# Patient Record
Sex: FEMALE | Race: WHITE | NOT HISPANIC OR LATINO | Employment: OTHER | URBAN - METROPOLITAN AREA
[De-identification: names, ages, dates, MRNs, and addresses within clinical notes are randomized per-mention and may not be internally consistent; named-entity substitution may affect disease eponyms.]

---

## 2018-07-15 ENCOUNTER — HOSPITAL ENCOUNTER (EMERGENCY)
Facility: HOSPITAL | Age: 83
Discharge: HOME | End: 2018-07-16
Attending: EMERGENCY MEDICINE | Admitting: EMERGENCY MEDICINE

## 2018-07-15 ENCOUNTER — APPOINTMENT (OUTPATIENT)
Dept: CT IMAGING | Facility: HOSPITAL | Age: 83
End: 2018-07-15

## 2018-07-15 DIAGNOSIS — R91.8 MASS OF UPPER LOBE OF RIGHT LUNG: ICD-10-CM

## 2018-07-15 DIAGNOSIS — T07.XXXA MULTIPLE CONTUSIONS: ICD-10-CM

## 2018-07-15 DIAGNOSIS — K59.09 CHRONIC CONSTIPATION: ICD-10-CM

## 2018-07-15 DIAGNOSIS — R29.6 MULTIPLE FALLS: Primary | ICD-10-CM

## 2018-07-15 LAB
BILIRUB UR QL STRIP: NEGATIVE
CLARITY UR: CLEAR
COLOR UR: ABNORMAL
GLUCOSE UR STRIP-MCNC: NEGATIVE MG/DL
HGB UR QL STRIP.AUTO: NEGATIVE
KETONES UR QL STRIP: ABNORMAL
LEUKOCYTE ESTERASE UR QL STRIP.AUTO: ABNORMAL
NITRITE UR QL STRIP: NEGATIVE
PH UR STRIP.AUTO: <=5 [PH] (ref 5–8)
PROT UR QL STRIP: ABNORMAL
SP GR UR STRIP: 1.02 (ref 1–1.03)
UROBILINOGEN UR QL STRIP: ABNORMAL

## 2018-07-15 PROCEDURE — 72125 CT NECK SPINE W/O DYE: CPT

## 2018-07-15 PROCEDURE — 70486 CT MAXILLOFACIAL W/O DYE: CPT

## 2018-07-15 PROCEDURE — 96372 THER/PROPH/DIAG INJ SC/IM: CPT

## 2018-07-15 PROCEDURE — 74176 CT ABD & PELVIS W/O CONTRAST: CPT

## 2018-07-15 PROCEDURE — 71250 CT THORAX DX C-: CPT

## 2018-07-15 PROCEDURE — 70450 CT HEAD/BRAIN W/O DYE: CPT

## 2018-07-15 PROCEDURE — 81001 URINALYSIS AUTO W/SCOPE: CPT | Performed by: EMERGENCY MEDICINE

## 2018-07-15 PROCEDURE — 99284 EMERGENCY DEPT VISIT MOD MDM: CPT

## 2018-07-15 RX ORDER — CARBAMAZEPINE 300 MG/1
300 CAPSULE, EXTENDED RELEASE ORAL DAILY
COMMUNITY
End: 2018-07-20 | Stop reason: HOSPADM

## 2018-07-15 RX ORDER — LANOLIN ALCOHOL/MO/W.PET/CERES
400 CREAM (GRAM) TOPICAL 2 TIMES DAILY
COMMUNITY

## 2018-07-15 RX ORDER — ASPIRIN 81 MG/1
81 TABLET ORAL DAILY
COMMUNITY
End: 2021-12-06

## 2018-07-15 RX ORDER — MIRTAZAPINE 30 MG/1
15 TABLET, FILM COATED ORAL NIGHTLY
COMMUNITY
End: 2018-07-20 | Stop reason: HOSPADM

## 2018-07-15 RX ORDER — BUDESONIDE AND FORMOTEROL FUMARATE DIHYDRATE 160; 4.5 UG/1; UG/1
2 AEROSOL RESPIRATORY (INHALATION)
COMMUNITY
End: 2021-12-06

## 2018-07-15 RX ORDER — PANTOPRAZOLE SODIUM 20 MG/1
20 TABLET, DELAYED RELEASE ORAL DAILY
COMMUNITY
End: 2022-04-22

## 2018-07-15 RX ORDER — LOSARTAN POTASSIUM 50 MG/1
50 TABLET ORAL DAILY
COMMUNITY
End: 2018-07-20 | Stop reason: HOSPADM

## 2018-07-15 RX ORDER — ATENOLOL 50 MG/1
50 TABLET ORAL DAILY
COMMUNITY
End: 2018-07-20 | Stop reason: HOSPADM

## 2018-07-15 RX ORDER — SACCHAROMYCES BOULARDII 250 MG
250 CAPSULE ORAL DAILY
COMMUNITY
End: 2018-07-20 | Stop reason: HOSPADM

## 2018-07-15 RX ORDER — LEVOTHYROXINE SODIUM 0.03 MG/1
25 TABLET ORAL
COMMUNITY

## 2018-07-16 VITALS
HEIGHT: 67 IN | SYSTOLIC BLOOD PRESSURE: 127 MMHG | WEIGHT: 88 LBS | TEMPERATURE: 97.7 F | DIASTOLIC BLOOD PRESSURE: 77 MMHG | HEART RATE: 75 BPM | OXYGEN SATURATION: 97 % | BODY MASS INDEX: 13.81 KG/M2 | RESPIRATION RATE: 16 BRPM

## 2018-07-16 LAB
BACTERIA UR QL AUTO: ABNORMAL /HPF
COARSE GRAN CASTS URNS QL MICRO: ABNORMAL /LPF
HYALINE CASTS UR QL AUTO: ABNORMAL /LPF
RBC # UR: ABNORMAL /HPF
REF LAB TEST METHOD: ABNORMAL
SQUAMOUS #/AREA URNS HPF: ABNORMAL /HPF
WBC UR QL AUTO: ABNORMAL /HPF

## 2018-07-16 PROCEDURE — 96372 THER/PROPH/DIAG INJ SC/IM: CPT

## 2018-07-16 RX ADMIN — METHYLNALTREXONE BROMIDE 6 MG: 12 INJECTION, SOLUTION SUBCUTANEOUS at 01:34

## 2018-07-18 ENCOUNTER — APPOINTMENT (OUTPATIENT)
Dept: GENERAL RADIOLOGY | Facility: HOSPITAL | Age: 83
End: 2018-07-18

## 2018-07-18 ENCOUNTER — HOSPITAL ENCOUNTER (OUTPATIENT)
Facility: HOSPITAL | Age: 83
Setting detail: OBSERVATION
Discharge: NURSING, REHAB OR LONG-TERM CARE | End: 2018-07-20
Attending: EMERGENCY MEDICINE | Admitting: EMERGENCY MEDICINE

## 2018-07-18 ENCOUNTER — APPOINTMENT (OUTPATIENT)
Dept: CT IMAGING | Facility: HOSPITAL | Age: 83
End: 2018-07-18

## 2018-07-18 DIAGNOSIS — S72.114A CLOSED NONDISPLACED FRACTURE OF GREATER TROCHANTER OF RIGHT FEMUR, INITIAL ENCOUNTER (HCC): ICD-10-CM

## 2018-07-18 DIAGNOSIS — Z74.09 IMPAIRED MOBILITY AND ADLS: ICD-10-CM

## 2018-07-18 DIAGNOSIS — R78.89 ELEVATED CARBAMAZEPINE LEVEL: ICD-10-CM

## 2018-07-18 DIAGNOSIS — N39.0 RECURRENT URINARY TRACT INFECTION: ICD-10-CM

## 2018-07-18 DIAGNOSIS — R29.6 MULTIPLE FALLS: Primary | ICD-10-CM

## 2018-07-18 DIAGNOSIS — F03.C0 ADVANCED DEMENTIA (HCC): ICD-10-CM
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
## 2018-07-18 DIAGNOSIS — Z74.09 IMPAIRED FUNCTIONAL MOBILITY, BALANCE, GAIT, AND ENDURANCE: ICD-10-CM
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
## 2018-07-18 DIAGNOSIS — Z78.9 IMPAIRED MOBILITY AND ADLS: ICD-10-CM

## 2018-07-18 LAB
ALBUMIN SERPL-MCNC: 3.61 G/DL (ref 3.2–4.8)
ALBUMIN/GLOB SERPL: 1.5 G/DL (ref 1.5–2.5)
ALP SERPL-CCNC: 88 U/L (ref 25–100)
ALT SERPL W P-5'-P-CCNC: 15 U/L (ref 7–40)
ANION GAP SERPL CALCULATED.3IONS-SCNC: 7 MMOL/L (ref 3–11)
AST SERPL-CCNC: 26 U/L (ref 0–33)
BACTERIA UR QL AUTO: ABNORMAL /HPF
BASOPHILS # BLD AUTO: 0.03 10*3/MM3 (ref 0–0.2)
BASOPHILS NFR BLD AUTO: 0.2 % (ref 0–1)
BILIRUB SERPL-MCNC: 0.3 MG/DL (ref 0.3–1.2)
BILIRUB UR QL STRIP: NEGATIVE
BUN BLD-MCNC: 22 MG/DL (ref 9–23)
BUN/CREAT SERPL: 22.9 (ref 7–25)
CALCIUM SPEC-SCNC: 8.7 MG/DL (ref 8.7–10.4)
CARBAMAZEPINE SERPL-MCNC: 19.5 MCG/ML (ref 4–10)
CHLORIDE SERPL-SCNC: 106 MMOL/L (ref 99–109)
CLARITY UR: ABNORMAL
CO2 SERPL-SCNC: 27 MMOL/L (ref 20–31)
COLOR UR: YELLOW
CREAT BLD-MCNC: 0.96 MG/DL (ref 0.6–1.3)
DEPRECATED RDW RBC AUTO: 43.7 FL (ref 37–54)
EOSINOPHIL # BLD AUTO: 0.05 10*3/MM3 (ref 0–0.3)
EOSINOPHIL NFR BLD AUTO: 0.4 % (ref 0–3)
ERYTHROCYTE [DISTWIDTH] IN BLOOD BY AUTOMATED COUNT: 12.6 % (ref 11.3–14.5)
GFR SERPL CREATININE-BSD FRML MDRD: 56 ML/MIN/1.73
GLOBULIN UR ELPH-MCNC: 2.4 GM/DL
GLUCOSE BLD-MCNC: 104 MG/DL (ref 70–100)
GLUCOSE UR STRIP-MCNC: NEGATIVE MG/DL
HCT VFR BLD AUTO: 31.8 % (ref 34.5–44)
HGB BLD-MCNC: 10.3 G/DL (ref 11.5–15.5)
HGB UR QL STRIP.AUTO: NEGATIVE
HYALINE CASTS UR QL AUTO: ABNORMAL /LPF
IMM GRANULOCYTES # BLD: 0.04 10*3/MM3 (ref 0–0.03)
IMM GRANULOCYTES NFR BLD: 0.3 % (ref 0–0.6)
KETONES UR QL STRIP: NEGATIVE
LEUKOCYTE ESTERASE UR QL STRIP.AUTO: ABNORMAL
LYMPHOCYTES # BLD AUTO: 1.14 10*3/MM3 (ref 0.6–4.8)
LYMPHOCYTES NFR BLD AUTO: 8.7 % (ref 24–44)
MCH RBC QN AUTO: 30.7 PG (ref 27–31)
MCHC RBC AUTO-ENTMCNC: 32.4 G/DL (ref 32–36)
MCV RBC AUTO: 94.9 FL (ref 80–99)
MONOCYTES # BLD AUTO: 1.15 10*3/MM3 (ref 0–1)
MONOCYTES NFR BLD AUTO: 8.7 % (ref 0–12)
NEUTROPHILS # BLD AUTO: 10.8 10*3/MM3 (ref 1.5–8.3)
NEUTROPHILS NFR BLD AUTO: 82 % (ref 41–71)
NITRITE UR QL STRIP: POSITIVE
PH UR STRIP.AUTO: 7 [PH] (ref 5–8)
PLATELET # BLD AUTO: 227 10*3/MM3 (ref 150–450)
PMV BLD AUTO: 10.4 FL (ref 6–12)
POTASSIUM BLD-SCNC: 4 MMOL/L (ref 3.5–5.5)
PROT SERPL-MCNC: 6 G/DL (ref 5.7–8.2)
PROT UR QL STRIP: ABNORMAL
RBC # BLD AUTO: 3.35 10*6/MM3 (ref 3.89–5.14)
RBC # UR: ABNORMAL /HPF
REF LAB TEST METHOD: ABNORMAL
SODIUM BLD-SCNC: 140 MMOL/L (ref 132–146)
SP GR UR STRIP: 1.02 (ref 1–1.03)
SQUAMOUS #/AREA URNS HPF: ABNORMAL /HPF
UROBILINOGEN UR QL STRIP: ABNORMAL
WBC NRBC COR # BLD: 13.17 10*3/MM3 (ref 3.5–10.8)
WBC UR QL AUTO: ABNORMAL /HPF

## 2018-07-18 PROCEDURE — 96372 THER/PROPH/DIAG INJ SC/IM: CPT

## 2018-07-18 PROCEDURE — 25010000002 CEFTRIAXONE PER 250 MG: Performed by: EMERGENCY MEDICINE

## 2018-07-18 PROCEDURE — 87186 SC STD MICRODIL/AGAR DIL: CPT | Performed by: EMERGENCY MEDICINE

## 2018-07-18 PROCEDURE — P9612 CATHETERIZE FOR URINE SPEC: HCPCS

## 2018-07-18 PROCEDURE — 72125 CT NECK SPINE W/O DYE: CPT

## 2018-07-18 PROCEDURE — 70450 CT HEAD/BRAIN W/O DYE: CPT

## 2018-07-18 PROCEDURE — G0378 HOSPITAL OBSERVATION PER HR: HCPCS

## 2018-07-18 PROCEDURE — 99285 EMERGENCY DEPT VISIT HI MDM: CPT

## 2018-07-18 PROCEDURE — 87077 CULTURE AEROBIC IDENTIFY: CPT | Performed by: EMERGENCY MEDICINE

## 2018-07-18 PROCEDURE — 80053 COMPREHEN METABOLIC PANEL: CPT | Performed by: EMERGENCY MEDICINE

## 2018-07-18 PROCEDURE — 70486 CT MAXILLOFACIAL W/O DYE: CPT

## 2018-07-18 PROCEDURE — 87086 URINE CULTURE/COLONY COUNT: CPT | Performed by: EMERGENCY MEDICINE

## 2018-07-18 PROCEDURE — 96365 THER/PROPH/DIAG IV INF INIT: CPT

## 2018-07-18 PROCEDURE — 80156 ASSAY CARBAMAZEPINE TOTAL: CPT | Performed by: EMERGENCY MEDICINE

## 2018-07-18 PROCEDURE — 85025 COMPLETE CBC W/AUTO DIFF WBC: CPT | Performed by: EMERGENCY MEDICINE

## 2018-07-18 PROCEDURE — 99220 PR INITIAL OBSERVATION CARE/DAY 70 MINUTES: CPT | Performed by: HOSPITALIST

## 2018-07-18 PROCEDURE — 25010000002 HEPARIN (PORCINE) PER 1000 UNITS: Performed by: HOSPITALIST

## 2018-07-18 PROCEDURE — 73502 X-RAY EXAM HIP UNI 2-3 VIEWS: CPT

## 2018-07-18 PROCEDURE — 81001 URINALYSIS AUTO W/SCOPE: CPT | Performed by: EMERGENCY MEDICINE

## 2018-07-18 RX ORDER — ACETAMINOPHEN 325 MG/1
650 TABLET ORAL EVERY 4 HOURS PRN
Status: DISCONTINUED | OUTPATIENT
Start: 2018-07-18 | End: 2018-07-20 | Stop reason: HOSPADM

## 2018-07-18 RX ORDER — CEFTRIAXONE SODIUM 1 G/50ML
1 INJECTION, SOLUTION INTRAVENOUS ONCE
Status: COMPLETED | OUTPATIENT
Start: 2018-07-18 | End: 2018-07-18

## 2018-07-18 RX ORDER — ONDANSETRON 2 MG/ML
4 INJECTION INTRAMUSCULAR; INTRAVENOUS EVERY 6 HOURS PRN
Status: DISCONTINUED | OUTPATIENT
Start: 2018-07-18 | End: 2018-07-20 | Stop reason: HOSPADM

## 2018-07-18 RX ORDER — LOSARTAN POTASSIUM AND HYDROCHLOROTHIAZIDE 25; 100 MG/1; MG/1
0.5 TABLET ORAL DAILY
COMMUNITY
End: 2018-07-20 | Stop reason: HOSPADM

## 2018-07-18 RX ORDER — LOVASTATIN 40 MG/1
40 TABLET ORAL NIGHTLY
COMMUNITY
End: 2021-12-06

## 2018-07-18 RX ORDER — AMLODIPINE BESYLATE 10 MG/1
10 TABLET ORAL DAILY
COMMUNITY
End: 2021-12-06 | Stop reason: HOSPADM

## 2018-07-18 RX ORDER — HEPARIN SODIUM 5000 [USP'U]/ML
5000 INJECTION, SOLUTION INTRAVENOUS; SUBCUTANEOUS EVERY 12 HOURS SCHEDULED
Status: DISCONTINUED | OUTPATIENT
Start: 2018-07-18 | End: 2018-07-20 | Stop reason: HOSPADM

## 2018-07-18 RX ORDER — PANTOPRAZOLE SODIUM 40 MG/1
40 TABLET, DELAYED RELEASE ORAL
Status: DISCONTINUED | OUTPATIENT
Start: 2018-07-19 | End: 2018-07-20 | Stop reason: HOSPADM

## 2018-07-18 RX ORDER — ACETAMINOPHEN 500 MG
1000 TABLET ORAL ONCE
Status: COMPLETED | OUTPATIENT
Start: 2018-07-18 | End: 2018-07-18

## 2018-07-18 RX ORDER — SODIUM CHLORIDE 0.9 % (FLUSH) 0.9 %
10 SYRINGE (ML) INJECTION AS NEEDED
Status: DISCONTINUED | OUTPATIENT
Start: 2018-07-18 | End: 2018-07-20 | Stop reason: HOSPADM

## 2018-07-18 RX ORDER — ONDANSETRON 4 MG/1
4 TABLET, FILM COATED ORAL EVERY 6 HOURS PRN
Status: DISCONTINUED | OUTPATIENT
Start: 2018-07-18 | End: 2018-07-20 | Stop reason: HOSPADM

## 2018-07-18 RX ORDER — ONDANSETRON HYDROCHLORIDE 8 MG/1
TABLET, FILM COATED ORAL 3 TIMES DAILY
COMMUNITY
End: 2018-07-20 | Stop reason: HOSPADM

## 2018-07-18 RX ORDER — LEVOTHYROXINE SODIUM 0.03 MG/1
25 TABLET ORAL
Status: DISCONTINUED | OUTPATIENT
Start: 2018-07-19 | End: 2018-07-20 | Stop reason: HOSPADM

## 2018-07-18 RX ORDER — CEFTRIAXONE SODIUM 1 G/50ML
1 INJECTION, SOLUTION INTRAVENOUS EVERY 24 HOURS
Status: COMPLETED | OUTPATIENT
Start: 2018-07-19 | End: 2018-07-20

## 2018-07-18 RX ORDER — DRONABINOL 2.5 MG/1
2.5 CAPSULE ORAL
Status: ON HOLD | COMMUNITY
End: 2018-07-20

## 2018-07-18 RX ORDER — ATORVASTATIN CALCIUM 10 MG/1
10 TABLET, FILM COATED ORAL DAILY
Status: DISCONTINUED | OUTPATIENT
Start: 2018-07-18 | End: 2018-07-20 | Stop reason: HOSPADM

## 2018-07-18 RX ORDER — BUDESONIDE AND FORMOTEROL FUMARATE DIHYDRATE 160; 4.5 UG/1; UG/1
2 AEROSOL RESPIRATORY (INHALATION)
Status: DISCONTINUED | OUTPATIENT
Start: 2018-07-18 | End: 2018-07-20 | Stop reason: HOSPADM

## 2018-07-18 RX ORDER — SODIUM CHLORIDE 0.9 % (FLUSH) 0.9 %
1-10 SYRINGE (ML) INJECTION AS NEEDED
Status: DISCONTINUED | OUTPATIENT
Start: 2018-07-18 | End: 2018-07-20 | Stop reason: HOSPADM

## 2018-07-18 RX ORDER — DRONABINOL 2.5 MG/1
2.5 CAPSULE ORAL
Status: DISCONTINUED | OUTPATIENT
Start: 2018-07-18 | End: 2018-07-20 | Stop reason: HOSPADM

## 2018-07-18 RX ORDER — AMLODIPINE BESYLATE 10 MG/1
10 TABLET ORAL DAILY
Status: DISCONTINUED | OUTPATIENT
Start: 2018-07-18 | End: 2018-07-20 | Stop reason: HOSPADM

## 2018-07-18 RX ADMIN — CEFTRIAXONE SODIUM 1 G: 1 INJECTION, SOLUTION INTRAVENOUS at 09:00

## 2018-07-18 RX ADMIN — ATORVASTATIN CALCIUM 10 MG: 10 TABLET, FILM COATED ORAL at 21:27

## 2018-07-18 RX ADMIN — ACETAMINOPHEN 1000 MG: 500 TABLET, FILM COATED ORAL at 06:29

## 2018-07-18 RX ADMIN — AMLODIPINE BESYLATE 10 MG: 10 TABLET ORAL at 21:26

## 2018-07-18 RX ADMIN — HEPARIN SODIUM 5000 UNITS: 5000 INJECTION, SOLUTION INTRAVENOUS; SUBCUTANEOUS at 21:27

## 2018-07-18 RX ADMIN — ACETAMINOPHEN 650 MG: 325 TABLET, FILM COATED ORAL at 17:59

## 2018-07-18 NOTE — CONSULTS
No Known Provider  Consulting physician: Andrew    Chief Complaint   Patient presents with   • Fall         HPI: Patient is an 83-year-old female brought in hospital after suffering a fall.  The patient probably had multiple falls over the last couple of days.  She was seen in the ER about 2 days ago for a previous fall.  The patient lives in independent living facility, however she has had a recent stay in a rehabilitation facility after suffering a hip fracture.  The patient herself states that she does not remember to fall or what caused her to fall.      Past Medical History:   Diagnosis Date   • Ataxic gait    • Chronic kidney disease, stage 3    • COPD (chronic obstructive pulmonary disease) (CMS/[facility])    • Dementia    • Disease of thyroid gland    • Hypertension      Past Surgical History:   Procedure Laterality Date   • BRAIN TUMOR EXCISION      BENIGN   • FRACTURE SURGERY     • TRIGGER FINGER RELEASE       Current Code Status     Date Active Code Status Order ID Comments User Context       7/18/2018 12:02 PM No CPR [id number]  Jessie Morales MD ED       Questions for Current Code Status     Question Answer Comment    Code Status No CPR     Medical Interventions (Level of Support Prior to Arrest) Limited     Limited Support to NOT Include Dialysis      Intubation      Vasopressors     Comments Freddy Eric  Arnie Medical POA     Level Of Support Discussed With Health Care Surrogate         Current Facility-Administered Medications   Medication Dose Route Frequency Provider Last Rate Last Dose   • [START ON 7/19/2018] cefTRIAXone (ROCEPHIN) IVPB 1 g  1 g Intravenous Q24H Jessie Morales MD       • sodium chloride 0.9 % flush 10 mL  10 mL Intravenous PRN Carlos Espinal MD         Current Outpatient Prescriptions   Medication Sig Dispense Refill   • amLODIPine (NORVASC) 10 MG tablet Take 10 mg by mouth Daily.     • aspirin 81 MG EC tablet Take 81 mg by mouth Daily.     • atenolol (TENORMIN) 50 MG tablet Take  "50 mg by mouth Daily.     • budesonide-formoterol (SYMBICORT) 160-4.5 MCG/ACT inhaler Inhale 2 puffs 2 (Two) Times a Day.     • carBAMazepine (CARBATROL) 300 MG 12 hr capsule Take 300 mg by mouth Daily.     • dronabinol (MARINOL) 2.5 MG capsule Take 2.5 mg by mouth 2 (Two) Times a Day Before Meals.     • levothyroxine (SYNTHROID, LEVOTHROID) 25 MCG tablet Take 25 mcg by mouth Daily.     • losartan-hydrochlorothiazide (HYZAAR) 100-25 MG per tablet Take 0.5 tablets by mouth Daily.     • lovastatin (MEVACOR) 40 MG tablet Take 40 mg by mouth Every Night.     • mirtazapine (REMERON) 30 MG tablet Take 15 mg by mouth Every Night.     • ondansetron (ZOFRAN) 8 MG tablet Take  by mouth 3 (Three) Times a Day.     • pantoprazole (PROTONIX) 40 MG EC tablet Take 40 mg by mouth Daily.     • folic acid (FOLVITE) 400 MCG tablet Take 400 mcg by mouth 2 (Two) Times a Day.     • losartan (COZAAR) 50 MG tablet Take 50 mg by mouth Daily.     • saccharomyces boulardii (FLORASTOR) 250 MG capsule Take 250 mg by mouth Daily.          Insert peripheral IV **AND** sodium chloride  No Known Allergies  History reviewed. No pertinent family history.  Social History     Social History   • Marital status: Single     Spouse name: N/A   • Number of children: N/A   • Years of education: N/A     Occupational History   • Not on file.     Social History Main Topics   • Smoking status: Former Smoker   • Smokeless tobacco: Not on file   • Alcohol use No   • Drug use: No   • Sexual activity: Not on file     Other Topics Concern   • Not on file     Social History Narrative   • No narrative on file     Review of Systems - All others reviewed and found negative except mentioned in the history of present illness      /63   Pulse 66   Temp 97.6 °F (36.4 °C) (Oral)   Resp 18   Ht 160 cm (63\")   Wt 39.9 kg (88 lb)   SpO2 94%   BMI 15.59 kg/m²     Intake/Output Summary (Last 24 hours) at 07/18/18 1401  Last data filed at 07/18/18 1221   Gross per 24 " hour   Intake               50 ml   Output                0 ml   Net               50 ml     Physical Exam:      General Appearance:    Alert, cooperative, in no acute distress   Head:    Multiple areas of ecchymosis on her face and forehead   Eyes:            Lids and lashes normal, conjunctivae and sclerae normal, no   icterus, no pallor, corneas clear, PERRLA   Ears:    Ears appear intact with no abnormalities noted   Throat:   No oral lesions, no thrush, oral mucosa moist   Neck:   No adenopathy, supple, trachea midline, no thyromegaly, no     carotid bruit, no JVD   Back:     No kyphosis present, no scoliosis present, no skin lesions,       erythema or scars, no tenderness to percussion or                   palpation,   range of motion normal   Lungs:     Clear to auscultation,respirations regular, even and                   unlabored    Heart:    Regular rhythm and normal rate, normal S1 and S2, no            murmur, no gallop, no rub, no click   Breast Exam:    Deferred   Abdomen:     Normal bowel sounds, no masses, no organomegaly, soft        non-tender, non-distended, no guarding, no rebound                 tenderness   Genitalia:    Deferred   Extremities:   Moves all extremities well, no edema, no cyanosis, no              redness   Pulses:   Pulses palpable and equal bilaterally   Skin:   No bleeding, bruising or rash   Lymph nodes:   No palpable adenopathy   Neurologic:   Cranial nerves 2 - 12 grossly intact, sensation intact, DTR        present and equal bilaterally         Results from last 7 days  Lab Units 07/18/18  0655   WBC 10*3/mm3 13.17*   HEMOGLOBIN g/dL 10.3*   HEMATOCRIT % 31.8*   PLATELETS 10*3/mm3 227       Results from last 7 days  Lab Units 07/18/18  0655   SODIUM mmol/L 140   POTASSIUM mmol/L 4.0   CHLORIDE mmol/L 106   CO2 mmol/L 27.0   BUN mg/dL 22   CREATININE mg/dL 0.96   CALCIUM mg/dL 8.7   BILIRUBIN mg/dL 0.3   ALK PHOS U/L 88   ALT (SGPT) U/L 15   AST (SGOT) U/L 26   GLUCOSE  mg/dL 104*       Results from last 7 days  Lab Units 07/18/18  0655   SODIUM mmol/L 140   POTASSIUM mmol/L 4.0   CHLORIDE mmol/L 106   CO2 mmol/L 27.0   BUN mg/dL 22   CREATININE mg/dL 0.96   GLUCOSE mg/dL 104*   CALCIUM mg/dL 8.7     Imaging Results (last 72 hours)     Procedure Component Value Units Date/Time    XR Hip With or Without Pelvis 2 - 3 View Right [[id number]] Collected:  07/18/18 0858     Updated:  07/18/18 1124    Narrative:       EXAMINATION: XR HIP W OR WO PELVIS 2-3 VIEW RIGHT- 07/18/2018     INDICATION: multiple falls with right hip fracture     TECHNIQUE:  Frontal view pelvis and frontal and lateral views right hip     COMPARISONS:  CT abdomen/pelvis 07/15/2018     FINDINGS:  Visible in retrospect on the comparison CT of the  abdomen/pelvis and poorly visualized on the radiograph is a fracture  involving the right greater trochanter; it is unknown when the ORIF of  the right hip was performed so this may be acute or chronic. Sacral  arcuate, ilioischial and iliopectineal lines are preserved. Hip joint  spaces are preserved. Incidental lumbar spine degenerative change noted.       Impression:       Age-indeterminate right greater trochanter fracture. ORIF  presumed prior right femoral neck fracture.     D:  07/18/2018  E:  07/18/2018     This report was finalized on 7/18/2018 11:22 AM by Cisco Jimenez.       CT Head Without Contrast [[id number]] Collected:  07/18/18 0621     Updated:  07/18/18 0758    Narrative:       EXAM:    CT Head Without Intravenous Contrast    EXAM DATE/TIME:    7/18/2018 6:21 AM    CLINICAL HISTORY:    83 years old, female; Injury or trauma; Fall; Initial encounter; Blunt trauma   (contusions or hematomas); Consciousness not specified; Additional info:   Multiple falls with head and face trauma    TECHNIQUE:    Axial computed tomography images of the head/brain without intravenous   contrast.  All CT scans at this facility use at least one of these dose   optimization  techniques: automated exposure control; mA and/or kV adjustment   per patient size (includes targeted exams where dose is matched to clinical   indication); or iterative reconstruction.    COMPARISON:    CT HEAD WO CONTRAST 2018-07-15 22:49    FINDINGS:    Brain: Prior left frontal craniotomy with subjacent old tissue loss.   Ill-defined areas of low attenuation  are  noted in the supratentorial white   matter.  Age related microvascular/ischemic disease may be considered. Stable 9   mm right far anterior falcine calcification versus calcified old meningioma.    Ventricles: Atrophic changes.    Bones/joints:  Unremarkable.  No acute fracture.    Soft tissues:  Unremarkable.    Sinuses:  Unremarkable as visualized.  No acute sinusitis.    Mastoid air cells:  Unremarkable as visualized.  No mastoid effusion.      Impression:       Essentially stable appearance compared to 7/15/18, with findings above.  MRI may be useful in excluding occult acute infarction, if clinically warranted.    THIS DOCUMENT HAS BEEN ELECTRONICALLY SIGNED BY DARIAN TRACY MD    CT Facial Bones Without Contrast [[id number]] Collected:  07/18/18 0622     Updated:  07/18/18 0745    Narrative:       EXAM:    CT Maxillofacial Without Intravenous Contrast    EXAM DATE/TIME:    7/18/2018 6:22 AM    CLINICAL HISTORY:    83 years old, female; Injury or trauma; Fall; Initial encounter; Blunt trauma   (contusions or hematomas); Cheek bone and head/scalp and forehead and nose and   orbit/periorbital and maxilla and jaw; Loss of consciousness not known; Right;   Additional info: Multiple falls with head and face trauma    TECHNIQUE:    Axial computed tomography images of the face without intravenous contrast.    All CT scans at this facility use at least one of these dose optimization   techniques: automated exposure control; mA and/or kV adjustment per patient   size (includes targeted exams where dose is matched to clinical indication); or   iterative  reconstruction.    Coronal and sagittal reformatted images were created and reviewed.    COMPARISON:    CT FACIAL BONES WO CONTRAST 2018-07-15 22:49    FINDINGS:    Bones/joints: Nasal bridge fractures.    Soft tissues: Right facial swelling.    Orbits:  Unremarkable.    Sinuses:  Unremarkable.  No air-fluid levels.      Impression:       Nasal bridge fractures.  Right facial swelling.    THIS DOCUMENT HAS BEEN ELECTRONICALLY SIGNED BY DARIAN TRACY MD    CT Cervical Spine Without Contrast [[id number]] Collected:  07/18/18 0622     Updated:  07/18/18 0738    Narrative:       EXAM:    CT Cervical Spine Without Intravenous Contrast    EXAM DATE/TIME:    7/18/2018 6:22 AM    CLINICAL HISTORY:    83 years old, female; Injury or trauma; Fall; Initial encounter; Blunt   trauma; Injury date: Today; Additional info: Multiple falls with head and face   trauma    TECHNIQUE:    Axial computed tomography images of the cervical spine without intravenous   contrast.  All CT scans at this facility use at least one of these dose   optimization techniques: automated exposure control; mA and/or kV adjustment   per patient size (includes targeted exams where dose is matched to clinical   indication); or iterative reconstruction.    Coronal and sagittal reformatted images were created and reviewed.    COMPARISON:    CT CERVICAL SPINE WO CONTRAST 2018-07-15 22:49    FINDINGS:    Vertebrae:  Unremarkable.  No acute fracture.    Discs/spinal canal/neural foramina: Mild multilevel degenerative spondylosis   with C5-6 listhesis.    Soft tissues: Small left thyroid nodule.    Lung apices: Biapical emphysematous changes.      Impression:         No acute fracture or prevertebral swelling.  Mild multilevel degenerative spondylosis with C5-6 listhesis.  Small left thyroid nodule.  Biapical emphysematous changes.    THIS DOCUMENT HAS BEEN ELECTRONICALLY SIGNED BY DARIAN TRACY MD        Impression: Fall  Dementia  Nasal fracture  [facility]  Plan: I did  have a discussion with the patient and her POA, who was at bedside.  Code status is noted to be DNR which I agree with at this time.  POA is meeting with patients independent living facility for further discussion and may look at SNF vs long term placement.  We will discuss with him further tomorrow.        Oliver Guillermo,   07/18/18  2:01 PM

## 2018-07-18 NOTE — H&P
[facility]  HISTORY AND PHYSICAL    Patient Name: Mitzi Eric  : 1935  MRN: [id number]  Primary Care Physician: No Known Provider    Subjective   Subjective     Chief Complaint:  FU multiple falls    HPI:  Mitzi Eric is a 83 y.o. female with dementia, HTN, COPD presented after an unwitnessed fall. Most history obtained from nephew - medical POA. 3 falls in 3 days at [facility]. Pt unable to provide any extensive history regarding falls. Able to answer simple questions. Found to have UTI in ED.     Review of Systems   Gen- No fevers, chills  CV- No chest pain, palpitations  Resp- No cough, dyspnea  GI- No N/V/D, abd pain    Otherwise 10-system ROS reviewed and is negative except as mentioned in the HPI.    Personal History     Past Medical History:   Diagnosis Date   • Ataxic gait    • Chronic kidney disease, stage 3    • COPD (chronic obstructive pulmonary disease) (CMS/HCC)    • Dementia    • Disease of thyroid gland    • Hypertension        Past Surgical History:   Procedure Laterality Date   • BRAIN TUMOR EXCISION      BENIGN   • FRACTURE SURGERY     • TRIGGER FINGER RELEASE         Family History: family history is not on file.     Social History:  reports that she has quit smoking. She does not have any smokeless tobacco history on file. She reports that she does not drink alcohol or use drugs.    Medications:  Prescriptions Prior to Admission   Medication Sig Dispense Refill Last Dose   • amLODIPine (NORVASC) 10 MG tablet Take 10 mg by mouth Daily.      • aspirin 81 MG EC tablet Take 81 mg by mouth Daily.      • atenolol (TENORMIN) 50 MG tablet Take 50 mg by mouth Daily.      • budesonide-formoterol (SYMBICORT) 160-4.5 MCG/ACT inhaler Inhale 2 puffs 2 (Two) Times a Day.      • carBAMazepine (CARBATROL) 300 MG 12 hr capsule Take 300 mg by mouth Daily.      • dronabinol (MARINOL) 2.5 MG capsule Take 2.5 mg by mouth 2 (Two) Times a Day Before Meals.       • levothyroxine (SYNTHROID, LEVOTHROID) 25 MCG tablet Take 25 mcg by mouth Daily.      • losartan-hydrochlorothiazide (HYZAAR) 100-25 MG per tablet Take 0.5 tablets by mouth Daily.      • lovastatin (MEVACOR) 40 MG tablet Take 40 mg by mouth Every Night.      • mirtazapine (REMERON) 30 MG tablet Take 15 mg by mouth Every Night.      • ondansetron (ZOFRAN) 8 MG tablet Take  by mouth 3 (Three) Times a Day.      • pantoprazole (PROTONIX) 40 MG EC tablet Take 40 mg by mouth Daily.      • folic acid (FOLVITE) 400 MCG tablet Take 400 mcg by mouth 2 (Two) Times a Day.      • losartan (COZAAR) 50 MG tablet Take 50 mg by mouth Daily.      • saccharomyces boulardii (FLORASTOR) 250 MG capsule Take 250 mg by mouth Daily.          No Known Allergies    Objective   Objective     Vital Signs:   Temp:  [97.6 °F (36.4 °C)] 97.6 °F (36.4 °C)  Heart Rate:  [65-68] 66  Resp:  [16-18] 18  BP: ()/(50-75) 140/69        Physical Exam   Constitutional: No acute distress, awake, alert on RA  Eyes: PERRLA, sclerae anicteric, no conjunctival injection, R eye ecchymotic  HENT: NCAT, mucous membranes moist, bruises on upper lip, no bleeding in mouth  Neck: Supple, no thyromegaly, no lymphadenopathy, trachea midline  Respiratory: Clear to auscultation bilaterally, nonlabored respirations   Cardiovascular: RRR, no murmurs, rubs, or gallops, palpable pedal pulses bilaterally  Gastrointestinal: Positive bowel sounds, soft, Suprapubic tenderness, nondistended  Musculoskeletal: No bilateral ankle edema, no clubbing or cyanosis to extremities  Psychiatric: Appropriate affect, cooperative  Neurologic: Oriented x 3, strength symmetric in all extremities, Cranial Nerves grossly intact to confrontation, speech clear  Skin: No rashes    Results Reviewed:  I have personally reviewed current lab, radiology, and data and agree.      Results from last 7 days  Lab Units 07/18/18  0655   WBC 10*3/mm3 13.17*   HEMOGLOBIN g/dL 10.3*   HEMATOCRIT % 31.8*    PLATELETS 10*3/mm3 227       Results from last 7 days  Lab Units 07/18/18  0655   SODIUM mmol/L 140   POTASSIUM mmol/L 4.0   CHLORIDE mmol/L 106   CO2 mmol/L 27.0   BUN mg/dL 22   CREATININE mg/dL 0.96   GLUCOSE mg/dL 104*   CALCIUM mg/dL 8.7   ALT (SGPT) U/L 15   AST (SGOT) U/L 26     No results found for: BNP  No results found for: PHART  Imaging Results (last 24 hours)     Procedure Component Value Units Date/Time    XR Hip With or Without Pelvis 2 - 3 View Right [[id number]] Collected:  07/18/18 0858     Updated:  07/18/18 1124    Narrative:       EXAMINATION: XR HIP W OR WO PELVIS 2-3 VIEW RIGHT- 07/18/2018     INDICATION: multiple falls with right hip fracture     TECHNIQUE:  Frontal view pelvis and frontal and lateral views right hip     COMPARISONS:  CT abdomen/pelvis 07/15/2018     FINDINGS:  Visible in retrospect on the comparison CT of the  abdomen/pelvis and poorly visualized on the radiograph is a fracture  involving the right greater trochanter; it is unknown when the ORIF of  the right hip was performed so this may be acute or chronic. Sacral  arcuate, ilioischial and iliopectineal lines are preserved. Hip joint  spaces are preserved. Incidental lumbar spine degenerative change noted.       Impression:       Age-indeterminate right greater trochanter fracture. ORIF  presumed prior right femoral neck fracture.     D:  07/18/2018  E:  07/18/2018     This report was finalized on 7/18/2018 11:22 AM by Cisco Jimenez.       CT Head Without Contrast [[id number]] Collected:  07/18/18 0621     Updated:  07/18/18 0758    Narrative:       EXAM:    CT Head Without Intravenous Contrast    EXAM DATE/TIME:    7/18/2018 6:21 AM    CLINICAL HISTORY:    83 years old, female; Injury or trauma; Fall; Initial encounter; Blunt trauma   (contusions or hematomas); Consciousness not specified; Additional info:   Multiple falls with head and face trauma    TECHNIQUE:    Axial computed tomography images of the head/brain without  intravenous   contrast.  All CT scans at this facility use at least one of these dose   optimization techniques: automated exposure control; mA and/or kV adjustment   per patient size (includes targeted exams where dose is matched to clinical   indication); or iterative reconstruction.    COMPARISON:    CT HEAD WO CONTRAST 2018-07-15 22:49    FINDINGS:    Brain: Prior left frontal craniotomy with subjacent old tissue loss.   Ill-defined areas of low attenuation  are  noted in the supratentorial white   matter.  Age related microvascular/ischemic disease may be considered. Stable 9   mm right far anterior falcine calcification versus calcified old meningioma.    Ventricles: Atrophic changes.    Bones/joints:  Unremarkable.  No acute fracture.    Soft tissues:  Unremarkable.    Sinuses:  Unremarkable as visualized.  No acute sinusitis.    Mastoid air cells:  Unremarkable as visualized.  No mastoid effusion.      Impression:       Essentially stable appearance compared to 7/15/18, with findings above.  MRI may be useful in excluding occult acute infarction, if clinically warranted.    THIS DOCUMENT HAS BEEN ELECTRONICALLY SIGNED BY DARIAN TRACY MD    CT Facial Bones Without Contrast [[id number]] Collected:  07/18/18 0622     Updated:  07/18/18 0745    Narrative:       EXAM:    CT Maxillofacial Without Intravenous Contrast    EXAM DATE/TIME:    7/18/2018 6:22 AM    CLINICAL HISTORY:    83 years old, female; Injury or trauma; Fall; Initial encounter; Blunt trauma   (contusions or hematomas); Cheek bone and head/scalp and forehead and nose and   orbit/periorbital and maxilla and jaw; Loss of consciousness not known; Right;   Additional info: Multiple falls with head and face trauma    TECHNIQUE:    Axial computed tomography images of the face without intravenous contrast.    All CT scans at this facility use at least one of these dose optimization   techniques: automated exposure control; mA and/or kV adjustment per  patient   size (includes targeted exams where dose is matched to clinical indication); or   iterative reconstruction.    Coronal and sagittal reformatted images were created and reviewed.    COMPARISON:    CT FACIAL BONES WO CONTRAST 2018-07-15 22:49    FINDINGS:    Bones/joints: Nasal bridge fractures.    Soft tissues: Right facial swelling.    Orbits:  Unremarkable.    Sinuses:  Unremarkable.  No air-fluid levels.      Impression:       Nasal bridge fractures.  Right facial swelling.    THIS DOCUMENT HAS BEEN ELECTRONICALLY SIGNED BY DARIAN TRACY MD    CT Cervical Spine Without Contrast [[id number]] Collected:  07/18/18 0622     Updated:  07/18/18 0738    Narrative:       EXAM:    CT Cervical Spine Without Intravenous Contrast    EXAM DATE/TIME:    7/18/2018 6:22 AM    CLINICAL HISTORY:    83 years old, female; Injury or trauma; Fall; Initial encounter; Blunt   trauma; Injury date: Today; Additional info: Multiple falls with head and face   trauma    TECHNIQUE:    Axial computed tomography images of the cervical spine without intravenous   contrast.  All CT scans at this facility use at least one of these dose   optimization techniques: automated exposure control; mA and/or kV adjustment   per patient size (includes targeted exams where dose is matched to clinical   indication); or iterative reconstruction.    Coronal and sagittal reformatted images were created and reviewed.    COMPARISON:    CT CERVICAL SPINE WO CONTRAST 2018-07-15 22:49    FINDINGS:    Vertebrae:  Unremarkable.  No acute fracture.    Discs/spinal canal/neural foramina: Mild multilevel degenerative spondylosis   with C5-6 listhesis.    Soft tissues: Small left thyroid nodule.    Lung apices: Biapical emphysematous changes.      Impression:         No acute fracture or prevertebral swelling.  Mild multilevel degenerative spondylosis with C5-6 listhesis.  Small left thyroid nodule.  Biapical emphysematous changes.    THIS DOCUMENT HAS BEEN  ELECTRONICALLY SIGNED BY DARIAN TRACY MD             Assessment/Plan   Assessment / Plan     Hospital Problem List     Fall          Assessment & Plan:    - Multiple falls: Telemetry for possible. Obtain orthostatic VS. Possibly mechanical. CM to look into a better-suited place to discharge pt to- perhaps with closer monitoring. Will consult Palliative given her multiple falls and plans for future hospitalizations going forward  - UTI: Ceftriaxone. Has suprapubic tenderness. UCx pending  - R greater trochanter fx: PT/OT. Non-surgical  - HTN: Amlodipine  - Hypothyr: Levothyr    DVT prophylaxis: [facility]    CODE STATUS:    Code Status and Medical Interventions:   Ordered at: 07/18/18 1202     Limited Support to NOT Include:    Dialysis    Intubation    Vasopressors     Level Of Support Discussed With:    Health Care Surrogate     Code Status:    No CPR     Medical Interventions (Level of Support Prior to Arrest):    Limited     Comments:    Freddy Dutton Nephgurpreet Medical POA       Admission Status:  I believe this patient meets OBSERVATION status, however if further evaluation or treatment plans warrant, status may change.  Based upon current information, I predict patient's care encounter to be less than or equal to 2 midnights.    Jessie Morales MD   07/18/18   6:04 PM

## 2018-07-18 NOTE — PROGRESS NOTES
Continued Stay Note  [facility]     Patient Name: Mitzi Eric  MRN: [id number]  Today's Date: 7/18/2018    Admit Date: 7/18/2018          Discharge Plan     Row Name 07/18/18 1718       Plan    Plan Comments CM received call from Tosha at [facility] and she has met with pt's nephew Freddy and they have suggested to him that pt goes to skilled rehab again and then they will reassess her at rehab prior to return to [facility] to see if she still qualifies for their level of care. Tosha reports they do not have a higher level of care available for the pt at this time. CM will need to follow up with pt and nephew tomorrow to develope discharge plan. CM will conitnue to follow.     Row Name 07/18/18 6857       Plan    Plan TBD    Patient/Family in Agreement with Plan yes    Plan Comments CM spoke with pt and nepwhew/ POBONY Freddy at bedside. Pt has been a resident at [facility], assisted living. Pt has has multiple falls in the past month including 3 in the past 3 days. Pt's nephew Freddy is concerned of pt safety residing in [facility] and feels she needs higher level of care. CM has attempted to contact Tosha at [facility]  twice today and left 2 messages reporting she would return calls to CM regarding pt. CM trying to contact [facility] to see if pt can be moved to a higher level of care at their facility or if pt and family will need to seek placement elsewhere. CM discussed with pt's nephew at bedside and he arranged an appointment with them for 4pm to discuss plans.  JACKIE did provide Freddy with [facility] facility list and we will follow up with pt and family to assist with discharge plans and needs.               Discharge Codes    No documentation.           Neelam Duong

## 2018-07-18 NOTE — PROGRESS NOTES
Discharge Planning Assessment  [facility]     Patient Name: Mitzi Eric  MRN: [id number]  Today's Date: 7/18/2018    Admit Date: 7/18/2018          Discharge Needs Assessment     Row Name 07/18/18 1523       Living Environment    Lives With facility resident    Name(s) of Who Lives With Patient Pt is a resident at [facility], assisted living    Current Living Arrangements home/apartment/condo;independent/assisted living facility    Primary Care Provided by other (see comments)   staff    Provides Primary Care For no one, unable/limited ability to care for self    Family Caregiver if Needed other (see comments)    Family Caregiver Names Freddy, nephew    Quality of Family Relationships helpful;involved;supportive    Able to Return to Prior Arrangements other (see comments)   TBD       Resource/Environmental Concerns    Resource/Environmental Concerns none       Transition Planning    Patient/Family Anticipates Transition to long term care facility    Patient/Family Anticipated Services at Transition skilled nursing   pt is going to need higher level of care than assisted living    Transportation Anticipated family or friend will provide       Discharge Needs Assessment    Readmission Within the Last 30 Days previous discharge plan unsuccessful   previously admitted to OSH for hip fx    Concerns to be Addressed discharge planning    Equipment Currently Used at Home wheelchair    Anticipated Changes Related to Illness none    Equipment Needed After Discharge none    Outpatient/Agency/Support Group Needs skilled nursing facility    Discharge Facility/Level of Care Needs nursing facility, skilled    Offered/Gave Vendor List yes            Discharge Plan     Row Name 07/18/18 1532       Plan    Plan TBD    Patient/Family in Agreement with Plan yes    Plan Comments CM spoke with pt and pratik/ CADE Hayes at bedside. Pt has been a resident at [facility], assisted [facility]. Pt has has multiple falls in the past month  including 3 in the past 3 days. Pt's nephew Freddy is concerned [facility] and feels she needs higher level of care. CM has attempted to contact Tosha at [facility]  twice today and left 2 messages reporting she would return calls to  regarding pt. CM trying to contact [facility] to see if pt can be moved to a higher level of care at their facility or if pt and family will need to seek placement elsewhere. CM discussed with pt's nephew at bedside and he arranged an appointment with them for 4pm to discuss plans.  CM did provide Freddy with [facility] facility list and we will follow up with pt and family to assist with discharge plans and needs.         Destination     No service coordination in this encounter.      Durable Medical Equipment     No service coordination in this encounter.      Dialysis/Infusion     No service coordination in this encounter.      Home Medical Care     No service coordination in this encounter.      Social Care     No service coordination in this encounter.                Demographic Summary     Row Name 07/18/18 1512       General Information    Required Notices Provided Observation Status Notice    Referral Source admission list    General Information Comments PCP- MD2U       Contact Information    Permission Granted to Share Info With     Contact Information Comments Freddy (nephew, POA) [phone]            Functional Status     Row Name 07/18/18 1519       Functional Status    Usual Activity Tolerance poor    Current Activity Tolerance poor    Functional Status Comments pt has been wheelchair bound since hip fx approx 1 month ago       Functional Status, IADL    Medications completely dependent    Meal Preparation completely dependent    Housekeeping completely dependent    Laundry completely dependent    Shopping completely dependent       Employment/    Employment/ Comments pt's nephew reports pt has [employer]  Medicare, denies concerns or disruption in coverage. Denies issues with medications            Psychosocial    No documentation.           Abuse/Neglect    No documentation.           Legal    No documentation.           Substance Abuse    No documentation.           Patient Forms    No documentation.         Neelam Duong

## 2018-07-18 NOTE — ED PROVIDER NOTES
Subjective   The patient presents to emergency department after a fall this morning.  The patient has multiple recent visits to this hospital others with falls and this is the 2nd time I have seen the patient this week related to a fall at  facility.  The patient recently had a surgery secondary to hip fracture associated with fall and was in a rehabilitation facility and now in a nursing home.  The patient is currently in a nursing home but did set up more like a assisted living facility per the nephew that is at the bedside.  The patient was getting out of bed at some point during the night and falling area she has multiple areas of ecchymoses, contusion, abrasion, and skin tears of various stages from multiple recent falls area unknown loss of consciousness.  No blood thinner.  Patient is complaining of right hip pain which is where she had surgery before as well as some neck pain.  Patient also complains of pain of the mouth and face.        History provided by:  Nursing home, EMS personnel, medical records and relative  History limited by:  Dementia      Review of Systems   Unable to perform ROS: Dementia   Respiratory: Negative.    Cardiovascular: Negative.    Gastrointestinal: Negative.    Musculoskeletal: Positive for neck pain. Negative for back pain.   Hematological: Bruises/bleeds easily.       Past Medical History:   Diagnosis Date   • Ataxic gait    • Chronic kidney disease, stage 3    • COPD (chronic obstructive pulmonary disease) (CMS/[facility])    • Dementia    • Disease of thyroid gland    • Hypertension        No Known Allergies    Past Surgical History:   Procedure Laterality Date   • BRAIN TUMOR EXCISION      BENIGN   • FRACTURE SURGERY     • TRIGGER FINGER RELEASE         History reviewed. No pertinent family history.    Social History     Social History   • Marital status: Single     Social History Main Topics   • Smoking status: Former Smoker   • Alcohol use No   • Drug use: No     Other Topics  Concern   • Not on file           Objective   Physical Exam   Constitutional: She appears well-developed and well-nourished.   Elderly chronically ill-appearing female.   HENT:   Head: Normocephalic. Head is with abrasion and with contusion. Head is without Green's sign.       Mouth/Throat: Oropharynx is clear and moist.       Multiple areas of contusion, ecchymoses, abrasion, superficial laceration throughout the face.  No significant injuries noted above.  These demonstrate various stages of evolution with acute and subacute injuries.   Eyes: Pupils are equal, round, and reactive to light. EOM are normal.   Neck:   Mild diffuse tenderness patient but no focal point tenderness or step-off.   Cardiovascular: Normal rate, regular rhythm, normal heart sounds and intact distal pulses.    Pulmonary/Chest: Effort normal and breath sounds normal. No respiratory distress.   Abdominal: Soft. There is no tenderness. There is no guarding.   Musculoskeletal:   Mild diffuse tenderness of the cervical spine.  No point tenderness of the thoracic spine.  Mild tenderness of the T12/L-1 region.  No step-off.  Mild tenderness of the right hip.   Neurological: She is alert.   Baseline mental status and is able to answer questions appropriately.   Skin: Capillary refill takes less than 2 seconds. Abrasion, bruising and laceration noted.        Multiple areas of injury at various stages.  Abrasions and contusions of the elbows and knees.  Facial contusions and ecchymoses as noted above.  Ecchymoses and mild swelling of the right shoulder.   Psychiatric: She has a normal mood and affect. Her behavior is normal.   Nursing note and vitals reviewed.      Procedures           ED Course  ED Course as of Jul 18 0926 Wed Jul 18, 2018   0726 Leuk Esterase, UA: (!) Small (1+) [RS]   0726 Nitrite, UA: (!) Positive [RS]   0726 WBC, UA: (!) 6-12 [RS]   0726 Bacteria, UA: (!) 4+ [RS]   0754 Soft tissue swelling in nasal bone fracture otherwise no  emergent findings on imaging of the head face or neck. CT Facial Bones Without Contrast [RS]   0819 Carbamazepine Level: (!!) 19.5 [RS]   0842 Patient has multiple abnormalities today including a recurrent urinary tract infection, supratherapeutic Carbatrol level, and possibly acute versus subacute greater trochanter fracture on the right hip.  With all of these considerations and her living situation, we will admit her for further evaluation and disposition planning.  Case discussed with Dr. Morales who agrees with plan to admit.  [RS]      ED Course User Index  [RS] Carlos Espinal MD                  MDM  Number of Diagnoses or Management Options  Advanced dementia:   Closed nondisplaced fracture of greater trochanter of right femur, initial encounter (CMS/[facility]):   Elevated carbamazepine level:   Fall at nursing home, initial encounter:   Multiple falls:   Recurrent urinary tract infection:   Diagnosis management comments: Recent Results (from the past 24 hour(s))  -Urinalysis With Microscopic If Indicated (No Culture) - Urine, Catheter  Collection Time: 07/18/18  6:40 AM       Result                      Value             Ref Range           Color, UA                   Yellow            Yellow, Straw       Appearance, UA              Cloudy (A)        Clear               pH, UA                      7.0               5.0 - 8.0           Specific Whiteside, UA        1.025             1.001 - 1.030       Glucose, UA                 Negative          Negative            Ketones, UA                 Negative          Negative            Bilirubin, UA               Negative          Negative            Blood, UA                   Negative          Negative            Protein, UA                                   Negative        30 mg/dL (1+) (A)       Leuk Esterase, UA                             Negative        Small (1+) (A)       Nitrite, UA                 Positive (A)      Negative            Urobilinogen, UA             1.0 E.U./dL       0.2 - 1.0 E.*  -Urinalysis, Microscopic Only - Urine, Clean Catch  Collection Time: 07/18/18  6:40 AM       Result                      Value             Ref Range           RBC, UA                     0-2               None Seen, 0*       WBC, UA                     6-12 (A)          None Seen, 0*       Bacteria, UA                4+ (A)            None Seen, T*       Squamous Epithelial Ce*     3-6 (A)           None Seen, 0*       Hyaline Casts, UA           0-6               0 - 6 /LPF          Methodology                                                   Manual Light Microscopy  -Comprehensive Metabolic Panel  Collection Time: 07/18/18  6:55 AM       Result                      Value             Ref Range           Glucose                     104 (H)           70 - 100 mg/*       BUN                         22                9 - 23 mg/dL        Creatinine                  0.96              0.60 - 1.30 *       Sodium                      140               132 - 146 mm*       Potassium                   4.0               3.5 - 5.5 mm*       Chloride                    106               99 - 109 mmo*       CO2                         27.0              20.0 - 31.0 *       Calcium                     8.7               8.7 - 10.4 m*       Total Protein               6.0               5.7 - 8.2 g/*       Albumin                     3.61              3.20 - 4.80 *       ALT (SGPT)                  15                7 - 40 U/L          AST (SGOT)                  26                0 - 33 U/L          Alkaline Phosphatase        88                25 - 100 U/L        Total Bilirubin             0.3               0.3 - 1.2 mg*       eGFR Non  Amer       56 (L)            >60 mL/min/1*       Globulin                    2.4               gm/dL               A/G Ratio                   1.5               1.5 - 2.5 g/*       BUN/Creatinine Ratio        22.9              7.0 - 25.0          Anion  Gap                   7.0               3.0 - 11.0 m*  -Carbamazepine Level, Total  Collection Time: 07/18/18  6:55 AM       Result                      Value             Ref Range           Carbamazepine Level         19.5 (C)          4.0 - 10.0 m*  -CBC Auto Differential  Collection Time: 07/18/18  6:55 AM       Result                      Value             Ref Range           WBC                         13.17 (H)         3.50 - 10.80*       RBC                         3.35 (L)          3.89 - 5.14 *       Hemoglobin                  10.3 (L)          11.5 - 15.5 *       Hematocrit                  31.8 (L)          34.5 - 44.0 %       MCV                         94.9              80.0 - 99.0 *       MCH                         30.7              27.0 - 31.0 *       MCHC                        32.4              32.0 - 36.0 *       RDW                         12.6              11.3 - 14.5 %       RDW-SD                      43.7              37.0 - 54.0 *       MPV                         10.4              6.0 - 12.0 fL       Platelets                   227               150 - 450 10*       Neutrophil %                82.0 (H)          41.0 - 71.0 %       Lymphocyte %                8.7 (L)           24.0 - 44.0 %       Monocyte %                  8.7               0.0 - 12.0 %        Eosinophil %                0.4               0.0 - 3.0 %         Basophil %                  0.2               0.0 - 1.0 %         Immature Grans %            0.3               0.0 - 0.6 %         Neutrophils, Absolute       10.80 (H)         1.50 - 8.30 *       Lymphocytes, Absolute       1.14              0.60 - 4.80 *       Monocytes, Absolute         1.15 (H)          0.00 - 1.00 *       Eosinophils, Absolute       0.05              0.00 - 0.30 *       Basophils, Absolute         0.03              0.00 - 0.20 *       Immature Grans, Absolu*     0.04 (H)          0.00 - 0.03 *  Note: In addition to lab results from this visit, the  "labs listed above may include labs taken at another facility or during a different encounter within the last 24 hours. Please correlate lab times with ED admission and discharge times for further clarification of the services performed during this visit.    XR Hip With or Without Pelvis 2 - 3 View Right   Preliminary Result    Age-indeterminate right greater trochanter fracture. ORIF    presumed prior right femoral neck fracture.         D:  07/18/2018    E:  07/18/2018               CT Head Without Contrast   Final Result    Essentially stable appearance compared to 7/15/18, with findings above.    MRI may be useful in excluding occult acute infarction, if clinically warranted.        THIS DOCUMENT HAS BEEN ELECTRONICALLY SIGNED BY DARIAN TRACY MD     CT Facial Bones Without Contrast   Final Result    Nasal bridge fractures.    Right facial swelling.        THIS DOCUMENT HAS BEEN ELECTRONICALLY SIGNED BY DARIAN TRACY MD     CT Cervical Spine Without Contrast   Final Result      No acute fracture or prevertebral swelling.    Mild multilevel degenerative spondylosis with C5-6 listhesis.    Small left thyroid nodule.    Biapical emphysematous changes.        THIS DOCUMENT HAS BEEN ELECTRONICALLY SIGNED BY DARIAN TRACY MD     --------------------------------------               07/18/18 07/18/18                  0546          0830     --------------------------------------   BP:          130/75        123/63     BP Location:    Right arm                 Patient Position:     Sitting                  Pulse:         68            65       Resp:          16            18       Temp:   97.6 °F (36.4 °C)             TempSrc:      Oral                    SpO2:          98%          97%       Weight:  39.9 kg (88 lb)              Height:   160 cm (63\")               --------------------------------------  Medications  sodium chloride 0.9 % flush 10 mL (not " administered)  cefTRIAXone (ROCEPHIN) IVPB 1 g (1 g Intravenous New Bag 7/18/18 0900)  acetaminophen (TYLENOL) tablet 1,000 mg (1,000 mg Oral Given 7/18/18 0629)  ECG/EMG Results (last 24 hours)     ** No results found for the last 24 hours. **           Amount and/or Complexity of Data Reviewed  Clinical lab tests: reviewed  Tests in the radiology section of CPT®: reviewed  Decide to obtain previous medical records or to obtain history from someone other than the patient: yes  Obtain history from someone other than the patient: yes  Review and summarize past medical records: yes  Discuss the patient with other providers: yes  Independent visualization of images, tracings, or specimens: yes          Final diagnoses:   Multiple falls   Fall at nursing home, initial encounter   Recurrent urinary tract infection   Elevated carbamazepine level   Closed nondisplaced fracture of greater trochanter of right femur, initial encounter (CMS/[facility])   Advanced dementia            Carlos Espinal MD  07/18/18 0997       Carlos Espinal MD  07/18/18 8942

## 2018-07-19 LAB
ALBUMIN SERPL-MCNC: 3.25 G/DL (ref 3.2–4.8)
ANION GAP SERPL CALCULATED.3IONS-SCNC: 7 MMOL/L (ref 3–11)
BUN BLD-MCNC: 16 MG/DL (ref 9–23)
BUN/CREAT SERPL: 19.8 (ref 7–25)
CALCIUM SPEC-SCNC: 8.3 MG/DL (ref 8.7–10.4)
CHLORIDE SERPL-SCNC: 106 MMOL/L (ref 99–109)
CO2 SERPL-SCNC: 27 MMOL/L (ref 20–31)
CREAT BLD-MCNC: 0.81 MG/DL (ref 0.6–1.3)
DEPRECATED RDW RBC AUTO: 44.9 FL (ref 37–54)
ERYTHROCYTE [DISTWIDTH] IN BLOOD BY AUTOMATED COUNT: 12.8 % (ref 11.3–14.5)
GFR SERPL CREATININE-BSD FRML MDRD: 68 ML/MIN/1.73
GLUCOSE BLD-MCNC: 80 MG/DL (ref 70–100)
HCT VFR BLD AUTO: 31.5 % (ref 34.5–44)
HGB BLD-MCNC: 10.2 G/DL (ref 11.5–15.5)
MCH RBC QN AUTO: 30.9 PG (ref 27–31)
MCHC RBC AUTO-ENTMCNC: 32.4 G/DL (ref 32–36)
MCV RBC AUTO: 95.5 FL (ref 80–99)
PHOSPHATE SERPL-MCNC: 3.6 MG/DL (ref 2.4–5.1)
PLATELET # BLD AUTO: 220 10*3/MM3 (ref 150–450)
PMV BLD AUTO: 10.7 FL (ref 6–12)
POTASSIUM BLD-SCNC: 3.8 MMOL/L (ref 3.5–5.5)
RBC # BLD AUTO: 3.3 10*6/MM3 (ref 3.89–5.14)
SODIUM BLD-SCNC: 140 MMOL/L (ref 132–146)
WBC NRBC COR # BLD: 7.31 10*3/MM3 (ref 3.5–10.8)

## 2018-07-19 PROCEDURE — 25010000002 HEPARIN (PORCINE) PER 1000 UNITS: Performed by: HOSPITALIST

## 2018-07-19 PROCEDURE — 80069 RENAL FUNCTION PANEL: CPT | Performed by: HOSPITALIST

## 2018-07-19 PROCEDURE — 94640 AIRWAY INHALATION TREATMENT: CPT

## 2018-07-19 PROCEDURE — G0378 HOSPITAL OBSERVATION PER HR: HCPCS

## 2018-07-19 PROCEDURE — G8979 MOBILITY GOAL STATUS: HCPCS

## 2018-07-19 PROCEDURE — 94760 N-INVAS EAR/PLS OXIMETRY 1: CPT

## 2018-07-19 PROCEDURE — 96372 THER/PROPH/DIAG INJ SC/IM: CPT

## 2018-07-19 PROCEDURE — 63710000001 DRONABINOL PER 2.5 MG: Performed by: HOSPITALIST

## 2018-07-19 PROCEDURE — 99225 PR SBSQ OBSERVATION CARE/DAY 25 MINUTES: CPT | Performed by: NURSE PRACTITIONER

## 2018-07-19 PROCEDURE — 97162 PT EVAL MOD COMPLEX 30 MIN: CPT

## 2018-07-19 PROCEDURE — G8978 MOBILITY CURRENT STATUS: HCPCS

## 2018-07-19 PROCEDURE — 96366 THER/PROPH/DIAG IV INF ADDON: CPT

## 2018-07-19 PROCEDURE — 94799 UNLISTED PULMONARY SVC/PX: CPT

## 2018-07-19 PROCEDURE — 25010000002 CEFTRIAXONE PER 250 MG: Performed by: HOSPITALIST

## 2018-07-19 PROCEDURE — 85027 COMPLETE CBC AUTOMATED: CPT | Performed by: HOSPITALIST

## 2018-07-19 RX ADMIN — DRONABINOL 2.5 MG: 2.5 CAPSULE ORAL at 10:35

## 2018-07-19 RX ADMIN — CEFTRIAXONE SODIUM 1 G: 1 INJECTION, SOLUTION INTRAVENOUS at 09:36

## 2018-07-19 RX ADMIN — AMLODIPINE BESYLATE 10 MG: 10 TABLET ORAL at 09:36

## 2018-07-19 RX ADMIN — DRONABINOL 2.5 MG: 2.5 CAPSULE ORAL at 17:30

## 2018-07-19 RX ADMIN — BUDESONIDE AND FORMOTEROL FUMARATE DIHYDRATE 2 PUFF: 160; 4.5 AEROSOL RESPIRATORY (INHALATION) at 20:14

## 2018-07-19 RX ADMIN — ACETAMINOPHEN 650 MG: 325 TABLET, FILM COATED ORAL at 10:35

## 2018-07-19 RX ADMIN — PANTOPRAZOLE SODIUM 40 MG: 40 TABLET, DELAYED RELEASE ORAL at 09:37

## 2018-07-19 RX ADMIN — HEPARIN SODIUM 5000 UNITS: 5000 INJECTION, SOLUTION INTRAVENOUS; SUBCUTANEOUS at 09:37

## 2018-07-19 RX ADMIN — ATORVASTATIN CALCIUM 10 MG: 10 TABLET, FILM COATED ORAL at 09:37

## 2018-07-19 RX ADMIN — HEPARIN SODIUM 5000 UNITS: 5000 INJECTION, SOLUTION INTRAVENOUS; SUBCUTANEOUS at 19:53

## 2018-07-19 RX ADMIN — BUDESONIDE AND FORMOTEROL FUMARATE DIHYDRATE 2 PUFF: 160; 4.5 AEROSOL RESPIRATORY (INHALATION) at 08:19

## 2018-07-19 RX ADMIN — LEVOTHYROXINE SODIUM 25 MCG: 25 TABLET ORAL at 09:37

## 2018-07-19 NOTE — PLAN OF CARE
Problem: Fall Risk (Adult)  Goal: Identify Related Risk Factors and Signs and Symptoms  Outcome: Ongoing (interventions implemented as appropriate)  Very high fall risk, all fall preventions implemented.   Goal: Absence of Fall  Outcome: Ongoing (interventions implemented as appropriate)      Problem: Skin Injury Risk (Adult)  Goal: Identify Related Risk Factors and Signs and Symptoms  Outcome: Ongoing (interventions implemented as appropriate)    Goal: Skin Health and Integrity  Outcome: Ongoing (interventions implemented as appropriate)

## 2018-07-19 NOTE — PROGRESS NOTES
Discharge Planning Assessment  [facility]     Patient Name: Mitzi Eric  MRN: [id number]  Today's Date: 7/19/2018    Admit Date: 7/18/2018          Discharge Needs Assessment    No documentation.             Discharge Plan     Row Name 07/19/18 1057       Plan    Plan skilled nursing facility    Plan Comments I spoke with Lloyd, Mrs. Eric's nephew and POA at the bedside. He would ultimately like for Mrs. Eric to return to her apartment as [facility], however for now he is requesting to send her to a skilled nursing facility for rehab. He is requesting that referrals are made to [facility], [facility], and [facility]. I have made these referrrals. This will require insurance approval.     Mrs. Eric was recently at [facility] for a short term rehab stay where she returned to her assited living apartment. She is rehospitalized after sustaining a fall at home. I provided Mr. Eric with a list of local memory care units if needed in the future.    Final Discharge Disposition Code 03 - skilled nursing facility (SNF)        Destination     Service Request Status Selected Specialties Address Phone Number Fax Number    [facility] Pending - No Request Sent N/A [address] [facility] [id number] [phone]    [facility] Pending - No Request Sent N/A [address] [facility] [address] [phone]    [facility] P[facility] - No Request Sent N/A [address] [place] [id number] [phone]      Durable Medical Equipment     No service coordination in this encounter.      Dialysis/Infusion     No service coordination in this encounter.      Home Medical Care     No service coordination in this encounter.      Social Care     No service coordination in this encounter.        Expected Discharge Date and Time     Expected Discharge Date Expected Discharge Time    Jul 21, 2018               Demographic  Summary    No documentation.           Functional Status    No documentation.           Psychosocial    No documentation.           Abuse/Neglect    No documentation.           Legal    No documentation.           Substance Abuse    No documentation.           Patient Forms    No documentation.         Shayne Walker

## 2018-07-19 NOTE — PROGRESS NOTES
"Adult Nutrition  Assessment/PES    Patient Name:  Mitzi Eric  YOB: 1935  MRN: [id number]  Admit Date:  7/18/2018    Assessment Date:  7/19/2018    Comments:            Reason for Assessment     Row Name 07/19/18 1237          Reason for Assessment    Reason For Assessment identified at risk by screening criteria     Diagnosis --   S/P FALL, RECENT HIP FX (NO SURGERY). PMH: CKD 3, COPD/ FORMER SMOKER, DEMENTIA, HYPOTHYROIDISM, HTN, ATAXIC GAIT. PSH: BENIGN BRAIN TUMOR EXCISION, FX SURG     Identified At Risk by Screening Criteria MST SCORE 2+             Nutrition/Diet History     Row Name 07/19/18 1238          Nutrition/Diet History    Typical Food/Fluid Intake PT WITH DIFFICULTY FINDING WORDS: HOWEVER, CONVEYED NOT SURE IF SHE'S LOST WEIGHT RECENTLY. SHE REPORTS HAS A DECREASED APPETITE BUT DOESN'T KNOW HOW LONG THAT HAS BEEN GOING ON. SHE REPORTS, HOWEVER, SHE'S NEVER BEEN OVERWEIGHT. SHE REPORTS HAS DIFFICULTY CHEWING DUE TO HITTING HER TEETH WHEN SHE FELL. SHE DENIES FOOD DISLIKES & REPORTS WILL DRINK BOOST PLUS IN CHOCOLATE FLAVOR.              Anthropometrics     Row Name 07/19/18 1242 07/19/18 0500       Anthropometrics    Height 160 cm (63\")  --    Weight  -- 39.2 kg (86 lb 6.4 oz)       Admit Weight    Admit Weight Method measured   BEDSCALE  --    Admit Weight 39.8 kg (87 lb 11.2 oz)  --       Ideal Body Weight (IBW)    Ideal Body Weight (IBW) (kg) 52.72  --                                --            Labs/Tests/Procedures/Meds     Row Name 07/19/18 1243          Labs/Procedures/Meds    Lab Results Reviewed reviewed        Medications    Pertinent Medications Reviewed reviewed     Pertinent Medications Comments MARINOL             Physical Findings     Row Name 07/19/18 1243          Physical Findings    Overall Physical Appearance other (see comments)   SEVERE MUSCLE WASTING & SUBCUTANEOUS FAT LOSS.     Gastrointestinal other (see comments)   WDL PER NURSING DOCUMENTATION     Skin " "other (see comments)   LACERATION & MULTIPLE ABRASIONS                   Nutrition Prescription Ordered     Row Name 07/19/18 1244          Nutrition Prescription PO    Current PO Diet Soft Texture     Texture Whole foods     Common Modifiers Cardiac             Evaluation of Received Nutrient/Fluid Intake     Row Name 07/19/18 1245 07/19/18 1242       Calculation Measurements    Height  -- 160 cm (63\")       PO Evaluation    Number of Days PO Intake Evaluated Insufficient Data   NO MEALS RECORDED YET  --                        Malnutrition Severity Assessment     Row Name 07/19/18 1245          Malnutrition Severity Assessment    Malnutrition Type Chronic Illness Malnutrition        Physical Signs of Malnutrition (Chronic)    Muscle Wasting Severe     Fat Loss Severe        Criteria Met (Must meet criteria for severity in at least 2 of these categories: M Wasting, Fat Loss, Fluid, Secondary Signs, Wt. Status, Intake)    Patient meets criteria for  Severe malnutrition         Problem/Interventions:        Problem 1     Row Name 07/19/18 1245          Nutrition Diagnoses Problem 1    Problem 1 Malnutrition   SEVERE, MOST LIKELY CHRONIC     Etiology (related to) Other (comment)   CLINICAL CONDITION     Signs/Symptoms (evidenced by) Other (comment)   SEVERE MUSCLE WASTING & SUBCUTANEOUS FAT LOSS                     Intervention Goal     Row Name 07/19/18 1246          Intervention Goal    General Nutrition support treatment     PO Establish PO             Nutrition Intervention     Row Name 07/19/18 1246          Nutrition Intervention    RD/Tech Action Interview for preference;Encourage intake;Recommend/ordered;Follow Tx progress;Care plan reviewd     Recommended/Ordered Supplement             Nutrition Prescription     Row Name 07/19/18 1247          Nutrition Prescription PO    PO Prescription Begin/change supplement     Supplement Boost Plus     Supplement Frequency 3 times a day     New PO Prescription Ordered? " Yes             Education/Evaluation     Row Name 07/19/18 1247          Monitor/Evaluation    Monitor Per protocol;I&O;PO intake;Supplement intake;Pertinent labs;Weight;Skin status;Symptoms         Electronically signed by:  Berenice Stewart RD  07/19/18 12:47 PM

## 2018-07-19 NOTE — PLAN OF CARE
Problem: Patient Care Overview  Goal: Plan of Care Review  Outcome: Ongoing (interventions implemented as appropriate)   07/19/18 7191   Coping/Psychosocial   Plan of Care Reviewed With patient   OTHER   Outcome Summary Pt doing well today with minimal c/o pain, tylenol helps, up with 1 to chair, needs rehab, [place] when ready.    Plan of Care Review   Progress improving       Problem: Fall Risk (Adult)  Goal: Absence of Fall  Outcome: Ongoing (interventions implemented as appropriate)      Problem: Skin Injury Risk (Adult)  Goal: Identify Related Risk Factors and Signs and Symptoms  Outcome: Outcome(s) achieved Date Met: 07/19/18    Goal: Skin Health and Integrity  Outcome: Ongoing (interventions implemented as appropriate)

## 2018-07-19 NOTE — THERAPY EVALUATION
Acute Care - Physical Therapy Initial Evaluation  [facility]     Patient Name: Mitzi Eric  : 1935  MRN: [id number]  Today's Date: 2018      Date of Referral to PT: 18  Referring Physician: MD Andrew      Admit Date: 2018    Visit Dx:     ICD-10-CM ICD-9-CM   1. Multiple falls R29.6 V15.88   2. Fall at nursing home, initial encounter W19.XXXA E888.9    Y92.129    3. Recurrent urinary tract infection N39.0 599.0   4. Elevated carbamazepine level R78.89 790.99   5. Closed nondisplaced fracture of greater trochanter of right femur, initial encounter (CMS/[facility]) S72.114A 820.20   6. Advanced dementia F03.90 294.20   7. Impaired functional mobility, balance, gait, and endurance Z74.09 V49.89     Patient Active Problem List   Diagnosis   • Fall     Past Medical History:   Diagnosis Date   • Ataxic gait    • Chronic kidney disease, stage 3    • COPD (chronic obstructive pulmonary disease) (CMS/[facility])    • Dementia    • Disease of thyroid gland    • Hypertension      Past Surgical History:   Procedure Laterality Date   • BRAIN TUMOR EXCISION      BENIGN   • FRACTURE SURGERY     • TRIGGER FINGER RELEASE          PT ASSESSMENT (last 12 hours)      Physical Therapy Evaluation     Row Name 18 0900          PT Evaluation Time/Intention    Subjective Information complains of;pain   c/o R hip pain w/movement  -     Document Type evaluation  -     Row Name 18 0900          General Information    Patient Profile Reviewed? yes  -LS     Referring Physician MD Andrew  -LS     Prior Level of Function independent:;transfer;bed mobility   nephew said walking w/HHPT & rollator; transferring on own  -     Equipment Currently Used at Home rollator;wheelchair   has been self-propelling w/c w/feet per nephew  -LS     Pertinent History of Current Functional Problem Pt w/3 falls in 3 days. Found to have UTI. Had ORIF one month ago per nephew (presumed prior femoral neck fx) now w/closed, nondisplaced R  greater trochanter fx (nonsurgical; WBAT). CT also showed nasal bridge fxs  -LS     Existing Precautions/Restrictions fall  -LS     Risks Reviewed patient and family:;LOB;increased discomfort   pt and nephew  -LS     Benefits Reviewed patient and family:;improve function  -LS     Barriers to Rehab cognitive status  -LS     Row Name 07/19/18 0900          Relationship/Environment    Primary Source of Support/Comfort extended family   nephew  -LS     Lives With facility resident   assisted living [facility];they do not assist w/mobility  -LS     Row Name 07/19/18 0900          Cognitive Assessment/Intervention- PT/OT    Affect/Mental Status (Cognitive) confused  -LS     Orientation Status (Cognition) disoriented to;place;situation;time   oriented to person  -LS     Follows Commands (Cognition) follows one step commands;over 90% accuracy  -     Personal Safety Interventions fall prevention program maintained;gait belt;nonskid shoes/slippers when out of bed;supervised activity  -     Row Name 07/19/18 0900          Safety Issues, Functional Mobility    Impairments Affecting Function (Mobility) cognition  -     Row Name 07/19/18 0900          Bed Mobility Assessment/Treatment    Supine-Sit [place] (Bed Mobility) supervision;minimum assist (75% patient effort)  -     Assistive Device (Bed Mobility) head of bed elevated  -     Row Name 07/19/18 0900          Transfer Assessment/Treatment    Sit-Stand [place] (Transfers) verbal cues;minimum assist (75% patient effort)   vcs and tcs for safe hand placement  -     Stand-Sit [place] (Transfers) contact guard  -     Row Name 07/19/18 0900          Sit-Stand Transfer    Assistive Device (Sit-Stand Transfers) walker, front-wheeled  -     Row Name 07/19/18 0900          Stand-Sit Transfer    Assistive Device (Stand-Sit Transfers) walker, front-wheeled  -LS     Row Name 07/19/18 0900          Gait/Stairs Assessment/Training    [place] Level  (Gait) minimum assist (75% patient effort)   PT tech also guarding for safety w/amb  -LS     Assistive Device (Gait) walker, front-wheeled  -LS     Distance in Feet (Gait) 50  -LS     Pattern (Gait) step-to  -LS     Deviations/Abnormal Patterns (Gait) bilateral deviations;base of support, narrow;mik decreased;gait speed decreased;stride length decreased  -LS     Bilateral Gait Deviations forward flexed posture  -LS     Row Name 07/19/18 0900          General ROM    GENERAL ROM COMMENTS AROM WFL X 4 extremities  -LS     Row Name 07/19/18 0900          General Assessment (Manual Muscle Testing)    Comment, General Manual Muscle Testing (MMT) Assessment B sh flexors, elbow flexors & extensors 4/5. R hip flexors 3, L hip flexors 4. B knee extensors 4. B ankle dorsiflexors 3+/5  -LS     Row Name 07/19/18 0900          Pain Scale: Numbers Pre/Post-Treatment    Pain Location - Side Right  -LS     Pain Location hip  -LS     Pain Intervention(s) Repositioned  -LS     Row Name 07/19/18 0900          Pain Scale: FACES Pre/Post-Treatment    Pain: FACES Scale, Pretreatment 0-->no hurt  -LS     Pain: FACES Scale, Post-Treatment 4-->hurts little more  -LS     Pre/Post Treatment Pain Comment c/o R hip pain during and after amb.  -LS     Row Name             Wound 07/18/18 2000 Other (See comments) anterior nose laceration    Wound - Properties Group Date first assessed: 07/18/18  -TJ Time first assessed: 2000  -TJ Present On Admission : yes  -TJ Side: Other (See comments)  -TJ Orientation: anterior  -TJ Location: nose  -TJ Type: laceration  -TJ    Row Name             Wound 07/18/18 2000 Left eyebrow abrasion    Wound - Properties Group Date first assessed: 07/18/18  -TJ Time first assessed: 2000  -TJ Present On Admission : yes  -TJ Side: Left  -TJ Location: eyebrow  -TJ Type: abrasion  -TJ    Row Name             Wound 07/18/18 2000 Right eyebrow abrasion;laceration    Wound - Properties Group Date first assessed: 07/18/18   -TJ Time first assessed: 2000  -TJ Side: Right  -TJ Location: eyebrow  -TJ Type: abrasion;laceration  -TJ    Row Name             Wound 07/18/18 2000 Right elbow abrasion    Wound - Properties Group Date first assessed: 07/18/18  -TJ Time first assessed: 2000  -TJ Side: Right  -TJ Location: elbow  -TJ Type: abrasion  -TJ    Row Name 07/19/18 0900          Physical Therapy Clinical Impression    Date of Referral to PT 07/18/18  -LS     PT Diagnosis (PT Clinical Impression) Impaired functional mobility  -LS     Patient/Family Goals Statement (PT Clinical Impression) Nephew said the ultimate goal is for pt to get back to [facility]. Pt did not state a goal.  -LS     Criteria for Skilled Interventions Met (PT Clinical Impression) yes;treatment indicated  -LS     Rehab Potential (PT Clinical Summary) good, to achieve stated therapy goals  -LS     Care Plan Review (PT) evaluation/treatment results reviewed;care plan/treatment goals reviewed;patient/other agree to care plan  -LS     Care Plan Review, Other Participant (PT Clinical Impression) family   nephew  -LS     Row Name 07/19/18 0900          Bed Mobility Goal 1 (PT)    Activity/Assistive Device (Bed Mobility Goal 1, PT) sit to supine/supine to sit  -LS     [place] Level/Cues Needed (Bed Mobility Goal 1, PT) independent  -LS     Time Frame (Bed Mobility Goal 1, PT) 10 days  -LS     Progress/Outcomes (Bed Mobility Goal 1, PT) goal ongoing  -LS     Row Name 07/19/18 0900          Transfer Goal 1 (PT)    Activity/Assistive Device (Transfer Goal 1, PT) sit-to-stand/stand-to-sit;walker, rolling  -LS     [place] Level/Cues Needed (Transfer Goal 1, PT) standby assist  -LS     Time Frame (Transfer Goal 1, PT) 10 days  -LS     Progress/Outcome (Transfer Goal 1, PT) goal ongoing  -LS     Row Name 07/19/18 0900          Gait Training Goal 1 (PT)    Activity/Assistive Device (Gait Training Goal 1, PT) gait (walking locomotion);assistive device use  -LS      [place] Level (Gait Training Goal 1, PT) standby assist  -LS     Distance (Gait Goal 1, PT) 150  -LS     Time Frame (Gait Training Goal 1, PT) 10 days  -LS     Progress/Outcome (Gait Training Goal 1, PT) goal ongoing  -LS     Row Name 07/19/18 0900          Positioning and Restraints    Pre-Treatment Position in bed  -LS     Post Treatment Position chair  -LS     In Chair notified nsg;sitting;call light within reach;encouraged to call for assist;exit alarm on;with family/caregiver;on mechanical lift sling;legs elevated;heels elevated  -LS     Row Name 07/19/18 0900          Living Environment    Home Accessibility wheelchair accessible  -       User Key  (r) = Recorded By, (t) = Taken By, (c) = Cosigned By    Initials Name Provider Type    LS Ute Rasheed, PT Physical Therapist    RUSSELL Hanks RN Registered Nurse          Physical Therapy Education     Title: PT OT SLP Therapies (Done)     Topic: Physical Therapy (Done)     Point: Mobility training (Done)    Learning Progress Summary     Learner Status Readiness Method Response Comment Documented by    Patient Done Acceptance E TANIA,NR   07/19/18 1004                      User Key     Initials Effective Dates Name Provider Type Discipline     06/19/15 -  Ute Rasheed, PT Physical Therapist PT                PT Recommendation and Plan  Anticipated Discharge Disposition (PT): skilled nursing facility  Planned Therapy Interventions (PT Eval): balance training, bed mobility training, gait training, home exercise program, neuromuscular re-education, patient/family education, strengthening, transfer training  Therapy Frequency (PT Clinical Impression): daily  Outcome Summary/Treatment Plan (PT)  Anticipated Discharge Disposition (PT): skilled nursing facility  Plan of Care Reviewed With: patient, other (see comments) (nephew)  Outcome Summary: Pt presents with deficits in strength and functional mobilty. She is also confused which is a  safety concern (nephew said more confused than baseline). Pt would benefit from skilled PT services to improve funtional mobility and maximize independence level.          Outcome Measures     Row Name 07/19/18 0900             How much help from another person do you currently need...    Turning from your back to your side while in flat bed without using bedrails? 4  -LS      Moving from lying on back to sitting on the side of a flat bed without bedrails? 3  -LS      Moving to and from a bed to a chair (including a wheelchair)? 3  -LS      Standing up from a chair using your arms (e.g., wheelchair, bedside chair)? 3  -LS      Climbing 3-5 steps with a railing? 2  -LS      To walk in hospital room? 3  -LS      AM-PAC 6 Clicks Score 18  -LS         Functional Assessment    Outcome Measure Options AM-PAC 6 Clicks Basic Mobility (PT)  -        User Key  (r) = Recorded By, (t) = Taken By, (c) = Cosigned By    Initials Name Provider Type    ANDREA Rasheed PT Physical Therapist           Time Calculation:         PT Charges     Row Name 07/19/18 1024             Time Calculation    Start Time 0900  -LS      PT Received On 07/19/18  -      PT Goal Re-Cert Due Date 07/29/18  -        User Key  (r) = Recorded By, (t) = Taken By, (c) = Cosigned By    Initials Name Provider Type    ANDREA Rasheed PT Physical Therapist        Therapy Suggested Charges     Code   Minutes Charges    None           Therapy Charges for Today     Code Description Service Date Service Provider Modifiers Qty    01915516142 HC PT EVAL MOD COMPLEXITY 4 7/19/2018 Ute Rasheed PT GP 1    85039314186 HC PT THER SUPP EA 15 MIN 7/19/2018 Ute Rasheed PT GP 1    48912775564 HC PT MOBILITY CURRENT 7/19/2018 Ute Rasheed, PT GP, CK 1    96586400491 HC PT MOBILITY PROJECTED 7/19/2018 Ute Rasheed, PT GP, CK 1          PT G-Codes  PT Professional Judgement Used?: Yes  Outcome Measure Options: AM-PAC 6 Clicks  Basic Mobility (PT)  Score: 18  Functional Limitation: Mobility: Walking and moving around  Mobility: Walking and Moving Around Current Status (): At least 40 percent but less than 60 percent impaired, limited or restricted  Mobility: Walking and Moving Around Goal Status (): At least 40 percent but less than 60 percent impaired, limited or restricted      Ute Rasheed, PT  7/19/2018

## 2018-07-19 NOTE — PLAN OF CARE
Problem: Patient Care Overview  Goal: Plan of Care Review  Outcome: Ongoing (interventions implemented as appropriate)   07/19/18 1023   Coping/Psychosocial   Plan of Care Reviewed With patient;other (see comments)  (nephew)   OTHER   Outcome Summary Pt presents with deficits in strength and functional mobilty. She is also confused which is a safety concern (nephew said more confused than baseline). Pt would benefit from skilled PT services to improve funtional mobility and maximize independence level.

## 2018-07-19 NOTE — PLAN OF CARE
Problem: Patient Care Overview  Goal: Plan of Care Review  Outcome: Ongoing (interventions implemented as appropriate)   07/19/18 1030   Coping/Psychosocial   Plan of Care Reviewed With family   OTHER   Outcome Summary plans for skilled rehab in hopes of getting back to [facility] assisted living. tylenol prn for pain. mouth is very tender and sore and right hip also causing pain. worse with standing.some nausea and anxiety as well. palliative can follow at snf but will sign off for now. pt has CADE Eric on file and present for conversation. no cpr/intubation.    Plan of Care Review   Progress no change

## 2018-07-19 NOTE — PROGRESS NOTES
[facility] Medicine Services  PROGRESS NOTE    Patient Name: Mitzi Eric  : 1935  MRN: [id number]    Date of Admission: 2018  Length of Stay: 0  Primary Care Physician: No Known Provider    Subjective   Subjective     CC:  FU multiple falls    HPI:  Resting in bed without any new complaints or issues.  States she still having pain in right hip and face sore.  Difficulty eating because teeth are sore from fall.  Denies nausea or vomiting.      Review of Systems  Gen- No fevers, chills. + face pain   CV- No chest pain, palpitations  Resp- No cough, dyspnea  GI- No N/V/D, abd pain    Otherwise ROS is negative except as mentioned in the HPI.    Objective   Objective     Vital Signs:   Temp:  [97.5 °F (36.4 °C)-98.1 °F (36.7 °C)] 98.1 °F (36.7 °C)  Heart Rate:  [59-90] 89  Resp:  [16-18] 17  BP: (104-142)/(61-93) 116/75        Physical Exam:  Constitutional: No acute distress, awake, alert, frail elderly lady  Eyes: PERRLA, sclerae anicteric, no conjunctival injection, R eye and face ecchymotic  HENT: NCAT, mucous membranes moist, bruises on upper lip, no bleeding in mouth  Neck: Supple, no thyromegaly, no lymphadenopathy, trachea midline  Respiratory: Clear to auscultation bilaterally, nonlabored respirations on RA  Cardiovascular: RRR, no murmurs, rubs, or gallops, palpable pedal pulses bilaterally  Gastrointestinal: Positive bowel sounds, soft, Suprapubic tenderness, nondistended  Musculoskeletal: No bilateral ankle edema, no clubbing or cyanosis to extremities  Psychiatric: Appropriate affect, cooperative  Neurologic: Oriented x 3, strength symmetric in all extremities, Cranial Nerves grossly intact to confrontation, speech clear  Skin: No rashes    Results Reviewed:  I have personally reviewed current lab, radiology, and data and agree.      Results from last 7 days  Lab Units 18  0333 18  0655   WBC 10*3/mm3 7.31 13.17*   HEMOGLOBIN g/dL 10.2* 10.3*    HEMATOCRIT % 31.5* 31.8*   PLATELETS 10*3/mm3 220 227       Results from last 7 days  Lab Units 07/19/18  0333 07/18/18  0655   SODIUM mmol/L 140 140   POTASSIUM mmol/L 3.8 4.0   CHLORIDE mmol/L 106 106   CO2 mmol/L 27.0 27.0   BUN mg/dL 16 22   CREATININE mg/dL 0.81 0.96   GLUCOSE mg/dL 80 104*   CALCIUM mg/dL 8.3* 8.7   ALT (SGPT) U/L  --  15   AST (SGOT) U/L  --  26     Estimated Creatinine Clearance: 33.1 mL/min (by C-G formula based on SCr of 0.81 mg/dL).  No results found for: BNP    Microbiology Results Abnormal     Procedure Component Value - Date/Time    Urine Culture - Urine, Urine, Clean Catch [[id number]]  (Abnormal) Collected:  07/18/18 0640    Lab Status:  Preliminary result Specimen:  Urine from Urine, Catheter Updated:  07/19/18 0929     Urine Culture >100,000 CFU/mL Gram Negative Bacilli (A)          Imaging Results (last 24 hours)     ** No results found for the last 24 hours. **             I have reviewed the medications.    Assessment/Plan   Assessment / Plan     Hospital Problem List     Fall        Brief Hospital Course to date:  Mitzi Eric is a 83 y.o. female with dementia, HTN, COPD presented after an unwitnessed fall.  Has had 3 falls in 3 days at [facility] assisted living.  Found to have UTI in ED.        Assessment & Plan:    Multiple falls   --palliative consulted  --CM consulted to help with DC planning    UTI  --Continue Rocephin  --culture pending    R greater trochanter fx  --Non-surgical  --PT/OT consulted    HTN  --Amlodipine    Hypothyroidism  --continue levothyroxine    DVT Prophylaxis:  heparin    CODE STATUS:   Code Status and Medical Interventions:   Ordered at: 07/18/18 1202     Limited Support to NOT Include:    Dialysis    Intubation    Vasopressors     Level Of Support Discussed With:    Health Care Surrogate     Code Status:    No CPR     Medical Interventions (Level of Support Prior to Arrest):    Limited     Comments:    Freddy Eric - [facility] CADE        Disposition: I expect the patient to be discharged to rehab when arrangements are made.        Electronically signed by RAFAEL Austin, 07/19/18, 2:12 PM.

## 2018-07-19 NOTE — PROGRESS NOTES
Malnutrition Severity Assessment    Patient Name:  Mitzi Eric  YOB: 1935  MRN: [id number]  Admit Date:  7/18/2018    Patient meets criteria for : Severe malnutrition    Comments:  MD, PT MEETS CRITERIA FOR SEVERE MALNUTRITION. PLEASE ATTEST & INCLUDE IN DX AS APPROPRIATE.     Malnutrition Type: Chronic Illness Malnutrition     Malnutrition Type (last 8 hours)      Malnutrition Severity Assessment     Row Name 07/19/18 1245       Malnutrition Severity Assessment    Malnutrition Type Chronic Illness Malnutrition    Row Name 07/19/18 1245       Physical Signs of Malnutrition (Chronic)    Muscle Wasting Severe    Fat Loss Severe    Row Name 07/19/18 1245       Criteria Met (Must meet criteria for severity in at least 2 of these categories: M Wasting, Fat Loss, Fluid, Secondary Signs, Wt. Status, Intake)    Patient meets criteria for  Severe malnutrition          Electronically signed by:  Berenice Stewart RD  07/19/18 12:48 PM

## 2018-07-19 NOTE — PLAN OF CARE
Problem: Patient Care Overview  Goal: Plan of Care Review  Outcome: Ongoing (interventions implemented as appropriate)  Orthostatic blood pressure done, Bp much lower when standing. Plan of care to be reviewed with poa,

## 2018-07-19 NOTE — PROGRESS NOTES
"Palliative Care Progress Note    Date of Admission: 7/18/2018    Subjective:  Patient states that she is having some slight pain in her right hip area, but otherwise has no complaints at this point time.  Current Code Status     Date Active Code Status Order ID Comments User Context       7/18/2018 12:02 PM No CPR [id number]  Jessie Morales MD ED       Questions for Current Code Status     Question Answer Comment    Code Status No CPR     Medical Interventions (Level of Support Prior to Arrest) Limited     Limited Support to NOT Include Dialysis      Intubation      Vasopressors     Comments [facility] POA     Level Of Support Discussed With Health Care Surrogate         No current facility-administered medications on file prior to encounter.      Current Outpatient Prescriptions on File Prior to Encounter   Medication Sig Dispense Refill   • aspirin 81 MG EC tablet Take 81 mg by mouth Daily.     • atenolol (TENORMIN) 50 MG tablet Take 50 mg by mouth Daily.     • budesonide-formoterol (SYMBICORT) 160-4.5 MCG/ACT inhaler Inhale 2 puffs 2 (Two) Times a Day.     • carBAMazepine (CARBATROL) 300 MG 12 hr capsule Take 300 mg by mouth Daily.     • levothyroxine (SYNTHROID, LEVOTHROID) 25 MCG tablet Take 25 mcg by mouth Daily.     • mirtazapine (REMERON) 30 MG tablet Take 15 mg by mouth Every Night.     • pantoprazole (PROTONIX) 40 MG EC tablet Take 40 mg by mouth Daily.     • folic acid (FOLVITE) 400 MCG tablet Take 400 mcg by mouth 2 (Two) Times a Day.     • losartan (COZAAR) 50 MG tablet Take 50 mg by mouth Daily.     • saccharomyces boulardii (FLORASTOR) 250 MG capsule Take 250 mg by mouth Daily.          •  acetaminophen  •  ondansetron **OR** ondansetron  •  sodium chloride  •  Insert peripheral IV **AND** sodium chloride    Objective: /75   Pulse 89   Temp 98.1 °F (36.7 °C) (Oral)   Resp 17   Ht 160 cm (63\")   Wt 39.2 kg (86 lb 6.4 oz)   SpO2 95%   BMI 15.31 kg/m²      Intake/Output " Summary (Last 24 hours) at 07/19/18 1249  Last data filed at 07/19/18 0936   Gross per 24 hour   Intake              170 ml   Output              550 ml   Net             -380 ml     Physical Exam:      General Appearance:    Alert, cooperative, in no acute distress   Head:    Multiple ecchymosis on face   Eyes:            Lids and lashes normal, conjunctivae and sclerae normal, no   icterus, no pallor, corneas clear, PERRLA   Ears:    Ears appear intact with no abnormalities noted   Throat:   No oral lesions, no thrush, oral mucosa moist   Neck:   No adenopathy, supple, trachea midline, no thyromegaly, no     carotid bruit, no JVD   Back:     No kyphosis present, no scoliosis present, no skin lesions,       erythema or scars, no tenderness to percussion or                   palpation,   range of motion normal   Lungs:     Clear to auscultation,respirations regular, even and                   unlabored    Heart:    Regular rhythm and normal rate, normal S1 and S2, no            murmur, no gallop, no rub, no click   Breast Exam:    Deferred   Abdomen:     Normal bowel sounds, no masses, no organomegaly, soft        non-tender, non-distended, no guarding, no rebound                 tenderness   Genitalia:    Deferred   Extremities:   Moves all extremities well, no edema, no cyanosis, no              redness   Pulses:   Pulses palpable and equal bilaterally   Skin:   No bleeding, bruising or rash   Lymph nodes:   No palpable adenopathy   Neurologic:   Cranial nerves 2 - 12 grossly intact, sensation intact, DTR        present and equal bilaterally       Results from last 7 days  Lab Units 07/19/18  0333   WBC 10*3/mm3 7.31   HEMOGLOBIN g/dL 10.2*   HEMATOCRIT % 31.5*   PLATELETS 10*3/mm3 220       Results from last 7 days  Lab Units 07/19/18  0333 07/18/18  0655   SODIUM mmol/L 140 140   POTASSIUM mmol/L 3.8 4.0   CHLORIDE mmol/L 106 106   CO2 mmol/L 27.0 27.0   BUN mg/dL 16 22   CREATININE mg/dL 0.81 0.96   CALCIUM  mg/dL 8.3* 8.7   BILIRUBIN mg/dL  --  0.3   ALK PHOS U/L  --  88   ALT (SGPT) U/L  --  15   AST (SGOT) U/L  --  26   GLUCOSE mg/dL 80 104*       Impression: Fall  Dementia  M/s pain- right hip  Nasal fracture  GOC  Plan: Overall the pain seems to be controlled and goals of care are set.  Palliative medicine will sign off at this point time.        Oliver Guillermo DO  07/19/18  12:49 PM

## 2018-07-19 NOTE — PROGRESS NOTES
Continued Stay Note  [facility]     Patient Name: Mitzi Eric  MRN: [id number]  Today's Date: 7/19/2018    Admit Date: 7/18/2018          Discharge Plan     Row Name 07/19/18 1556       Plan    Plan the [facility]     Patient/Family in Agreement with Plan yes    Plan Comments Per Cassie, they can offer Mrs. Eric a skilled bed. I notified Mrs. Eric's nephew Lloyd, and this is his first choice. Per Cassie, they are going to initiate insurance precert today.    Final Discharge Disposition Code 03 - skilled nursing facility (SNF)              Discharge Codes    No documentation.       Expected Discharge Date and Time     Expected Discharge Date Expected Discharge Time    Jul 21, 2018             Shayne Walker

## 2018-07-20 VITALS
OXYGEN SATURATION: 94 % | SYSTOLIC BLOOD PRESSURE: 103 MMHG | WEIGHT: 86.4 LBS | HEART RATE: 69 BPM | RESPIRATION RATE: 16 BRPM | DIASTOLIC BLOOD PRESSURE: 80 MMHG | TEMPERATURE: 97.5 F | BODY MASS INDEX: 15.31 KG/M2 | HEIGHT: 63 IN

## 2018-07-20 LAB — BACTERIA SPEC AEROBE CULT: ABNORMAL

## 2018-07-20 PROCEDURE — 25010000002 HEPARIN (PORCINE) PER 1000 UNITS: Performed by: HOSPITALIST

## 2018-07-20 PROCEDURE — 96372 THER/PROPH/DIAG INJ SC/IM: CPT

## 2018-07-20 PROCEDURE — G8988 SELF CARE GOAL STATUS: HCPCS

## 2018-07-20 PROCEDURE — 96366 THER/PROPH/DIAG IV INF ADDON: CPT

## 2018-07-20 PROCEDURE — 97166 OT EVAL MOD COMPLEX 45 MIN: CPT

## 2018-07-20 PROCEDURE — G0378 HOSPITAL OBSERVATION PER HR: HCPCS

## 2018-07-20 PROCEDURE — 25010000002 CEFTRIAXONE PER 250 MG: Performed by: HOSPITALIST

## 2018-07-20 PROCEDURE — 94640 AIRWAY INHALATION TREATMENT: CPT

## 2018-07-20 PROCEDURE — G8987 SELF CARE CURRENT STATUS: HCPCS

## 2018-07-20 PROCEDURE — 63710000001 DRONABINOL PER 2.5 MG: Performed by: HOSPITALIST

## 2018-07-20 PROCEDURE — 99217 PR OBSERVATION CARE DISCHARGE MANAGEMENT: CPT | Performed by: NURSE PRACTITIONER

## 2018-07-20 RX ORDER — DOCUSATE SODIUM 100 MG/1
100 CAPSULE, LIQUID FILLED ORAL 2 TIMES DAILY
Status: DISCONTINUED | OUTPATIENT
Start: 2018-07-20 | End: 2018-07-20 | Stop reason: HOSPADM

## 2018-07-20 RX ORDER — DRONABINOL 2.5 MG/1
2.5 CAPSULE ORAL
Qty: 6 CAPSULE | Refills: 0 | Status: SHIPPED | OUTPATIENT
Start: 2018-07-20 | End: 2021-12-06 | Stop reason: HOSPADM

## 2018-07-20 RX ORDER — CEFDINIR 300 MG/1
300 CAPSULE ORAL 2 TIMES DAILY
Qty: 10 CAPSULE | Refills: 0
Start: 2018-07-20 | End: 2018-07-25

## 2018-07-20 RX ORDER — PSEUDOEPHEDRINE HCL 30 MG
100 TABLET ORAL 2 TIMES DAILY
Start: 2018-07-20

## 2018-07-20 RX ORDER — ACETAMINOPHEN 325 MG/1
650 TABLET ORAL EVERY 4 HOURS PRN
Qty: 1 BOTTLE | Refills: 0
Start: 2018-07-20 | End: 2021-12-06

## 2018-07-20 RX ADMIN — HEPARIN SODIUM 5000 UNITS: 5000 INJECTION, SOLUTION INTRAVENOUS; SUBCUTANEOUS at 08:31

## 2018-07-20 RX ADMIN — LEVOTHYROXINE SODIUM 25 MCG: 25 TABLET ORAL at 05:17

## 2018-07-20 RX ADMIN — PANTOPRAZOLE SODIUM 40 MG: 40 TABLET, DELAYED RELEASE ORAL at 05:17

## 2018-07-20 RX ADMIN — BUDESONIDE AND FORMOTEROL FUMARATE DIHYDRATE 2 PUFF: 160; 4.5 AEROSOL RESPIRATORY (INHALATION) at 10:28

## 2018-07-20 RX ADMIN — ATORVASTATIN CALCIUM 10 MG: 10 TABLET, FILM COATED ORAL at 08:31

## 2018-07-20 RX ADMIN — DRONABINOL 2.5 MG: 2.5 CAPSULE ORAL at 08:31

## 2018-07-20 RX ADMIN — CEFTRIAXONE SODIUM 1 G: 1 INJECTION, SOLUTION INTRAVENOUS at 08:31

## 2018-07-20 RX ADMIN — AMLODIPINE BESYLATE 10 MG: 10 TABLET ORAL at 08:31

## 2018-07-20 RX ADMIN — ACETAMINOPHEN 650 MG: 325 TABLET, FILM COATED ORAL at 08:31

## 2018-07-20 NOTE — DISCHARGE SUMMARY
[facility] Medicine Services  DISCHARGE SUMMARY    Patient Name: Mitzi Eric  : 1935  MRN: [id number]    Date of Admission: 2018  Date of Discharge:  2018  Primary Care Physician: No Known Provider    Consults     Date and Time Order Name Status Description    2018 1221 Inpatient Palliative Care MD Consult Completed     2018 1204 Inpatient Palliative Care MD Consult Completed         Hospital Course     Presenting Problem:   Fall [W19.XXXA]    Active Hospital Problems    Diagnosis Date Noted   • Fall [W19.XXXA] 2018      Resolved Hospital Problems    Diagnosis Date Noted Date Resolved   No resolved problems to display.          Hospital Course:  Mitzi Eric is a 83 y.o. female dementia patient who has been a long-term resident of [facility] who presented to [facility] emergency department after an unwitnessed fall.  The patient's nephew and medical power of  presented with her and assist with most of her medical history.  The patient has had 3 previous falls at [facility].  The first fall resulted in an ER visit and subsequent discharge.  The second fall was treated at the facility and her third fall resulted in this hospitalization.  The patient was found to have a urinary tract infection in the emergency department and was initiated on IV Rocephin.  Per the patient's nephew, 4 weeks ago she sustained a right hip fracture from a fall at the facility and hadn't a right hip fixation at [facility].  Imaging in the emergency department on this admission revealed a small nonoperative fracture of that right greater trochanter.  The patient's pain has been tolerable.  She has tolerated IV Rocephin.  Her urine culture grew out pansensitive Escherichia coli.  She will be transitioned to oral cefdinir for a total of 7 days of treatment.  The patient has been accepted to a skilled nursing facility for inpatient rehabilitation.  I had  a long conversation with the patient and her nephew about the plan of care.  He is hopeful that she will regain some of her mobility that she had previously had prior to her hip fracture but is realistic that she may have a new normal mobility.  The patient is now medically stable and will be transferred to the [facility] today by family for further rehabilitation by private vehicle.  Patient should follow-up with his primary care provider within the next couple of weeks.  If any hip pain returns or worsens, the patient is encouraged to follow-up with her primary surgeon at [place].      Day of Discharge     HPI:   Resting comfortably in chair.  Nephew at bedside.  He feels like she is doing better.    Review of Systems  Difficult to obtain due to baseline altered mental status    Otherwise ROS is negative except as mentioned in the HPI.    Vital Signs:   Temp:  [97.3 °F (36.3 °C)-98.1 °F (36.7 °C)] 97.5 °F (36.4 °C)  Heart Rate:  [] 69  Resp:  [16-20] 16  BP: (103-121)/(80-89) 103/80     Physical Exam:  Constitutional: No acute distress, awake, alert, resting in chair with nephew at bedside  HENT: NCAT, mucous membranes moist, multiple areas of ecchymosis on her face  Respiratory: Clear to auscultation bilaterally, respiratory effort normal on room air   Cardiovascular: RRR, no murmurs, rubs, or gallops, palpable pedal pulses bilaterally  Gastrointestinal: Positive bowel sounds, soft, nontender, nondistended  Musculoskeletal: No bilateral ankle edema, thin extremities with muscle wasting noted  Psychiatric: Appropriate affect, cooperative  Neurologic: Oriented x 3, strength symmetric in all extremities, Cranial Nerves grossly intact to confrontation, speech clear  Skin: No rashes, loose fragile skin        Pertinent  and/or Most Recent Results       Results from last 7 days  Lab Units 07/19/18  0333 07/18/18  0655   WBC 10*3/mm3 7.31 13.17*   HEMOGLOBIN g/dL 10.2* 10.3*   HEMATOCRIT % 31.5* 31.8*    PLATELETS 10*3/mm3 220 227   SODIUM mmol/L 140 140   POTASSIUM mmol/L 3.8 4.0   CHLORIDE mmol/L 106 106   CO2 mmol/L 27.0 27.0   BUN mg/dL 16 22   CREATININE mg/dL 0.81 0.96   GLUCOSE mg/dL 80 104*   CALCIUM mg/dL 8.3* 8.7       Results from last 7 days  Lab Units 07/18/18  0655   BILIRUBIN mg/dL 0.3   ALK PHOS U/L 88   ALT (SGPT) U/L 15   AST (SGOT) U/L 26           Invalid input(s): TG, LDLCALC, LDLREALC      Brief Urine Lab Results  (Last result in the past 365 days)      Color   Clarity   Blood   Leuk Est   Nitrite   Protein   CREAT   Urine HCG        07/18/18 0640 Yellow Cloudy(A) Negative Small (1+)(A) Positive(A) 30 mg/dL (1+)(A)               Microbiology Results Abnormal     Procedure Component Value - Date/Time    Urine Culture - Urine, Urine, Clean Catch [[id number]]  (Abnormal)  (Susceptibility) Collected:  07/18/18 0640    Lab Status:  Final result Specimen:  Urine from Urine, Catheter Updated:  07/20/18 1212     Urine Culture >100,000 CFU/mL Escherichia coli (A)    Susceptibility      Escherichia coli     JOAN     Ampicillin <=8 ug/ml Susceptible     Ampicillin + Sulbactam <=8/4 ug/ml Susceptible     Aztreonam <=8 ug/ml Susceptible     Cefepime <=8 ug/ml Susceptible     Cefotaxime <=2 ug/ml Susceptible     Ceftriaxone <=8 ug/ml Susceptible     Cefuroxime sodium <=4 ug/ml Susceptible     Cephalothin <=8 ug/ml Susceptible     Ertapenem <=1 ug/ml Susceptible     Gentamicin <=4 ug/ml Susceptible     Levofloxacin <=2 ug/ml Susceptible     Meropenem <=1 ug/ml Susceptible     Nitrofurantoin <=32 ug/ml Susceptible     Piperacillin + Tazobactam <=16 ug/ml Susceptible     Tetracycline <=4 ug/ml Susceptible     Tobramycin <=4 ug/ml Susceptible     Trimethoprim + Sulfamethoxazole <=2/38 ug/ml Susceptible                          Imaging Results (all)     Procedure Component Value Units Date/Time    XR Hip With or Without Pelvis 2 - 3 View Right [[id number]] Collected:  07/18/18 0858     Updated:  07/18/18 1124     Narrative:       EXAMINATION: XR HIP W OR WO PELVIS 2-3 VIEW RIGHT- 07/18/2018     INDICATION: multiple falls with right hip fracture     TECHNIQUE:  Frontal view pelvis and frontal and lateral views right hip     COMPARISONS:  CT abdomen/pelvis 07/15/2018     FINDINGS:  Visible in retrospect on the comparison CT of the  abdomen/pelvis and poorly visualized on the radiograph is a fracture  involving the right greater trochanter; it is unknown when the ORIF of  the right hip was performed so this may be acute or chronic. Sacral  arcuate, ilioischial and iliopectineal lines are preserved. Hip joint  spaces are preserved. Incidental lumbar spine degenerative change noted.       Impression:       Age-indeterminate right greater trochanter fracture. ORIF  presumed prior right femoral neck fracture.     D:  07/18/2018  E:  07/18/2018     This report was finalized on 7/18/2018 11:22 AM by Cisco Jimenez.       CT Head Without Contrast [[id number]] Collected:  07/18/18 0621     Updated:  07/18/18 0758    Narrative:       EXAM:    CT Head Without Intravenous Contrast    EXAM DATE/TIME:    7/18/2018 6:21 AM    CLINICAL HISTORY:    83 years old, female; Injury or trauma; Fall; Initial encounter; Blunt trauma   (contusions or hematomas); Consciousness not specified; Additional info:   Multiple falls with head and face trauma    TECHNIQUE:    Axial computed tomography images of the head/brain without intravenous   contrast.  All CT scans at this facility use at least one of these dose   optimization techniques: automated exposure control; mA and/or kV adjustment   per patient size (includes targeted exams where dose is matched to clinical   indication); or iterative reconstruction.    COMPARISON:    CT HEAD WO CONTRAST 2018-07-15 22:49    FINDINGS:    Brain: Prior left frontal craniotomy with subjacent old tissue loss.   Ill-defined areas of low attenuation  are  noted in the supratentorial white   matter.  Age related  microvascular/ischemic disease may be considered. Stable 9   mm right far anterior falcine calcification versus calcified old meningioma.    Ventricles: Atrophic changes.    Bones/joints:  Unremarkable.  No acute fracture.    Soft tissues:  Unremarkable.    Sinuses:  Unremarkable as visualized.  No acute sinusitis.    Mastoid air cells:  Unremarkable as visualized.  No mastoid effusion.      Impression:       Essentially stable appearance compared to 7/15/18, with findings above.  MRI may be useful in excluding occult acute infarction, if clinically warranted.    THIS DOCUMENT HAS BEEN ELECTRONICALLY SIGNED BY DARIAN TRCAY MD    CT Facial Bones Without Contrast [[id number]] Collected:  07/18/18 0622     Updated:  07/18/18 0745    Narrative:       EXAM:    CT Maxillofacial Without Intravenous Contrast    EXAM DATE/TIME:    7/18/2018 6:22 AM    CLINICAL HISTORY:    83 years old, female; Injury or trauma; Fall; Initial encounter; Blunt trauma   (contusions or hematomas); Cheek bone and head/scalp and forehead and nose and   orbit/periorbital and maxilla and jaw; Loss of consciousness not known; Right;   Additional info: Multiple falls with head and face trauma    TECHNIQUE:    Axial computed tomography images of the face without intravenous contrast.    All CT scans at this facility use at least one of these dose optimization   techniques: automated exposure control; mA and/or kV adjustment per patient   size (includes targeted exams where dose is matched to clinical indication); or   iterative reconstruction.    Coronal and sagittal reformatted images were created and reviewed.    COMPARISON:    CT FACIAL BONES WO CONTRAST 2018-07-15 22:49    FINDINGS:    Bones/joints: Nasal bridge fractures.    Soft tissues: Right facial swelling.    Orbits:  Unremarkable.    Sinuses:  Unremarkable.  No air-fluid levels.      Impression:       Nasal bridge fractures.  Right facial swelling.    THIS DOCUMENT HAS BEEN ELECTRONICALLY  SIGNED BY DARIAN TRACY MD    CT Cervical Spine Without Contrast [[id number]] Collected:  07/18/18 0622     Updated:  07/18/18 0738    Narrative:       EXAM:    CT Cervical Spine Without Intravenous Contrast    EXAM DATE/TIME:    7/18/2018 6:22 AM    CLINICAL HISTORY:    83 years old, female; Injury or trauma; Fall; Initial encounter; Blunt   trauma; Injury date: Today; Additional info: Multiple falls with head and face   trauma    TECHNIQUE:    Axial computed tomography images of the cervical spine without intravenous   contrast.  All CT scans at this facility use at least one of these dose   optimization techniques: automated exposure control; mA and/or kV adjustment   per patient size (includes targeted exams where dose is matched to clinical   indication); or iterative reconstruction.    Coronal and sagittal reformatted images were created and reviewed.    COMPARISON:    CT CERVICAL SPINE WO CONTRAST 2018-07-15 22:49    FINDINGS:    Vertebrae:  Unremarkable.  No acute fracture.    Discs/spinal canal/neural foramina: Mild multilevel degenerative spondylosis   with C5-6 listhesis.    Soft tissues: Small left thyroid nodule.    Lung apices: Biapical emphysematous changes.      Impression:         No acute fracture or prevertebral swelling.  Mild multilevel degenerative spondylosis with C5-6 listhesis.  Small left thyroid nodule.  Biapical emphysematous changes.    THIS DOCUMENT HAS BEEN ELECTRONICALLY SIGNED BY DARIAN TRACY MD                           Discharge Details        Discharge Medications      New Medications      Instructions Start Date   acetaminophen 325 MG tablet  Commonly known as:  TYLENOL   650 mg, Oral, Every 4 Hours PRN      cefdinir 300 MG capsule  Commonly known as:  OMNICEF   300 mg, Oral, 2 Times Daily      docusate sodium 100 MG capsule   100 mg, Oral, 2 Times Daily         Continue These Medications      Instructions Start Date   amLODIPine 10 MG tablet  Commonly known as:  NORVASC   10 mg,  Oral, Daily      aspirin 81 MG EC tablet   81 mg, Oral, Daily      budesonide-formoterol 160-4.5 MCG/ACT inhaler  Commonly known as:  SYMBICORT   2 puffs, Inhalation, 2 Times Daily - RT      dronabinol 2.5 MG capsule  Commonly known as:  MARINOL   2.5 mg, Oral, 2 Times Daily Before Meals      folic acid 400 MCG tablet  Commonly known as:  FOLVITE   400 mcg, Oral, 2 Times Daily      levothyroxine 25 MCG tablet  Commonly known as:  SYNTHROID, LEVOTHROID   25 mcg, Oral, Daily      lovastatin 40 MG tablet  Commonly known as:  MEVACOR   40 mg, Oral, Nightly      pantoprazole 40 MG EC tablet  Commonly known as:  PROTONIX   40 mg, Oral, Daily         Stop These Medications    atenolol 50 MG tablet  Commonly known as:  TENORMIN     carBAMazepine 300 MG 12 hr capsule  Commonly known as:  CARBATROL     losartan 50 MG tablet  Commonly known as:  COZAAR     losartan-hydrochlorothiazide 100-25 MG per tablet  Commonly known as:  HYZAAR     mirtazapine 30 MG tablet  Commonly known as:  REMERON     ondansetron 8 MG tablet  Commonly known as:  ZOFRAN     saccharomyces boulardii 250 MG capsule  Commonly known as:  FLORASTOR              Discharge Disposition:  Skilled Nursing Facility (DC - External)    Discharge Diet:  Diet Instructions     Diet: Soft Texture; Thin Liquids, No Restrictions; Whole       Discharge Diet:  Soft Texture    Fluid Consistency:  Thin Liquids, No Restrictions    Soft Options:  Whole          Discharge Activity:   Activity Instructions     Activity as Tolerated             Code Status/Level of Support:  Code Status and Medical Interventions:   Ordered at: 07/18/18 1202     Limited Support to NOT Include:    Dialysis    Intubation    Vasopressors     Level Of Support Discussed With:    Health Care Surrogate     Code Status:    No CPR     Medical Interventions (Level of Support Prior to Arrest):    Limited     Comments:    [facility] POA       No future appointments.    Additional Instructions  for the Follow-ups that You Need to Schedule     Discharge Follow-up with PCP    As directed      Currently Documented PCP:  No Known Provider  PCP Phone Number:  None    Follow Up Details:  once discharged from rehab               Time Spent on Discharge:  45 minutes    Electronically signed by RAFAEL Navas, 07/20/18, 2:30 PM.

## 2018-07-20 NOTE — PLAN OF CARE
Problem: Patient Care Overview  Goal: Plan of Care Review  Outcome: Outcome(s) achieved Date Met: 07/20/18 07/20/18 6515   Coping/Psychosocial   Plan of Care Reviewed With patient   OTHER   Outcome Summary Pt is limited by fear and anxiety. Pt requires maxA for toileting and Katerina for transfers. LBD deferred this date due to pts decreased balance and frequent falls. Recommend discharge to SNF when medically ready.    Plan of Care Review   Progress improving

## 2018-07-20 NOTE — PLAN OF CARE
Problem: Patient Care Overview  Goal: Plan of Care Review  Outcome: Ongoing (interventions implemented as appropriate)   07/20/18 8383   Coping/Psychosocial   Plan of Care Reviewed With patient   OTHER   Outcome Summary pt did not rest well, awake throughout night, vss, no c/o pain, no c/o n/v/d, encourage PO fluids, turn q2, mepilex and zguard to coccyx, will continue to monitor.   Plan of Care Review   Progress improving     Goal: Individualization and Mutuality  Outcome: Ongoing (interventions implemented as appropriate)    Goal: Discharge Needs Assessment  Outcome: Ongoing (interventions implemented as appropriate)      Problem: Fall Risk (Adult)  Goal: Identify Related Risk Factors and Signs and Symptoms  Outcome: Ongoing (interventions implemented as appropriate)    Goal: Absence of Fall  Outcome: Ongoing (interventions implemented as appropriate)      Problem: Skin Injury Risk (Adult)  Goal: Skin Health and Integrity  Outcome: Ongoing (interventions implemented as appropriate)

## 2018-07-20 NOTE — PROGRESS NOTES
Case Management Discharge Note    Final Note: Aayush Gustafson at the [facility] has approved Mrs. Eric's transfer to a skilled bed. I have notified her nephew Lane. He will transport her there by car. Nurse to call report to [phone].    Destination - Selection Complete     Service Request Status Selected Specialties Address Phone Number Fax Number    [facility] Selected Skilled Nursing Facility [address] [place] [address] [phone]      Durable Medical Equipment     No service has been selected for the patient.      Dialysis/Infusion     No service has been selected for the patient.      Home Medical Care     No service has been selected for the patient.      Social Care     No service has been selected for the patient.             Final Discharge Disposition Code: 03 - skilled nursing facility (SNF)

## 2018-07-20 NOTE — THERAPY DISCHARGE NOTE
Acute Care - Physical Therapy Discharge Summary  [facility]       Patient Name: Mitzi Eric  : 1935  MRN: [id number]    Today's Date: 2018  Onset of Illness/Injury or Date of Surgery: 18    Date of Referral to PT: 18  Referring Physician: MD Andrew      Admit Date: 2018      PT Recommendation and Plan    Visit Dx:    ICD-10-CM ICD-9-CM   1. Multiple falls R29.6 V15.88   2. Fall at nursing home, initial encounter W19.XXXA E888.9    Y92.129    3. Recurrent urinary tract infection N39.0 599.0   4. Elevated carbamazepine level R78.89 790.99   5. Closed nondisplaced fracture of greater trochanter of right femur, initial encounter (CMS/[facility]) S72.114A 820.20   6. Advanced dementia F03.90 294.20   7. Impaired functional mobility, balance, gait, and endurance Z74.09 V49.89   8. Impaired mobility and ADLs Z74.09 799.89             Outcome Measures     Row Name 18 0925 18 0900          How much help from another person do you currently need...    Turning from your back to your side while in flat bed without using bedrails?  -- 4  -LS     Moving from lying on back to sitting on the side of a flat bed without bedrails?  -- 3  -LS     Moving to and from a bed to a chair (including a wheelchair)?  -- 3  -LS     Standing up from a chair using your arms (e.g., wheelchair, bedside chair)?  -- 3  -LS     Climbing 3-5 steps with a railing?  -- 2  -LS     To walk in hospital room?  -- 3  -LS     AM-PAC 6 Clicks Score  -- 18  -LS        How much help from another is currently needed...    Putting on and taking off regular lower body clothing? 2  -HK  --     Bathing (including washing, rinsing, and drying) 2  -HK  --     Toileting (which includes using toilet bed pan or urinal) 1  -HK  --     Putting on and taking off regular upper body clothing 3  -HK  --     Taking care of personal grooming (such as brushing teeth) 2  -HK  --     Eating meals 3  -HK  --     Score 13  -HK  --         Functional Assessment    Outcome Measure Options AM-PAC 6 Clicks Daily Activity (OT)  -HK AM-PAC 6 Clicks Basic Mobility (PT)  -LS       User Key  (r) = Recorded By, (t) = Taken By, (c) = Cosigned By    Initials Name Provider Type    LS Ute Rasheed, PT Physical Therapist    HK Dionra Gonzalez, OT Occupational Therapist            Therapy Suggested Charges     Code   Minutes Charges    None                   PT Rehab Goals     Row Name 07/20/18 8684             Bed Mobility Goal 1 (PT)    Activity/Assistive Device (Bed Mobility Goal 1, PT) sit to supine/supine to sit  -MC      [place] Level/Cues Needed (Bed Mobility Goal 1, PT) independent  -MC      Time Frame (Bed Mobility Goal 1, PT) 10 days  -MC      Progress/Outcomes (Bed Mobility Goal 1, PT) goal ongoing  -MC         Transfer Goal 1 (PT)    Activity/Assistive Device (Transfer Goal 1, PT) sit-to-stand/stand-to-sit;walker, rolling  -MC      [place] Level/Cues Needed (Transfer Goal 1, PT) standby assist  -MC      Time Frame (Transfer Goal 1, PT) 10 days  -MC      Progress/Outcome (Transfer Goal 1, PT) goal ongoing  -MC         Gait Training Goal 1 (PT)    Activity/Assistive Device (Gait Training Goal 1, PT) gait (walking locomotion);assistive device use  -MC      [place] Level (Gait Training Goal 1, PT) standby assist  -MC      Distance (Gait Goal 1, PT) 150  -MC      Time Frame (Gait Training Goal 1, PT) 10 days  -MC      Progress/Outcome (Gait Training Goal 1, PT) goal ongoing  -MC        User Key  (r) = Recorded By, (t) = Taken By, (c) = Cosigned By    Initials Name Provider Type [facility] Kami Aguilar, PT Physical Therapist PT              PT Discharge Summary  Anticipated Discharge Disposition (PT): skilled nursing facility  Reason for Discharge: Discharge from facility  Outcomes Achieved: Refer to plan of care for updates on goals achieved      Kami Aguilar, PT   7/20/2018

## 2018-07-20 NOTE — THERAPY EVALUATION
Acute Care - Occupational Therapy Initial Evaluation  [facility]     Patient Name: Mitzi Eric  : 1935  MRN: [id number]  Today's Date: 2018  Onset of Illness/Injury or Date of Surgery: 18  Date of Referral to OT: 18  Referring Physician: MD Andrew    Admit Date: 2018       ICD-10-CM ICD-9-CM   1. Multiple falls R29.6 V15.88   2. Fall at nursing home, initial encounter W19.XXXA E888.9    Y92.129    3. Recurrent urinary tract infection N39.0 599.0   4. Elevated carbamazepine level R78.89 790.99   5. Closed nondisplaced fracture of greater trochanter of right femur, initial encounter (CMS/[facility]) S72.114A 820.20   6. Advanced dementia F03.90 294.20   7. Impaired functional mobility, balance, gait, and endurance Z74.09 V49.89   8. Impaired mobility and ADLs Z74.09 799.89     Patient Active Problem List   Diagnosis   • Fall     Past Medical History:   Diagnosis Date   • Ataxic gait    • Chronic kidney disease, stage 3    • COPD (chronic obstructive pulmonary disease) (CMS/[facility])    • Dementia    • Disease of thyroid gland    • Hypertension      Past Surgical History:   Procedure Laterality Date   • BRAIN TUMOR EXCISION      BENIGN   • FRACTURE SURGERY     • TRIGGER FINGER RELEASE            OT ASSESSMENT FLOWSHEET (last 72 hours)      Occupational Therapy Evaluation     Row Name 18 0925                   OT Evaluation Time/Intention    Subjective Information complains of   anixety  -HK        Document Type evaluation  -HK        Mode of Treatment individual therapy;occupational therapy  -HK        Patient Effort good  -HK        Symptoms Noted During/After Treatment fatigue  -HK        Comment Pt reports she is used to being alone. Pt is very easily frightened by sudden movement and talking.   -HK           General Information    Patient Profile Reviewed? yes  -HK        Onset of Illness/Injury or Date of Surgery 18  -HK        Referring Physician MD Andrew  -HK        Patient  Observations alert;cooperative;agree to therapy  -HK        Patient/Family Observations No family at bedside.   -HK        General Observations of Patient Pt received sitting EOB with nursing tech.   -HK        Prior Level of Function independent:;transfer;bed mobility   pt is poor historian; per chart required assist   -HK        Equipment Currently Used at Home rollator;wheelchair   per chart; pt said no AE  -HK        Pertinent History of Current Functional Problem Pt is a 83 YOF who presents to [facility] with complaints of 3 falls in 3 days. Found to have UTI. Had ORIF one month ago per nephew. Pt now has nondisplaced R greater trochanter fx (nonsurgical; WBAT).   -HK        Existing Precautions/Restrictions fall  -HK        Risks Reviewed patient:;nausea/vomiting;LOB;dizziness;increased discomfort;change in vital signs;increased drainage;lines disloged  -HK        Benefits Reviewed patient:;improve function;increase independence;increase balance;decrease pain;improve skin integrity;increase knowledge;decrease risk of DVT;increase strength  -HK        Barriers to Rehab cognitive status  -HK           Relationship/Environment    Primary Source of Support/Comfort extended family   nephew  -HK        Lives With facility resident  -HK           Resource/Environmental Concerns    Current Living Arrangements independent/assisted living facility  -HK           Cognitive Assessment/Interventions    Additional Documentation Cognitive Assessment/Intervention (Group)  -HK           Cognitive Assessment/Intervention- PT/OT    Affect/Mental Status (Cognitive) emotionally labile  -HK        Behavioral Issues (Cognitive) difficulty managing stress  -HK        Orientation Status (Cognition) oriented to;person  -HK        Follows Commands (Cognition) follows one step commands;over 90% accuracy  -HK        Cognitive Function (Cognitive) safety deficit  -HK        Safety Deficit (Cognitive) safety precautions  follow-through/compliance;safety precautions awareness;problem solving;insight into deficits/self awareness;awareness of need for assistance;mild deficit  -HK        Personal Safety Interventions fall prevention program maintained;gait belt;nonskid shoes/slippers when out of bed  -HK           Bed Mobility Assessment/Treatment    Comment (Bed Mobility) pt received at EOB and left UIC  -HK           Functional Mobility    Functional Mobility- Ind. Level minimum assist (75% patient effort);verbal cues required  -HK        Functional Mobility- Device rolling walker  -HK        Functional Mobility-Distance (Feet) 15  -HK        Functional Mobility- Safety Issues step length decreased;weight-shifting ability decreased  -HK        Functional Mobility- Comment pt took steps from bed to BSC and over to chair.   -HK           Transfer Assessment/Treatment    Transfer Assessment/Treatment sit-stand transfer;stand-sit transfer;toilet transfer  -HK        Comment (Transfers) verbal cues for safe hand placement and appropriate body mechanics.   -HK           Sit-Stand Transfer    Sit-Stand [place] (Transfers) minimum assist (75% patient effort);verbal cues  -HK        Assistive Device (Sit-Stand Transfers) walker, front-wheeled  -HK           Stand-Sit Transfer    Stand-Sit [place] (Transfers) minimum assist (75% patient effort);verbal cues  -HK        Assistive Device (Stand-Sit Transfers) walker, front-wheeled  -HK           Toilet Transfer    Type (Toilet Transfer) sit-stand;stand-sit  -HK        [place] Level (Toilet Transfer) minimum assist (75% patient effort);verbal cues  -HK           ADL Assessment/Intervention    BADL Assessment/Intervention toileting;lower body dressing  -HK           Lower Body Dressing Assessment/Training    Comment (Lower Body Dressing) deferred due to decreased balance and frequent falls.   -HK           Toileting Assessment/Training    [place] Level (Toileting) toileting  skills;maximum assist (25% patient effort);verbal cues  -HK        Assistive Devices (Toileting) commode, bedside without drop arms  -HK        Toileting Position unsupported sitting  -HK        Comment (Toileting) Pt requires maxA for lokesh care and clothing management.   -HK           BADL Safety/Performance    Impairments, BADL Safety/Performance balance;strength  -HK           General ROM    RT Upper Ext Rt Shoulder Flexion  -HK        LT Upper Ext Lt Shoulder Flexion  -HK           Right Upper Ext    Rt Shoulder Flexion AROM WFL for age  -HK           Left Upper Ext    Lt Shoulder Flexion AROM WFL for age  -HK           MMT (Manual Muscle Testing)    Additional Documentation General Assessment (Manual Muscle Testing) (Group)  -HK           General Assessment (Manual Muscle Testing)    General Manual Muscle Testing (MMT) Assessment upper extremity strength deficits identified  -HK           Upper Extremity (Manual Muscle Testing)    Upper Extremity: Manual Muscle Testing (MMT) left shoulder strength deficit;right shoulder strength deficit  -HK           Left Shoulder (Manual Muscle Testing)    Left Shoulder Manual Muscle Testing (MMT) flexion  -HK        MMT: Flexion, Left Shoulder flexion  -HK        MMT, Gross Movement: Left Shoulder Flexion (4-/5) good minus  -HK           Right Shoulder (Manual Muscle Testing)    Right Shoulder Manual Muscle Testing (MMT) flexion  -HK        MMT: Flexion, Right Shoulder flexion  -HK        MMT, Gross Movement: Right Shoulder Flexion (4-/5) good minus  -HK           Motor Assessment/Interventions    Additional Documentation Balance (Group)  -HK           Balance    Balance static sitting balance;static standing balance  -HK           Static Sitting Balance    Level of [place] (Unsupported Sitting, Static Balance) supervision  -HK        Sitting Position (Unsupported Sitting, Static Balance) other (see comments)   sitting on BSC   -HK        Time Able to Maintain Position  (Unsupported Sitting, Static Balance) more than 5 minutes  -HK           Static Standing Balance    Level of [place] (Supported Standing, Static Balance) contact guard assist  -HK        Time Able to Maintain Position (Supported Standing, Static Balance) 1 to 2 minutes  -HK        Assistive Device Utilized (Supported Standing, Static Balance) rolling walker  -HK           Sensory Assessment/Intervention    Sensory General Assessment no sensation deficits identified  -HK           Positioning and Restraints    Pre-Treatment Position in bed  -HK        Post Treatment Position chair  -HK        In Chair notified nsg;reclined;call light within reach;exit alarm on;encouraged to call for assist  -HK           Pain Assessment    Additional Documentation Pain Scale: Numbers Pre/Post-Treatment (Group)  -HK           Pain Scale: Numbers Pre/Post-Treatment    Pain Scale: Numbers, Pretreatment 0/10 - no pain  -HK        Pain Scale: Numbers, Post-Treatment 0/10 - no pain  -HK           Wound 07/18/18 2000 Other (See comments) anterior nose laceration    Wound - Properties Group Date first assessed: 07/18/18  -TJ Time first assessed: 2000  -TJ Present On Admission : yes  -TJ Side: Other (See comments)  -TJ Orientation: anterior  -TJ Location: nose  -TJ Type: laceration  -TJ       Wound 07/18/18 2000 Left eyebrow abrasion    Wound - Properties Group Date first assessed: 07/18/18  -TJ Time first assessed: 2000  -TJ Present On Admission : yes  -TJ Side: Left  -TJ Location: eyebrow  -TJ Type: abrasion  -TJ       Wound 07/18/18 2000 Right eyebrow abrasion;laceration    Wound - Properties Group Date first assessed: 07/18/18  -TJ Time first assessed: 2000  -TJ Side: Right  -TJ Location: eyebrow  -TJ Type: abrasion;laceration  -TJ       Wound 07/18/18 2000 Right elbow abrasion    Wound - Properties Group Date first assessed: 07/18/18  -TJ Time first assessed: 2000  -TJ Side: Right  -TJ Location: elbow  -TJ Type: abrasion  -TJ        Wound 07/19/18 2000 midline coccyx puncture;abrasion;unspecified    Wound - Properties Group Date first assessed: 07/19/18  -MW Time first assessed: 2000  -MW Present On Admission : other (see comments)  -MW, unsure  Orientation: midline  -MW Location: coccyx  -MW Type: puncture;abrasion;unspecified  -MW       Plan of Care Review    Plan of Care Reviewed With patient  -HK           Clinical Impression (OT)    Date of Referral to OT 07/19/18  -HK        OT Diagnosis Decreased independence with ADLS and Mobility   -HK        Patient/Family Goals Statement (OT Eval) Pt would like to improve and return home  -HK        Rehab Potential (OT Eval) good, to achieve stated therapy goals  -HK        Therapy Frequency (OT Eval) daily  -HK        Care Plan Review (OT) evaluation/treatment results reviewed;care plan/treatment goals reviewed;patient/other agree to care plan  -HK        Anticipated Discharge Disposition (OT) skilled nursing facility  -HK           Vital Signs    Pre Systolic BP Rehab 103  -HK        Pre Treatment Diastolic BP 80  -HK        Intra Systolic BP Rehab 87  -HK        Intra Treatment Diastolic BP 59  -HK        Post Systolic BP Rehab 87  -HK        Post Treatment Diastolic BP 59  -HK        Pretreatment Heart Rate (beats/min) 78  -HK        Intratreatment Heart Rate (beats/min) 97  -HK        Posttreatment Heart Rate (beats/min) 86  -HK        Pre Patient Position Sitting  -HK        Intra Patient Position Standing  -HK        Post Patient Position Sitting  -HK           OT Goals    Bed Mobility Goal Selection (OT) bed mobility, OT goal 1  -HK        Transfer Goal Selection (OT) transfer, OT goal 1  -HK        Dressing Goal Selection (OT) dressing, OT goal 1  -HK        Toileting Goal Selection (OT) toileting, OT goal 1  -HK           Bed Mobility Goal 1 (OT)    Activity/Assistive Device (Bed Mobility Goal 1, OT) sit to supine/supine to sit;scooting  -HK        [place] Level/Cues Needed (Bed  Mobility Goal 1, OT) minimum assist (75% or more patient effort);verbal cues required  -HK        Time Frame (Bed Mobility Goal 1, OT) 5 days  -HK        Progress/Outcomes (Bed Mobility Goal 1, OT) goal ongoing  -HK           Transfer Goal 1 (OT)    Activity/Assistive Device (Transfer Goal 1, OT) sit-to-stand/stand-to-sit;toilet  -HK        [place] Level/Cues Needed (Transfer Goal 1, OT) contact guard assist  -HK        Time Frame (Transfer Goal 1, OT) 5 days  -HK        Progress/Outcome (Transfer Goal 1, OT) goal ongoing  -HK           Dressing Goal 1 (OT)    Activity/Assistive Device (Dressing Goal 1, OT) lower body dressing  -HK        [place]/Cues Needed (Dressing Goal 1, OT) minimum assist (75% or more patient effort);verbal cues required  -HK        Time Frame (Dressing Goal 1, OT) 5 days  -HK        Progress/Outcome (Dressing Goal 1, OT) goal ongoing  -HK           Toileting Goal 1 (OT)    Activity/Device (Toileting Goal 1, OT) toileting skills, all  -HK        [place] Level/Cues Needed (Toileting Goal 1, OT) moderate assist (50-74% patient effort);verbal cues required  -HK        Time Frame (Toileting Goal 1, OT) 5 days  -HK        Progress/Outcome (Toileting Goal 1, OT) goal ongoing  -HK           Living Environment    Home Accessibility wheelchair accessible  -HK          User Key  (r) = Recorded By, (t) = Taken By, (c) = Cosigned By    Initials Name Effective Dates    MW Barb Dinh RN 06/16/16 -     TJ Savannah Hanks RN 06/16/16 -     HK Dinora Gonzalez OT 03/07/18 -            Occupational Therapy Education     Title: PT OT SLP Therapies (Active)     Topic: Occupational Therapy (Active)     Point: ADL training (Done)     Description: Instruct learner(s) on proper safety adaptation and remediation techniques during self care or transfers.   Instruct in proper use of assistive devices.   Learning Progress Summary     Learner Status Readiness Method Response Comment Documented by     Patient Done Acceptance E VU Pt educated on ADL retraining with toileting, safety precautions, and appropriate body mechanics. HK 07/20/18 1337          Point: Precautions (Done)     Description: Instruct learner(s) on prescribed precautions during self-care and functional transfers.   Learning Progress Summary     Learner Status Readiness Method Response Comment Documented by    Patient Done Acceptance E VU Pt educated on ADL retraining with toileting, safety precautions, and appropriate body mechanics. HK 07/20/18 1337          Point: Body mechanics (Done)     Description: Instruct learner(s) on proper positioning and spine alignment during self-care, functional mobility activities and/or exercises.   Learning Progress Summary     Learner Status Readiness Method Response Comment Documented by    Patient Done Acceptance E VU Pt educated on ADL retraining with toileting, safety precautions, and appropriate body mechanics.  07/20/18 1337                      User Key     Initials Effective Dates Name Provider Type Discipline     03/07/18 -  Dinora Gonzalez, OT Occupational Therapist OT                  OT Recommendation and Plan  Outcome Summary/Treatment Plan (OT)  Anticipated Discharge Disposition (OT): skilled nursing facility  Therapy Frequency (OT Eval): daily  Plan of Care Review  Plan of Care Reviewed With: patient  Plan of Care Reviewed With: patient  Outcome Summary: Pt is limited by fear and anxiety. Pt requires maxA for toileting and Katerina for transfers. LBD deferred this date due to pts decreased balance and frequent falls. Recommend discharge to SNF when medically ready.           Outcome Measures     Row Name 07/20/18 0925 07/19/18 0900          How much help from another person do you currently need...    Turning from your back to your side while in flat bed without using bedrails?  -- 4  -LS     Moving from lying on back to sitting on the side of a flat bed without bedrails?  -- 3  -LS     Moving to  and from a bed to a chair (including a wheelchair)?  -- 3  -LS     Standing up from a chair using your arms (e.g., wheelchair, bedside chair)?  -- 3  -LS     Climbing 3-5 steps with a railing?  -- 2  -LS     To walk in hospital room?  -- 3  -LS     AM-PAC 6 Clicks Score  -- 18  -LS        How much help from another is currently needed...    Putting on and taking off regular lower body clothing? 2  -HK  --     Bathing (including washing, rinsing, and drying) 2  -HK  --     Toileting (which includes using toilet bed pan or urinal) 1  -HK  --     Putting on and taking off regular upper body clothing 3  -HK  --     Taking care of personal grooming (such as brushing teeth) 2  -HK  --     Eating meals 3  -HK  --     Score 13  -HK  --        Functional Assessment    Outcome Measure Options AM-PAC 6 Clicks Daily Activity (OT)  -HK AM-PAC 6 Clicks Basic Mobility (PT)  -LS       User Key  (r) = Recorded By, (t) = Taken By, (c) = Cosigned By    Initials Name Provider Type     Ute Rasheed, PT Physical Therapist     Dinora Gonzalez OT Occupational Therapist          Time Calculation:   OT Start Time: 0925  Therapy Suggested Charges     Code   Minutes Charges    None           Therapy Charges for Today     Code Description Service Date Service Provider Modifiers Qty    [id number]  OT EVAL MOD COMPLEXITY 4 7/20/2018 Dinora Gonzalez OT GO 1               Dinora Gonzalez OT  7/20/2018

## 2018-07-23 NOTE — THERAPY DISCHARGE NOTE
Acute Care - Occupational Therapy Discharge Summary  [facility]     Patient Name: Mitzi Eirc  : 1935  MRN: [id number]    Today's Date: 2018  Onset of Illness/Injury or Date of Surgery: 18    Date of Referral to OT: 18  Referring Physician: MD Andrew      Admit Date: 2018        OT Recommendation and Plan    Visit Dx:    ICD-10-CM ICD-9-CM   1. Multiple falls R29.6 V15.88   2. Fall at nursing home, initial encounter W19.XXXA E888.9    Y92.129    3. Recurrent urinary tract infection N39.0 599.0   4. Elevated carbamazepine level R78.89 790.99   5. Closed nondisplaced fracture of greater trochanter of right femur, initial encounter (CMS/[facility]) S72.114A 820.20   6. Advanced dementia F03.90 294.20   7. Impaired functional mobility, balance, gait, and endurance Z74.09 V49.89   8. Impaired mobility and ADLs Z74.09 799.89                     OT Rehab Goals     Row Name 18 1255             Bed Mobility Goal 1 (OT)    Progress/Outcomes (Bed Mobility Goal 1, OT) goal not met;discharged from facility  -JESSICA         Transfer Goal 1 (OT)    Progress/Outcome (Transfer Goal 1, OT) goal not met;discharged from facility  -JESSICA         Dressing Goal 1 (OT)    Progress/Outcome (Dressing Goal 1, OT) goal not met;discharged from facility  -JESSICA         Toileting Goal 1 (OT)    Progress/Outcome (Toileting Goal 1, OT) goal not met;discharged from facility  -JESSICA        User Key  (r) = Recorded By, (t) = Taken By, (c) = Cosigned By    Initials Name Provider Type Discipline    JESSICA Kamara OT Occupational Therapist OT              Therapy Suggested Charges     Code   Minutes Charges    None                 OT Discharge Summary  Reason for Discharge: Discharge from facility  Outcomes Achieved: Discharge from facility occurred on same date as evluation  Discharge Destination: SNF      Rochelle Kamara OT  2018

## 2021-10-21 ENCOUNTER — HOSPITAL ENCOUNTER (EMERGENCY)
Facility: HOSPITAL | Age: 86
Discharge: HOME | End: 2021-10-21
Attending: EMERGENCY MEDICINE | Admitting: EMERGENCY MEDICINE

## 2021-10-21 ENCOUNTER — APPOINTMENT (OUTPATIENT)
Dept: GENERAL RADIOLOGY | Facility: HOSPITAL | Age: 86
End: 2021-10-21

## 2021-10-21 ENCOUNTER — APPOINTMENT (OUTPATIENT)
Dept: CT IMAGING | Facility: HOSPITAL | Age: 86
End: 2021-10-21

## 2021-10-21 VITALS
BODY MASS INDEX: 15.95 KG/M2 | WEIGHT: 90 LBS | HEART RATE: 90 BPM | DIASTOLIC BLOOD PRESSURE: 95 MMHG | HEIGHT: 63 IN | OXYGEN SATURATION: 94 % | TEMPERATURE: 97.6 F | SYSTOLIC BLOOD PRESSURE: 158 MMHG | RESPIRATION RATE: 18 BRPM

## 2021-10-21 DIAGNOSIS — S70.01XA CONTUSION OF RIGHT HIP, INITIAL ENCOUNTER: Primary | ICD-10-CM

## 2021-10-21 DIAGNOSIS — S40.012A CONTUSION OF LEFT SHOULDER, INITIAL ENCOUNTER: ICD-10-CM

## 2021-10-21 DIAGNOSIS — S00.03XA CONTUSION OF SCALP, INITIAL ENCOUNTER: ICD-10-CM

## 2021-10-21 LAB
ALBUMIN SERPL-MCNC: 4.9 G/DL (ref 3.5–5.2)
ALBUMIN/GLOB SERPL: 2 G/DL
ALP SERPL-CCNC: 113 U/L (ref 39–117)
ALT SERPL W P-5'-P-CCNC: 7 U/L (ref 1–33)
ANION GAP SERPL CALCULATED.3IONS-SCNC: 10 MMOL/L (ref 5–15)
AST SERPL-CCNC: 23 U/L (ref 1–32)
BASOPHILS # BLD AUTO: 0.05 10*3/MM3 (ref 0–0.2)
BASOPHILS NFR BLD AUTO: 0.8 % (ref 0–1.5)
BILIRUB SERPL-MCNC: 0.4 MG/DL (ref 0–1.2)
BUN SERPL-MCNC: 23 MG/DL (ref 8–23)
BUN/CREAT SERPL: 26.1 (ref 7–25)
CALCIUM SPEC-SCNC: 9.3 MG/DL (ref 8.6–10.5)
CHLORIDE SERPL-SCNC: 107 MMOL/L (ref 98–107)
CO2 SERPL-SCNC: 27 MMOL/L (ref 22–29)
CREAT SERPL-MCNC: 0.88 MG/DL (ref 0.57–1)
DEPRECATED RDW RBC AUTO: 42.2 FL (ref 37–54)
EOSINOPHIL # BLD AUTO: 0.1 10*3/MM3 (ref 0–0.4)
EOSINOPHIL NFR BLD AUTO: 1.7 % (ref 0.3–6.2)
ERYTHROCYTE [DISTWIDTH] IN BLOOD BY AUTOMATED COUNT: 11.9 % (ref 12.3–15.4)
GFR SERPL CREATININE-BSD FRML MDRD: 61 ML/MIN/1.73
GLOBULIN UR ELPH-MCNC: 2.5 GM/DL
GLUCOSE SERPL-MCNC: 101 MG/DL (ref 65–99)
HCT VFR BLD AUTO: 42.1 % (ref 34–46.6)
HGB BLD-MCNC: 13.9 G/DL (ref 12–15.9)
IMM GRANULOCYTES # BLD AUTO: 0.03 10*3/MM3 (ref 0–0.05)
IMM GRANULOCYTES NFR BLD AUTO: 0.5 % (ref 0–0.5)
LYMPHOCYTES # BLD AUTO: 0.94 10*3/MM3 (ref 0.7–3.1)
LYMPHOCYTES NFR BLD AUTO: 15.7 % (ref 19.6–45.3)
MCH RBC QN AUTO: 31.8 PG (ref 26.6–33)
MCHC RBC AUTO-ENTMCNC: 33 G/DL (ref 31.5–35.7)
MCV RBC AUTO: 96.3 FL (ref 79–97)
MONOCYTES # BLD AUTO: 0.74 10*3/MM3 (ref 0.1–0.9)
MONOCYTES NFR BLD AUTO: 12.4 % (ref 5–12)
NEUTROPHILS NFR BLD AUTO: 4.11 10*3/MM3 (ref 1.7–7)
NEUTROPHILS NFR BLD AUTO: 68.9 % (ref 42.7–76)
NRBC BLD AUTO-RTO: 0 /100 WBC (ref 0–0.2)
PLATELET # BLD AUTO: 114 10*3/MM3 (ref 140–450)
PMV BLD AUTO: 11.8 FL (ref 6–12)
POTASSIUM SERPL-SCNC: 4.5 MMOL/L (ref 3.5–5.2)
PROT SERPL-MCNC: 7.4 G/DL (ref 6–8.5)
RBC # BLD AUTO: 4.37 10*6/MM3 (ref 3.77–5.28)
SODIUM SERPL-SCNC: 144 MMOL/L (ref 136–145)
WBC # BLD AUTO: 5.97 10*3/MM3 (ref 3.4–10.8)

## 2021-10-21 PROCEDURE — 70450 CT HEAD/BRAIN W/O DYE: CPT

## 2021-10-21 PROCEDURE — 73030 X-RAY EXAM OF SHOULDER: CPT

## 2021-10-21 PROCEDURE — 73502 X-RAY EXAM HIP UNI 2-3 VIEWS: CPT

## 2021-10-21 PROCEDURE — 36415 COLL VENOUS BLD VENIPUNCTURE: CPT

## 2021-10-21 PROCEDURE — 85025 COMPLETE CBC W/AUTO DIFF WBC: CPT | Performed by: EMERGENCY MEDICINE

## 2021-10-21 PROCEDURE — 99283 EMERGENCY DEPT VISIT LOW MDM: CPT

## 2021-10-21 PROCEDURE — 80053 COMPREHEN METABOLIC PANEL: CPT | Performed by: EMERGENCY MEDICINE

## 2021-10-21 RX ORDER — MIRTAZAPINE 15 MG/1
15 TABLET, FILM COATED ORAL NIGHTLY
COMMUNITY
End: 2021-12-06 | Stop reason: HOSPADM

## 2021-10-21 RX ORDER — SODIUM CHLORIDE 0.9 % (FLUSH) 0.9 %
10 SYRINGE (ML) INJECTION AS NEEDED
Status: DISCONTINUED | OUTPATIENT
Start: 2021-10-21 | End: 2021-10-21 | Stop reason: HOSPADM

## 2021-10-21 RX ORDER — CARBAMAZEPINE 400 MG/1
400 TABLET, EXTENDED RELEASE ORAL 2 TIMES DAILY
COMMUNITY
End: 2021-12-06 | Stop reason: HOSPADM

## 2021-10-21 RX ORDER — PREGABALIN 50 MG/1
50 CAPSULE ORAL 3 TIMES DAILY
COMMUNITY
End: 2021-12-06 | Stop reason: HOSPADM

## 2021-10-21 RX ORDER — TRAZODONE HYDROCHLORIDE 50 MG/1
50 TABLET ORAL NIGHTLY
COMMUNITY
End: 2021-12-06 | Stop reason: HOSPADM

## 2021-10-21 NOTE — ED PROVIDER NOTES
Subjective   86-year-old female who presents for evaluation of right hip pain after fall.  The patient has a known history of dementia and [place] [facility]  She typically uses a walker to help aid in.  She states that she was attempting to get off of the toilet when she tripped due to her wheelchair and landed on her right side.  She reports striking her head, he did her right hip, and also complains mildly of left shoulder pain.  She states she has not been able to get up on her own despite what the triage note states.  She does not take any anticoagulation.  Denies significant headache.  No reported chest or abdominal pain.  No recent fever, bodies, or chills.  No other acute complaints.  Per family of her who is in the room with her she is currently at her baseline.          Review of Systems   Constitutional: Negative for chills, fatigue and fever.   HENT: Negative for congestion, ear pain, postnasal drip, sinus pressure and sore throat.    Eyes: Negative for pain, redness and visual disturbance.   Respiratory: Negative for cough, chest tightness and shortness of breath.    Cardiovascular: Negative for chest pain, palpitations and leg swelling.   Gastrointestinal: Negative for abdominal pain, anal bleeding, blood in stool, diarrhea, nausea and vomiting.   Endocrine: Negative for polydipsia and polyuria.   Genitourinary: Negative for difficulty urinating, dysuria, frequency and urgency.   Musculoskeletal: Positive for arthralgias. Negative for back pain and neck pain.   Skin: Negative for pallor and rash.   Allergic/Immunologic: Negative for environmental allergies and immunocompromised state.   Neurological: Positive for headaches. Negative for dizziness and weakness.   Hematological: Negative for adenopathy.   Psychiatric/Behavioral: Negative for confusion, self-injury and suicidal ideas. The patient is not nervous/anxious.    All other systems reviewed and are negative.      Past Medical  History:   Diagnosis Date   • A-fib (HCC)    • Ataxic gait    • Chronic kidney disease, stage 3 (HCC)    • COPD (chronic obstructive pulmonary disease) (HCC)    • Dementia (HCC)    • Disease of thyroid gland    • Hyperlipidemia    • Hypertension        No Known Allergies    Past Surgical History:   Procedure Laterality Date   • BRAIN TUMOR EXCISION      BENIGN   • FRACTURE SURGERY     • TRIGGER FINGER RELEASE         History reviewed. No pertinent family history.    Social History     Socioeconomic History   • Marital status: Single   Tobacco Use   • Smoking status: Former Smoker   Substance and Sexual Activity   • Alcohol use: No   • Drug use: No           Objective   Physical Exam  Vitals and nursing note reviewed.   Constitutional:       General: She is not in acute distress.     Appearance: Normal appearance. She is well-developed. She is not toxic-appearing or diaphoretic.   HENT:      Head: Normocephalic and atraumatic.        Right Ear: External ear normal.      Left Ear: External ear normal.      Nose: Nose normal.   Eyes:      General: Lids are normal.      Pupils: Pupils are equal, round, and reactive to light.   Neck:      Trachea: No tracheal deviation.   Cardiovascular:      Rate and Rhythm: Normal rate and regular rhythm.      Pulses: No decreased pulses.      Heart sounds: Normal heart sounds. No murmur heard.  No friction rub. No gallop.    Pulmonary:      Effort: Pulmonary effort is normal. No respiratory distress.      Breath sounds: Normal breath sounds. No decreased breath sounds, wheezing, rhonchi or rales.   Abdominal:      General: Bowel sounds are normal.      Palpations: Abdomen is soft.      Tenderness: There is no abdominal tenderness. There is no guarding or rebound.   Musculoskeletal:         General: No deformity. Normal range of motion.        Arms:       Cervical back: Normal range of motion and neck supple.        Legs:    Lymphadenopathy:      Cervical: No cervical adenopathy.    Skin:     General: Skin is warm and dry.      Findings: No rash.   Neurological:      Mental Status: She is alert and oriented to person, place, and time.      Cranial Nerves: No cranial nerve deficit.      Sensory: No sensory deficit.   Psychiatric:         Speech: Speech normal.         Behavior: Behavior normal.         Thought Content: Thought content normal.         Judgment: Judgment normal.         Procedures           ED Course                                           MDM  Number of Diagnoses or Management Options  Contusion of left shoulder, initial encounter: new and requires workup  Contusion of right hip, initial encounter: new and requires workup  Contusion of scalp, initial encounter: new and requires workup  Diagnosis management comments: X-ray of the left shoulder, right hip, and CT scan head showed no acute valleys.    Patient is felt to have contusion of the aforementioned areas, but overall appears well, expresses desire to go home, and is appropriate for discharge.    Patient already has tramadol available to her at home to help with pain that is not controlled by Tylenol or ibuprofen.    Discharged home appearing well.       Amount and/or Complexity of Data Reviewed  Tests in the radiology section of CPT®: ordered and reviewed  Obtain history from someone other than the patient: yes  Review and summarize past medical records: yes  Independent visualization of images, tracings, or specimens: yes        Final diagnoses:   Contusion of right hip, initial encounter   Contusion of scalp, initial encounter   Contusion of left shoulder, initial encounter       ED Disposition  ED Disposition     ED Disposition Condition Comment    Discharge Stable           No follow-up provider specified.       Medication List      No changes were made to your prescriptions during this visit.          April Diaz MD  10/21/21 6722

## 2021-10-21 NOTE — DISCHARGE INSTRUCTIONS
Apply ice to areas of pain.    Take Tylenol or ibuprofen as needed for pain.    Take already prescribed tramadol as needed to help with pain.    Follow-up with primary care physician for recheck in 1 week and return to the ER as needed.

## 2021-11-05 ENCOUNTER — APPOINTMENT (OUTPATIENT)
Dept: GENERAL RADIOLOGY | Facility: HOSPITAL | Age: 86
End: 2021-11-05

## 2021-11-05 ENCOUNTER — APPOINTMENT (OUTPATIENT)
Dept: CT IMAGING | Facility: HOSPITAL | Age: 86
End: 2021-11-05

## 2021-11-05 ENCOUNTER — HOSPITAL ENCOUNTER (EMERGENCY)
Facility: HOSPITAL | Age: 86
Discharge: NURSING, REHAB OR LONG-TERM CARE | End: 2021-11-05
Attending: EMERGENCY MEDICINE | Admitting: EMERGENCY MEDICINE

## 2021-11-05 VITALS
BODY MASS INDEX: 20.61 KG/M2 | HEART RATE: 80 BPM | TEMPERATURE: 97.6 F | WEIGHT: 112 LBS | DIASTOLIC BLOOD PRESSURE: 85 MMHG | RESPIRATION RATE: 16 BRPM | SYSTOLIC BLOOD PRESSURE: 121 MMHG | OXYGEN SATURATION: 94 % | HEIGHT: 62 IN

## 2021-11-05 DIAGNOSIS — R25.1 TREMOR: ICD-10-CM

## 2021-11-05 DIAGNOSIS — R41.0 EPISODE OF CONFUSION: ICD-10-CM

## 2021-11-05 DIAGNOSIS — R41.82 ALTERED MENTAL STATUS, UNSPECIFIED ALTERED MENTAL STATUS TYPE: Primary | ICD-10-CM

## 2021-11-05 LAB
ALBUMIN SERPL-MCNC: 4.7 G/DL (ref 3.5–5.2)
ALBUMIN/GLOB SERPL: 2.1 G/DL
ALP SERPL-CCNC: 107 U/L (ref 39–117)
ALT SERPL W P-5'-P-CCNC: 12 U/L (ref 1–33)
ANION GAP SERPL CALCULATED.3IONS-SCNC: 12 MMOL/L (ref 5–15)
AST SERPL-CCNC: 21 U/L (ref 1–32)
BASOPHILS # BLD AUTO: 0.04 10*3/MM3 (ref 0–0.2)
BASOPHILS NFR BLD AUTO: 0.9 % (ref 0–1.5)
BILIRUB SERPL-MCNC: 0.4 MG/DL (ref 0–1.2)
BILIRUB UR QL STRIP: NEGATIVE
BUN SERPL-MCNC: 17 MG/DL (ref 8–23)
BUN/CREAT SERPL: 20.2 (ref 7–25)
CALCIUM SPEC-SCNC: 8.9 MG/DL (ref 8.6–10.5)
CHLORIDE SERPL-SCNC: 105 MMOL/L (ref 98–107)
CLARITY UR: CLEAR
CO2 SERPL-SCNC: 25 MMOL/L (ref 22–29)
COLOR UR: YELLOW
CREAT SERPL-MCNC: 0.84 MG/DL (ref 0.57–1)
DEPRECATED RDW RBC AUTO: 40.6 FL (ref 37–54)
EOSINOPHIL # BLD AUTO: 0.18 10*3/MM3 (ref 0–0.4)
EOSINOPHIL NFR BLD AUTO: 4.1 % (ref 0.3–6.2)
ERYTHROCYTE [DISTWIDTH] IN BLOOD BY AUTOMATED COUNT: 11.9 % (ref 12.3–15.4)
GFR SERPL CREATININE-BSD FRML MDRD: 64 ML/MIN/1.73
GLOBULIN UR ELPH-MCNC: 2.2 GM/DL
GLUCOSE SERPL-MCNC: 86 MG/DL (ref 65–99)
GLUCOSE UR STRIP-MCNC: NEGATIVE MG/DL
HCT VFR BLD AUTO: 38.7 % (ref 34–46.6)
HGB BLD-MCNC: 13 G/DL (ref 12–15.9)
HGB UR QL STRIP.AUTO: NEGATIVE
HOLD SPECIMEN: NORMAL
IMM GRANULOCYTES # BLD AUTO: 0.01 10*3/MM3 (ref 0–0.05)
IMM GRANULOCYTES NFR BLD AUTO: 0.2 % (ref 0–0.5)
KETONES UR QL STRIP: NEGATIVE
LEUKOCYTE ESTERASE UR QL STRIP.AUTO: NEGATIVE
LYMPHOCYTES # BLD AUTO: 1.31 10*3/MM3 (ref 0.7–3.1)
LYMPHOCYTES NFR BLD AUTO: 29.5 % (ref 19.6–45.3)
MAGNESIUM SERPL-MCNC: 2 MG/DL (ref 1.6–2.4)
MCH RBC QN AUTO: 31.4 PG (ref 26.6–33)
MCHC RBC AUTO-ENTMCNC: 33.6 G/DL (ref 31.5–35.7)
MCV RBC AUTO: 93.5 FL (ref 79–97)
MONOCYTES # BLD AUTO: 0.56 10*3/MM3 (ref 0.1–0.9)
MONOCYTES NFR BLD AUTO: 12.6 % (ref 5–12)
NEUTROPHILS NFR BLD AUTO: 2.34 10*3/MM3 (ref 1.7–7)
NEUTROPHILS NFR BLD AUTO: 52.7 % (ref 42.7–76)
NITRITE UR QL STRIP: NEGATIVE
NRBC BLD AUTO-RTO: 0 /100 WBC (ref 0–0.2)
PH UR STRIP.AUTO: 6.5 [PH] (ref 5–8)
PLATELET # BLD AUTO: 142 10*3/MM3 (ref 140–450)
PMV BLD AUTO: 12 FL (ref 6–12)
POTASSIUM SERPL-SCNC: 4.1 MMOL/L (ref 3.5–5.2)
PROT SERPL-MCNC: 6.9 G/DL (ref 6–8.5)
PROT UR QL STRIP: NEGATIVE
RBC # BLD AUTO: 4.14 10*6/MM3 (ref 3.77–5.28)
SODIUM SERPL-SCNC: 142 MMOL/L (ref 136–145)
SP GR UR STRIP: 1.01 (ref 1–1.03)
TROPONIN T SERPL-MCNC: <0.01 NG/ML (ref 0–0.03)
UROBILINOGEN UR QL STRIP: NORMAL
WBC # BLD AUTO: 4.44 10*3/MM3 (ref 3.4–10.8)
WHOLE BLOOD HOLD SPECIMEN: NORMAL
WHOLE BLOOD HOLD SPECIMEN: NORMAL

## 2021-11-05 PROCEDURE — 80053 COMPREHEN METABOLIC PANEL: CPT | Performed by: EMERGENCY MEDICINE

## 2021-11-05 PROCEDURE — 99284 EMERGENCY DEPT VISIT MOD MDM: CPT

## 2021-11-05 PROCEDURE — 83735 ASSAY OF MAGNESIUM: CPT | Performed by: EMERGENCY MEDICINE

## 2021-11-05 PROCEDURE — 84484 ASSAY OF TROPONIN QUANT: CPT | Performed by: EMERGENCY MEDICINE

## 2021-11-05 PROCEDURE — 85025 COMPLETE CBC W/AUTO DIFF WBC: CPT | Performed by: EMERGENCY MEDICINE

## 2021-11-05 PROCEDURE — 93005 ELECTROCARDIOGRAM TRACING: CPT | Performed by: EMERGENCY MEDICINE

## 2021-11-05 PROCEDURE — 71045 X-RAY EXAM CHEST 1 VIEW: CPT

## 2021-11-05 PROCEDURE — 70450 CT HEAD/BRAIN W/O DYE: CPT

## 2021-11-05 PROCEDURE — 81003 URINALYSIS AUTO W/O SCOPE: CPT | Performed by: EMERGENCY MEDICINE

## 2021-11-05 RX ORDER — SODIUM CHLORIDE 0.9 % (FLUSH) 0.9 %
10 SYRINGE (ML) INJECTION AS NEEDED
Status: DISCONTINUED | OUTPATIENT
Start: 2021-11-05 | End: 2021-11-05 | Stop reason: HOSPADM

## 2021-11-05 RX ORDER — CETIRIZINE HYDROCHLORIDE 10 MG/1
10 TABLET ORAL DAILY
COMMUNITY
End: 2022-12-13

## 2021-11-05 NOTE — ED PROVIDER NOTES
"Subjective   Patient is an 86-year-old female presenting to the emergency department from [facility] for concerns of altered mental status with tremors.  Nursing staff reported that at approximately noon today she was found to be in an altered mental state with violent tremors in her arms with additional involuntary flexion in both legs. Nursing staff had additional reports that patient was combative during transit. Patient appears to be resting comfortably still in her bed in no distress at this time with subtle observable resting tremor.  On attempt to converse with the patient, she appeared to be alert, but neither aware of why she was here nor oriented to place, day, or year.  She was oriented to her own name, but not her age.  She is unaware of this event happening.  Her nephew whom she recognized and who has power of  arrived to the emergency department and provided the remaining patient history.  Endorses that she has a history of frequent UTIs, does not hydrate well, and expresses high suspicion that she has one today.  He endorses that her earlier described symptoms were abnormal for her baseline.  He reports she has been showing signs of mental decline over the past 3 months, but denies any prior diagnoses of dementia or or Alzheimer's.  He endorses that she is still able to ambulate with assistance and can clean herself and toilet independently at this time.  He reports she has a history of 2 falls; the most recent fall was 2 weeks ago.  He endorses that she has been predominantly wheelchair-bound since her last fall.  The patient denies any symptoms of dysuria, shortness of breath, myopathies, fever, pain, or distress at this time.  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      History provided by:  Patient and relative  History limited by:  Mental status change   used: No    Altered " Mental Status  Presenting symptoms: behavior changes and disorientation    Severity:  Unable to specify  Most recent episode:  Today  Episode history:  Single  Timing:  Unable to specify  Progression:  Resolved  Chronicity:  New  Context: nursing home resident    Context: not homeless    Associated symptoms: weakness        Review of Systems   Neurological: Positive for weakness.       Past Medical History:   Diagnosis Date   • A-fib (HCC)    • Ataxic gait    • Chronic kidney disease, stage 3 (HCC)    • COPD (chronic obstructive pulmonary disease) (HCC)    • Dementia (HCC)    • Disease of thyroid gland    • Hyperlipidemia    • Hypertension        Allergies   Allergen Reactions   • Bee Pollen Unknown - Low Severity       Past Surgical History:   Procedure Laterality Date   • BRAIN TUMOR EXCISION      BENIGN   • FRACTURE SURGERY     • TRIGGER FINGER RELEASE         History reviewed. No pertinent family history.    Social History     Socioeconomic History   • Marital status: Single   Tobacco Use   • Smoking status: Former Smoker   Substance and Sexual Activity   • Alcohol use: No   • Drug use: No           Objective   Physical Exam  HENT:      Head: Normocephalic.   Cardiovascular:      Rate and Rhythm: Normal rate and regular rhythm.      Heart sounds: Normal heart sounds.   Pulmonary:      Effort: Pulmonary effort is normal.   Abdominal:      Palpations: Abdomen is soft.   Neurological:      Mental Status: She is alert. She is disoriented.      GCS: GCS eye subscore is 4. GCS verbal subscore is 5. GCS motor subscore is 6.      Motor: Weakness present.      Comments: Patient is oriented to self only.    Psychiatric:         Mood and Affect: Mood is not anxious or depressed.         Behavior: Behavior is not agitated.         Cognition and Memory: Cognition is impaired. Memory is impaired.         Procedures           ED Course  ED Course as of 11/06/21 0852   Fri Nov 05, 2021   1453 XR Chest 1 View [CP]      ED Course  User Index  [CP] Singh Pascual DO      Recent Results (from the past 24 hour(s))   Comprehensive Metabolic Panel    Collection Time: 11/05/21 12:46 PM    Specimen: Blood   Result Value Ref Range    Glucose 86 65 - 99 mg/dL    BUN 17 8 - 23 mg/dL    Creatinine 0.84 0.57 - 1.00 mg/dL    Sodium 142 136 - 145 mmol/L    Potassium 4.1 3.5 - 5.2 mmol/L    Chloride 105 98 - 107 mmol/L    CO2 25.0 22.0 - 29.0 mmol/L    Calcium 8.9 8.6 - 10.5 mg/dL    Total Protein 6.9 6.0 - 8.5 g/dL    Albumin 4.70 3.50 - 5.20 g/dL    ALT (SGPT) 12 1 - 33 U/L    AST (SGOT) 21 1 - 32 U/L    Alkaline Phosphatase 107 39 - 117 U/L    Total Bilirubin 0.4 0.0 - 1.2 mg/dL    eGFR Non African Amer 64 >60 mL/min/1.73    Globulin 2.2 gm/dL    A/G Ratio 2.1 g/dL    BUN/Creatinine Ratio 20.2 7.0 - 25.0    Anion Gap 12.0 5.0 - 15.0 mmol/L   Troponin    Collection Time: 11/05/21 12:46 PM    Specimen: Blood   Result Value Ref Range    Troponin T <0.010 0.000 - 0.030 ng/mL   Magnesium    Collection Time: 11/05/21 12:46 PM    Specimen: Blood   Result Value Ref Range    Magnesium 2.0 1.6 - 2.4 mg/dL   Green Top (Gel)    Collection Time: 11/05/21 12:46 PM   Result Value Ref Range    Extra Tube Hold for add-ons.    Lavender Top    Collection Time: 11/05/21 12:46 PM   Result Value Ref Range    Extra Tube hold for add-on    Gray Top    Collection Time: 11/05/21 12:46 PM   Result Value Ref Range    Extra Tube Hold for add-ons.    CBC Auto Differential    Collection Time: 11/05/21 12:46 PM    Specimen: Blood   Result Value Ref Range    WBC 4.44 3.40 - 10.80 10*3/mm3    RBC 4.14 3.77 - 5.28 10*6/mm3    Hemoglobin 13.0 12.0 - 15.9 g/dL    Hematocrit 38.7 34.0 - 46.6 %    MCV 93.5 79.0 - 97.0 fL    MCH 31.4 26.6 - 33.0 pg    MCHC 33.6 31.5 - 35.7 g/dL    RDW 11.9 (L) 12.3 - 15.4 %    RDW-SD 40.6 37.0 - 54.0 fl    MPV 12.0 6.0 - 12.0 fL    Platelets 142 140 - 450 10*3/mm3    Neutrophil % 52.7 42.7 - 76.0 %    Lymphocyte % 29.5 19.6 - 45.3 %    Monocyte % 12.6 (H) 5.0 -  12.0 %    Eosinophil % 4.1 0.3 - 6.2 %    Basophil % 0.9 0.0 - 1.5 %    Immature Grans % 0.2 0.0 - 0.5 %    Neutrophils, Absolute 2.34 1.70 - 7.00 10*3/mm3    Lymphocytes, Absolute 1.31 0.70 - 3.10 10*3/mm3    Monocytes, Absolute 0.56 0.10 - 0.90 10*3/mm3    Eosinophils, Absolute 0.18 0.00 - 0.40 10*3/mm3    Basophils, Absolute 0.04 0.00 - 0.20 10*3/mm3    Immature Grans, Absolute 0.01 0.00 - 0.05 10*3/mm3    nRBC 0.0 0.0 - 0.2 /100 WBC   Gold Top - SST    Collection Time: 11/05/21 12:47 PM   Result Value Ref Range    Extra Tube Hold for add-ons.    Light Blue Top    Collection Time: 11/05/21 12:47 PM   Result Value Ref Range    Extra Tube hold for add-on    Urinalysis With Microscopic If Indicated (No Culture) - Urine, Clean Catch    Collection Time: 11/05/21  1:01 PM    Specimen: Urine, Clean Catch   Result Value Ref Range    Color, UA Yellow Yellow, Straw    Appearance, UA Clear Clear    pH, UA 6.5 5.0 - 8.0    Specific Gravity, UA 1.012 1.001 - 1.030    Glucose, UA Negative Negative    Ketones, UA Negative Negative    Bilirubin, UA Negative Negative    Blood, UA Negative Negative    Protein, UA Negative Negative    Leuk Esterase, UA Negative Negative    Nitrite, UA Negative Negative    Urobilinogen, UA 0.2 E.U./dL 0.2 - 1.0 E.U./dL     Note: In addition to lab results from this visit, the labs listed above may include labs taken at another facility or during a different encounter within the last 24 hours. Please correlate lab times with ED admission and discharge times for further clarification of the services performed during this visit.    CT Head Without Contrast   Final Result   Stable age-related and postoperative changes of the brain,   without evidence of acute intracranial abnormality.           This report was finalized on 11/5/2021 3:14 PM by Javier Jean-Baptiste.          XR Chest 1 View   Final Result   A masslike right upper lobe opacity is noted, grossly   similar in appearance to 2018 comparison CT.  Correlate with any evidence   of further workup at that time. This finding was suspicious for   malignancy in 2018, however its apparent stability, in the absence of   treatment since that time, would suggest benign nature.       No acute cardiopulmonary abnormality otherwise.       This report was finalized on 11/5/2021 1:49 PM by Javier Jean-Baptiste.            Vitals:    11/05/21 1600 11/05/21 1615 11/05/21 1630 11/05/21 1645   BP: 138/79 145/69 113/76 121/85   BP Location:       Patient Position:       Pulse: 76 76 77 80   Resp:       Temp:       TempSrc:       SpO2: 92% 94% 94% 94%   Weight:       Height:         Medications - No data to display  ECG/EMG Results (last 24 hours)     ** No results found for the last 24 hours. **        ECG 12 Lead    (Results Pending)                                            MDM    Final diagnoses:   Altered mental status, unspecified altered mental status type   Episode of confusion   Tremor       ED Disposition  ED Disposition     ED Disposition Condition Comment    Discharge Stable           Your Primary Care Provider    Schedule an appointment as soon as possible for a visit       [facility] Emergency Department  [address] [address] [phone]  Schedule an appointment as soon as possible for a visit       Robin Guo MD  [address]  [facility] [address] [phone]    Schedule an appointment as soon as possible for a visit            Medication List      No changes were made to your prescriptions during this visit.          Singh Pascual DO  11/06/21 0853

## 2021-11-08 ENCOUNTER — TELEPHONE (OUTPATIENT)
Dept: NEUROLOGY | Facility: CLINIC | Age: 86
End: 2021-11-08

## 2021-11-08 NOTE — TELEPHONE ENCOUNTER
Caller: CHELE    Relationship to patient: NEPHSAYDA     Best call back number: [phone]    New or established patient?  [x] New  [] Established    Date of discharge: 11.05.21    Facility discharged from: [facility]     Diagnosis/Symptoms: ALTERED MENTAL /TREMOR     Length of stay (If applicable): 5 HOURS     Specialty Only: Did you see a [facility] provider?    [] Yes  [x] No  If so, who?     NOTE IN DISCHARGE STATES TO SET APPT UP WITH DR DUMONT. NEURO FOR ALTERED MENTAL /TREMOR  [facility] [place] [phone]  / POA IS GOING TO GET PCP TO SEND REFERRAL TO US  PLEASE ADVISE TO GO AHEAD AND SCHED OR WAIT FOR REFERRAL .

## 2021-11-11 LAB
QT INTERVAL: 368 MS
QTC INTERVAL: 419 MS

## 2021-11-29 ENCOUNTER — APPOINTMENT (OUTPATIENT)
Dept: CT IMAGING | Facility: HOSPITAL | Age: 86
End: 2021-11-29

## 2021-11-29 ENCOUNTER — APPOINTMENT (OUTPATIENT)
Dept: GENERAL RADIOLOGY | Facility: HOSPITAL | Age: 86
End: 2021-11-29

## 2021-11-29 ENCOUNTER — APPOINTMENT (OUTPATIENT)
Dept: CARDIOLOGY | Facility: HOSPITAL | Age: 86
End: 2021-11-29

## 2021-11-29 ENCOUNTER — APPOINTMENT (OUTPATIENT)
Dept: MRI IMAGING | Facility: HOSPITAL | Age: 86
End: 2021-11-29

## 2021-11-29 ENCOUNTER — HOSPITAL ENCOUNTER (INPATIENT)
Facility: HOSPITAL | Age: 86
LOS: 7 days | Discharge: HOME HEALTH | End: 2021-12-06
Attending: EMERGENCY MEDICINE | Admitting: INTERNAL MEDICINE

## 2021-11-29 DIAGNOSIS — D72.829 LEUKOCYTOSIS, UNSPECIFIED TYPE: ICD-10-CM

## 2021-11-29 DIAGNOSIS — N28.9 RENAL INSUFFICIENCY: ICD-10-CM

## 2021-11-29 DIAGNOSIS — R41.0 CONFUSION: ICD-10-CM

## 2021-11-29 DIAGNOSIS — G50.0 TRIGEMINAL NEURALGIA: ICD-10-CM

## 2021-11-29 DIAGNOSIS — R47.1 DYSARTHRIA: ICD-10-CM

## 2021-11-29 DIAGNOSIS — R29.810 FACIAL DROOP: ICD-10-CM

## 2021-11-29 DIAGNOSIS — N39.0 ACUTE UTI: Primary | ICD-10-CM

## 2021-11-29 DIAGNOSIS — R47.01 APHASIA: ICD-10-CM

## 2021-11-29 DIAGNOSIS — R41.841 COGNITIVE COMMUNICATION DEFICIT: ICD-10-CM

## 2021-11-29 PROBLEM — N17.9 AKI (ACUTE KIDNEY INJURY) (HCC): Status: ACTIVE | Noted: 2021-11-29

## 2021-11-29 PROBLEM — I63.9 CVA (CEREBRAL VASCULAR ACCIDENT): Status: ACTIVE | Noted: 2021-11-29

## 2021-11-29 PROBLEM — I63.9 CVA (CEREBRAL VASCULAR ACCIDENT): Status: RESOLVED | Noted: 2021-11-29 | Resolved: 2021-11-29

## 2021-11-29 PROBLEM — I10 HTN (HYPERTENSION): Status: ACTIVE | Noted: 2021-11-29

## 2021-11-29 LAB
ALBUMIN SERPL-MCNC: 4 G/DL (ref 3.5–5.2)
ALBUMIN/GLOB SERPL: 1.7 G/DL
ALP SERPL-CCNC: 77 U/L (ref 39–117)
ALT SERPL W P-5'-P-CCNC: 10 U/L (ref 1–33)
ANION GAP SERPL CALCULATED.3IONS-SCNC: 10 MMOL/L (ref 5–15)
APTT PPP: 38.1 SECONDS (ref 22–39)
AST SERPL-CCNC: 20 U/L (ref 1–32)
BACTERIA UR QL AUTO: ABNORMAL /HPF
BASE EXCESS BLDA CALC-SCNC: 4 MMOL/L (ref -5–5)
BASOPHILS # BLD AUTO: 0.05 10*3/MM3 (ref 0–0.2)
BASOPHILS NFR BLD AUTO: 0.3 % (ref 0–1.5)
BH CV ECHO MEAS - AO MAX PG: 6 MMHG
BH CV ECHO MEAS - AO MEAN PG: 3.3 MMHG
BH CV ECHO MEAS - AO ROOT AREA (BSA CORRECTED): 2.1
BH CV ECHO MEAS - AO ROOT AREA: 7.6 CM^2
BH CV ECHO MEAS - AO ROOT DIAM: 3.1 CM
BH CV ECHO MEAS - AO V2 MAX: 118.4 CM/SEC
BH CV ECHO MEAS - AO V2 MEAN: 87.9 CM/SEC
BH CV ECHO MEAS - AO V2 VTI: 24.1 CM
BH CV ECHO MEAS - BSA(HAYCOCK): 1.5 M^2
BH CV ECHO MEAS - BSA: 1.5 M^2
BH CV ECHO MEAS - BZI_BMI: 20.5 KILOGRAMS/M^2
BH CV ECHO MEAS - BZI_METRIC_HEIGHT: 157.5 CM
BH CV ECHO MEAS - BZI_METRIC_WEIGHT: 50.8 KG
BH CV ECHO MEAS - EDV(CUBED): 32.6 ML
BH CV ECHO MEAS - EDV(MOD-SP2): 83 ML
BH CV ECHO MEAS - EDV(MOD-SP4): 79.3 ML
BH CV ECHO MEAS - EDV(TEICH): 40.7 ML
BH CV ECHO MEAS - EF(CUBED): 67 %
BH CV ECHO MEAS - EF(MOD-BP): 66 %
BH CV ECHO MEAS - EF(MOD-SP2): 63.6 %
BH CV ECHO MEAS - EF(MOD-SP4): 69.6 %
BH CV ECHO MEAS - EF(TEICH): 59.9 %
BH CV ECHO MEAS - ESV(CUBED): 10.8 ML
BH CV ECHO MEAS - ESV(MOD-SP2): 30.2 ML
BH CV ECHO MEAS - ESV(MOD-SP4): 24.1 ML
BH CV ECHO MEAS - ESV(TEICH): 16.3 ML
BH CV ECHO MEAS - FS: 30.9 %
BH CV ECHO MEAS - IVS/LVPW: 0.79
BH CV ECHO MEAS - IVSD: 1 CM
BH CV ECHO MEAS - LA DIMENSION: 3.1 CM
BH CV ECHO MEAS - LA/AO: 1
BH CV ECHO MEAS - LAT PEAK E' VEL: 11.6 CM/SEC
BH CV ECHO MEAS - LATERAL E/E' RATIO: 5
BH CV ECHO MEAS - LV DIASTOLIC VOL/BSA (35-75): 53.1 ML/M^2
BH CV ECHO MEAS - LV MASS(C)D: 115.1 GRAMS
BH CV ECHO MEAS - LV MASS(C)DI: 77 GRAMS/M^2
BH CV ECHO MEAS - LV SYSTOLIC VOL/BSA (12-30): 16.1 ML/M^2
BH CV ECHO MEAS - LVIDD: 3.2 CM
BH CV ECHO MEAS - LVIDS: 2.2 CM
BH CV ECHO MEAS - LVLD AP2: 6.8 CM
BH CV ECHO MEAS - LVLD AP4: 6.3 CM
BH CV ECHO MEAS - LVLS AP2: 5.8 CM
BH CV ECHO MEAS - LVLS AP4: 5.2 CM
BH CV ECHO MEAS - LVOT AREA (M): 3.1 CM^2
BH CV ECHO MEAS - LVOT AREA: 3.1 CM^2
BH CV ECHO MEAS - LVOT DIAM: 2 CM
BH CV ECHO MEAS - LVPWD: 1.3 CM
BH CV ECHO MEAS - MED PEAK E' VEL: 10.6 CM/SEC
BH CV ECHO MEAS - MEDIAL E/E' RATIO: 5.5
BH CV ECHO MEAS - MV A MAX VEL: 66.2 CM/SEC
BH CV ECHO MEAS - MV DEC TIME: 0.18 SEC
BH CV ECHO MEAS - MV E MAX VEL: 58.3 CM/SEC
BH CV ECHO MEAS - MV E/A: 0.88
BH CV ECHO MEAS - MV MAX PG: 3.7 MMHG
BH CV ECHO MEAS - MV MEAN PG: 2 MMHG
BH CV ECHO MEAS - MV V2 MAX: 96.8 CM/SEC
BH CV ECHO MEAS - MV V2 MEAN: 66.4 CM/SEC
BH CV ECHO MEAS - MV V2 VTI: 20.3 CM
BH CV ECHO MEAS - PA ACC TIME: 0.07 SEC
BH CV ECHO MEAS - PA PR(ACCEL): 48.9 MMHG
BH CV ECHO MEAS - RAP SYSTOLE: 3 MMHG
BH CV ECHO MEAS - RVSP: 59 MMHG
BH CV ECHO MEAS - SI(AO): 122.5 ML/M^2
BH CV ECHO MEAS - SI(CUBED): 14.6 ML/M^2
BH CV ECHO MEAS - SI(MOD-SP2): 35.3 ML/M^2
BH CV ECHO MEAS - SI(MOD-SP4): 36.9 ML/M^2
BH CV ECHO MEAS - SI(TEICH): 16.3 ML/M^2
BH CV ECHO MEAS - SV(AO): 183 ML
BH CV ECHO MEAS - SV(CUBED): 21.8 ML
BH CV ECHO MEAS - SV(MOD-SP2): 52.8 ML
BH CV ECHO MEAS - SV(MOD-SP4): 55.2 ML
BH CV ECHO MEAS - SV(TEICH): 24.4 ML
BH CV ECHO MEAS - TAPSE (>1.6): 1.9 CM
BH CV ECHO MEAS - TR MAX PG: 56 MMHG
BH CV ECHO MEAS - TR MAX VEL: 361.4 CM/SEC
BH CV ECHO MEASUREMENTS AVERAGE E/E' RATIO: 5.25
BH CV XLRA - RV BASE: 3.6 CM
BH CV XLRA - RV LENGTH: 5.8 CM
BH CV XLRA - RV MID: 2.7 CM
BH CV XLRA - TDI S': 13.4 CM/SEC
BILIRUB SERPL-MCNC: 0.4 MG/DL (ref 0–1.2)
BILIRUB UR QL STRIP: NEGATIVE
BUN SERPL-MCNC: 29 MG/DL (ref 8–23)
BUN/CREAT SERPL: 19.1 (ref 7–25)
CA-I BLDA-SCNC: 1.21 MMOL/L (ref 1.2–1.32)
CALCIUM SPEC-SCNC: 8.8 MG/DL (ref 8.6–10.5)
CARBAMAZEPINE SERPL-MCNC: 14.5 MCG/ML (ref 4–12)
CHLORIDE SERPL-SCNC: 103 MMOL/L (ref 98–107)
CLARITY UR: ABNORMAL
CO2 BLDA-SCNC: 31 MMOL/L (ref 24–29)
CO2 SERPL-SCNC: 28 MMOL/L (ref 22–29)
COLOR UR: YELLOW
CREAT BLDA-MCNC: 1.6 MG/DL (ref 0.6–1.3)
CREAT SERPL-MCNC: 1.52 MG/DL (ref 0.57–1)
D-LACTATE SERPL-SCNC: 1.4 MMOL/L (ref 0.5–2)
DEPRECATED RDW RBC AUTO: 43.4 FL (ref 37–54)
EOSINOPHIL # BLD AUTO: 0.08 10*3/MM3 (ref 0–0.4)
EOSINOPHIL NFR BLD AUTO: 0.5 % (ref 0.3–6.2)
ERYTHROCYTE [DISTWIDTH] IN BLOOD BY AUTOMATED COUNT: 12.4 % (ref 12.3–15.4)
FLUAV SUBTYP SPEC NAA+PROBE: NOT DETECTED
FLUBV RNA ISLT QL NAA+PROBE: NOT DETECTED
GFR SERPL CREATININE-BSD FRML MDRD: 33 ML/MIN/1.73
GLOBULIN UR ELPH-MCNC: 2.3 GM/DL
GLUCOSE BLDC GLUCOMTR-MCNC: 88 MG/DL (ref 70–130)
GLUCOSE SERPL-MCNC: 86 MG/DL (ref 65–99)
GLUCOSE UR STRIP-MCNC: NEGATIVE MG/DL
HCO3 BLDA-SCNC: 29.2 MMOL/L (ref 22–26)
HCT VFR BLD AUTO: 35.6 % (ref 34–46.6)
HCT VFR BLDA CALC: 33 % (ref 38–51)
HGB BLD-MCNC: 11.9 G/DL (ref 12–15.9)
HGB BLDA-MCNC: 11.2 G/DL (ref 12–17)
HGB UR QL STRIP.AUTO: ABNORMAL
HOLD SPECIMEN: NORMAL
HYALINE CASTS UR QL AUTO: ABNORMAL /LPF
IMM GRANULOCYTES # BLD AUTO: 0.06 10*3/MM3 (ref 0–0.05)
IMM GRANULOCYTES NFR BLD AUTO: 0.4 % (ref 0–0.5)
INR PPP: 1.3 (ref 0.8–1.2)
KETONES UR QL STRIP: NEGATIVE
LEFT ATRIUM VOLUME INDEX: 26 ML/M2
LEUKOCYTE ESTERASE UR QL STRIP.AUTO: ABNORMAL
LYMPHOCYTES # BLD AUTO: 1.74 10*3/MM3 (ref 0.7–3.1)
LYMPHOCYTES NFR BLD AUTO: 11.9 % (ref 19.6–45.3)
MAGNESIUM SERPL-MCNC: 2.3 MG/DL (ref 1.6–2.4)
MAXIMAL PREDICTED HEART RATE: 135 BPM
MCH RBC QN AUTO: 32 PG (ref 26.6–33)
MCHC RBC AUTO-ENTMCNC: 33.4 G/DL (ref 31.5–35.7)
MCV RBC AUTO: 95.7 FL (ref 79–97)
MONOCYTES # BLD AUTO: 1.07 10*3/MM3 (ref 0.1–0.9)
MONOCYTES NFR BLD AUTO: 7.3 % (ref 5–12)
NEUTROPHILS NFR BLD AUTO: 11.6 10*3/MM3 (ref 1.7–7)
NEUTROPHILS NFR BLD AUTO: 79.6 % (ref 42.7–76)
NITRITE UR QL STRIP: POSITIVE
NRBC BLD AUTO-RTO: 0 /100 WBC (ref 0–0.2)
PCO2 BLDA: 46.6 MM HG (ref 35–45)
PH BLDA: 7.4 PH UNITS (ref 7.35–7.6)
PH UR STRIP.AUTO: 5.5 [PH] (ref 5–8)
PLATELET # BLD AUTO: 136 10*3/MM3 (ref 140–450)
PMV BLD AUTO: 11.9 FL (ref 6–12)
PO2 BLDA: 13 MMHG (ref 80–105)
POTASSIUM BLDA-SCNC: 4.1 MMOL/L (ref 3.5–4.9)
POTASSIUM SERPL-SCNC: 4.1 MMOL/L (ref 3.5–5.2)
PROCALCITONIN SERPL-MCNC: 2.46 NG/ML (ref 0–0.25)
PROT SERPL-MCNC: 6.3 G/DL (ref 6–8.5)
PROT UR QL STRIP: ABNORMAL
PROTHROMBIN TIME: 15.2 SECONDS (ref 12.8–15.2)
QT INTERVAL: 344 MS
QTC INTERVAL: 411 MS
RBC # BLD AUTO: 3.72 10*6/MM3 (ref 3.77–5.28)
RBC # UR STRIP: ABNORMAL /HPF
REF LAB TEST METHOD: ABNORMAL
SAO2 % BLDA: 15 % (ref 95–98)
SARS-COV-2 RNA PNL SPEC NAA+PROBE: NOT DETECTED
SODIUM BLD-SCNC: 141 MMOL/L (ref 138–146)
SODIUM SERPL-SCNC: 141 MMOL/L (ref 136–145)
SP GR UR STRIP: 1.03 (ref 1–1.03)
SQUAMOUS #/AREA URNS HPF: ABNORMAL /HPF
STRESS TARGET HR: 115 BPM
T4 FREE SERPL-MCNC: 0.8 NG/DL (ref 0.93–1.7)
TROPONIN T SERPL-MCNC: <0.01 NG/ML (ref 0–0.03)
TSH SERPL DL<=0.05 MIU/L-ACNC: 1.24 UIU/ML (ref 0.27–4.2)
UROBILINOGEN UR QL STRIP: ABNORMAL
WBC # UR STRIP: ABNORMAL /HPF
WBC NRBC COR # BLD: 14.6 10*3/MM3 (ref 3.4–10.8)
WHOLE BLOOD HOLD SPECIMEN: NORMAL
WHOLE BLOOD HOLD SPECIMEN: NORMAL

## 2021-11-29 PROCEDURE — 70496 CT ANGIOGRAPHY HEAD: CPT

## 2021-11-29 PROCEDURE — 36415 COLL VENOUS BLD VENIPUNCTURE: CPT

## 2021-11-29 PROCEDURE — 0 IOPAMIDOL PER 1 ML: Performed by: EMERGENCY MEDICINE

## 2021-11-29 PROCEDURE — 25010000002 CEFTRIAXONE PER 250 MG: Performed by: EMERGENCY MEDICINE

## 2021-11-29 PROCEDURE — 84145 PROCALCITONIN (PCT): CPT | Performed by: EMERGENCY MEDICINE

## 2021-11-29 PROCEDURE — 70450 CT HEAD/BRAIN W/O DYE: CPT

## 2021-11-29 PROCEDURE — 84132 ASSAY OF SERUM POTASSIUM: CPT

## 2021-11-29 PROCEDURE — 85610 PROTHROMBIN TIME: CPT

## 2021-11-29 PROCEDURE — 71045 X-RAY EXAM CHEST 1 VIEW: CPT

## 2021-11-29 PROCEDURE — 85730 THROMBOPLASTIN TIME PARTIAL: CPT | Performed by: EMERGENCY MEDICINE

## 2021-11-29 PROCEDURE — 84484 ASSAY OF TROPONIN QUANT: CPT | Performed by: EMERGENCY MEDICINE

## 2021-11-29 PROCEDURE — P9612 CATHETERIZE FOR URINE SPEC: HCPCS

## 2021-11-29 PROCEDURE — 81001 URINALYSIS AUTO W/SCOPE: CPT | Performed by: EMERGENCY MEDICINE

## 2021-11-29 PROCEDURE — 83605 ASSAY OF LACTIC ACID: CPT | Performed by: EMERGENCY MEDICINE

## 2021-11-29 PROCEDURE — 70553 MRI BRAIN STEM W/O & W/DYE: CPT

## 2021-11-29 PROCEDURE — 85025 COMPLETE CBC W/AUTO DIFF WBC: CPT | Performed by: EMERGENCY MEDICINE

## 2021-11-29 PROCEDURE — A9577 INJ MULTIHANCE: HCPCS | Performed by: INTERNAL MEDICINE

## 2021-11-29 PROCEDURE — 87077 CULTURE AEROBIC IDENTIFY: CPT | Performed by: EMERGENCY MEDICINE

## 2021-11-29 PROCEDURE — 0 GADOBENATE DIMEGLUMINE 529 MG/ML SOLUTION: Performed by: INTERNAL MEDICINE

## 2021-11-29 PROCEDURE — 0042T HC CT CEREBRAL PERFUSION W/WO CONTRAST: CPT

## 2021-11-29 PROCEDURE — 84295 ASSAY OF SERUM SODIUM: CPT

## 2021-11-29 PROCEDURE — 82565 ASSAY OF CREATININE: CPT

## 2021-11-29 PROCEDURE — 87186 SC STD MICRODIL/AGAR DIL: CPT | Performed by: EMERGENCY MEDICINE

## 2021-11-29 PROCEDURE — 87636 SARSCOV2 & INF A&B AMP PRB: CPT | Performed by: EMERGENCY MEDICINE

## 2021-11-29 PROCEDURE — 85014 HEMATOCRIT: CPT

## 2021-11-29 PROCEDURE — 80156 ASSAY CARBAMAZEPINE TOTAL: CPT | Performed by: INTERNAL MEDICINE

## 2021-11-29 PROCEDURE — 87040 BLOOD CULTURE FOR BACTERIA: CPT | Performed by: EMERGENCY MEDICINE

## 2021-11-29 PROCEDURE — 99222 1ST HOSP IP/OBS MODERATE 55: CPT | Performed by: STUDENT IN AN ORGANIZED HEALTH CARE EDUCATION/TRAINING PROGRAM

## 2021-11-29 PROCEDURE — 25010000002 HEPARIN (PORCINE) PER 1000 UNITS: Performed by: INTERNAL MEDICINE

## 2021-11-29 PROCEDURE — 82947 ASSAY GLUCOSE BLOOD QUANT: CPT

## 2021-11-29 PROCEDURE — 99223 1ST HOSP IP/OBS HIGH 75: CPT | Performed by: INTERNAL MEDICINE

## 2021-11-29 PROCEDURE — 93306 TTE W/DOPPLER COMPLETE: CPT | Performed by: INTERNAL MEDICINE

## 2021-11-29 PROCEDURE — 93005 ELECTROCARDIOGRAM TRACING: CPT | Performed by: EMERGENCY MEDICINE

## 2021-11-29 PROCEDURE — 82803 BLOOD GASES ANY COMBINATION: CPT

## 2021-11-29 PROCEDURE — 73502 X-RAY EXAM HIP UNI 2-3 VIEWS: CPT

## 2021-11-29 PROCEDURE — 25010000002 LORAZEPAM PER 2 MG: Performed by: INTERNAL MEDICINE

## 2021-11-29 PROCEDURE — 84443 ASSAY THYROID STIM HORMONE: CPT | Performed by: EMERGENCY MEDICINE

## 2021-11-29 PROCEDURE — 4A03X5D MEASUREMENT OF ARTERIAL FLOW, INTRACRANIAL, EXTERNAL APPROACH: ICD-10-PCS | Performed by: RADIOLOGY

## 2021-11-29 PROCEDURE — 84439 ASSAY OF FREE THYROXINE: CPT | Performed by: EMERGENCY MEDICINE

## 2021-11-29 PROCEDURE — 93306 TTE W/DOPPLER COMPLETE: CPT

## 2021-11-29 PROCEDURE — 82330 ASSAY OF CALCIUM: CPT

## 2021-11-29 PROCEDURE — 25010000002 HALOPERIDOL LACTATE PER 5 MG: Performed by: INTERNAL MEDICINE

## 2021-11-29 PROCEDURE — 87086 URINE CULTURE/COLONY COUNT: CPT | Performed by: EMERGENCY MEDICINE

## 2021-11-29 PROCEDURE — 99285 EMERGENCY DEPT VISIT HI MDM: CPT

## 2021-11-29 PROCEDURE — 70498 CT ANGIOGRAPHY NECK: CPT

## 2021-11-29 PROCEDURE — 80053 COMPREHEN METABOLIC PANEL: CPT | Performed by: EMERGENCY MEDICINE

## 2021-11-29 PROCEDURE — 83735 ASSAY OF MAGNESIUM: CPT | Performed by: EMERGENCY MEDICINE

## 2021-11-29 RX ORDER — CETIRIZINE HYDROCHLORIDE 10 MG/1
5 TABLET ORAL DAILY
Status: DISCONTINUED | OUTPATIENT
Start: 2021-11-30 | End: 2021-12-06 | Stop reason: HOSPADM

## 2021-11-29 RX ORDER — PANTOPRAZOLE SODIUM 40 MG/1
40 TABLET, DELAYED RELEASE ORAL
Status: DISCONTINUED | OUTPATIENT
Start: 2021-11-30 | End: 2021-12-05

## 2021-11-29 RX ORDER — SODIUM CHLORIDE 0.9 % (FLUSH) 0.9 %
10 SYRINGE (ML) INJECTION EVERY 12 HOURS SCHEDULED
Status: DISCONTINUED | OUTPATIENT
Start: 2021-11-29 | End: 2021-12-06 | Stop reason: HOSPADM

## 2021-11-29 RX ORDER — SODIUM CHLORIDE 9 MG/ML
100 INJECTION, SOLUTION INTRAVENOUS CONTINUOUS
Status: DISCONTINUED | OUTPATIENT
Start: 2021-11-29 | End: 2021-12-02

## 2021-11-29 RX ORDER — SODIUM CHLORIDE 0.9 % (FLUSH) 0.9 %
10 SYRINGE (ML) INJECTION AS NEEDED
Status: DISCONTINUED | OUTPATIENT
Start: 2021-11-29 | End: 2021-12-06 | Stop reason: HOSPADM

## 2021-11-29 RX ORDER — PREGABALIN 25 MG/1
25 CAPSULE ORAL EVERY 12 HOURS SCHEDULED
Status: DISCONTINUED | OUTPATIENT
Start: 2021-11-29 | End: 2021-12-06 | Stop reason: HOSPADM

## 2021-11-29 RX ORDER — SODIUM CHLORIDE 9 MG/ML
50 INJECTION, SOLUTION INTRAVENOUS CONTINUOUS
Status: DISCONTINUED | OUTPATIENT
Start: 2021-11-29 | End: 2021-11-29

## 2021-11-29 RX ORDER — LORAZEPAM 2 MG/ML
0.25 INJECTION INTRAMUSCULAR EVERY 4 HOURS PRN
Status: DISCONTINUED | OUTPATIENT
Start: 2021-11-29 | End: 2021-12-01

## 2021-11-29 RX ORDER — ASPIRIN 81 MG/1
81 TABLET, CHEWABLE ORAL DAILY
Status: DISCONTINUED | OUTPATIENT
Start: 2021-11-29 | End: 2021-12-05

## 2021-11-29 RX ORDER — ONDANSETRON 2 MG/ML
4 INJECTION INTRAMUSCULAR; INTRAVENOUS EVERY 6 HOURS PRN
Status: DISCONTINUED | OUTPATIENT
Start: 2021-11-29 | End: 2021-12-06 | Stop reason: HOSPADM

## 2021-11-29 RX ORDER — ACETAMINOPHEN 650 MG/1
650 SUPPOSITORY RECTAL EVERY 4 HOURS PRN
Status: DISCONTINUED | OUTPATIENT
Start: 2021-11-29 | End: 2021-12-06 | Stop reason: HOSPADM

## 2021-11-29 RX ORDER — BUSPIRONE HYDROCHLORIDE 7.5 MG/1
7.5 TABLET ORAL 2 TIMES DAILY
COMMUNITY
End: 2021-12-06 | Stop reason: HOSPADM

## 2021-11-29 RX ORDER — ONDANSETRON 4 MG/1
4 TABLET, FILM COATED ORAL EVERY 6 HOURS PRN
Status: DISCONTINUED | OUTPATIENT
Start: 2021-11-29 | End: 2021-12-06 | Stop reason: HOSPADM

## 2021-11-29 RX ORDER — LEVOTHYROXINE SODIUM 0.03 MG/1
25 TABLET ORAL
Status: DISCONTINUED | OUTPATIENT
Start: 2021-11-30 | End: 2021-12-06 | Stop reason: HOSPADM

## 2021-11-29 RX ORDER — MIRTAZAPINE 15 MG/1
15 TABLET, FILM COATED ORAL NIGHTLY
Status: DISCONTINUED | OUTPATIENT
Start: 2021-11-29 | End: 2021-12-04

## 2021-11-29 RX ORDER — HALOPERIDOL 5 MG/ML
2 INJECTION INTRAMUSCULAR ONCE
Status: COMPLETED | OUTPATIENT
Start: 2021-11-29 | End: 2021-11-29

## 2021-11-29 RX ORDER — ATORVASTATIN CALCIUM 40 MG/1
80 TABLET, FILM COATED ORAL NIGHTLY
Status: DISCONTINUED | OUTPATIENT
Start: 2021-11-29 | End: 2021-12-05

## 2021-11-29 RX ORDER — HEPARIN SODIUM 5000 [USP'U]/ML
5000 INJECTION, SOLUTION INTRAVENOUS; SUBCUTANEOUS EVERY 8 HOURS SCHEDULED
Status: DISCONTINUED | OUTPATIENT
Start: 2021-11-29 | End: 2021-12-06 | Stop reason: HOSPADM

## 2021-11-29 RX ORDER — TRAZODONE HYDROCHLORIDE 50 MG/1
50 TABLET ORAL NIGHTLY
Status: DISCONTINUED | OUTPATIENT
Start: 2021-11-29 | End: 2021-12-04

## 2021-11-29 RX ORDER — DOCUSATE SODIUM 100 MG/1
100 CAPSULE, LIQUID FILLED ORAL 2 TIMES DAILY
Status: DISCONTINUED | OUTPATIENT
Start: 2021-11-29 | End: 2021-12-06 | Stop reason: HOSPADM

## 2021-11-29 RX ORDER — ACETAMINOPHEN 325 MG/1
650 TABLET ORAL 3 TIMES DAILY
COMMUNITY

## 2021-11-29 RX ORDER — BUDESONIDE AND FORMOTEROL FUMARATE DIHYDRATE 160; 4.5 UG/1; UG/1
2 AEROSOL RESPIRATORY (INHALATION)
Status: DISCONTINUED | OUTPATIENT
Start: 2021-11-29 | End: 2021-12-06 | Stop reason: HOSPADM

## 2021-11-29 RX ORDER — LANOLIN ALCOHOL/MO/W.PET/CERES
400 CREAM (GRAM) TOPICAL 2 TIMES DAILY
Status: DISCONTINUED | OUTPATIENT
Start: 2021-11-29 | End: 2021-12-05

## 2021-11-29 RX ORDER — ASPIRIN 300 MG/1
300 SUPPOSITORY RECTAL DAILY
Status: DISCONTINUED | OUTPATIENT
Start: 2021-11-29 | End: 2021-12-05

## 2021-11-29 RX ORDER — ACETAMINOPHEN 160 MG/5ML
650 SOLUTION ORAL EVERY 4 HOURS PRN
Status: DISCONTINUED | OUTPATIENT
Start: 2021-11-29 | End: 2021-12-06 | Stop reason: HOSPADM

## 2021-11-29 RX ORDER — ACETAMINOPHEN 325 MG/1
650 TABLET ORAL EVERY 4 HOURS PRN
Status: DISCONTINUED | OUTPATIENT
Start: 2021-11-29 | End: 2021-12-06 | Stop reason: HOSPADM

## 2021-11-29 RX ADMIN — SODIUM CHLORIDE 100 ML/HR: 9 INJECTION, SOLUTION INTRAVENOUS at 18:22

## 2021-11-29 RX ADMIN — HEPARIN SODIUM 5000 UNITS: 5000 INJECTION INTRAVENOUS; SUBCUTANEOUS at 20:24

## 2021-11-29 RX ADMIN — GADOBENATE DIMEGLUMINE 10 ML: 529 INJECTION, SOLUTION INTRAVENOUS at 14:42

## 2021-11-29 RX ADMIN — HALOPERIDOL LACTATE 2 MG: 5 INJECTION, SOLUTION INTRAMUSCULAR at 16:09

## 2021-11-29 RX ADMIN — SODIUM CHLORIDE 1 G: 900 INJECTION INTRAVENOUS at 13:43

## 2021-11-29 RX ADMIN — IOPAMIDOL 100 ML: 755 INJECTION, SOLUTION INTRAVENOUS at 11:30

## 2021-11-29 RX ADMIN — LORAZEPAM 0.25 MG: 2 INJECTION INTRAMUSCULAR; INTRAVENOUS at 20:24

## 2021-11-30 LAB
AMMONIA BLD-SCNC: 15 UMOL/L (ref 11–51)
ANION GAP SERPL CALCULATED.3IONS-SCNC: 12 MMOL/L (ref 5–15)
BASOPHILS # BLD AUTO: 0.03 10*3/MM3 (ref 0–0.2)
BASOPHILS NFR BLD AUTO: 0.4 % (ref 0–1.5)
BUN SERPL-MCNC: 17 MG/DL (ref 8–23)
BUN/CREAT SERPL: 18.3 (ref 7–25)
CALCIUM SPEC-SCNC: 8.3 MG/DL (ref 8.6–10.5)
CHLORIDE SERPL-SCNC: 107 MMOL/L (ref 98–107)
CHOLEST SERPL-MCNC: 158 MG/DL (ref 0–200)
CO2 SERPL-SCNC: 23 MMOL/L (ref 22–29)
CREAT SERPL-MCNC: 0.93 MG/DL (ref 0.57–1)
DEPRECATED RDW RBC AUTO: 42 FL (ref 37–54)
EOSINOPHIL # BLD AUTO: 0.15 10*3/MM3 (ref 0–0.4)
EOSINOPHIL NFR BLD AUTO: 1.9 % (ref 0.3–6.2)
ERYTHROCYTE [DISTWIDTH] IN BLOOD BY AUTOMATED COUNT: 12 % (ref 12.3–15.4)
GFR SERPL CREATININE-BSD FRML MDRD: 57 ML/MIN/1.73
GLUCOSE SERPL-MCNC: 82 MG/DL (ref 65–99)
HBA1C MFR BLD: 5.5 % (ref 4.8–5.6)
HCT VFR BLD AUTO: 33.6 % (ref 34–46.6)
HDLC SERPL-MCNC: 40 MG/DL (ref 40–60)
HGB BLD-MCNC: 11.2 G/DL (ref 12–15.9)
IMM GRANULOCYTES # BLD AUTO: 0.02 10*3/MM3 (ref 0–0.05)
IMM GRANULOCYTES NFR BLD AUTO: 0.3 % (ref 0–0.5)
LDLC SERPL CALC-MCNC: 97 MG/DL (ref 0–100)
LDLC/HDLC SERPL: 2.36 {RATIO}
LYMPHOCYTES # BLD AUTO: 1.3 10*3/MM3 (ref 0.7–3.1)
LYMPHOCYTES NFR BLD AUTO: 16.6 % (ref 19.6–45.3)
MCH RBC QN AUTO: 32.1 PG (ref 26.6–33)
MCHC RBC AUTO-ENTMCNC: 33.3 G/DL (ref 31.5–35.7)
MCV RBC AUTO: 96.3 FL (ref 79–97)
MONOCYTES # BLD AUTO: 0.74 10*3/MM3 (ref 0.1–0.9)
MONOCYTES NFR BLD AUTO: 9.5 % (ref 5–12)
NEUTROPHILS NFR BLD AUTO: 5.58 10*3/MM3 (ref 1.7–7)
NEUTROPHILS NFR BLD AUTO: 71.3 % (ref 42.7–76)
NRBC BLD AUTO-RTO: 0 /100 WBC (ref 0–0.2)
PLATELET # BLD AUTO: 112 10*3/MM3 (ref 140–450)
PMV BLD AUTO: 12.5 FL (ref 6–12)
POTASSIUM SERPL-SCNC: 3.7 MMOL/L (ref 3.5–5.2)
RBC # BLD AUTO: 3.49 10*6/MM3 (ref 3.77–5.28)
SODIUM SERPL-SCNC: 142 MMOL/L (ref 136–145)
TRIGL SERPL-MCNC: 118 MG/DL (ref 0–150)
VLDLC SERPL-MCNC: 21 MG/DL (ref 5–40)
WBC NRBC COR # BLD: 7.82 10*3/MM3 (ref 3.4–10.8)

## 2021-11-30 PROCEDURE — 25010000002 LORAZEPAM PER 2 MG: Performed by: INTERNAL MEDICINE

## 2021-11-30 PROCEDURE — 25010000002 CEFTRIAXONE PER 250 MG: Performed by: INTERNAL MEDICINE

## 2021-11-30 PROCEDURE — 97530 THERAPEUTIC ACTIVITIES: CPT

## 2021-11-30 PROCEDURE — 83036 HEMOGLOBIN GLYCOSYLATED A1C: CPT | Performed by: NURSE PRACTITIONER

## 2021-11-30 PROCEDURE — 80048 BASIC METABOLIC PNL TOTAL CA: CPT | Performed by: INTERNAL MEDICINE

## 2021-11-30 PROCEDURE — 94799 UNLISTED PULMONARY SVC/PX: CPT

## 2021-11-30 PROCEDURE — 97166 OT EVAL MOD COMPLEX 45 MIN: CPT

## 2021-11-30 PROCEDURE — 94640 AIRWAY INHALATION TREATMENT: CPT

## 2021-11-30 PROCEDURE — 97162 PT EVAL MOD COMPLEX 30 MIN: CPT

## 2021-11-30 PROCEDURE — 97116 GAIT TRAINING THERAPY: CPT

## 2021-11-30 PROCEDURE — 92610 EVALUATE SWALLOWING FUNCTION: CPT

## 2021-11-30 PROCEDURE — 99233 SBSQ HOSP IP/OBS HIGH 50: CPT | Performed by: INTERNAL MEDICINE

## 2021-11-30 PROCEDURE — 85025 COMPLETE CBC W/AUTO DIFF WBC: CPT | Performed by: INTERNAL MEDICINE

## 2021-11-30 PROCEDURE — 92523 SPEECH SOUND LANG COMPREHEN: CPT

## 2021-11-30 PROCEDURE — 25010000002 HEPARIN (PORCINE) PER 1000 UNITS: Performed by: INTERNAL MEDICINE

## 2021-11-30 PROCEDURE — 80061 LIPID PANEL: CPT | Performed by: NURSE PRACTITIONER

## 2021-11-30 PROCEDURE — 82140 ASSAY OF AMMONIA: CPT | Performed by: INTERNAL MEDICINE

## 2021-11-30 PROCEDURE — 99233 SBSQ HOSP IP/OBS HIGH 50: CPT | Performed by: STUDENT IN AN ORGANIZED HEALTH CARE EDUCATION/TRAINING PROGRAM

## 2021-11-30 RX ORDER — BUSPIRONE HYDROCHLORIDE 15 MG/1
7.5 TABLET ORAL EVERY 12 HOURS SCHEDULED
Status: DISCONTINUED | OUTPATIENT
Start: 2021-11-30 | End: 2021-12-04

## 2021-11-30 RX ORDER — LORAZEPAM 2 MG/ML
0.5 INJECTION INTRAMUSCULAR ONCE
Status: COMPLETED | OUTPATIENT
Start: 2021-11-30 | End: 2021-11-30

## 2021-11-30 RX ADMIN — PREGABALIN 25 MG: 25 CAPSULE ORAL at 21:33

## 2021-11-30 RX ADMIN — LORAZEPAM 0.25 MG: 2 INJECTION INTRAMUSCULAR; INTRAVENOUS at 15:45

## 2021-11-30 RX ADMIN — TRAZODONE HYDROCHLORIDE 50 MG: 50 TABLET ORAL at 21:33

## 2021-11-30 RX ADMIN — HEPARIN SODIUM 5000 UNITS: 5000 INJECTION INTRAVENOUS; SUBCUTANEOUS at 21:32

## 2021-11-30 RX ADMIN — ASPIRIN 81 MG CHEWABLE TABLET 81 MG: 81 TABLET CHEWABLE at 11:51

## 2021-11-30 RX ADMIN — SODIUM CHLORIDE, PRESERVATIVE FREE 10 ML: 5 INJECTION INTRAVENOUS at 11:51

## 2021-11-30 RX ADMIN — ATORVASTATIN CALCIUM 80 MG: 40 TABLET, FILM COATED ORAL at 21:32

## 2021-11-30 RX ADMIN — BUSPIRONE HYDROCHLORIDE 7.5 MG: 15 TABLET ORAL at 21:40

## 2021-11-30 RX ADMIN — MIRTAZAPINE 15 MG: 15 TABLET, FILM COATED ORAL at 21:33

## 2021-11-30 RX ADMIN — LORAZEPAM 0.25 MG: 2 INJECTION INTRAMUSCULAR; INTRAVENOUS at 00:14

## 2021-11-30 RX ADMIN — LORAZEPAM 0.25 MG: 2 INJECTION INTRAMUSCULAR; INTRAVENOUS at 21:32

## 2021-11-30 RX ADMIN — CETIRIZINE HYDROCHLORIDE 5 MG: 10 TABLET, FILM COATED ORAL at 11:50

## 2021-11-30 RX ADMIN — BUSPIRONE HYDROCHLORIDE 7.5 MG: 15 TABLET ORAL at 14:04

## 2021-11-30 RX ADMIN — LORAZEPAM 0.5 MG: 2 INJECTION INTRAMUSCULAR; INTRAVENOUS at 17:12

## 2021-11-30 RX ADMIN — HEPARIN SODIUM 5000 UNITS: 5000 INJECTION INTRAVENOUS; SUBCUTANEOUS at 05:42

## 2021-11-30 RX ADMIN — PREGABALIN 25 MG: 25 CAPSULE ORAL at 11:50

## 2021-11-30 RX ADMIN — BUDESONIDE AND FORMOTEROL FUMARATE DIHYDRATE 2 PUFF: 160; 4.5 AEROSOL RESPIRATORY (INHALATION) at 09:48

## 2021-11-30 RX ADMIN — DOCUSATE SODIUM 100 MG: 100 CAPSULE, LIQUID FILLED ORAL at 21:32

## 2021-11-30 RX ADMIN — SODIUM CHLORIDE, PRESERVATIVE FREE 10 ML: 5 INJECTION INTRAVENOUS at 21:33

## 2021-11-30 RX ADMIN — FOLIC ACID TAB 400 MCG 400 MCG: 400 TAB at 21:33

## 2021-11-30 RX ADMIN — HEPARIN SODIUM 5000 UNITS: 5000 INJECTION INTRAVENOUS; SUBCUTANEOUS at 14:04

## 2021-11-30 RX ADMIN — DOCUSATE SODIUM 100 MG: 100 CAPSULE, LIQUID FILLED ORAL at 11:51

## 2021-11-30 RX ADMIN — SODIUM CHLORIDE 1 G: 900 INJECTION INTRAVENOUS at 11:51

## 2021-12-01 LAB
ANION GAP SERPL CALCULATED.3IONS-SCNC: 15 MMOL/L (ref 5–15)
BACTERIA SPEC AEROBE CULT: ABNORMAL
BASOPHILS # BLD AUTO: 0.03 10*3/MM3 (ref 0–0.2)
BASOPHILS NFR BLD AUTO: 0.5 % (ref 0–1.5)
BUN SERPL-MCNC: 12 MG/DL (ref 8–23)
BUN/CREAT SERPL: 14.8 (ref 7–25)
CALCIUM SPEC-SCNC: 8.6 MG/DL (ref 8.6–10.5)
CHLORIDE SERPL-SCNC: 104 MMOL/L (ref 98–107)
CO2 SERPL-SCNC: 21 MMOL/L (ref 22–29)
CREAT SERPL-MCNC: 0.81 MG/DL (ref 0.57–1)
DEPRECATED RDW RBC AUTO: 41.8 FL (ref 37–54)
EOSINOPHIL # BLD AUTO: 0.17 10*3/MM3 (ref 0–0.4)
EOSINOPHIL NFR BLD AUTO: 2.7 % (ref 0.3–6.2)
ERYTHROCYTE [DISTWIDTH] IN BLOOD BY AUTOMATED COUNT: 11.9 % (ref 12.3–15.4)
GFR SERPL CREATININE-BSD FRML MDRD: 67 ML/MIN/1.73
GLUCOSE SERPL-MCNC: 82 MG/DL (ref 65–99)
HCT VFR BLD AUTO: 36.2 % (ref 34–46.6)
HGB BLD-MCNC: 11.9 G/DL (ref 12–15.9)
IMM GRANULOCYTES # BLD AUTO: 0.02 10*3/MM3 (ref 0–0.05)
IMM GRANULOCYTES NFR BLD AUTO: 0.3 % (ref 0–0.5)
LYMPHOCYTES # BLD AUTO: 1.46 10*3/MM3 (ref 0.7–3.1)
LYMPHOCYTES NFR BLD AUTO: 23.3 % (ref 19.6–45.3)
MCH RBC QN AUTO: 31.9 PG (ref 26.6–33)
MCHC RBC AUTO-ENTMCNC: 32.9 G/DL (ref 31.5–35.7)
MCV RBC AUTO: 97.1 FL (ref 79–97)
MONOCYTES # BLD AUTO: 0.83 10*3/MM3 (ref 0.1–0.9)
MONOCYTES NFR BLD AUTO: 13.3 % (ref 5–12)
NEUTROPHILS NFR BLD AUTO: 3.75 10*3/MM3 (ref 1.7–7)
NEUTROPHILS NFR BLD AUTO: 59.9 % (ref 42.7–76)
NRBC BLD AUTO-RTO: 0 /100 WBC (ref 0–0.2)
PLATELET # BLD AUTO: 130 10*3/MM3 (ref 140–450)
PMV BLD AUTO: 12.4 FL (ref 6–12)
POTASSIUM SERPL-SCNC: 3.2 MMOL/L (ref 3.5–5.2)
RBC # BLD AUTO: 3.73 10*6/MM3 (ref 3.77–5.28)
SODIUM SERPL-SCNC: 140 MMOL/L (ref 136–145)
WBC NRBC COR # BLD: 6.26 10*3/MM3 (ref 3.4–10.8)

## 2021-12-01 PROCEDURE — 97530 THERAPEUTIC ACTIVITIES: CPT

## 2021-12-01 PROCEDURE — 25010000002 LORAZEPAM PER 2 MG: Performed by: INTERNAL MEDICINE

## 2021-12-01 PROCEDURE — 97110 THERAPEUTIC EXERCISES: CPT

## 2021-12-01 PROCEDURE — 93005 ELECTROCARDIOGRAM TRACING: CPT | Performed by: NURSE PRACTITIONER

## 2021-12-01 PROCEDURE — 85025 COMPLETE CBC W/AUTO DIFF WBC: CPT | Performed by: INTERNAL MEDICINE

## 2021-12-01 PROCEDURE — 80048 BASIC METABOLIC PNL TOTAL CA: CPT | Performed by: INTERNAL MEDICINE

## 2021-12-01 PROCEDURE — 93010 ELECTROCARDIOGRAM REPORT: CPT | Performed by: INTERNAL MEDICINE

## 2021-12-01 PROCEDURE — 25010000002 HEPARIN (PORCINE) PER 1000 UNITS: Performed by: INTERNAL MEDICINE

## 2021-12-01 PROCEDURE — 94799 UNLISTED PULMONARY SVC/PX: CPT

## 2021-12-01 PROCEDURE — 99233 SBSQ HOSP IP/OBS HIGH 50: CPT | Performed by: INTERNAL MEDICINE

## 2021-12-01 PROCEDURE — 25010000002 CEFTRIAXONE PER 250 MG: Performed by: INTERNAL MEDICINE

## 2021-12-01 PROCEDURE — 97535 SELF CARE MNGMENT TRAINING: CPT

## 2021-12-01 RX ORDER — METOPROLOL TARTRATE 5 MG/5ML
2.5 INJECTION INTRAVENOUS ONCE
Status: COMPLETED | OUTPATIENT
Start: 2021-12-02 | End: 2021-12-01

## 2021-12-01 RX ORDER — POTASSIUM CHLORIDE 7.45 MG/ML
10 INJECTION INTRAVENOUS
Status: DISCONTINUED | OUTPATIENT
Start: 2021-12-01 | End: 2021-12-06 | Stop reason: HOSPADM

## 2021-12-01 RX ORDER — LORAZEPAM 2 MG/ML
0.5 INJECTION INTRAMUSCULAR EVERY 4 HOURS PRN
Status: DISCONTINUED | OUTPATIENT
Start: 2021-12-01 | End: 2021-12-02

## 2021-12-01 RX ORDER — POTASSIUM CHLORIDE 1.5 G/1.77G
40 POWDER, FOR SOLUTION ORAL AS NEEDED
Status: DISCONTINUED | OUTPATIENT
Start: 2021-12-01 | End: 2021-12-06 | Stop reason: HOSPADM

## 2021-12-01 RX ORDER — POTASSIUM CHLORIDE 750 MG/1
40 CAPSULE, EXTENDED RELEASE ORAL AS NEEDED
Status: DISCONTINUED | OUTPATIENT
Start: 2021-12-01 | End: 2021-12-06 | Stop reason: HOSPADM

## 2021-12-01 RX ADMIN — SODIUM CHLORIDE, PRESERVATIVE FREE 10 ML: 5 INJECTION INTRAVENOUS at 09:26

## 2021-12-01 RX ADMIN — CETIRIZINE HYDROCHLORIDE 5 MG: 10 TABLET, FILM COATED ORAL at 09:26

## 2021-12-01 RX ADMIN — SODIUM CHLORIDE 250 ML: 9 INJECTION, SOLUTION INTRAVENOUS at 23:32

## 2021-12-01 RX ADMIN — TRAZODONE HYDROCHLORIDE 50 MG: 50 TABLET ORAL at 20:17

## 2021-12-01 RX ADMIN — POTASSIUM CHLORIDE 40 MEQ: 750 CAPSULE, EXTENDED RELEASE ORAL at 09:26

## 2021-12-01 RX ADMIN — FOLIC ACID TAB 400 MCG 400 MCG: 400 TAB at 09:25

## 2021-12-01 RX ADMIN — LORAZEPAM 0.25 MG: 2 INJECTION INTRAMUSCULAR; INTRAVENOUS at 03:38

## 2021-12-01 RX ADMIN — HEPARIN SODIUM 5000 UNITS: 5000 INJECTION INTRAVENOUS; SUBCUTANEOUS at 05:15

## 2021-12-01 RX ADMIN — HEPARIN SODIUM 5000 UNITS: 5000 INJECTION INTRAVENOUS; SUBCUTANEOUS at 20:19

## 2021-12-01 RX ADMIN — BUSPIRONE HYDROCHLORIDE 7.5 MG: 15 TABLET ORAL at 20:18

## 2021-12-01 RX ADMIN — ACETAMINOPHEN 650 MG: 325 TABLET, FILM COATED ORAL at 09:25

## 2021-12-01 RX ADMIN — ACETAMINOPHEN 650 MG: 325 TABLET, FILM COATED ORAL at 15:00

## 2021-12-01 RX ADMIN — SODIUM CHLORIDE, PRESERVATIVE FREE 10 ML: 5 INJECTION INTRAVENOUS at 20:19

## 2021-12-01 RX ADMIN — DOCUSATE SODIUM 100 MG: 100 CAPSULE, LIQUID FILLED ORAL at 09:25

## 2021-12-01 RX ADMIN — PREGABALIN 25 MG: 25 CAPSULE ORAL at 09:26

## 2021-12-01 RX ADMIN — LEVOTHYROXINE SODIUM 25 MCG: 25 TABLET ORAL at 05:15

## 2021-12-01 RX ADMIN — METOPROLOL TARTRATE 2.5 MG: 5 INJECTION INTRAVENOUS at 23:31

## 2021-12-01 RX ADMIN — BUSPIRONE HYDROCHLORIDE 7.5 MG: 15 TABLET ORAL at 09:26

## 2021-12-01 RX ADMIN — POTASSIUM CHLORIDE 40 MEQ: 750 CAPSULE, EXTENDED RELEASE ORAL at 15:00

## 2021-12-01 RX ADMIN — SODIUM CHLORIDE 1 G: 900 INJECTION INTRAVENOUS at 12:59

## 2021-12-01 RX ADMIN — SODIUM CHLORIDE, PRESERVATIVE FREE 10 ML: 5 INJECTION INTRAVENOUS at 20:18

## 2021-12-01 RX ADMIN — PREGABALIN 25 MG: 25 CAPSULE ORAL at 20:17

## 2021-12-01 RX ADMIN — BUDESONIDE AND FORMOTEROL FUMARATE DIHYDRATE 2 PUFF: 160; 4.5 AEROSOL RESPIRATORY (INHALATION) at 22:24

## 2021-12-01 RX ADMIN — LORAZEPAM 0.5 MG: 2 INJECTION INTRAMUSCULAR; INTRAVENOUS at 20:18

## 2021-12-01 RX ADMIN — LORAZEPAM 0.5 MG: 2 INJECTION INTRAMUSCULAR; INTRAVENOUS at 09:26

## 2021-12-01 RX ADMIN — HEPARIN SODIUM 5000 UNITS: 5000 INJECTION INTRAVENOUS; SUBCUTANEOUS at 15:00

## 2021-12-01 RX ADMIN — ATORVASTATIN CALCIUM 80 MG: 40 TABLET, FILM COATED ORAL at 20:17

## 2021-12-01 RX ADMIN — ASPIRIN 81 MG CHEWABLE TABLET 81 MG: 81 TABLET CHEWABLE at 09:25

## 2021-12-01 RX ADMIN — FOLIC ACID TAB 400 MCG 400 MCG: 400 TAB at 20:18

## 2021-12-01 RX ADMIN — MIRTAZAPINE 15 MG: 15 TABLET, FILM COATED ORAL at 20:17

## 2021-12-01 RX ADMIN — PANTOPRAZOLE SODIUM 40 MG: 40 TABLET, DELAYED RELEASE ORAL at 09:25

## 2021-12-01 NOTE — PLAN OF CARE
Problem: Adult Inpatient Plan of Care  Goal: Plan of Care Review  Recent Flowsheet Documentation  Taken 12/1/2021 1504 by Jesse Carpenter, OT  Progress: improving  Plan of Care Reviewed With:   patient   family  Outcome Summary: Demonstrated improved fxl endurance and increased participation this date - progressed to [facility] for bed mobiltiy, [facility]/CGA for transfers and in-room distances w/ RW, [facility] for UB dressing. Pt able to walk distance to toilet w/ assist, SBA for lokesh-care. Tolerated BUE AROM HEP, pain in R shoulder reported. Will cont IPOT per POC as tolerated.

## 2021-12-01 NOTE — PLAN OF CARE
Goal Outcome Evaluation:  Plan of Care Reviewed With: patient, other (see comments) (NEPHEW)           Outcome Summary: PT NEEDS CONTINUAL CUES FOR SAFETY AWARENESS DUE TO IMPULSIVITY AND DIFFICULTY SEQUENCING. REQUIRED CGA FOR STS AND MIN ASSIST FOR GAIT X 32 FEET WITH R WALKER. DISTANCE LIMITED DUE TO FATIGUE AND ELEVATED HR. PT SOA WITH ACTIVITY BUT O2 SATS STABLE ON RA.

## 2021-12-01 NOTE — THERAPY TREATMENT NOTE
Patient Name: Mitzi Eric  : 1935    MRN: [id number]                              Today's Date: 2021       Admit Date: 2021    Visit Dx:     ICD-10-CM ICD-9-CM   1. Acute UTI  N39.0 599.0   2. Confusion  R41.0 298.9   3. Dysarthria  R47.1 784.51   4. Facial droop  R29.810 781.94   5. Renal insufficiency  N28.9 593.9   6. Leukocytosis, unspecified type  D72.829 288.60   7. Aphasia  R47.01 784.3     Patient Active Problem List   Diagnosis   • Fall   • Delirium, acute   • Acute UTI (urinary tract infection)   • HTN (hypertension)   • JUAN F (acute kidney injury) (HCC)     Past Medical History:   Diagnosis Date   • A-fib (HCC)    • Ataxic gait    • Chronic kidney disease, stage 3 (HCC)    • COPD (chronic obstructive pulmonary disease) (HCC)    • Dementia (HCC)    • Disease of thyroid gland    • Hyperlipidemia    • Hypertension      Past Surgical History:   Procedure Laterality Date   • BRAIN TUMOR EXCISION      BENIGN   • FRACTURE SURGERY     • TRIGGER FINGER RELEASE        General Information     Row Name 21 1456          OT Time and Intention    Document Type therapy note (daily note)  -CS     Mode of Treatment occupational therapy  -CS     Row Name 21 1456          General Information    Patient Profile Reviewed yes  -CS     Existing Precautions/Restrictions fall; other (see comments)  baseline dementia  -CS     Barriers to Rehab previous functional deficit; cognitive status  -CS     Row Name 21 1456          Cognition    Orientation Status (Cognition) oriented to; person; disoriented to; place; situation; time  -CS     Row Name 21 1456          Safety Issues, Functional Mobility    Safety Issues Affecting Function (Mobility) awareness of need for assistance; insight into deficits/self-awareness; problem-solving; safety precaution awareness; safety precautions follow-through/compliance; sequencing abilities  -CS     Impairments Affecting Function (Mobility) balance;  cognition; endurance/activity tolerance; strength; coordination  -     Cognitive Impairments, Mobility Safety/Performance awareness, need for assistance; insight into deficits/self-awareness; safety precaution awareness; safety precaution follow-through; sequencing abilities; problem-solving/reasoning  -           User Key  (r) = Recorded By, (t) = Taken By, (c) = Cosigned By    Initials Name Provider Type     Jesse Carpenter OT Occupational Therapist                 Mobility/ADL's     Row Name 12/01/21 1457          Bed Mobility    Bed Mobility supine-sit; scooting/bridging  -CS     Scooting/Bridging [place] (Bed Mobility) minimum assist (75% patient effort); verbal cues; nonverbal cues (demo/gesture)  -     Supine-Sit [place] (Bed Mobility) minimum assist (75% patient effort); verbal cues; nonverbal cues (demo/gesture)  -     Assistive Device (Bed Mobility) bed rails; head of bed elevated  -CS     Comment (Bed Mobility) improved trunk stability during supine-sit, improved sitting balance upon reaching EOB  -     Row Name 12/01/21 8809          Transfers    Transfers sit-stand transfer; bed-chair transfer; toilet transfer  -     Comment (Transfers) tactile/verbal cues for sequencing  safe hand placement w/ RW use, cues for grab bars, increased assist at toilet d/t lower surface  -     Bed-Chair [place] (Transfers) minimum assist (75% patient effort); verbal cues; nonverbal cues (demo/gesture)  -     Assistive Device (Bed-Chair Transfers) walker, front-wheeled  -     Sit-Stand [place] (Transfers) contact guard; verbal cues; nonverbal cues (demo/gesture)  -     [place] Level (Toilet Transfer) minimum assist (75% patient effort); verbal cues; nonverbal cues (demo/gesture)  -     Assistive Device (Toilet Transfer) walker, front-wheeled  -     Row Name 12/01/21 1457          Sit-Stand Transfer    Assistive Device (Sit-Stand Transfers) walker, front-wheeled  -      Row Name 12/01/21 1457          Toilet Transfer    Type (Toilet Transfer) stand-sit; sit-stand  -     Row Name 12/01/21 1457          Functional Mobility    Functional Mobility- Comment CGA for in-room distance to toilet and recliner, slow and effortful  -     Row Name 12/01/21 1457          Activities of Daily Living    BADL Assessment/Intervention lower body dressing; upper body dressing; toileting  -     Row Name 12/01/21 1457          Grooming Assessment/Training    [place] Level (Grooming) wash face, hands; set up; nonverbal cues (demo/gesture)  -     Position (Grooming) supported sitting  -CS     Row Name 12/01/21 1457          Upper Body Dressing Assessment/Training    [place] Level (Upper Body Dressing) doff; don; other (see comments); minimum assist (75% patient effort)  -CS     Comment (Upper Body Dressing) improved command following this date  -     Row Name 12/01/21 1457          Toileting Assessment/Training    [place] Level (Toileting) adjust/manage clothing; perform perineal hygiene; moderate assist (50% patient effort)  -CS     Position (Toileting) supported sitting; unsupported standing  -           User Key  (r) = Recorded By, (t) = Taken By, (c) = Cosigned By    Initials Name Provider Type    Jesse Silva OT Occupational Therapist               Obj/Interventions     Row Name 12/01/21 1503          Balance    Balance Assessment sitting static balance; sitting dynamic balance; standing static balance; standing dynamic balance  -     Static Sitting Balance WFL; unsupported; sitting, edge of bed  -     Dynamic Sitting Balance mild impairment; unsupported; sitting, edge of bed  -CS     Static Standing Balance mild impairment; supported; standing  -CS     Dynamic Standing Balance mild impairment; supported; standing  -CS     Balance Interventions sitting; sit to stand; standing; core stability exercise; occupation based/functional task; UE activity with balance  activity  -CS     Row Name 12/01/21 1502          Therapeutic Exercise    Therapeutic Exercise elbow/forearm; shoulder; other (see comments)  BUE AROM performed w/ Max cues from therapist, R shoulder pain reported - w/in pain free range  -CS           User Key  (r) = Recorded By, (t) = Taken By, (c) = Cosigned By    Initials Name Provider Type     Jesse Carpenter OT Occupational Therapist               Goals/Plan    No documentation.                Clinical Impression     Row Name 12/01/21 1507          Pain Assessment    Additional Documentation Pain Scale: FACES Pre/Post-Treatment (Group)  -CS     Row Name 12/01/21 1505          Pain Scale: FACES Pre/Post-Treatment    Pain: FACES Scale, Pretreatment 2-->hurts little bit  -CS     Posttreatment Pain Rating 4-->hurts little more  -CS     Pain Location - Side Right  -CS     Pain Location shoulder  -CS     Pre/Posttreatment Pain Comment tolerated, nursing aware and managing  -CS     Row Name 12/01/21 1501          Plan of Care Review    Plan of Care Reviewed With patient; family  -CS     Progress improving  -CS     Outcome Summary Demonstrated improved fxl endurance and increased participation this date - progressed to [place] for bed mobiltiy, Elías/CGA for transfers and in-room distances w/ RW, [place] for UB dressing. Pt able to walk distance to toilet w/ assist, SBA for lokesh-care. Tolerated BUE AROM HEP, pain in R shoulder reported. Will cont IPOT per POC as tolerated.  -CS     Row Name 12/01/21 1501          Vital Signs    Pre Systolic BP Rehab --  RN cleared for tx, VSS on RA  -CS     O2 Delivery Pre Treatment room air  -CS     O2 Delivery Intra Treatment room air  -CS     O2 Delivery Post Treatment room air  -CS     Pre Patient Position Supine  -CS     Intra Patient Position Standing  -CS     Post Patient Position Sitting  -CS     Row Name 12/01/21 150          Positioning and Restraints    Pre-Treatment Position in bed  -CS     Post Treatment Position chair  -CS      In Chair notified nsg; reclined; sitting; call light within reach; encouraged to call for assist; exit alarm on; RUE elevated; waffle cushion; legs elevated  -CS           User Key  (r) = Recorded By, (t) = Taken By, (c) = Cosigned By    Initials Name Provider Type    Jesse Silva OT Occupational Therapist               Outcome Measures     Row Name 12/01/21 1508          How much help from another is currently needed...    Putting on and taking off regular lower body clothing? 2  -CS     Bathing (including washing, rinsing, and drying) 2  -CS     Toileting (which includes using toilet bed pan or urinal) 3  -CS     Putting on and taking off regular upper body clothing 3  -CS     Taking care of personal grooming (such as brushing teeth) 3  -CS     Eating meals 3  -CS     AM-PAC 6 Clicks Score (OT) 16  -CS     Row Name 12/01/21 1508          Functional Assessment    Outcome Measure Options AM-PAC 6 Clicks Daily Activity (OT)  -CS           User Key  (r) = Recorded By, (t) = Taken By, (c) = Cosigned By    Initials Name Provider Type    Jesse Silva OT Occupational Therapist                Occupational Therapy Education                 Title: PT OT SLP Therapies (In Progress)     Topic: Occupational Therapy (In Progress)     Point: ADL training (In Progress)     Description:   Instruct learner(s) on proper safety adaptation and remediation techniques during self care or transfers.   Instruct in proper use of assistive devices.              Learning Progress Summary           Patient Acceptance, E,D, NR,NL by  at 11/30/2021 0942    Comment: OT role, safety awareness                   Point: Home exercise program (In Progress)     Description:   Instruct learner(s) on appropriate technique for monitoring, assisting and/or progressing therapeutic exercises/activities.              Learning Progress Summary           Patient Acceptance, E,D, NR,NL by  at 11/30/2021 0942    Comment: OT role, safety  awareness                   Point: Precautions (In Progress)     Description:   Instruct learner(s) on prescribed precautions during self-care and functional transfers.              Learning Progress Summary           Patient Acceptance, E,D, NR,NL by  at 11/30/2021 0942    Comment: OT role, safety awareness                   Point: Body mechanics (In Progress)     Description:   Instruct learner(s) on proper positioning and spine alignment during self-care, functional mobility activities and/or exercises.              Learning Progress Summary           Patient Acceptance, E,D, NR,NL by  at 11/30/2021 0942    Comment: OT role, safety awareness                               User Key     Initials Effective Dates Name Provider Type Discipline     06/16/21 -  Jesse Carpenter, OT Occupational Therapist OT              OT Recommendation and Plan  Planned Therapy Interventions (OT): activity tolerance training, functional balance retraining, occupation/activity based interventions, ROM/therapeutic exercise, strengthening exercise, transfer/mobility retraining, passive ROM/stretching, patient/caregiver education/training  Therapy Frequency (OT): daily  Plan of Care Review  Plan of Care Reviewed With: patient, family  Progress: improving  Outcome Summary: Demonstrated improved fxl endurance and increased participation this date - progressed to [facility] for bed mobiltiy, Elías/CGA for transfers and in-room distances w/ RW, [place] for UB dressing. Pt able to walk distance to toilet w/ assist, SBA for lokesh-care. Tolerated BUE AROM HEP, pain in R shoulder reported. Will cont IPOT per POC as tolerated.     Time Calculation:    Time Calculation- OT     Row Name 12/01/21 1509             Time Calculation- OT    OT Start Time 1400  -      OT Received On 12/01/21  -      OT Goal Re-Cert Due Date 12/10/21  -              Timed Charges    97195 - OT Therapeutic Exercise Minutes 4  -      77216 - OT Therapeutic Activity Minutes  12  -      18341 - OT Self Care/Mgmt Minutes 8  -CS              Total Minutes    Timed Charges Total Minutes 24  -CS       Total Minutes 24  -CS            User Key  (r) = Recorded By, (t) = Taken By, (c) = Cosigned By    Initials Name Provider Type    CS Jesse Carpenter, OT Occupational Therapist              Therapy Charges for Today     Code Description Service Date Service Provider Modifiers Qty    54416417171 HC OT THERAPEUTIC ACT EA 15 MIN 11/30/2021 Jesse Carpenter OT GO 1    02983510862 HC OT EVAL MOD COMPLEXITY 4 11/30/2021 Jesse Carpenter, OT GO 1    85935977917 HC OT SELF CARE/MGMT/TRAIN EA 15 MIN 12/1/2021 Jesse Carpenter OT GO 1    67408980237 HC OT THERAPEUTIC ACT EA 15 MIN 12/1/2021 Jesse Carpenter, OT GO 1               Jesse Carpenter OT  12/1/2021

## 2021-12-01 NOTE — PROGRESS NOTES
[facility] Medicine Services  PROGRESS NOTE    Patient Name: Mitzi Eric  : 1935  MRN: [id number]    Date of Admission: 2021  Primary Care Physician: Dm Cade APRN    Subjective   Subjective     CC:  AMS    HPI:  Pt reports that she's cold this am. Son states that he asked RN to give her ativan preemptively this am because yesterday was a rough day for her.       ROS:  Gen- No fevers, chills  CV- No chest pain, palpitations  Resp- No cough, dyspnea  GI- No N/V/D, abd pain        Objective   Objective     Vital Signs:   Temp:  [98.1 °F (36.7 °C)-98.7 °F (37.1 °C)] 98.1 °F (36.7 °C)  Heart Rate:  [] 97  Resp:  [18] 18  BP: (156-195)/() 156/97     Physical Exam:  Constitutional: No acute distress, awake, alert  HENT: NCAT, mucous membranes moist  Respiratory: Clear to auscultation bilaterally, respiratory effort normal   Cardiovascular: RRR, no murmurs, rubs, or gallops  Gastrointestinal: Positive bowel sounds, soft, nontender, nondistended  Musculoskeletal: No bilateral ankle edema  Psychiatric: Appropriate affect, cooperative  Neurologic: Oriented x 3, strength symmetric in all extremities, Cranial Nerves grossly intact to confrontation, speech clear  Skin: No rashes    Results Reviewed:  LAB RESULTS:      Lab 21  0657 21  0839 21  1123 21  1115 21  1111 21  1110   WBC 6.26 7.82  --  14.60*  --   --    HEMOGLOBIN 11.9* 11.2*  --  11.9*  --   --    HEMOGLOBIN, POC  --   --   --   --   --  11.2*   HEMATOCRIT 36.2 33.6*  --  35.6  --   --    HEMATOCRIT POC  --   --   --   --   --  33*   PLATELETS 130* 112*  --  136*  --   --    NEUTROS ABS 3.75 5.58  --  11.60*  --   --    IMMATURE GRANS (ABS) 0.02 0.02  --  0.06*  --   --    LYMPHS ABS 1.46 1.30  --  1.74  --   --    MONOS ABS 0.83 0.74  --  1.07*  --   --    EOS ABS 0.17 0.15  --  0.08  --   --    MCV 97.1* 96.3  --  95.7  --   --    PROCALCITONIN  --   --   --   2.46*  --   --    LACTATE  --   --   --  1.4  --   --    PROTIME  --   --   --   --  15.2  --    APTT  --   --  38.1  --   --   --          Lab 12/01/21  0657 11/30/21  0839 11/29/21  1115 11/29/21  1108   SODIUM 140 142 141  --    POTASSIUM 3.2* 3.7 4.1  --    CHLORIDE 104 107 103  --    CO2 21.0* 23.0 28.0  --    ANION GAP 15.0 12.0 10.0  --    BUN 12 17 29*  --    CREATININE 0.81 0.93 1.52* 1.60*   GLUCOSE 82 82 86  --    CALCIUM 8.6 8.3* 8.8  --    MAGNESIUM  --   --  2.3  --    HEMOGLOBIN A1C  --  5.50  --   --    TSH  --   --  1.240  --          Lab 11/29/21  1115   TOTAL PROTEIN 6.3   ALBUMIN 4.00   GLOBULIN 2.3   ALT (SGPT) 10   AST (SGOT) 20   BILIRUBIN 0.4   ALK PHOS 77         Lab 11/29/21  1115 11/29/21  1111   TROPONIN T <0.010  --    PROTIME  --  15.2   INR  --  1.3*         Lab 11/30/21  0839   CHOLESTEROL 158   LDL CHOL 97   HDL CHOL 40   TRIGLYCERIDES 118             Lab 11/29/21  1110   PH, ARTERIAL 7.40     Brief Urine Lab Results  (Last result in the past 365 days)      Color   Clarity   Blood   Leuk Est   Nitrite   Protein   CREAT   Urine HCG        11/29/21 1200 Yellow   Cloudy   Trace   Small (1+)   Positive   30 mg/dL (1+)                 Microbiology Results Abnormal     Procedure Component Value - Date/Time    Blood Culture - Blood, Arm, Right [[id number]]  (Normal) Collected: 11/29/21 1330    Lab Status: Preliminary result Specimen: Blood from Arm, Right Updated: 11/30/21 1530     Blood Culture No growth at 24 hours    Blood Culture - Blood, Arm, Right [[id number]]  (Normal) Collected: 11/29/21 1320    Lab Status: Preliminary result Specimen: Blood from Arm, Right Updated: 11/30/21 1530     Blood Culture No growth at 24 hours    COVID-19 and FLU A/B PCR - Swab, Nasopharynx [[id number]]  (Normal) Collected: 11/29/21 1131    Lab Status: Final result Specimen: Swab from Nasopharynx Updated: 11/29/21 1213     COVID19 Not Detected     Influenza A PCR Not Detected     Influenza B PCR Not Detected     Narrative:      Fact sheet for providers: https://www.fda.gov/media/009139/download    Fact sheet for patients: https://www.fda.gov/media/313842/download    Test performed by PCR.          Adult Transthoracic Echo Complete W/ Cont if Necessary Per Protocol (With Agitated Saline)    Result Date: 11/29/2021  · Left ventricular ejection fraction appears to be 61 - 65%. Left ventricular systolic function is normal. · Saline test results are negative. · Estimated right ventricular systolic pressure from tricuspid regurgitation is markedly elevated (>55 mmHg). · Moderate tricuspid valve regurgitation is present. · Left ventricular diastolic function was normal.      CT Angiogram Neck    Result Date: 11/29/2021  EXAMINATION: CT ANGIOGRAM NECK-, CT ANGIOGRAM HEAD W AI ANALYSIS OF LVO-11/29/2021:  INDICATION: Stroke, follow-up.  TECHNIQUE: CT angiogram head and neck with intravenous contrast administration. 2-D and 3-D reconstructions performed.  The radiation dose reduction device was turned on for each scan per the ALARA (As Low as Reasonably Achievable) protocol.  COMPARISON: Concurrent CT cerebral perfusion and CT head, noncontrast, stroke protocol head.  FINDINGS:  CTA NECK: Normal 3-vessel arch with patent great vessel origins. Proximal subclavian arteries are patent. The vertebral arteries demonstrate a mild left-sided dominance of caliber without focal severe stenosis, aneurysm or occlusion. The carotids demonstrate grossly normal course and branching pattern with atherosclerotic calcific disease and plaque formations of the ICA origins producing 20% right and 10% left luminal narrowing as measured by NASCET criteria with patency of the distal internal carotid arteries to the intracranial portions discussed further below. Cervical soft tissue is unremarkable. The thyroid has a low-density left thyroid nodules without calcifications measuring up to 1 cm. The lung apices demonstrate biapical pleural parenchymal  scarring with confluent opacification in the right upper lung partially imaged and may represent airspace disease although underlying mass or nodule would not be excluded.  CTA HEAD: Distal internal carotid arteries demonstrate mild-to-moderate atherosclerotic calcific disease of the distal internal carotid arteries, left greater than right, without focal severe stenosis, aneurysm or occlusion. Anterior cerebral arteries are patent without hemodynamically significant stenosis, aneurysm or occlusion. Middle cerebral arteries are patent without hemodynamically significant stenosis, aneurysm or occlusion. Vertebrobasilar system and posterior cerebral arteries are patent without hemodynamically significant stenosis, aneurysm or occlusion. Fetal origin variance of the right posterior cerebral artery. The venous structures are partially imaged and unremarkable including superior sagittal sinus widely patent.      Impression: 1. No hemodynamically significant stenosis, aneurysm or occlusion throughout the CTA head and neck with plaque formation and calcific disease in the ICA origins and distal internal carotid arteries, however, no large vessel occlusion with [place] widely patent.  2.  Lung apices demonstrate biapical pleural parenchymal scarring with confluent opacification in the right upper lung partially imaged and may represent airspace disease although underlying mass or nodule cannot be excluded. Further evaluation with CT chest recommended when clinically appropriate.  D:  11/29/2021 E:  11/29/2021  This report was finalized on 11/29/2021 4:50 PM by Dr. Mikie Alfaro.      MRI Brain With & Without Contrast    Result Date: 11/29/2021  EXAMINATION: MRI BRAIN W WO CONTRAST-11/29/2021:  INDICATION: Seizure, AMS.  TECHNIQUE: Multiplanar MRI of the brain with and without intravenous contrast administration.  COMPARISON: CT head and angiogram as well as perfusion 11/29/2021 earlier same day.  FINDINGS: No  restriction on diffusion-weighted sequences to suggest acute ischemia. Midline structures are asymmetric with encephalomalacia left frontal insult and prominence of the sulci towards the vertex with prior left craniotomy findings. No susceptibility artifact on susceptibility-weighted imaging. Postcontrast sequences without abnormal enhancement of the pulmonary parenchyma. Globes and orbits are unremarkable. The paranasal sinuses and mastoid air cells are grossly clear and well pneumatized with the exception of small right and trace left mastoid effusions. Pituitary and sella within normal limits. Cervicomedullary junction widely patent.      Impression: 1. Postsurgical changes and encephalomalacic involvement of the left frontal lobe. 2. No acute infarction. 3. No abnormal enhancement.  D:  11/29/2021 E:  11/29/2021         XR Chest 1 View    Result Date: 11/29/2021  EXAMINATION: XR CHEST 1 VW- 11/29/2021  INDICATION: Acute Stroke Protocol (onset < 12 hrs)  COMPARISON: Chest x-ray 11/05/2021  FINDINGS: Hyperinflated appearance bilateral with opacifications at the right lung apex similar to prior comparison 11/05/2021 likely atelectasis or scarring without pleural effusion.      Impression: Chronic changes including atelectasis or scarring somewhat confluent at the right lung apex similar to 11/05/2021 comparison without effusion.  D: 11/29/2021 E: 11/29/2021  This report was finalized on 11/29/2021 4:51 PM by Dr. Mikie Alfaro.      CT Head Without Contrast Stroke Protocol    Result Date: 11/29/2021  EXAMINATION: CT HEAD WO CONTRAST, STROKE PROTOCOL-11/29/2021:  INDICATION: Neuro deficit, acute, stroke suspected.  TECHNIQUE: CT head without intravenous contrast following stroke protocol.  The radiation dose reduction device was turned on for each scan per the ALARA (As Low as Reasonably Achievable) protocol.  COMPARISON: CT head dated 11/05/2021.  FINDINGS: Prior left frontal craniotomy and encephalomalacia in the  left frontal lobe without acute findings. No midline shift or hydrocephalus. No intra-axial hemorrhage or extra-axial fluid collection. Globes and orbits are normal.      Impression: No acute intracranial finding specifically, no acute intracranial hemorrhage.  Scan performed on 11/29/2021 at 1055 hours. Scan report given to ER physician and stroke team in person at scanner by Dr. Alfaro on 11/29/2021 at 1105 hours.  D:  11/29/2021 E:  11/29/2021  This report was finalized on 11/29/2021 4:50 PM by Dr. Mikie Alfaro.      CT Angiogram Head w AI Analysis of LVO    Result Date: 11/29/2021  EXAMINATION: CT ANGIOGRAM NECK-, CT ANGIOGRAM HEAD W AI ANALYSIS OF LVO-11/29/2021:  INDICATION: Stroke, follow-up.  TECHNIQUE: CT angiogram head and neck with intravenous contrast administration. 2-D and 3-D reconstructions performed.  The radiation dose reduction device was turned on for each scan per the ALARA (As Low as Reasonably Achievable) protocol.  COMPARISON: Concurrent CT cerebral perfusion and CT head, noncontrast, stroke protocol head.  FINDINGS:  CTA NECK: Normal 3-vessel arch with patent great vessel origins. Proximal subclavian arteries are patent. The vertebral arteries demonstrate a mild left-sided dominance of caliber without focal severe stenosis, aneurysm or occlusion. The carotids demonstrate grossly normal course and branching pattern with atherosclerotic calcific disease and plaque formations of the ICA origins producing 20% right and 10% left luminal narrowing as measured by NASCET criteria with patency of the distal internal carotid arteries to the intracranial portions discussed further below. Cervical soft tissue is unremarkable. The thyroid has a low-density left thyroid nodules without calcifications measuring up to 1 cm. The lung apices demonstrate biapical pleural parenchymal scarring with confluent opacification in the right upper lung partially imaged and may represent airspace disease although  underlying mass or nodule would not be excluded.  CTA HEAD: Distal internal carotid arteries demonstrate mild-to-moderate atherosclerotic calcific disease of the distal internal carotid arteries, left greater than right, without focal severe stenosis, aneurysm or occlusion. Anterior cerebral arteries are patent without hemodynamically significant stenosis, aneurysm or occlusion. Middle cerebral arteries are patent without hemodynamically significant stenosis, aneurysm or occlusion. Vertebrobasilar system and posterior cerebral arteries are patent without hemodynamically significant stenosis, aneurysm or occlusion. Fetal origin variance of the right posterior cerebral artery. The venous structures are partially imaged and unremarkable including superior sagittal sinus widely patent.      Impression: 1. No hemodynamically significant stenosis, aneurysm or occlusion throughout the CTA head and neck with plaque formation and calcific disease in the ICA origins and distal internal carotid arteries, however, no large vessel occlusion with [place] of [place] widely patent.  2.  Lung apices demonstrate biapical pleural parenchymal scarring with confluent opacification in the right upper lung partially imaged and may represent airspace disease although underlying mass or nodule cannot be excluded. Further evaluation with CT chest recommended when clinically appropriate.  D:  11/29/2021 E:  11/29/2021  This report was finalized on 11/29/2021 4:50 PM by Dr. Mikie Alfaro.      CT CEREBRAL PERFUSION WITH & WITHOUT CONTRAST    Result Date: 11/29/2021  EXAMINATION: CT CEREBRAL PERFUSION W WO CONTRAST- 11/29/2021  INDICATION: Neuro deficit, acute, stroke suspected  TECHNIQUE: CT cerebral perfusion with and without intravenous contrast administration. Multiple parametric maps including mean transit time, time to drain, cerebral blood flow and cerebral blood volume performed.  The radiation dose reduction device was turned on for each  scan per the ALARA (As Low as Reasonably Achievable) protocol.  COMPARISON: CT noncontrast stroke protocol and concurrent CT angiogram  FINDINGS: Area of artifact from prior insult left frontal region. No perfusion defect otherwise identified specifically no reversible ischemia within a specific vascular territory.      Impression: No reversible ischemia within a specific vascular territory.  D: 11/29/2021 E: 11/29/2021   This report was finalized on 11/29/2021 4:50 PM by Dr. Mikie Alfaro.      XR Hip With or Without Pelvis 2 - 3 View Left    Result Date: 11/29/2021  EXAMINATION: XR HIP W OR WO PELVIS 2-3 VIEW LEFT- 11/29/2021  INDICATION: fall  COMPARISON: Hip x-ray 10/21/2021  FINDINGS: SI joints symmetric and without widening. No displaced pelvic ring fracture. 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 Left hip without acute displaced fracture remaining well located at the left hip joint.      Impression: No displaced left rib fracture or displaced pelvic ring fracture with left hip remaining well located in the left acetabular cup.  D: 11/29/2021 E: 11/29/2021  This report was finalized on 11/29/2021 4:51 PM by Dr. Mikie Alfaro.        Results for orders placed during the hospital encounter of 11/29/21    Adult Transthoracic Echo Complete W/ Cont if Necessary Per Protocol (With Agitated Saline)    Interpretation Summary  · Left ventricular ejection fraction appears to be 61 - 65%. Left ventricular systolic function is normal.  · Saline test results are negative.  · Estimated right ventricular systolic pressure from tricuspid regurgitation is markedly elevated (>55 mmHg).  · Moderate tricuspid valve regurgitation is present.  · Left ventricular diastolic function was normal.      I have reviewed the medications:  Scheduled Meds:aspirin, 81 mg, Oral, Daily   Or  aspirin, 300 mg, Rectal, Daily  atorvastatin, 80 mg, Oral, Nightly  budesonide-formoterol, 2 puff, Inhalation, BID - RT  busPIRone, 7.5 mg, Oral,  Q12H  cefTRIAXone, 1 g, Intravenous, Q24H  cetirizine, 5 mg, Oral, Daily  docusate sodium, 100 mg, Oral, BID  folic acid, 400 mcg, Oral, BID  heparin (porcine), 5,000 Units, Subcutaneous, Q8H  levothyroxine, 25 mcg, Oral, Q AM  mirtazapine, 15 mg, Oral, Nightly  pantoprazole, 40 mg, Oral, QAM AC  pregabalin, 25 mg, Oral, Q12H  sodium chloride, 10 mL, Intravenous, Q12H  sodium chloride, 10 mL, Intravenous, Q12H  traZODone, 50 mg, Oral, Nightly      Continuous Infusions:sodium chloride, 100 mL/hr, Last Rate: 100 mL/hr (12/01/21 0338)      PRN Meds:.•  acetaminophen **OR** acetaminophen **OR** acetaminophen  •  LORazepam  •  ondansetron **OR** ondansetron  •  sodium chloride  •  sodium chloride  •  sodium chloride    Assessment/Plan   Assessment & Plan     Active Hospital Problems    Diagnosis  POA   • Delirium, acute [R41.0]  Yes   • Acute UTI (urinary tract infection) [N39.0]  Yes   • HTN (hypertension) [I10]  Yes   • JUAN F (acute kidney injury) (HCC) [N17.9]  Yes   • Fall [W19.XXXA]  Yes      Resolved Hospital Problems    Diagnosis Date Resolved POA   • CVA (cerebral vascular accident) (HCC) [I63.9] 11/29/2021 Yes        Brief Hospital Course to date:  Mitzi Eric is a 86 y.o. female with history of dementia, hypothyroidism, prior craniotomy secondary to possible brain mass, hypertension, atrial fibrillation not on anticoagulation, COPD, lung mass, and frequent UTIs who presented from [facility] with altered mental status, a fall and tremors found to have an UTI.    Plan:    UTI  --on admission, WBC count 14,Procal 2.46  -- UA with positive nitrates, small leukocyte esterase, 21-30 white blood cells and 4+ bacteria  -- continue Rocephin D3 of 7  -- UCx >100 Klebsiella resistant to Ampicillin, would continue IV Rocephin until discharge and then d/c on PO ABX      Delirium  -- likely secondary to UTI, MRI brain without acute changes  -- patient on multiple sedating meds, which may contribute to AMS in setting of  JUAN F  -- Tegretol: level was high on admission.  Will hold for now  -- Buspar- will resume  -- Lyrica: lowered dose and frequency  -- Remeron: continue HS dose  -- Trazodone: continue HS dose  -- Ativan seems to be helping with her agitation, increased dose to 0.5 Q 4 PRN to hopefully decrease the frequency needed (she had gotten four doses in last 48 hours of 0.25 mg in addition to one time dose of 0.5mg yesterday afternoon).   --Will obviously need a long term solution (other than ativan/benzos) if her delirium doesn't improve with treatment of UTI     Elevated Creatinine  Volume depletion  - IV fluids overnight on day of admission  -- Cr initially 1.60, baseline of  0.8  -- repeat Cr this am shows improvement     Dementia     Fall  - plain films without fracture  - PT/OT/fall precautions     Lung mass  - right apex. Noted on CT chest back in 2018 too. ED notes at the time mention prior biopsies x 2.  - Unable to discuss with family on admission, I discussed with son. They are aware of this and stated that, during prior workup, they had all decided on no further workup/treatment     HTN  --continue home meds     Hypothyroid  --TSH wnl, continue home dose Synthroid    H/o Afib  -- not on AC, likely due to fall risk  -- not on any rate controlling meds, but currently rate controlled    Hypokalemia  --replace per protocol    DVT prophylaxis:  Medical and mechanical DVT prophylaxis orders are present.       AM-PAC 6 Clicks Score (PT): 17 (11/30/21 1123)    Disposition: I expect the patient to be discharged back to NH in 1-2 days    CODE STATUS:   Code Status and Medical Interventions:   Ordered at: 11/29/21 1528     Level Of Support Discussed With:    Next of Kin (If No Surrogate)     Code Status (Patient has no pulse and is not breathing):    CPR (Attempt to Resuscitate)     Medical Interventions (Patient has pulse or is breathing):    Full Support       Yu Cesar MD  12/01/21

## 2021-12-01 NOTE — PLAN OF CARE
Alert.  Oriented to self.  Follows commands.  Anxious intermittent but cooperative.  Majority of speech illogical.  Respirations unlabored.  Monitor SR and ST with activity.  Potassium replaced.  PRN Tylenol twice and Ativan once.  Appetite poor.  Up to recliner and bathroom 1 assist/walker.  Nephew bedside.

## 2021-12-01 NOTE — THERAPY TREATMENT NOTE
Patient Name: Mitzi Eric  : 1935    MRN: [id number]                              Today's Date: 2021       Admit Date: 2021    Visit Dx:     ICD-10-CM ICD-9-CM   1. Acute UTI  N39.0 599.0   2. Confusion  R41.0 298.9   3. Dysarthria  R47.1 784.51   4. Facial droop  R29.810 781.94   5. Renal insufficiency  N28.9 593.9   6. Leukocytosis, unspecified type  D72.829 288.60   7. Aphasia  R47.01 784.3     Patient Active Problem List   Diagnosis   • Fall   • Delirium, acute   • Acute UTI (urinary tract infection)   • HTN (hypertension)   • JUAN F (acute kidney injury) (HCC)     Past Medical History:   Diagnosis Date   • A-fib (HCC)    • Ataxic gait    • Chronic kidney disease, stage 3 (HCC)    • COPD (chronic obstructive pulmonary disease) (HCC)    • Dementia (HCC)    • Disease of thyroid gland    • Hyperlipidemia    • Hypertension      Past Surgical History:   Procedure Laterality Date   • BRAIN TUMOR EXCISION      BENIGN   • FRACTURE SURGERY     • TRIGGER FINGER RELEASE        General Information     Row Name 21 1635          Physical Therapy Time and Intention    Document Type therapy note (daily note)  -CD     Mode of Treatment physical therapy  -CD     Row Name 21 1635          General Information    Patient Profile Reviewed yes  -CD     Existing Precautions/Restrictions fall  DEMENTIA, MONITOR HR.  -CD     Barriers to Rehab previous functional deficit; cognitive status  -CD     Row Name 21 1638          Cognition    Orientation Status (Cognition) oriented to; person; disoriented to; place; situation; time  SLOW TO RESPOND VERBALLY. DIFFICULTY WITH WORD FINDING.  -CD     Row Name 21 8437          Safety Issues, Functional Mobility    Safety Issues Affecting Function (Mobility) awareness of need for assistance; insight into deficits/self-awareness; positioning of assistive device; safety precautions follow-through/compliance; safety precaution awareness; sequencing abilities   -CD     Impairments Affecting Function (Mobility) balance; endurance/activity tolerance; strength  -CD     Cognitive Impairments, Mobility Safety/Performance insight into deficits/self-awareness; safety precaution awareness  -CD           User Key  (r) = Recorded By, (t) = Taken By, (c) = Cosigned By    Initials Name Provider Type    CD Michelle Cardenas PT Physical Therapist               Mobility     Row Name 12/01/21 1637          Bed Mobility    Comment (Bed Mobility) PT [facility] UPON ARRIVAL ON RA.  -CD     Row Name 12/01/21 1637          Transfers    Comment (Transfers) CUES FOR HAND PLACEMENT - REPEATED MULTIPLE TIME FOR SAFTY. STS FROM RECLINER AND COMMODE. CUES TO USE GRAB BAR IN BATHROOM.  -CD     Row Name 12/01/21 1637          Sit-Stand Transfer    Sit-Stand [place] (Transfers) contact guard; verbal cues  -CD     Assistive Device (Sit-Stand Transfers) walker, front-wheeled  -CD     Row Name 12/01/21 1637          Gait/Stairs (Locomotion)    [place] Level (Gait) minimum assist (75% patient effort); verbal cues  -CD     Assistive Device (Gait) walker, front-wheeled  -CD     Distance in Feet (Gait) 32 FEET.  -CD     Deviations/Abnormal Patterns (Gait) mik decreased; gait speed decreased; base of support, narrow; stride length decreased  -CD     Bilateral Gait Deviations forward flexed posture; heel strike decreased  -CD     Comment (Gait/Stairs) MANUAL ASSIST TO GUIDE R WALKER. CUES FOR UPRIGHT POSTURE AND TO LOOK AHEAD. LIMITED BY FATIGUE/ELEVATED HR.  -CD           User Key  (r) = Recorded By, (t) = Taken By, (c) = Cosigned By    Initials Name Provider Type    Michelle Venegas PT Physical Therapist               Obj/Interventions     Row Name 12/01/21 1639          Motor Skills    Motor Skills coordination; functional endurance  -CD     Coordination gross motor deficit; bilateral; lower extremity; minimal impairment  -CD     Functional Endurance GAIT LIMITED BY ELEVATED HR, FATIGUE.  -CD      Therapeutic Exercise --  SEATED B LE THER EX: LAQ, HIP FLEX, AP'S, SLR- 1 SET OF 10 REPS EACH WITH REST BREAKS.  -CD     Row Name 12/01/21 2471          Balance    Comment, Balance STOOD AT SINK APPROX 30 SECONDS TO WASH HANDS. CUES FOR UPRIGHT POSTURE AND WALKER PLACEMENT. GAIT IN ROOM.  -CD           User Key  (r) = Recorded By, (t) = Taken By, (c) = Cosigned By    Initials Name Provider Type    CD Michelle Cardenas, PT Physical Therapist               Goals/Plan    No documentation.                Clinical Impression     Row Name 12/01/21 1640          Pain Scale: Numbers Pre/Post-Treatment    Pretreatment Pain Rating 0/10 - no pain  -CD     Posttreatment Pain Rating 0/10 - no pain  -CD     Row Name 12/01/21 1640          Plan of Care Review    Plan of Care Reviewed With patient; other (see comments)  NEPHEW  -CD     Outcome Summary PT NEEDS CONTINUAL CUES FOR SAFETY AWARENESS DUE TO IMPULSIVITY AND DIFFICULTY SEQUENCING. REQUIRED CGA FOR STS AND MIN ASSIST FOR GAIT X 32 FEET WITH R WALKER. DISTANCE LIMITED DUE TO FATIGUE AND ELEVATED HR. PT SOA WITH ACTIVITY BUT O2 SATS STABLE ON RA.  -CD     Row Name 12/01/21 1640          Therapy Assessment/Plan (PT)    Patient/Family Therapy Goals Statement (PT) DID NOT STATE.  -CD     Rehab Potential (PT) good, to achieve stated therapy goals  -CD     Row Name 12/01/21 1640          Vital Signs    Post Systolic BP Rehab 174  -CD     Post Treatment Diastolic BP 98  -CD     Pretreatment Heart Rate (beats/min) 96  -CD     Intratreatment Heart Rate (beats/min) 125  -CD     Posttreatment Heart Rate (beats/min) 100  -CD     Pre SpO2 (%) 94  -CD     O2 Delivery Pre Treatment room air  -CD     O2 Delivery Intra Treatment room air  -CD     Post SpO2 (%) 92  -CD     O2 Delivery Post Treatment room air  -CD     Pre Patient Position Sitting  -CD     Intra Patient Position Standing  -CD     Post Patient Position Sitting  -CD     Row Name 12/01/21 1640          Positioning and Restraints     Pre-Treatment Position sitting in chair/recliner  -CD     Post Treatment Position chair  -CD     In Chair reclined; call light within reach; exit alarm on; with family/caregiver; legs elevated; notified nsg; RUE elevated; LUE elevated  -CD           User Key  (r) = Recorded By, (t) = Taken By, (c) = Cosigned By    Initials Name Provider Type    CD Michelle Cardenas, PT Physical Therapist               Outcome Measures     Row Name 12/01/21 1644          How much help from another person do you currently need...    Turning from your back to your side while in flat bed without using bedrails? 3  -CD     Moving from lying on back to sitting on the side of a flat bed without bedrails? 3  -CD     Moving to and from a bed to a chair (including a wheelchair)? 3  -CD     Standing up from a chair using your arms (e.g., wheelchair, bedside chair)? 3  -CD     Climbing 3-5 steps with a railing? 2  -CD     To walk in hospital room? 3  -CD     AM-PAC 6 Clicks Score (PT) 17  -CD     Row Name 12/01/21 1644          Modified [place]bbie Scale    Modified [place] Scale 4 - Moderately severe disability.  Unable to walk without assistance, and unable to attend to own bodily needs without assistance.  -CD     Row Name 12/01/21 1644 12/01/21 1508       Functional Assessment    Outcome Measure Options AM-PAC 6 Clicks Basic Mobility (PT)  -CD AM-PAC 6 Clicks Daily Activity (OT)  -          User Key  (r) = Recorded By, (t) = Taken By, (c) = Cosigned By    Initials Name Provider Type    CD Michelle Cardenas, PT Physical Therapist    CS Jesse Carpenter OT Occupational Therapist                             Physical Therapy Education                 Title: PT OT SLP Therapies (In Progress)     Topic: Physical Therapy (Done)     Point: Mobility training (Done)     Learning Progress Summary           Patient Acceptance, E, VU,NR by CD at 12/1/2021 1645    Comment: SEE FLOWSHEET.    Acceptance, E, VU,NR by CD at 11/30/2021 1124    Comment: BENEFITS OF OOB  ACTIVITY, SAFETY WITH MOBILITY, PROGRESSION OF POC, D/C PLANNING,   Family Acceptance, E, VU,NR by CD at 12/1/2021 1645    Comment: SEE FLOWSHEET.   Significant Other Acceptance, E, VU,NR by CD at 11/30/2021 1124    Comment: BENEFITS OF OOB ACTIVITY, SAFETY WITH MOBILITY, PROGRESSION OF POC, D/C PLANNING,                   Point: Home exercise program (Done)     Learning Progress Summary           Patient Acceptance, E, VU,NR by CD at 12/1/2021 1645    Comment: SEE FLOWSHEET.    Acceptance, E, VU,NR by CD at 11/30/2021 1124    Comment: BENEFITS OF OOB ACTIVITY, SAFETY WITH MOBILITY, PROGRESSION OF POC, D/C PLANNING,   Family Acceptance, E, VU,NR by CD at 12/1/2021 1645    Comment: SEE FLOWSHEET.   Significant Other Acceptance, E, VU,NR by CD at 11/30/2021 1124    Comment: BENEFITS OF OOB ACTIVITY, SAFETY WITH MOBILITY, PROGRESSION OF POC, D/C PLANNING,                   Point: Body mechanics (Done)     Learning Progress Summary           Patient Acceptance, E, VU,NR by CD at 12/1/2021 1645    Comment: SEE FLOWSHEET.    Acceptance, E, VU,NR by CD at 11/30/2021 1124    Comment: BENEFITS OF OOB ACTIVITY, SAFETY WITH MOBILITY, PROGRESSION OF POC, D/C PLANNING,   Family Acceptance, E, VU,NR by CD at 12/1/2021 1645    Comment: SEE FLOWSHEET.   Significant Other Acceptance, E, VU,NR by CD at 11/30/2021 1124    Comment: BENEFITS OF OOB ACTIVITY, SAFETY WITH MOBILITY, PROGRESSION OF POC, D/C PLANNING,                   Point: Precautions (Done)     Learning Progress Summary           Patient Acceptance, E, VU,NR by CD at 12/1/2021 1645    Comment: SEE FLOWSHEET.    Acceptance, E, VU,NR by CD at 11/30/2021 1124    Comment: BENEFITS OF OOB ACTIVITY, SAFETY WITH MOBILITY, PROGRESSION OF POC, D/C PLANNING,   Family Acceptance, E, VU,NR by CD at 12/1/2021 1645    Comment: SEE FLOWSHEET.   Significant Other Acceptance, E, VU,NR by CD at 11/30/2021 1124    Comment: BENEFITS OF OOB ACTIVITY, SAFETY WITH MOBILITY, PROGRESSION OF POC,  D/C PLANNING,                               User Key     Initials Effective Dates Name Provider Type Discipline    CD 06/16/21 -  Michelle Cardenas PT Physical Therapist PT              PT Recommendation and Plan  Planned Therapy Interventions (PT): balance training, bed mobility training, gait training, home exercise program, transfer training, strengthening, patient/family education  Plan of Care Reviewed With: patient, other (see comments) (NEPHEW)  Outcome Summary: PT NEEDS CONTINUAL CUES FOR SAFETY AWARENESS DUE TO IMPULSIVITY AND DIFFICULTY SEQUENCING. REQUIRED CGA FOR STS AND MIN ASSIST FOR GAIT X 32 FEET WITH R WALKER. DISTANCE LIMITED DUE TO FATIGUE AND ELEVATED HR. PT SOA WITH ACTIVITY BUT O2 SATS STABLE ON RA.     Time Calculation:    PT Charges     Row Name 12/01/21 1645             Time Calculation    Start Time 1608  -CD      PT Received On 12/01/21  -CD              Time Calculation- PT    Total Timed Code Minutes- PT 34 minute(s)  -CD              Timed Charges    24418 - PT Therapeutic Exercise Minutes 15  -CD      69570 - Gait Training Minutes  8  -CD      24930 - PT Therapeutic Activity Minutes 11  -CD              Total Minutes    Timed Charges Total Minutes 34  -CD       Total Minutes 34  -CD            User Key  (r) = Recorded By, (t) = Taken By, (c) = Cosigned By    Initials Name Provider Type    CD Michelle Cardenas PT Physical Therapist              Therapy Charges for Today     Code Description Service Date Service Provider Modifiers Qty    55693980857 HC GAIT TRAINING EA 15 MIN 11/30/2021 Michelle Cardenas, PT GP 1    07131633810 HC PT THERAPEUTIC ACT EA 15 MIN 11/30/2021 Michelle Cardenas, PT GP 1    12688050269 HC PT EVAL MOD COMPLEXITY 4 11/30/2021 Michelle Cardenas, PT GP 1    94901477168 HC PT THER PROC EA 15 MIN 12/1/2021 Michelle Cardenas, PT GP 1    56056460470 HC PT THERAPEUTIC ACT EA 15 MIN 12/1/2021 Michelle Cardenas, PT GP 1          PT G-Codes  Outcome Measure Options: AM-PAC 6 Clicks Basic  Mobility (PT)  AM-PAC 6 Clicks Score (PT): 17  AM-PAC 6 Clicks Score (OT): 16  Modified [place] Scale: 4 - Moderately severe disability.  Unable to walk without assistance, and unable to attend to own bodily needs without assistance.    Michelle Cardenas, PT  12/1/2021

## 2021-12-02 LAB
ANION GAP SERPL CALCULATED.3IONS-SCNC: 13 MMOL/L (ref 5–15)
BASOPHILS # BLD AUTO: 0.04 10*3/MM3 (ref 0–0.2)
BASOPHILS NFR BLD AUTO: 0.7 % (ref 0–1.5)
BUN SERPL-MCNC: 8 MG/DL (ref 8–23)
BUN/CREAT SERPL: 12.9 (ref 7–25)
CALCIUM SPEC-SCNC: 8.8 MG/DL (ref 8.6–10.5)
CHLORIDE SERPL-SCNC: 107 MMOL/L (ref 98–107)
CO2 SERPL-SCNC: 20 MMOL/L (ref 22–29)
CREAT SERPL-MCNC: 0.62 MG/DL (ref 0.57–1)
DEPRECATED RDW RBC AUTO: 41.1 FL (ref 37–54)
EOSINOPHIL # BLD AUTO: 0.01 10*3/MM3 (ref 0–0.4)
EOSINOPHIL NFR BLD AUTO: 0.2 % (ref 0.3–6.2)
ERYTHROCYTE [DISTWIDTH] IN BLOOD BY AUTOMATED COUNT: 11.9 % (ref 12.3–15.4)
GFR SERPL CREATININE-BSD FRML MDRD: 91 ML/MIN/1.73
GLUCOSE SERPL-MCNC: 97 MG/DL (ref 65–99)
HCT VFR BLD AUTO: 36.7 % (ref 34–46.6)
HGB BLD-MCNC: 12.3 G/DL (ref 12–15.9)
IMM GRANULOCYTES # BLD AUTO: 0.04 10*3/MM3 (ref 0–0.05)
IMM GRANULOCYTES NFR BLD AUTO: 0.7 % (ref 0–0.5)
LYMPHOCYTES # BLD AUTO: 0.84 10*3/MM3 (ref 0.7–3.1)
LYMPHOCYTES NFR BLD AUTO: 15.1 % (ref 19.6–45.3)
MCH RBC QN AUTO: 32.2 PG (ref 26.6–33)
MCHC RBC AUTO-ENTMCNC: 33.5 G/DL (ref 31.5–35.7)
MCV RBC AUTO: 96.1 FL (ref 79–97)
MONOCYTES # BLD AUTO: 1.09 10*3/MM3 (ref 0.1–0.9)
MONOCYTES NFR BLD AUTO: 19.5 % (ref 5–12)
NEUTROPHILS NFR BLD AUTO: 3.56 10*3/MM3 (ref 1.7–7)
NEUTROPHILS NFR BLD AUTO: 63.8 % (ref 42.7–76)
NRBC BLD AUTO-RTO: 0 /100 WBC (ref 0–0.2)
PLATELET # BLD AUTO: 112 10*3/MM3 (ref 140–450)
PMV BLD AUTO: 12.6 FL (ref 6–12)
POTASSIUM SERPL-SCNC: 3.6 MMOL/L (ref 3.5–5.2)
RBC # BLD AUTO: 3.82 10*6/MM3 (ref 3.77–5.28)
SODIUM SERPL-SCNC: 140 MMOL/L (ref 136–145)
WBC NRBC COR # BLD: 5.58 10*3/MM3 (ref 3.4–10.8)

## 2021-12-02 PROCEDURE — 25010000002 HEPARIN (PORCINE) PER 1000 UNITS: Performed by: INTERNAL MEDICINE

## 2021-12-02 PROCEDURE — 80048 BASIC METABOLIC PNL TOTAL CA: CPT | Performed by: INTERNAL MEDICINE

## 2021-12-02 PROCEDURE — 85025 COMPLETE CBC W/AUTO DIFF WBC: CPT | Performed by: INTERNAL MEDICINE

## 2021-12-02 PROCEDURE — 25010000002 CEFTRIAXONE PER 250 MG: Performed by: INTERNAL MEDICINE

## 2021-12-02 PROCEDURE — 94799 UNLISTED PULMONARY SVC/PX: CPT

## 2021-12-02 PROCEDURE — 92507 TX SP LANG VOICE COMM INDIV: CPT

## 2021-12-02 PROCEDURE — 99232 SBSQ HOSP IP/OBS MODERATE 35: CPT | Performed by: INTERNAL MEDICINE

## 2021-12-02 RX ORDER — LORAZEPAM 0.5 MG/1
0.5 TABLET ORAL EVERY 6 HOURS PRN
Status: DISCONTINUED | OUTPATIENT
Start: 2021-12-02 | End: 2021-12-05

## 2021-12-02 RX ORDER — QUETIAPINE FUMARATE 25 MG/1
12.5 TABLET, FILM COATED ORAL NIGHTLY PRN
Status: DISCONTINUED | OUTPATIENT
Start: 2021-12-02 | End: 2021-12-06 | Stop reason: HOSPADM

## 2021-12-02 RX ORDER — CARBAMAZEPINE 100 MG/1
200 TABLET, EXTENDED RELEASE ORAL EVERY 12 HOURS SCHEDULED
Status: DISCONTINUED | OUTPATIENT
Start: 2021-12-02 | End: 2021-12-04

## 2021-12-02 RX ADMIN — CARBAMAZEPINE 200 MG: 100 TABLET, EXTENDED RELEASE ORAL at 20:24

## 2021-12-02 RX ADMIN — FOLIC ACID TAB 400 MCG 400 MCG: 400 TAB at 08:57

## 2021-12-02 RX ADMIN — METOPROLOL TARTRATE 12.5 MG: 25 TABLET, FILM COATED ORAL at 09:02

## 2021-12-02 RX ADMIN — SODIUM CHLORIDE, PRESERVATIVE FREE 10 ML: 5 INJECTION INTRAVENOUS at 20:25

## 2021-12-02 RX ADMIN — TRAZODONE HYDROCHLORIDE 50 MG: 50 TABLET ORAL at 20:25

## 2021-12-02 RX ADMIN — BUSPIRONE HYDROCHLORIDE 7.5 MG: 15 TABLET ORAL at 20:24

## 2021-12-02 RX ADMIN — METOPROLOL TARTRATE 12.5 MG: 25 TABLET, FILM COATED ORAL at 20:24

## 2021-12-02 RX ADMIN — HEPARIN SODIUM 5000 UNITS: 5000 INJECTION INTRAVENOUS; SUBCUTANEOUS at 20:25

## 2021-12-02 RX ADMIN — QUETIAPINE FUMARATE 12.5 MG: 25 TABLET ORAL at 22:05

## 2021-12-02 RX ADMIN — SODIUM CHLORIDE 1 G: 900 INJECTION INTRAVENOUS at 14:31

## 2021-12-02 RX ADMIN — PANTOPRAZOLE SODIUM 40 MG: 40 TABLET, DELAYED RELEASE ORAL at 05:56

## 2021-12-02 RX ADMIN — PREGABALIN 25 MG: 25 CAPSULE ORAL at 20:25

## 2021-12-02 RX ADMIN — BUDESONIDE AND FORMOTEROL FUMARATE DIHYDRATE 2 PUFF: 160; 4.5 AEROSOL RESPIRATORY (INHALATION) at 20:01

## 2021-12-02 RX ADMIN — BUSPIRONE HYDROCHLORIDE 7.5 MG: 15 TABLET ORAL at 08:57

## 2021-12-02 RX ADMIN — MIRTAZAPINE 15 MG: 15 TABLET, FILM COATED ORAL at 20:24

## 2021-12-02 RX ADMIN — ASPIRIN 81 MG CHEWABLE TABLET 81 MG: 81 TABLET CHEWABLE at 08:56

## 2021-12-02 RX ADMIN — ATORVASTATIN CALCIUM 80 MG: 40 TABLET, FILM COATED ORAL at 20:24

## 2021-12-02 RX ADMIN — BUDESONIDE AND FORMOTEROL FUMARATE DIHYDRATE 2 PUFF: 160; 4.5 AEROSOL RESPIRATORY (INHALATION) at 08:19

## 2021-12-02 RX ADMIN — PREGABALIN 25 MG: 25 CAPSULE ORAL at 08:56

## 2021-12-02 RX ADMIN — SODIUM CHLORIDE, PRESERVATIVE FREE 10 ML: 5 INJECTION INTRAVENOUS at 09:04

## 2021-12-02 RX ADMIN — FOLIC ACID TAB 400 MCG 400 MCG: 400 TAB at 20:25

## 2021-12-02 RX ADMIN — CETIRIZINE HYDROCHLORIDE 5 MG: 10 TABLET, FILM COATED ORAL at 08:56

## 2021-12-02 RX ADMIN — DOCUSATE SODIUM 100 MG: 100 CAPSULE, LIQUID FILLED ORAL at 20:24

## 2021-12-02 RX ADMIN — DOCUSATE SODIUM 100 MG: 100 CAPSULE, LIQUID FILLED ORAL at 08:56

## 2021-12-02 RX ADMIN — LEVOTHYROXINE SODIUM 25 MCG: 25 TABLET ORAL at 05:56

## 2021-12-02 RX ADMIN — HEPARIN SODIUM 5000 UNITS: 5000 INJECTION INTRAVENOUS; SUBCUTANEOUS at 14:49

## 2021-12-02 RX ADMIN — HEPARIN SODIUM 5000 UNITS: 5000 INJECTION INTRAVENOUS; SUBCUTANEOUS at 05:56

## 2021-12-02 NOTE — THERAPY TREATMENT NOTE
Acute Care - Speech Language Pathology Treatment Note  [facility]     Patient Name: Mitzi Eric  : 1935  MRN: [id number]  Today's Date: 2021               Admit Date: 2021     Visit Dx:    ICD-10-CM ICD-9-CM   1. Acute UTI  N39.0 599.0   2. Confusion  R41.0 298.9   3. Dysarthria  R47.1 784.51   4. Facial droop  R29.810 781.94   5. Renal insufficiency  N28.9 593.9   6. Leukocytosis, unspecified type  D72.829 288.60   7. Aphasia  R47.01 784.3     Patient Active Problem List   Diagnosis   • Fall   • Delirium, acute   • Acute UTI (urinary tract infection)   • HTN (hypertension)   • JUAN F (acute kidney injury) (HCC)     Past Medical History:   Diagnosis Date   • A-fib (HCC)    • Ataxic gait    • Chronic kidney disease, stage 3 (HCC)    • COPD (chronic obstructive pulmonary disease) (HCC)    • Dementia (HCC)    • Disease of thyroid gland    • Hyperlipidemia    • Hypertension      Past Surgical History:   Procedure Laterality Date   • BRAIN TUMOR EXCISION      BENIGN   • FRACTURE SURGERY     • TRIGGER FINGER RELEASE         SLP Recommendation and Plan              Anticipated Discharge Disposition (SLP): unknown, anticipate therapy at next level of care (21 1505)        Predicted Duration Therapy Intervention (Days): until discharge (21 1505)  Daily Summary of Progress (SLP): progress toward functional goals as expected (21 1505)          Plan of Care Reviewed With: patient (21 1619)  Progress: improving (21 1619)      SLP EVALUATION (last 72 hours)     SLP SLC Evaluation     Row Name 21 1505                Communication Assessment/Intervention    Document Type therapy note (daily note)  -RD       Subjective Information no complaints  -RD       Patient Observations alert; cooperative; agree to therapy  -RD       Patient/Family/Caregiver Comments/Observations no family present  -RD       Patient Effort good  -RD               General Information    Prior Level of  Function-Communication --       Patient's Goals for Discharge --       Family Goals for Discharge --               Pain    Additional Documentation Pain Scale: Numbers Pre/Post-Treatment (Group)  -RD               Pain Scale: Numbers Pre/Post-Treatment    Pretreatment Pain Rating 0/10 - no pain  -RD       Posttreatment Pain Rating 0/10 - no pain  -RD               Comprehension Assessment/Intervention    Comprehension Assessment/Intervention --               SLP Treatment Clinical Impressions    Treatment Assessment (SLP) Communication treatment completed. Pt improving today & reported having a rough night/day. Participated well in therapy. Continue to address.  -RD       Daily Summary of Progress (SLP) progress toward functional goals as expected  -RD       Barriers to Overall Progress (SLP) Baseline deficits  -RD       Plan for Continued Treatment (SLP) continue treatment per plan of care  -RD       Care Plan Review evaluation/treatment results reviewed; care plan/treatment goals reviewed; risks/benefits reviewed; current/potential barriers reviewed; patient/other agree to care plan  -RD               Recommendations    Therapy Frequency (SLP SLC) 5 days per week  -RD       Predicted Duration Therapy Intervention (Days) until discharge  -RD       Anticipated Discharge Disposition (SLP) unknown; anticipate therapy at next level of care  -RD             User Key  (r) = Recorded By, (t) = Taken By, (c) = Cosigned By    Initials Name Effective Dates    RD Bela Huynh, MS CCC-SLP 06/16/21 -                    EDUCATION  The patient has been educated in the following areas:     Cognitive Impairment Communication Impairment.           SLP GOALS     Row Name 12/02/21 1505 11/30/21 0930          Comprehend Questions Goal 1 (SLP)    Improve Ability to Comprehend Questions Goal 1 (SLP) questions about personal information; complex yes/no questions; concrete general questions; 80%; with minimal cues (75-90%)  -RD  questions about personal information; complex yes/no questions; concrete general questions; 80%; with minimal cues (75-90%)  -RD     Time Frame (Comprehend Questions Goal 1, SLP) short term goal (STG)  -RD short term goal (STG)  -RD     Progress (Ability to Comprehend Questions Goal 1, SLP) 70%; with minimal cues (75-90%)  -RD --     Progress/Outcomes (Comprehend Questions Goal 1, SLP) continuing progress toward goal  -RD --            Follow Directions Goal 2 (SLP)    Improve Ability to Follow Directions Goal 1 (SLP) 3 step commands; 80%; with minimal cues (75-90%)  -RD 3 step commands; 80%; with minimal cues (75-90%)  -RD     Time Frame (Follow Directions Goal 1, SLP) short term goal (STG)  -RD short term goal (STG)  -RD     Progress (Ability to Follow Directions Goal 1, SLP) 70%; with minimal cues (75-90%)  -RD --     Progress/Outcomes (Follow Directions Goal 1, SLP) continuing progress toward goal  -RD --            Word Retrieval Skills Goal 1 (SLP)    Improve Word Retrieval Skills By Goal 1 (SLP) confrontational naming task; low frequency; completing open ended unstructured sentence; completing a divergent task; completing a convergent task; 80%; with minimal cues (75-90%)  -RD confrontational naming task; low frequency; completing open ended unstructured sentence; completing a divergent task; completing a convergent task; 80%; with minimal cues (75-90%)  -RD     Time Frame (Word Retrieval Goal 1, SLP) short term goal (STG)  -RD short term goal (STG)  -RD     Progress (Word Retrieval Skills Goal 1, SLP) 60%; with minimal cues (75-90%)  -RD --     Progress/Outcomes (Word Retrieval Goal 1, SLP) continuing progress toward goal  -RD --     Comment (Word Retrieval Goal 1, SLP) convergent  -RD --            Additional Goal 1 (SLP)    Additional Goal 1, SLP LTG: pt will improve communication skills to actively participate in care w/ 80% accuracy w/ min cues  -RD LTG: pt will improve communication skills to actively  participate in care w/ 80% accuracy w/ min cues  -RD     Time Frame (Additional Goal 1, SLP) by discharge  -RD by discharge  -RD     Progress/Outcomes (Additional Goal 1, SLP) continuing progress toward goal  -RD --           User Key  (r) = Recorded By, (t) = Taken By, (c) = Cosigned By    Initials Name Provider Type    Bela Bryant MS CCC-SLP Speech and Language Pathologist                        Time Calculation:      Time Calculation- SLP     Row Name 12/02/21 1624             Time Calculation- SLP    SLP Start Time 1505  -RD      SLP Received On 12/02/21  -RD              Untimed Charges    32216-WL Treatment/ST Modification Prosth Aug Alter  30  -RD              Total Minutes    Untimed Charges Total Minutes 30  -RD       Total Minutes 30  -RD            User Key  (r) = Recorded By, (t) = Taken By, (c) = Cosigned By    Initials Name Provider Type    Bela Bryant MS CCC-SLP Speech and Language Pathologist                Therapy Charges for Today     Code Description Service Date Service Provider Modifiers Qty    89090005531  ST TREATMENT SPEECH 2 12/2/2021 Bela Huynh MS CCC-OJNATHAN  1            Patient was not wearing a face mask and did not exhibit coughing during this therapy encounter.  Procedure performed was aerosolizing, involved close contact (within 6 feet for at least 15 minutes or longer), and did not involve contact with infectious secretions or specimens.  Therapist used appropriate personal protective equipment including gloves, standard procedure mask and eye protection.  Appropriate PPE was worn during the entire therapy session.  Hand hygiene was completed before and after therapy session.            MS PAULA Hebert  12/2/2021

## 2021-12-02 NOTE — PLAN OF CARE
Goal Outcome Evaluation:  Plan of Care Reviewed With: patient  Progress: improving   SLP treatment completed. Will continue to address communication deficit during treatment. Please see note for further details and recommendations.

## 2021-12-02 NOTE — PROGRESS NOTES
[facility]  PROGRESS NOTE    Patient Name: Mitzi Eric  : 1935  MRN: [id number]    Date of Admission: 2021  Primary Care Physician: Dm Cade APRN    Subjective   Subjective     CC:  AMS     HPI:  Difficulty with sleep and agitation last night. Nephew at bedside stated she had a terrible time and was unable to sleep. Reviewed medication changes and both are agreeable to trying.     ROS:  Gen- No fevers, chills  CV- No chest pain, palpitations  Resp- No cough, dyspnea  GI- No N/V/D, abd pain     Objective   Objective     Vital Signs:   Temp:  [97.3 °F (36.3 °C)-98.9 °F (37.2 °C)] 98.9 °F (37.2 °C)  Heart Rate:  [] 107  Resp:  [16-20] 18  BP: (123-189)/() 142/98  Flow (L/min):  [2] 2     Physical Exam:  Constitutional: No acute distress, awake, alert; frail  HENT: NCAT, mucous membranes moist  Respiratory: Clear to auscultation bilaterally, respiratory effort normal   Cardiovascular: RRR, no murmurs, rubs, or gallops  Gastrointestinal: Positive bowel sounds, soft, nontender, nondistended  Musculoskeletal: No bilateral ankle edema  Psychiatric: Appropriate affect, cooperative  Neurologic: Oriented x 2, strength symmetric in all extremities, Cranial Nerves grossly intact to confrontation, speech clear  Skin: No rashes    Results Reviewed:  LAB RESULTS:      Lab 21  0657 21  0839 21  1123 21  1115 21  1111 21  1110   WBC 6.26 7.82  --  14.60*  --   --    HEMOGLOBIN 11.9* 11.2*  --  11.9*  --   --    HEMOGLOBIN, POC  --   --   --   --   --  11.2*   HEMATOCRIT 36.2 33.6*  --  35.6  --   --    HEMATOCRIT POC  --   --   --   --   --  33*   PLATELETS 130* 112*  --  136*  --   --    NEUTROS ABS 3.75 5.58  --  11.60*  --   --    IMMATURE GRANS (ABS) 0.02 0.02  --  0.06*  --   --    LYMPHS ABS 1.46 1.30  --  1.74  --   --    MONOS ABS 0.83 0.74  --  1.07*  --   --    EOS ABS 0.17 0.15  --  0.08  --   --    MCV  97.1* 96.3  --  95.7  --   --    PROCALCITONIN  --   --   --  2.46*  --   --    LACTATE  --   --   --  1.4  --   --    PROTIME  --   --   --   --  15.2  --    APTT  --   --  38.1  --   --   --          Lab 12/01/21  0657 11/30/21  0839 11/29/21  1115 11/29/21  1108   SODIUM 140 142 141  --    POTASSIUM 3.2* 3.7 4.1  --    CHLORIDE 104 107 103  --    CO2 21.0* 23.0 28.0  --    ANION GAP 15.0 12.0 10.0  --    BUN 12 17 29*  --    CREATININE 0.81 0.93 1.52* 1.60*   GLUCOSE 82 82 86  --    CALCIUM 8.6 8.3* 8.8  --    MAGNESIUM  --   --  2.3  --    HEMOGLOBIN A1C  --  5.50  --   --    TSH  --   --  1.240  --          Lab 11/29/21  1115   TOTAL PROTEIN 6.3   ALBUMIN 4.00   GLOBULIN 2.3   ALT (SGPT) 10   AST (SGOT) 20   BILIRUBIN 0.4   ALK PHOS 77         Lab 11/29/21  1115 11/29/21  1111   TROPONIN T <0.010  --    PROTIME  --  15.2   INR  --  1.3*         Lab 11/30/21  0839   CHOLESTEROL 158   LDL CHOL 97   HDL CHOL 40   TRIGLYCERIDES 118             Lab 11/29/21  1110   PH, ARTERIAL 7.40     Brief Urine Lab Results  (Last result in the past 365 days)      Color   Clarity   Blood   Leuk Est   Nitrite   Protein   CREAT   Urine HCG        11/29/21 1200 Yellow   Cloudy   Trace   Small (1+)   Positive   30 mg/dL (1+)                 Microbiology Results Abnormal     Procedure Component Value - Date/Time    Blood Culture - Blood, Arm, Right [[id number]]  (Normal) Collected: 11/29/21 1330    Lab Status: Preliminary result Specimen: Blood from Arm, Right Updated: 12/01/21 1530     Blood Culture No growth at 2 days    Blood Culture - Blood, Arm, Right [[id number]]  (Normal) Collected: 11/29/21 1320    Lab Status: Preliminary result Specimen: Blood from Arm, Right Updated: 12/01/21 1530     Blood Culture No growth at 2 days    COVID-19 and FLU A/B PCR - Swab, Nasopharynx [[id number]]  (Normal) Collected: 11/29/21 1131    Lab Status: Final result Specimen: Swab from Nasopharynx Updated: 11/29/21 1213     COVID19 Not Detected      Influenza A PCR Not Detected     Influenza B PCR Not Detected    Narrative:      Fact sheet for providers: https://www.fda.gov/media/702539/download    Fact sheet for patients: https://www.fda.gov/media/177950/download    Test performed by PCR.          No radiology results from the last 24 hrs    Results for orders placed during the hospital encounter of 11/29/21    Adult Transthoracic Echo Complete W/ Cont if Necessary Per Protocol (With Agitated Saline)    Interpretation Summary  · Left ventricular ejection fraction appears to be 61 - 65%. Left ventricular systolic function is normal.  · Saline test results are negative.  · Estimated right ventricular systolic pressure from tricuspid regurgitation is markedly elevated (>55 mmHg).  · Moderate tricuspid valve regurgitation is present.  · Left ventricular diastolic function was normal.      I have reviewed the medications:  Scheduled Meds:aspirin, 81 mg, Oral, Daily   Or  aspirin, 300 mg, Rectal, Daily  atorvastatin, 80 mg, Oral, Nightly  budesonide-formoterol, 2 puff, Inhalation, BID - RT  busPIRone, 7.5 mg, Oral, Q12H  cefTRIAXone, 1 g, Intravenous, Q24H  cetirizine, 5 mg, Oral, Daily  docusate sodium, 100 mg, Oral, BID  folic acid, 400 mcg, Oral, BID  heparin (porcine), 5,000 Units, Subcutaneous, Q8H  levothyroxine, 25 mcg, Oral, Q AM  mirtazapine, 15 mg, Oral, Nightly  pantoprazole, 40 mg, Oral, QAM AC  pregabalin, 25 mg, Oral, Q12H  sodium chloride, 10 mL, Intravenous, Q12H  sodium chloride, 10 mL, Intravenous, Q12H  traZODone, 50 mg, Oral, Nightly      Continuous Infusions:sodium chloride, 100 mL/hr, Last Rate: 100 mL/hr (12/01/21 0338)      PRN Meds:.•  acetaminophen **OR** acetaminophen **OR** acetaminophen  •  LORazepam  •  ondansetron **OR** ondansetron  •  potassium chloride **OR** potassium chloride **OR** potassium chloride  •  sodium chloride  •  sodium chloride  •  sodium chloride    Assessment/Plan   Assessment & Plan     Active Hospital Problems     Diagnosis  POA   • Delirium, acute [R41.0]  Yes   • Acute UTI (urinary tract infection) [N39.0]  Yes   • HTN (hypertension) [I10]  Yes   • JUAN F (acute kidney injury) (HCC) [N17.9]  Yes   • Fall [W19.XXXA]  Yes      Resolved Hospital Problems    Diagnosis Date Resolved POA   • CVA (cerebral vascular accident) (HCC) [I63.9] 11/29/2021 Yes        Brief Hospital Course to date:  Mitzi Eric is a 86 y.o. female with history of dementia, hypothyroidism, prior craniotomy secondary to possible brain mass, hypertension, atrial fibrillation not on anticoagulation, COPD, lung mass, and frequent UTIs who [facility] with altered mental status, a fall and tremors found to have an UTI.     Plan:     UTI  --on admission, WBC count 14,Procal 2.46  -- UA with positive nitrates, small leukocyte esterase, 21-30 white blood cells and 4+ bacteria  -- continue Rocephin D4 of 7  -- UCx >100 Klebsiella resistant to Ampicillin, will transition to PO on discharge      Delirium  -- likely secondary to UTI, MRI brain without acute changes  -- patient on multiple sedating meds, which may contribute to AMS in setting of JUAN F  -- Tegretol: level was high on admission. Resume lower dose at 200 mg BID   -- Buspar- will resume  -- Lyrica: lowered dose and frequency  -- Remeron: continue HS dose  -- Trazodone: continue HS dose  -- Continue ativan PRN but change to PO  - Trial seroquel 12.5 mg qHS PRN   - Repeat EKG in AM      Elevated Creatinine  Volume depletion  -- Cr initially 1.60, baseline of  0.8  -- Cr improved; monitor off fluids      Dementia     Fall  - plain films without fracture  - PT/OT/fall precautions     Lung mass  - right apex. Noted on CT chest back in 2018 too. ED notes at the time mention prior biopsies x 2.  - my partner discussed with nephew, They are aware of this and stated that, during prior workup, they had all decided on no further workup/treatment     HTN  --monitor BP with starting metoprolol -- if  remains elevated, resume amlodipine      Hypothyroid  --TSH wnl, continue home dose Synthroid     H/o Afib  -- not on AC, likely due to fall risk  -- not on any rate controlling meds, and issue with SVT overnight 12/1  - Start metoprolol 12.5 mg BID      Hypokalemia  --replace per protocol    Trigeminal neuralgia   - Resume tegretol      DVT prophylaxis:  Medical and mechanical DVT prophylaxis orders are present.       AM-PAC 6 Clicks Score (PT): 17 (12/01/21 6144)    Disposition: I expect the patient to be discharged likely back to the nursing home in 1-3 days pending mental status     CODE STATUS:   Code Status and Medical Interventions:   Ordered at: 11/29/21 1290     Level Of Support Discussed With:    Next of Kin (If No Surrogate)     Code Status (Patient has no pulse and is not breathing):    CPR (Attempt to Resuscitate)     Medical Interventions (Patient has pulse or is breathing):    Full Support       Anjali Williamson DO  12/02/21

## 2021-12-03 ENCOUNTER — APPOINTMENT (OUTPATIENT)
Dept: GENERAL RADIOLOGY | Facility: HOSPITAL | Age: 86
End: 2021-12-03

## 2021-12-03 LAB
QT INTERVAL: 288 MS
QT INTERVAL: 338 MS
QTC INTERVAL: 425 MS
QTC INTERVAL: 446 MS

## 2021-12-03 PROCEDURE — 25010000002 HEPARIN (PORCINE) PER 1000 UNITS: Performed by: INTERNAL MEDICINE

## 2021-12-03 PROCEDURE — 94799 UNLISTED PULMONARY SVC/PX: CPT

## 2021-12-03 PROCEDURE — 71045 X-RAY EXAM CHEST 1 VIEW: CPT

## 2021-12-03 PROCEDURE — 94640 AIRWAY INHALATION TREATMENT: CPT

## 2021-12-03 PROCEDURE — 99232 SBSQ HOSP IP/OBS MODERATE 35: CPT | Performed by: INTERNAL MEDICINE

## 2021-12-03 PROCEDURE — 25010000002 CEFTRIAXONE PER 250 MG: Performed by: INTERNAL MEDICINE

## 2021-12-03 PROCEDURE — 93010 ELECTROCARDIOGRAM REPORT: CPT | Performed by: INTERNAL MEDICINE

## 2021-12-03 PROCEDURE — 93005 ELECTROCARDIOGRAM TRACING: CPT | Performed by: INTERNAL MEDICINE

## 2021-12-03 PROCEDURE — 97535 SELF CARE MNGMENT TRAINING: CPT

## 2021-12-03 RX ORDER — FLUTICASONE PROPIONATE 50 MCG
2 SPRAY, SUSPENSION (ML) NASAL DAILY
Status: DISCONTINUED | OUTPATIENT
Start: 2021-12-03 | End: 2021-12-06 | Stop reason: HOSPADM

## 2021-12-03 RX ORDER — BENZONATATE 100 MG/1
200 CAPSULE ORAL 3 TIMES DAILY PRN
Status: DISCONTINUED | OUTPATIENT
Start: 2021-12-03 | End: 2021-12-06 | Stop reason: HOSPADM

## 2021-12-03 RX ORDER — IPRATROPIUM BROMIDE AND ALBUTEROL SULFATE 2.5; .5 MG/3ML; MG/3ML
3 SOLUTION RESPIRATORY (INHALATION)
Status: DISCONTINUED | OUTPATIENT
Start: 2021-12-03 | End: 2021-12-06 | Stop reason: HOSPADM

## 2021-12-03 RX ORDER — CHOLECALCIFEROL (VITAMIN D3) 125 MCG
5 CAPSULE ORAL NIGHTLY
Status: DISCONTINUED | OUTPATIENT
Start: 2021-12-03 | End: 2021-12-06 | Stop reason: HOSPADM

## 2021-12-03 RX ADMIN — TRAZODONE HYDROCHLORIDE 50 MG: 50 TABLET ORAL at 20:45

## 2021-12-03 RX ADMIN — BUSPIRONE HYDROCHLORIDE 7.5 MG: 15 TABLET ORAL at 08:24

## 2021-12-03 RX ADMIN — ATORVASTATIN CALCIUM 80 MG: 40 TABLET, FILM COATED ORAL at 20:44

## 2021-12-03 RX ADMIN — IPRATROPIUM BROMIDE AND ALBUTEROL SULFATE 3 ML: 2.5; .5 SOLUTION RESPIRATORY (INHALATION) at 16:33

## 2021-12-03 RX ADMIN — PREGABALIN 25 MG: 25 CAPSULE ORAL at 08:25

## 2021-12-03 RX ADMIN — CETIRIZINE HYDROCHLORIDE 5 MG: 10 TABLET, FILM COATED ORAL at 08:25

## 2021-12-03 RX ADMIN — FOLIC ACID TAB 400 MCG 400 MCG: 400 TAB at 08:24

## 2021-12-03 RX ADMIN — FOLIC ACID TAB 400 MCG 400 MCG: 400 TAB at 20:43

## 2021-12-03 RX ADMIN — HEPARIN SODIUM 5000 UNITS: 5000 INJECTION INTRAVENOUS; SUBCUTANEOUS at 15:39

## 2021-12-03 RX ADMIN — SODIUM CHLORIDE, PRESERVATIVE FREE 10 ML: 5 INJECTION INTRAVENOUS at 20:48

## 2021-12-03 RX ADMIN — SODIUM CHLORIDE, PRESERVATIVE FREE 10 ML: 5 INJECTION INTRAVENOUS at 20:47

## 2021-12-03 RX ADMIN — ASPIRIN 81 MG CHEWABLE TABLET 81 MG: 81 TABLET CHEWABLE at 08:24

## 2021-12-03 RX ADMIN — IPRATROPIUM BROMIDE AND ALBUTEROL SULFATE 3 ML: 2.5; .5 SOLUTION RESPIRATORY (INHALATION) at 19:25

## 2021-12-03 RX ADMIN — LEVOTHYROXINE SODIUM 25 MCG: 25 TABLET ORAL at 08:27

## 2021-12-03 RX ADMIN — SODIUM CHLORIDE, PRESERVATIVE FREE 10 ML: 5 INJECTION INTRAVENOUS at 08:29

## 2021-12-03 RX ADMIN — Medication 5 MG: at 20:44

## 2021-12-03 RX ADMIN — PANTOPRAZOLE SODIUM 40 MG: 40 TABLET, DELAYED RELEASE ORAL at 08:25

## 2021-12-03 RX ADMIN — BUDESONIDE AND FORMOTEROL FUMARATE DIHYDRATE 2 PUFF: 160; 4.5 AEROSOL RESPIRATORY (INHALATION) at 19:26

## 2021-12-03 RX ADMIN — BUSPIRONE HYDROCHLORIDE 7.5 MG: 15 TABLET ORAL at 20:42

## 2021-12-03 RX ADMIN — FLUTICASONE PROPIONATE 2 SPRAY: 50 SPRAY, METERED NASAL at 15:39

## 2021-12-03 RX ADMIN — DOCUSATE SODIUM 100 MG: 100 CAPSULE, LIQUID FILLED ORAL at 20:48

## 2021-12-03 RX ADMIN — MIRTAZAPINE 15 MG: 15 TABLET, FILM COATED ORAL at 20:42

## 2021-12-03 RX ADMIN — CARBAMAZEPINE 200 MG: 100 TABLET, EXTENDED RELEASE ORAL at 08:25

## 2021-12-03 RX ADMIN — HEPARIN SODIUM 5000 UNITS: 5000 INJECTION INTRAVENOUS; SUBCUTANEOUS at 20:47

## 2021-12-03 RX ADMIN — BUDESONIDE AND FORMOTEROL FUMARATE DIHYDRATE 2 PUFF: 160; 4.5 AEROSOL RESPIRATORY (INHALATION) at 07:44

## 2021-12-03 RX ADMIN — CARBAMAZEPINE 200 MG: 100 TABLET, EXTENDED RELEASE ORAL at 20:43

## 2021-12-03 RX ADMIN — SODIUM CHLORIDE 1 G: 900 INJECTION INTRAVENOUS at 13:39

## 2021-12-03 RX ADMIN — METOPROLOL TARTRATE 12.5 MG: 25 TABLET, FILM COATED ORAL at 08:26

## 2021-12-03 RX ADMIN — PREGABALIN 25 MG: 25 CAPSULE ORAL at 20:46

## 2021-12-03 RX ADMIN — METOPROLOL TARTRATE 12.5 MG: 25 TABLET, FILM COATED ORAL at 20:46

## 2021-12-03 RX ADMIN — DOCUSATE SODIUM 100 MG: 100 CAPSULE, LIQUID FILLED ORAL at 08:25

## 2021-12-03 NOTE — CASE MANAGEMENT/SOCIAL WORK
Continued Stay Note   Flash     Patient Name: Mitzi Eric  MRN: [id number]  Today's Date: 12/3/2021    Admit Date: 11/29/2021          Janet Cooper RN

## 2021-12-03 NOTE — THERAPY TREATMENT NOTE
Patient Name: Mitzi Eric  : 1935    MRN: [id number]                              Today's Date: 12/3/2021       Admit Date: 2021    Visit Dx:     ICD-10-CM ICD-9-CM   1. Acute UTI  N39.0 599.0   2. Confusion  R41.0 298.9   3. Dysarthria  R47.1 784.51   4. Facial droop  R29.810 781.94   5. Renal insufficiency  N28.9 593.9   6. Leukocytosis, unspecified type  D72.829 288.60   7. Aphasia  R47.01 784.3     Patient Active Problem List   Diagnosis   • Fall   • Delirium, acute   • Acute UTI (urinary tract infection)   • HTN (hypertension)   • JUAN F (acute kidney injury) (HCC)     Past Medical History:   Diagnosis Date   • A-fib (HCC)    • Ataxic gait    • Chronic kidney disease, stage 3 (HCC)    • COPD (chronic obstructive pulmonary disease) (HCC)    • Dementia (HCC)    • Disease of thyroid gland    • Hyperlipidemia    • Hypertension      Past Surgical History:   Procedure Laterality Date   • BRAIN TUMOR EXCISION      BENIGN   • FRACTURE SURGERY     • TRIGGER FINGER RELEASE        General Information     Row Name 21 1627          OT Time and Intention    Document Type therapy note (daily note)  -     Mode of Treatment occupational therapy  -     Row Name 21 [facility]          General Information    Patient Profile Reviewed yes  -LC     Prior Level of Function --  See IE  -LC     Existing Precautions/Restrictions fall  -     Barriers to Rehab previous functional deficit; cognitive status  -     Row Name 21 1627          Cognition    Orientation Status (Cognition) oriented to; person; disoriented to; place; situation; time  -     Row Name 21 1627          Safety Issues, Functional Mobility    Safety Issues Affecting Function (Mobility) awareness of need for assistance; insight into deficits/self-awareness; safety precaution awareness; safety precautions follow-through/compliance; positioning of assistive device; sequencing abilities  -     Impairments Affecting Function  (Mobility) balance; endurance/activity tolerance; strength; coordination; motor planning  -     Cognitive Impairments, Mobility Safety/Performance awareness, need for assistance; insight into deficits/self-awareness; judgment; problem-solving/reasoning; safety precaution awareness; safety precaution follow-through; sequencing abilities  -           User Key  (r) = Recorded By, (t) = Taken By, (c) = Cosigned By    Initials Name Provider Type     Humaira Pritchett OT Occupational Therapist                 Mobility/ADL's     Row Name 12/03/21 1637          Bed Mobility    Bed Mobility supine-sit; scooting/bridging  -     Scooting/Bridging [place] (Bed Mobility) minimum assist (75% patient effort); verbal cues; nonverbal cues (demo/gesture)  -     Supine-Sit [place] (Bed Mobility) minimum assist (75% patient effort); verbal cues; nonverbal cues (demo/gesture)  -     Assistive Device (Bed Mobility) bed rails; head of bed elevated  -     Comment (Bed Mobility) Increased time and effort to transition from supine to sit EOB.  -     Row Name 12/03/21 1637          Transfers    Transfers sit-stand transfer; toilet transfer; bed-chair transfer  -     Bed-Chair [place] (Transfers) minimum assist (75% patient effort); 2 person assist; verbal cues  -     Assistive Device (Bed-Chair Transfers) other (see comments)  BUE support  -     Sit-Stand [place] (Transfers) minimum assist (75% patient effort); 2 person assist; verbal cues  -     [place] Level (Toilet Transfer) minimum assist (75% patient effort); 2 person assist; verbal cues  -     Assistive Device (Toilet Transfer) commode, bedside without drop arms  -     Row Name 12/03/21 1637          Sit-Stand Transfer    Assistive Device (Sit-Stand Transfers) other (see comments)  BUE support  -     Row Name 12/03/21 1637          Activities of Daily Living    BADL Assessment/Intervention lower body dressing; grooming; toileting  -      Row Name 12/03/21 1637          Lower Body Dressing Assessment/Training    [place] Level (Lower Body Dressing) don; socks; maximum assist (25% patient effort)  -     Position (Lower Body Dressing) edge of bed sitting  -     Row Name 12/03/21 1637          Grooming Assessment/Training    [place] Level (Grooming) wash face, hands; hair care, combing/brushing; minimum assist (75% patient effort)  -     Position (Grooming) supported sitting  -     Comment (Grooming) Assist to comb hair, vc's to initiate and sequence tasks needed.  -     Row Name 12/03/21 1637          Toileting Assessment/Training    [place] Level (Toileting) adjust/manage clothing; perform perineal hygiene; dependent (less than 25% patient effort)  -     Assistive Devices (Toileting) commode, bedside without drop arms  -     Position (Toileting) supported sitting; supported standing  -     Comment (Toileting) Increased time needed to complete task  -           User Key  (r) = Recorded By, (t) = Taken By, (c) = Cosigned By    Initials Name Provider Type    Humaira Osei OT Occupational Therapist               Obj/Interventions     Row Name 12/03/21 1640          Balance    Balance Assessment sitting static balance; sitting dynamic balance; standing static balance; standing dynamic balance  -     Static Sitting Balance WFL; unsupported; sitting, edge of bed  -     Dynamic Sitting Balance mild impairment; unsupported; sitting, edge of bed  -     Static Standing Balance mild impairment; supported; standing  -     Dynamic Standing Balance supported; standing; moderate impairment  -     Balance Interventions sitting; sit to stand; standing; occupation based/functional task; weight shifting activity  -     Comment, Balance Min Assist needed to steady. in standing.  -           User Key  (r) = Recorded By, (t) = Taken By, (c) = Cosigned By    Initials Name Provider Type    Humaira Osei, OT  Occupational Therapist               Goals/Plan    No documentation.                Clinical Impression     Row Name 12/03/21 1641          Pain Assessment    Additional Documentation Pain Scale: Numbers Pre/Post-Treatment (Group)  -     Row Name 12/03/21 1641          Pain Scale: Numbers Pre/Post-Treatment    Pretreatment Pain Rating 0/10 - no pain  -     Posttreatment Pain Rating 0/10 - no pain  -     Row Name 12/03/21 1641          Plan of Care Review    Plan of Care Reviewed With patient  -     Progress no change  -     Outcome Summary Pt. completes bed mobility with Min A and T/Fs with Min A of 2. Required increased time and effort to complete bed mobility. VC's needed to initiate and sequence tasks. Pt. required Max A to don socks, Min A for grooming, and DEP for toileting. Continue to recommend SNF at discharge.  -     Row Name 12/03/21 1641          Therapy Plan Review/Discharge Plan (OT)    Anticipated Discharge Disposition (OT) skilled nursing facility  -     Row Name 12/03/21 1641          Vital Signs    Pre Systolic BP Rehab --  VSS  -     Pre Patient Position Supine  -     Intra Patient Position Standing  -     Post Patient Position Sitting  -     Row Name 12/03/21 1641          Positioning and Restraints    Pre-Treatment Position in bed  -     Post Treatment Position chair  -     In Chair notified nsg; reclined; call light within reach; encouraged to call for assist; exit alarm on; waffle cushion; legs elevated  -           User Key  (r) = Recorded By, (t) = Taken By, (c) = Cosigned By    Initials Name Provider Type     Humaira Pritchett, REBECCA Occupational Therapist               Outcome Measures     Row Name 12/03/21 1643          How much help from another is currently needed...    Putting on and taking off regular lower body clothing? 2  -LC     Bathing (including washing, rinsing, and drying) 2  -LC     Toileting (which includes using toilet bed pan or urinal) 2  -LC      Putting on and taking off regular upper body clothing 3  -     Taking care of personal grooming (such as brushing teeth) 3  -     Eating meals 3  -     AM-PAC 6 Clicks Score (OT) 15  -     Row Name 12/03/21 1643          Functional Assessment    Outcome Measure Options AM-PAC 6 Clicks Daily Activity (OT)  -           User Key  (r) = Recorded By, (t) = Taken By, (c) = Cosigned By    Initials Name Provider Type     Humaira Pritchett OT Occupational Therapist                Occupational Therapy Education                 Title: PT OT SLP Therapies (In Progress)     Topic: Occupational Therapy (In Progress)     Point: ADL training (Done)     Description:   Instruct learner(s) on proper safety adaptation and remediation techniques during self care or transfers.   Instruct in proper use of assistive devices.              Learning Progress Summary           Patient Acceptance, E, VU,NR by  at 12/3/2021 1503    Acceptance, E,D, NR,NL by  at 11/30/2021 0942    Comment: OT role, safety awareness                   Point: Home exercise program (In Progress)     Description:   Instruct learner(s) on appropriate technique for monitoring, assisting and/or progressing therapeutic exercises/activities.              Learning Progress Summary           Patient Acceptance, E,D, NR,NL by  at 11/30/2021 0942    Comment: OT role, safety awareness                   Point: Precautions (Done)     Description:   Instruct learner(s) on prescribed precautions during self-care and functional transfers.              Learning Progress Summary           Patient Acceptance, E, VU,NR by  at 12/3/2021 1503    Acceptance, E,D, NR,NL by  at 11/30/2021 0942    Comment: OT role, safety awareness                   Point: Body mechanics (Done)     Description:   Instruct learner(s) on proper positioning and spine alignment during self-care, functional mobility activities and/or exercises.              Learning Progress Summary            Patient Acceptance, E, VU,NR by  at 12/3/2021 1503    Acceptance, E,D, NR,NL by  at 11/30/2021 0942    Comment: OT role, safety awareness                               User Key     Initials Effective Dates Name Provider Type Discipline     06/16/21 -  Humaira Pritchett OT Occupational Therapist OT    CS 06/16/21 -  Jesse Carpenter OT Occupational Therapist OT              OT Recommendation and Plan     Plan of Care Review  Plan of Care Reviewed With: patient  Progress: no change  Outcome Summary: Pt. completes bed mobility with Min A and T/Fs with Min A of 2. Required increased time and effort to complete bed mobility. VC's needed to initiate and sequence tasks. Pt. required Max A to don socks, Min A for grooming, and DEP for toileting. Continue to recommend SNF at discharge.     Time Calculation:    Time Calculation- OT     Row Name 12/03/21 1644             Time Calculation- OT    OT Start Time 1503  -      OT Received On 12/03/21  -      OT Goal Re-Cert Due Date 12/10/21  -              Timed Charges    20743 - OT Self Care/Mgmt Minutes 25  -              Total Minutes    Timed Charges Total Minutes 25  -       Total Minutes 25  -            User Key  (r) = Recorded By, (t) = Taken By, (c) = Cosigned By    Initials Name Provider Type     Humaira Pritchett OT Occupational Therapist              Therapy Charges for Today     Code Description Service Date Service Provider Modifiers Qty    77880388861 HC OT SELF CARE/MGMT/TRAIN EA 15 MIN 12/3/2021 Humaira Pritchett OT GO 2    [id number]  OT THER SUPP EA 15 MIN 12/3/2021 Humaira Pritchett OT GO 2               Humaira Pritchett OT  12/3/2021

## 2021-12-03 NOTE — NURSING NOTE
Woc follow up for miguel a scale < or = to 14    Current Miguel A Score: 14    Skin issues: No skin issues yes, RN states patient has not been mobile and probably benefit from specialty bed.    Specialty bed ordered: ELLEN bed ordered.    Pressure Injury Protocol (initiate for Miguel A Score of 18 or less):   *Maintain good skin care, keep dry, turn q 2 hr, keep heels elevated and offloaded with heel boots.    *Apply z-guard to sacrococcygeal area/ perineal area BID or daily and PRN per incontinent episodes.  *Follow C.A.R.E protocol if medical devices (Bipap, lewis, Ng tube, etc) are being used.     Please contact [facility] with any new questions or concerns.    Dragon disclaimer:  This note was entered via electronic voice recognition/ transcription prorgram. The electronic translation of spoken language may permit erroneous, or at times, nonsensical words or phrases to be inadvertently transcribed; Although I have reviewed the note for such errors, some may still exist.

## 2021-12-03 NOTE — PROGRESS NOTES
[facility] Medicine Services  PROGRESS NOTE    Patient Name: Mitzi Eric  : 1935  MRN: [id number]    Date of Admission: 2021  Primary Care Physician: Dm Cade APRN    Subjective   Subjective     CC:  Altered mental status, UTI    HPI:  Patient was less talkative today.  Her nephew is at bedside.  States that she had a more calm night but did not seem to sleep very well.  States that she is less talkative today.  She has had increasing cough specifically at night. Patient denies any concerns     ROS:  Gen- No fevers, chills  CV- No chest pain, palpitations  Resp- + cough, dyspnea  GI- No N/V/D, abd pain      Objective   Objective     Vital Signs:   Temp:  [97.8 °F (36.6 °C)-98.8 °F (37.1 °C)] 98.2 °F (36.8 °C)  Heart Rate:  [] 99  Resp:  [18] 18  BP: (141-168)/(92-97) 141/92     Physical Exam:  Constitutional: No acute distress, awake, alert; frail   HENT: NCAT, mucous membranes moist  Respiratory: no wheeze, crackles, rhonchi + cough   Cardiovascular: RRR, no murmurs, rubs, or gallops  Gastrointestinal: Positive bowel sounds, soft, nontender, nondistended  Musculoskeletal: No bilateral ankle edema  Psychiatric: Appropriate affect, cooperative  Neurologic: Oriented x 1, strength symmetric in all extremities, Cranial Nerves grossly intact to confrontation, speech clear  Skin: No rashes      Results Reviewed:  LAB RESULTS:      Lab 21  0953 21  0657 21  0839 21  1123 21  1115 21  1111 21  1110   WBC 5.58 6.26 7.82  --  14.60*  --   --    HEMOGLOBIN 12.3 11.9* 11.2*  --  11.9*  --   --    HEMOGLOBIN, POC  --   --   --   --   --   --  11.2*   HEMATOCRIT 36.7 36.2 33.6*  --  35.6  --   --    HEMATOCRIT POC  --   --   --   --   --   --  33*   PLATELETS 112* 130* 112*  --  136*  --   --    NEUTROS ABS 3.56 3.75 5.58  --  11.60*  --   --    IMMATURE GRANS (ABS) 0.04 0.02 0.02  --  0.06*  --   --    LYMPHS ABS 0.84 1.46 1.30   --  1.74  --   --    MONOS ABS 1.09* 0.83 0.74  --  1.07*  --   --    EOS ABS 0.01 0.17 0.15  --  0.08  --   --    MCV 96.1 97.1* 96.3  --  95.7  --   --    PROCALCITONIN  --   --   --   --  2.46*  --   --    LACTATE  --   --   --   --  1.4  --   --    PROTIME  --   --   --   --   --  15.2  --    APTT  --   --   --  38.1  --   --   --          Lab 12/02/21  0953 12/01/21  0657 11/30/21  0839 11/29/21  1115 11/29/21  1108   SODIUM 140 140 142 141  --    POTASSIUM 3.6 3.2* 3.7 4.1  --    CHLORIDE 107 104 107 103  --    CO2 20.0* 21.0* 23.0 28.0  --    ANION GAP 13.0 15.0 12.0 10.0  --    BUN 8 12 17 29*  --    CREATININE 0.62 0.81 0.93 1.52* 1.60*   GLUCOSE 97 82 82 86  --    CALCIUM 8.8 8.6 8.3* 8.8  --    MAGNESIUM  --   --   --  2.3  --    HEMOGLOBIN A1C  --   --  5.50  --   --    TSH  --   --   --  1.240  --          Lab 11/29/21  1115   TOTAL PROTEIN 6.3   ALBUMIN 4.00   GLOBULIN 2.3   ALT (SGPT) 10   AST (SGOT) 20   BILIRUBIN 0.4   ALK PHOS 77         Lab 11/29/21  1115 11/29/21  1111   TROPONIN T <0.010  --    PROTIME  --  15.2   INR  --  1.3*         Lab 11/30/21  0839   CHOLESTEROL 158   LDL CHOL 97   HDL CHOL 40   TRIGLYCERIDES 118             Lab 11/29/21  1110   PH, ARTERIAL 7.40     Brief Urine Lab Results  (Last result in the past 365 days)      Color   Clarity   Blood   Leuk Est   Nitrite   Protein   CREAT   Urine HCG        11/29/21 1200 Yellow   Cloudy   Trace   Small (1+)   Positive   30 mg/dL (1+)                 Microbiology Results Abnormal     Procedure Component Value - Date/Time    Blood Culture - Blood, Arm, Right [[id number]]  (Normal) Collected: 11/29/21 1330    Lab Status: Preliminary result Specimen: Blood from Arm, Right Updated: 12/02/21 1530     Blood Culture No growth at 3 days    Blood Culture - Blood, Arm, Right [[id number]]  (Normal) Collected: 11/29/21 1320    Lab Status: Preliminary result Specimen: Blood from Arm, Right Updated: 12/02/21 1530     Blood Culture No growth at 3 days     COVID-19 and FLU A/B PCR - Swab, Nasopharynx [[id number]]  (Normal) Collected: 11/29/21 1131    Lab Status: Final result Specimen: Swab from Nasopharynx Updated: 11/29/21 1213     COVID19 Not Detected     Influenza A PCR Not Detected     Influenza B PCR Not Detected    Narrative:      Fact sheet for providers: https://www.fda.gov/media/700945/download    Fact sheet for patients: https://www.fda.gov/media/425305/download    Test performed by PCR.          No radiology results from the last 24 hrs    Results for orders placed during the hospital encounter of 11/29/21    Adult Transthoracic Echo Complete W/ Cont if Necessary Per Protocol (With Agitated Saline)    Interpretation Summary  · Left ventricular ejection fraction appears to be 61 - 65%. Left ventricular systolic function is normal.  · Saline test results are negative.  · Estimated right ventricular systolic pressure from tricuspid regurgitation is markedly elevated (>55 mmHg).  · Moderate tricuspid valve regurgitation is present.  · Left ventricular diastolic function was normal.      I have reviewed the medications:  Scheduled Meds:aspirin, 81 mg, Oral, Daily   Or  aspirin, 300 mg, Rectal, Daily  atorvastatin, 80 mg, Oral, Nightly  budesonide-formoterol, 2 puff, Inhalation, BID - RT  busPIRone, 7.5 mg, Oral, Q12H  carBAMazepine XR, 200 mg, Oral, Q12H  cefTRIAXone, 1 g, Intravenous, Q24H  cetirizine, 5 mg, Oral, Daily  docusate sodium, 100 mg, Oral, BID  folic acid, 400 mcg, Oral, BID  heparin (porcine), 5,000 Units, Subcutaneous, Q8H  levothyroxine, 25 mcg, Oral, Q AM  metoprolol tartrate, 12.5 mg, Oral, Q12H  mirtazapine, 15 mg, Oral, Nightly  pantoprazole, 40 mg, Oral, QAM AC  pregabalin, 25 mg, Oral, Q12H  sodium chloride, 10 mL, Intravenous, Q12H  sodium chloride, 10 mL, Intravenous, Q12H  traZODone, 50 mg, Oral, Nightly      Continuous Infusions:   PRN Meds:.•  acetaminophen **OR** acetaminophen **OR** acetaminophen  •  LORazepam  •  ondansetron  **OR** ondansetron  •  potassium chloride **OR** potassium chloride **OR** potassium chloride  •  QUEtiapine  •  sodium chloride  •  sodium chloride  •  sodium chloride    Assessment/Plan   Assessment & Plan     Active Hospital Problems    Diagnosis  POA   • Delirium, acute [R41.0]  Yes   • Acute UTI (urinary tract infection) [N39.0]  Yes   • HTN (hypertension) [I10]  Yes   • JUAN F (acute kidney injury) (formerly [facility]) [N17.9]  Yes   • Fall [W19.XXXA]  Yes      Resolved Hospital Problems    Diagnosis Date Resolved POA   • CVA (cerebral vascular accident) (HCC) [I63.9] 11/29/2021 Yes        Brief Hospital Course to date:  Mitzi Eric is a 86 y.o. female with history of dementia, hypothyroidism, prior craniotomy secondary to possible brain mass, hypertension, atrial fibrillation not on anticoagulation, COPD, lung mass, and frequent UTIs who presented from [facility] with altered mental status, a fall and tremors found to have an UTI.     Plan:     UTI  --on admission, WBC count 14,Procal 2.46  -- UA with positive nitrates, small leukocyte esterase, 21-30 white blood cells and 4+ bacteria  -- continue Rocephin D5 of 7  -- UCx >100 Klebsiella resistant to Ampicillin, will transition to PO on discharge      Delirium  -- likely secondary to UTI, MRI brain without acute changes  -- patient on multiple sedating meds, which may contribute to AMS in setting of JUAN F  -- Tegretol: level was high on admission. Resume lower dose at 200 mg BID   -- Buspar: resumed   -- Lyrica: lowered dose and frequency  -- Remeron: continue HS dose  -- Trazodone: continue HS dose  -- Continue ativan PRN but change to PO  - Trial seroquel 12.5 mg qHS PRN; schedule melatonin   - Repeat EKG with acceptable QTc     Cough   - CXR with no acute findings  - Start duo-nebs; flonase; AM labs      Elevated Creatinine  Volume depletion  -- Cr initially 1.60, baseline of  0.8  -- Cr improved; monitor off fluids      Dementia     Fall  - plain films without  fracture  - PT/OT/fall precautions     Lung mass  - right apex. Noted on CT chest back in 2018 too. ED notes at the time mention prior biopsies x 2.  - my partner discussed with nephew, They are aware of this and stated that, during prior workup, they had all decided on no further workup/treatment     HTN  --monitor BP with starting metoprolol -- if remains elevated, resume amlodipine but currently acceptable BP      Hypothyroid  --TSH wnl, continue home dose Synthroid     H/o Afib  -- not on AC, likely due to fall risk  -- not on any rate controlling meds, and issue with SVT overnight 12/1 and metoprolol 12.5 mg BID started      Hypokalemia  --replace per protocol     Trigeminal neuralgia   - Resumed tegretol     DVT prophylaxis:  Medical and mechanical DVT prophylaxis orders are present.       AM-PAC 6 Clicks Score (PT): 17 (12/01/21 1644)    Disposition: I expect the patient to be discharged likely back to her assisted living vs SNF in 1-2 days    CODE STATUS:   Code Status and Medical Interventions:   Ordered at: 11/29/21 1528     Level Of Support Discussed With:    Next of Kin (If No Surrogate)     Code Status (Patient has no pulse and is not breathing):    CPR (Attempt to Resuscitate)     Medical Interventions (Patient has pulse or is breathing):    Full Support       Anjali Williamson DO  12/03/21

## 2021-12-03 NOTE — CASE MANAGEMENT/SOCIAL WORK
Case Management Discharge Note    Final Note:     I anticipate Ms. Eric to be medically ready for discharge over the weekend. I spoke to Marie with [facility] who advises Ms. Eric can return there at the time of discharge. I spoke to Ms. Eric's nephew/POBONY Sherman over the phone today, he is agreeable to this discharge plan.     Case management has spoken with [facility] and advised of Ms. Eric's potential discharge this weekend. The resume therapy order has been faxed. Lane will trransport Ms. Eric back to [facility] by car at the time of discharge.       Nursing to call report # [phone].     Case management will fax the discharge summary to # [phone].       Please call the weekend  at [phone] for any discharge needs.             Home Medical Care Coordination complete.    Service Provider Selected Services Address Phone Fax Patient Preferred    [facility] - [place]  Home [facility] Services [place] [address], [facility] [address] [phone] --                  Transportation Services  Private: Car    Final Discharge Disposition Code: 06 - home with home health care

## 2021-12-03 NOTE — PLAN OF CARE
Goal Outcome Evaluation:  Plan of Care Reviewed With: patient        Progress: no change  Outcome Summary: Pt. completes bed mobility with Min A and T/Fs with Min A of 2. Required increased time and effort to complete bed mobility. VC's needed to initiate and sequence tasks. Pt. required Max A to don socks, Min A for grooming, and DEP for toileting. Continue to recommend SNF at discharge.

## 2021-12-03 NOTE — DISCHARGE PLACEMENT REQUEST
"Janet ORELLANA, RN,   [phone]      Mitzi Zapata (86 y.o. Female)             Date of Birth Social Security Number Address Home Phone MRN    1935  [address]  [address]  [place] [address] [phone] [id number]    Zoroastrianism Marital Status             Caodaism Single       Admission Date Admission Type Admitting Provider Attending Provider Department, Room/Bed    21 Emergency Anjali Williamson DO Roesch, Lyndsey, DO [facility] 3F, S302/1    Discharge Date Discharge Disposition Discharge Destination                         Attending Provider: Anjali Williamson DO    Allergies: Bee Pollen    Isolation: None   Infection: None   Code Status: CPR   Advance Care Planning Activity    Ht: 157.5 cm (62\")   Wt: 50.8 kg (112 lb)    Admission Cmt: None   Principal Problem: None                Active Insurance as of 2021     Primary Coverage     Payor Plan Insurance Group Employer/Plan Group    HUMANA MEDICARE REPLACEMENT HUMANA MEDICARE REPLACEMENT [id number]     Payor Plan Address Payor Plan Phone Number Payor Plan Fax Number Effective Dates    [address] [address] [phone]  2018 - None Entered    [facility] [address]       Subscriber Name Subscriber Birth Date Member ID       MITZI ZAPATA 1935 [id number]                 Emergency Contacts      (Rel.) Home Phone Work Phone Mobile Phone    [employer] (Power of ) [phone] -- --          [address] [address]  Phone:  [phone]  Fax:  [phone]        Patient:     Mitzi Zapata MRN:  [id number]   [address]  [address]  [place] [address] :  1935  SSN:    Phone: [phone] Sex:  F      INSURANCE PAYOR PLAN GROUP # SUBSCRIBER ID   Primary:    HUMANA MEDICARE REPLACEMENT [id number]   Admitting Diagnosis: CVA (cerebral vascular accident) (HCC) [I63.9]  Order Date:  Dec 3, 2021       "     [facility]       (Order ID: [id number])     Diagnosis:         Priority:  Routine Expected Date:   Expiration Date:        Interval:  Until Discontinued Count:          Specimen Type:   Specimen Source:   Specimen Taken Date:   Specimen Taken Time:                     Authorizing Provider:Anjali Williamson DO  Authorizing Provider's NPI: [id number]  Order Entered By: Janet Cooper RN 12/3/2021  4:02 PM     Electronically signed by: Anjali Williamson DO 12/3/2021  4:02 PM

## 2021-12-04 LAB
ANION GAP SERPL CALCULATED.3IONS-SCNC: 14 MMOL/L (ref 5–15)
BACTERIA SPEC AEROBE CULT: NORMAL
BACTERIA SPEC AEROBE CULT: NORMAL
BUN SERPL-MCNC: 25 MG/DL (ref 8–23)
BUN/CREAT SERPL: 24.3 (ref 7–25)
CALCIUM SPEC-SCNC: 8.8 MG/DL (ref 8.6–10.5)
CHLORIDE SERPL-SCNC: 110 MMOL/L (ref 98–107)
CO2 SERPL-SCNC: 22 MMOL/L (ref 22–29)
CREAT SERPL-MCNC: 1.03 MG/DL (ref 0.57–1)
DEPRECATED RDW RBC AUTO: 43.5 FL (ref 37–54)
ERYTHROCYTE [DISTWIDTH] IN BLOOD BY AUTOMATED COUNT: 12.4 % (ref 12.3–15.4)
GFR SERPL CREATININE-BSD FRML MDRD: 51 ML/MIN/1.73
GLUCOSE SERPL-MCNC: 119 MG/DL (ref 65–99)
HCT VFR BLD AUTO: 38.1 % (ref 34–46.6)
HGB BLD-MCNC: 12.6 G/DL (ref 12–15.9)
MCH RBC QN AUTO: 31.7 PG (ref 26.6–33)
MCHC RBC AUTO-ENTMCNC: 33.1 G/DL (ref 31.5–35.7)
MCV RBC AUTO: 96 FL (ref 79–97)
PLATELET # BLD AUTO: 128 10*3/MM3 (ref 140–450)
PMV BLD AUTO: 12.9 FL (ref 6–12)
POTASSIUM SERPL-SCNC: 3.3 MMOL/L (ref 3.5–5.2)
RBC # BLD AUTO: 3.97 10*6/MM3 (ref 3.77–5.28)
SODIUM SERPL-SCNC: 146 MMOL/L (ref 136–145)
WBC NRBC COR # BLD: 7.72 10*3/MM3 (ref 3.4–10.8)

## 2021-12-04 PROCEDURE — 94799 UNLISTED PULMONARY SVC/PX: CPT

## 2021-12-04 PROCEDURE — 80048 BASIC METABOLIC PNL TOTAL CA: CPT | Performed by: INTERNAL MEDICINE

## 2021-12-04 PROCEDURE — 25010000002 CEFTRIAXONE PER 250 MG: Performed by: INTERNAL MEDICINE

## 2021-12-04 PROCEDURE — 25010000002 HEPARIN (PORCINE) PER 1000 UNITS: Performed by: INTERNAL MEDICINE

## 2021-12-04 PROCEDURE — 97530 THERAPEUTIC ACTIVITIES: CPT

## 2021-12-04 PROCEDURE — 85027 COMPLETE CBC AUTOMATED: CPT | Performed by: INTERNAL MEDICINE

## 2021-12-04 PROCEDURE — 99233 SBSQ HOSP IP/OBS HIGH 50: CPT | Performed by: INTERNAL MEDICINE

## 2021-12-04 PROCEDURE — 97116 GAIT TRAINING THERAPY: CPT

## 2021-12-04 RX ORDER — TRAZODONE HYDROCHLORIDE 50 MG/1
25 TABLET ORAL NIGHTLY
Status: DISCONTINUED | OUTPATIENT
Start: 2021-12-04 | End: 2021-12-06 | Stop reason: HOSPADM

## 2021-12-04 RX ORDER — SODIUM CHLORIDE 450 MG/100ML
50 INJECTION, SOLUTION INTRAVENOUS CONTINUOUS
Status: DISCONTINUED | OUTPATIENT
Start: 2021-12-04 | End: 2021-12-05

## 2021-12-04 RX ADMIN — PREGABALIN 25 MG: 25 CAPSULE ORAL at 12:34

## 2021-12-04 RX ADMIN — METOPROLOL TARTRATE 12.5 MG: 25 TABLET, FILM COATED ORAL at 21:16

## 2021-12-04 RX ADMIN — HEPARIN SODIUM 5000 UNITS: 5000 INJECTION INTRAVENOUS; SUBCUTANEOUS at 15:55

## 2021-12-04 RX ADMIN — IPRATROPIUM BROMIDE AND ALBUTEROL SULFATE 3 ML: 2.5; .5 SOLUTION RESPIRATORY (INHALATION) at 16:17

## 2021-12-04 RX ADMIN — SODIUM CHLORIDE 1 G: 900 INJECTION INTRAVENOUS at 12:26

## 2021-12-04 RX ADMIN — IPRATROPIUM BROMIDE AND ALBUTEROL SULFATE 3 ML: 2.5; .5 SOLUTION RESPIRATORY (INHALATION) at 19:17

## 2021-12-04 RX ADMIN — DOCUSATE SODIUM 100 MG: 100 CAPSULE, LIQUID FILLED ORAL at 21:16

## 2021-12-04 RX ADMIN — IPRATROPIUM BROMIDE AND ALBUTEROL SULFATE 3 ML: 2.5; .5 SOLUTION RESPIRATORY (INHALATION) at 13:06

## 2021-12-04 RX ADMIN — HEPARIN SODIUM 5000 UNITS: 5000 INJECTION INTRAVENOUS; SUBCUTANEOUS at 06:11

## 2021-12-04 RX ADMIN — ATORVASTATIN CALCIUM 80 MG: 40 TABLET, FILM COATED ORAL at 21:15

## 2021-12-04 RX ADMIN — BUDESONIDE AND FORMOTEROL FUMARATE DIHYDRATE 2 PUFF: 160; 4.5 AEROSOL RESPIRATORY (INHALATION) at 19:19

## 2021-12-04 RX ADMIN — PREGABALIN 25 MG: 25 CAPSULE ORAL at 21:15

## 2021-12-04 RX ADMIN — BUDESONIDE AND FORMOTEROL FUMARATE DIHYDRATE 2 PUFF: 160; 4.5 AEROSOL RESPIRATORY (INHALATION) at 07:50

## 2021-12-04 RX ADMIN — SODIUM CHLORIDE, PRESERVATIVE FREE 10 ML: 5 INJECTION INTRAVENOUS at 21:15

## 2021-12-04 RX ADMIN — LEVOTHYROXINE SODIUM 25 MCG: 25 TABLET ORAL at 06:12

## 2021-12-04 RX ADMIN — SODIUM CHLORIDE 50 ML/HR: 4.5 INJECTION, SOLUTION INTRAVENOUS at 09:54

## 2021-12-04 RX ADMIN — FOLIC ACID TAB 400 MCG 400 MCG: 400 TAB at 21:15

## 2021-12-04 RX ADMIN — HEPARIN SODIUM 5000 UNITS: 5000 INJECTION INTRAVENOUS; SUBCUTANEOUS at 21:19

## 2021-12-04 RX ADMIN — IPRATROPIUM BROMIDE AND ALBUTEROL SULFATE 3 ML: 2.5; .5 SOLUTION RESPIRATORY (INHALATION) at 07:50

## 2021-12-04 RX ADMIN — TRAZODONE HYDROCHLORIDE 25 MG: 50 TABLET ORAL at 21:15

## 2021-12-04 NOTE — PROGRESS NOTES
[facility] Medicine Services  PROGRESS NOTE    Patient Name: Mitzi Eric  : 1935  MRN: [id number]    Date of Admission: 2021  Primary Care Physician: Dm Cade APRN    Subjective   Subjective     CC:  AMS     HPI:  Nephew bedside.  States that patient slept okay last night but very lethargic today.  She will not wake up and interact with him.  Very poor p.o. intake.  Reviewed CODE STATUS with nephew who is her POA and agreeable to DNR/DNI.  Also reviewed medication changes and he was agreeable.  Agreeable to palliative care consult.  Patient will intermittently open her eyes but no verbal response.  She does follow commands.    ROS:  Unable to obtain due to mental status    Objective   Objective     Vital Signs:   Temp:  [98.3 °F (36.8 °C)-98.9 °F (37.2 °C)] 98.3 °F (36.8 °C)  Heart Rate:  [] 97  Resp:  [16-18] 18  BP: (104-135)/(59-94) 104/59  Flow (L/min):  [2] 2     Physical Exam:  Constitutional: lethargic, frail   HENT: NCAT, mucous membranes dry  Respiratory: Clear to auscultation bilaterally, respiratory effort normal   Cardiovascular: RRR, no murmurs, rubs, or gallops  Gastrointestinal: Positive bowel sounds, soft, nontender, nondistended  Musculoskeletal: No bilateral ankle edema  Psychiatric: Difficult to assess due to lethargy  Neurologic:strength symmetric in all extremities, Cranial Nerves grossly intact to confrontation, will not answer questions today  Skin: No rashes    Results Reviewed:  LAB RESULTS:      Lab 21  0457 21  0953 21  0657 21  0839 21  1123 21  1115 21  1111   WBC 7.72 5.58 6.26 7.82  --  14.60*  --    HEMOGLOBIN 12.6 12.3 11.9* 11.2*  --  11.9*  --    HEMATOCRIT 38.1 36.7 36.2 33.6*  --  35.6  --    PLATELETS 128* 112* 130* 112*  --  136*  --    NEUTROS ABS  --  3.56 3.75 5.58  --  11.60*  --    IMMATURE GRANS (ABS)  --  0.04 0.02 0.02  --  0.06*  --    LYMPHS ABS  --  0.84 1.46 1.30   --  1.74  --    MONOS ABS  --  1.09* 0.83 0.74  --  1.07*  --    EOS ABS  --  0.01 0.17 0.15  --  0.08  --    MCV 96.0 96.1 97.1* 96.3  --  95.7  --    PROCALCITONIN  --   --   --   --   --  2.46*  --    LACTATE  --   --   --   --   --  1.4  --    PROTIME  --   --   --   --   --   --  15.2   APTT  --   --   --   --  38.1  --   --          Lab 12/04/21  0457 12/02/21  0953 12/01/21  0657 11/30/21  0839 11/29/21  1115   SODIUM 146* 140 140 142 141   POTASSIUM 3.3* 3.6 3.2* 3.7 4.1   CHLORIDE 110* 107 104 107 103   CO2 22.0 20.0* 21.0* 23.0 28.0   ANION GAP 14.0 13.0 15.0 12.0 10.0   BUN 25* 8 12 17 29*   CREATININE 1.03* 0.62 0.81 0.93 1.52*   GLUCOSE 119* 97 82 82 86   CALCIUM 8.8 8.8 8.6 8.3* 8.8   MAGNESIUM  --   --   --   --  2.3   HEMOGLOBIN A1C  --   --   --  5.50  --    TSH  --   --   --   --  1.240         Lab 11/29/21  1115   TOTAL PROTEIN 6.3   ALBUMIN 4.00   GLOBULIN 2.3   ALT (SGPT) 10   AST (SGOT) 20   BILIRUBIN 0.4   ALK PHOS 77         Lab 11/29/21  1115 11/29/21  1111   TROPONIN T <0.010  --    PROTIME  --  15.2   INR  --  1.3*         Lab 11/30/21  0839   CHOLESTEROL 158   LDL CHOL 97   HDL CHOL 40   TRIGLYCERIDES 118             Lab 11/29/21  1110   PH, ARTERIAL 7.40     Brief Urine Lab Results  (Last result in the past 365 days)      Color   Clarity   Blood   Leuk Est   Nitrite   Protein   CREAT   Urine HCG        11/29/21 1200 Yellow   Cloudy   Trace   Small (1+)   Positive   30 mg/dL (1+)                 Microbiology Results Abnormal     Procedure Component Value - Date/Time    Blood Culture - Blood, Arm, Right [[id number]]  (Normal) Collected: 11/29/21 1330    Lab Status: Preliminary result Specimen: Blood from Arm, Right Updated: 12/03/21 1531     Blood Culture No growth at 4 days    Blood Culture - Blood, Arm, Right [[id number]]  (Normal) Collected: 11/29/21 1320    Lab Status: Preliminary result Specimen: Blood from Arm, Right Updated: 12/03/21 1531     Blood Culture No growth at 4 days     COVID-19 and FLU A/B PCR - Swab, Nasopharynx [[id number]]  (Normal) Collected: 11/29/21 1131    Lab Status: Final result Specimen: Swab from Nasopharynx Updated: 11/29/21 1213     COVID19 Not Detected     Influenza A PCR Not Detected     Influenza B PCR Not Detected    Narrative:      Fact sheet for providers: https://www.fda.gov/media/411851/download    Fact sheet for patients: https://www.fda.gov/media/811933/download    Test performed by PCR.          XR Chest 1 View    Result Date: 12/3/2021  EXAMINATION: XR CHEST 1 VW-  INDICATION: Cough, hypoxia; N39.0-Urinary tract infection, site not specified; R41.0-Disorientation, unspecified; R47.1-Dysarthria and anarthria; R29.810-Facial weakness; N28.9-Disorder of kidney and ureter, unspecified; D72.829-Elevated white blood cell count, unspecified; R47.01-Aphasia.  COMPARISON: 11/29/2021  FINDINGS: Heart and vasculature appear normal in size. There are chronic changes of centrilobular emphysema, with hyperlucent upper lungs and coarsening of the basilar interstitial markings which appears similar to the prior study. Right apical lung mass, known since at least 2018, appear stable. No new pulmonary parenchymal disease, evidence of edema, effusion or pneumothorax is seen.      Impression: Stable right apical lung mass and chronic appearing changes of centrilobular emphysema. No clearly new chest pathology is identified.   D:  12/03/2021 E:  12/03/2021  This report was finalized on 12/3/2021 10:01 PM by Dr. Jorge Foote MD.        Results for orders placed during the hospital encounter of 11/29/21    Adult Transthoracic Echo Complete W/ Cont if Necessary Per Protocol (With Agitated Saline)    Interpretation Summary  · Left ventricular ejection fraction appears to be 61 - 65%. Left ventricular systolic function is normal.  · Saline test results are negative.  · Estimated right ventricular systolic pressure from tricuspid regurgitation is markedly elevated (>55 mmHg).  ·  Moderate tricuspid valve regurgitation is present.  · Left ventricular diastolic function was normal.      I have reviewed the medications:  Scheduled Meds:aspirin, 81 mg, Oral, Daily   Or  aspirin, 300 mg, Rectal, Daily  atorvastatin, 80 mg, Oral, Nightly  budesonide-formoterol, 2 puff, Inhalation, BID - RT  busPIRone, 7.5 mg, Oral, Q12H  carBAMazepine XR, 200 mg, Oral, Q12H  cefTRIAXone, 1 g, Intravenous, Q24H  cetirizine, 5 mg, Oral, Daily  docusate sodium, 100 mg, Oral, BID  fluticasone, 2 spray, Each Nare, Daily  folic acid, 400 mcg, Oral, BID  heparin (porcine), 5,000 Units, Subcutaneous, Q8H  ipratropium-albuterol, 3 mL, Nebulization, 4x Daily - RT  levothyroxine, 25 mcg, Oral, Q AM  melatonin, 5 mg, Oral, Nightly  metoprolol tartrate, 12.5 mg, Oral, Q12H  mirtazapine, 15 mg, Oral, Nightly  pantoprazole, 40 mg, Oral, QAM AC  pregabalin, 25 mg, Oral, Q12H  sodium chloride, 10 mL, Intravenous, Q12H  sodium chloride, 10 mL, Intravenous, Q12H  traZODone, 50 mg, Oral, Nightly      Continuous Infusions:sodium chloride, 50 mL/hr      PRN Meds:.•  acetaminophen **OR** acetaminophen **OR** acetaminophen  •  benzonatate  •  LORazepam  •  ondansetron **OR** ondansetron  •  potassium chloride **OR** potassium chloride **OR** potassium chloride  •  QUEtiapine  •  sodium chloride  •  sodium chloride  •  sodium chloride    Assessment/Plan   Assessment & Plan     Active Hospital Problems    Diagnosis  POA   • Delirium, acute [R41.0]  Yes   • Acute UTI (urinary tract infection) [N39.0]  Yes   • HTN (hypertension) [I10]  Yes   • JUAN F (acute kidney injury) (HCC) [N17.9]  Yes   • Fall [W19.XXXA]  Yes      Resolved Hospital Problems    Diagnosis Date Resolved POA   • CVA (cerebral vascular accident) (HCC) [I63.9] 11/29/2021 Yes        Brief Hospital Course to date:  Mitzi Eric is a 86 y.o. female with history of dementia, hypothyroidism, prior craniotomy secondary to possible brain mass, hypertension, atrial fibrillation not  on anticoagulation, COPD, lung mass, and frequent UTIs who presented from [facility] with altered mental status, a fall and tremors found to have an UTI.     Plan:     UTI  --on admission, WBC count 14,Procal 2.46  -- UA with positive nitrates, small leukocyte esterase, 21-30 white blood cells and 4+ bacteria  -- continue Rocephin D6 of 7  -- UCx >100 Klebsiella resistant to Ampicillin, will transition to PO on discharge      Delirium  Dementia  -- likely secondary to UTI, MRI brain without acute changes  -- patient on multiple sedating meds, which may contribute to AMS in setting of JUAN F  -- Tegretol: level was high on admission.  Attempted to resume 200 mg twice daily after discussing with her POA however more lethargic today.  We will discontinue  -- Buspar: Discontinued  -- Lyrica: lowered dose and frequency  -- Remeron: Discontinued  -- Trazodone: Decreased to 25 mg nightly  -- Continue ativan PRN but change to PO  Continue scheduled melatonin  - Patient did not receive Seroquel last night still very lethargic today.  -Reviewed her overall goals of care general decline with her nephew who is her POA.  He is agreeable to palliative care consult.    Cough - improved  - CXR with no acute findings  - Start duo-nebs; flonase; normal WBC      Elevated Creatinine  Volume depletion  Hypernatremia   -- Cr initially 1.60, baseline of  0.8  -- Cr improved but now trending up with hypernatremia -- started 1/2 NS and monitor      Fall  - plain films without fracture  - PT/OT/fall precautions     Lung mass  - right apex. Noted on CT chest back in 2018 too. ED notes at the time mention prior biopsies x 2.  - my partner discussed with nephew, They are aware of this and stated that, during prior workup, they had all decided on no further workup/treatment     HTN  --monitor BP with starting metoprolol; currently controlled and will continue to home amlodipine      Hypothyroid  --TSH wnl, continue home dose Synthroid     H/o  Afib  -- not on AC, likely due to fall risk  -- not on any rate controlling meds, and issue with SVT overnight 12/1 and metoprolol 12.5 mg BID started      Hypokalemia  --replace per protocol     Trigeminal neuralgia   -Holding Tegretol    More than 50% of time spent counseling on current illness and plan of care. Case discussed with: Nursing, patient, POA  Total time of the encounter was 40 minutes.           DVT prophylaxis:  Medical and mechanical DVT prophylaxis orders are present.       AM-PAC 6 Clicks Score (PT): 17 (12/01/21 0364)    Disposition: I expect the patient to be discharged TBD; possible in 1-3 days pending labs and mental status. Plan back to [facility]     CODE STATUS:   Code Status and Medical Interventions:   Ordered at: 11/29/21 6201     Level Of Support Discussed With:    Next of Kin (If No Surrogate)     Code Status (Patient has no pulse and is not breathing):    CPR (Attempt to Resuscitate)     Medical Interventions (Patient has pulse or is breathing):    Full Support       Anjali Williamson DO  12/04/21

## 2021-12-04 NOTE — PLAN OF CARE
Goal Outcome Evaluation:  Plan of Care Reviewed With: patient, family        Progress: declining  Outcome Summary: Pt lethargic with low arousal and decreased participation requiring increased assist for bed mobility and transfers.  Pt required maxA to transfer from side lying to sitting EOB, CGA-maxAx1 to sit EOB, and mod-maxAx2 to step for EOB to chair.  Max cues for hand placement on armrest and to step and turn toward chair.  Pt lethargic with forward flexed posture and poor safety awareness throughout.  Family would like pt to return to assisted living facility at her [facility], but stated that she needs more assist and rehab before she can return.  Pt will need continued skilled inpatient PT with rehab at SNF upon D/C.

## 2021-12-04 NOTE — THERAPY TREATMENT NOTE
Patient Name: Mitzi Eric  : 1935    MRN: [id number]                              Today's Date: 2021       Admit Date: 2021    Visit Dx:     ICD-10-CM ICD-9-CM   1. Acute UTI  N39.0 599.0   2. Confusion  R41.0 298.9   3. Dysarthria  R47.1 784.51   4. Facial droop  R29.810 781.94   5. Renal insufficiency  N28.9 593.9   6. Leukocytosis, unspecified type  D72.829 288.60   7. Aphasia  R47.01 784.3     Patient Active Problem List   Diagnosis   • Fall   • Delirium, acute   • Acute UTI (urinary tract infection)   • HTN (hypertension)   • JUAN F (acute kidney injury) (HCC)     Past Medical History:   Diagnosis Date   • A-fib (HCC)    • Ataxic gait    • Chronic kidney disease, stage 3 (HCC)    • COPD (chronic obstructive pulmonary disease) (HCC)    • Dementia (HCC)    • Disease of thyroid gland    • Hyperlipidemia    • Hypertension      Past Surgical History:   Procedure Laterality Date   • BRAIN TUMOR EXCISION      BENIGN   • FRACTURE SURGERY     • TRIGGER FINGER RELEASE        General Information     Row Name 21          Physical Therapy Time and Intention    Document Type therapy note (daily note)  -     Mode of Treatment physical therapy  -     Row Name 21          General Information    Patient Profile Reviewed yes  -     Existing Precautions/Restrictions fall; oxygen therapy device and L/min; other (see comments)  h/o Dementia  -     Barriers to Rehab previous functional deficit; cognitive status  -     Row Name 21          Living Environment    Lives With facility resident  -     Row Name 21          Cognition    Orientation Status (Cognition) unable/difficult to assess; other (see comments)  Pt unable to answer orientation questions, but responded to her name and appears to be oriented to self.  -     Row Name 21          Safety Issues, Functional Mobility    Safety Issues Affecting Function (Mobility) at risk behavior observed;  ability to follow commands; awareness of need for assistance; insight into deficits/self-awareness; safety precaution awareness; judgment; problem-solving; safety precautions follow-through/compliance; positioning of assistive device; sequencing abilities; impulsivity; friction/shear risk  -     Impairments Affecting Function (Mobility) cognition; endurance/activity tolerance; strength; postural/trunk control; balance; coordination; motor control; motor planning; grasp  -     Cognitive Impairments, Mobility Safety/Performance attention; awareness, need for assistance; insight into deficits/self-awareness; judgment; problem-solving/reasoning; safety precaution awareness; safety precaution follow-through; sequencing abilities; impulsivity  -           User Key  (r) = Recorded By, (t) = Taken By, (c) = Cosigned By    Initials Name Provider Type     Akilah Brannon PT Physical Therapist               Mobility     Row Name 12/04/21 0942          Bed Mobility    Bed Mobility supine-sit  -     Scooting/Bridging [place] (Bed Mobility) maximum assist (25% patient effort); 1 person assist; verbal cues; nonverbal cues (demo/gesture)  -     Supine-Sit [place] (Bed Mobility) moderate assist (50% patient effort); 1 person assist; verbal cues; nonverbal cues (demo/gesture)  -     Assistive Device (Bed Mobility) bed rails; head of bed elevated; draw sheet  -     Comment (Bed Mobility) Pt lethargic with low arousal and decreased participation requiring increased assist for bed mobility and transfers.  -     Row Name 12/04/21 0942          Transfers    Comment (Transfers) Pt preformed sit<>stand multiple times, but unable to stand without forward flexed posture and legs supported by the chair/bed.  -     Row Name 12/04/21 0942          Bed-Chair Transfer    Bed-Chair [place] (Transfers) moderate assist (50% patient effort); maximum assist (25% patient effort); 2 person assist; verbal cues;  nonverbal cues (demo/gesture)  -LF     Assistive Device (Bed-Chair Transfers) other (see comments)  Rodrick UE support  -LF     Row Name 12/04/21 0942          Sit-Stand Transfer    Sit-Stand [place] (Transfers) moderate assist (50% patient effort); 1 person assist; 2 person assist; verbal cues; nonverbal cues (demo/gesture)  -LF     Assistive Device (Sit-Stand Transfers) walker, front-wheeled  -LF     Row Name 12/04/21 0942          Gait/Stairs (Locomotion)    [place] Level (Gait) moderate assist (50% patient effort); 2 person assist; verbal cues; nonverbal cues (demo/gesture)  -LF     Assistive Device (Gait) other (see comments)  rodrick UE support  -LF     Distance in Feet (Gait) 2ft bed to chair.  -LF     Deviations/Abnormal Patterns (Gait) base of support, narrow; mik decreased; stride length decreased  -LF     Bilateral Gait Deviations forward flexed posture; heel strike decreased; weight shift ability decreased  -LF     Comment (Gait/Stairs) Pt required mod-maxAx2 to step for EOB to chair.  Max cues for hand placement on armrest and to step and turn toward chair.  Pt lethargic with forward flexed posture and poor safety awareness.  -LF           User Key  (r) = Recorded By, (t) = Taken By, (c) = Cosigned By    Initials Name Provider Type    LF Akilah Brannon, PT Physical Therapist               Obj/Interventions     Row Name 12/04/21 0950          Balance    Balance Assessment sitting static balance; sitting dynamic balance; standing static balance; standing dynamic balance  -LF     Static Sitting Balance moderate impairment; supported; sitting, edge of bed; sitting in chair  -     Dynamic Sitting Balance severe impairment; unsupported; sitting, edge of bed  -     Static Standing Balance severe impairment; supported; standing  -LF     Dynamic Standing Balance severe impairment; supported; standing  -LF     Balance Interventions sitting; standing; sit to stand; supported; static; dynamic; minimal  challenge; dynamic reaching; weight shifting activity  -     Comment, Balance Pt with low arousal, lateral lean, and unable to sit EOB for more than a few seconds without assist.  Pt also unable to stand without support.  Dependent for hygiene in standing.  -     Row Name 12/04/21 0950          Sensory Assessment (Somatosensory)    Sensory Assessment (Somatosensory) unable/difficult to assess  -           User Key  (r) = Recorded By, (t) = Taken By, (c) = Cosigned By    Initials Name Provider Type     Akilah Brannon PT Physical Therapist               Goals/Plan    No documentation.                Clinical Impression     Row Name 12/04/21 0906          Pain    Additional Documentation Pain Scale: Numbers Pre/Post-Treatment (Group); Pain Scale: FACES Pre/Post-Treatment (Group)  -     Row Name 12/04/21 0958          Pain Scale: Numbers Pre/Post-Treatment    Pretreatment Pain Rating 0/10 - no pain  -     Posttreatment Pain Rating 0/10 - no pain  -     Row Name 12/04/21 0958          Pain Scale: FACES Pre/Post-Treatment    Pain: FACES Scale, Pretreatment 0-->no hurt  -     Posttreatment Pain Rating 0-->no hurt  -     Row Name 12/04/21 0986          Plan of Care Review    Plan of Care Reviewed With patient; family  -     Progress declining  -     Outcome Summary Pt lethargic with low arousal and decreased participation requiring increased assist for bed mobility and transfers.  Pt required maxA to transfer from side lying to sitting EOB, CGA-maxAx1 to sit EOB, and mod-maxAx2 to step for EOB to chair.  Max cues for hand placement on armrest and to step and turn toward chair.  Pt lethargic with forward flexed posture and poor safety awareness throughout.  Family would like pt to return to assisted living facility at her [facility], but stated that she needs more assist and rehab before she can return.  Pt will need continued skilled inpatient PT with rehab at SNF upon D/C.  -     Row Name 12/04/21  0958          Therapy Assessment/Plan (PT)    Patient/Family Therapy Goals Statement (PT) Unable to state.  Family would like pt to return to [facility].  -LF     Rehab Potential (PT) fair, will monitor progress closely  -LF     Criteria for Skilled Interventions Met (PT) yes; meets criteria; skilled treatment is necessary  -LF     Row Name 12/04/21 0958          Vital Signs    Pre Systolic BP Rehab 104  -LF     Pre Treatment Diastolic BP 59  -LF     Post Systolic BP Rehab 106  -LF     Post Treatment Diastolic BP 64  -LF     Pretreatment Heart Rate (beats/min) 103  -LF     Posttreatment Heart Rate (beats/min) 94  -LF     Pre SpO2 (%) 89  -LF     O2 Delivery Pre Treatment room air  -LF     O2 Delivery Intra Treatment supplemental O2  -LF     Post SpO2 (%) 91  -LF     O2 Delivery Post Treatment supplemental O2  -LF     Pre Patient Position Side Lying  -LF     Intra Patient Position Standing  -LF     Post Patient Position Sitting  -LF     Row Name 12/04/21 0958          Positioning and Restraints    Pre-Treatment Position in bed  -LF     Post Treatment Position chair  -LF     In Chair reclined; waffle cushion; legs elevated; heels elevated; call light within reach; encouraged to call for assist; exit alarm on; with family/caregiver; notified nsg  -LF           User Key  (r) = Recorded By, (t) = Taken By, (c) = Cosigned By    Initials Name Provider Type    Akilah Tovar, PT Physical Therapist               Outcome Measures     Row Name 12/04/21 1013          How much help from another person do you currently need...    Turning from your back to your side while in flat bed without using bedrails? 2  -LF     Moving from lying on back to sitting on the side of a flat bed without bedrails? 2  -LF     Moving to and from a bed to a chair (including a wheelchair)? 2  -LF     Standing up from a chair using your arms (e.g., wheelchair, bedside chair)? 2  -LF     Climbing 3-5 steps with a railing? 1  -LF     To walk in  hospital room? 2  -     AM-PAC 6 Clicks Score (PT) 11  -     Row Name 12/04/21 1013          Modified [place] Scale    Modified [place] Scale 4 - Moderately severe disability.  Unable to walk without assistance, and unable to attend to own bodily needs without assistance.  -     Row Name 12/04/21 1013          Functional Assessment    Outcome Measure Options AM-PAC 6 Clicks Basic Mobility (PT); Modified [place]  -LF           User Key  (r) = Recorded By, (t) = Taken By, (c) = Cosigned By    Initials Name Provider Type    Akilah Tovar, PT Physical Therapist                             Physical Therapy Education                 Title: PT OT SLP Therapies (In Progress)     Topic: Physical Therapy (In Progress)     Point: Mobility training (In Progress)     Learning Progress Summary           Patient Acceptance, E,D, NR,NL by  at 12/4/2021 0840    Acceptance, E, VU,NR by  at 12/1/2021 1645    Comment: SEE FLOWSHEET.    Acceptance, E, VU,NR by CD at 11/30/2021 1124    Comment: BENEFITS OF OOB ACTIVITY, SAFETY WITH MOBILITY, PROGRESSION OF POC, D/C PLANNING,   Family Acceptance, E,D, NR,NL by  at 12/4/2021 0840    Acceptance, E, VU,NR by CD at 12/1/2021 1645    Comment: SEE FLOWSHEET.   Significant Other Acceptance, E, VU,NR by CD at 11/30/2021 1124    Comment: BENEFITS OF OOB ACTIVITY, SAFETY WITH MOBILITY, PROGRESSION OF POC, D/C PLANNING,                   Point: Home exercise program (In Progress)     Learning Progress Summary           Patient Acceptance, E,D, NR,NL by  at 12/4/2021 0840    Acceptance, E, VU,NR by CD at 12/1/2021 1645    Comment: SEE FLOWSHEET.    Acceptance, E, VU,NR by CD at 11/30/2021 1124    Comment: BENEFITS OF OOB ACTIVITY, SAFETY WITH MOBILITY, PROGRESSION OF POC, D/C PLANNING,   Family Acceptance, E,D, NR,NL by  at 12/4/2021 0840    Acceptance, E, VU,NR by CD at 12/1/2021 1645    Comment: SEE FLOWSHEET.   Significant Other Acceptance, E, VU,NR by CD at 11/30/2021 1124     Comment: BENEFITS OF OOB ACTIVITY, SAFETY WITH MOBILITY, PROGRESSION OF POC, D/C PLANNING,                   Point: Body mechanics (In Progress)     Learning Progress Summary           Patient Acceptance, E,D, NR,NL by  at 12/4/2021 0840    Acceptance, E, VU,NR by  at 12/1/2021 1645    Comment: SEE FLOWSHEET.    Acceptance, E, VU,NR by CD at 11/30/2021 1124    Comment: BENEFITS OF OOB ACTIVITY, SAFETY WITH MOBILITY, PROGRESSION OF POC, D/C PLANNING,   Family Acceptance, E,D, NR,NL by  at 12/4/2021 0840    Acceptance, E, VU,NR by CD at 12/1/2021 1645    Comment: SEE FLOWSHEET.   Significant Other Acceptance, E, VU,NR by  at 11/30/2021 1124    Comment: BENEFITS OF OOB ACTIVITY, SAFETY WITH MOBILITY, PROGRESSION OF POC, D/C PLANNING,                   Point: Precautions (In Progress)     Learning Progress Summary           Patient Acceptance, E,D, NR,NL by  at 12/4/2021 0840    Acceptance, E, VU,NR by CD at 12/1/2021 1645    Comment: SEE FLOWSHEET.    Acceptance, E, VU,NR by  at 11/30/2021 1124    Comment: BENEFITS OF OOB ACTIVITY, SAFETY WITH MOBILITY, PROGRESSION OF POC, D/C PLANNING,   Family Acceptance, E,D, NR,NL by  at 12/4/2021 0840    Acceptance, E, VU,NR by CD at 12/1/2021 1645    Comment: SEE FLOWSHEET.   Significant Other Acceptance, E, VU,NR by  at 11/30/2021 1124    Comment: BENEFITS OF OOB ACTIVITY, SAFETY WITH MOBILITY, PROGRESSION OF POC, D/C PLANNING,                               User Key     Initials Effective Dates Name Provider Type Discipline     06/16/21 -  Michelle Cardenas, PT Physical Therapist PT     06/16/21 -  Akilah Brannon, PT Physical Therapist PT              PT Recommendation and Plan     Plan of Care Reviewed With: patient, family  Progress: declining  Outcome Summary: Pt lethargic with low arousal and decreased participation requiring increased assist for bed mobility and transfers.  Pt required maxA to transfer from side lying to sitting EOB, CGA-maxAx1 to sit  EOB, and mod-maxAx2 to step for EOB to chair.  Max cues for hand placement on armrest and to step and turn toward chair.  Pt lethargic with forward flexed posture and poor safety awareness throughout.  Family would like pt to return to assisted living facility at her [place], but stated that she needs more assist and rehab before she can return.  Pt will need continued skilled inpatient PT with rehab at SNF upon D/C.     Time Calculation:    PT Charges     Row Name 12/04/21 0840             Time Calculation    Start Time 0840  -LF      PT Received On 12/04/21  -LF      PT Goal Re-Cert Due Date 12/10/21  -LF              Timed Charges    35179 - Gait Training Minutes  8  -LF      65774 - PT Therapeutic Activity Minutes 42  -LF              Total Minutes    Timed Charges Total Minutes 50  -LF       Total Minutes 50  -LF            User Key  (r) = Recorded By, (t) = Taken By, (c) = Cosigned By    Initials Name Provider Type    LF Akilah Brannon, PT Physical Therapist              Therapy Charges for Today     Code Description Service Date Service Provider Modifiers Qty    53465800955 HC PT THERAPEUTIC ACT EA 15 MIN 12/4/2021 Akilah Brannon, PT GP 2    47990951259 HC GAIT TRAINING EA 15 MIN 12/4/2021 Akilah Brannon, PT GP 1          PT G-Codes  Outcome Measure Options: AM-PAC 6 Clicks Basic Mobility (PT), Modified [place]  AM-PAC 6 Clicks Score (PT): 11  AM-PAC 6 Clicks Score (OT): 15  Modified [place] Scale: 4 - Moderately severe disability.  Unable to walk without assistance, and unable to attend to own bodily needs without assistance.    Akilah Brannon PT  12/4/2021

## 2021-12-05 LAB
ANION GAP SERPL CALCULATED.3IONS-SCNC: 12 MMOL/L (ref 5–15)
BUN SERPL-MCNC: 23 MG/DL (ref 8–23)
BUN/CREAT SERPL: 29.1 (ref 7–25)
CALCIUM SPEC-SCNC: 8.4 MG/DL (ref 8.6–10.5)
CHLORIDE SERPL-SCNC: 109 MMOL/L (ref 98–107)
CO2 SERPL-SCNC: 22 MMOL/L (ref 22–29)
CREAT SERPL-MCNC: 0.79 MG/DL (ref 0.57–1)
DEPRECATED RDW RBC AUTO: 45 FL (ref 37–54)
ERYTHROCYTE [DISTWIDTH] IN BLOOD BY AUTOMATED COUNT: 12.7 % (ref 12.3–15.4)
GFR SERPL CREATININE-BSD FRML MDRD: 69 ML/MIN/1.73
GLUCOSE SERPL-MCNC: 102 MG/DL (ref 65–99)
HCT VFR BLD AUTO: 34.7 % (ref 34–46.6)
HGB BLD-MCNC: 11.6 G/DL (ref 12–15.9)
MCH RBC QN AUTO: 32 PG (ref 26.6–33)
MCHC RBC AUTO-ENTMCNC: 33.4 G/DL (ref 31.5–35.7)
MCV RBC AUTO: 95.6 FL (ref 79–97)
PLATELET # BLD AUTO: 108 10*3/MM3 (ref 140–450)
PMV BLD AUTO: 12.7 FL (ref 6–12)
POTASSIUM SERPL-SCNC: 3.4 MMOL/L (ref 3.5–5.2)
RBC # BLD AUTO: 3.63 10*6/MM3 (ref 3.77–5.28)
SODIUM SERPL-SCNC: 143 MMOL/L (ref 136–145)
WBC NRBC COR # BLD: 6.88 10*3/MM3 (ref 3.4–10.8)

## 2021-12-05 PROCEDURE — 25010000002 CEFTRIAXONE PER 250 MG: Performed by: INTERNAL MEDICINE

## 2021-12-05 PROCEDURE — 94799 UNLISTED PULMONARY SVC/PX: CPT

## 2021-12-05 PROCEDURE — 25010000002 HEPARIN (PORCINE) PER 1000 UNITS: Performed by: INTERNAL MEDICINE

## 2021-12-05 PROCEDURE — 80048 BASIC METABOLIC PNL TOTAL CA: CPT | Performed by: INTERNAL MEDICINE

## 2021-12-05 PROCEDURE — 99232 SBSQ HOSP IP/OBS MODERATE 35: CPT | Performed by: INTERNAL MEDICINE

## 2021-12-05 PROCEDURE — 85027 COMPLETE CBC AUTOMATED: CPT | Performed by: INTERNAL MEDICINE

## 2021-12-05 PROCEDURE — 94761 N-INVAS EAR/PLS OXIMETRY MLT: CPT

## 2021-12-05 RX ORDER — L.ACID,PARA/B.BIFIDUM/S.THERM 8B CELL
1 CAPSULE ORAL DAILY
Status: DISCONTINUED | OUTPATIENT
Start: 2021-12-05 | End: 2021-12-06 | Stop reason: HOSPADM

## 2021-12-05 RX ADMIN — HEPARIN SODIUM 5000 UNITS: 5000 INJECTION INTRAVENOUS; SUBCUTANEOUS at 08:57

## 2021-12-05 RX ADMIN — BUDESONIDE AND FORMOTEROL FUMARATE DIHYDRATE 2 PUFF: 160; 4.5 AEROSOL RESPIRATORY (INHALATION) at 08:05

## 2021-12-05 RX ADMIN — CETIRIZINE HYDROCHLORIDE 5 MG: 10 TABLET, FILM COATED ORAL at 08:56

## 2021-12-05 RX ADMIN — LEVOTHYROXINE SODIUM 25 MCG: 25 TABLET ORAL at 08:57

## 2021-12-05 RX ADMIN — SODIUM CHLORIDE, PRESERVATIVE FREE 10 ML: 5 INJECTION INTRAVENOUS at 21:38

## 2021-12-05 RX ADMIN — HEPARIN SODIUM 5000 UNITS: 5000 INJECTION INTRAVENOUS; SUBCUTANEOUS at 14:18

## 2021-12-05 RX ADMIN — FLUTICASONE PROPIONATE 2 SPRAY: 50 SPRAY, METERED NASAL at 08:57

## 2021-12-05 RX ADMIN — TRAZODONE HYDROCHLORIDE 25 MG: 50 TABLET ORAL at 21:38

## 2021-12-05 RX ADMIN — DOCUSATE SODIUM 100 MG: 100 CAPSULE, LIQUID FILLED ORAL at 21:37

## 2021-12-05 RX ADMIN — POTASSIUM CHLORIDE 40 MEQ: 750 CAPSULE, EXTENDED RELEASE ORAL at 19:13

## 2021-12-05 RX ADMIN — Medication 5 MG: at 21:37

## 2021-12-05 RX ADMIN — SODIUM CHLORIDE 1 G: 900 INJECTION INTRAVENOUS at 11:06

## 2021-12-05 RX ADMIN — Medication 1 CAPSULE: at 14:18

## 2021-12-05 RX ADMIN — METOPROLOL TARTRATE 12.5 MG: 25 TABLET, FILM COATED ORAL at 21:37

## 2021-12-05 RX ADMIN — POTASSIUM CHLORIDE 40 MEQ: 750 CAPSULE, EXTENDED RELEASE ORAL at 11:05

## 2021-12-05 RX ADMIN — ACETAMINOPHEN 650 MG: 325 TABLET, FILM COATED ORAL at 21:37

## 2021-12-05 RX ADMIN — BUDESONIDE AND FORMOTEROL FUMARATE DIHYDRATE 2 PUFF: 160; 4.5 AEROSOL RESPIRATORY (INHALATION) at 19:21

## 2021-12-05 RX ADMIN — PREGABALIN 25 MG: 25 CAPSULE ORAL at 21:37

## 2021-12-05 RX ADMIN — DOCUSATE SODIUM 100 MG: 100 CAPSULE, LIQUID FILLED ORAL at 08:56

## 2021-12-05 RX ADMIN — IPRATROPIUM BROMIDE AND ALBUTEROL SULFATE 3 ML: 2.5; .5 SOLUTION RESPIRATORY (INHALATION) at 19:21

## 2021-12-05 RX ADMIN — BENZONATATE 200 MG: 100 CAPSULE ORAL at 21:55

## 2021-12-05 RX ADMIN — HEPARIN SODIUM 5000 UNITS: 5000 INJECTION INTRAVENOUS; SUBCUTANEOUS at 21:38

## 2021-12-05 RX ADMIN — IPRATROPIUM BROMIDE AND ALBUTEROL SULFATE 3 ML: 2.5; .5 SOLUTION RESPIRATORY (INHALATION) at 08:04

## 2021-12-05 RX ADMIN — IPRATROPIUM BROMIDE AND ALBUTEROL SULFATE 3 ML: 2.5; .5 SOLUTION RESPIRATORY (INHALATION) at 12:36

## 2021-12-05 RX ADMIN — PREGABALIN 25 MG: 25 CAPSULE ORAL at 08:56

## 2021-12-05 RX ADMIN — METOPROLOL TARTRATE 12.5 MG: 25 TABLET, FILM COATED ORAL at 08:56

## 2021-12-05 RX ADMIN — IPRATROPIUM BROMIDE AND ALBUTEROL SULFATE 3 ML: 2.5; .5 SOLUTION RESPIRATORY (INHALATION) at 17:04

## 2021-12-05 NOTE — PLAN OF CARE
Goal Outcome Evaluation:  Plan of Care Reviewed With: patient        Progress: no change  Outcome Summary: Pt VSS, no complaints of pain. Pt sleeping throughout the night. family at bedside. will cont to monitor.

## 2021-12-05 NOTE — PROGRESS NOTES
[facility] Medicine Services  PROGRESS NOTE    Patient Name: Mitzi Eric  : 1935  MRN: [id number]    Date of Admission: 2021  Primary Care Physician: Dm Cade APRN    Subjective   Subjective     CC:  AMS, UTI     HPI:  Slept last night. More alert today. Nephew at bedside. Reviewed continuing with the same medication changes. Patient denies any concerns.     ROS:  Difficult to obtain due to mental status; denies pain.     Objective   Objective     Vital Signs:   Temp:  [98.2 °F (36.8 °C)-98.5 °F (36.9 °C)] 98.5 °F (36.9 °C)  Heart Rate:  [] 114  Resp:  [16-18] 18  BP: (118-142)/(57-79) 139/75  Flow (L/min):  [2-3] 3     Physical Exam:  Constitutional: No acute distress, awake, alert; frail   HENT: NCAT, mucous membranes moist  Respiratory: Clear to auscultation bilaterally, respiratory effort normal; cough with deep inspiration   Cardiovascular: RRR, no murmurs, rubs, or gallops  Gastrointestinal: Positive bowel sounds, soft, nontender, nondistended  Musculoskeletal: No bilateral ankle edema  Psychiatric: flat  Neurologic: Oriented x 1, strength symmetric in all extremities, Cranial Nerves grossly intact to confrontation, speech clear  Skin: No rashes    Results Reviewed:  LAB RESULTS:      Lab 21  0627 21  0457 21  0953 21  0657 21  0839 21  1123 21  1115 21  1115 21  1111   WBC 6.88 7.72 5.58 6.26 7.82  --    < > 14.60*  --    HEMOGLOBIN 11.6* 12.6 12.3 11.9* 11.2*  --    < > 11.9*  --    HEMATOCRIT 34.7 38.1 36.7 36.2 33.6*  --    < > 35.6  --    PLATELETS 108* 128* 112* 130* 112*  --    < > 136*  --    NEUTROS ABS  --   --  3.56 3.75 5.58  --   --  11.60*  --    IMMATURE GRANS (ABS)  --   --  0.04 0.02 0.02  --   --  0.06*  --    LYMPHS ABS  --   --  0.84 1.46 1.30  --   --  1.74  --    MONOS ABS  --   --  1.09* 0.83 0.74  --   --  1.07*  --    EOS ABS  --   --  0.01 0.17 0.15  --   --  0.08  --     MCV 95.6 96.0 96.1 97.1* 96.3  --    < > 95.7  --    PROCALCITONIN  --   --   --   --   --   --   --  2.46*  --    LACTATE  --   --   --   --   --   --   --  1.4  --    PROTIME  --   --   --   --   --   --   --   --  15.2   APTT  --   --   --   --   --  38.1  --   --   --     < > = values in this interval not displayed.         Lab 12/04/21  0457 12/02/21  0953 12/01/21  0657 11/30/21  0839 11/29/21  1115   SODIUM 146* 140 140 142 141   POTASSIUM 3.3* 3.6 3.2* 3.7 4.1   CHLORIDE 110* 107 104 107 103   CO2 22.0 20.0* 21.0* 23.0 28.0   ANION GAP 14.0 13.0 15.0 12.0 10.0   BUN 25* 8 12 17 29*   CREATININE 1.03* 0.62 0.81 0.93 1.52*   GLUCOSE 119* 97 82 82 86   CALCIUM 8.8 8.8 8.6 8.3* 8.8   MAGNESIUM  --   --   --   --  2.3   HEMOGLOBIN A1C  --   --   --  5.50  --    TSH  --   --   --   --  1.240         Lab 11/29/21  1115   TOTAL PROTEIN 6.3   ALBUMIN 4.00   GLOBULIN 2.3   ALT (SGPT) 10   AST (SGOT) 20   BILIRUBIN 0.4   ALK PHOS 77         Lab 11/29/21  1115 11/29/21  1111   TROPONIN T <0.010  --    PROTIME  --  15.2   INR  --  1.3*         Lab 11/30/21  0839   CHOLESTEROL 158   LDL CHOL 97   HDL CHOL 40   TRIGLYCERIDES 118             Lab 11/29/21  1110   PH, ARTERIAL 7.40     Brief Urine Lab Results  (Last result in the past 365 days)      Color   Clarity   Blood   Leuk Est   Nitrite   Protein   CREAT   Urine HCG        11/29/21 1200 Yellow   Cloudy   Trace   Small (1+)   Positive   30 mg/dL (1+)                 Microbiology Results Abnormal     Procedure Component Value - Date/Time    Blood Culture - Blood, Arm, Right [[id number]]  (Normal) Collected: 11/29/21 1320    Lab Status: Final result Specimen: Blood from Arm, Right Updated: 12/04/21 1516     Blood Culture No growth at 5 days    Blood Culture - Blood, Arm, Right [[id number]]  (Normal) Collected: 11/29/21 1330    Lab Status: Final result Specimen: Blood from Arm, Right Updated: 12/04/21 1516     Blood Culture No growth at 5 days    COVID-19 and FLU A/B PCR -  Swab, Nasopharynx [[id number]]  (Normal) Collected: 11/29/21 1131    Lab Status: Final result Specimen: Swab from Nasopharynx Updated: 11/29/21 1213     COVID19 Not Detected     Influenza A PCR Not Detected     Influenza B PCR Not Detected    Narrative:      Fact sheet for providers: https://www.fda.gov/media/647654/download    Fact sheet for patients: https://www.fda.gov/media/840117/download    Test performed by PCR.          XR Chest 1 View    Result Date: 12/3/2021  EXAMINATION: XR CHEST 1 VW-  INDICATION: Cough, hypoxia; N39.0-Urinary tract infection, site not specified; R41.0-Disorientation, unspecified; R47.1-Dysarthria and anarthria; R29.810-Facial weakness; N28.9-Disorder of kidney and ureter, unspecified; D72.829-Elevated white blood cell count, unspecified; R47.01-Aphasia.  COMPARISON: 11/29/2021  FINDINGS: Heart and vasculature appear normal in size. There are chronic changes of centrilobular emphysema, with hyperlucent upper lungs and coarsening of the basilar interstitial markings which appears similar to the prior study. Right apical lung mass, known since at least 2018, appear stable. No new pulmonary parenchymal disease, evidence of edema, effusion or pneumothorax is seen.      Impression: Stable right apical lung mass and chronic appearing changes of centrilobular emphysema. No clearly new chest pathology is identified.   D:  12/03/2021 E:  12/03/2021  This report was finalized on 12/3/2021 10:01 PM by Dr. Jorge Foote MD.        Results for orders placed during the hospital encounter of 11/29/21    Adult Transthoracic Echo Complete W/ Cont if Necessary Per Protocol (With Agitated Saline)    Interpretation Summary  · Left ventricular ejection fraction appears to be 61 - 65%. Left ventricular systolic function is normal.  · Saline test results are negative.  · Estimated right ventricular systolic pressure from tricuspid regurgitation is markedly elevated (>55 mmHg).  · Moderate tricuspid valve  regurgitation is present.  · Left ventricular diastolic function was normal.      I have reviewed the medications:  Scheduled Meds:budesonide-formoterol, 2 puff, Inhalation, BID - RT  cefTRIAXone, 1 g, Intravenous, Q24H  cetirizine, 5 mg, Oral, Daily  docusate sodium, 100 mg, Oral, BID  fluticasone, 2 spray, Each Nare, Daily  heparin (porcine), 5,000 Units, Subcutaneous, Q8H  ipratropium-albuterol, 3 mL, Nebulization, 4x Daily - RT  levothyroxine, 25 mcg, Oral, Q AM  melatonin, 5 mg, Oral, Nightly  metoprolol tartrate, 12.5 mg, Oral, Q12H  pregabalin, 25 mg, Oral, Q12H  sodium chloride, 10 mL, Intravenous, Q12H  sodium chloride, 10 mL, Intravenous, Q12H  traZODone, 25 mg, Oral, Nightly      Continuous Infusions:sodium chloride, 50 mL/hr, Last Rate: 50 mL/hr (12/04/21 0954)      PRN Meds:.•  acetaminophen **OR** acetaminophen **OR** acetaminophen  •  benzonatate  •  ondansetron **OR** ondansetron  •  potassium chloride **OR** potassium chloride **OR** potassium chloride  •  QUEtiapine  •  sodium chloride  •  sodium chloride  •  sodium chloride    Assessment/Plan   Assessment & Plan     Active Hospital Problems    Diagnosis  POA   • Delirium, acute [R41.0]  Yes   • Acute UTI (urinary tract infection) [N39.0]  Yes   • HTN (hypertension) [I10]  Yes   • JUAN F (acute kidney injury) (Formerly [facility]) [N17.9]  Yes   • Fall [W19.XXXA]  Yes      Resolved Hospital Problems    Diagnosis Date Resolved POA   • CVA (cerebral vascular accident) (Formerly [facility]) [I63.9] 11/29/2021 Yes        Brief Hospital Course to date:  Mitzi Eric is a 86 y.o. female with history of dementia, hypothyroidism, prior craniotomy secondary to possible brain mass, hypertension, atrial fibrillation not on anticoagulation, COPD, lung mass, and frequent UTIs who presented from [facility] with altered mental status, a fall and tremors found to have an UTI.     Plan:     UTI  --on admission, WBC count 14,Procal 2.46  -- UA with positive nitrates, small leukocyte esterase,  21-30 white blood cells and 4+ bacteria  -- UCx >100 Klebsiella resistant to Ampicillin  - Completed course of rocephin inpatient      Delirium  Dementia  -- likely secondary to UTI, MRI brain without acute changes  -- patient on multiple sedating meds, which may contribute to AMS in setting of JUAN F  -- Tegretol: level was high on admission.  Attempted to resume 200 mg twice daily after discussing with her POA however more lethargic and this was discontinued   -- Buspar: Discontinued  -- Lyrica: lowered dose and frequency  -- Remeron: Discontinued  -- Trazodone: Decreased to 25 mg nightly  -- Continue ativan PRN but change to PO  - Continue scheduled melatonin  - Palliative care consulted and appreciate recs. Monitor today with medication changes made yesterday. Improvement in mental status      Cough - improved  - CXR with no acute findings   - Start duo-nebs; flonase; normal WBC      Elevated Creatinine  Volume depletion  Hypernatremia   -- Cr initially 1.60, baseline of  0.8  -- Cr trended up due to poor PO intake. S/p IVF with improvement; monitor off fluids      Fall  - plain films without fracture  - PT/OT/fall precautions     Lung mass  - right apex. Noted on CT chest back in 2018 too. ED notes at the time mention prior biopsies x 2.  - my partner discussed with nephew, They are aware of this and stated that, during prior workup, they had all decided on no further workup/treatment     HTN  --monitor BP with starting metoprolol; remains controlled -- hold home amlodipine      Hypothyroid  --TSH wnl, continue home dose Synthroid     H/o Afib  -- not on AC, likely due to fall risk  -- not on any rate controlling meds, and issue with SVT overnight 12/1 and metoprolol 12.5 mg BID started      Hypokalemia  --replace per protocol     Trigeminal neuralgia   -Holding Tegretol    DVT prophylaxis:  Medical and mechanical DVT prophylaxis orders are present.       AM-PAC 6 Clicks Score (PT): 11 (12/04/21  1013)    Disposition: I expect the patient to be discharged TBD -  needs rehab     CODE STATUS:   Code Status and Medical Interventions:   Ordered at: 12/04/21 1115     Medical Intervention Limits:    NO intubation (DNI)     Level Of Support Discussed With:    Health Care Surrogate     Code Status (Patient has no pulse and is not breathing):    No CPR (Do Not Attempt to Resuscitate)     Medical Interventions (Patient has pulse or is breathing):    Limited Support       Anjali Williamson DO  12/05/21

## 2021-12-05 NOTE — CONSULTS
Dm Cade APRN  Consulting physician: Tierra Welsh MD    Chief Complaint   Patient presents with   • Altered Mental Status   Pt unable to report.  History per chart and nephew.     Reason for consult: symptom mgmt, disposition, [facility]    HPI:   85 yo F with advanced dementia (clinical diagnosis, type not diagnosed previously).  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  She presented to [facility] ED from [facility] due to multiple falls (most recent 11/29 0630), increased confusion, and twitching.      Nephew (POA, NOK)  Reports progressive decline over the last 4 years.  She had more rapid decline after COVID pandemic with subsequent isolation.  Prior to pandemic and then again recently he has been able to visit [facility] daily in order to assist with ambulation, social interaction, feeding.  He reports she typically sleeps from 6:00 p.m. at 10:00 a.m..  She typically does not eat breakfast or lunch but usually does eat evening meal.  She is ambulatory in the facility usually by propelling herself with her feet in a wheelchair or propelled by staff.  Since the pandemic she is no longer ambulatory with walker.  Nephew reports recurring urinary tract infections.  She had severe agitation that was worsened by doses of lorazepam this hospitalization.  He reports earlier hospital stay she was expressive for to himself and providers.  Today she is very sedate and will open her eyes briefly with stimulation answer yes/ no questions.  Minimal intake of only a few sips today.   She is not taking most of her medications orally.   MRI 11/2921 showed encephalomalacia due to prior left craniotomy but no other significant findings.   She is able to answer yes / no to some questions.  She denies pain, denies shortness of breath, denies nausea or vomiting.    She does take a few sips of water through straw and meds during visit with coughing or evidence of aspiration. Dr. Williamson decreased doses or discontinued sedating medications today.    She is currently on Rocephin for Klebsiella UTI.    Past Medical History:   Diagnosis Date   • A-fib (HCC)    • Ataxic gait    • Chronic kidney disease, stage 3 (HCC)    • COPD (chronic obstructive pulmonary disease) (HCC)    • Dementia (HCC)    • Disease of thyroid gland    • Hyperlipidemia    • Hypertension      Past Surgical History:   Procedure Laterality Date   • BRAIN TUMOR EXCISION      BENIGN   • FRACTURE SURGERY     • TRIGGER FINGER RELEASE       Current Facility-Administered Medications   Medication Dose Route Frequency Provider Last Rate Last Admin   • acetaminophen (TYLENOL) tablet 650 mg  650 mg Oral Q4H PRN Prem Whipple MD   650 mg at 12/01/21 1500    Or   • acetaminophen (TYLENOL) 160 MG/5ML solution 650 mg  650 mg Oral Q4H PRN Prem Whipple MD        Or   • acetaminophen (TYLENOL) suppository 650 mg  650 mg Rectal Q4H PRN Prem Whipple MD       • aspirin chewable tablet 81 mg  81 mg Oral Daily Jessica Hernandez APRN   81 mg at 12/03/21 0824    Or   • aspirin suppository 300 mg  300 mg Rectal Daily Jessica Hernandez APRN       • atorvastatin (LIPITOR) tablet 80 mg  80 mg Oral Nightly Jessica Hernandez APRN   80 mg at 12/04/21 2115   • benzonatate (TESSALON) capsule 200 mg  200 mg Oral TID PRN Anjali Williamson DO       • budesonide-formoterol (SYMBICORT) 160-4.5 MCG/ACT inhaler 2 puff  2 puff Inhalation BID - RT Prem Whipple MD   2 puff at 12/04/21 1919   • cefTRIAXone (ROCEPHIN) 1 g/100 mL 0.9% NS (MBP)  1 g Intravenous Q24H Anjali Williamson DO   1 g at 12/04/21 1226   • cetirizine (zyrTEC) tablet 5 mg  5 mg Oral Daily Prem Whipple MD   5 mg at 12/03/21 0825   • docusate sodium (COLACE) capsule 100 mg  100 mg Oral BID Prem Whipple MD   100 mg at 12/04/21 2116   • fluticasone (FLONASE) 50  MCG/ACT nasal spray 2 spray  2 spray Each Nare Daily Anjali Williamson DO   2 spray at 12/03/21 1539   • folic acid (FOLVITE) tablet 400 mcg  400 mcg Oral BID Prem Whipple MD   400 mcg at 12/04/21 2115   • heparin (porcine) 5000 UNIT/ML injection 5,000 Units  5,000 Units Subcutaneous Q8H Prem Whipple MD   5,000 Units at 12/04/21 2119   • ipratropium-albuterol (DUO-NEB) nebulizer solution 3 mL  3 mL Nebulization 4x Daily - RT Anjali Williamson DO   3 mL at 12/04/21 1917   • levothyroxine (SYNTHROID, LEVOTHROID) tablet 25 mcg  25 mcg Oral Q AM Prem Whipple MD   25 mcg at 12/04/21 0612   • LORazepam (ATIVAN) tablet 0.5 mg  0.5 mg Oral Q6H PRN Anjali Williamson DO       • melatonin tablet 5 mg  5 mg Oral Nightly Anjali Williamson DO   5 mg at 12/03/21 2044   • metoprolol tartrate (LOPRESSOR) half tablet 12.5 mg  12.5 mg Oral Q12H Anjali Williamson DO   12.5 mg at 12/04/21 2116   • ondansetron (ZOFRAN) tablet 4 mg  4 mg Oral Q6H PRN Prem Whipple MD        Or   • ondansetron (ZOFRAN) injection 4 mg  4 mg Intravenous Q6H PRN Prem Whipple MD       • pantoprazole (PROTONIX) EC tablet 40 mg  40 mg Oral QAM AC Prem Whipple MD   40 mg at 12/03/21 0825   • potassium chloride (MICRO-K) CR capsule 40 mEq  40 mEq Oral PRN Yu Cesar MD   40 mEq at 12/01/21 1500    Or   • potassium chloride (KLOR-CON) packet 40 mEq  40 mEq Oral PRN Yu Cesar MD        Or   • potassium chloride 10 mEq in 100 mL IVPB  10 mEq Intravenous Q1H PRN Yu Cesar MD       • pregabalin (LYRICA) capsule 25 mg  25 mg Oral Q12H Prem Whipple MD   25 mg at 12/04/21 2115   • QUEtiapine (SEROquel) tablet 12.5 mg  12.5 mg Oral Nightly PRN Anjali Williamson DO   12.5 mg at 12/02/21 2205   • sodium chloride 0.45 % infusion  50 mL/hr Intravenous Continuous Anjali Williamson DO 50 mL/hr at 12/04/21 0954 50 mL/hr at 12/04/21 0954   • sodium chloride 0.9 % flush 10 mL  10 mL Intravenous PRN Jonas Wayne MD        • sodium chloride 0.9 % flush 10 mL  10 mL Intravenous Q12H Jessica Hernandez APRN   10 mL at 21   • sodium chloride 0.9 % flush 10 mL  10 mL Intravenous PRN Jessica Hernandez APRN       • sodium chloride 0.9 % flush 10 mL  10 mL Intravenous Q12H Prem Whipple MD   10 mL at 21   • sodium chloride 0.9 % flush 10 mL  10 mL Intravenous PRN Prem Whipple MD       • traZODone (DESYREL) tablet 25 mg  25 mg Oral Nightly Anjali Williamson DO   25 mg at 21     sodium chloride, 50 mL/hr, Last Rate: 50 mL/hr (21)      Allergies   Allergen Reactions   • Bee Pollen Unknown - Low Severity     FHX:  Parents  before she was age 6.      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  After [employer], moved to [place] for > 20 years closer to her sister.  4 years ago returned to [place] (nephew is POA) and resides at [facility].    Never , no children.      Review of Systems -     See HPI.  She does answer a few yes / no questions but otherwise history her nephew and chart review.    General- for few she has maintained her weight, she typically does not eat breakfast or lunch, she does eat anything chocolate  HEENT- no dysphagia no reported oral issues  Lungs -  She denies shortness of breath, no cough  CV - telemetry afib  GI - no N/V, unknown usual bowel habits  MSK -  2 years ago she was ambulatory with walker, recently ambulatory with a wheelchair within  Facility, she has been able to transfer with minimal assistance  Psych -usually pleasant disposition, agitation is very rare for her and only present during hospitalization      /75 (BP Location: Left arm, Patient Position: Lying)   Pulse 114   Temp 98.4 °F (36.9 °C) (Oral)   Resp 18   Ht  "157.5 cm (62\")   Wt 50.8 kg (112 lb)   SpO2 93%   BMI 20.49 kg/m²     Intake/Output Summary (Last 24 hours) at 12/4/2021 2357  Last data filed at 12/4/2021 1226  Gross per 24 hour   Intake 1340 ml   Output 150 ml   Net 1190 ml     Physical Exam:      General Appearance:    Elderly female, sitting slouched in [place] chair, arouses and answers yes no questions with stimulation but otherwise does not engage during visit   Head:   Normocephalic,   Frontal temporal wasting, atraumatic   Eyes:           Lids and lashes normal, conjunctivae and sclerae normal   Ears:   Ears appear intact with no abnormalities noted, hearing grossly normal   Throat:  No oral lesions, no thrush, oral mucosa moist   Neck:  No adenopathy, supple, trachea midline   Back:    mild kyphosis   Lungs:    Clear to auscultation,respirations regular,  nonlabored respiratory effort, decreased breath sounds bilateral bases but no wheezes    Heart:   irreg irreg rhythm and normal rate   Breast Exam:   Deferred   Abdomen:    Normal bowel sounds, no masses, no organomegaly, soft        non-tender, non-distended, no guarding   Genitalia:   Deferred   Extremities:   No clubbing, cyanosis or edema   Pulses:  Pedal and radial pulses palpable and equal bilaterally   Skin:  No bleeding, bruising or rash   Lymph nodes:  No palpable adenopathy cervical and supraclavicular    Psych:             eyes closed most visit, does arouse with interaction or if her name is old, no agitation during visit, flat affect   Neurologic:   Face appears symmetric, moves BUE and bilateral feet, sleeping but arouseable during visit, no verbalization except yes/no           Results from last 7 days   Lab Units 12/04/21  0457 11/30/21  0839 11/29/21  1115   WBC 10*3/mm3 7.72   < > 14.60*   HEMOGLOBIN g/dL 12.6   < > 11.9*   HEMATOCRIT % 38.1   < > 35.6   PLATELETS 10*3/mm3 128*   < > 136*   SODIUM mmol/L 146*   < > 141   POTASSIUM mmol/L 3.3*   < > 4.1   CHLORIDE mmol/L 110*   < > 103 "   CO2 mmol/L 22.0   < > 28.0   BUN mg/dL 25*   < > 29*   CREATININE mg/dL 1.03*   < > 1.52*   CALCIUM mg/dL 8.8   < > 8.8   BILIRUBIN mg/dL  --   --  0.4   ALK PHOS U/L  --   --  77   ALT (SGPT) U/L  --   --  10   AST (SGOT) U/L  --   --  20   GLUCOSE mg/dL 119*   < > 86    < > = values in this interval not displayed.     Imaging Results (Last 72 Hours)     Procedure Component Value Units Date/Time    XR Chest 1 View [[id number]] Collected: 12/03/21 1035     Updated: 12/03/21 2205    Narrative:      EXAMINATION: XR CHEST 1 VW-     INDICATION: Cough, hypoxia; N39.0-Urinary tract infection, site not  specified; R41.0-Disorientation, unspecified; R47.1-Dysarthria and  anarthria; R29.810-Facial weakness; N28.9-Disorder of kidney and ureter,  unspecified; D72.829-Elevated white blood cell count, unspecified;  R47.01-Aphasia.     COMPARISON: 11/29/2021     FINDINGS: Heart and vasculature appear normal in size. There are chronic  changes of centrilobular emphysema, with hyperlucent upper lungs and  coarsening of the basilar interstitial markings which appears similar to  the prior study. Right apical lung mass, known since at least 2018,  appear stable. No new pulmonary parenchymal disease, evidence of edema,  effusion or pneumothorax is seen.       Impression:      Stable right apical lung mass and chronic appearing changes  of centrilobular emphysema. No clearly new chest pathology is  identified.      D:  12/03/2021  E:  12/03/2021     This report was finalized on 12/3/2021 10:01 PM by Dr. Jorge Foote MD.             ASSESSMENT/PLAN:  1.   Agitation -new behaviors according to her nephew.  May be related to adverse reaction from lorazepam versus acute infection.  She currently has Seroquel 12.5 mg each evening as needed.  Agree with decreasing sedating medications and monitoring response    2. Delirium - initially hyperactive delirium, now possible hypoactive delirium.  Monitor with medication changes as above.    3.  Dementia -she has not have formal diagnosis of dementia of based on her decline it sounds like a typical dementia course.  Discussed with her nephew regarding difference of dementia (disease progression over months to years) versus delirium (decline over days with fluctuating mental status).  Discussed advanced/end-stage dementia including decreased oral intake, increased risk for dehydration,  Recurring infections, increased sleeping, weight loss, cognitive and functional decline.    4. Klebsiella UTI - Rocephin D # 5 but she has not returned to prior baseline.      5. Pill burden -   She has been unable to take medications most of the day today.  Discontinue medications that are likely not beneficial with advanced disease including aspirin, statin, supplements.      6.  Disposition -reviewed possible hospice referral if she does not improve previous baseline.   Reviewed hospice benefit in home (moving to nephew's home for EOL care is NOT an option), AFL (may require hiring additional personal CG), LTCF, inpt hospice (currently not inpt appropriate since she is not requiring medications for symptom mgmt).      7.  Code status/GOC -  Nephew who desires no CPR intubation of or life-prolonging measures.  He does  Request treatment for reversible causes.      Thank you for allowing us see her in consultation.  Palliative will continue to follow along for symptom management, disposition, patient and family support.    Time: 60 min spent in care of this patient which includes examination of pt, discussion with pt and nephew (Freddy), chart review, , documentation.  40 min at bedside.     Debra Welsh MD  12/04/21

## 2021-12-06 ENCOUNTER — HOSPITAL ENCOUNTER (OUTPATIENT)
Facility: HOSPITAL | Age: 86
Setting detail: OBSERVATION
Discharge: HOME | End: 2021-12-08
Attending: STUDENT IN AN ORGANIZED HEALTH CARE EDUCATION/TRAINING PROGRAM | Admitting: HOSPITALIST

## 2021-12-06 VITALS
HEART RATE: 84 BPM | WEIGHT: 112 LBS | TEMPERATURE: 98.6 F | SYSTOLIC BLOOD PRESSURE: 137 MMHG | RESPIRATION RATE: 16 BRPM | BODY MASS INDEX: 20.61 KG/M2 | OXYGEN SATURATION: 93 % | DIASTOLIC BLOOD PRESSURE: 84 MMHG | HEIGHT: 62 IN

## 2021-12-06 DIAGNOSIS — Z74.09 IMPAIRED FUNCTIONAL MOBILITY, BALANCE, GAIT, AND ENDURANCE: ICD-10-CM

## 2021-12-06 DIAGNOSIS — R41.841 COGNITIVE COMMUNICATION DEFICIT: ICD-10-CM

## 2021-12-06 DIAGNOSIS — J10.1 INFLUENZA A VIRUS PRESENT: ICD-10-CM

## 2021-12-06 DIAGNOSIS — J96.01 ACUTE RESPIRATORY FAILURE WITH HYPOXIA (HCC): Primary | ICD-10-CM
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
## 2021-12-06 DIAGNOSIS — G50.0 TRIGEMINAL NEURALGIA: ICD-10-CM

## 2021-12-06 PROBLEM — N39.0 ACUTE UTI (URINARY TRACT INFECTION): Status: RESOLVED | Noted: 2021-11-29 | Resolved: 2021-12-06

## 2021-12-06 PROBLEM — N17.9 AKI (ACUTE KIDNEY INJURY): Status: RESOLVED | Noted: 2021-11-29 | Resolved: 2021-12-06

## 2021-12-06 PROBLEM — R91.8 LUNG MASS: Status: ACTIVE | Noted: 2021-12-06

## 2021-12-06 PROBLEM — E03.9 HYPOTHYROIDISM (ACQUIRED): Status: ACTIVE | Noted: 2021-12-06

## 2021-12-06 PROBLEM — R41.89 COGNITIVE DECLINE: Status: ACTIVE | Noted: 2021-12-06

## 2021-12-06 LAB
ANION GAP SERPL CALCULATED.3IONS-SCNC: 10 MMOL/L (ref 5–15)
BUN SERPL-MCNC: 17 MG/DL (ref 8–23)
BUN/CREAT SERPL: 22.7 (ref 7–25)
CALCIUM SPEC-SCNC: 8.4 MG/DL (ref 8.6–10.5)
CHLORIDE SERPL-SCNC: 111 MMOL/L (ref 98–107)
CO2 SERPL-SCNC: 21 MMOL/L (ref 22–29)
CREAT SERPL-MCNC: 0.75 MG/DL (ref 0.57–1)
GFR SERPL CREATININE-BSD FRML MDRD: 73 ML/MIN/1.73
GLUCOSE SERPL-MCNC: 97 MG/DL (ref 65–99)
POTASSIUM SERPL-SCNC: 5.2 MMOL/L (ref 3.5–5.2)
SODIUM SERPL-SCNC: 142 MMOL/L (ref 136–145)

## 2021-12-06 PROCEDURE — 94799 UNLISTED PULMONARY SVC/PX: CPT

## 2021-12-06 PROCEDURE — 80048 BASIC METABOLIC PNL TOTAL CA: CPT | Performed by: INTERNAL MEDICINE

## 2021-12-06 PROCEDURE — 99285 EMERGENCY DEPT VISIT HI MDM: CPT

## 2021-12-06 PROCEDURE — 94761 N-INVAS EAR/PLS OXIMETRY MLT: CPT

## 2021-12-06 PROCEDURE — 25010000002 HEPARIN (PORCINE) PER 1000 UNITS: Performed by: INTERNAL MEDICINE

## 2021-12-06 PROCEDURE — 99239 HOSP IP/OBS DSCHRG MGMT >30: CPT | Performed by: INTERNAL MEDICINE

## 2021-12-06 PROCEDURE — 92507 TX SP LANG VOICE COMM INDIV: CPT

## 2021-12-06 RX ORDER — IPRATROPIUM BROMIDE AND ALBUTEROL SULFATE 2.5; .5 MG/3ML; MG/3ML
3 SOLUTION RESPIRATORY (INHALATION)
Qty: 360 ML
Start: 2021-12-06 | End: 2021-12-06

## 2021-12-06 RX ORDER — MENTHOL 40 MG/ML
1 GEL TOPICAL
COMMUNITY

## 2021-12-06 RX ORDER — IPRATROPIUM BROMIDE 21 UG/1
2 SPRAY, METERED NASAL DAILY
COMMUNITY
End: 2022-03-09

## 2021-12-06 RX ORDER — CARBAMAZEPINE 400 MG/1
400 TABLET, EXTENDED RELEASE ORAL 2 TIMES DAILY
COMMUNITY
End: 2021-12-08 | Stop reason: HOSPADM

## 2021-12-06 RX ORDER — QUETIAPINE FUMARATE 25 MG/1
12.5 TABLET, FILM COATED ORAL NIGHTLY PRN
Start: 2021-12-06 | End: 2021-12-06

## 2021-12-06 RX ORDER — MIRTAZAPINE 15 MG/1
15 TABLET, FILM COATED ORAL NIGHTLY
COMMUNITY
End: 2021-12-08 | Stop reason: HOSPADM

## 2021-12-06 RX ORDER — BENZONATATE 200 MG/1
200 CAPSULE ORAL 3 TIMES DAILY PRN
Start: 2021-12-06 | End: 2021-12-06

## 2021-12-06 RX ORDER — FLUTICASONE PROPIONATE 50 MCG
2 SPRAY, SUSPENSION (ML) NASAL DAILY
Start: 2021-12-07 | End: 2021-12-06

## 2021-12-06 RX ORDER — MECLIZINE HCL 25MG 25 MG/1
25 TABLET, CHEWABLE ORAL 3 TIMES DAILY PRN
COMMUNITY
End: 2022-03-09

## 2021-12-06 RX ORDER — CHOLECALCIFEROL (VITAMIN D3) 125 MCG
5 CAPSULE ORAL NIGHTLY
Start: 2021-12-06 | End: 2021-12-06

## 2021-12-06 RX ORDER — AMLODIPINE BESYLATE 10 MG/1
10 TABLET ORAL DAILY
COMMUNITY
End: 2022-03-09

## 2021-12-06 RX ORDER — TRAMADOL HYDROCHLORIDE 50 MG/1
50 TABLET ORAL 2 TIMES DAILY PRN
Status: ON HOLD | COMMUNITY
End: 2021-12-08 | Stop reason: SDUPTHER

## 2021-12-06 RX ORDER — BUSPIRONE HYDROCHLORIDE 7.5 MG/1
7.5 TABLET ORAL 2 TIMES DAILY
COMMUNITY
End: 2021-12-08 | Stop reason: HOSPADM

## 2021-12-06 RX ORDER — PREGABALIN 25 MG/1
25 CAPSULE ORAL EVERY 12 HOURS SCHEDULED
Qty: 6 CAPSULE | Refills: 0 | Status: ON HOLD | OUTPATIENT
Start: 2021-12-06 | End: 2021-12-08 | Stop reason: SDUPTHER

## 2021-12-06 RX ORDER — L.ACID,PARA/B.BIFIDUM/S.THERM 8B CELL
1 CAPSULE ORAL DAILY
Start: 2021-12-07 | End: 2021-12-06

## 2021-12-06 RX ORDER — TRAZODONE HYDROCHLORIDE 50 MG/1
25 TABLET ORAL NIGHTLY
Start: 2021-12-06 | End: 2022-03-09

## 2021-12-06 RX ADMIN — IPRATROPIUM BROMIDE AND ALBUTEROL SULFATE 3 ML: 2.5; .5 SOLUTION RESPIRATORY (INHALATION) at 10:24

## 2021-12-06 RX ADMIN — FLUTICASONE PROPIONATE 2 SPRAY: 50 SPRAY, METERED NASAL at 08:18

## 2021-12-06 RX ADMIN — PREGABALIN 25 MG: 25 CAPSULE ORAL at 08:19

## 2021-12-06 RX ADMIN — LEVOTHYROXINE SODIUM 25 MCG: 25 TABLET ORAL at 07:02

## 2021-12-06 RX ADMIN — Medication 1 CAPSULE: at 08:19

## 2021-12-06 RX ADMIN — SODIUM CHLORIDE, PRESERVATIVE FREE 10 ML: 5 INJECTION INTRAVENOUS at 08:20

## 2021-12-06 RX ADMIN — SODIUM CHLORIDE, PRESERVATIVE FREE 10 ML: 5 INJECTION INTRAVENOUS at 08:19

## 2021-12-06 RX ADMIN — BUDESONIDE AND FORMOTEROL FUMARATE DIHYDRATE 2 PUFF: 160; 4.5 AEROSOL RESPIRATORY (INHALATION) at 10:34

## 2021-12-06 RX ADMIN — IPRATROPIUM BROMIDE AND ALBUTEROL SULFATE 3 ML: 2.5; .5 SOLUTION RESPIRATORY (INHALATION) at 13:36

## 2021-12-06 RX ADMIN — CETIRIZINE HYDROCHLORIDE 5 MG: 10 TABLET, FILM COATED ORAL at 08:19

## 2021-12-06 RX ADMIN — DOCUSATE SODIUM 100 MG: 100 CAPSULE, LIQUID FILLED ORAL at 08:19

## 2021-12-06 RX ADMIN — HEPARIN SODIUM 5000 UNITS: 5000 INJECTION INTRAVENOUS; SUBCUTANEOUS at 07:01

## 2021-12-06 RX ADMIN — METOPROLOL TARTRATE 12.5 MG: 25 TABLET, FILM COATED ORAL at 08:19

## 2021-12-06 NOTE — PLAN OF CARE
Goal Outcome Evaluation:     Palliative consult for GOC/ACP and support to pt and family. Pt seen in consult by ANN Welsh MD over the weekend, by Palliative RN today. Pt to discharge today back to [facility]; per discussion with nephew/POA, referral faxed to [facility] for follow up at facility.      1300, Palliative team meeting: CARIDAD Parra RN, CHPN; CYNDI Alaniz RN, CHPN; ANTONIA Garza, RN, CHPN; SANDY Grijalva, [facility], [facility]; ALEN Mancini, APRN; GIACOMO Suazo MD    Problem: Palliative Care  Goal: Enhanced Quality of Life  Outcome: Adequate for Care Transition  Intervention: Maximize Comfort  Flowsheets (Taken 12/6/2021 1606)  Pain Management Interventions: (denied pain when asked) other (see comments)  Intervention: Optimize Function  Flowsheets (Taken 12/6/2021 1606)  Fatigue Management:  • frequent rest breaks encouraged  • paced activity encouraged  Sleep/Rest Enhancement:  • consistent schedule promoted  • family presence promoted  Intervention: Promote Advance Care Planning  Flowsheets (Taken 12/6/2021 1606)  Life Transition/Adjustment: (referral faxed to [facility] for Palliative to follow in facility)  • palliative care discussed  • palliative care initiated  • other (see comments)  Intervention: Optimize Psychosocial Wellbeing  Flowsheets (Taken 12/6/2021 1606)  Supportive Measures:  • decision-making supported  • goal setting facilitated  • positive reinforcement provided  Spiritual Activities Assistance: (Spiritual Care consult) other (see comments)  Family/Support System Care:  • caregiver stress acknowledged  • involvement promoted  • presence promoted  • self-care encouraged  • support provided

## 2021-12-06 NOTE — PLAN OF CARE
Goal Outcome Evaluation:  Plan of Care Reviewed With: patient        Progress: no change  Outcome Summary: Pt VSS, more alert this shift when awake, seems to have rested well this shift. will cont to monitor.

## 2021-12-06 NOTE — PROGRESS NOTES
Continued Stay Note  [facility]     Patient Name: Mitzi Eric  MRN: [id number]  Today's Date: 12/6/2021    Admit Date: 11/29/2021     Discharge Plan     Row Name 12/06/21 1200       Plan    Plan Ongoing    Plan Comments Hospice referral received, chart reviewed. Visit made to pt, pt sitting up in bed, nephew/POA Freddy present. Informed Freddy and pt of hospice referral. Teaching done on hospice services, Freddy stated pt is not ready for hospice at this time. Provided Freddy with Hospice brochure and phone number if needed in the future. Freddy's goal is for pt to beable to return to [facility] if able. A representative from [facility] made a visit last week, at that time Freddy was told pt would be able to return to [facility]. Spoke with Dr. Williamson and  CHARLOTTE Cooper RN, informed Freddy has declined hospice and wants pt to return to [facility] if able. Kenneth stated will follow up with [facility]. Will continue to follow on the periphery. Please call [phone] if can be of further assistance.               Discharge Codes    No documentation.               Expected Discharge Date and Time     Expected Discharge Date Expected Discharge Time    Dec 4, 2021             Luz Marina Alaniz, RN

## 2021-12-06 NOTE — PROGRESS NOTES
"Palliative Care Daily Progress Note     CVA (cerebral vascular accident) ([facility]) [I63.9]    CC:  Decreased intake, more alert    S: Medical record reviewed, followed up with nephew at bedside regarding patient's condition. Events noted.    85 yo F with advanced dementia (clinical diagnosis, type not diagnosed previously).  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  She presented to [facility] ED from [facility] due to multiple falls (most recent 11/29 0630), increased confusion, and twitching.        Dr. Williamson decreased doses or discontinued sedating medications yesterday.  She is currently on Rocephin for Klebsiella UTI.  She is more alert today and answers questions and able to follow directions.  She denies pain, no N/V, denies SOA today.  Decreased appetite with only bites of intake.  She has not been OOB today.  PT/OT have not come for sessions today.       O:   Palliative Performance Scale Score:   30%  /63 (BP Location: Left arm, Patient Position: Lying)   Pulse 100   Temp 98.4 °F (36.9 °C) (Oral)   Resp 17   Ht 157.5 cm (62\")   Wt 50.8 kg (112 lb)   SpO2 95%   BMI 20.49 kg/m²     Intake/Output Summary (Last 24 hours) at 12/6/2021 0158  Last data filed at 12/5/2021 0900  Gross per 24 hour   Intake 120 ml   Output 300 ml   Net -180 ml       Physical Exam:  General Appearance:    Elderly female, frail, lying in bed with HOB elevated, alert and good eye contact, answers few questions/pleasantries.    Head:   Normocephalic,   Frontal temporal wasting, atraumatic   Eyes:           Lids and lashes normal, conjunctivae and sclerae normal   Ears:   Ears appear intact with no abnormalities noted, hearing grossly normal   Throat:  No oral lesions, no thrush, oral mucosa moist   Neck:  No adenopathy, supple, trachea midline   Back:   " kyphosis   Lungs:    Clear to auscultation,respirations regular,  nonlabored respiratory effort, decreased breath sounds bilateral bases but no wheezes, intermittent weak but loose cough    Heart:   irreg irreg rhythm and normal rate   Breast Exam:   Deferred   Abdomen:    Normal bowel sounds, no masses, no organomegaly, soft        non-tender, non-distended, no guarding   Genitalia:   Deferred   Extremities:   No clubbing, cyanosis or edema   Pulses:  Pedal and radial pulses palpable and equal bilaterally   Skin:  No bleeding, bruising or rash   Lymph nodes:  No palpable adenopathy cervical and supraclavicular     Psych:               no agitation, no anxiety   Neurologic:   Face appears symmetric, moves BUE and bilateral feet, memory deficit              Meds: Reviewed   Current Facility-Administered Medications   Medication Dose Route Frequency Provider Last Rate Last Admin   • acetaminophen (TYLENOL) tablet 650 mg  650 mg Oral Q4H PRN Prem Whipple MD   650 mg at 12/05/21 2137    Or   • acetaminophen (TYLENOL) 160 MG/5ML solution 650 mg  650 mg Oral Q4H PRN Prem Whipple MD        Or   • acetaminophen (TYLENOL) suppository 650 mg  650 mg Rectal Q4H PRN Prem Whipple MD       • benzonatate (TESSALON) capsule 200 mg  200 mg Oral TID PRN Anjali Williamson DO   200 mg at 12/05/21 2155   • budesonide-formoterol (SYMBICORT) 160-4.5 MCG/ACT inhaler 2 puff  2 puff Inhalation BID - RT Prem Whipple MD   2 puff at 12/05/21 1921   • cetirizine (zyrTEC) tablet 5 mg  5 mg Oral Daily Prem Whipple MD   5 mg at 12/05/21 0856   • docusate sodium (COLACE) capsule 100 mg  100 mg Oral BID Prem Whipple MD   100 mg at 12/05/21 2137   • fluticasone (FLONASE) 50 MCG/ACT nasal spray 2 spray  2 spray Each Nare Daily Anjali Williamson DO   2 spray at 12/05/21 0857   • heparin (porcine) 5000 UNIT/ML injection 5,000 Units  5,000 Units Subcutaneous Q8H Prem Whipple MD   5,000 Units at 12/05/21 2138   •  ipratropium-albuterol (DUO-NEB) nebulizer solution 3 mL  3 mL Nebulization 4x Daily - RT Anjali Williamson DO   3 mL at 12/05/21 1921   • lactobacillus acidophilus (RISAQUAD) capsule 1 capsule  1 capsule Oral Daily Anjali Williamson DO   1 capsule at 12/05/21 1418   • levothyroxine (SYNTHROID, LEVOTHROID) tablet 25 mcg  25 mcg Oral Q AM Prem Whipple MD   25 mcg at 12/05/21 0857   • melatonin tablet 5 mg  5 mg Oral Nightly Anjali Williamson DO   5 mg at 12/05/21 2137   • metoprolol tartrate (LOPRESSOR) half tablet 12.5 mg  12.5 mg Oral Q12H Anjali Williamson DO   12.5 mg at 12/05/21 2137   • ondansetron (ZOFRAN) tablet 4 mg  4 mg Oral Q6H PRN Prem Whipple MD        Or   • ondansetron (ZOFRAN) injection 4 mg  4 mg Intravenous Q6H PRN Prem Whipple MD       • potassium chloride (MICRO-K) CR capsule 40 mEq  40 mEq Oral PRN Yu Cesar MD   40 mEq at 12/05/21 1913    Or   • potassium chloride (KLOR-CON) packet 40 mEq  40 mEq Oral PRN Yu Cesar MD        Or   • potassium chloride 10 mEq in 100 mL IVPB  10 mEq Intravenous Q1H PRN Yu Cesar MD       • pregabalin (LYRICA) capsule 25 mg  25 mg Oral Q12H Prem Whipple MD   25 mg at 12/05/21 2137   • QUEtiapine (SEROquel) tablet 12.5 mg  12.5 mg Oral Nightly PRN Anjali Williamson DO   12.5 mg at 12/02/21 2205   • sodium chloride 0.9 % flush 10 mL  10 mL Intravenous PRN Jonas Wayne MD       • sodium chloride 0.9 % flush 10 mL  10 mL Intravenous Q12H Jessica Hernandez, APRLEAH   10 mL at 12/04/21 2115   • sodium chloride 0.9 % flush 10 mL  10 mL Intravenous PRN Hernandez, Jessica C, APRN       • sodium chloride 0.9 % flush 10 mL  10 mL Intravenous Q12H Prem Whipple MD   10 mL at 12/05/21 2138   • sodium chloride 0.9 % flush 10 mL  10 mL Intravenous PRN Prem Whipple MD       • traZODone (DESYREL) tablet 25 mg  25 mg Oral Nightly Anjali Williamson DO   25 mg at 12/05/21 2138              Labs:   Results from last 7 days    Lab Units 12/05/21  0627 11/30/21  0839 11/29/21  1115   WBC 10*3/mm3 6.88   < > 14.60*   HEMOGLOBIN g/dL 11.6*   < > 11.9*   HEMATOCRIT % 34.7   < > 35.6   PLATELETS 10*3/mm3 108*   < > 136*   SODIUM mmol/L 143   < > 141   POTASSIUM mmol/L 3.4*   < > 4.1   CHLORIDE mmol/L 109*   < > 103   CO2 mmol/L 22.0   < > 28.0   BUN mg/dL 23   < > 29*   CREATININE mg/dL 0.79   < > 1.52*   CALCIUM mg/dL 8.4*   < > 8.8   BILIRUBIN mg/dL  --   --  0.4   ALK PHOS U/L  --   --  77   ALT (SGPT) U/L  --   --  10   AST (SGOT) U/L  --   --  20   GLUCOSE mg/dL 102*   < > 86    < > = values in this interval not displayed.     Imaging Results (Last 72 Hours)     Procedure Component Value Units Date/Time    XR Chest 1 View [[id number]] Collected: 12/03/21 1035     Updated: 12/03/21 2205    Narrative:      EXAMINATION: XR CHEST 1 VW-     INDICATION: Cough, hypoxia; N39.0-Urinary tract infection, site not  specified; R41.0-Disorientation, unspecified; R47.1-Dysarthria and  anarthria; R29.810-Facial weakness; N28.9-Disorder of kidney and ureter,  unspecified; D72.829-Elevated white blood cell count, unspecified;  R47.01-Aphasia.     COMPARISON: 11/29/2021     FINDINGS: Heart and vasculature appear normal in size. There are chronic  changes of centrilobular emphysema, with hyperlucent upper lungs and  coarsening of the basilar interstitial markings which appears similar to  the prior study. Right apical lung mass, known since at least 2018,  appear stable. No new pulmonary parenchymal disease, evidence of edema,  effusion or pneumothorax is seen.       Impression:      Stable right apical lung mass and chronic appearing changes  of centrilobular emphysema. No clearly new chest pathology is  identified.      D:  12/03/2021  E:  12/03/2021     This report was finalized on 12/3/2021 10:01 PM by Dr. Jorge Foote MD.                 Diagnostics: Reviewed    Assessment/Plan:  1.   Agitation -new behaviors according to her nephew.  May be related to  adverse reaction from lorazepam versus acute infection. Mental status and agitation improved on lower doses of sedating medications.      2. Delirium -improved with med changes.      3. Dementia -she has not have formal diagnosis of dementia of based on her decline it sounds like a typical dementia course.  Discussed yesterday with her nephew regarding difference of dementia (disease progression over months to years) versus delirium (decline over days with fluctuating mental status).  Discussed advanced/end-stage dementia including decreased oral intake, increased risk for dehydration,  Recurring infections, increased sleeping, weight loss, cognitive and functional decline.  She would be candidate for home hospice.      4. Klebsiella UTI - Rocephin D # 6.       5. Pill burden -    Discontinued yesterday medications that are likely not beneficial with advanced disease including aspirin, statin, supplements.       6.  Disposition -reviewed possible hospice referral if she does not improve previous baseline.   Reviewed hospice benefit in home (moving to nephew's home for EOL care is NOT an option), AFL (may require hiring additional personal CG), LTCF, inpt hospice (currently not inpt appropriate since she is not requiring medications for symptom mgmt).  Nephew is leaning towards PT/OT at [facility], then transition to hospice when no longer receiving benefit from therapy or no longer able to participate.       7.  Code status/GOC -  Nephew who desires no CPR intubation of or life-prolonging measures.  He does  Request treatment for reversible causes.    8. Tremor - OT consult to assess modified utensils for self feeding.       We will continue to follow along. Please do not hesitate to contact us regarding further sx mgmt or GOC needs, including after hours or on weekends via our on call provider at [phone].     Debra Welsh MD

## 2021-12-06 NOTE — DISCHARGE PLACEMENT REQUEST
"Janet ORELLANA, RN,   [phone]      Mitzi Zapata (86 y.o. Female)             Date of Birth Social Security Number Address Home Phone MRN    1935  [address]  [address]  [facility] [address] [phone] [id number]    [employer] Mar[employer] Status             Voodoo Single       Admission Date Admission Type Admitting Provider Attending Provider Department, Room/Bed    21 Emergency Anjali Williamson DO Roesch, Lyndsey, DO [facility] 3F, S302/1    Discharge Date Discharge Disposition Discharge Destination           Long Term Care (DC - External)              Attending Provider: Anjali Williamson DO    Allergies: Bee Pollen    Isolation: None   Infection: None   Code Status: No CPR   Advance Care Planning Activity    Ht: 157.5 cm (62\")   Wt: 50.8 kg (112 lb)    Admission Cmt: None   Principal Problem: None                Active Insurance as of 2021     Primary Coverage     Payor Plan Insurance Group Employer/Plan Group    HUMANA MEDICARE REPLACEMENT HUMANA MEDICARE REPLACEMENT [id number]     Payor Plan Address Payor Plan Phone Number Payor Plan Fax Number Effective Dates    [address] [address] [phone]  2018 - None Entered    [facility] [id number]       Subscriber Name Subscriber Birth Date Member ID       MITZI ZAPATA 1935 [id number]                 Emergency Contacts      (Rel.) Home Phone Work Phone Mobile Phone    Lane Zapata (Power of ) [phone] -- --               Speech Language Pathology Notes (most recent note)      Maribell Hunt MS CCC-SLP at 21 1508          Acute Care - Speech Language Pathology Treatment Note  [facility]     Patient Name: Mitzi Zapata  : 1935  MRN: [id number]  Today's Date: 2021               Admit Date: 2021     Visit Dx:    ICD-10-CM ICD-9-CM   1. Acute UTI  N39.0 599.0   2. Confusion  R41.0 298.9   3. Dysarthria  R47.1 784.51   4. Facial droop  R29.810 781.94   5. " Renal insufficiency  N28.9 593.9   6. Leukocytosis, unspecified type  D72.829 288.60   7. Aphasia  R47.01 784.3   8. Trigeminal neuralgia  G50.0 350.1   9. Cognitive communication deficit  R41.841 799.52     Patient Active Problem List   Diagnosis   • Fall   • Delirium, acute   • Essential hypertension   • Cognitive decline   • Trigeminal neuralgia   • Hypothyroidism (acquired)   • Lung mass     Past Medical History:   Diagnosis Date   • A-fib (HCC)    • Ataxic gait    • Chronic kidney disease, stage 3 (HCC)    • COPD (chronic obstructive pulmonary disease) (HCC)    • Dementia (HCC)    • Disease of thyroid gland    • Hyperlipidemia    • Hypertension      Past Surgical History:   Procedure Laterality Date   • BRAIN TUMOR EXCISION      BENIGN   • FRACTURE SURGERY     • TRIGGER FINGER RELEASE         SLP Recommendation and Plan              Anticipated Discharge Disposition (SLP): anticipate therapy at next level of care, other (see comments) (Needs cont'd SLP services @ [facility]) (12/06/21 1415)           Daily Summary of Progress (SLP): progress toward functional goals as expected (12/06/21 1415)          Plan of Care Reviewed With: patient, durable power of  (12/06/21 1507)  Progress: no change (12/06/21 1507)      SLP EVALUATION (last 72 hours)     SLP SLC Evaluation     Row Name 12/06/21 1415                   Communication Assessment/Intervention    Document Type therapy note (daily note)  -AC        Subjective Information no complaints  -AC        Patient Observations alert; cooperative  -AC        Patient/Family/Caregiver Comments/Observations Nephew/POA present.  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  -AC         Daily Summary of Progress (SLP) progress toward functional goals as expected  -AC        Plan for Continued Treatment (SLP) continue treatment per plan of care  -AC        Care Plan Review evaluation/treatment results reviewed; care plan/treatment goals reviewed; risks/benefits reviewed; current/potential barriers reviewed; patient/other agree to care plan  -AC        Care Plan Review, Other Participant(s) family; durable/healthcare power of   -                  Recommendations    Anticipated Discharge Disposition (SLP) anticipate therapy at next level of care; other (see comments)  Needs cont'd SLP services @ [facility]  -              User Key  (r) = Recorded By, (t) = Taken By, (c) = Cosigned By    Initials Name Effective Dates     Maribell Hunt MS CCC-SLP 06/16/21 -                    EDUCATION  The patient has been educated in the following areas:     Cognitive Impairment Communication Impairment.           SLP GOALS     Row Name 12/06/21 1415             Comprehend Questions Goal 1 (SLP)    Improve Ability to Comprehend Questions Goal 1 (SLP) questions about personal information; complex yes/no questions; concrete general questions; 80%; with minimal cues (75-90%)  -AC      Time Frame (Comprehend Questions Goal 1, SLP) short term goal (STG)  -AC      Progress (Ability to Comprehend Questions Goal 1, SLP) 60%; with minimal cues (75-90%)  -AC      Progress/Outcomes (Comprehend Questions Goal 1, SLP) continuing progress toward goal  -AC              Follow Directions Goal 2 (SLP)    Improve Ability to Follow Directions Goal 1 (SLP) 3 step commands; 80%; with minimal cues (75-90%)  -AC      Time Frame (Follow Directions Goal 1, SLP) short term goal (STG)  -AC      Progress (Ability to Follow Directions Goal 1, SLP) 20%; with minimal cues (75-90%)  -AC      Progress/Outcomes (Follow Directions Goal 1, SLP) progress slower than expected  -AC              Word Retrieval Skills Goal 1 (SLP)     Improve Word Retrieval Skills By Goal 1 (SLP) confrontational naming task; low frequency; completing open ended unstructured sentence; completing a divergent task; completing a convergent task; 80%; with minimal cues (75-90%)  -AC      Time Frame (Word Retrieval Goal 1, SLP) short term goal (STG)  -AC      Progress (Word Retrieval Skills Goal 1, SLP) 60%; with minimal cues (75-90%)  -AC      Progress/Outcomes (Word Retrieval Goal 1, SLP) continuing progress toward goal  -AC      Comment (Word Retrieval Goal 1, SLP) Confrontational namin%, concrete convergent namin%  -AC              Additional Goal 1 (SLP)    Additional Goal 1, SLP LTG: pt will improve communication skills to actively participate in care w/ 80% accuracy w/ min cues  -AC      Time Frame (Additional Goal 1, SLP) by discharge  -AC      Progress/Outcomes (Additional Goal 1, SLP) continuing progress toward goal  -AC            User Key  (r) = Recorded By, (t) = Taken By, (c) = Cosigned By    Initials Name Provider Type    Maribell Urrutia MS CCC-SLP Speech and Language Pathologist                        Time Calculation:      Time Calculation- SLP     Row Name 21 1508             Time Calculation- SLP    SLP Start Time 1415  -AC      SLP Received On 21  -AC              Untimed Charges    30445-VS Treatment/ST Modification Prosth Aug Alter  39  -AC              Total Minutes    Untimed Charges Total Minutes 39  -AC       Total Minutes 39  -AC            User Key  (r) = Recorded By, (t) = Taken By, (c) = Cosigned By    Initials Name Provider Type    Maribell Urrutia MS CCC-SLP Speech and Language Pathologist                Therapy Charges for Today     Code Description Service Date Service Provider Modifiers Qty    [id number]  ST TREATMENT SPEECH 3 2021 Maribell Hunt MS CCC-SLP GN 1        Patient was not wearing a face mask and did not exhibit coughing during this therapy encounter.  Procedure performed was not  aerosolizing, involved close contact (within 6 feet for at least 15 minutes or longer), and did not involve contact with infectious secretions or specimens.  Therapist used appropriate personal protective equipment including gloves, standard procedure mask and eye protection.  Appropriate PPE was worn during the entire therapy session.  Hand hygiene was completed before and after therapy session.                Maribell Hunt MS CCC-SLP  2021    Electronically signed by Maribell Hunt MS CCC-SLP at 21 1509          Discharge Summary      Anjali Williamson DO at 21 1403              [facility] Medicine Services  DISCHARGE SUMMARY    Patient Name: Mitzi Eric  : 1935  MRN: [id number]    Date of Admission: 2021 10:54 AM  Date of Discharge:  21  Primary Care Physician: Dm Cade APRN    Consults     Date and Time Order Name Status Description    2021 11:15 AM Inpatient Palliative Care MD Consult Completed     2021 10:56 AM Inpatient Neurology Consult Stroke Completed           Hospital Course     Presenting Problem:   CVA (cerebral vascular accident) ([facility]) [I63.9]    Active Hospital Problems    Diagnosis  POA   • Cognitive decline [R41.89]  Yes   • Trigeminal neuralgia [G50.0]  Yes   • Hypothyroidism (acquired) [E03.9]  Yes   • Lung mass [R91.8]  Yes   • Delirium, acute [R41.0]  Yes   • Essential hypertension [I10]  Yes   • Fall [W19.XXXA]  Yes      Resolved Hospital Problems    Diagnosis Date Resolved POA   • CVA (cerebral vascular accident) ([facility]) [I63.9] 2021 Yes   • Acute UTI (urinary tract infection) [N39.0] 2021 Yes   • JUAN F (acute kidney injury) ([facility]) [N17.9] 2021 Yes          Hospital Course:  Mitzi Eric is a 86 y.o. female with history of dementia, hypothyroidism, prior craniotomy secondary to possible brain mass, hypertension, atrial fibrillation not on anticoagulation, COPD, lung mass, and frequent UTIs who  [facility]ed from [facility] with altered mental status, a fall and tremors found to have an UTI.     Plan:     UTI  --on admission, WBC count 14,Procal 2.46  -- UA with positive nitrates, small leukocyte esterase, 21-30 white blood cells and 4+ bacteria  -- UCx >100 Klebsiella resistant to Ampicillin  - Completed course of rocephin inpatient      Delirium  Dementia  -- likely secondary to UTI, MRI brain without acute changes  -- multiple medications likely contributing   -- Tegretol: level was high on admission.  Attempted to resume 200 mg twice daily after discussing with her POA, however more lethargic and this was discontinued   -- Buspar: Discontinued  -- Lyrica: lowered dose and frequency  -- Remeron: Discontinued  -- Trazodone: Decreased to 25 mg nightly  - Continue scheduled melatonin; Seroquel 12.5 nightly as needed added  -Nephew did meet with palliative care as well as hospice care while inpatient.     Cough - improved  - CXR with no acute findings   - Start duo-nebs; flonase; normal WBC   -Likely improvement with increased mobilization     Elevated Creatinine  Volume depletion  Hypernatremia   -- Cr initially 1.60, baseline of  0.8  -- Cr trended up due to poor PO intake now stable off of IV fluids     Fall  - plain films without fracture  - PT/OT/fall precautions; home health on discharge     Lung mass  - right apex. Noted on CT chest back in 2018 too. ED notes at the time mention prior biopsies x 2.  - my partner discussed with nephew, They are aware of this and stated that, during prior workup, they had all decided on no further workup/treatment     HTN  --Blood pressure control with starting metoprolol.  Amlodipine was discontinued.     Hypothyroid  --TSH wnl, continue home dose Synthroid     H/o Afib  -- not on AC, likely due to fall risk  -- not on any rate controlling meds, and issue with SVT overnight 12/1 and metoprolol 12.5 mg BID started with heart rate control     Hypokalemia    Trigeminal  neuralgia   -Holding Tegretol    Discharge Follow Up Recommendations for outpatient labs/diagnostics:  Facility provider 24 hours  PCP Dm Cade 1 week   Consider consulting palliative care as outpatient    Day of Discharge     HPI:   No acute overnight events.  Patient sitting and alert.  She is minimally interactive but does answer some yes or no questions.  She is able to me her nephew's name.  Long conversation with nephew regarding current medications, goals of care, plan for discharge.  He feels that her cough is improved.  He is agreeable for discharge today if [facility] is able to meet her needs.  Case management reviewed with [facility] and nephew did as well who confirms that they are able to provide patient the care that she needs.  They are aware that she is unable to transfer and needs assistance with toileting.  Home health has been arranged.    Review of Systems  Difficult to obtain due to mental status patient denies any concerns    Vital Signs:   Temp:  [98.3 °F (36.8 °C)-98.6 °F (37 °C)] 98.6 °F (37 °C)  Heart Rate:  [] 84  Resp:  [16-20] 16  BP: (113-164)/(63-96) 137/84     Physical Exam:  Constitutional: No acute distress, awake, alert; frail  HENT: NCAT, mucous membranes moist  Respiratory: Clear to auscultation bilaterally, respiratory effort; occ cough   Cardiovascular: RRR, no murmurs, rubs, or gallops  Gastrointestinal: Positive bowel sounds, soft, nontender, nondistended  Musculoskeletal: No bilateral ankle edema  Psychiatric: calm  Neurologic: Oriented x 1, strength symmetric in all extremities - generalized weakness, Cranial Nerves grossly intact to confrontation, speech clear  Skin: No rashes    Pertinent  and/or Most Recent Results     LAB RESULTS:      Lab 12/05/21  0627 12/04/21  0457 12/02/21  0953 12/01/21  0657 11/30/21  0839   WBC 6.88 7.72 5.58 6.26 7.82   HEMOGLOBIN 11.6* 12.6 12.3 11.9* 11.2*   HEMATOCRIT 34.7 38.1 36.7 36.2 33.6*   PLATELETS 108* 128* 112*  130* 112*   NEUTROS ABS  --   --  3.56 3.75 5.58   IMMATURE GRANS (ABS)  --   --  0.04 0.02 0.02   LYMPHS ABS  --   --  0.84 1.46 1.30   MONOS ABS  --   --  1.09* 0.83 0.74   EOS ABS  --   --  0.01 0.17 0.15   MCV 95.6 96.0 96.1 97.1* 96.3         Lab 12/06/21  0234 12/05/21  0627 12/04/21  0457 12/02/21  0953 12/01/21  0657 11/30/21  0839 11/30/21  0839   SODIUM 142 143 146* 140 140   < > 142   POTASSIUM 5.2 3.4* 3.3* 3.6 3.2*   < > 3.7   CHLORIDE 111* 109* 110* 107 104   < > 107   CO2 21.0* 22.0 22.0 20.0* 21.0*   < > 23.0   ANION GAP 10.0 12.0 14.0 13.0 15.0   < > 12.0   BUN 17 23 25* 8 12   < > 17   CREATININE 0.75 0.79 1.03* 0.62 0.81   < > 0.93   GLUCOSE 97 102* 119* 97 82   < > 82   CALCIUM 8.4* 8.4* 8.8 8.8 8.6   < > 8.3*   HEMOGLOBIN A1C  --   --   --   --   --   --  5.50    < > = values in this interval not displayed.                 Lab 11/30/21  0839   CHOLESTEROL 158   LDL CHOL 97   HDL CHOL 40   TRIGLYCERIDES 118             Brief Urine Lab Results  (Last result in the past 365 days)      Color   Clarity   Blood   Leuk Est   Nitrite   Protein   CREAT   Urine HCG        11/29/21 1200 Yellow   Cloudy   Trace   Small (1+)   Positive   30 mg/dL (1+)               Microbiology Results (last 10 days)     Procedure Component Value - Date/Time    Blood Culture - Blood, Arm, Right [[id number]]  (Normal) Collected: 11/29/21 1330    Lab Status: Final result Specimen: Blood from Arm, Right Updated: 12/04/21 1516     Blood Culture No growth at 5 days    Blood Culture - Blood, Arm, Right [[id number]]  (Normal) Collected: 11/29/21 1320    Lab Status: Final result Specimen: Blood from Arm, Right Updated: 12/04/21 1516     Blood Culture No growth at 5 days    Urine Culture - Urine, Urine, Catheter [[id number]]  (Abnormal)  (Susceptibility) Collected: 11/29/21 1200    Lab Status: Final result Specimen: Urine, Catheter Updated: 12/01/21 0932     Urine Culture >100,000 CFU/mL Klebsiella pneumoniae ssp pneumoniae     Susceptibility      Klebsiella pneumoniae ssp pneumoniae     JOAN     Ampicillin Resistant     Ampicillin + Sulbactam Resistant     Cefazolin Susceptible     Cefepime Susceptible     Ceftazidime Susceptible     Ceftriaxone Susceptible     Gentamicin Susceptible     Levofloxacin Susceptible     Nitrofurantoin Intermediate     Piperacillin + Tazobactam Susceptible     Tetracycline Susceptible     Trimethoprim + Sulfamethoxazole Susceptible                         COVID-19 and FLU A/B PCR - Swab, Nasopharynx [[id number]]  (Normal) Collected: 11/29/21 1131    Lab Status: Final result Specimen: Swab from Nasopharynx Updated: 11/29/21 1213     COVID19 Not Detected     Influenza A PCR Not Detected     Influenza B PCR Not Detected    Narrative:      Fact sheet for providers: https://www.fda.gov/media/155917/download    Fact sheet for patients: https://www.fda.gov/media/931413/download    Test performed by PCR.          Adult Transthoracic Echo Complete W/ Cont if Necessary Per Protocol (With Agitated Saline)    Result Date: 11/29/2021  · Left ventricular ejection fraction appears to be 61 - 65%. Left ventricular systolic function is normal. · Saline test results are negative. · Estimated right ventricular systolic pressure from tricuspid regurgitation is markedly elevated (>55 mmHg). · Moderate tricuspid valve regurgitation is present. · Left ventricular diastolic function was normal.      CT Angiogram Neck    Result Date: 11/29/2021  EXAMINATION: CT ANGIOGRAM NECK-, CT ANGIOGRAM HEAD W AI ANALYSIS OF LVO-11/29/2021:  INDICATION: Stroke, follow-up.  TECHNIQUE: CT angiogram head and neck with intravenous contrast administration. 2-D and 3-D reconstructions performed.  The radiation dose reduction device was turned on for each scan per the ALARA (As Low as Reasonably Achievable) protocol.  COMPARISON: Concurrent CT cerebral perfusion and CT head, noncontrast, stroke protocol head.  FINDINGS:  CTA NECK: Normal 3-vessel arch with  patent great vessel origins. Proximal subclavian arteries are patent. The vertebral arteries demonstrate a mild left-sided dominance of caliber without focal severe stenosis, aneurysm or occlusion. The carotids demonstrate grossly normal course and branching pattern with atherosclerotic calcific disease and plaque formations of the ICA origins producing 20% right and 10% left luminal narrowing as measured by NASCET criteria with patency of the distal internal carotid arteries to the intracranial portions discussed further below. Cervical soft tissue is unremarkable. The thyroid has a low-density left thyroid nodules without calcifications measuring up to 1 cm. The lung apices demonstrate biapical pleural parenchymal scarring with confluent opacification in the right upper lung partially imaged and may represent airspace disease although underlying mass or nodule would not be excluded.  CTA HEAD: Distal internal carotid arteries demonstrate mild-to-moderate atherosclerotic calcific disease of the distal internal carotid arteries, left greater than right, without focal severe stenosis, aneurysm or occlusion. Anterior cerebral arteries are patent without hemodynamically significant stenosis, aneurysm or occlusion. Middle cerebral arteries are patent without hemodynamically significant stenosis, aneurysm or occlusion. Vertebrobasilar system and posterior cerebral arteries are patent without hemodynamically significant stenosis, aneurysm or occlusion. Fetal origin variance of the right posterior cerebral artery. The venous structures are partially imaged and unremarkable including superior sagittal sinus widely patent.      1. No hemodynamically significant stenosis, aneurysm or occlusion throughout the CTA head and neck with plaque formation and calcific disease in the ICA origins and distal internal carotid arteries, however, no large vessel occlusion with [place] widely patent.  2.  Lung apices demonstrate  biapical pleural parenchymal scarring with confluent opacification in the right upper lung partially imaged and may represent airspace disease although underlying mass or nodule cannot be excluded. Further evaluation with CT chest recommended when clinically appropriate.  D:  11/29/2021 E:  11/29/2021  This report was finalized on 11/29/2021 4:50 PM by Dr. Mikie Alfaro.      MRI Brain With & Without Contrast    Result Date: 12/1/2021  EXAMINATION: MRI BRAIN W WO CONTRAST-11/29/2021:  INDICATION: Seizure, AMS.  TECHNIQUE: Multiplanar MRI of the brain with and without intravenous contrast administration.  COMPARISON: CT head and angiogram as well as perfusion 11/29/2021 earlier same day.  FINDINGS: No restriction on diffusion-weighted sequences to suggest acute ischemia. Midline structures are asymmetric with encephalomalacia left frontal insult and prominence of the sulci towards the vertex with prior left craniotomy findings. No susceptibility artifact on susceptibility-weighted imaging. Postcontrast sequences without abnormal enhancement of the pulmonary parenchyma. Globes and orbits are unremarkable. The paranasal sinuses and mastoid air cells are grossly clear and well pneumatized with the exception of small right and trace left mastoid effusions. Pituitary and sella within normal limits. Cervicomedullary junction widely patent.      1. Postsurgical changes and encephalomalacic involvement of the left frontal lobe. 2. No acute infarction. 3. No abnormal enhancement.  D:  11/29/2021 E:  11/29/2021     This report was finalized on 12/1/2021 1:32 PM by Dr. Mikie Alfaro.      XR Chest 1 View    Result Date: 12/3/2021  EXAMINATION: XR CHEST 1 VW-  INDICATION: Cough, hypoxia; N39.0-Urinary tract infection, site not specified; R41.0-Disorientation, unspecified; R47.1-Dysarthria and anarthria; R29.810-Facial weakness; N28.9-Disorder of kidney and ureter, unspecified; D72.829-Elevated white blood cell count, unspecified;  R47.01-Aphasia.  COMPARISON: 11/29/2021  FINDINGS: Heart and vasculature appear normal in size. There are chronic changes of centrilobular emphysema, with hyperlucent upper lungs and coarsening of the basilar interstitial markings which appears similar to the prior study. Right apical lung mass, known since at least 2018, appear stable. No new pulmonary parenchymal disease, evidence of edema, effusion or pneumothorax is seen.      Stable right apical lung mass and chronic appearing changes of centrilobular emphysema. No clearly new chest pathology is identified.   D:  12/03/2021 E:  12/03/2021  This report was finalized on 12/3/2021 10:01 PM by Dr. Jorge Foote MD.      XR Chest 1 View    Result Date: 11/29/2021  EXAMINATION: XR CHEST 1 VW- 11/29/2021  INDICATION: Acute Stroke Protocol (onset < 12 hrs)  COMPARISON: Chest x-ray 11/05/2021  FINDINGS: Hyperinflated appearance bilateral with opacifications at the right lung apex similar to prior comparison 11/05/2021 likely atelectasis or scarring without pleural effusion.      Chronic changes including atelectasis or scarring somewhat confluent at the right lung apex similar to 11/05/2021 comparison without effusion.  D: 11/29/2021 E: 11/29/2021  This report was finalized on 11/29/2021 4:51 PM by Dr. Mikie Alfaro.      CT Head Without Contrast Stroke Protocol    Result Date: 11/29/2021  EXAMINATION: CT HEAD WO CONTRAST, STROKE PROTOCOL-11/29/2021:  INDICATION: Neuro deficit, acute, stroke suspected.  TECHNIQUE: CT head without intravenous contrast following stroke protocol.  The radiation dose reduction device was turned on for each scan per the ALARA (As Low as Reasonably Achievable) protocol.  COMPARISON: CT head dated 11/05/2021.  FINDINGS: Prior left frontal craniotomy and encephalomalacia in the left frontal lobe without acute findings. No midline shift or hydrocephalus. No intra-axial hemorrhage or extra-axial fluid collection. Globes and orbits are normal.      No  acute intracranial finding specifically, no acute intracranial hemorrhage.  Scan performed on 11/29/2021 at 1055 hours. Scan report given to ER physician and stroke team in person at scanner by Dr. Alfaro on 11/29/2021 at 1105 hours.  D:  11/29/2021 E:  11/29/2021  This report was finalized on 11/29/2021 4:50 PM by Dr. Mikie Alfaro.      CT Angiogram Head w AI Analysis of LVO    Result Date: 11/29/2021  EXAMINATION: CT ANGIOGRAM NECK-, CT ANGIOGRAM HEAD W AI ANALYSIS OF LVO-11/29/2021:  INDICATION: Stroke, follow-up.  TECHNIQUE: CT angiogram head and neck with intravenous contrast administration. 2-D and 3-D reconstructions performed.  The radiation dose reduction device was turned on for each scan per the ALARA (As Low as Reasonably Achievable) protocol.  COMPARISON: Concurrent CT cerebral perfusion and CT head, noncontrast, stroke protocol head.  FINDINGS:  CTA NECK: Normal 3-vessel arch with patent great vessel origins. Proximal subclavian arteries are patent. The vertebral arteries demonstrate a mild left-sided dominance of caliber without focal severe stenosis, aneurysm or occlusion. The carotids demonstrate grossly normal course and branching pattern with atherosclerotic calcific disease and plaque formations of the ICA origins producing 20% right and 10% left luminal narrowing as measured by NASCET criteria with patency of the distal internal carotid arteries to the intracranial portions discussed further below. Cervical soft tissue is unremarkable. The thyroid has a low-density left thyroid nodules without calcifications measuring up to 1 cm. The lung apices demonstrate biapical pleural parenchymal scarring with confluent opacification in the right upper lung partially imaged and may represent airspace disease although underlying mass or nodule would not be excluded.  CTA HEAD: Distal internal carotid arteries demonstrate mild-to-moderate atherosclerotic calcific disease of the distal internal carotid arteries,  left greater than right, without focal severe stenosis, aneurysm or occlusion. Anterior cerebral arteries are patent without hemodynamically significant stenosis, aneurysm or occlusion. Middle cerebral arteries are patent without hemodynamically significant stenosis, aneurysm or occlusion. Vertebrobasilar system and posterior cerebral arteries are patent without hemodynamically significant stenosis, aneurysm or occlusion. Fetal origin variance of the right posterior cerebral artery. The venous structures are partially imaged and unremarkable including superior sagittal sinus widely patent.      1. No hemodynamically significant stenosis, aneurysm or occlusion throughout the CTA head and neck with plaque formation and calcific disease in the ICA origins and distal internal carotid arteries, however, no large vessel occlusion with [place] widely patent.  2.  Lung apices demonstrate biapical pleural parenchymal scarring with confluent opacification in the right upper lung partially imaged and may represent airspace disease although underlying mass or nodule cannot be excluded. Further evaluation with CT chest recommended when clinically appropriate.  D:  11/29/2021 E:  11/29/2021  This report was finalized on 11/29/2021 4:50 PM by Dr. Mikie Alfaro.      CT CEREBRAL PERFUSION WITH & WITHOUT CONTRAST    Result Date: 11/29/2021  EXAMINATION: CT CEREBRAL PERFUSION W WO CONTRAST- 11/29/2021  INDICATION: Neuro deficit, acute, stroke suspected  TECHNIQUE: CT cerebral perfusion with and without intravenous contrast administration. Multiple parametric maps including mean transit time, time to drain, cerebral blood flow and cerebral blood volume performed.  The radiation dose reduction device was turned on for each scan per the ALARA (As Low as Reasonably Achievable) protocol.  COMPARISON: CT noncontrast stroke protocol and concurrent CT angiogram  FINDINGS: Area of artifact from prior insult left frontal region. No  perfusion defect otherwise identified specifically no reversible ischemia within a specific vascular territory.      No reversible ischemia within a specific vascular territory.  D: 11/29/2021 E: 11/29/2021   This report was finalized on 11/29/2021 4:50 PM by Dr. Mikie Alfaro.      XR Hip With or Without Pelvis 2 - 3 View Left    Result Date: 11/29/2021  EXAMINATION: XR HIP W OR WO PELVIS 2-3 VIEW LEFT- 11/29/2021  INDICATION: fall  COMPARISON: Hip x-ray 10/21/2021  FINDINGS: SI joints symmetric and without widening. No displaced pelvic ring fracture. 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 Left hip without acute displaced fracture remaining well located at the left hip joint.      No displaced left rib fracture or displaced pelvic ring fracture with left hip remaining well located in the left acetabular cup.  D: 11/29/2021 E: 11/29/2021  This report was finalized on 11/29/2021 4:51 PM by Dr. Mikie Alfaro.                Results for orders placed during the hospital encounter of 11/29/21    Adult Transthoracic Echo Complete W/ Cont if Necessary Per Protocol (With Agitated Saline)    Interpretation Summary  · Left ventricular ejection fraction appears to be 61 - 65%. Left ventricular systolic function is normal.  · Saline test results are negative.  · Estimated right ventricular systolic pressure from tricuspid regurgitation is markedly elevated (>55 mmHg).  · Moderate tricuspid valve regurgitation is present.  · Left ventricular diastolic function was normal.      Plan for Follow-up of Pending Labs/Results:     Discharge Details        Discharge Medications      New Medications      Instructions Start Date   benzonatate 200 MG capsule  Commonly known as: TESSALON   200 mg, Oral, 3 Times Daily PRN      fluticasone 50 MCG/ACT nasal spray  Commonly known as: FLONASE   2 sprays, Each Nare, Daily   Start Date: December 7, 2021     ipratropium-albuterol 0.5-2.5 mg/3 ml nebulizer  Commonly known as: DUO-NEB   3 mL, Nebulization,  4 Times Daily - RT      lactobacillus acidophilus capsule capsule   1 capsule, Oral, Daily   Start Date: December 7, 2021     melatonin 5 MG tablet tablet   5 mg, Oral, Nightly      metoprolol tartrate 25 MG tablet  Commonly known as: LOPRESSOR   12.5 mg, Oral, Every 12 Hours Scheduled      QUEtiapine 25 MG tablet  Commonly known as: SEROquel   12.5 mg, Oral, Nightly PRN         Changes to Medications      Instructions Start Date   pregabalin 25 MG capsule  Commonly known as: LYRICA  What changed:   · medication strength  · how much to take  · when to take this   25 mg, Oral, Every 12 Hours Scheduled      traZODone 50 MG tablet  Commonly known as: DESYREL  What changed: how much to take   25 mg, Oral, Nightly         Continue These Medications      Instructions Start Date   acetaminophen 325 MG tablet  Commonly known as: TYLENOL   650 mg, Oral, 3 Times Daily      acetaminophen 325 MG tablet  Commonly known as: TYLENOL   650 mg, Oral, Every 4 Hours PRN      aspirin 81 MG EC tablet   81 mg, Oral, Daily      budesonide-formoterol 160-4.5 MCG/ACT inhaler  Commonly known as: SYMBICORT   2 puffs, Inhalation, 2 Times Daily - RT      cetirizine 10 MG tablet  Commonly known as: zyrTEC   10 mg, Oral, Daily      docusate sodium 100 MG capsule   100 mg, Oral, 2 Times Daily      folic acid 400 MCG tablet  Commonly known as: FOLVITE   400 mcg, Oral, 2 Times Daily      levothyroxine 25 MCG tablet  Commonly known as: SYNTHROID, LEVOTHROID   25 mcg, Oral, Daily      lovastatin 40 MG tablet  Commonly known as: MEVACOR   40 mg, Oral, Nightly      pantoprazole 20 MG EC tablet  Commonly known as: PROTONIX   20 mg, Oral, Daily         Stop These Medications    amLODIPine 10 MG tablet  Commonly known as: NORVASC     busPIRone 7.5 MG tablet  Commonly known as: BUSPAR     carBAMazepine  MG 12 hr tablet  Commonly known as: TEGretol  XR     dronabinol 2.5 MG capsule  Commonly known as: MARINOL     mirtazapine 15 MG tablet  Commonly  known as: REMERON            Allergies   Allergen Reactions   • Bee Pollen Unknown - Low Severity         Discharge Disposition:  Long Term Care (DC - External)    Diet:  Hospital:  Diet Order   Procedures   • Diet Regular; Thin       Activity:  Activity Instructions     Activity as Tolerated            Restrictions or Other Recommendations:         CODE STATUS:    Code Status and Medical Interventions:   Ordered at: 21 1115     Medical Intervention Limits:    NO intubation (DNI)     Level Of Support Discussed With:    Health Care Surrogate     Code Status (Patient has no pulse and is not breathing):    No CPR (Do Not Attempt to Resuscitate)     Medical Interventions (Patient has pulse or is breathing):    Limited Support       Future Appointments   Date Time Provider Department Center   2022  9:30 AM Robin Guo MD [facility]       Additional Instructions for the Follow-ups that You Need to Schedule     Discharge Follow-up with PCP   As directed       Currently Documented PCP:    Dm Cade APRN    PCP Phone Number:    [phone]     Follow Up Details: PCP Dm Cade 1 week                     Anjali Williamson DO  21      Time Spent on Discharge:  I spent  35  minutes on this discharge activity which included: face-to-face encounter with the patient, reviewing the data in the system, coordination of the care with the nursing staff as well as consultants, documentation, and entering orders.            Electronically signed by Anjali Williamson DO at 21 1409       [address] [address]  Phone:  [phone]  Fax:  [phone] Date: Dec 6, 2021      Ambulatory Referral to Home Health     Patient:  Mitzi Eric MRN:  [id number]   [address]  [address]  [place] [address] :  1935  SSN:    Phone: [phone] Sex:  F      INSURANCE PAYOR PLAN GROUP # SUBSCRIBER ID   Primary:    [employer]  REPLACEMENT [id number]      Referring Provider Information:  ANJALI WILLIAMSON Phone: [phone] Fax: [phone]      Referral Information:   # Visits:  999 Referral Type: Home Health [42]   Urgency:  Routine Referral Reason: Specialty Services Required   Start Date: Dec 6, 2021 End Date:  To be determined by Insurer   Diagnosis: Cognitive communication deficit (R41.841 [ICD-10-CM] 799.52 [ICD-9-CM])      Refer to Dept:   Refer to Provider:   Refer to Facility:  [facility]     Face to Face Visit Date: 12/6/2021  Follow-up provider for Plan of Care? I treated the patient in an acute care facility and will not continue treatment after discharge.  Follow-up provider: FREDI HINSON [[id number]]  Reason/Clinical Findings: Acute delerium  Describe mobility limitations that make leaving home difficult: Impaired physical mobility and gait endurance.  Nursing/Therapeutic Services Requested: Speech Therapy  SLP orders: Articulation  SLP orders: Cognition  Frequency: 1 Week 1     This document serves as a request of services and does not constitute Insurance authorization or approval of services.  To determine eligibility, please contact the members Insurance carrier to verify and review coverage.     If you have medical questions regarding this request for services. Please contact [address] at [phone] during normal business hours.         Authorizing Provider:Anjali Williamson DO  Authorizing Provider's NPI: [id number]  Order Entered By: Janet Cooper RN 12/6/2021  3:13 PM     Electronically signed by: Anjali Williamson DO 12/6/2021  3:13 PM

## 2021-12-06 NOTE — DISCHARGE PLACEMENT REQUEST
"Mitzi Zapata (86 y.o. Female)     Referred 12/6/2021 by Dr. ANN Welsh  PCP-Dm Talbot APRN  Dx-CVA  Tested negative for covid 19 on 11/29/2021        Date of Birth Social Security Number Address Home Phone MRN    1935  [address]  [address]  [place] [id number] [phone] [id number]    Taoist Marital Status             Scientologist Single       Admission Date Admission Type Admitting Provider Attending Provider Department, Room/Bed    11/29/21 Emergency Anjali Williamson DO Roesch, Lyndsey, DO [facility] 3F, S302/1    Discharge Date Discharge Disposition Discharge Destination                         Attending Provider: nAjali Williamson DO    Allergies: Bee Pollen    Isolation: None   Infection: None   Code Status: No CPR   Advance Care Planning Activity    Ht: 157.5 cm (62\")   Wt: 50.8 kg (112 lb)    Admission Cmt: None   Principal Problem: None                Active Insurance as of 11/29/2021     Primary Coverage     Payor Plan Insurance Group Employer/Plan Group    HUMANA MEDICARE REPLACEMENT HUMANA MEDICARE REPLACEMENT [id number]     Payor Plan Address Payor Plan Phone Number Payor Plan Fax Number Effective Dates    [address] [address] [phone]  1/1/2018 - None Entered    [facility] [phone]       Subscriber Name Subscriber Birth Date Member ID       VITA ZAPATAALE JIMENEZ 1935 [id number]                 Emergency Contacts      (Rel.) Home Phone Work Phone Mobile Phone    Lane Zapata (Power of ) [phone] -- --            Emergency Contact Information     Name Relation Home Work Mobile    Lane Zapata Power of  [phone]            Insurance Information                HUMANA MEDICARE REPLACEMENT/HUMANA MEDICARE REPLACEMENT Phone: [phone]    Subscriber: Mitzi Zapata Subscriber#: [id number]    Group#: [id number] Precert#: [id number]             History & Physical      Prem Whipple MD at 11/29/21 1530              [facility] " "Hospital Medicine Services  HISTORY AND PHYSICAL    Patient Name: Mitzi Eric  : 1935  MRN: [id number]  Primary Care Physician: Provider, No Known  Date of admission: 2021      Subjective   Subjective     Chief Complaint: fall, confusion    HPI:  Mitzi Eric is a 86 y.o. female history of dementia, prior craniotomy secondary to possible brain mass, hypertension, atrial fibrillation, COPD, lung mass, and frequent UTIs who presents from [facility] with altered mental status, a fall and tremors.  Most history obtained from the chart and from her son as the patient is confused at present.  Patient apparently fell this morning at approximately 6:30 AM.  She was noted to be more confused than baseline and her son said she was \"twitching all over.\"      COVID Details:    Symptoms:    [] NONE [] Fever []  Cough [] Shortness of breath [] Change in taste/smell      The patient has started, but not completed, their COVID-19 vaccination series.      Review of Systems   Unable to obtain due to patient confusion       Personal History     Past Medical History:   Diagnosis Date   • A-fib (HCC)    • Ataxic gait    • Chronic kidney disease, stage 3 (HCC)    • COPD (chronic obstructive pulmonary disease) (HCC)    • Dementia (HCC)    • Disease of thyroid gland    • Hyperlipidemia    • Hypertension        Past Surgical History:   Procedure Laterality Date   • BRAIN TUMOR EXCISION      BENIGN   • FRACTURE SURGERY     • TRIGGER FINGER RELEASE         Family History: Unable to obtain due to patient altered mental status    Social History:  reports that she has quit smoking. She has never used smokeless tobacco. She reports that she does not drink alcohol and does not use drugs.  Social History     Social History Narrative   • Not on file       Medications:  Available home medication information reviewed.  Medications Prior to Admission   Medication Sig Dispense Refill Last Dose   • acetaminophen (TYLENOL) 325 MG " tablet Take 650 mg by mouth 3 (Three) Times a Day.      • amLODIPine (NORVASC) 10 MG tablet Take 10 mg by mouth Daily.      • budesonide-formoterol (SYMBICORT) 160-4.5 MCG/ACT inhaler Inhale 2 puffs 2 (Two) Times a Day.      • busPIRone (BUSPAR) 7.5 MG tablet Take 7.5 mg by mouth 2 (Two) Times a Day.      • carBAMazepine XR (TEGretol  XR) 400 MG 12 hr tablet Take 400 mg by mouth 2 (Two) Times a Day.      • cetirizine (zyrTEC) 10 MG tablet Take 10 mg by mouth Daily.      • docusate sodium 100 MG capsule Take 100 mg by mouth 2 (Two) Times a Day.      • folic acid (FOLVITE) 400 MCG tablet Take 400 mcg by mouth 2 (Two) Times a Day.      • levothyroxine (SYNTHROID, LEVOTHROID) 25 MCG tablet Take 25 mcg by mouth Daily.      • mirtazapine (REMERON) 15 MG tablet Take 15 mg by mouth Every Night.      • pantoprazole (PROTONIX) 20 MG EC tablet Take 20 mg by mouth Daily.      • pregabalin (LYRICA) 50 MG capsule Take 50 mg by mouth 3 (Three) Times a Day.      • traZODone (DESYREL) 50 MG tablet Take 50 mg by mouth Every Night.      • acetaminophen (TYLENOL) 325 MG tablet Take 2 tablets by mouth Every 4 (Four) Hours As Needed for Mild Pain . 1 bottle 0    • aspirin 81 MG EC tablet Take 81 mg by mouth Daily.      • dronabinol (MARINOL) 2.5 MG capsule Take 1 capsule by mouth 2 (Two) Times a Day Before Meals. 6 capsule 0    • lovastatin (MEVACOR) 40 MG tablet Take 40 mg by mouth Every Night.          Allergies   Allergen Reactions   • Bee Pollen Unknown - Low Severity       Objective   Objective     Vital Signs:   Temp:  [97.6 °F (36.4 °C)-98.3 °F (36.8 °C)] 97.6 °F (36.4 °C)  Heart Rate:  [78-96] 79  Resp:  [18-20] 18  BP: (105-130)/(69-77) 105/77  Total (NIH Stroke Scale): 4    Physical Exam   Constitutional -frail female, in bed  HEENT-NCAT, mucous membranes dry  CV-RRR, S1 S2 normal, no m/r/g  Resp-CTAB, no wheezes, rhonchi or rales  Abd-soft, nontender, nondistended, normoactive bowel sounds  Ext-No lower extremity cyanosis,  clubbing or edema bilaterally  Neuro-alert but confused, speech clear, moves all extremities   Psych-slightly anxious affect   Skin- No rash on exposed UE or LE bilaterally      Result Review:  I have personally reviewed the results from the time of this admission to 11/29/2021 15:30 EST and agree with these findings:  [x]  Laboratory  []  Microbiology  []  Radiology  []  EKG/Telemetry   []  Cardiology/Vascular   []  Pathology  []  Old records  []  Other:  Most notable findings include: WBC count 14      LAB RESULTS:      Lab 11/29/21  1123 11/29/21  1115 11/29/21  1111 11/29/21  1110   WBC  --  14.60*  --   --    HEMOGLOBIN  --  11.9*  --   --    HEMOGLOBIN, POC  --   --   --  11.2*   HEMATOCRIT  --  35.6  --   --    HEMATOCRIT POC  --   --   --  33*   PLATELETS  --  136*  --   --    NEUTROS ABS  --  11.60*  --   --    IMMATURE GRANS (ABS)  --  0.06*  --   --    LYMPHS ABS  --  1.74  --   --    MONOS ABS  --  1.07*  --   --    EOS ABS  --  0.08  --   --    MCV  --  95.7  --   --    PROCALCITONIN  --  2.46*  --   --    LACTATE  --  1.4  --   --    PROTIME  --   --  15.2  --    INR  --   --  1.3*  --    APTT 38.1  --   --   --          Lab 11/29/21  1115 11/29/21  1108   SODIUM 141  --    POTASSIUM 4.1  --    CHLORIDE 103  --    CO2 28.0  --    ANION GAP 10.0  --    BUN 29*  --    CREATININE 1.52* 1.60*   GLUCOSE 86  --    CALCIUM 8.8  --    MAGNESIUM 2.3  --    TSH 1.240  --          Lab 11/29/21  1115   TOTAL PROTEIN 6.3   ALBUMIN 4.00   GLOBULIN 2.3   ALT (SGPT) 10   AST (SGOT) 20   BILIRUBIN 0.4   ALK PHOS 77         Lab 11/29/21  1115   TROPONIN T <0.010                 Lab 11/29/21  1110   PH, ARTERIAL 7.40     UA    Urinalysis 11/5/21 11/29/21 11/29/21     1200 1200   Squamous Epithelial Cells, UA   None Seen   Specific [place], UA 1.012 1.026    Ketones, UA Negative Negative    Blood, UA Negative Trace (A)    Leukocytes, UA Negative Small (1+) (A)    Nitrite, UA Negative Positive (A)    RBC, UA   0-2   WBC,  UA   21-30 (A)   Bacteria, UA   4+ (A)   (A) Abnormal value              Microbiology Results (last 10 days)     Procedure Component Value - Date/Time    COVID-19 and FLU A/B PCR - Swab, Nasopharynx [[id number]]  (Normal) Collected: 11/29/21 1131    Lab Status: Final result Specimen: Swab from Nasopharynx Updated: 11/29/21 1213     COVID19 Not Detected     Influenza A PCR Not Detected     Influenza B PCR Not Detected    Narrative:      Fact sheet for providers: https://www.fda.gov/media/241238/download    Fact sheet for patients: https://www.fda.gov/media/752458/download    Test performed by PCR.          CT Angiogram Neck    Result Date: 11/29/2021  EXAMINATION: CT ANGIOGRAM NECK-, CT ANGIOGRAM HEAD W AI ANALYSIS OF LVO-11/29/2021:  INDICATION: Stroke, follow-up.  TECHNIQUE: CT angiogram head and neck with intravenous contrast administration. 2-D and 3-D reconstructions performed.  The radiation dose reduction device was turned on for each scan per the ALARA (As Low as Reasonably Achievable) protocol.  COMPARISON: Concurrent CT cerebral perfusion and CT head, noncontrast, stroke protocol head.  FINDINGS:  CTA NECK: Normal 3-vessel arch with patent great vessel origins. Proximal subclavian arteries are patent. The vertebral arteries demonstrate a mild left-sided dominance of caliber without focal severe stenosis, aneurysm or occlusion. The carotids demonstrate grossly normal course and branching pattern with atherosclerotic calcific disease and plaque formations of the ICA origins producing 20% right and 10% left luminal narrowing as measured by NASCET criteria with patency of the distal internal carotid arteries to the intracranial portions discussed further below. Cervical soft tissue is unremarkable. The thyroid has a low-density left thyroid nodules without calcifications measuring up to 1 cm. The lung apices demonstrate biapical pleural parenchymal scarring with confluent opacification in the right upper lung  partially imaged and may represent airspace disease although underlying mass or nodule would not be excluded.  CTA HEAD: Distal internal carotid arteries demonstrate mild-to-moderate atherosclerotic calcific disease of the distal internal carotid arteries, left greater than right, without focal severe stenosis, aneurysm or occlusion. Anterior cerebral arteries are patent without hemodynamically significant stenosis, aneurysm or occlusion. Middle cerebral arteries are patent without hemodynamically significant stenosis, aneurysm or occlusion. Vertebrobasilar system and posterior cerebral arteries are patent without hemodynamically significant stenosis, aneurysm or occlusion. Fetal origin variance of the right posterior cerebral artery. The venous structures are partially imaged and unremarkable including superior sagittal sinus widely patent.      Impression: 1. No hemodynamically significant stenosis, aneurysm or occlusion throughout the CTA head and neck with plaque formation and calcific disease in the ICA origins and distal internal carotid arteries, however, no large vessel occlusion with [place] widely patent.  2.  Lung apices demonstrate biapical pleural parenchymal scarring with confluent opacification in the right upper lung partially imaged and may represent airspace disease although underlying mass or nodule cannot be excluded. Further evaluation with CT chest recommended when clinically appropriate.  D:  11/29/2021 E:  11/29/2021       MRI Brain With & Without Contrast    Result Date: 11/29/2021  EXAMINATION: MRI BRAIN W WO CONTRAST-  INDICATION: seizure, ams  TECHNIQUE: Multiplanar of the brain with and without intravenous contrast ministration  COMPARISON: CT head and angiogram as well as perfusion 11/29/2021 earlier same day  FINDINGS: No restriction on diffusion-weighted sequences to suggest acute ischemia. Midline structures are asymmetric with septal malacia left frontal insult and  prominence of the sulci towards the vertex with prior left craniotomy findings. No susceptibility artifact on susceptibility weighted imaging. Postcontrast sequences without abnormal enhancement of the pulmonary parenchyma. Globes and orbits unremarkable. Paranasal sinuses and mastoid cells are grossly clear well-pneumatized with the exception of small right and trace left mastoid effusions. Pituitary and sella within normal limits. Cervicomedullary junction widely patent      Impression: 1. Postsurgical changes and encephalomalacia involvement of the left frontal lobe. 2. No acute infarction 3. No abnormal enhancement        XR Chest 1 View    Result Date: 11/29/2021  EXAMINATION: XR CHEST 1 VW- 11/29/2021  INDICATION: Acute Stroke Protocol (onset < 12 hrs)  COMPARISON: Chest x-ray 11/05/2021  FINDINGS: Hyperinflated appearance bilateral with opacifications at the right lung apex similar to prior comparison 11/05/2021 likely atelectasis or scarring without pleural effusion.      Impression: Chronic changes including atelectasis or scarring somewhat confluent at the right lung apex similar to 11/05/2021 comparison without effusion.  D: 11/29/2021 E: 11/29/2021       CT Head Without Contrast Stroke Protocol    Result Date: 11/29/2021  EXAMINATION: CT HEAD WO CONTRAST, STROKE PROTOCOL-11/29/2021:  INDICATION: Neuro deficit, acute, stroke suspected.  TECHNIQUE: CT head without intravenous contrast following stroke protocol.  The radiation dose reduction device was turned on for each scan per the ALARA (As Low as Reasonably Achievable) protocol.  COMPARISON: CT head dated 11/05/2021.  FINDINGS: Prior left frontal craniotomy and encephalomalacia in the left frontal lobe without acute findings. No midline shift or hydrocephalus. No intra-axial hemorrhage or extra-axial fluid collection. Globes and orbits are normal.      Impression: No acute intracranial finding specifically, no acute intracranial hemorrhage.  Scan  performed on 11/29/2021 at 1055 hours. Scan report given to ER physician and stroke team in person at scanner by Dr. Alfaro on 11/29/2021 at 1105 hours.  D:  11/29/2021 E:  11/29/2021       CT Angiogram Head w AI Analysis of LVO    Result Date: 11/29/2021  EXAMINATION: CT ANGIOGRAM NECK-, CT ANGIOGRAM HEAD W AI ANALYSIS OF LVO-11/29/2021:  INDICATION: Stroke, follow-up.  TECHNIQUE: CT angiogram head and neck with intravenous contrast administration. 2-D and 3-D reconstructions performed.  The radiation dose reduction device was turned on for each scan per the ALARA (As Low as Reasonably Achievable) protocol.  COMPARISON: Concurrent CT cerebral perfusion and CT head, noncontrast, stroke protocol head.  FINDINGS:  CTA NECK: Normal 3-vessel arch with patent great vessel origins. Proximal subclavian arteries are patent. The vertebral arteries demonstrate a mild left-sided dominance of caliber without focal severe stenosis, aneurysm or occlusion. The carotids demonstrate grossly normal course and branching pattern with atherosclerotic calcific disease and plaque formations of the ICA origins producing 20% right and 10% left luminal narrowing as measured by NASCET criteria with patency of the distal internal carotid arteries to the intracranial portions discussed further below. Cervical soft tissue is unremarkable. The thyroid has a low-density left thyroid nodules without calcifications measuring up to 1 cm. The lung apices demonstrate biapical pleural parenchymal scarring with confluent opacification in the right upper lung partially imaged and may represent airspace disease although underlying mass or nodule would not be excluded.  CTA HEAD: Distal internal carotid arteries demonstrate mild-to-moderate atherosclerotic calcific disease of the distal internal carotid arteries, left greater than right, without focal severe stenosis, aneurysm or occlusion. Anterior cerebral arteries are patent without hemodynamically  significant stenosis, aneurysm or occlusion. Middle cerebral arteries are patent without hemodynamically significant stenosis, aneurysm or occlusion. Vertebrobasilar system and posterior cerebral arteries are patent without hemodynamically significant stenosis, aneurysm or occlusion. Fetal origin variance of the right posterior cerebral artery. The venous structures are partially imaged and unremarkable including superior sagittal sinus widely patent.      Impression: 1. No hemodynamically significant stenosis, aneurysm or occlusion throughout the CTA head and neck with plaque formation and calcific disease in the ICA origins and distal internal carotid arteries, however, no large vessel occlusion with [place] widely patent.  2.  Lung apices demonstrate biapical pleural parenchymal scarring with confluent opacification in the right upper lung partially imaged and may represent airspace disease although underlying mass or nodule cannot be excluded. Further evaluation with CT chest recommended when clinically appropriate.  D:  11/29/2021 E:  11/29/2021       CT CEREBRAL PERFUSION WITH & WITHOUT CONTRAST    Result Date: 11/29/2021  EXAMINATION: CT CEREBRAL PERFUSION W WO CONTRAST- 11/29/2021  INDICATION: Neuro deficit, acute, stroke suspected  TECHNIQUE: CT cerebral perfusion with and without intravenous contrast administration. Multiple parametric maps including mean transit time, time to drain, cerebral blood flow and cerebral blood volume performed.  The radiation dose reduction device was turned on for each scan per the ALARA (As Low as Reasonably Achievable) protocol.  COMPARISON: CT noncontrast stroke protocol and concurrent CT angiogram  FINDINGS: Area of artifact from prior insult left frontal region. No perfusion defect otherwise identified specifically no reversible ischemia within a specific vascular territory.      Impression: No reversible ischemia within a specific vascular territory.  D:  11/29/2021 E: 11/29/2021       XR Hip With or Without Pelvis 2 - 3 View Left    Result Date: 11/29/2021  EXAMINATION: XR HIP W OR WO PELVIS 2-3 VIEW LEFT- 11/29/2021  INDICATION: fall  COMPARISON: Hip x-ray 10/21/2021  FINDINGS: SI joints symmetric and without widening. No displaced pelvic ring fracture. 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 Left hip without acute displaced fracture remaining well located at the left hip joint.      Impression: No displaced left rib fracture or displaced pelvic ring fracture with left hip remaining well located in the left acetabular cup.  D: 11/29/2021 E: 11/29/2021             Assessment/Plan   Assessment & Plan     Active Hospital Problems    Diagnosis  POA   • CVA (cerebral vascular accident) ([facility]) [I63.9]  Yes       UTI  -WBC count 14,Procal 2.46, UA with positive nitrates, small leukocyte esterase, 21-30 white blood cells and 4+ bacteria  -Rocephin    Delirium  - likely secondary to UTI, MRI brain without acute changes  - patient on multiple sedating meds, which may contribute to AMS in setting of JUAN F  -- Tegretol: hold, check level  -- Buspar: hold  -- Lyrica: lower dose and frequency  -- Remeron: continue HS dose  -- Trazodone: continue HS dose    JUAN F  Volume depletion  - IV fluids  - bmp am    Dementia    Fall  - plain films without fracture  - PT/OT/fall precautions    Lung mass  - right apex. Noted on CT chest back in 2018 too. ED notes at the time mention prior biopsies x 2.  - Unable to discuss with family today, suggest discussing with son tomorrow.    HTN    Hypothyroid    DVT prophylaxis:  heparin      CODE STATUS:  Full per son.  Discussed with son that he may wish to consider an advance directive in the near future  Code Status and Medical Interventions:   Ordered at: 11/29/21 1528     Level Of Support Discussed With:    Next of Kin (If No Surrogate)     Code Status (Patient has no pulse and is not breathing):    CPR (Attempt to Resuscitate)     Medical Interventions  (Patient has pulse or is breathing):    Full Support       Admission Status:  I believe this patient meets OBSERVATION status, however if further evaluation or treatment plans warrant, status may change.  Based upon current information, I predict patient's care encounter to be less than or equal to 2 midnights.      Prem Whipple MD  11/29/21      Electronically signed by Prem Whipple MD at 11/29/21 1609         Current Facility-Administered Medications   Medication Dose Route Frequency Provider Last Rate Last Admin   • acetaminophen (TYLENOL) tablet 650 mg  650 mg Oral Q4H PRN Prem Whipple MD   650 mg at 12/05/21 2137    Or   • acetaminophen (TYLENOL) 160 MG/5ML solution 650 mg  650 mg Oral Q4H PRN Prem Whipple MD        Or   • acetaminophen (TYLENOL) suppository 650 mg  650 mg Rectal Q4H PRN Prem Whipple MD       • benzonatate (TESSALON) capsule 200 mg  200 mg Oral TID PRN Anjali Williamson DO   200 mg at 12/05/21 2155   • budesonide-formoterol (SYMBICORT) 160-4.5 MCG/ACT inhaler 2 puff  2 puff Inhalation BID - RT Prem Whipple MD   2 puff at 12/05/21 1921   • cetirizine (zyrTEC) tablet 5 mg  5 mg Oral Daily Prem Whipple MD   5 mg at 12/06/21 0819   • docusate sodium (COLACE) capsule 100 mg  100 mg Oral BID Prem Whipple MD   100 mg at 12/06/21 0819   • fluticasone (FLONASE) 50 MCG/ACT nasal spray 2 spray  2 spray Each Nare Daily Anjali Williamson DO   2 spray at 12/06/21 0818   • heparin (porcine) 5000 UNIT/ML injection 5,000 Units  5,000 Units Subcutaneous Q8H Prem Whipple MD   5,000 Units at 12/06/21 0701   • ipratropium-albuterol (DUO-NEB) nebulizer solution 3 mL  3 mL Nebulization 4x Daily - RT Anjali Williamson DO   3 mL at 12/05/21 1921   • lactobacillus acidophilus (RISAQUAD) capsule 1 capsule  1 capsule Oral Daily Anjali Williamson DO   1 capsule at 12/06/21 0819   • levothyroxine (SYNTHROID, LEVOTHROID) tablet 25 mcg  25 mcg Oral Q AM Prem Whipple MD   25 mcg at 12/06/21  0702   • melatonin tablet 5 mg  5 mg Oral Nightly Anjali Williamson, DO   5 mg at 12/05/21 2137   • metoprolol tartrate (LOPRESSOR) half tablet 12.5 mg  12.5 mg Oral Q12H Anjali Williamson, DO   12.5 mg at 12/06/21 0819   • ondansetron (ZOFRAN) tablet 4 mg  4 mg Oral Q6H PRN Prem Whipple MD        Or   • ondansetron (ZOFRAN) injection 4 mg  4 mg Intravenous Q6H PRN Prem Whipple MD       • potassium chloride (MICRO-K) CR capsule 40 mEq  40 mEq Oral PRN Yu Cesar MD   40 mEq at 12/05/21 1913    Or   • potassium chloride (KLOR-CON) packet 40 mEq  40 mEq Oral PRN Yu Cesar MD        Or   • potassium chloride 10 mEq in 100 mL IVPB  10 mEq Intravenous Q1H PRN Yu Cesar MD       • pregabalin (LYRICA) capsule 25 mg  25 mg Oral Q12H Prem Whipple MD   25 mg at 12/06/21 0819   • QUEtiapine (SEROquel) tablet 12.5 mg  12.5 mg Oral Nightly PRN Anjali Williamson, DO   12.5 mg at 12/02/21 2205   • sodium chloride 0.9 % flush 10 mL  10 mL Intravenous PRN Jonas Wayne MD       • sodium chloride 0.9 % flush 10 mL  10 mL Intravenous Q12H Jessica Hernandez, APRN   10 mL at 12/06/21 0820   • sodium chloride 0.9 % flush 10 mL  10 mL Intravenous PRN Jessica Hernandez, APRN       • sodium chloride 0.9 % flush 10 mL  10 mL Intravenous Q12H Prem Whipple MD   10 mL at 12/06/21 0819   • sodium chloride 0.9 % flush 10 mL  10 mL Intravenous PRN Prem Whipple MD       • traZODone (DESYREL) tablet 25 mg  25 mg Oral Nightly Anjali Williamson, DO   25 mg at 12/05/21 2138        Physician Progress Notes (last 72 hours)      Debra Welsh MD at 12/05/21 1500          Palliative Care Daily Progress Note     CVA (cerebral vascular accident) ([facility]) [I63.9]    CC:  Decreased intake, more alert    S: Medical record reviewed, followed up with nephew at bedside regarding patient's condition. Events noted.    87 yo F with advanced dementia (clinical diagnosis, type not diagnosed previously).   "Related diagnosis: prior craniotomy secondary to brain mass (benign), hypertension, atrial fibrillation (not on anticoagulation due to fall risk), emphysema, right apical lung mass (present since 2018), frequent UTIs.  She [facility] to [facility] ED from [facility] due to multiple falls (most recent 11/29 0630), increased confusion, and twitching.        Dr. Williamson decreased doses or discontinued sedating medications yesterday.  She is currently on Rocephin for Klebsiella UTI.  She is more alert today and answers questions and able to follow directions.  She denies pain, no N/V, denies SOA today.  Decreased appetite with only bites of intake.  She has not been OOB today.  PT/OT have not come for sessions today.       O:   Palliative Performance Scale Score:   30%  /63 (BP Location: Left arm, Patient Position: Lying)   Pulse 100   Temp 98.4 °F (36.9 °C) (Oral)   Resp 17   Ht 157.5 cm (62\")   Wt 50.8 kg (112 lb)   SpO2 95%   BMI 20.49 kg/m²     Intake/Output Summary (Last 24 hours) at 12/6/2021 0158  Last data filed at 12/5/2021 0900  Gross per 24 hour   Intake 120 ml   Output 300 ml   Net -180 ml       Physical Exam:  General Appearance:    Elderly female, frail, lying in bed with HOB elevated, alert and good eye contact, answers few questions/pleasantries.    Head:   Normocephalic,   Frontal temporal wasting, atraumatic   Eyes:           Lids and lashes normal, conjunctivae and sclerae normal   Ears:   Ears appear intact with no abnormalities noted, hearing grossly normal   Throat:  No oral lesions, no thrush, oral mucosa moist   Neck:  No adenopathy, supple, trachea midline   Back:   kyphosis   Lungs:    Clear to auscultation,respirations regular,  nonlabored respiratory effort, decreased breath sounds bilateral bases but no wheezes, intermittent weak but loose cough    Heart:   irreg irreg rhythm and normal rate   Breast Exam:   Deferred   Abdomen:    Normal bowel sounds, no masses, no organomegaly, " soft        non-tender, non-distended, no guarding   Genitalia:   Deferred   Extremities:   No clubbing, cyanosis or edema   Pulses:  Pedal and radial pulses palpable and equal bilaterally   Skin:  No bleeding, bruising or rash   Lymph nodes:  No palpable adenopathy cervical and supraclavicular     Psych:               no agitation, no anxiety   Neurologic:   Face appears symmetric, moves BUE and bilateral feet, memory deficit              Meds: Reviewed   Current Facility-Administered Medications   Medication Dose Route Frequency Provider Last Rate Last Admin   • acetaminophen (TYLENOL) tablet 650 mg  650 mg Oral Q4H PRN Prem Whipple MD   650 mg at 12/05/21 2137    Or   • acetaminophen (TYLENOL) 160 MG/5ML solution 650 mg  650 mg Oral Q4H PRN Prem Whipple MD        Or   • acetaminophen (TYLENOL) suppository 650 mg  650 mg Rectal Q4H PRN Prem Whipple MD       • benzonatate (TESSALON) capsule 200 mg  200 mg Oral TID PRN Anjali Williamson DO   200 mg at 12/05/21 2155   • budesonide-formoterol (SYMBICORT) 160-4.5 MCG/ACT inhaler 2 puff  2 puff Inhalation BID - RT Prem Whipple MD   2 puff at 12/05/21 1921   • cetirizine (zyrTEC) tablet 5 mg  5 mg Oral Daily Prem Whipple MD   5 mg at 12/05/21 0856   • docusate sodium (COLACE) capsule 100 mg  100 mg Oral BID Prem Whipple MD   100 mg at 12/05/21 2137   • fluticasone (FLONASE) 50 MCG/ACT nasal spray 2 spray  2 spray Each Nare Daily Anjali Williamson DO   2 spray at 12/05/21 0857   • heparin (porcine) 5000 UNIT/ML injection 5,000 Units  5,000 Units Subcutaneous Q8H Prem Whipple MD   5,000 Units at 12/05/21 2138   • ipratropium-albuterol (DUO-NEB) nebulizer solution 3 mL  3 mL Nebulization 4x Daily - RT Anjali Williamson DO   3 mL at 12/05/21 1921   • lactobacillus acidophilus (RISAQUAD) capsule 1 capsule  1 capsule Oral Daily Anjali Williamson DO   1 capsule at 12/05/21 1418   • levothyroxine (SYNTHROID, LEVOTHROID) tablet 25 mcg  25 mcg Oral Q AM  Prem Whipple MD   25 mcg at 12/05/21 0857   • melatonin tablet 5 mg  5 mg Oral Nightly Anjali Williamson, DO   5 mg at 12/05/21 2137   • metoprolol tartrate (LOPRESSOR) half tablet 12.5 mg  12.5 mg Oral Q12H Anjali Williamson, DO   12.5 mg at 12/05/21 2137   • ondansetron (ZOFRAN) tablet 4 mg  4 mg Oral Q6H PRN Prem Whipple MD        Or   • ondansetron (ZOFRAN) injection 4 mg  4 mg Intravenous Q6H PRN Prem Whipple MD       • potassium chloride (MICRO-K) CR capsule 40 mEq  40 mEq Oral PRN Yu Cesar MD   40 mEq at 12/05/21 1913    Or   • potassium chloride (KLOR-CON) packet 40 mEq  40 mEq Oral PRN Yu Cesar MD        Or   • potassium chloride 10 mEq in 100 mL IVPB  10 mEq Intravenous Q1H PRN Yu Cesar MD       • pregabalin (LYRICA) capsule 25 mg  25 mg Oral Q12H Prem Whipple MD   25 mg at 12/05/21 2137   • QUEtiapine (SEROquel) tablet 12.5 mg  12.5 mg Oral Nightly PRN Anjali Williamson, DO   12.5 mg at 12/02/21 2205   • sodium chloride 0.9 % flush 10 mL  10 mL Intravenous PRN Jonas Wayne MD       • sodium chloride 0.9 % flush 10 mL  10 mL Intravenous Q12H Jessica Hernandez APRN   10 mL at 12/04/21 2115   • sodium chloride 0.9 % flush 10 mL  10 mL Intravenous PRN Jessica Hernandez APRN       • sodium chloride 0.9 % flush 10 mL  10 mL Intravenous Q12H Prem Whipple MD   10 mL at 12/05/21 2138   • sodium chloride 0.9 % flush 10 mL  10 mL Intravenous PRN Prem Whipple MD       • traZODone (DESYREL) tablet 25 mg  25 mg Oral Nightly Anjali Williamson, DO   25 mg at 12/05/21 2138              Labs:   Results from last 7 days   Lab Units 12/05/21  0609 11/30/21  0839 11/29/21  1115   WBC 10*3/mm3 6.88   < > 14.60*   HEMOGLOBIN g/dL 11.6*   < > 11.9*   HEMATOCRIT % 34.7   < > 35.6   PLATELETS 10*3/mm3 108*   < > 136*   SODIUM mmol/L 143   < > 141   POTASSIUM mmol/L 3.4*   < > 4.1   CHLORIDE mmol/L 109*   < > 103   CO2 mmol/L 22.0   < > 28.0   BUN mg/dL 23   < >  29*   CREATININE mg/dL 0.79   < > 1.52*   CALCIUM mg/dL 8.4*   < > 8.8   BILIRUBIN mg/dL  --   --  0.4   ALK PHOS U/L  --   --  77   ALT (SGPT) U/L  --   --  10   AST (SGOT) U/L  --   --  20   GLUCOSE mg/dL 102*   < > 86    < > = values in this interval not displayed.     Imaging Results (Last 72 Hours)     Procedure Component Value Units Date/Time    XR Chest 1 View [[id number]] Collected: 12/03/21 1035     Updated: 12/03/21 2205    Narrative:      EXAMINATION: XR CHEST 1 VW-     INDICATION: Cough, hypoxia; N39.0-Urinary tract infection, site not  specified; R41.0-Disorientation, unspecified; R47.1-Dysarthria and  anarthria; R29.810-Facial weakness; N28.9-Disorder of kidney and ureter,  unspecified; D72.829-Elevated white blood cell count, unspecified;  R47.01-Aphasia.     COMPARISON: 11/29/2021     FINDINGS: Heart and vasculature appear normal in size. There are chronic  changes of centrilobular emphysema, with hyperlucent upper lungs and  coarsening of the basilar interstitial markings which appears similar to  the prior study. Right apical lung mass, known since at least 2018,  appear stable. No new pulmonary parenchymal disease, evidence of edema,  effusion or pneumothorax is seen.       Impression:      Stable right apical lung mass and chronic appearing changes  of centrilobular emphysema. No clearly new chest pathology is  identified.      D:  12/03/2021  E:  12/03/2021     This report was finalized on 12/3/2021 10:01 PM by Dr. Jorge Foote MD.                 Diagnostics: Reviewed    Assessment/Plan:  1.   Agitation -new behaviors according to her nephew.  May be related to adverse reaction from lorazepam versus acute infection. Mental status and agitation improved on lower doses of sedating medications.      2. Delirium -improved with med changes.      3. Dementia -she has not have formal diagnosis of dementia of based on her decline it sounds like a typical dementia course.  Discussed yesterday with her  nephew regarding difference of dementia (disease progression over months to years) versus delirium (decline over days with fluctuating mental status).  Discussed advanced/end-stage dementia including decreased oral intake, increased risk for dehydration,  Recurring infections, increased sleeping, weight loss, cognitive and functional decline.  She would be candidate for home hospice.      4. Klebsiella UTI - Rocephin D # 6.       5. Pill burden -    Discontinued yesterday medications that are likely not beneficial with advanced disease including aspirin, statin, supplements.       6.  Disposition -reviewed possible hospice referral if she does not improve previous baseline.   Reviewed hospice benefit in home (moving to nephew's home for EOL care is NOT an option), AFL (may require hiring additional personal CG), LTCF, inpt hospice (currently not inpt appropriate since she is not requiring medications for symptom mgmt).  Nephew is leaning towards PT/OT at [facility], then transition to hospice when no longer receiving benefit from therapy or no longer able to participate.       7.  Code status/GOC -  Nephew who desires no CPR intubation of or life-prolonging measures.  He does  Request treatment for reversible causes.    8. Tremor - OT consult to assess modified utensils for self feeding.       We will continue to follow along. Please do not hesitate to contact us regarding further sx mgmt or GOC needs, including after hours or on weekends via our on call provider at [phone].     Debra Welsh MD          Electronically signed by Debra Welsh MD at 21 0218     Anjali Williamson DO at 21 0775              [facility] Medicine Services  PROGRESS NOTE    Patient Name: Mitzi Eric  : 1935  MRN: [id number]    Date of Admission: 2021  Primary Care Physician: Dm Cade APRN    Subjective   Subjective     CC:  AMS, UTI     HPI:  Slept last night. More alert  today. Nephew at bedside. Reviewed continuing with the same medication changes. Patient denies any concerns.     ROS:  Difficult to obtain due to mental status; denies pain.     Objective   Objective     Vital Signs:   Temp:  [98.2 °F (36.8 °C)-98.5 °F (36.9 °C)] 98.5 °F (36.9 °C)  Heart Rate:  [] 114  Resp:  [16-18] 18  BP: (118-142)/(57-79) 139/75  Flow (L/min):  [2-3] 3     Physical Exam:  Constitutional: No acute distress, awake, alert; frail   HENT: NCAT, mucous membranes moist  Respiratory: Clear to auscultation bilaterally, respiratory effort normal; cough with deep inspiration   Cardiovascular: RRR, no murmurs, rubs, or gallops  Gastrointestinal: Positive bowel sounds, soft, nontender, nondistended  Musculoskeletal: No bilateral ankle edema  Psychiatric: flat  Neurologic: Oriented x 1, strength symmetric in all extremities, Cranial Nerves grossly intact to confrontation, speech clear  Skin: No rashes    Results Reviewed:  LAB RESULTS:      Lab 12/05/21  0627 12/04/21  0457 12/02/21  0953 12/01/21  0657 11/30/21  0839 11/29/21  1123 11/29/21  1115 11/29/21  1115 11/29/21  1111   WBC 6.88 7.72 5.58 6.26 7.82  --    < > 14.60*  --    HEMOGLOBIN 11.6* 12.6 12.3 11.9* 11.2*  --    < > 11.9*  --    HEMATOCRIT 34.7 38.1 36.7 36.2 33.6*  --    < > 35.6  --    PLATELETS 108* 128* 112* 130* 112*  --    < > 136*  --    NEUTROS ABS  --   --  3.56 3.75 5.58  --   --  11.60*  --    IMMATURE GRANS (ABS)  --   --  0.04 0.02 0.02  --   --  0.06*  --    LYMPHS ABS  --   --  0.84 1.46 1.30  --   --  1.74  --    MONOS ABS  --   --  1.09* 0.83 0.74  --   --  1.07*  --    EOS ABS  --   --  0.01 0.17 0.15  --   --  0.08  --    MCV 95.6 96.0 96.1 97.1* 96.3  --    < > 95.7  --    PROCALCITONIN  --   --   --   --   --   --   --  2.46*  --    LACTATE  --   --   --   --   --   --   --  1.4  --    PROTIME  --   --   --   --   --   --   --   --  15.2   APTT  --   --   --   --   --  38.1  --   --   --     < > = values in this  interval not displayed.         Lab 12/04/21  0457 12/02/21  0953 12/01/21  0657 11/30/21  0839 11/29/21  1115   SODIUM 146* 140 140 142 141   POTASSIUM 3.3* 3.6 3.2* 3.7 4.1   CHLORIDE 110* 107 104 107 103   CO2 22.0 20.0* 21.0* 23.0 28.0   ANION GAP 14.0 13.0 15.0 12.0 10.0   BUN 25* 8 12 17 29*   CREATININE 1.03* 0.62 0.81 0.93 1.52*   GLUCOSE 119* 97 82 82 86   CALCIUM 8.8 8.8 8.6 8.3* 8.8   MAGNESIUM  --   --   --   --  2.3   HEMOGLOBIN A1C  --   --   --  5.50  --    TSH  --   --   --   --  1.240         Lab 11/29/21  1115   TOTAL PROTEIN 6.3   ALBUMIN 4.00   GLOBULIN 2.3   ALT (SGPT) 10   AST (SGOT) 20   BILIRUBIN 0.4   ALK PHOS 77         Lab 11/29/21  1115 11/29/21  1111   TROPONIN T <0.010  --    PROTIME  --  15.2   INR  --  1.3*         Lab 11/30/21  0839   CHOLESTEROL 158   LDL CHOL 97   HDL CHOL 40   TRIGLYCERIDES 118             Lab 11/29/21  1110   PH, ARTERIAL 7.40     Brief Urine Lab Results  (Last result in the past 365 days)      Color   Clarity   Blood   Leuk Est   Nitrite   Protein   CREAT   Urine HCG        11/29/21 1200 Yellow   Cloudy   Trace   Small (1+)   Positive   30 mg/dL (1+)                 Microbiology Results Abnormal     Procedure Component Value - Date/Time    Blood Culture - Blood, Arm, Right [[id number]]  (Normal) Collected: 11/29/21 1320    Lab Status: Final result Specimen: Blood from Arm, Right Updated: 12/04/21 1516     Blood Culture No growth at 5 days    Blood Culture - Blood, Arm, Right [[id number]]  (Normal) Collected: 11/29/21 1330    Lab Status: Final result Specimen: Blood from Arm, Right Updated: 12/04/21 1516     Blood Culture No growth at 5 days    COVID-19 and FLU A/B PCR - Swab, Nasopharynx [[id number]]  (Normal) Collected: 11/29/21 1131    Lab Status: Final result Specimen: Swab from Nasopharynx Updated: 11/29/21 1213     COVID19 Not Detected     Influenza A PCR Not Detected     Influenza B PCR Not Detected    Narrative:      Fact sheet for providers:  https://www.fda.gov/media/340557/download    Fact sheet for patients: https://www.fda.gov/media/773992/download    Test performed by PCR.          XR Chest 1 View    Result Date: 12/3/2021  EXAMINATION: XR CHEST 1 VW-  INDICATION: Cough, hypoxia; N39.0-Urinary tract infection, site not specified; R41.0-Disorientation, unspecified; R47.1-Dysarthria and anarthria; R29.810-Facial weakness; N28.9-Disorder of kidney and ureter, unspecified; D72.829-Elevated white blood cell count, unspecified; R47.01-Aphasia.  COMPARISON: 11/29/2021  FINDINGS: Heart and vasculature appear normal in size. There are chronic changes of centrilobular emphysema, with hyperlucent upper lungs and coarsening of the basilar interstitial markings which appears similar to the prior study. Right apical lung mass, known since at least 2018, appear stable. No new pulmonary parenchymal disease, evidence of edema, effusion or pneumothorax is seen.      Impression: Stable right apical lung mass and chronic appearing changes of centrilobular emphysema. No clearly new chest pathology is identified.   D:  12/03/2021 E:  12/03/2021  This report was finalized on 12/3/2021 10:01 PM by Dr. Jorge Foote MD.        Results for orders placed during the hospital encounter of 11/29/21    Adult Transthoracic Echo Complete W/ Cont if Necessary Per Protocol (With Agitated Saline)    Interpretation Summary  · Left ventricular ejection fraction appears to be 61 - 65%. Left ventricular systolic function is normal.  · Saline test results are negative.  · Estimated right ventricular systolic pressure from tricuspid regurgitation is markedly elevated (>55 mmHg).  · Moderate tricuspid valve regurgitation is present.  · Left ventricular diastolic function was normal.      I have reviewed the medications:  Scheduled Meds:budesonide-formoterol, 2 puff, Inhalation, BID - RT  cefTRIAXone, 1 g, Intravenous, Q24H  cetirizine, 5 mg, Oral, Daily  docusate sodium, 100 mg, Oral,  BID  fluticasone, 2 spray, Each Nare, Daily  heparin (porcine), 5,000 Units, Subcutaneous, Q8H  ipratropium-albuterol, 3 mL, Nebulization, 4x Daily - RT  levothyroxine, 25 mcg, Oral, Q AM  melatonin, 5 mg, Oral, Nightly  metoprolol tartrate, 12.5 mg, Oral, Q12H  pregabalin, 25 mg, Oral, Q12H  sodium chloride, 10 mL, Intravenous, Q12H  sodium chloride, 10 mL, Intravenous, Q12H  traZODone, 25 mg, Oral, Nightly      Continuous Infusions:sodium chloride, 50 mL/hr, Last Rate: 50 mL/hr (12/04/21 0954)      PRN Meds:.•  acetaminophen **OR** acetaminophen **OR** acetaminophen  •  benzonatate  •  ondansetron **OR** ondansetron  •  potassium chloride **OR** potassium chloride **OR** potassium chloride  •  QUEtiapine  •  sodium chloride  •  sodium chloride  •  sodium chloride    Assessment/Plan   Assessment & Plan     Active Hospital Problems    Diagnosis  POA   • Delirium, acute [R41.0]  Yes   • Acute UTI (urinary tract infection) [N39.0]  Yes   • HTN (hypertension) [I10]  Yes   • JUAN F (acute kidney injury) (HCC) [N17.9]  Yes   • Fall [W19.XXXA]  Yes      Resolved Hospital Problems    Diagnosis Date Resolved POA   • CVA (cerebral vascular accident) (HCC) [I63.9] 11/29/2021 Yes        Brief Hospital Course to date:  Mitzi Eric is a 86 y.o. female with history of dementia, hypothyroidism, prior craniotomy secondary to possible brain mass, hypertension, atrial fibrillation not on anticoagulation, COPD, lung mass, and frequent UTIs who presented from [facility] with altered mental status, a fall and tremors found to have an UTI.     Plan:     UTI  --on admission, WBC count 14,Procal 2.46  -- UA with positive nitrates, small leukocyte esterase, 21-30 white blood cells and 4+ bacteria  -- UCx >100 Klebsiella resistant to Ampicillin  - Completed course of rocephin inpatient      Delirium  Dementia  -- likely secondary to UTI, MRI brain without acute changes  -- patient on multiple sedating meds, which may contribute to AMS in  setting of JUAN F  -- Tegretol: level was high on admission.  Attempted to resume 200 mg twice daily after discussing with her POA however more lethargic and this was discontinued   -- Buspar: Discontinued  -- Lyrica: lowered dose and frequency  -- Remeron: Discontinued  -- Trazodone: Decreased to 25 mg nightly  -- Continue ativan PRN but change to PO  - Continue scheduled melatonin  - Palliative care consulted and appreciate recs. Monitor today with medication changes made yesterday. Improvement in mental status      Cough - improved  - CXR with no acute findings   - Start duo-nebs; flonase; normal WBC      Elevated Creatinine  Volume depletion  Hypernatremia   -- Cr initially 1.60, baseline of  0.8  -- Cr trended up due to poor PO intake. S/p IVF with improvement; monitor off fluids      Fall  - plain films without fracture  - PT/OT/fall precautions     Lung mass  - right apex. Noted on CT chest back in 2018 too. ED notes at the time mention prior biopsies x 2.  - my partner discussed with nephew, They are aware of this and stated that, during prior workup, they had all decided on no further workup/treatment     HTN  --monitor BP with starting metoprolol; remains controlled -- hold home amlodipine      Hypothyroid  --TSH wnl, continue home dose Synthroid     H/o Afib  -- not on AC, likely due to fall risk  -- not on any rate controlling meds, and issue with SVT overnight 12/1 and metoprolol 12.5 mg BID started      Hypokalemia  --replace per protocol     Trigeminal neuralgia   -Holding Tegretol    DVT prophylaxis:  Medical and mechanical DVT prophylaxis orders are present.       AM-PAC 6 Clicks Score (PT): 11 (12/04/21 1013)    Disposition: I expect the patient to be discharged TBD -  needs rehab     CODE STATUS:   Code Status and Medical Interventions:   Ordered at: 12/04/21 1115     Medical Intervention Limits:    NO intubation (DNI)     Level Of Support Discussed With:    Health Care Surrogate     Code Status  (Patient has no pulse and is not breathing):    No CPR (Do Not Attempt to Resuscitate)     Medical Interventions (Patient has pulse or is breathing):    Limited Support       Anjali Williamson DO  21                Electronically signed by Anjali Williamson DO at 21 1256     Anjali Williamson DO at 21 0817              [facility] Medicine Services  PROGRESS NOTE    Patient Name: Mitzi Eric  : 1935  MRN: [id number]    Date of Admission: 2021  Primary Care Physician: Dm Cade APRN    Subjective   Subjective     CC:  AMS     HPI:  Nephew bedside.  States that patient slept okay last night but very lethargic today.  She will not wake up and interact with him.  Very poor p.o. intake.  Reviewed CODE STATUS with nephew who is her POA and agreeable to DNR/DNI.  Also reviewed medication changes and he was agreeable.  Agreeable to palliative care consult.  Patient will intermittently open her eyes but no verbal response.  She does follow commands.    ROS:  Unable to obtain due to mental status    Objective   Objective     Vital Signs:   Temp:  [98.3 °F (36.8 °C)-98.9 °F (37.2 °C)] 98.3 °F (36.8 °C)  Heart Rate:  [] 97  Resp:  [16-18] 18  BP: (104-135)/(59-94) 104/59  Flow (L/min):  [2] 2     Physical Exam:  Constitutional: lethargic, frail   HENT: NCAT, mucous membranes dry  Respiratory: Clear to auscultation bilaterally, respiratory effort normal   Cardiovascular: RRR, no murmurs, rubs, or gallops  Gastrointestinal: Positive bowel sounds, soft, nontender, nondistended  Musculoskeletal: No bilateral ankle edema  Psychiatric: Difficult to assess due to lethargy  Neurologic:strength symmetric in all extremities, Cranial Nerves grossly intact to confrontation, will not answer questions today  Skin: No rashes    Results Reviewed:  LAB RESULTS:      Lab 21  0457 21  0953 21  0657 21  0839 21  1123 21  1115 21  1111    WBC 7.72 5.58 6.26 7.82  --  14.60*  --    HEMOGLOBIN 12.6 12.3 11.9* 11.2*  --  11.9*  --    HEMATOCRIT 38.1 36.7 36.2 33.6*  --  35.6  --    PLATELETS 128* 112* 130* 112*  --  136*  --    NEUTROS ABS  --  3.56 3.75 5.58  --  11.60*  --    IMMATURE GRANS (ABS)  --  0.04 0.02 0.02  --  0.06*  --    LYMPHS ABS  --  0.84 1.46 1.30  --  1.74  --    MONOS ABS  --  1.09* 0.83 0.74  --  1.07*  --    EOS ABS  --  0.01 0.17 0.15  --  0.08  --    MCV 96.0 96.1 97.1* 96.3  --  95.7  --    PROCALCITONIN  --   --   --   --   --  2.46*  --    LACTATE  --   --   --   --   --  1.4  --    PROTIME  --   --   --   --   --   --  15.2   APTT  --   --   --   --  38.1  --   --          Lab 12/04/21  0457 12/02/21  0953 12/01/21  0657 11/30/21  0839 11/29/21  1115   SODIUM 146* 140 140 142 141   POTASSIUM 3.3* 3.6 3.2* 3.7 4.1   CHLORIDE 110* 107 104 107 103   CO2 22.0 20.0* 21.0* 23.0 28.0   ANION GAP 14.0 13.0 15.0 12.0 10.0   BUN 25* 8 12 17 29*   CREATININE 1.03* 0.62 0.81 0.93 1.52*   GLUCOSE 119* 97 82 82 86   CALCIUM 8.8 8.8 8.6 8.3* 8.8   MAGNESIUM  --   --   --   --  2.3   HEMOGLOBIN A1C  --   --   --  5.50  --    TSH  --   --   --   --  1.240         Lab 11/29/21  1115   TOTAL PROTEIN 6.3   ALBUMIN 4.00   GLOBULIN 2.3   ALT (SGPT) 10   AST (SGOT) 20   BILIRUBIN 0.4   ALK PHOS 77         Lab 11/29/21  1115 11/29/21  1111   TROPONIN T <0.010  --    PROTIME  --  15.2   INR  --  1.3*         Lab 11/30/21  0839   CHOLESTEROL 158   LDL CHOL 97   HDL CHOL 40   TRIGLYCERIDES 118             Lab 11/29/21  1110   PH, ARTERIAL 7.40     Brief Urine Lab Results  (Last result in the past 365 days)      Color   Clarity   Blood   Leuk Est   Nitrite   Protein   CREAT   Urine HCG        11/29/21 1200 Yellow   Cloudy   Trace   Small (1+)   Positive   30 mg/dL (1+)                 Microbiology Results Abnormal     Procedure Component Value - Date/Time    Blood Culture - Blood, Arm, Right [[id number]]  (Normal) Collected: 11/29/21 1330    Lab  Status: Preliminary result Specimen: Blood from Arm, Right Updated: 12/03/21 1531     Blood Culture No growth at 4 days    Blood Culture - Blood, Arm, Right [[id number]]  (Normal) Collected: 11/29/21 1320    Lab Status: Preliminary result Specimen: Blood from Arm, Right Updated: 12/03/21 1531     Blood Culture No growth at 4 days    COVID-19 and FLU A/B PCR - Swab, Nasopharynx [[id number]]  (Normal) Collected: 11/29/21 1131    Lab Status: Final result Specimen: Swab from Nasopharynx Updated: 11/29/21 1213     COVID19 Not Detected     Influenza A PCR Not Detected     Influenza B PCR Not Detected    Narrative:      Fact sheet for providers: https://www.fda.gov/media/277944/download    Fact sheet for patients: https://www.fda.gov/media/684253/download    Test performed by PCR.          XR Chest 1 View    Result Date: 12/3/2021  EXAMINATION: XR CHEST 1 VW-  INDICATION: Cough, hypoxia; N39.0-Urinary tract infection, site not specified; R41.0-Disorientation, unspecified; R47.1-Dysarthria and anarthria; R29.810-Facial weakness; N28.9-Disorder of kidney and ureter, unspecified; D72.829-Elevated white blood cell count, unspecified; R47.01-Aphasia.  COMPARISON: 11/29/2021  FINDINGS: Heart and vasculature appear normal in size. There are chronic changes of centrilobular emphysema, with hyperlucent upper lungs and coarsening of the basilar interstitial markings which appears similar to the prior study. Right apical lung mass, known since at least 2018, appear stable. No new pulmonary parenchymal disease, evidence of edema, effusion or pneumothorax is seen.      Impression: Stable right apical lung mass and chronic appearing changes of centrilobular emphysema. No clearly new chest pathology is identified.   D:  12/03/2021 E:  12/03/2021  This report was finalized on 12/3/2021 10:01 PM by Dr. Jorge Foote MD.        Results for orders placed during the hospital encounter of 11/29/21    Adult Transthoracic Echo Complete W/ Cont if  Necessary Per Protocol (With Agitated Saline)    Interpretation Summary  · Left ventricular ejection fraction appears to be 61 - 65%. Left ventricular systolic function is normal.  · Saline test results are negative.  · Estimated right ventricular systolic pressure from tricuspid regurgitation is markedly elevated (>55 mmHg).  · Moderate tricuspid valve regurgitation is present.  · Left ventricular diastolic function was normal.      I have reviewed the medications:  Scheduled Meds:aspirin, 81 mg, Oral, Daily   Or  aspirin, 300 mg, Rectal, Daily  atorvastatin, 80 mg, Oral, Nightly  budesonide-formoterol, 2 puff, Inhalation, BID - RT  busPIRone, 7.5 mg, Oral, Q12H  carBAMazepine XR, 200 mg, Oral, Q12H  cefTRIAXone, 1 g, Intravenous, Q24H  cetirizine, 5 mg, Oral, Daily  docusate sodium, 100 mg, Oral, BID  fluticasone, 2 spray, Each Nare, Daily  folic acid, 400 mcg, Oral, BID  heparin (porcine), 5,000 Units, Subcutaneous, Q8H  ipratropium-albuterol, 3 mL, Nebulization, 4x Daily - RT  levothyroxine, 25 mcg, Oral, Q AM  melatonin, 5 mg, Oral, Nightly  metoprolol tartrate, 12.5 mg, Oral, Q12H  mirtazapine, 15 mg, Oral, Nightly  pantoprazole, 40 mg, Oral, QAM AC  pregabalin, 25 mg, Oral, Q12H  sodium chloride, 10 mL, Intravenous, Q12H  sodium chloride, 10 mL, Intravenous, Q12H  traZODone, 50 mg, Oral, Nightly      Continuous Infusions:sodium chloride, 50 mL/hr      PRN Meds:.•  acetaminophen **OR** acetaminophen **OR** acetaminophen  •  benzonatate  •  LORazepam  •  ondansetron **OR** ondansetron  •  potassium chloride **OR** potassium chloride **OR** potassium chloride  •  QUEtiapine  •  sodium chloride  •  sodium chloride  •  sodium chloride    Assessment/Plan   Assessment & Plan     Active Hospital Problems    Diagnosis  POA   • Delirium, acute [R41.0]  Yes   • Acute UTI (urinary tract infection) [N39.0]  Yes   • HTN (hypertension) [I10]  Yes   • JUAN F (acute kidney injury) (HCC) [N17.9]  Yes   • Fall [W19.XXXA]  Yes       Resolved Hospital Problems    Diagnosis Date Resolved POA   • CVA (cerebral vascular accident) (HCC) [I63.9] 11/29/2021 Yes        Brief Hospital Course to date:  Mitzi Eric is a 86 y.o. female with history of dementia, hypothyroidism, prior craniotomy secondary to possible brain mass, hypertension, atrial fibrillation not on anticoagulation, COPD, lung mass, and frequent UTIs who presented from [facility] with altered mental status, a fall and tremors found to have an UTI.     Plan:     UTI  --on admission, WBC count 14,Procal 2.46  -- UA with positive nitrates, small leukocyte esterase, 21-30 white blood cells and 4+ bacteria  -- continue Rocephin D6 of 7  -- UCx >100 Klebsiella resistant to Ampicillin, will transition to PO on discharge      Delirium  Dementia  -- likely secondary to UTI, MRI brain without acute changes  -- patient on multiple sedating meds, which may contribute to AMS in setting of JUAN F  -- Tegretol: level was high on admission.  Attempted to resume 200 mg twice daily after discussing with her POA however more lethargic today.  We will discontinue  -- Buspar: Discontinued  -- Lyrica: lowered dose and frequency  -- Remeron: Discontinued  -- Trazodone: Decreased to 25 mg nightly  -- Continue ativan PRN but change to PO  Continue scheduled melatonin  - Patient did not receive Seroquel last night still very lethargic today.  -Reviewed her overall goals of care general decline with her nephew who is her POA.  He is agreeable to palliative care consult.    Cough - improved  - CXR with no acute findings  - Start duo-nebs; flonase; normal WBC      Elevated Creatinine  Volume depletion  Hypernatremia   -- Cr initially 1.60, baseline of  0.8  -- Cr improved but now trending up with hypernatremia -- started 1/2 NS and monitor      Fall  - plain films without fracture  - PT/OT/fall precautions     Lung mass  - right apex. Noted on CT chest back in 2018 too. ED notes at the time mention prior  biopsies x 2.  - my partner discussed with nephew, They are aware of this and stated that, during prior workup, they had all decided on no further workup/treatment     HTN  --monitor BP with starting metoprolol; currently controlled and will continue to home amlodipine      Hypothyroid  --TSH wnl, continue home dose Synthroid     H/o Afib  -- not on AC, likely due to fall risk  -- not on any rate controlling meds, and issue with SVT overnight 12/1 and metoprolol 12.5 mg BID started      Hypokalemia  --replace per protocol     Trigeminal neuralgia   -Holding Tegretol    More than 50% of time spent counseling on current illness and plan of care. Case discussed with: Nursing, patient, POA  Total time of the encounter was 40 minutes.           DVT prophylaxis:  Medical and mechanical DVT prophylaxis orders are present.       AM-PAC 6 Clicks Score (PT): 17 (12/01/21 1644)    Disposition: I expect the patient to be discharged TBD; possible in 1-3 days pending labs and mental status. Plan back to [facility]     CODE STATUS:   Code Status and Medical Interventions:   Ordered at: 11/29/21 1528     Level Of Support Discussed With:    Next of Kin (If No Surrogate)     Code Status (Patient has no pulse and is not breathing):    CPR (Attempt to Resuscitate)     Medical Interventions (Patient has pulse or is breathing):    Full Support       Anjali Williamson DO  12/04/21                Electronically signed by Anjali Williamson DO at 12/04/21 1124          Consult Notes (last 72 hours)      Debra Welsh MD at 12/04/21 1525      Consult Orders    1. Inpatient Palliative Care MD Consult [[id number]] ordered by Anjali Williamson DO at 12/04/21 1115               Dm Cade APRN  Consulting physician: Tierra Welsh MD    Chief Complaint   Patient presents with   • Altered Mental Status   Pt unable to report.  History per chart and nephew.     Reason for consult: symptom mgmt, disposition, GOC    HPI:   85 yo F with advanced  dementia (clinical diagnosis, type not diagnosed previously).  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  She presented to [facility] ED [facility] due to multiple falls (most recent 11/29 0630), increased confusion, and twitching.      Nephew (POA, NOK)  Reports progressive decline over the last 4 years.  She had more rapid decline after COVID pandemic with subsequent isolation.  Prior to pandemic and then again recently he has been able to visit [facility] daily in order to assist with ambulation, social interaction, feeding.  He reports she typically sleeps from 6:00 p.m. at 10:00 a.m..  She typically does not eat breakfast or lunch but usually does eat evening meal.  She is ambulatory in the facility usually by propelling herself with her feet in a wheelchair or propelled by staff.  Since the pandemic she is no longer ambulatory with walker.  Nephew reports recurring urinary tract infections.  She had severe agitation that was worsened by doses of lorazepam this hospitalization.  He reports earlier hospital stay she was expressive for to himself and providers.  Today she is very sedate and will open her eyes briefly with stimulation answer yes/ no questions.  Minimal intake of only a few sips today.   She is not taking most of her medications orally.   MRI 11/2921 showed encephalomalacia due to prior left craniotomy but no other significant findings.   She is able to answer yes / no to some questions.  She denies pain, denies shortness of breath, denies nausea or vomiting.   She does take a few sips of water through straw and meds during visit with coughing or evidence of aspiration. Dr. Williamson decreased doses or discontinued sedating medications today.    She is currently on Rocephin for Klebsiella UTI.    Past Medical History:   Diagnosis  Date   • A-fib (HCC)    • Ataxic gait    • Chronic kidney disease, stage 3 (HCC)    • COPD (chronic obstructive pulmonary disease) (HCC)    • Dementia (HCC)    • Disease of thyroid gland    • Hyperlipidemia    • Hypertension      Past Surgical History:   Procedure Laterality Date   • BRAIN TUMOR EXCISION      BENIGN   • FRACTURE SURGERY     • TRIGGER FINGER RELEASE       Current Facility-Administered Medications   Medication Dose Route Frequency Provider Last Rate Last Admin   • acetaminophen (TYLENOL) tablet 650 mg  650 mg Oral Q4H PRN Prem Whipple MD   650 mg at 12/01/21 1500    Or   • acetaminophen (TYLENOL) 160 MG/5ML solution 650 mg  650 mg Oral Q4H PRN Prem Whipple MD        Or   • acetaminophen (TYLENOL) suppository 650 mg  650 mg Rectal Q4H PRN Prem Whipple MD       • aspirin chewable tablet 81 mg  81 mg Oral Daily Jessica Hernandez APRN   81 mg at 12/03/21 0824    Or   • aspirin suppository 300 mg  300 mg Rectal Daily Jessica Hernandez APRN       • atorvastatin (LIPITOR) tablet 80 mg  80 mg Oral Nightly Jessica Hernandez APRN   80 mg at 12/04/21 2115   • benzonatate (TESSALON) capsule 200 mg  200 mg Oral TID PRN Anjali Williamson DO       • budesonide-formoterol (SYMBICORT) 160-4.5 MCG/ACT inhaler 2 puff  2 puff Inhalation BID - RT Prem Whipple MD   2 puff at 12/04/21 1919   • cefTRIAXone (ROCEPHIN) 1 g/100 mL 0.9% NS (MBP)  1 g Intravenous Q24H Anjali Williamson DO   1 g at 12/04/21 1226   • cetirizine (zyrTEC) tablet 5 mg  5 mg Oral Daily Prem Whipple MD   5 mg at 12/03/21 0825   • docusate sodium (COLACE) capsule 100 mg  100 mg Oral BID Prem Whipple MD   100 mg at 12/04/21 2116   • fluticasone (FLONASE) 50 MCG/ACT nasal spray 2 spray  2 spray Each Nare Daily Anjali Williamson DO   2 spray at 12/03/21 1539   • folic acid (FOLVITE) tablet 400 mcg  400 mcg Oral BID Prem Whipple MD   400 mcg at 12/04/21 3143   • heparin (porcine) 5000 UNIT/ML injection 5,000 Units  5,000 Units  Subcutaneous Q8H Prem Whipple MD   5,000 Units at 12/04/21 2119   • ipratropium-albuterol (DUO-NEB) nebulizer solution 3 mL  3 mL Nebulization 4x Daily - RT Anjali Williamson DO   3 mL at 12/04/21 1917   • levothyroxine (SYNTHROID, LEVOTHROID) tablet 25 mcg  25 mcg Oral Q AM Prem Whipple MD   25 mcg at 12/04/21 0612   • LORazepam (ATIVAN) tablet 0.5 mg  0.5 mg Oral Q6H PRN Anjali Williamson DO       • melatonin tablet 5 mg  5 mg Oral Nightly Anjali Williamson DO   5 mg at 12/03/21 2044   • metoprolol tartrate (LOPRESSOR) half tablet 12.5 mg  12.5 mg Oral Q12H Anjali Williamson DO   12.5 mg at 12/04/21 2116   • ondansetron (ZOFRAN) tablet 4 mg  4 mg Oral Q6H PRN Prem Whipple MD        Or   • ondansetron (ZOFRAN) injection 4 mg  4 mg Intravenous Q6H PRN Prem Whipple MD       • pantoprazole (PROTONIX) EC tablet 40 mg  40 mg Oral QAM AC Prem Whipple MD   40 mg at 12/03/21 0825   • potassium chloride (MICRO-K) CR capsule 40 mEq  40 mEq Oral PRN Yu Cesar MD   40 mEq at 12/01/21 1500    Or   • potassium chloride (KLOR-CON) packet 40 mEq  40 mEq Oral PRN Yu Cesar MD        Or   • potassium chloride 10 mEq in 100 mL IVPB  10 mEq Intravenous Q1H PRN Yu Cesar MD       • pregabalin (LYRICA) capsule 25 mg  25 mg Oral Q12H Prem Whipple MD   25 mg at 12/04/21 2115   • QUEtiapine (SEROquel) tablet 12.5 mg  12.5 mg Oral Nightly PRN Anjali Williamson DO   12.5 mg at 12/02/21 2205   • sodium chloride 0.45 % infusion  50 mL/hr Intravenous Continuous Anjali Williamson DO 50 mL/hr at 12/04/21 0954 50 mL/hr at 12/04/21 0954   • sodium chloride 0.9 % flush 10 mL  10 mL Intravenous PRN Jonas Wayne MD       • sodium chloride 0.9 % flush 10 mL  10 mL Intravenous Q12H Jessica Hernandez APRN   10 mL at 12/04/21 2115   • sodium chloride 0.9 % flush 10 mL  10 mL Intravenous PRN Jessica Hernandez APRN       • sodium chloride 0.9 % flush 10 mL  10 mL Intravenous Q12H Sarabjit  "Prem HERNANDEZ MD   10 mL at 21   • sodium chloride 0.9 % flush 10 mL  10 mL Intravenous PRN Prem Whipple MD       • traZODone (DESYREL) tablet 25 mg  25 mg Oral Nightly Anjali Williamson DO   25 mg at 21     sodium chloride, 50 mL/hr, Last Rate: 50 mL/hr (21 0954)      Allergies   Allergen Reactions   • Bee Pollen Unknown - Low Severity     FHX:  Parents  before she was age 6.      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  After [facility], moved to [place] for > 20 years closer to her sister.  4 years ago returned to [place] (nephew is POA) and resides at [facility].    Never , no children.      Review of Systems -     See HPI.  She does answer a few yes / no questions but otherwise history her nephew and chart review.    General- for few she has maintained her weight, she typically does not eat breakfast or lunch, she does eat anything chocolate  HEENT- no dysphagia no reported oral issues  Lungs -  She denies shortness of breath, no cough  CV - telemetry afib  GI - no N/V, unknown usual bowel habits  MSK -  2 years ago she was ambulatory with walker, recently ambulatory with a wheelchair within  Facility, she has been able to transfer with minimal assistance  Psych -usually pleasant disposition, agitation is very rare for her and only present during hospitalization      /75 (BP Location: Left arm, Patient Position: Lying)   Pulse 114   Temp 98.4 °F (36.9 °C) (Oral)   Resp 18   Ht 157.5 cm (62\")   Wt 50.8 kg (112 lb)   SpO2 93%   BMI 20.49 kg/m²     Intake/Output Summary (Last 24 hours) at 2021 1426  Last data filed at 2021 1226  Gross per 24 hour   Intake 1340 ml   Output 150 ml   Net 1190 ml     Physical Exam:      General " Appearance:    Elderly female, sitting slouched in [place] chair, arouses and answers yes no questions with stimulation but otherwise does not engage during visit   Head:   Normocephalic,   Frontal temporal wasting, atraumatic   Eyes:           Lids and lashes normal, conjunctivae and sclerae normal   Ears:   Ears appear intact with no abnormalities noted, hearing grossly normal   Throat:  No oral lesions, no thrush, oral mucosa moist   Neck:  No adenopathy, supple, trachea midline   Back:    mild kyphosis   Lungs:    Clear to auscultation,respirations regular,  nonlabored respiratory effort, decreased breath sounds bilateral bases but no wheezes    Heart:   irreg irreg rhythm and normal rate   Breast Exam:   Deferred   Abdomen:    Normal bowel sounds, no masses, no organomegaly, soft        non-tender, non-distended, no guarding   Genitalia:   Deferred   Extremities:   No clubbing, cyanosis or edema   Pulses:  Pedal and radial pulses palpable and equal bilaterally   Skin:  No bleeding, bruising or rash   Lymph nodes:  No palpable adenopathy cervical and supraclavicular    Psych:             eyes closed most visit, does arouse with interaction or if her name is old, no agitation during visit, flat affect   Neurologic:   Face appears symmetric, moves BUE and bilateral feet, sleeping but arouseable during visit, no verbalization except yes/no           Results from last 7 days   Lab Units 12/04/21  0457 11/30/21  0839 11/29/21  1115   WBC 10*3/mm3 7.72   < > 14.60*   HEMOGLOBIN g/dL 12.6   < > 11.9*   HEMATOCRIT % 38.1   < > 35.6   PLATELETS 10*3/mm3 128*   < > 136*   SODIUM mmol/L 146*   < > 141   POTASSIUM mmol/L 3.3*   < > 4.1   CHLORIDE mmol/L 110*   < > 103   CO2 mmol/L 22.0   < > 28.0   BUN mg/dL 25*   < > 29*   CREATININE mg/dL 1.03*   < > 1.52*   CALCIUM mg/dL 8.8   < > 8.8   BILIRUBIN mg/dL  --   --  0.4   ALK PHOS U/L  --   --  77   ALT (SGPT) U/L  --   --  10   AST (SGOT) U/L  --   --  20   GLUCOSE mg/dL 119*    < > 86    < > = values in this interval not displayed.     Imaging Results (Last 72 Hours)     Procedure Component Value Units Date/Time    XR Chest 1 View [[id number]] Collected: 12/03/21 1035     Updated: 12/03/21 2205    Narrative:      EXAMINATION: XR CHEST 1 VW-     INDICATION: Cough, hypoxia; N39.0-Urinary tract infection, site not  specified; R41.0-Disorientation, unspecified; R47.1-Dysarthria and  anarthria; R29.810-Facial weakness; N28.9-Disorder of kidney and ureter,  unspecified; D72.829-Elevated white blood cell count, unspecified;  R47.01-Aphasia.     COMPARISON: 11/29/2021     FINDINGS: Heart and vasculature appear normal in size. There are chronic  changes of centrilobular emphysema, with hyperlucent upper lungs and  coarsening of the basilar interstitial markings which appears similar to  the prior study. Right apical lung mass, known since at least 2018,  appear stable. No new pulmonary parenchymal disease, evidence of edema,  effusion or pneumothorax is seen.       Impression:      Stable right apical lung mass and chronic appearing changes  of centrilobular emphysema. No clearly new chest pathology is  identified.      D:  12/03/2021  E:  12/03/2021     This report was finalized on 12/3/2021 10:01 PM by Dr. Jorge Foote MD.             ASSESSMENT/PLAN:  1.   Agitation -new behaviors according to her nephew.  May be related to adverse reaction from lorazepam versus acute infection.  She currently has Seroquel 12.5 mg each evening as needed.  Agree with decreasing sedating medications and monitoring response    2. Delirium - initially hyperactive delirium, now possible hypoactive delirium.  Monitor with medication changes as above.    3. Dementia -she has not have formal diagnosis of dementia of based on her decline it sounds like a typical dementia course.  Discussed with her nephew regarding difference of dementia (disease progression over months to years) versus delirium (decline over days with  fluctuating mental status).  Discussed advanced/end-stage dementia including decreased oral intake, increased risk for dehydration,  Recurring infections, increased sleeping, weight loss, cognitive and functional decline.    4. Klebsiella UTI - Rocephin D # 5 but she has not returned to prior baseline.      5. Pill burden -   She has been unable to take medications most of the day today.  Discontinue medications that are likely not beneficial with advanced disease including aspirin, statin, supplements.      6.  Disposition -reviewed possible hospice referral if she does not improve previous baseline.   Reviewed hospice benefit in home (moving to nephew's home for EOL care is NOT an option), AFL (may require hiring additional personal CG), LTCF, inpt hospice (currently not inpt appropriate since she is not requiring medications for symptom mgmt).      7.  Code status/GOC -  Nephew who desires no CPR intubation of or life-prolonging measures.  He does  Request treatment for reversible causes.      Thank you for allowing us see her in consultation.  Palliative will continue to follow along for symptom management, disposition, patient and family support.    Time: 60 min spent in care of this patient which includes examination of pt, discussion with pt and nephew (Freddy), chart review, , documentation.  40 min at bedside.     Debra Welsh MD  12/04/21      Electronically signed by Debra Welsh MD at 12/05/21 0045

## 2021-12-06 NOTE — CASE MANAGEMENT/SOCIAL WORK
Case Management Discharge Note      Final Note:     Ms. Eric is medically ready for discharge today. I spoke with Taina at [facility] who confirms they can accept Ms. Eric back today to her personal care apartment. [facility] health has been advised of her discharge today and will resume physical and occupational therapy. Ms. Eric's nephew Lane is available to transport her back to [facility] by car.     Nursing to call report # [phone].     Case management will fax the discharge summary to  # [phone].       Selected Continued Care - Admitted Since 11/29/2021         Home Medical Care Coordination complete.    Service Provider Selected Services Address Phone Fax Patient Preferred    [facility] [place]  [facility] [address], [place] [id number] [phone] --                  Transportation Services  Private: Car    Final Discharge Disposition Code: 06 - home with home health care

## 2021-12-06 NOTE — DISCHARGE PLACEMENT REQUEST
"Janet ORELLANA, RN, Case Management    Mitzi Zapata (86 y.o. Female)             Date of Birth Social Security Number Address Home Phone MRN    1935  [address]  [address]  [place] [id number] [phone] [id number]    Sabianism Marital Status             [employer] Single       Admission Date Admission Type Admitting Provider Attending Provider Department, Room/Bed    21 Emergency Anjali Williamson DO Roesch, Lyndsey, DO [address], [address]    Discharge Date Discharge Disposition Discharge Destination                         Attending Provider: Anjali Williamson DO    Allergies: Bee Pollen    Isolation: None   Infection: None   Code Status: No CPR   Advance Care Planning Activity    Ht: 157.5 cm (62\")   Wt: 50.8 kg (112 lb)    Admission Cmt: None   Principal Problem: None                Active Insurance as of 2021     Primary Coverage     Payor Plan Insurance Group Employer/Plan Group    HUMANA MEDICARE REPLACEMENT HUMANA MEDICARE REPLACEMENT [id number]     Payor Plan Address Payor Plan Phone Number Payor Plan Fax Number Effective Dates    [address] [address] [phone]  2018 - None Entered    [facility] [address]       Subscriber Name Subscriber Birth Date Member ID       MITZI ZAPATA 1935 [id number]                 Emergency Contacts      (Rel.) Home Phone Work Phone Mobile Phone    Lane Zapata (Power of ) [phone] -- --            [address]  [address] [facility] [address]  Phone:  [phone]  Fax:  [phone]        Patient:     Mitzi Zapata MRN:  [id number]   [address]  [address]  [facility] [address] :  1935  SSN:    Phone: [phone] Sex:  F      INSURANCE PAYOR PLAN GROUP # SUBSCRIBER ID   Primary:    HUMANA MEDICARE REPLACEMENT [id number]   Admitting Diagnosis: CVA (cerebral vascular accident) (HCC) [I63.9]  Order Date:  Dec 6, 2021           Notify Home " Health       (Order ID: [id number])     Diagnosis:         Priority:  Routine Expected Date:   Expiration Date:        Interval:  Until Discontinued Count:          Specimen Type:   Specimen Source:   Specimen Taken Date:   Specimen Taken Time:                     Authorizing Provider:Anjali Williamson DO  Authorizing Provider's NPI: [id number]  Order Entered By: Janet Cooper RN 12/6/2021  1:55 PM     Electronically signed by: Anjali Williamson DO 12/6/2021  1:55 PM

## 2021-12-06 NOTE — THERAPY TREATMENT NOTE
Acute Care - Speech Language Pathology Treatment Note  [facility]     Patient Name: Mitzi Eric  : 1935  MRN: [id number]  Today's Date: 2021               Admit Date: 2021     Visit Dx:    ICD-10-CM ICD-9-CM   1. Acute UTI  N39.0 599.0   2. Confusion  R41.0 298.9   3. Dysarthria  R47.1 784.51   4. Facial droop  R29.810 781.94   5. Renal insufficiency  N28.9 593.9   6. Leukocytosis, unspecified type  D72.829 288.60   7. Aphasia  R47.01 784.3   8. Trigeminal neuralgia  G50.0 350.1   9. Cognitive communication deficit  R41.841 799.52     Patient Active Problem List   Diagnosis   • Fall   • Delirium, acute   • Essential hypertension   • Cognitive decline   • Trigeminal neuralgia   • Hypothyroidism (acquired)   • Lung mass     Past Medical History:   Diagnosis Date   • A-fib (HCC)    • Ataxic gait    • Chronic kidney disease, stage 3 (HCC)    • COPD (chronic obstructive pulmonary disease) ([facility])    • Dementia ([facility])    • Disease of thyroid gland    • Hyperlipidemia    • Hypertension      Past Surgical History:   Procedure Laterality Date   • BRAIN TUMOR EXCISION      BENIGN   • FRACTURE SURGERY     • TRIGGER FINGER RELEASE         SLP Recommendation and Plan              Anticipated Discharge Disposition (SLP): anticipate therapy at next level of care, other (see comments) (Needs cont'd SLP services @ [facility]) (21 141)           Daily Summary of Progress (SLP): progress toward functional goals as expected (21 141)          Plan of Care Reviewed With: patient, durable power of  (21 1507)  Progress: no change (21 1507)      SLP EVALUATION (last 72 hours)     SLP SLC Evaluation     Row Name 21 1415                   Communication Assessment/Intervention    Document Type therapy note (daily note)  -AC        Subjective Information no complaints  -AC        Patient Observations alert; cooperative  -AC        Patient/Family/Caregiver Comments/Observations  Nephew/POA present.  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  -AC        Daily Summary of Progress (SLP) progress toward functional goals as expected  -AC        Plan for Continued Treatment (SLP) continue treatment per plan of care  -AC        Care Plan Review evaluation/treatment results reviewed; care plan/treatment goals reviewed; risks/benefits reviewed; current/potential barriers reviewed; patient/other agree to care plan  -AC        Care Plan Review, Other Participant(s) family; durable/healthcare power of   -AC                  Recommendations    Anticipated Discharge Disposition (SLP) anticipate therapy at next level of care; other (see comments)  Needs cont'd SLP services @ [facility]  -              User Key  (r) = Recorded By, (t) = Taken By, (c) = Cosigned By    Initials Name Effective Dates    AC Maribell Hunt, MS CCC-SLP 06/16/21 -                    EDUCATION  The patient has been educated in the following areas:     Cognitive Impairment Communication Impairment.           SLP GOALS     Row Name 12/06/21 1415             Comprehend Questions Goal 1 (SLP)    Improve Ability to Comprehend Questions Goal 1 (SLP) questions about personal information; complex yes/no questions; concrete general questions; 80%; with minimal cues (75-90%)  -AC      Time Frame (Comprehend Questions Goal 1, SLP) short term goal (STG)  -AC      Progress (Ability to Comprehend Questions Goal 1, SLP) 60%; with minimal cues (75-90%)  -AC      Progress/Outcomes (Comprehend Questions Goal 1, SLP) continuing progress toward goal  -AC              Follow Directions Goal 2 (SLP)    Improve Ability to Follow Directions Goal 1 (SLP) 3  step commands; 80%; with minimal cues (75-90%)  -AC      Time Frame (Follow Directions Goal 1, SLP) short term goal (STG)  -AC      Progress (Ability to Follow Directions Goal 1, SLP) 20%; with minimal cues (75-90%)  -AC      Progress/Outcomes (Follow Directions Goal 1, SLP) progress slower than expected  -AC              Word Retrieval Skills Goal 1 (SLP)    Improve Word Retrieval Skills By Goal 1 (SLP) confrontational naming task; low frequency; completing open ended unstructured sentence; completing a divergent task; completing a convergent task; 80%; with minimal cues (75-90%)  -AC      Time Frame (Word Retrieval Goal 1, SLP) short term goal (STG)  -AC      Progress (Word Retrieval Skills Goal 1, SLP) 60%; with minimal cues (75-90%)  -AC      Progress/Outcomes (Word Retrieval Goal 1, SLP) continuing progress toward goal  -AC      Comment (Word Retrieval Goal 1, SLP) Confrontational namin%, concrete convergent namin%  -AC              Additional Goal 1 (SLP)    Additional Goal 1, SLP LTG: pt will improve communication skills to actively participate in care w/ 80% accuracy w/ min cues  -AC      Time Frame (Additional Goal 1, SLP) by discharge  -AC      Progress/Outcomes (Additional Goal 1, SLP) continuing progress toward goal  -AC            User Key  (r) = Recorded By, (t) = Taken By, (c) = Cosigned By    Initials Name Provider Type    Maribell Urrutia MS CCC-SLP Speech and Language Pathologist                        Time Calculation:      Time Calculation- SLP     Row Name 21 1508             Time Calculation- SLP    SLP Start Time 1415  -AC      SLP Received On 21  -AC              Untimed Charges    26558-DF Treatment/ST Modification Prosth Aug Alter  39  -AC              Total Minutes    Untimed Charges Total Minutes 39  -AC       Total Minutes 39  -AC            User Key  (r) = Recorded By, (t) = Taken By, (c) = Cosigned By    Initials Name Provider Type    Maribell Urrutia MS  CCC-SLP Speech and Language Pathologist                Therapy Charges for Today     Code Description Service Date Service Provider Modifiers Qty    [id number] [facility] TREATMENT SPEECH 3 12/6/2021 Maribell Hunt, MS CCC-SLP GN 1        Patient was not wearing a face mask and did not exhibit coughing during this therapy encounter.  Procedure performed was not aerosolizing, involved close contact (within 6 feet for at least 15 minutes or longer), and did not involve contact with infectious secretions or specimens.  Therapist used appropriate personal protective equipment including gloves, standard procedure mask and eye protection.  Appropriate PPE was worn during the entire therapy session.  Hand hygiene was completed before and after therapy session.                Maribell Hunt MS CCC-SLP  12/6/2021

## 2021-12-06 NOTE — DISCHARGE PLACEMENT REQUEST
"Mitzi Zapata (86 y.o. Female)     Discharge Summary        Date of Birth Social Security Number Address Home Phone MRN    1935  [address]  [address]  [place] [id number] [phone] [id number]    [place] Marital Status             Mandaen Single       Admission Date Admission Type Admitting Provider Attending Provider Department, Room/Bed    21 Emergency Anjali Williamson DO Roesch, Lyndsey, DO [facility] 3F, S302/1    Discharge Date Discharge Disposition Discharge Destination           Long Term Care (DC - External)              Attending Provider: Anjali Williamson DO    Allergies: Bee Pollen    Isolation: None   Infection: None   Code Status: No CPR   Advance Care Planning Activity    Ht: 157.5 cm (62\")   Wt: 50.8 kg (112 lb)    Admission Cmt: None   Principal Problem: None                Active Insurance as of 2021     Primary Coverage     Payor Plan Insurance Group Employer/Plan Group    HUMANA MEDICARE REPLACEMENT HUMANA MEDICARE REPLACEMENT [id number]     Payor Plan Address Payor Plan Phone Number Payor Plan Fax Number Effective Dates    [address] [address] [phone]  2018 - None Entered    [facility] [address]       Subscriber Name Subscriber Birth Date Member ID       MITZI ZAPATA 1935 [id number]                 Emergency Contacts      (Rel.) Home Phone Work Phone Mobile Phone    Lane Zapata (Power of ) [phone] -- --               Discharge Summary      Anjali Williamson DO at 21 1403              [facility]  DISCHARGE SUMMARY    Patient Name: Mitzi Zapata  : 1935  MRN: [id number]    Date of Admission: 2021 10:54 AM  Date of Discharge:  21  Primary Care Physician: Dm Cade APRN    Consults     Date and Time Order Name Status Description    2021 11:15 AM Inpatient Palliative Care MD Consult Completed     2021 10:56 AM Inpatient Neurology " Consult Stroke Completed           Hospital Course     Presenting Problem:   CVA (cerebral vascular accident) ([facility]) [I63.9]    Active Hospital Problems    Diagnosis  POA   • Cognitive decline [R41.89]  Yes   • Trigeminal neuralgia [G50.0]  Yes   • Hypothyroidism (acquired) [E03.9]  Yes   • Lung mass [R91.8]  Yes   • Delirium, acute [R41.0]  Yes   • Essential hypertension [I10]  Yes   • Fall [W19.XXXA]  Yes      Resolved Hospital Problems    Diagnosis Date Resolved POA   • CVA (cerebral vascular accident) ([facility]) [I63.9] 11/29/2021 Yes   • Acute UTI (urinary tract infection) [N39.0] 12/06/2021 Yes   • JUAN F (acute kidney injury) ([facility]) [N17.9] 12/06/2021 Yes          Hospital Course:  Mitzi Eric is a 86 y.o. female with history of dementia, hypothyroidism, prior craniotomy secondary to possible brain mass, hypertension, atrial fibrillation not on anticoagulation, COPD, lung mass, and frequent UTIs who presented from [facility] with altered mental status, a fall and tremors found to have an UTI.     Plan:     UTI  --on admission, WBC count 14,Procal 2.46  -- UA with positive nitrates, small leukocyte esterase, 21-30 white blood cells and 4+ bacteria  -- UCx >100 Klebsiella resistant to Ampicillin  - Completed course of rocephin inpatient      Delirium  Dementia  -- likely secondary to UTI, MRI brain without acute changes  -- multiple medications likely contributing   -- Tegretol: level was high on admission.  Attempted to resume 200 mg twice daily after discussing with her POA, however more lethargic and this was discontinued   -- Buspar: Discontinued  -- Lyrica: lowered dose and frequency  -- Remeron: Discontinued  -- Trazodone: Decreased to 25 mg nightly  - Continue scheduled melatonin; Seroquel 12.5 nightly as needed added  -Nephew did meet with palliative care as well as hospice care while inpatient.     Cough - improved  - CXR with no acute findings   - Start duo-nebs; flonase; normal WBC   -Likely improvement  with increased mobilization     Elevated Creatinine  Volume depletion  Hypernatremia   -- Cr initially 1.60, baseline of  0.8  -- Cr trended up due to poor PO intake now stable off of IV fluids     Fall  - plain films without fracture  - PT/OT/fall precautions; home health on discharge     Lung mass  - right apex. Noted on CT chest back in 2018 too. ED notes at the time mention prior biopsies x 2.  - my partner discussed with nephew, They are aware of this and stated that, during prior workup, they had all decided on no further workup/treatment     HTN  --Blood pressure control with starting metoprolol.  Amlodipine was discontinued.     Hypothyroid  --TSH wnl, continue home dose Synthroid     H/o Afib  -- not on AC, likely due to fall risk  -- not on any rate controlling meds, and issue with SVT overnight 12/1 and metoprolol 12.5 mg BID started with heart rate control     Hypokalemia    Trigeminal neuralgia   -Holding Tegretol    Discharge Follow Up Recommendations for outpatient labs/diagnostics:  Facility provider 24 hours  PCP Dm Cade 1 week   Consider consulting palliative care as outpatient    Day of Discharge     HPI:   No acute overnight events.  Patient sitting and alert.  She is minimally interactive but does answer some yes or no questions.  She is able to me her nephew's name.  Long conversation with nephew regarding current medications, goals of care, plan for discharge.  He feels that her cough is improved.  He is agreeable for discharge today if [facility] is able to meet her needs.  Case management reviewed with [facility] and nephew did as well who confirms that they are able to provide patient the care that she needs.  They are aware that she is unable to transfer and needs assistance with toileting.  Home health has been arranged.    Review of Systems  Difficult to obtain due to mental status patient denies any concerns    Vital Signs:   Temp:  [98.3 °F (36.8 °C)-98.6 °F (37 °C)] 98.6  °F (37 °C)  Heart Rate:  [] 84  Resp:  [16-20] 16  BP: (113-164)/(63-96) 137/84     Physical Exam:  Constitutional: No acute distress, awake, alert; frail  HENT: NCAT, mucous membranes moist  Respiratory: Clear to auscultation bilaterally, respiratory effort; occ cough   Cardiovascular: RRR, no murmurs, rubs, or gallops  Gastrointestinal: Positive bowel sounds, soft, nontender, nondistended  Musculoskeletal: No bilateral ankle edema  Psychiatric: calm  Neurologic: Oriented x 1, strength symmetric in all extremities - generalized weakness, Cranial Nerves grossly intact to confrontation, speech clear  Skin: No rashes    Pertinent  and/or Most Recent Results     LAB RESULTS:      Lab 12/05/21  0627 12/04/21  0457 12/02/21  0953 12/01/21  0657 11/30/21  0839   WBC 6.88 7.72 5.58 6.26 7.82   HEMOGLOBIN 11.6* 12.6 12.3 11.9* 11.2*   HEMATOCRIT 34.7 38.1 36.7 36.2 33.6*   PLATELETS 108* 128* 112* 130* 112*   NEUTROS ABS  --   --  3.56 3.75 5.58   IMMATURE GRANS (ABS)  --   --  0.04 0.02 0.02   LYMPHS ABS  --   --  0.84 1.46 1.30   MONOS ABS  --   --  1.09* 0.83 0.74   EOS ABS  --   --  0.01 0.17 0.15   MCV 95.6 96.0 96.1 97.1* 96.3         Lab 12/06/21  0234 12/05/21  0627 12/04/21 0457 12/02/21  0953 12/01/21  0657 11/30/21  0839 11/30/21  0839   SODIUM 142 143 146* 140 140   < > 142   POTASSIUM 5.2 3.4* 3.3* 3.6 3.2*   < > 3.7   CHLORIDE 111* 109* 110* 107 104   < > 107   CO2 21.0* 22.0 22.0 20.0* 21.0*   < > 23.0   ANION GAP 10.0 12.0 14.0 13.0 15.0   < > 12.0   BUN 17 23 25* 8 12   < > 17   CREATININE 0.75 0.79 1.03* 0.62 0.81   < > 0.93   GLUCOSE 97 102* 119* 97 82   < > 82   CALCIUM 8.4* 8.4* 8.8 8.8 8.6   < > 8.3*   HEMOGLOBIN A1C  --   --   --   --   --   --  5.50    < > = values in this interval not displayed.                 Lab 11/30/21  0839   CHOLESTEROL 158   LDL CHOL 97   HDL CHOL 40   TRIGLYCERIDES 118             Brief Urine Lab Results  (Last result in the past 365 days)      Color   Clarity    Blood   Leuk Est   Nitrite   Protein   CREAT   Urine HCG        11/29/21 1200 Yellow   Cloudy   Trace   Small (1+)   Positive   30 mg/dL (1+)               Microbiology Results (last 10 days)     Procedure Component Value - Date/Time    Blood Culture - Blood, Arm, Right [[id number]]  (Normal) Collected: 11/29/21 1330    Lab Status: Final result Specimen: Blood from Arm, Right Updated: 12/04/21 1516     Blood Culture No growth at 5 days    Blood Culture - Blood, Arm, Right [[id number]]  (Normal) Collected: 11/29/21 1320    Lab Status: Final result Specimen: Blood from Arm, Right Updated: 12/04/21 1516     Blood Culture No growth at 5 days    Urine Culture - Urine, Urine, Catheter [[id number]]  (Abnormal)  (Susceptibility) Collected: 11/29/21 1200    Lab Status: Final result Specimen: Urine, Catheter Updated: 12/01/21 0932     Urine Culture >100,000 CFU/mL Klebsiella pneumoniae ssp pneumoniae    Susceptibility      Klebsiella pneumoniae ssp pneumoniae     OJAN     Ampicillin Resistant     Ampicillin + Sulbactam Resistant     Cefazolin Susceptible     Cefepime Susceptible     Ceftazidime Susceptible     Ceftriaxone Susceptible     Gentamicin Susceptible     Levofloxacin Susceptible     Nitrofurantoin Intermediate     Piperacillin + Tazobactam Susceptible     Tetracycline Susceptible     Trimethoprim + Sulfamethoxazole Susceptible                         COVID-19 and FLU A/B PCR - Swab, Nasopharynx [[id number]]  (Normal) Collected: 11/29/21 1131    Lab Status: Final result Specimen: Swab from Nasopharynx Updated: 11/29/21 1213     COVID19 Not Detected     Influenza A PCR Not Detected     Influenza B PCR Not Detected    Narrative:      Fact sheet for providers: https://www.fda.gov/media/223565/download    Fact sheet for patients: https://www.fda.gov/media/508819/download    Test performed by PCR.          Adult Transthoracic Echo Complete W/ Cont if Necessary Per Protocol (With Agitated Saline)    Result Date: 11/29/2021  ·  Left ventricular ejection fraction appears to be 61 - 65%. Left ventricular systolic function is normal. · Saline test results are negative. · Estimated right ventricular systolic pressure from tricuspid regurgitation is markedly elevated (>55 mmHg). · Moderate tricuspid valve regurgitation is present. · Left ventricular diastolic function was normal.      CT Angiogram Neck    Result Date: 11/29/2021  EXAMINATION: CT ANGIOGRAM NECK-, CT ANGIOGRAM HEAD W AI ANALYSIS OF LVO-11/29/2021:  INDICATION: Stroke, follow-up.  TECHNIQUE: CT angiogram head and neck with intravenous contrast administration. 2-D and 3-D reconstructions performed.  The radiation dose reduction device was turned on for each scan per the ALARA (As Low as Reasonably Achievable) protocol.  COMPARISON: Concurrent CT cerebral perfusion and CT head, noncontrast, stroke protocol head.  FINDINGS:  CTA NECK: Normal 3-vessel arch with patent great vessel origins. Proximal subclavian arteries are patent. The vertebral arteries demonstrate a mild left-sided dominance of caliber without focal severe stenosis, aneurysm or occlusion. The carotids demonstrate grossly normal course and branching pattern with atherosclerotic calcific disease and plaque formations of the ICA origins producing 20% right and 10% left luminal narrowing as measured by NASCET criteria with patency of the distal internal carotid arteries to the intracranial portions discussed further below. Cervical soft tissue is unremarkable. The thyroid has a low-density left thyroid nodules without calcifications measuring up to 1 cm. The lung apices demonstrate biapical pleural parenchymal scarring with confluent opacification in the right upper lung partially imaged and may represent airspace disease although underlying mass or nodule would not be excluded.  CTA HEAD: Distal internal carotid arteries demonstrate mild-to-moderate atherosclerotic calcific disease of the distal internal carotid  arteries, left greater than right, without focal severe stenosis, aneurysm or occlusion. Anterior cerebral arteries are patent without hemodynamically significant stenosis, aneurysm or occlusion. Middle cerebral arteries are patent without hemodynamically significant stenosis, aneurysm or occlusion. Vertebrobasilar system and posterior cerebral arteries are patent without hemodynamically significant stenosis, aneurysm or occlusion. Fetal origin variance of the right posterior cerebral artery. The venous structures are partially imaged and unremarkable including superior sagittal sinus widely patent.      1. No hemodynamically significant stenosis, aneurysm or occlusion throughout the CTA head and neck with plaque formation and calcific disease in the ICA origins and distal internal carotid arteries, however, no large vessel occlusion with [place] widely patent.  2.  Lung apices demonstrate biapical pleural parenchymal scarring with confluent opacification in the right upper lung partially imaged and may represent airspace disease although underlying mass or nodule cannot be excluded. Further evaluation with CT chest recommended when clinically appropriate.  D:  11/29/2021 E:  11/29/2021  This report was finalized on 11/29/2021 4:50 PM by Dr. Mikie Alfaro.      MRI Brain With & Without Contrast    Result Date: 12/1/2021  EXAMINATION: MRI BRAIN W WO CONTRAST-11/29/2021:  INDICATION: Seizure, AMS.  TECHNIQUE: Multiplanar MRI of the brain with and without intravenous contrast administration.  COMPARISON: CT head and angiogram as well as perfusion 11/29/2021 earlier same day.  FINDINGS: No restriction on diffusion-weighted sequences to suggest acute ischemia. Midline structures are asymmetric with encephalomalacia left frontal insult and prominence of the sulci towards the vertex with prior left craniotomy findings. No susceptibility artifact on susceptibility-weighted imaging. Postcontrast sequences without  abnormal enhancement of the pulmonary parenchyma. Globes and orbits are unremarkable. The paranasal sinuses and mastoid air cells are grossly clear and well pneumatized with the exception of small right and trace left mastoid effusions. Pituitary and sella within normal limits. Cervicomedullary junction widely patent.      1. Postsurgical changes and encephalomalacic involvement of the left frontal lobe. 2. No acute infarction. 3. No abnormal enhancement.  D:  11/29/2021 E:  11/29/2021     This report was finalized on 12/1/2021 1:32 PM by Dr. Mikie Alfaro.      XR Chest 1 View    Result Date: 12/3/2021  EXAMINATION: XR CHEST 1 VW-  INDICATION: Cough, hypoxia; N39.0-Urinary tract infection, site not specified; R41.0-Disorientation, unspecified; R47.1-Dysarthria and anarthria; R29.810-Facial weakness; N28.9-Disorder of kidney and ureter, unspecified; D72.829-Elevated white blood cell count, unspecified; R47.01-Aphasia.  COMPARISON: 11/29/2021  FINDINGS: Heart and vasculature appear normal in size. There are chronic changes of centrilobular emphysema, with hyperlucent upper lungs and coarsening of the basilar interstitial markings which appears similar to the prior study. Right apical lung mass, known since at least 2018, appear stable. No new pulmonary parenchymal disease, evidence of edema, effusion or pneumothorax is seen.      Stable right apical lung mass and chronic appearing changes of centrilobular emphysema. No clearly new chest pathology is identified.   D:  12/03/2021 E:  12/03/2021  This report was finalized on 12/3/2021 10:01 PM by Dr. Jorge Foote MD.      XR Chest 1 View    Result Date: 11/29/2021  EXAMINATION: XR CHEST 1 VW- 11/29/2021  INDICATION: Acute Stroke Protocol (onset < 12 hrs)  COMPARISON: Chest x-ray 11/05/2021  FINDINGS: Hyperinflated appearance bilateral with opacifications at the right lung apex similar to prior comparison 11/05/2021 likely atelectasis or scarring without pleural effusion.       Chronic changes including atelectasis or scarring somewhat confluent at the right lung apex similar to 11/05/2021 comparison without effusion.  D: 11/29/2021 E: 11/29/2021  This report was finalized on 11/29/2021 4:51 PM by Dr. Mikie Alfaro.      CT Head Without Contrast Stroke Protocol    Result Date: 11/29/2021  EXAMINATION: CT HEAD WO CONTRAST, STROKE PROTOCOL-11/29/2021:  INDICATION: Neuro deficit, acute, stroke suspected.  TECHNIQUE: CT head without intravenous contrast following stroke protocol.  The radiation dose reduction device was turned on for each scan per the ALARA (As Low as Reasonably Achievable) protocol.  COMPARISON: CT head dated 11/05/2021.  FINDINGS: Prior left frontal craniotomy and encephalomalacia in the left frontal lobe without acute findings. No midline shift or hydrocephalus. No intra-axial hemorrhage or extra-axial fluid collection. Globes and orbits are normal.      No acute intracranial finding specifically, no acute intracranial hemorrhage.  Scan performed on 11/29/2021 at 1055 hours. Scan report given to ER physician and stroke team in person at scanner by Dr. Alfaro on 11/29/2021 at 1105 hours.  D:  11/29/2021 E:  11/29/2021  This report was finalized on 11/29/2021 4:50 PM by Dr. Mikie Alfaro.      CT Angiogram Head w AI Analysis of LVO    Result Date: 11/29/2021  EXAMINATION: CT ANGIOGRAM NECK-, CT ANGIOGRAM HEAD W AI ANALYSIS OF LVO-11/29/2021:  INDICATION: Stroke, follow-up.  TECHNIQUE: CT angiogram head and neck with intravenous contrast administration. 2-D and 3-D reconstructions performed.  The radiation dose reduction device was turned on for each scan per the ALARA (As Low as Reasonably Achievable) protocol.  COMPARISON: Concurrent CT cerebral perfusion and CT head, noncontrast, stroke protocol head.  FINDINGS:  CTA NECK: Normal 3-vessel arch with patent great vessel origins. Proximal subclavian arteries are patent. The vertebral arteries demonstrate a mild left-sided  dominance of caliber without focal severe stenosis, aneurysm or occlusion. The carotids demonstrate grossly normal course and branching pattern with atherosclerotic calcific disease and plaque formations of the ICA origins producing 20% right and 10% left luminal narrowing as measured by NASCET criteria with patency of the distal internal carotid arteries to the intracranial portions discussed further below. Cervical soft tissue is unremarkable. The thyroid has a low-density left thyroid nodules without calcifications measuring up to 1 cm. The lung apices demonstrate biapical pleural parenchymal scarring with confluent opacification in the right upper lung partially imaged and may represent airspace disease although underlying mass or nodule would not be excluded.  CTA HEAD: Distal internal carotid arteries demonstrate mild-to-moderate atherosclerotic calcific disease of the distal internal carotid arteries, left greater than right, without focal severe stenosis, aneurysm or occlusion. Anterior cerebral arteries are patent without hemodynamically significant stenosis, aneurysm or occlusion. Middle cerebral arteries are patent without hemodynamically significant stenosis, aneurysm or occlusion. Vertebrobasilar system and posterior cerebral arteries are patent without hemodynamically significant stenosis, aneurysm or occlusion. Fetal origin variance of the right posterior cerebral artery. The venous structures are partially imaged and unremarkable including superior sagittal sinus widely patent.      1. No hemodynamically significant stenosis, aneurysm or occlusion throughout the CTA head and neck with plaque formation and calcific disease in the ICA origins and distal internal carotid arteries, however, no large vessel occlusion with [place] widely patent.  2.  Lung apices demonstrate biapical pleural parenchymal scarring with confluent opacification in the right upper lung partially imaged and may represent  airspace disease although underlying mass or nodule cannot be excluded. Further evaluation with CT chest recommended when clinically appropriate.  D:  11/29/2021 E:  11/29/2021  This report was finalized on 11/29/2021 4:50 PM by Dr. Mikie Alfaro.      CT CEREBRAL PERFUSION WITH & WITHOUT CONTRAST    Result Date: 11/29/2021  EXAMINATION: CT CEREBRAL PERFUSION W WO CONTRAST- 11/29/2021  INDICATION: Neuro deficit, acute, stroke suspected  TECHNIQUE: CT cerebral perfusion with and without intravenous contrast administration. Multiple parametric maps including mean transit time, time to drain, cerebral blood flow and cerebral blood volume performed.  The radiation dose reduction device was turned on for each scan per the ALARA (As Low as Reasonably Achievable) protocol.  COMPARISON: CT noncontrast stroke protocol and concurrent CT angiogram  FINDINGS: Area of artifact from prior insult left frontal region. No perfusion defect otherwise identified specifically no reversible ischemia within a specific vascular territory.      No reversible ischemia within a specific vascular territory.  D: 11/29/2021 E: 11/29/2021   This report was finalized on 11/29/2021 4:50 PM by Dr. Mikie Alfaro.      XR Hip With or Without Pelvis 2 - 3 View Left    Result Date: 11/29/2021  EXAMINATION: XR HIP W OR WO PELVIS 2-3 VIEW LEFT- 11/29/2021  INDICATION: fall  COMPARISON: Hip x-ray 10/21/2021  FINDINGS: SI joints symmetric and without widening. No displaced pelvic ring fracture. 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 Left hip without acute displaced fracture remaining well located at the left hip joint.      No displaced left rib fracture or displaced pelvic ring fracture with left hip remaining well located in the left acetabular cup.  D: 11/29/2021 E: 11/29/2021  This report was finalized on 11/29/2021 4:51 PM by Dr. Mikie Alfaro.                Results for orders placed during the hospital encounter of 11/29/21    Adult Transthoracic Echo  Complete W/ Cont if Necessary Per Protocol (With Agitated Saline)    Interpretation Summary  · Left ventricular ejection fraction appears to be 61 - 65%. Left ventricular systolic function is normal.  · Saline test results are negative.  · Estimated right ventricular systolic pressure from tricuspid regurgitation is markedly elevated (>55 mmHg).  · Moderate tricuspid valve regurgitation is present.  · Left ventricular diastolic function was normal.      Plan for Follow-up of Pending Labs/Results:     Discharge Details        Discharge Medications      New Medications      Instructions Start Date   benzonatate 200 MG capsule  Commonly known as: TESSALON   200 mg, Oral, 3 Times Daily PRN      fluticasone 50 MCG/ACT nasal spray  Commonly known as: FLONASE   2 sprays, Each Nare, Daily   Start Date: December 7, 2021     ipratropium-albuterol 0.5-2.5 mg/3 ml nebulizer  Commonly known as: DUO-NEB   3 mL, Nebulization, 4 Times Daily - RT      lactobacillus acidophilus capsule capsule   1 capsule, Oral, Daily   Start Date: December 7, 2021     melatonin 5 MG tablet tablet   5 mg, Oral, Nightly      metoprolol tartrate 25 MG tablet  Commonly known as: LOPRESSOR   12.5 mg, Oral, Every 12 Hours Scheduled      QUEtiapine 25 MG tablet  Commonly known as: SEROquel   12.5 mg, Oral, Nightly PRN         Changes to Medications      Instructions Start Date   pregabalin 25 MG capsule  Commonly known as: LYRICA  What changed:   · medication strength  · how much to take  · when to take this   25 mg, Oral, Every 12 Hours Scheduled      traZODone 50 MG tablet  Commonly known as: DESYREL  What changed: how much to take   25 mg, Oral, Nightly         Continue These Medications      Instructions Start Date   acetaminophen 325 MG tablet  Commonly known as: TYLENOL   650 mg, Oral, 3 Times Daily      acetaminophen 325 MG tablet  Commonly known as: TYLENOL   650 mg, Oral, Every 4 Hours PRN      aspirin 81 MG EC tablet   81 mg, Oral, Daily       budesonide-formoterol 160-4.5 MCG/ACT inhaler  Commonly known as: SYMBICORT   2 puffs, Inhalation, 2 Times Daily - RT      cetirizine 10 MG tablet  Commonly known as: zyrTEC   10 mg, Oral, Daily      docusate sodium 100 MG capsule   100 mg, Oral, 2 Times Daily      folic acid 400 MCG tablet  Commonly known as: FOLVITE   400 mcg, Oral, 2 Times Daily      levothyroxine 25 MCG tablet  Commonly known as: SYNTHROID, LEVOTHROID   25 mcg, Oral, Daily      lovastatin 40 MG tablet  Commonly known as: MEVACOR   40 mg, Oral, Nightly      pantoprazole 20 MG EC tablet  Commonly known as: PROTONIX   20 mg, Oral, Daily         Stop These Medications    amLODIPine 10 MG tablet  Commonly known as: NORVASC     busPIRone 7.5 MG tablet  Commonly known as: BUSPAR     carBAMazepine  MG 12 hr tablet  Commonly known as: TEGretol  XR     dronabinol 2.5 MG capsule  Commonly known as: MARINOL     mirtazapine 15 MG tablet  Commonly known as: REMERON            Allergies   Allergen Reactions   • Bee Pollen Unknown - Low Severity         Discharge Disposition:  Long Term Care (DC - External)    Diet:  Hospital:  Diet Order   Procedures   • Diet Regular; Thin       Activity:  Activity Instructions     Activity as Tolerated            Restrictions or Other Recommendations:         CODE STATUS:    Code Status and Medical Interventions:   Ordered at: 12/04/21 1115     Medical Intervention Limits:    NO intubation (DNI)     Level Of Support Discussed With:    [facility]     Code Status (Patient has no pulse and is not breathing):    No CPR (Do Not Attempt to Resuscitate)     Medical Interventions (Patient has pulse or is breathing):    Limited Support       Future Appointments   Date Time Provider Department Center   1/14/2022  9:30 AM Robin Guo MD [facility]       Additional Instructions for the Follow-ups that You Need to Schedule     Discharge Follow-up with PCP   As directed       Currently Documented PCP:     Dm Cade, RAFAEL    PCP Phone Number:    [phone]     Follow Up Details: PCP Dm Cade 1 week                     Anjali Williamson DO  12/06/21      Time Spent on Discharge:  I spent  35  minutes on this discharge activity which included: face-to-face encounter with the patient, reviewing the data in the system, coordination of the care with the nursing staff as well as consultants, documentation, and entering orders.            Electronically signed by Anjali Williamson DO at 12/06/21 [id number]

## 2021-12-06 NOTE — DISCHARGE PLACEMENT REQUEST
"Mitzi Zapata (86 y.o. Female)             Date of Birth Social Security Number Address Home Phone MRN    1935  [id number]  [address]  [place] [id number] [phone] [id number]    [employer] Marital Status             Amish Single       Admission Date Admission Type Admitting Provider Attending Provider Department, Room/Bed    21 Emergency Anjali Williamson DO Roesch, Lyndsey, DO [facility] 3F, S302/1    Discharge Date Discharge Disposition Discharge Destination           Long Term Care (DC - External)              Attending Provider: Anjali Williamson DO    Allergies: Bee Pollen    Isolation: None   Infection: None   Code Status: No CPR   Advance Care Planning Activity    Ht: 157.5 cm (62\")   Wt: 50.8 kg (112 lb)    Admission Cmt: None   Principal Problem: None                Active Insurance as of 2021     Primary Coverage     Payor Plan Insurance Group Employer/Plan Group    HUMANA MEDICARE REPLACEMENT HUMANA MEDICARE REPLACEMENT [id number]     Payor Plan Address Payor Plan Phone Number Payor Plan Fax Number Effective Dates    [address] [address] [phone]  2018 - None Entered    [facility] [id number]       Subscriber Name Subscriber Birth Date Member ID       MITZI ZAPATA 1935 [id number]                 Emergency Contacts      (Rel.) Home Phone Work Phone Mobile Phone    Lane Zapata (Power of ) [phone] -- --               Discharge Summary      Anjali Williamson DO at 21 1403              [facility] Medicine Services  DISCHARGE SUMMARY    Patient Name: Mitzi Zapata  : 1935  MRN: [id number]    Date of Admission: 2021 10:54 AM  Date of Discharge:  21  Primary Care Physician: Dm Cade APRN    Consults     Date and Time Order Name Status Description    2021 11:15 AM Inpatient Palliative Care MD Consult Completed     2021 10:56 AM Inpatient Neurology Consult Stroke " Completed           Hospital Course     Presenting Problem:   CVA (cerebral vascular accident) ([facility]) [I63.9]    Active Hospital Problems    Diagnosis  POA   • Cognitive decline [R41.89]  Yes   • Trigeminal neuralgia [G50.0]  Yes   • Hypothyroidism (acquired) [E03.9]  Yes   • Lung mass [R91.8]  Yes   • Delirium, acute [R41.0]  Yes   • Essential hypertension [I10]  Yes   • Fall [W19.XXXA]  Yes      Resolved Hospital Problems    Diagnosis Date Resolved POA   • CVA (cerebral vascular accident) ([facility]) [I63.9] 11/29/2021 Yes   • Acute UTI (urinary tract infection) [N39.0] 12/06/2021 Yes   • JUAN F (acute kidney injury) ([facility]) [N17.9] 12/06/2021 Yes          Hospital Course:  Mitzi Eric is a 86 y.o. female with history of dementia, hypothyroidism, prior craniotomy secondary to possible brain mass, hypertension, atrial fibrillation not on anticoagulation, COPD, lung mass, and frequent UTIs who presented from [facility] with altered mental status, a fall and tremors found to have an UTI.     Plan:     UTI  --on admission, WBC count 14,Procal 2.46  -- UA with positive nitrates, small leukocyte esterase, 21-30 white blood cells and 4+ bacteria  -- UCx >100 Klebsiella resistant to Ampicillin  - Completed course of rocephin inpatient      Delirium  Dementia  -- likely secondary to UTI, MRI brain without acute changes  -- multiple medications likely contributing   -- Tegretol: level was high on admission.  Attempted to resume 200 mg twice daily after discussing with her POA, however more lethargic and this was discontinued   -- Buspar: Discontinued  -- Lyrica: lowered dose and frequency  -- Remeron: Discontinued  -- Trazodone: Decreased to 25 mg nightly  - Continue scheduled melatonin; Seroquel 12.5 nightly as needed added  -Nephew did meet with palliative care as well as hospice care while inpatient.     Cough - improved  - CXR with no acute findings   - Start duo-nebs; flonase; normal WBC   -Likely improvement with increased  mobilization     Elevated Creatinine  Volume depletion  Hypernatremia   -- Cr initially 1.60, baseline of  0.8  -- Cr trended up due to poor PO intake now stable off of IV fluids     Fall  - plain films without fracture  - PT/OT/fall precautions; home health on discharge     Lung mass  - right apex. Noted on CT chest back in 2018 too. ED notes at the time mention prior biopsies x 2.  - my partner discussed with nephew, They are aware of this and stated that, during prior workup, they had all decided on no further workup/treatment     HTN  --Blood pressure control with starting metoprolol.  Amlodipine was discontinued.     Hypothyroid  --TSH wnl, continue home dose Synthroid     H/o Afib  -- not on AC, likely due to fall risk  -- not on any rate controlling meds, and issue with SVT overnight 12/1 and metoprolol 12.5 mg BID started with heart rate control     Hypokalemia    Trigeminal neuralgia   -Holding Tegretol    Discharge Follow Up Recommendations for outpatient labs/diagnostics:  Facility provider 24 hours  PCP Dm Cade 1 week   Consider consulting palliative care as outpatient    Day of Discharge     HPI:   No acute overnight events.  Patient sitting and alert.  She is minimally interactive but does answer some yes or no questions.  She is able to me her nephew's name.  Long conversation with nephew regarding current medications, goals of care, plan for discharge.  He feels that her cough is improved.  He is agreeable for discharge today if [facility] is able to meet her needs.  Case management reviewed with [facility] and nephew did as well who confirms that they are able to provide patient the care that she needs.  They are aware that she is unable to transfer and needs assistance with toileting.  Home health has been arranged.    Review of Systems  Difficult to obtain due to mental status patient denies any concerns    Vital Signs:   Temp:  [98.3 °F (36.8 °C)-98.6 °F (37 °C)] 98.6 °F (37  °C)  Heart Rate:  [] 84  Resp:  [16-20] 16  BP: (113-164)/(63-96) 137/84     Physical Exam:  Constitutional: No acute distress, awake, alert; frail  HENT: NCAT, mucous membranes moist  Respiratory: Clear to auscultation bilaterally, respiratory effort; occ cough   Cardiovascular: RRR, no murmurs, rubs, or gallops  Gastrointestinal: Positive bowel sounds, soft, nontender, nondistended  Musculoskeletal: No bilateral ankle edema  Psychiatric: calm  Neurologic: Oriented x 1, strength symmetric in all extremities - generalized weakness, Cranial Nerves grossly intact to confrontation, speech clear  Skin: No rashes    Pertinent  and/or Most Recent Results     LAB RESULTS:      Lab 12/05/21  0627 12/04/21 0457 12/02/21  0953 12/01/21  0657 11/30/21  0839   WBC 6.88 7.72 5.58 6.26 7.82   HEMOGLOBIN 11.6* 12.6 12.3 11.9* 11.2*   HEMATOCRIT 34.7 38.1 36.7 36.2 33.6*   PLATELETS 108* 128* 112* 130* 112*   NEUTROS ABS  --   --  3.56 3.75 5.58   IMMATURE GRANS (ABS)  --   --  0.04 0.02 0.02   LYMPHS ABS  --   --  0.84 1.46 1.30   MONOS ABS  --   --  1.09* 0.83 0.74   EOS ABS  --   --  0.01 0.17 0.15   MCV 95.6 96.0 96.1 97.1* 96.3         Lab 12/06/21  0234 12/05/21  0627 12/04/21 0457 12/02/21  0953 12/01/21  0657 11/30/21  0839 11/30/21  0839   SODIUM 142 143 146* 140 140   < > 142   POTASSIUM 5.2 3.4* 3.3* 3.6 3.2*   < > 3.7   CHLORIDE 111* 109* 110* 107 104   < > 107   CO2 21.0* 22.0 22.0 20.0* 21.0*   < > 23.0   ANION GAP 10.0 12.0 14.0 13.0 15.0   < > 12.0   BUN 17 23 25* 8 12   < > 17   CREATININE 0.75 0.79 1.03* 0.62 0.81   < > 0.93   GLUCOSE 97 102* 119* 97 82   < > 82   CALCIUM 8.4* 8.4* 8.8 8.8 8.6   < > 8.3*   HEMOGLOBIN A1C  --   --   --   --   --   --  5.50    < > = values in this interval not displayed.                 Lab 11/30/21  0839   CHOLESTEROL 158   LDL CHOL 97   HDL CHOL 40   TRIGLYCERIDES 118             Brief Urine Lab Results  (Last result in the past 365 days)      Color   Clarity   Blood    Leuk Est   Nitrite   Protein   CREAT   Urine HCG        11/29/21 1200 Yellow   Cloudy   Trace   Small (1+)   Positive   30 mg/dL (1+)               Microbiology Results (last 10 days)     Procedure Component Value - Date/Time    Blood Culture - Blood, Arm, Right [[id number]]  (Normal) Collected: 11/29/21 1330    Lab Status: Final result Specimen: Blood from Arm, Right Updated: 12/04/21 1516     Blood Culture No growth at 5 days    Blood Culture - Blood, Arm, Right [[id number]]  (Normal) Collected: 11/29/21 1320    Lab Status: Final result Specimen: Blood from Arm, Right Updated: 12/04/21 1516     Blood Culture No growth at 5 days    Urine Culture - Urine, Urine, Catheter [[id number]]  (Abnormal)  (Susceptibility) Collected: 11/29/21 1200    Lab Status: Final result Specimen: Urine, Catheter Updated: 12/01/21 0932     Urine Culture >100,000 CFU/mL Klebsiella pneumoniae ssp pneumoniae    Susceptibility      Klebsiella pneumoniae ssp pneumoniae     JOAN     Ampicillin Resistant     Ampicillin + Sulbactam Resistant     Cefazolin Susceptible     Cefepime Susceptible     Ceftazidime Susceptible     Ceftriaxone Susceptible     Gentamicin Susceptible     Levofloxacin Susceptible     Nitrofurantoin Intermediate     Piperacillin + Tazobactam Susceptible     Tetracycline Susceptible     Trimethoprim + Sulfamethoxazole Susceptible                         COVID-19 and FLU A/B PCR - Swab, Nasopharynx [[id number]]  (Normal) Collected: 11/29/21 1131    Lab Status: Final result Specimen: Swab from Nasopharynx Updated: 11/29/21 1213     COVID19 Not Detected     Influenza A PCR Not Detected     Influenza B PCR Not Detected    Narrative:      Fact sheet for providers: https://www.fda.gov/media/571592/download    Fact sheet for patients: https://www.fda.gov/media/387338/download    Test performed by PCR.          Adult Transthoracic Echo Complete W/ Cont if Necessary Per Protocol (With Agitated Saline)    Result Date: 11/29/2021  · Left  ventricular ejection fraction appears to be 61 - 65%. Left ventricular systolic function is normal. · Saline test results are negative. · Estimated right ventricular systolic pressure from tricuspid regurgitation is markedly elevated (>55 mmHg). · Moderate tricuspid valve regurgitation is present. · Left ventricular diastolic function was normal.      CT Angiogram Neck    Result Date: 11/29/2021  EXAMINATION: CT ANGIOGRAM NECK-, CT ANGIOGRAM HEAD W AI ANALYSIS OF LVO-11/29/2021:  INDICATION: Stroke, follow-up.  TECHNIQUE: CT angiogram head and neck with intravenous contrast administration. 2-D and 3-D reconstructions performed.  The radiation dose reduction device was turned on for each scan per the ALARA (As Low as Reasonably Achievable) protocol.  COMPARISON: Concurrent CT cerebral perfusion and CT head, noncontrast, stroke protocol head.  FINDINGS:  CTA NECK: Normal 3-vessel arch with patent great vessel origins. Proximal subclavian arteries are patent. The vertebral arteries demonstrate a mild left-sided dominance of caliber without focal severe stenosis, aneurysm or occlusion. The carotids demonstrate grossly normal course and branching pattern with atherosclerotic calcific disease and plaque formations of the ICA origins producing 20% right and 10% left luminal narrowing as measured by NASCET criteria with patency of the distal internal carotid arteries to the intracranial portions discussed further below. Cervical soft tissue is unremarkable. The thyroid has a low-density left thyroid nodules without calcifications measuring up to 1 cm. The lung apices demonstrate biapical pleural parenchymal scarring with confluent opacification in the right upper lung partially imaged and may represent airspace disease although underlying mass or nodule would not be excluded.  CTA HEAD: Distal internal carotid arteries demonstrate mild-to-moderate atherosclerotic calcific disease of the distal internal carotid arteries,  left greater than right, without focal severe stenosis, aneurysm or occlusion. Anterior cerebral arteries are patent without hemodynamically significant stenosis, aneurysm or occlusion. Middle cerebral arteries are patent without hemodynamically significant stenosis, aneurysm or occlusion. Vertebrobasilar system and posterior cerebral arteries are patent without hemodynamically significant stenosis, aneurysm or occlusion. Fetal origin variance of the right posterior cerebral artery. The venous structures are partially imaged and unremarkable including superior sagittal sinus widely patent.      1. No hemodynamically significant stenosis, aneurysm or occlusion throughout the CTA head and neck with plaque formation and calcific disease in the ICA origins and distal internal carotid arteries, however, no large vessel occlusion with [place] of [place] widely patent.  2.  Lung apices demonstrate biapical pleural parenchymal scarring with confluent opacification in the right upper lung partially imaged and may represent airspace disease although underlying mass or nodule cannot be excluded. Further evaluation with CT chest recommended when clinically appropriate.  D:  11/29/2021 E:  11/29/2021  This report was finalized on 11/29/2021 4:50 PM by Dr. Mikie Alfaro.      MRI Brain With & Without Contrast    Result Date: 12/1/2021  EXAMINATION: MRI BRAIN W WO CONTRAST-11/29/2021:  INDICATION: Seizure, AMS.  TECHNIQUE: Multiplanar MRI of the brain with and without intravenous contrast administration.  COMPARISON: CT head and angiogram as well as perfusion 11/29/2021 earlier same day.  FINDINGS: No restriction on diffusion-weighted sequences to suggest acute ischemia. Midline structures are asymmetric with encephalomalacia left frontal insult and prominence of the sulci towards the vertex with prior left craniotomy findings. No susceptibility artifact on susceptibility-weighted imaging. Postcontrast sequences without abnormal  enhancement of the pulmonary parenchyma. Globes and orbits are unremarkable. The paranasal sinuses and mastoid air cells are grossly clear and well pneumatized with the exception of small right and trace left mastoid effusions. Pituitary and sella within normal limits. Cervicomedullary junction widely patent.      1. Postsurgical changes and encephalomalacic involvement of the left frontal lobe. 2. No acute infarction. 3. No abnormal enhancement.  D:  11/29/2021 E:  11/29/2021     This report was finalized on 12/1/2021 1:32 PM by Dr. Mikie Alfaro.      XR Chest 1 View    Result Date: 12/3/2021  EXAMINATION: XR CHEST 1 VW-  INDICATION: Cough, hypoxia; N39.0-Urinary tract infection, site not specified; R41.0-Disorientation, unspecified; R47.1-Dysarthria and anarthria; R29.810-Facial weakness; N28.9-Disorder of kidney and ureter, unspecified; D72.829-Elevated white blood cell count, unspecified; R47.01-Aphasia.  COMPARISON: 11/29/2021  FINDINGS: Heart and vasculature appear normal in size. There are chronic changes of centrilobular emphysema, with hyperlucent upper lungs and coarsening of the basilar interstitial markings which appears similar to the prior study. Right apical lung mass, known since at least 2018, appear stable. No new pulmonary parenchymal disease, evidence of edema, effusion or pneumothorax is seen.      Stable right apical lung mass and chronic appearing changes of centrilobular emphysema. No clearly new chest pathology is identified.   D:  12/03/2021 E:  12/03/2021  This report was finalized on 12/3/2021 10:01 PM by Dr. Jorge Fotoe MD.      XR Chest 1 View    Result Date: 11/29/2021  EXAMINATION: XR CHEST 1 VW- 11/29/2021  INDICATION: Acute Stroke Protocol (onset < 12 hrs)  COMPARISON: Chest x-ray 11/05/2021  FINDINGS: Hyperinflated appearance bilateral with opacifications at the right lung apex similar to prior comparison 11/05/2021 likely atelectasis or scarring without pleural effusion.       Chronic changes including atelectasis or scarring somewhat confluent at the right lung apex similar to 11/05/2021 comparison without effusion.  D: 11/29/2021 E: 11/29/2021  This report was finalized on 11/29/2021 4:51 PM by Dr. Mikie Alfaro.      CT Head Without Contrast Stroke Protocol    Result Date: 11/29/2021  EXAMINATION: CT HEAD WO CONTRAST, STROKE PROTOCOL-11/29/2021:  INDICATION: Neuro deficit, acute, stroke suspected.  TECHNIQUE: CT head without intravenous contrast following stroke protocol.  The radiation dose reduction device was turned on for each scan per the ALARA (As Low as Reasonably Achievable) protocol.  COMPARISON: CT head dated 11/05/2021.  FINDINGS: Prior left frontal craniotomy and encephalomalacia in the left frontal lobe without acute findings. No midline shift or hydrocephalus. No intra-axial hemorrhage or extra-axial fluid collection. Globes and orbits are normal.      No acute intracranial finding specifically, no acute intracranial hemorrhage.  Scan performed on 11/29/2021 at 1055 hours. Scan report given to ER physician and stroke team in person at scanner by Dr. Alfaro on 11/29/2021 at 1105 hours.  D:  11/29/2021 E:  11/29/2021  This report was finalized on 11/29/2021 4:50 PM by Dr. Mikie Alfaro.      CT Angiogram Head w AI Analysis of LVO    Result Date: 11/29/2021  EXAMINATION: CT ANGIOGRAM NECK-, CT ANGIOGRAM HEAD W AI ANALYSIS OF LVO-11/29/2021:  INDICATION: Stroke, follow-up.  TECHNIQUE: CT angiogram head and neck with intravenous contrast administration. 2-D and 3-D reconstructions performed.  The radiation dose reduction device was turned on for each scan per the ALARA (As Low as Reasonably Achievable) protocol.  COMPARISON: Concurrent CT cerebral perfusion and CT head, noncontrast, stroke protocol head.  FINDINGS:  CTA NECK: Normal 3-vessel arch with patent great vessel origins. Proximal subclavian arteries are patent. The vertebral arteries demonstrate a mild left-sided  dominance of caliber without focal severe stenosis, aneurysm or occlusion. The carotids demonstrate grossly normal course and branching pattern with atherosclerotic calcific disease and plaque formations of the ICA origins producing 20% right and 10% left luminal narrowing as measured by NASCET criteria with patency of the distal internal carotid arteries to the intracranial portions discussed further below. Cervical soft tissue is unremarkable. The thyroid has a low-density left thyroid nodules without calcifications measuring up to 1 cm. The lung apices demonstrate biapical pleural parenchymal scarring with confluent opacification in the right upper lung partially imaged and may represent airspace disease although underlying mass or nodule would not be excluded.  CTA HEAD: Distal internal carotid arteries demonstrate mild-to-moderate atherosclerotic calcific disease of the distal internal carotid arteries, left greater than right, without focal severe stenosis, aneurysm or occlusion. Anterior cerebral arteries are patent without hemodynamically significant stenosis, aneurysm or occlusion. Middle cerebral arteries are patent without hemodynamically significant stenosis, aneurysm or occlusion. Vertebrobasilar system and posterior cerebral arteries are patent without hemodynamically significant stenosis, aneurysm or occlusion. Fetal origin variance of the right posterior cerebral artery. The venous structures are partially imaged and unremarkable including superior sagittal sinus widely patent.      1. No hemodynamically significant stenosis, aneurysm or occlusion throughout the CTA head and neck with plaque formation and calcific disease in the ICA origins and distal internal carotid arteries, however, no large vessel occlusion with [place] of [place] widely patent.  2.  Lung apices demonstrate biapical pleural parenchymal scarring with confluent opacification in the right upper lung partially imaged and may represent  airspace disease although underlying mass or nodule cannot be excluded. Further evaluation with CT chest recommended when clinically appropriate.  D:  11/29/2021 E:  11/29/2021  This report was finalized on 11/29/2021 4:50 PM by Dr. Mikie Alfaro.      CT CEREBRAL PERFUSION WITH & WITHOUT CONTRAST    Result Date: 11/29/2021  EXAMINATION: CT CEREBRAL PERFUSION W WO CONTRAST- 11/29/2021  INDICATION: Neuro deficit, acute, stroke suspected  TECHNIQUE: CT cerebral perfusion with and without intravenous contrast administration. Multiple parametric maps including mean transit time, time to drain, cerebral blood flow and cerebral blood volume performed.  The radiation dose reduction device was turned on for each scan per the ALARA (As Low as Reasonably Achievable) protocol.  COMPARISON: CT noncontrast stroke protocol and concurrent CT angiogram  FINDINGS: Area of artifact from prior insult left frontal region. No perfusion defect otherwise identified specifically no reversible ischemia within a specific vascular territory.      No reversible ischemia within a specific vascular territory.  D: 11/29/2021 E: 11/29/2021   This report was finalized on 11/29/2021 4:50 PM by Dr. Mikie Alfaro.      XR Hip With or Without Pelvis 2 - 3 View Left    Result Date: 11/29/2021  EXAMINATION: XR HIP W OR WO PELVIS 2-3 VIEW LEFT- 11/29/2021  INDICATION: fall  COMPARISON: Hip x-ray 10/21/2021  FINDINGS: SI joints symmetric and without widening. No displaced pelvic ring fracture. 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 Left hip without acute displaced fracture remaining well located at the left hip joint.      No displaced left rib fracture or displaced pelvic ring fracture with left hip remaining well located in the left acetabular cup.  D: 11/29/2021 E: 11/29/2021  This report was finalized on 11/29/2021 4:51 PM by Dr. Mikie Alfaro.                Results for orders placed during the hospital encounter of 11/29/21    Adult Transthoracic Echo  Complete W/ Cont if Necessary Per Protocol (With Agitated Saline)    Interpretation Summary  · Left ventricular ejection fraction appears to be 61 - 65%. Left ventricular systolic function is normal.  · Saline test results are negative.  · Estimated right ventricular systolic pressure from tricuspid regurgitation is markedly elevated (>55 mmHg).  · Moderate tricuspid valve regurgitation is present.  · Left ventricular diastolic function was normal.      Plan for Follow-up of Pending Labs/Results:     Discharge Details        Discharge Medications      New Medications      Instructions Start Date   benzonatate 200 MG capsule  Commonly known as: TESSALON   200 mg, Oral, 3 Times Daily PRN      fluticasone 50 MCG/ACT nasal spray  Commonly known as: FLONASE   2 sprays, Each Nare, Daily   Start Date: December 7, 2021     ipratropium-albuterol 0.5-2.5 mg/3 ml nebulizer  Commonly known as: DUO-NEB   3 mL, Nebulization, 4 Times Daily - RT      lactobacillus acidophilus capsule capsule   1 capsule, Oral, Daily   Start Date: December 7, 2021     melatonin 5 MG tablet tablet   5 mg, Oral, Nightly      metoprolol tartrate 25 MG tablet  Commonly known as: LOPRESSOR   12.5 mg, Oral, Every 12 Hours Scheduled      QUEtiapine 25 MG tablet  Commonly known as: SEROquel   12.5 mg, Oral, Nightly PRN         Changes to Medications      Instructions Start Date   pregabalin 25 MG capsule  Commonly known as: LYRICA  What changed:   · medication strength  · how much to take  · when to take this   25 mg, Oral, Every 12 Hours Scheduled      traZODone 50 MG tablet  Commonly known as: DESYREL  What changed: how much to take   25 mg, Oral, Nightly         Continue These Medications      Instructions Start Date   acetaminophen 325 MG tablet  Commonly known as: TYLENOL   650 mg, Oral, 3 Times Daily      acetaminophen 325 MG tablet  Commonly known as: TYLENOL   650 mg, Oral, Every 4 Hours PRN      aspirin 81 MG EC tablet   81 mg, Oral, Daily       budesonide-formoterol 160-4.5 MCG/ACT inhaler  Commonly known as: SYMBICORT   2 puffs, Inhalation, 2 Times Daily - RT      cetirizine 10 MG tablet  Commonly known as: zyrTEC   10 mg, Oral, Daily      docusate sodium 100 MG capsule   100 mg, Oral, 2 Times Daily      folic acid 400 MCG tablet  Commonly known as: FOLVITE   400 mcg, Oral, 2 Times Daily      levothyroxine 25 MCG tablet  Commonly known as: SYNTHROID, LEVOTHROID   25 mcg, Oral, Daily      lovastatin 40 MG tablet  Commonly known as: MEVACOR   40 mg, Oral, Nightly      pantoprazole 20 MG EC tablet  Commonly known as: PROTONIX   20 mg, Oral, Daily         Stop These Medications    amLODIPine 10 MG tablet  Commonly known as: NORVASC     busPIRone 7.5 MG tablet  Commonly known as: BUSPAR     carBAMazepine  MG 12 hr tablet  Commonly known as: TEGretol  XR     dronabinol 2.5 MG capsule  Commonly known as: MARINOL     mirtazapine 15 MG tablet  Commonly known as: REMERON            Allergies   Allergen Reactions   • Bee Pollen Unknown - Low Severity         Discharge Disposition:  Long Term Care (DC - External)    Diet:  Hospital:  Diet Order   Procedures   • Diet Regular; Thin       Activity:  Activity Instructions     Activity as Tolerated            Restrictions or Other Recommendations:         CODE STATUS:    Code Status and Medical Interventions:   Ordered at: 12/04/21 1115     Medical Intervention Limits:    NO intubation (DNI)     Level Of Support Discussed With:    [facility]     Code Status (Patient has no pulse and is not breathing):    No CPR (Do Not Attempt to Resuscitate)     Medical Interventions (Patient has pulse or is breathing):    Limited Support       Future Appointments   Date Time Provider Department Center   1/14/2022  9:30 AM Robin Guo MD [facility]       Additional Instructions for the Follow-ups that You Need to Schedule     Discharge Follow-up with PCP   As directed       Currently Documented PCP:     Dm Cade, RAFAEL    PCP Phone Number:    [phone]     Follow Up Details: PCP Dm Cade 1 week                     Anjali Williamson DO  12/06/21      Time Spent on Discharge:  I spent  35  minutes on this discharge activity which included: face-to-face encounter with the patient, reviewing the data in the system, coordination of the care with the nursing staff as well as consultants, documentation, and entering orders.            Electronically signed by Anjali Williamson DO at 12/06/21 7121

## 2021-12-06 NOTE — DISCHARGE SUMMARY
[facility] Medicine Services  DISCHARGE SUMMARY    Patient Name: Mitzi Eric  : 1935  MRN: [id number]    Date of Admission: 2021 10:54 AM  Date of Discharge:  21  Primary Care Physician: Dm Cade APRN    Consults     Date and Time Order Name Status Description    2021 11:15 AM Inpatient Palliative Care MD Consult Completed     2021 10:56 AM Inpatient Neurology Consult Stroke Completed           Hospital Course     Presenting Problem:   CVA (cerebral vascular accident) ([facility]) [I63.9]    Active Hospital Problems    Diagnosis  POA   • Cognitive decline [R41.89]  Yes   • Trigeminal neuralgia [G50.0]  Yes   • Hypothyroidism (acquired) [E03.9]  Yes   • Lung mass [R91.8]  Yes   • Delirium, acute [R41.0]  Yes   • Essential hypertension [I10]  Yes   • Fall [W19.XXXA]  Yes      Resolved Hospital Problems    Diagnosis Date Resolved POA   • CVA (cerebral vascular accident) ([facility]) [I63.9] 2021 Yes   • Acute UTI (urinary tract infection) [N39.0] 2021 Yes   • JUAN F (acute kidney injury) ([facility]) [N17.9] 2021 Yes          Hospital Course:  Mitzi Eric is a 86 y.o. female with history of dementia, hypothyroidism, prior craniotomy secondary to possible brain mass, hypertension, atrial fibrillation not on anticoagulation, COPD, lung mass, and frequent UTIs who presented from [facility] with altered mental status, a fall and tremors found to have an UTI.     Plan:     UTI  --on admission, WBC count 14,Procal 2.46  -- UA with positive nitrates, small leukocyte esterase, 21-30 white blood cells and 4+ bacteria  -- UCx >100 Klebsiella resistant to Ampicillin  - Completed course of rocephin inpatient      Delirium  Dementia  -- likely secondary to UTI, MRI brain without acute changes  -- multiple medications likely contributing   -- Tegretol: level was high on admission.  Attempted to resume 200 mg twice daily after discussing with her POA, however  more lethargic and this was discontinued   -- Buspar: Discontinued  -- Lyrica: lowered dose and frequency  -- Remeron: Discontinued  -- Trazodone: Decreased to 25 mg nightly  - Continue scheduled melatonin; Seroquel 12.5 nightly as needed added  -Nephew did meet with palliative care as well as hospice care while inpatient.     Cough - improved  - CXR with no acute findings   - Start duo-nebs; flonase; normal WBC   -Likely improvement with increased mobilization     Elevated Creatinine  Volume depletion  Hypernatremia   -- Cr initially 1.60, baseline of  0.8  -- Cr trended up due to poor PO intake now stable off of IV fluids     Fall  - plain films without fracture  - PT/OT/fall precautions; home health on discharge     Lung mass  - right apex. Noted on CT chest back in 2018 too. ED notes at the time mention prior biopsies x 2.  - my partner discussed with nephew, They are aware of this and stated that, during prior workup, they had all decided on no further workup/treatment     HTN  --Blood pressure control with starting metoprolol.  Amlodipine was discontinued.     Hypothyroid  --TSH wnl, continue home dose Synthroid     H/o Afib  -- not on AC, likely due to fall risk  -- not on any rate controlling meds, and issue with SVT overnight 12/1 and metoprolol 12.5 mg BID started with heart rate control     Hypokalemia    Trigeminal neuralgia   -Holding Tegretol    Discharge Follow Up Recommendations for outpatient labs/diagnostics:  Facility provider 24 hours  PCP Dm Cade 1 week   Consider consulting palliative care as outpatient    Day of Discharge     HPI:   No acute overnight events.  Patient sitting and alert.  She is minimally interactive but does answer some yes or no questions.  She is able to me her nephew's name.  Long conversation with nephew regarding current medications, goals of care, plan for discharge.  He feels that her cough is improved.  He is agreeable for discharge today if [facility] is  able to meet her needs.  Case management reviewed with [facility] and nephew did as well who confirms that they are able to provide patient the care that she needs.  They are aware that she is unable to transfer and needs assistance with toileting.  Home health has been arranged.    Review of Systems  Difficult to obtain due to mental status patient denies any concerns    Vital Signs:   Temp:  [98.3 °F (36.8 °C)-98.6 °F (37 °C)] 98.6 °F (37 °C)  Heart Rate:  [] 84  Resp:  [16-20] 16  BP: (113-164)/(63-96) 137/84     Physical Exam:  Constitutional: No acute distress, awake, alert; frail  HENT: NCAT, mucous membranes moist  Respiratory: Clear to auscultation bilaterally, respiratory effort; occ cough   Cardiovascular: RRR, no murmurs, rubs, or gallops  Gastrointestinal: Positive bowel sounds, soft, nontender, nondistended  Musculoskeletal: No bilateral ankle edema  Psychiatric: calm  Neurologic: Oriented x 1, strength symmetric in all extremities - generalized weakness, Cranial Nerves grossly intact to confrontation, speech clear  Skin: No rashes    Pertinent  and/or Most Recent Results     LAB RESULTS:      Lab 12/05/21 0627 12/04/21 0457 12/02/21  0953 12/01/21  0657 11/30/21  0839   WBC 6.88 7.72 5.58 6.26 7.82   HEMOGLOBIN 11.6* 12.6 12.3 11.9* 11.2*   HEMATOCRIT 34.7 38.1 36.7 36.2 33.6*   PLATELETS 108* 128* 112* 130* 112*   NEUTROS ABS  --   --  3.56 3.75 5.58   IMMATURE GRANS (ABS)  --   --  0.04 0.02 0.02   LYMPHS ABS  --   --  0.84 1.46 1.30   MONOS ABS  --   --  1.09* 0.83 0.74   EOS ABS  --   --  0.01 0.17 0.15   MCV 95.6 96.0 96.1 97.1* 96.3         Lab 12/06/21  0234 12/05/21 0627 12/04/21 0457 12/02/21  0953 12/01/21  0657 11/30/21  0839 11/30/21  0839   SODIUM 142 143 146* 140 140   < > 142   POTASSIUM 5.2 3.4* 3.3* 3.6 3.2*   < > 3.7   CHLORIDE 111* 109* 110* 107 104   < > 107   CO2 21.0* 22.0 22.0 20.0* 21.0*   < > 23.0   ANION GAP 10.0 12.0 14.0 13.0 15.0   < > 12.0   BUN 17 23 25* 8 12    < > 17   CREATININE 0.75 0.79 1.03* 0.62 0.81   < > 0.93   GLUCOSE 97 102* 119* 97 82   < > 82   CALCIUM 8.4* 8.4* 8.8 8.8 8.6   < > 8.3*   HEMOGLOBIN A1C  --   --   --   --   --   --  5.50    < > = values in this interval not displayed.                 Lab 11/30/21  0839   CHOLESTEROL 158   LDL CHOL 97   HDL CHOL 40   TRIGLYCERIDES 118             Brief Urine Lab Results  (Last result in the past 365 days)      Color   Clarity   Blood   Leuk Est   Nitrite   Protein   CREAT   Urine HCG        11/29/21 1200 Yellow   Cloudy   Trace   Small (1+)   Positive   30 mg/dL (1+)               Microbiology Results (last 10 days)     Procedure Component Value - Date/Time    Blood Culture - Blood, Arm, Right [[id number]]  (Normal) Collected: 11/29/21 1330    Lab Status: Final result Specimen: Blood from Arm, Right Updated: 12/04/21 1516     Blood Culture No growth at 5 days    Blood Culture - Blood, Arm, Right [[id number]]  (Normal) Collected: 11/29/21 1320    Lab Status: Final result Specimen: Blood from Arm, Right Updated: 12/04/21 1516     Blood Culture No growth at 5 days    Urine Culture - Urine, Urine, Catheter [[id number]]  (Abnormal)  (Susceptibility) Collected: 11/29/21 1200    Lab Status: Final result Specimen: Urine, Catheter Updated: 12/01/21 0932     Urine Culture >100,000 CFU/mL Klebsiella pneumoniae ssp pneumoniae    Susceptibility      Klebsiella pneumoniae ssp pneumoniae     JAON     Ampicillin Resistant     Ampicillin + Sulbactam Resistant     Cefazolin Susceptible     Cefepime Susceptible     Ceftazidime Susceptible     Ceftriaxone Susceptible     Gentamicin Susceptible     Levofloxacin Susceptible     Nitrofurantoin Intermediate     Piperacillin + Tazobactam Susceptible     Tetracycline Susceptible     Trimethoprim + Sulfamethoxazole Susceptible                         COVID-19 and FLU A/B PCR - Swab, Nasopharynx [[id number]]  (Normal) Collected: 11/29/21 1131    Lab Status: Final result Specimen: Swab from  Nasopharynx Updated: 11/29/21 1213     COVID19 Not Detected     Influenza A PCR Not Detected     Influenza B PCR Not Detected    Narrative:      Fact sheet for providers: https://www.fda.gov/media/736818/download    Fact sheet for patients: https://www.fda.gov/media/859563/download    Test performed by PCR.          Adult Transthoracic Echo Complete W/ Cont if Necessary Per Protocol (With Agitated Saline)    Result Date: 11/29/2021  · Left ventricular ejection fraction appears to be 61 - 65%. Left ventricular systolic function is normal. · Saline test results are negative. · Estimated right ventricular systolic pressure from tricuspid regurgitation is markedly elevated (>55 mmHg). · Moderate tricuspid valve regurgitation is present. · Left ventricular diastolic function was normal.      CT Angiogram Neck    Result Date: 11/29/2021  EXAMINATION: CT ANGIOGRAM NECK-, CT ANGIOGRAM HEAD W AI ANALYSIS OF LVO-11/29/2021:  INDICATION: Stroke, follow-up.  TECHNIQUE: CT angiogram head and neck with intravenous contrast administration. 2-D and 3-D reconstructions performed.  The radiation dose reduction device was turned on for each scan per the ALARA (As Low as Reasonably Achievable) protocol.  COMPARISON: Concurrent CT cerebral perfusion and CT head, noncontrast, stroke protocol head.  FINDINGS:  CTA NECK: Normal 3-vessel arch with patent great vessel origins. Proximal subclavian arteries are patent. The vertebral arteries demonstrate a mild left-sided dominance of caliber without focal severe stenosis, aneurysm or occlusion. The carotids demonstrate grossly normal course and branching pattern with atherosclerotic calcific disease and plaque formations of the ICA origins producing 20% right and 10% left luminal narrowing as measured by NASCET criteria with patency of the distal internal carotid arteries to the intracranial portions discussed further below. Cervical soft tissue is unremarkable. The thyroid has a low-density  left thyroid nodules without calcifications measuring up to 1 cm. The lung apices demonstrate biapical pleural parenchymal scarring with confluent opacification in the right upper lung partially imaged and may represent airspace disease although underlying mass or nodule would not be excluded.  CTA HEAD: Distal internal carotid arteries demonstrate mild-to-moderate atherosclerotic calcific disease of the distal internal carotid arteries, left greater than right, without focal severe stenosis, aneurysm or occlusion. Anterior cerebral arteries are patent without hemodynamically significant stenosis, aneurysm or occlusion. Middle cerebral arteries are patent without hemodynamically significant stenosis, aneurysm or occlusion. Vertebrobasilar system and posterior cerebral arteries are patent without hemodynamically significant stenosis, aneurysm or occlusion. Fetal origin variance of the right posterior cerebral artery. The venous structures are partially imaged and unremarkable including superior sagittal sinus widely patent.      1. No hemodynamically significant stenosis, aneurysm or occlusion throughout the CTA head and neck with plaque formation and calcific disease in the ICA origins and distal internal carotid arteries, however, no large vessel occlusion with [place] widely patent.  2.  Lung apices demonstrate biapical pleural parenchymal scarring with confluent opacification in the right upper lung partially imaged and may represent airspace disease although underlying mass or nodule cannot be excluded. Further evaluation with CT chest recommended when clinically appropriate.  D:  11/29/2021 E:  11/29/2021  This report was finalized on 11/29/2021 4:50 PM by Dr. Mikie Alfaro.      MRI Brain With & Without Contrast    Result Date: 12/1/2021  EXAMINATION: MRI BRAIN W WO CONTRAST-11/29/2021:  INDICATION: Seizure, AMS.  TECHNIQUE: Multiplanar MRI of the brain with and without intravenous contrast  administration.  COMPARISON: CT head and angiogram as well as perfusion 11/29/2021 earlier same day.  FINDINGS: No restriction on diffusion-weighted sequences to suggest acute ischemia. Midline structures are asymmetric with encephalomalacia left frontal insult and prominence of the sulci towards the vertex with prior left craniotomy findings. No susceptibility artifact on susceptibility-weighted imaging. Postcontrast sequences without abnormal enhancement of the pulmonary parenchyma. Globes and orbits are unremarkable. The paranasal sinuses and mastoid air cells are grossly clear and well pneumatized with the exception of small right and trace left mastoid effusions. Pituitary and sella within normal limits. Cervicomedullary junction widely patent.      1. Postsurgical changes and encephalomalacic involvement of the left frontal lobe. 2. No acute infarction. 3. No abnormal enhancement.  D:  11/29/2021 E:  11/29/2021     This report was finalized on 12/1/2021 1:32 PM by Dr. Mikie Alfaro.      XR Chest 1 View    Result Date: 12/3/2021  EXAMINATION: XR CHEST 1 VW-  INDICATION: Cough, hypoxia; N39.0-Urinary tract infection, site not specified; R41.0-Disorientation, unspecified; R47.1-Dysarthria and anarthria; R29.810-Facial weakness; N28.9-Disorder of kidney and ureter, unspecified; D72.829-Elevated white blood cell count, unspecified; R47.01-Aphasia.  COMPARISON: 11/29/2021  FINDINGS: Heart and vasculature appear normal in size. There are chronic changes of centrilobular emphysema, with hyperlucent upper lungs and coarsening of the basilar interstitial markings which appears similar to the prior study. Right apical lung mass, known since at least 2018, appear stable. No new pulmonary parenchymal disease, evidence of edema, effusion or pneumothorax is seen.      Stable right apical lung mass and chronic appearing changes of centrilobular emphysema. No clearly new chest pathology is identified.   D:  12/03/2021 E:   12/03/2021  This report was finalized on 12/3/2021 10:01 PM by Dr. Jorge Foote MD.      XR Chest 1 View    Result Date: 11/29/2021  EXAMINATION: XR CHEST 1 VW- 11/29/2021  INDICATION: Acute Stroke Protocol (onset < 12 hrs)  COMPARISON: Chest x-ray 11/05/2021  FINDINGS: Hyperinflated appearance bilateral with opacifications at the right lung apex similar to prior comparison 11/05/2021 likely atelectasis or scarring without pleural effusion.      Chronic changes including atelectasis or scarring somewhat confluent at the right lung apex similar to 11/05/2021 comparison without effusion.  D: 11/29/2021 E: 11/29/2021  This report was finalized on 11/29/2021 4:51 PM by Dr. Mikie Alfaro.      CT Head Without Contrast Stroke Protocol    Result Date: 11/29/2021  EXAMINATION: CT HEAD WO CONTRAST, STROKE PROTOCOL-11/29/2021:  INDICATION: Neuro deficit, acute, stroke suspected.  TECHNIQUE: CT head without intravenous contrast following stroke protocol.  The radiation dose reduction device was turned on for each scan per the ALARA (As Low as Reasonably Achievable) protocol.  COMPARISON: CT head dated 11/05/2021.  FINDINGS: Prior left frontal craniotomy and encephalomalacia in the left frontal lobe without acute findings. No midline shift or hydrocephalus. No intra-axial hemorrhage or extra-axial fluid collection. Globes and orbits are normal.      No acute intracranial finding specifically, no acute intracranial hemorrhage.  Scan performed on 11/29/2021 at 1055 hours. Scan report given to ER physician and stroke team in person at scanner by Dr. Alfaro on 11/29/2021 at 1105 hours.  D:  11/29/2021 E:  11/29/2021  This report was finalized on 11/29/2021 4:50 PM by Dr. Mikie Alfaro.      CT Angiogram Head w AI Analysis of LVO    Result Date: 11/29/2021  EXAMINATION: CT ANGIOGRAM NECK-, CT ANGIOGRAM HEAD W AI ANALYSIS OF LVO-11/29/2021:  INDICATION: Stroke, follow-up.  TECHNIQUE: CT angiogram head and neck with intravenous contrast  administration. 2-D and 3-D reconstructions performed.  The radiation dose reduction device was turned on for each scan per the ALARA (As Low as Reasonably Achievable) protocol.  COMPARISON: Concurrent CT cerebral perfusion and CT head, noncontrast, stroke protocol head.  FINDINGS:  CTA NECK: Normal 3-vessel arch with patent great vessel origins. Proximal subclavian arteries are patent. The vertebral arteries demonstrate a mild left-sided dominance of caliber without focal severe stenosis, aneurysm or occlusion. The carotids demonstrate grossly normal course and branching pattern with atherosclerotic calcific disease and plaque formations of the ICA origins producing 20% right and 10% left luminal narrowing as measured by NASCET criteria with patency of the distal internal carotid arteries to the intracranial portions discussed further below. Cervical soft tissue is unremarkable. The thyroid has a low-density left thyroid nodules without calcifications measuring up to 1 cm. The lung apices demonstrate biapical pleural parenchymal scarring with confluent opacification in the right upper lung partially imaged and may represent airspace disease although underlying mass or nodule would not be excluded.  CTA HEAD: Distal internal carotid arteries demonstrate mild-to-moderate atherosclerotic calcific disease of the distal internal carotid arteries, left greater than right, without focal severe stenosis, aneurysm or occlusion. Anterior cerebral arteries are patent without hemodynamically significant stenosis, aneurysm or occlusion. Middle cerebral arteries are patent without hemodynamically significant stenosis, aneurysm or occlusion. Vertebrobasilar system and posterior cerebral arteries are patent without hemodynamically significant stenosis, aneurysm or occlusion. Fetal origin variance of the right posterior cerebral artery. The venous structures are partially imaged and unremarkable including superior sagittal sinus  widely patent.      1. No hemodynamically significant stenosis, aneurysm or occlusion throughout the CTA head and neck with plaque formation and calcific disease in the ICA origins and distal internal carotid arteries, however, no large vessel occlusion with [place] widely patent.  2.  Lung apices demonstrate biapical pleural parenchymal scarring with confluent opacification in the right upper lung partially imaged and may represent airspace disease although underlying mass or nodule cannot be excluded. Further evaluation with CT chest recommended when clinically appropriate.  D:  11/29/2021 E:  11/29/2021  This report was finalized on 11/29/2021 4:50 PM by Dr. Mikie Alfaro.      CT CEREBRAL PERFUSION WITH & WITHOUT CONTRAST    Result Date: 11/29/2021  EXAMINATION: CT CEREBRAL PERFUSION W WO CONTRAST- 11/29/2021  INDICATION: Neuro deficit, acute, stroke suspected  TECHNIQUE: CT cerebral perfusion with and without intravenous contrast administration. Multiple parametric maps including mean transit time, time to drain, cerebral blood flow and cerebral blood volume performed.  The radiation dose reduction device was turned on for each scan per the ALARA (As Low as Reasonably Achievable) protocol.  COMPARISON: CT noncontrast stroke protocol and concurrent CT angiogram  FINDINGS: Area of artifact from prior insult left frontal region. No perfusion defect otherwise identified specifically no reversible ischemia within a specific vascular territory.      No reversible ischemia within a specific vascular territory.  D: 11/29/2021 E: 11/29/2021   This report was finalized on 11/29/2021 4:50 PM by Dr. Mikie Alfaro.      XR Hip With or Without Pelvis 2 - 3 View Left    Result Date: 11/29/2021  EXAMINATION: XR HIP W OR WO PELVIS 2-3 VIEW LEFT- 11/29/2021  INDICATION: fall  COMPARISON: Hip x-ray 10/21/2021  FINDINGS: SI joints symmetric and without widening. No displaced pelvic ring fracture. Right hip arthroplasty in  place. Left hip without acute displaced fracture remaining well located at the left hip joint.      No displaced left rib fracture or displaced pelvic ring fracture with left hip remaining well located in the left acetabular cup.  D: 11/29/2021 E: 11/29/2021  This report was finalized on 11/29/2021 4:51 PM by Dr. Mikie Alfaro.                Results for orders placed during the hospital encounter of 11/29/21    Adult Transthoracic Echo Complete W/ Cont if Necessary Per Protocol (With Agitated Saline)    Interpretation Summary  · Left ventricular ejection fraction appears to be 61 - 65%. Left ventricular systolic function is normal.  · Saline test results are negative.  · Estimated right ventricular systolic pressure from tricuspid regurgitation is markedly elevated (>55 mmHg).  · Moderate tricuspid valve regurgitation is present.  · Left ventricular diastolic function was normal.      Plan for Follow-up of Pending Labs/Results:     Discharge Details        Discharge Medications      New Medications      Instructions Start Date   benzonatate 200 MG capsule  Commonly known as: TESSALON   200 mg, Oral, 3 Times Daily PRN      fluticasone 50 MCG/ACT nasal spray  Commonly known as: FLONASE   2 sprays, Each Nare, Daily   Start Date: December 7, 2021     ipratropium-albuterol 0.5-2.5 mg/3 ml nebulizer  Commonly known as: DUO-NEB   3 mL, Nebulization, 4 Times Daily - RT      lactobacillus acidophilus capsule capsule   1 capsule, Oral, Daily   Start Date: December 7, 2021     melatonin 5 MG tablet tablet   5 mg, Oral, Nightly      metoprolol tartrate 25 MG tablet  Commonly known as: LOPRESSOR   12.5 mg, Oral, Every 12 Hours Scheduled      QUEtiapine 25 MG tablet  Commonly known as: SEROquel   12.5 mg, Oral, Nightly PRN         Changes to Medications      Instructions Start Date   pregabalin 25 MG capsule  Commonly known as: LYRICA  What changed:   · medication strength  · how much to take  · when to take this   25 mg, Oral,  Every 12 Hours Scheduled      traZODone 50 MG tablet  Commonly known as: DESYREL  What changed: how much to take   25 mg, Oral, Nightly         Continue These Medications      Instructions Start Date   acetaminophen 325 MG tablet  Commonly known as: TYLENOL   650 mg, Oral, 3 Times Daily      acetaminophen 325 MG tablet  Commonly known as: TYLENOL   650 mg, Oral, Every 4 Hours PRN      aspirin 81 MG EC tablet   81 mg, Oral, Daily      budesonide-formoterol 160-4.5 MCG/ACT inhaler  Commonly known as: SYMBICORT   2 puffs, Inhalation, 2 Times Daily - RT      cetirizine 10 MG tablet  Commonly known as: zyrTEC   10 mg, Oral, Daily      docusate sodium 100 MG capsule   100 mg, Oral, 2 Times Daily      folic acid 400 MCG tablet  Commonly known as: FOLVITE   400 mcg, Oral, 2 Times Daily      levothyroxine 25 MCG tablet  Commonly known as: SYNTHROID, LEVOTHROID   25 mcg, Oral, Daily      lovastatin 40 MG tablet  Commonly known as: MEVACOR   40 mg, Oral, Nightly      pantoprazole 20 MG EC tablet  Commonly known as: PROTONIX   20 mg, Oral, Daily         Stop These Medications    amLODIPine 10 MG tablet  Commonly known as: NORVASC     busPIRone 7.5 MG tablet  Commonly known as: BUSPAR     carBAMazepine  MG 12 hr tablet  Commonly known as: TEGretol  XR     dronabinol 2.5 MG capsule  Commonly known as: MARINOL     mirtazapine 15 MG tablet  Commonly known as: REMERON            Allergies   Allergen Reactions   • Bee Pollen Unknown - Low Severity         Discharge Disposition:  Long Term Care (DC - External)    Diet:  Hospital:  Diet Order   Procedures   • Diet Regular; Thin       Activity:  Activity Instructions     Activity as Tolerated            Restrictions or Other Recommendations:         CODE STATUS:    Code Status and Medical Interventions:   Ordered at: 12/04/21 1115     Medical Intervention Limits:    NO intubation (DNI)     Level Of Support Discussed With:    Health Care Surrogate     Code Status (Patient has no  pulse and is not breathing):    No CPR (Do Not Attempt to Resuscitate)     Medical Interventions (Patient has pulse or is breathing):    Limited Support       Future Appointments   Date Time Provider Department Center   1/14/2022  9:30 AM Robin Guo MD [facility]       Additional Instructions for the Follow-ups that You Need to Schedule     Discharge Follow-up with PCP   As directed       Currently Documented PCP:    Dm Cade APRN    PCP Phone Number:    [phone]     Follow Up Details: PCP Dm Cade 1 week                     Anjali Williamson DO  12/06/21      Time Spent on Discharge:  I spent  35  minutes on this discharge activity which included: face-to-face encounter with the patient, reviewing the data in the system, coordination of the care with the nursing staff as well as consultants, documentation, and entering orders.

## 2021-12-06 NOTE — NURSING NOTE
Pt left with nephew. Going back to [facility].Report called to duc, Pt is alter to self and can answer yes no questions. Pt changed and brief. Patients nephew took all belongings. Last set of vitals T 98.6, hr 82, RR 18, /84, SpO2 92

## 2021-12-07 ENCOUNTER — APPOINTMENT (OUTPATIENT)
Dept: CT IMAGING | Facility: HOSPITAL | Age: 86
End: 2021-12-07

## 2021-12-07 ENCOUNTER — APPOINTMENT (OUTPATIENT)
Dept: GENERAL RADIOLOGY | Facility: HOSPITAL | Age: 86
End: 2021-12-07

## 2021-12-07 PROBLEM — J96.01 ACUTE RESPIRATORY FAILURE WITH HYPOXIA: Status: ACTIVE | Noted: 2021-12-07

## 2021-12-07 PROBLEM — R09.02 HYPOXIA: Status: ACTIVE | Noted: 2021-12-07

## 2021-12-07 PROBLEM — J10.1 INFLUENZA A: Status: ACTIVE | Noted: 2021-12-07

## 2021-12-07 LAB
ALBUMIN SERPL-MCNC: 3.9 G/DL (ref 3.5–5.2)
ALBUMIN/GLOB SERPL: 1.4 G/DL
ALP SERPL-CCNC: 84 U/L (ref 39–117)
ALT SERPL W P-5'-P-CCNC: 27 U/L (ref 1–33)
ANION GAP SERPL CALCULATED.3IONS-SCNC: 10 MMOL/L (ref 5–15)
AST SERPL-CCNC: 49 U/L (ref 1–32)
B PARAPERT DNA SPEC QL NAA+PROBE: NOT DETECTED
B PERT DNA SPEC QL NAA+PROBE: NOT DETECTED
BASOPHILS # BLD AUTO: 0.02 10*3/MM3 (ref 0–0.2)
BASOPHILS NFR BLD AUTO: 0.3 % (ref 0–1.5)
BILIRUB SERPL-MCNC: 0.3 MG/DL (ref 0–1.2)
BUN SERPL-MCNC: 17 MG/DL (ref 8–23)
BUN/CREAT SERPL: 18.9 (ref 7–25)
C PNEUM DNA NPH QL NAA+NON-PROBE: NOT DETECTED
CALCIUM SPEC-SCNC: 9.1 MG/DL (ref 8.6–10.5)
CHLORIDE SERPL-SCNC: 107 MMOL/L (ref 98–107)
CO2 SERPL-SCNC: 25 MMOL/L (ref 22–29)
CREAT SERPL-MCNC: 0.9 MG/DL (ref 0.57–1)
DEPRECATED RDW RBC AUTO: 44.3 FL (ref 37–54)
EOSINOPHIL # BLD AUTO: 0.01 10*3/MM3 (ref 0–0.4)
EOSINOPHIL NFR BLD AUTO: 0.2 % (ref 0.3–6.2)
ERYTHROCYTE [DISTWIDTH] IN BLOOD BY AUTOMATED COUNT: 12.6 % (ref 12.3–15.4)
FLUAV H3 RNA NPH QL NAA+PROBE: DETECTED
FLUBV RNA ISLT QL NAA+PROBE: NOT DETECTED
GFR SERPL CREATININE-BSD FRML MDRD: 60 ML/MIN/1.73
GLOBULIN UR ELPH-MCNC: 2.8 GM/DL
GLUCOSE SERPL-MCNC: 101 MG/DL (ref 65–99)
HADV DNA SPEC NAA+PROBE: NOT DETECTED
HCOV 229E RNA SPEC QL NAA+PROBE: NOT DETECTED
HCOV HKU1 RNA SPEC QL NAA+PROBE: NOT DETECTED
HCOV NL63 RNA SPEC QL NAA+PROBE: NOT DETECTED
HCOV OC43 RNA SPEC QL NAA+PROBE: NOT DETECTED
HCT VFR BLD AUTO: 37.1 % (ref 34–46.6)
HGB BLD-MCNC: 12.3 G/DL (ref 12–15.9)
HMPV RNA NPH QL NAA+NON-PROBE: NOT DETECTED
HPIV1 RNA ISLT QL NAA+PROBE: NOT DETECTED
HPIV2 RNA SPEC QL NAA+PROBE: NOT DETECTED
HPIV3 RNA NPH QL NAA+PROBE: NOT DETECTED
HPIV4 P GENE NPH QL NAA+PROBE: NOT DETECTED
IMM GRANULOCYTES # BLD AUTO: 0.06 10*3/MM3 (ref 0–0.05)
IMM GRANULOCYTES NFR BLD AUTO: 1 % (ref 0–0.5)
LYMPHOCYTES # BLD AUTO: 1.44 10*3/MM3 (ref 0.7–3.1)
LYMPHOCYTES NFR BLD AUTO: 25 % (ref 19.6–45.3)
M PNEUMO IGG SER IA-ACNC: NOT DETECTED
MCH RBC QN AUTO: 31.8 PG (ref 26.6–33)
MCHC RBC AUTO-ENTMCNC: 33.2 G/DL (ref 31.5–35.7)
MCV RBC AUTO: 95.9 FL (ref 79–97)
MONOCYTES # BLD AUTO: 0.79 10*3/MM3 (ref 0.1–0.9)
MONOCYTES NFR BLD AUTO: 13.7 % (ref 5–12)
NEUTROPHILS NFR BLD AUTO: 3.44 10*3/MM3 (ref 1.7–7)
NEUTROPHILS NFR BLD AUTO: 59.8 % (ref 42.7–76)
NRBC BLD AUTO-RTO: 0 /100 WBC (ref 0–0.2)
NT-PROBNP SERPL-MCNC: 2267 PG/ML (ref 0–1800)
PLATELET # BLD AUTO: 137 10*3/MM3 (ref 140–450)
PMV BLD AUTO: 12.5 FL (ref 6–12)
POTASSIUM SERPL-SCNC: 4.2 MMOL/L (ref 3.5–5.2)
PROCALCITONIN SERPL-MCNC: 0.12 NG/ML (ref 0–0.25)
PROT SERPL-MCNC: 6.7 G/DL (ref 6–8.5)
QT INTERVAL: 400 MS
QTC INTERVAL: 450 MS
RBC # BLD AUTO: 3.87 10*6/MM3 (ref 3.77–5.28)
RHINOVIRUS RNA SPEC NAA+PROBE: NOT DETECTED
RSV RNA NPH QL NAA+NON-PROBE: NOT DETECTED
SARS-COV-2 RNA NPH QL NAA+NON-PROBE: NOT DETECTED
SODIUM SERPL-SCNC: 142 MMOL/L (ref 136–145)
TROPONIN T SERPL-MCNC: <0.01 NG/ML (ref 0–0.03)
WBC NRBC COR # BLD: 5.76 10*3/MM3 (ref 3.4–10.8)

## 2021-12-07 PROCEDURE — 80053 COMPREHEN METABOLIC PANEL: CPT | Performed by: PHYSICIAN ASSISTANT

## 2021-12-07 PROCEDURE — G0378 HOSPITAL OBSERVATION PER HR: HCPCS

## 2021-12-07 PROCEDURE — 84484 ASSAY OF TROPONIN QUANT: CPT | Performed by: PHYSICIAN ASSISTANT

## 2021-12-07 PROCEDURE — 85025 COMPLETE CBC W/AUTO DIFF WBC: CPT | Performed by: PHYSICIAN ASSISTANT

## 2021-12-07 PROCEDURE — 71275 CT ANGIOGRAPHY CHEST: CPT

## 2021-12-07 PROCEDURE — 25010000002 IOPAMIDOL 61 % SOLUTION: Performed by: STUDENT IN AN ORGANIZED HEALTH CARE EDUCATION/TRAINING PROGRAM

## 2021-12-07 PROCEDURE — 99220 PR INITIAL OBSERVATION CARE/DAY 70 MINUTES: CPT | Performed by: INTERNAL MEDICINE

## 2021-12-07 PROCEDURE — 93005 ELECTROCARDIOGRAM TRACING: CPT | Performed by: PHYSICIAN ASSISTANT

## 2021-12-07 PROCEDURE — 71045 X-RAY EXAM CHEST 1 VIEW: CPT

## 2021-12-07 PROCEDURE — 92610 EVALUATE SWALLOWING FUNCTION: CPT | Performed by: SPEECH-LANGUAGE PATHOLOGIST

## 2021-12-07 PROCEDURE — 0202U NFCT DS 22 TRGT SARS-COV-2: CPT | Performed by: PHYSICIAN ASSISTANT

## 2021-12-07 PROCEDURE — 83880 ASSAY OF NATRIURETIC PEPTIDE: CPT | Performed by: PHYSICIAN ASSISTANT

## 2021-12-07 PROCEDURE — 84145 PROCALCITONIN (PCT): CPT | Performed by: INTERNAL MEDICINE

## 2021-12-07 RX ORDER — ACETAMINOPHEN 325 MG/1
650 TABLET ORAL 3 TIMES DAILY
Status: DISCONTINUED | OUTPATIENT
Start: 2021-12-07 | End: 2021-12-08 | Stop reason: HOSPADM

## 2021-12-07 RX ORDER — LEVOTHYROXINE SODIUM 0.03 MG/1
25 TABLET ORAL
Status: DISCONTINUED | OUTPATIENT
Start: 2021-12-07 | End: 2021-12-08 | Stop reason: HOSPADM

## 2021-12-07 RX ORDER — BUSPIRONE HYDROCHLORIDE 5 MG/1
7.5 TABLET ORAL 2 TIMES DAILY
Status: DISCONTINUED | OUTPATIENT
Start: 2021-12-07 | End: 2021-12-08 | Stop reason: HOSPADM

## 2021-12-07 RX ORDER — DOCUSATE SODIUM 100 MG/1
100 CAPSULE, LIQUID FILLED ORAL 2 TIMES DAILY PRN
Status: DISCONTINUED | OUTPATIENT
Start: 2021-12-07 | End: 2021-12-08 | Stop reason: HOSPADM

## 2021-12-07 RX ORDER — CETIRIZINE HYDROCHLORIDE 10 MG/1
5 TABLET ORAL DAILY
Status: DISCONTINUED | OUTPATIENT
Start: 2021-12-07 | End: 2021-12-08 | Stop reason: HOSPADM

## 2021-12-07 RX ORDER — TRAMADOL HYDROCHLORIDE 50 MG/1
50 TABLET ORAL EVERY 12 HOURS PRN
Status: DISCONTINUED | OUTPATIENT
Start: 2021-12-07 | End: 2021-12-08 | Stop reason: HOSPADM

## 2021-12-07 RX ORDER — PANTOPRAZOLE SODIUM 40 MG/1
40 TABLET, DELAYED RELEASE ORAL DAILY
Status: DISCONTINUED | OUTPATIENT
Start: 2021-12-07 | End: 2021-12-08 | Stop reason: HOSPADM

## 2021-12-07 RX ORDER — CARBAMAZEPINE 400 MG/1
400 TABLET, EXTENDED RELEASE ORAL 2 TIMES DAILY
Status: DISCONTINUED | OUTPATIENT
Start: 2021-12-07 | End: 2021-12-08 | Stop reason: HOSPADM

## 2021-12-07 RX ORDER — OSELTAMIVIR PHOSPHATE 30 MG/1
30 CAPSULE ORAL EVERY 12 HOURS SCHEDULED
Status: DISCONTINUED | OUTPATIENT
Start: 2021-12-07 | End: 2021-12-08 | Stop reason: HOSPADM

## 2021-12-07 RX ORDER — PREGABALIN 50 MG/1
50 CAPSULE ORAL 3 TIMES DAILY
Status: DISCONTINUED | OUTPATIENT
Start: 2021-12-07 | End: 2021-12-08 | Stop reason: HOSPADM

## 2021-12-07 RX ORDER — TRAZODONE HYDROCHLORIDE 50 MG/1
25 TABLET ORAL NIGHTLY
Status: DISCONTINUED | OUTPATIENT
Start: 2021-12-07 | End: 2021-12-08 | Stop reason: HOSPADM

## 2021-12-07 RX ORDER — MIRTAZAPINE 15 MG/1
15 TABLET, FILM COATED ORAL NIGHTLY
Status: DISCONTINUED | OUTPATIENT
Start: 2021-12-07 | End: 2021-12-08 | Stop reason: HOSPADM

## 2021-12-07 RX ORDER — LANOLIN ALCOHOL/MO/W.PET/CERES
400 CREAM (GRAM) TOPICAL 2 TIMES DAILY
Status: DISCONTINUED | OUTPATIENT
Start: 2021-12-07 | End: 2021-12-08 | Stop reason: HOSPADM

## 2021-12-07 RX ORDER — SODIUM CHLORIDE 0.9 % (FLUSH) 0.9 %
10 SYRINGE (ML) INJECTION EVERY 12 HOURS SCHEDULED
Status: DISCONTINUED | OUTPATIENT
Start: 2021-12-07 | End: 2021-12-08 | Stop reason: HOSPADM

## 2021-12-07 RX ORDER — AMLODIPINE BESYLATE 10 MG/1
10 TABLET ORAL DAILY
Status: DISCONTINUED | OUTPATIENT
Start: 2021-12-07 | End: 2021-12-08 | Stop reason: HOSPADM

## 2021-12-07 RX ORDER — MECLIZINE HYDROCHLORIDE 25 MG/1
25 TABLET ORAL 3 TIMES DAILY PRN
Status: DISCONTINUED | OUTPATIENT
Start: 2021-12-07 | End: 2021-12-08 | Stop reason: HOSPADM

## 2021-12-07 RX ORDER — SODIUM CHLORIDE 0.9 % (FLUSH) 0.9 %
10 SYRINGE (ML) INJECTION AS NEEDED
Status: DISCONTINUED | OUTPATIENT
Start: 2021-12-07 | End: 2021-12-08 | Stop reason: HOSPADM

## 2021-12-07 RX ADMIN — FOLIC ACID TAB 400 MCG 400 MCG: 400 TAB at 10:01

## 2021-12-07 RX ADMIN — PREGABALIN 50 MG: 50 CAPSULE ORAL at 12:04

## 2021-12-07 RX ADMIN — IOPAMIDOL 100 ML: 612 INJECTION, SOLUTION INTRAVENOUS at 04:12

## 2021-12-07 RX ADMIN — CARBAMAZEPINE 400 MG: 400 TABLET, EXTENDED RELEASE ORAL at 21:47

## 2021-12-07 RX ADMIN — FOLIC ACID TAB 400 MCG 400 MCG: 400 TAB at 21:10

## 2021-12-07 RX ADMIN — PREGABALIN 50 MG: 50 CAPSULE ORAL at 16:25

## 2021-12-07 RX ADMIN — CARBAMAZEPINE 400 MG: 400 TABLET, EXTENDED RELEASE ORAL at 10:02

## 2021-12-07 RX ADMIN — PANTOPRAZOLE SODIUM 40 MG: 40 TABLET, DELAYED RELEASE ORAL at 09:59

## 2021-12-07 RX ADMIN — ACETAMINOPHEN 650 MG: 325 TABLET, FILM COATED ORAL at 10:00

## 2021-12-07 RX ADMIN — ACETAMINOPHEN 650 MG: 325 TABLET, FILM COATED ORAL at 21:09

## 2021-12-07 RX ADMIN — OSELTAMIVIR PHOSPHATE 30 MG: 30 CAPSULE ORAL at 07:39

## 2021-12-07 RX ADMIN — CETIRIZINE HYDROCHLORIDE 5 MG: 10 TABLET, FILM COATED ORAL at 09:59

## 2021-12-07 RX ADMIN — ACETAMINOPHEN 650 MG: 325 TABLET, FILM COATED ORAL at 16:25

## 2021-12-07 RX ADMIN — BUSPIRONE HYDROCHLORIDE 7.5 MG: 5 TABLET ORAL at 10:02

## 2021-12-07 RX ADMIN — SODIUM CHLORIDE, PRESERVATIVE FREE 10 ML: 5 INJECTION INTRAVENOUS at 21:10

## 2021-12-07 RX ADMIN — SODIUM CHLORIDE, PRESERVATIVE FREE 10 ML: 5 INJECTION INTRAVENOUS at 13:43

## 2021-12-07 RX ADMIN — TRAZODONE HYDROCHLORIDE 25 MG: 50 TABLET ORAL at 21:09

## 2021-12-07 RX ADMIN — BUSPIRONE HYDROCHLORIDE 7.5 MG: 5 TABLET ORAL at 18:03

## 2021-12-07 RX ADMIN — AMLODIPINE BESYLATE 10 MG: 10 TABLET ORAL at 10:00

## 2021-12-07 RX ADMIN — LEVOTHYROXINE SODIUM 25 MCG: 25 TABLET ORAL at 10:03

## 2021-12-07 RX ADMIN — MIRTAZAPINE 15 MG: 15 TABLET, FILM COATED ORAL at 21:09

## 2021-12-07 RX ADMIN — OSELTAMIVIR PHOSPHATE 30 MG: 30 CAPSULE ORAL at 18:04

## 2021-12-07 RX ADMIN — PREGABALIN 50 MG: 50 CAPSULE ORAL at 21:10

## 2021-12-07 NOTE — PLAN OF CARE
Goal Outcome Evaluation:  Plan of Care Reviewed With: patient, son        Progress:  (initial eval)   SLP evaluation completed. Will sign-off for dysphagia. Please see note for further details and recommendations.

## 2021-12-07 NOTE — CONSULTS
Palliative Care Initial Consult     Attending Physician: Yu Cesar MD  Referring Provider: ALEN HALL    Palliative Reason for Consult: GOC      Code Status:   Code Status and Medical Interventions:   Ordered at: 12/07/21 0310     Medical Intervention Limits:    NO intubation (DNI)     Level Of Support Discussed With:    Patient     Code Status (Patient has no pulse and is not breathing):    No CPR (Do Not Attempt to Resuscitate)     Medical Interventions (Patient has pulse or is breathing):    Limited Support      Advanced Directives: Advance Directive Status: Patient has advance directive, copy in chart   Healthcare surrogate: nephgurpreet Eric  Goals of Care: initiated.    HPI:  Mitzi Eric is a 86 y.o. female admitted to [facility] on 12/7 with PMH significant for dementia, hypothyroidism, prior craniotomy secondary to possible brain mass, essential hypertension, atrial fibrillation no on anticoagulation, COPD, lung mass and frequent UTI's presents to the ED due to oxgyen saturation dropping.  Patient was recently admitted to [facility] from 11/29/21 to 12/6/21 secondary to altered mental status.  She was found to have a UTI.  Urine cultures grew >100 Klebsiella resistant to ampicillin.  She completed course of rocephin.  She was discharged back to [facility] on 12/6/21.  Today she was noted to have a RA oxygen saturation of 84% while resting therefore she was sent to the ED for further evaluation.         ROS:   Review of Systems   Reason unable to perform ROS: dementia, relied on staff and family report.        Past Medical History:   Diagnosis Date   • A-fib (HCC)    • Ataxic gait    • Chronic kidney disease, stage 3 ([facility])    • COPD (chronic obstructive pulmonary disease) ([facility])    • Dementia (HCC)    • Disease of thyroid gland    • Hyperlipidemia    • Hypertension      Past Surgical History:   Procedure Laterality Date   • BRAIN TUMOR EXCISION      BENIGN   • FRACTURE  SURGERY     • TRIGGER FINGER RELEASE       Social History     Socioeconomic History   • Marital status: Single   Tobacco Use   • Smoking status: Former Smoker   • Smokeless tobacco: Never Used   Substance and Sexual Activity   • Alcohol use: No   • Drug use: No     History reviewed. No pertinent family history.    Allergies   Allergen Reactions   • Bee Pollen Unknown - Low Severity   • Lorazepam Delirium       Current Facility-Administered Medications   Medication Dose Route Frequency Provider Last Rate Last Admin   • acetaminophen (TYLENOL) tablet 650 mg  650 mg Oral TID Mayela Stewart APRN   650 mg at 12/07/21 1000   • amLODIPine (NORVASC) tablet 10 mg  10 mg Oral Daily Mayela Stewart, APRN   10 mg at 12/07/21 1000   • busPIRone (BUSPAR) tablet 7.5 mg  7.5 mg Oral BID Mayela Stewart APRN   7.5 mg at 12/07/21 1002   • carBAMazepine XR (TEGretol  XR) 12 hr tablet 400 mg  400 mg Oral BID Mayela Stewart, APRN   400 mg at 12/07/21 1002   • cetirizine (zyrTEC) tablet 5 mg  5 mg Oral Daily Mayela Stewart, APRN   5 mg at 12/07/21 0959   • docusate sodium (COLACE) capsule 100 mg  100 mg Oral BID PRN Mayela Stewart, APRN       • folic acid (FOLVITE) tablet 400 mcg  400 mcg Oral BID Mayela Stewart, APRN   400 mcg at 12/07/21 1001   • levothyroxine (SYNTHROID, LEVOTHROID) tablet 25 mcg  25 mcg Oral Q AM Mayela Stewart APRN   25 mcg at 12/07/21 1003   • meclizine (ANTIVERT) tablet 25 mg  25 mg Oral TID PRN Mayela Stewart, APRN       • mirtazapine (REMERON) tablet 15 mg  15 mg Oral Nightly Mayela Stewart, APRN       • oseltamivir (TAMIFLU) capsule 30 mg  30 mg Oral Q12H Keaton Monroe DO   30 mg at 12/07/21 0739   • pantoprazole (PROTONIX) EC tablet 40 mg  40 mg Oral Daily Mayela Stewart, APRN   40 mg at 12/07/21 0959   • pregabalin (LYRICA) capsule 50 mg  50 mg Oral TID Mayela Stewart, APRN   50 mg at 12/07/21 1204   • sodium chloride 0.9 % flush 10 mL   10 mL Intravenous Q12H Mayela Stewart APRN   10 mL at 12/07/21 1343   • sodium chloride 0.9 % flush 10 mL  10 mL Intravenous PRN Mayela Stewart APRN       • traMADol (ULTRAM) tablet 50 mg  50 mg Oral Q12H PRN Mayela Stewart APRN       • traZODone (DESYREL) tablet 25 mg  25 mg Oral Nightly Mayela Stewart APRN         Current Outpatient Medications   Medication Sig Dispense Refill   • acetaminophen (TYLENOL) 325 MG tablet Take 650 mg by mouth 3 (Three) Times a Day.     • amLODIPine (NORVASC) 10 MG tablet Take 10 mg by mouth Daily.     • busPIRone (BUSPAR) 7.5 MG tablet Take 7.5 mg by mouth 2 (Two) Times a Day.     • carBAMazepine XR (TEGretol  XR) 400 MG 12 hr tablet Take 400 mg by mouth 2 (Two) Times a Day.     • cetirizine (zyrTEC) 10 MG tablet Take 10 mg by mouth Daily.     • docusate sodium 100 MG capsule Take 100 mg by mouth 2 (Two) Times a Day. (Patient taking differently: Take 100 mg by mouth 2 (Two) Times a Day As Needed.)     • folic acid (FOLVITE) 400 MCG tablet Take 400 mcg by mouth 2 (Two) Times a Day.     • ipratropium (ATROVENT) 0.03 % nasal spray 2 sprays into the nostril(s) as directed by provider Daily.     • levothyroxine (SYNTHROID, LEVOTHROID) 25 MCG tablet Take 25 mcg by mouth Daily.     • meclizine 25 MG chewable tablet chewable tablet Chew 25 mg 3 (Three) Times a Day As Needed.     • Menthol, Topical Analgesic, (Biofreeze) 4 % gel Apply 1 application topically Every Morning. Apply to lower back     • mirtazapine (REMERON) 15 MG tablet Take 15 mg by mouth Every Night.     • Nutritional Supplements (ENSURE PO) Take  by mouth 2 (Two) Times a Day. MIX 8OZ OF ENSURE, 1/4 SCOOP PROTEIN POWDER AND 2OZ OF MILK FOR A MILKSHAKE     • pantoprazole (PROTONIX) 20 MG EC tablet Take 20 mg by mouth Daily.     • pregabalin (LYRICA) 25 MG capsule Take 1 capsule by mouth Every 12 (Twelve) Hours. (Patient taking differently: Take 50 mg by mouth 3 (Three) Times a Day.) 6 capsule 0   •  "traMADol (ULTRAM) 50 MG tablet Take 50 mg by mouth 2 (Two) Times a Day As Needed for Moderate Pain .     • traZODone (DESYREL) 50 MG tablet Take 0.5 tablets by mouth Every Night. (Patient taking differently: Take 50 mg by mouth Every Night.)          docusate sodium  •  meclizine  •  sodium chloride  •  traMADol    Current medication reviewed for route, type, dose and frequency and are current per MAR.    Palliative Performance Scale Score:     /61   Pulse 76   Temp 98 °F (36.7 °C) (Oral)   Resp 16   Ht 157.5 cm (62\")   Wt 50.8 kg (112 lb)   SpO2 92%   BMI 20.49 kg/m²   No intake or output data in the 24 hours ending 12/07/21 1403    PE:  General -frail confused female, NAD  HEENT-NC/AT, slightly [place], PERRL, EOMI, MMM  CV-RRR, S1 S2 normal, no m/r/g  Resp-CTAB, no wheezes, rhonchi or rales  Abd-soft, nontender, nondistended, BS+  Ext-Reduced ROM, No cyanosis, clubbing or edema bilaterally  Neuro-alert but confused, speech clear, moves all extremities   Psych-calm  Skin- No rash on exposed UE or LE bilaterally    Labs:   Results from last 7 days   Lab Units 12/07/21  0058   WBC 10*3/mm3 5.76   HEMOGLOBIN g/dL 12.3   HEMATOCRIT % 37.1   PLATELETS 10*3/mm3 137*     Results from last 7 days   Lab Units 12/07/21  0058   SODIUM mmol/L 142   POTASSIUM mmol/L 4.2   CHLORIDE mmol/L 107   CO2 mmol/L 25.0   BUN mg/dL 17   CREATININE mg/dL 0.90   GLUCOSE mg/dL 101*   CALCIUM mg/dL 9.1     Results from last 7 days   Lab Units 12/07/21  0058   SODIUM mmol/L 142   POTASSIUM mmol/L 4.2   CHLORIDE mmol/L 107   CO2 mmol/L 25.0   BUN mg/dL 17   CREATININE mg/dL 0.90   CALCIUM mg/dL 9.1   BILIRUBIN mg/dL 0.3   ALK PHOS U/L 84   ALT (SGPT) U/L 27   AST (SGOT) U/L 49*   GLUCOSE mg/dL 101*     Imaging Results (Last 72 Hours)     Procedure Component Value Units Date/Time    CT Angiogram Chest [[id number]] Collected: 12/07/21 0420     Updated: 12/07/21 0422    Narrative:      CTA Chest    INDICATION:   Hypoxia, lung mass right " upper lobe since 2018    TECHNIQUE:   CT angiogram of the chest with IV contrast. 3-D reconstructions were obtained and reviewed.   Radiation dose reduction techniques included automated exposure control or exposure modulation based on body size. Count of known CT and cardiac nuc med studies  performed in previous 12 months: 0.     COMPARISON:   7/15/2018    FINDINGS:   There are marked emphysematous changes in the upper lobes. There is a large area of soft tissue density in the right upper lobe measuring at least 4 cm in maximum dimension. It is unchanged in 2018. The aorta is normal in appearance. There is optimal  opacification of pulmonary arteries and there is no CT evidence of pulmonary. Upper abdominal images are unremarkable. Bones are unremarkable.          Impression:      No CT evidence pulmonary embolus.    Stable right upper lobe soft tissue mass since 7/15/2018    Emphysematous changes upper lobes    Signer Name: Vik Bedoya MD   Signed: 12/7/2021 4:20 AM   Workstation Name: [email or web address]    Radiology [facility]    XR Chest 1 View [[id number]] Collected: 12/07/21 0101     Updated: 12/07/21 0103    Narrative:      CR Chest 1 Vw    INDICATION:   Hypoxia     COMPARISON:    L3 21, 11/5/2021    FINDINGS:  Portable AP view(s) of the chest.  Size and vascular normal. Left lung is clear. There is an area of density in the right upper lobe measuring about 4.2 cm in diameter. This unchanged from prior studies dating back to 11/5/2021. There is a chest CT from  7/15/2018 and this shows a large parenchymal density in that region.      Impression:      Stable irregular density right upper lobe unchanged from CT scan from 2018    No active disease    Signer Name: Vik Bedoya MD   Signed: 12/7/2021 1:01 AM   Workstation Name: [email or web address]    Radiology [facility]          Diagnostics: Reviewed    A: Mitzi Eric is a 86 y.o. female with dementia, known right apex lung mass, HTN,  Hypothyroidism, and h/o afib presents to ED from nursing home after sats found to be 84% on RA.         P:   AMS- Continue to monitor. Confused at baseline 2/2/ dementia  Dyspnea- appears stable. NAD. Currently on room air, sats WNL.  Debility- advanced age with several comorbidities. Will assist with GOC.    GOC:  Spoke with nephew/POA Lane Eric via phone. Palliative referral was pending at NH facility when discharged on 12/6. He is still not interested in hospice services at this time. Will ensure palliative referral upon discharge as previously planned and continue to provide support.    We appreciate the consult and the opportunity to participate in Mitzi Eric's care. We will continue to follow along. Please do not hesitate to contact us regarding further symptom management or goals of care needs, including after hours or on weekends via our on call provider at [phone].     Time: 60 minutes spent reviewing medical and medication records, assessing and examining patient, discussing with family, answering questions, providing some guidance about a plan and documentation of care, and coordinating care with other healthcare members, with > 50% time spent face to face.     Yuni Mancini, APRN    12/7/2021

## 2021-12-07 NOTE — THERAPY EVALUATION
Acute Care - Speech Language Pathology   Swallow Initial Evaluation [facility]   Clinical Swallow Evaluation       Patient Name: Mitzi Eric  : 1935  MRN: [id number]  Today's Date: 2021               Admit Date: 2021    Visit Dx:     ICD-10-CM ICD-9-CM   1. Acute respiratory failure with hypoxia (HCC)  J96.01 518.81   2. Influenza A virus present  J10.1 487.1     Patient Active Problem List   Diagnosis   • Fall   • Delirium, acute   • Essential hypertension   • Cognitive decline   • Trigeminal neuralgia   • Hypothyroidism (acquired)   • Lung mass   • Hypoxia   • Acute respiratory failure with hypoxia (HCC)   • Influenza A     Past Medical History:   Diagnosis Date   • A-fib (HCC)    • Ataxic gait    • Chronic kidney disease, stage 3 (HCC)    • COPD (chronic obstructive pulmonary disease) (HCC)    • Dementia (HCC)    • Disease of thyroid gland    • Hyperlipidemia    • Hypertension      Past Surgical History:   Procedure Laterality Date   • BRAIN TUMOR EXCISION      BENIGN   • FRACTURE SURGERY     • TRIGGER FINGER RELEASE         SLP Recommendation and Plan  SLP Swallowing Diagnosis: functional oral phase, functional pharyngeal phase (21)  SLP Diet Recommendation: regular textures, thin liquids (21)  Recommended Precautions and Strategies: upright posture during/after eating, general aspiration precautions (21)  SLP Rec. for Method of Medication Administration: meds whole, with thin liquids, with pudding or applesauce, as tolerated (21)     Monitor for Signs of Aspiration: yes, notify SLP if any concerns (21)  Recommended Diagnostics: No further SLP services recommended (21)  Swallow Criteria for Skilled Therapeutic Interventions Met: no problems identified which require skilled intervention (21)  Anticipated Discharge Disposition (SLP): anticipate therapy at next level of care, other (see comments) (21  0830)  Rehab Potential/Prognosis, Swallowing: good, to achieve stated therapy goals (12/07/21 0830)  Therapy Frequency (Swallow): evaluation only (12/07/21 0830)                            Plan of Care Reviewed With: patient, son  Progress:  (initial eval)      SWALLOW EVALUATION (last 72 hours)     SLP Adult Swallow Evaluation     Row Name 12/07/21 0830       Rehab Evaluation    Document Type evaluation  -CJ    Subjective Information no complaints  -CJ    Patient Observations alert; cooperative  -CJ    Patient/Family/Caregiver Comments/Observations son present  -CJ    Patient Effort good  -CJ    Symptoms Noted During/After Treatment none  -CJ            General Information    Patient Profile Reviewed yes  -CJ    Pertinent History Of Current Problem Pt was recently just adm w/ r/o CVA; now readmitted w/ hypoxia; has sig h/o ess HTN, hypothyroid, lung mass, HTN, JUAN F, dementia  -CJ    Current Method of Nutrition regular textures; thin liquids  -CJ    Precautions/Limitations, Vision WFL with corrective lenses; for purposes of eval  -CJ    Precautions/Limitations, Hearing WFL; for purposes of eval  -CJ    Prior Level of Function-Communication cognitive-linguistic impairment; expressive language impairment; receptive language impairment; other (see comments)  per son  -CJ    Prior Level of Function-Swallowing no diet consistency restrictions  -CJ    Plans/Goals Discussed with patient; agreed upon  -    Barriers to Rehab previous functional deficit  -CJ    Patient's Goals for Discharge patient did not state  -    Family Goals for Discharge patient able to return to all previous activities/roles  -            Pain    Additional Documentation Pain Scale: FACES Pre/Post-Treatment (Group)  -CJ            Pain Scale: FACES Pre/Post-Treatment    Pain: FACES Scale, Pretreatment 0-->no hurt  -CJ    Posttreatment Pain Rating 0-->no hurt  -CJ            Oral Motor Structure and Function    Dentition Assessment natural,  present and adequate  -    Secretion Management WNL/WFL  -    Mucosal Quality moist, healthy  -    Volitional Swallow WFL  -    Volitional Cough WFL  -            Oral Musculature and Cranial Nerve Assessment    Oral Motor General Assessment [facility]  -            General Eating/Swallowing Observations    Respiratory Support Currently in Use nasal cannula  -    Eating/Swallowing Skills fed by SLP; self-fed; appropriate self-feeding skills observed  -    Positioning During Eating upright 90 degree; upright in chair  -    Utensils Used spoon; cup; straw  -    Consistencies Trialed thin liquids; pudding thick; regular textures  -            Clinical Swallow Eval    Pharyngeal Phase no overt signs/symptoms of pharyngeal impairment  -    Clinical Swallow Evaluation Summary CSE completed this am. Mrs. Eric is positioned upright and centered in bed to accept po presentations of puree, solid, ice chips and thin liquids. Adequate mastication of solids. Minimal po intake accepted 2/2 pt participation. No overt s/s of aspiration w/ thins, puree or solid even w/ consecutive sips via cup and straw.Will continue regular diet w/ thin liquids. Meds whole in puree/thins as tolerated  -            Clinical Impression    SLP Swallowing Diagnosis functional oral phase; functional pharyngeal phase  -    Functional Impact no impact on function  -    Rehab Potential/Prognosis, Swallowing good, to achieve stated therapy goals  -    Swallow Criteria for Skilled Therapeutic Interventions Met no problems identified which require skilled intervention  -            Recommendations    Therapy Frequency (Swallow) evaluation only  -    SLP Diet Recommendation regular textures; thin liquids  -    Recommended Diagnostics No further SLP services recommended  -    Recommended Precautions and Strategies upright posture during/after eating; general aspiration precautions  -    Oral Care Recommendations Oral Care  BID/PRN  -    SLP Rec. for Method of Medication Administration meds whole; with thin liquids; with pudding or applesauce; as tolerated  -POONAM    Monitor for Signs of Aspiration yes; notify SLP if any concerns  -    Anticipated Discharge Disposition (SLP) anticipate therapy at next level of care; other (see comments)  -          User Key  (r) = Recorded By, (t) = Taken By, (c) = Cosigned By    Initials Name Effective Dates    Crystal Sheehan MS CCC-SLP 06/16/21 -                 EDUCATION  The patient has been educated in the following areas:   Dysphagia (Swallowing Impairment) Oral Care/Hydration.              Time Calculation:    Time Calculation- SLP     Row Name 12/07/21 1149             Time Calculation- SLP    SLP Start Time 0830  -POONAM      SLP Received On 12/07/21  -              Untimed Charges    43181-VW Eval Oral Pharyng Swallow Minutes 40  -CJ              Total Minutes    Untimed Charges Total Minutes 40  -CJ       Total Minutes 40  -CJ            User Key  (r) = Recorded By, (t) = Taken By, (c) = Cosigned By    Initials Name Provider Type    Crystal Sheehan MS CCC-SLP Speech and Language Pathologist                Therapy Charges for Today     Code Description Service Date Service Provider Modifiers Qty    [id number] HC ST EVAL ORAL PHARYNG SWALLOW 3 12/7/2021 Crystal Villafana MS CCC-SLP GN 1      Patient was not wearing a face mask and did exhibit coughing during this therapy encounter.  Procedure performed was aerosolizing, involved close contact (within 6 feet for at least 15 minutes or longer), and did not involve contact with infectious secretions or specimens.  Therapist used appropriate personal protective equipment including gloves, standard procedure mask and eye protection.  Appropriate PPE was worn during the entire therapy session.  Hand hygiene was completed before and after therapy session.            MS NIYAH MeltonSLP  12/7/2021

## 2021-12-07 NOTE — CASE MANAGEMENT/SOCIAL WORK
Discharge Planning Assessment  [facility]     Patient Name: Mitzi Zapata  MRN: [id number]  Today's Date: 12/7/2021    Admit Date: 12/6/2021     Discharge Needs Assessment     Row Name 12/07/21 1051       Living Environment    Lives With facility resident    Name(s) of Who Lives With Patient [facility]    Current Living Arrangements independent/assisted living facility    Duration at Residence 3 years    Potentially Unsafe Housing Conditions unable to assess    Primary Care Provided by self    Provides Primary Care For no one    Family Caregiver if Needed other relative(s)    Family Caregiver Names CHELE ZAPATA (Power of ) [phone] (Home Phone)  nephew    Quality of Family Relationships helpful; involved; supportive    Living Arrangement Comments pt needs to ambulate independently       Resource/Environmental Concerns    Resource/Environmental Concerns none    Transportation Concerns car, none       Transition Planning    Patient/Family Anticipates Transition to home       Discharge Needs Assessment    Readmission Within the Last 30 Days previous discharge plan unsuccessful    Current Outpatient/Agency/Support Group homecare agency    Equipment Currently Used at Home walker, rolling; wheelchair; shower chair    Concerns to be Addressed discharge planning    Anticipated Changes Related to Illness inability to care for self    Outpatient/Agency/Support Group Needs homecare agency    Discharge Facility/Level of Care Needs home with home health    Provided Post Acute Provider List? N/A    Provided Post Acute Provider Quality & Resource List? N/A    Current Discharge Risk dependent with mobility/activities of daily living; chronically ill; lives alone    Discharge Coordination/Progress condition is declining               Discharge Plan     Row Name 12/07/21 1056       Plan    Plan initial    Plan Comments Pt discharged yesterday from [facility], returns today for SOA. CM spoke with POA at  bedside. He still anticipates pt will improve and be more independent. He refused Hospice at last admission. H includes HTN, Respiratory Insufficiency, Frequent Falls, and Lung Mass. Pt will need to be assessed by [facility] before she can return. At baseline she was using a walker, now confined to . She is current with [facility] PT and OT. Plan is TBD. PCP is RAFAEL Gr.    Final Discharge Disposition Code 30 - still a patient              Continued Care and Services - Admitted Since 12/6/2021    Coordination has not been started for this encounter.     Selected Continued Care - Prior Encounters Includes selections from prior encounters from 9/7/2021 to 12/7/2021    Discharged on 12/6/2021 Admission date: 11/29/2021 - Discharge disposition: Long Term Care (DC - External)    Home Medical Care     Service Provider Selected Services Address Phone Fax Patient Pref[facility]    [facility] [address] [place] [address] [id number] [phone] --                       Demographic Summary    No documentation.                Functional Status     Row Name 12/07/21 1051       Functional Status    Usual Activity Tolerance poor       Functional Status, IADL    Medications assistive person    Meal Preparation assistive person    Housekeeping assistive person    Laundry assistive person    Shopping assistive person       Mental Status    General Appearance WDL WDL       Mental Status Summary    Recent Changes in Mental Status/Cognitive Functioning unable to assess       Employment/    Employment Status retired               Psychosocial    No documentation.                Abuse/Neglect    No documentation.                Legal    No documentation.                Substance Abuse    No documentation.                Patient Forms    No documentation.                   Renetta Lozada RN

## 2021-12-07 NOTE — ED PROVIDER NOTES
Subjective   Patient is an 86-year-old female with past medical history significant for dementia, atrial fibrillation, stage III chronic kidney disease, COPD, hypertension and hyperlipidemia who was discharged from this facility this morning after being treated for altered mental status and a urinary tract infection.  Patient responded well to inpatient treatment of her UTI and was discharged back to her nursing home facility for continued outpatient follow-up with her primary care physician.  Upon returning back to her facility she was being checked on by her nursing staff this evening when her oxygen saturations were found to be in the low 80s on room air.  Patient did not have previous oxygen requirement.  Patient has no access to oxygen currently at her nursing home facility.  Per discussion with nursing facility staff patient did report feeling some short of breath but denies any additional complaints on their assessment.      History provided by:  EMS personnel, relative and nursing home  History limited by:  Dementia  Shortness of Breath  Severity:  Moderate  Onset quality:  Gradual  Duration:  1 day  Timing:  Constant  Progression:  Worsening  Chronicity:  New  Relieved by:  Nothing  Worsened by:  Nothing  Ineffective treatments:  None tried  Associated symptoms: no abdominal pain, no chest pain, no cough and no fever        Review of Systems   Unable to perform ROS: Dementia (Review of systems limited secondary to patient's dementia.  Information provided in this review of systems given by nursing staff at nursing facility and EMS.)   Constitutional: Negative for fever.   HENT: Negative.    Respiratory: Positive for shortness of breath. Negative for cough.    Cardiovascular: Negative for chest pain.   Gastrointestinal: Negative for abdominal pain.   All other systems reviewed and are negative.      Past Medical History:   Diagnosis Date   • A-fib (HCC)    • Ataxic gait    • Chronic kidney disease, stage 3  (HCC)    • COPD (chronic obstructive pulmonary disease) (HCC)    • Dementia (HCC)    • Disease of thyroid gland    • Hyperlipidemia    • Hypertension        Allergies   Allergen Reactions   • Bee Pollen Unknown - Low Severity   • Lorazepam Delirium       Past Surgical History:   Procedure Laterality Date   • BRAIN TUMOR EXCISION      BENIGN   • FRACTURE SURGERY     • TRIGGER FINGER RELEASE         History reviewed. No pertinent family history.    Social History     Socioeconomic History   • Marital status: Single   Tobacco Use   • Smoking status: Former Smoker   • Smokeless tobacco: Never Used   Substance and Sexual Activity   • Alcohol use: No   • Drug use: No           Objective   Physical Exam  Vitals and nursing note reviewed.   Constitutional:       General: She is not in acute distress.     Appearance: Normal appearance. She is not ill-appearing or toxic-appearing.   HENT:      Head: Normocephalic and atraumatic.      Nose: Nose normal.      Mouth/Throat:      Mouth: Mucous membranes are moist.   Eyes:      Extraocular Movements: Extraocular movements intact.      Conjunctiva/sclera: Conjunctivae normal.   Cardiovascular:      Rate and Rhythm: Normal rate.      Pulses: Normal pulses.      Heart sounds: Normal heart sounds.   Pulmonary:      Effort: Pulmonary effort is normal. No respiratory distress.      Breath sounds: Normal breath sounds.   Abdominal:      General: There is no distension.      Palpations: Abdomen is soft.      Tenderness: There is no abdominal tenderness.   Musculoskeletal:         General: Normal range of motion.      Cervical back: Normal range of motion.   Skin:     General: Skin is warm and dry.   Neurological:      Mental Status: She is alert. Mental status is at baseline.   Psychiatric:         Mood and Affect: Mood normal.         Behavior: Behavior normal.         Procedures           ED Course  ED Course as of 12/07/21 1553   Tue Dec 07, 2021   0127 Patient asleep at bedside with  oxygen saturation dropping into 84% on room air.  Oxygen applied, 2 L via nasal cannula with return of baseline oxygen saturation of 91% on 2 L. [JG]   0143 Oxygen applied, 2L via nasal cannula, now with O2 sats 95% [JG]   0152 Case reviewed with Dr. Laurent who requested some additional lab work and a respiratory panel.  These will be obtained and patient likely be admitted based on new O2 requirements. [JG]   0229 I have tried to contact [facility] nursing facility to question if patient is available to have supplemental oxygen at their facility.  Family member does not wish to have patient readmitted if possible. [JG]   0249 Case reviewed with nursing at [facility] facility they do not have oxygen available for the patient tonight and it would have to be ordered by the patient's primary care physician. Patient will be admitted to the hospital this evening and can work on establishing home oxygen tomorrow. [JG]   0258 Hospitalist paged for admission and agreeable to accept patient [JG]   0356 Influenza A H3(!): Detected [JG]   0445 Patient presents to ED hypoxic after being discharged from hospital this morning. Initial oxygen saturation in upper 80's and low 90's. Responded well to supplemental oxygen of 2L via nasal cannula while in ED. No acute or emergent findings on labs or imaging. Hospitalist was consulted for admission as nursing home could not provide supplemental O2 for patient. Hospitalist agreed to accept patient with respiratory panel pending. Respiratory panel resulted and confirmed patient positive for influenza. Patient admitted to medicine for further evaluation and treatment.  [JG]      ED Course User Index  [JG] Dm Cardozo, PA     Recent Results (from the past 24 hour(s))   Comprehensive Metabolic Panel    Collection Time: 12/07/21 12:58 AM    Specimen: Blood   Result Value Ref Range    Glucose 101 (H) 65 - 99 mg/dL    BUN 17 8 - 23 mg/dL    Creatinine 0.90 0.57 - 1.00 mg/dL    Sodium  142 136 - 145 mmol/L    Potassium 4.2 3.5 - 5.2 mmol/L    Chloride 107 98 - 107 mmol/L    CO2 25.0 22.0 - 29.0 mmol/L    Calcium 9.1 8.6 - 10.5 mg/dL    Total Protein 6.7 6.0 - 8.5 g/dL    Albumin 3.90 3.50 - 5.20 g/dL    ALT (SGPT) 27 1 - 33 U/L    AST (SGOT) 49 (H) 1 - 32 U/L    Alkaline Phosphatase 84 39 - 117 U/L    Total Bilirubin 0.3 0.0 - 1.2 mg/dL    eGFR Non African Amer 60 (L) >60 mL/min/1.73    Globulin 2.8 gm/dL    A/G Ratio 1.4 g/dL    BUN/Creatinine Ratio 18.9 7.0 - 25.0    Anion Gap 10.0 5.0 - 15.0 mmol/L   CBC Auto Differential    Collection Time: 12/07/21 12:58 AM    Specimen: Blood   Result Value Ref Range    WBC 5.76 3.40 - 10.80 10*3/mm3    RBC 3.87 3.77 - 5.28 10*6/mm3    Hemoglobin 12.3 12.0 - 15.9 g/dL    Hematocrit 37.1 34.0 - 46.6 %    MCV 95.9 79.0 - 97.0 fL    MCH 31.8 26.6 - 33.0 pg    MCHC 33.2 31.5 - 35.7 g/dL    RDW 12.6 12.3 - 15.4 %    RDW-SD 44.3 37.0 - 54.0 fl    MPV 12.5 (H) 6.0 - 12.0 fL    Platelets 137 (L) 140 - 450 10*3/mm3    Neutrophil % 59.8 42.7 - 76.0 %    Lymphocyte % 25.0 19.6 - 45.3 %    Monocyte % 13.7 (H) 5.0 - 12.0 %    Eosinophil % 0.2 (L) 0.3 - 6.2 %    Basophil % 0.3 0.0 - 1.5 %    Immature Grans % 1.0 (H) 0.0 - 0.5 %    Neutrophils, Absolute 3.44 1.70 - 7.00 10*3/mm3    Lymphocytes, Absolute 1.44 0.70 - 3.10 10*3/mm3    Monocytes, Absolute 0.79 0.10 - 0.90 10*3/mm3    Eosinophils, Absolute 0.01 0.00 - 0.40 10*3/mm3    Basophils, Absolute 0.02 0.00 - 0.20 10*3/mm3    Immature Grans, Absolute 0.06 (H) 0.00 - 0.05 10*3/mm3    nRBC 0.0 0.0 - 0.2 /100 WBC   BNP    Collection Time: 12/07/21 12:58 AM    Specimen: Blood   Result Value Ref Range    proBNP 2,267.0 (H) 0.0-1,800.0 pg/mL   Troponin    Collection Time: 12/07/21 12:58 AM    Specimen: Blood   Result Value Ref Range    Troponin T <0.010 0.000 - 0.030 ng/mL   Procalcitonin    Collection Time: 12/07/21 12:58 AM    Specimen: Blood   Result Value Ref Range    Procalcitonin 0.12 0.00 - 0.25 ng/mL   Respiratory Panel  PCR w/COVID-19(SARS-CoV-2) GIANNA/GARRET/KATHLEEN/PAD/COR/MAD/JOSIANE In-House, NP Swab in UTM/VTM, 3-4 HR TAT - Swab, Nasopharynx    Collection Time: 12/07/21  2:09 AM    Specimen: Nasopharynx; Swab   Result Value Ref Range    ADENOVIRUS, PCR Not Detected Not Detected    Coronavirus 229E Not Detected Not Detected    Coronavirus HKU1 Not Detected Not Detected    Coronavirus NL63 Not Detected Not Detected    Coronavirus OC43 Not Detected Not Detected    COVID19 Not Detected Not Detected - Ref. Range    Human Metapneumovirus Not Detected Not Detected    Human Rhinovirus/Enterovirus Not Detected Not Detected    Influenza A H3 Detected (A) Not Detected    Influenza B PCR Not Detected Not Detected    Parainfluenza Virus 1 Not Detected Not Detected    Parainfluenza Virus 2 Not Detected Not Detected    Parainfluenza Virus 3 Not Detected Not Detected    Parainfluenza Virus 4 Not Detected Not Detected    RSV, PCR Not Detected Not Detected    Bordetella pertussis pcr Not Detected Not Detected    Bordetella parapertussis PCR Not Detected Not Detected    Chlamydophila pneumoniae PCR Not Detected Not Detected    Mycoplasma pneumo by PCR Not Detected Not Detected     Note: In addition to lab results from this visit, the labs listed above may include labs taken at another facility or during a different encounter within the last 24 hours. Please correlate lab times with ED admission and discharge times for further clarification of the services performed during this visit.    CT Angiogram Chest   Final Result   No CT evidence pulmonary embolus.      Stable right upper lobe soft tissue mass since 7/15/2018      Emphysematous changes upper lobes      Signer Name: Vik Bedoya MD    Signed: 12/7/2021 4:20 AM    Workstation Name: [email or web address]     [facility]      XR Chest 1 View   Final Result   Stable irregular density right upper lobe unchanged from CT scan from 2018      No active disease      Signer Name: Vik Bedoya MD     Signed: 12/7/2021 1:01 AM    Workstation Name: [email or web address]     [facility]        Vitals:    12/07/21 0030 12/07/21 0200 12/07/21 0230 12/07/21 1528   BP: 123/65 116/65 110/61 103/74   BP Location:    Left arm   Patient Position:    Lying   Pulse: 86 76 76 76   Resp:    16   Temp:    97.4 °F (36.3 °C)   TempSrc:    Oral   SpO2: 90% 96% 92% 93%   Weight:       Height:         Medications   acetaminophen (TYLENOL) tablet 650 mg (650 mg Oral Given 12/7/21 1000)   amLODIPine (NORVASC) tablet 10 mg (10 mg Oral Given 12/7/21 1000)   busPIRone (BUSPAR) tablet 7.5 mg (7.5 mg Oral Given 12/7/21 1002)   carBAMazepine XR (TEGretol  XR) 12 hr tablet 400 mg (400 mg Oral Given 12/7/21 1002)   cetirizine (zyrTEC) tablet 5 mg (5 mg Oral Given 12/7/21 0959)   docusate sodium (COLACE) capsule 100 mg (has no administration in time range)   folic acid (FOLVITE) tablet 400 mcg (400 mcg Oral Given 12/7/21 1001)   levothyroxine (SYNTHROID, LEVOTHROID) tablet 25 mcg (25 mcg Oral Given 12/7/21 1003)   meclizine (ANTIVERT) tablet 25 mg (has no administration in time range)   mirtazapine (REMERON) tablet 15 mg (has no administration in time range)   pantoprazole (PROTONIX) EC tablet 40 mg (40 mg Oral Given 12/7/21 0959)   pregabalin (LYRICA) capsule 50 mg (50 mg Oral Given 12/7/21 1204)   traZODone (DESYREL) tablet 25 mg (has no administration in time range)   traMADol (ULTRAM) tablet 50 mg (has no administration in time range)   sodium chloride 0.9 % flush 10 mL (10 mL Intravenous Given 12/7/21 1343)   sodium chloride 0.9 % flush 10 mL (has no administration in time range)   oseltamivir (TAMIFLU) capsule 30 mg (30 mg Oral Given 12/7/21 0739)   iopamidol (ISOVUE-300) 61 % injection 100 mL (100 mL Intravenous Given 12/7/21 8839)     ECG/EMG Results (last 24 hours)     ** No results found for the last 24 hours. **        ECG 12 Lead                                                         MDM  Number of Diagnoses or  Management Options  Acute respiratory failure with hypoxia (HCC): new and requires workup  Influenza A virus present: new and requires workup     Amount and/or Complexity of Data Reviewed  Clinical lab tests: reviewed  Tests in the radiology section of CPT®: reviewed  Decide to obtain previous medical records or to obtain history from someone other than the patient: yes    Risk of Complications, Morbidity, and/or Mortality  Presenting problems: moderate  Diagnostic procedures: moderate  Management options: moderate    Patient Progress  Patient progress: stable      Final diagnoses:   Acute respiratory failure with hypoxia (HCC)   Influenza A virus present       ED Disposition  ED Disposition     ED Disposition Condition Comment    Decision to Admit  Level of Care: Telemetry [5]   Diagnosis: Acute respiratory failure with hypoxia (HCC) [533842]   Admitting Physician: YANETH PAYNE [[id number]]   Attending Physician: YANETH PAYNE [[id number]]            No follow-up provider specified.       Medication List      ASK your doctor about these medications    acetaminophen 325 MG tablet  Commonly known as: TYLENOL  Ask about: Which instructions should I use?             Dm Cardozo PA  12/07/21 155

## 2021-12-07 NOTE — H&P
[facility] Medicine Services  HISTORY AND PHYSICAL    Patient Name: Mitzi Eric  : 1935  MRN: [id number]  Primary Care Physician: Dm Cade APRN  Date of admission: 2021    Subjective   Subjective     Chief Complaint:  Oxygen saturation dropping     HPI:  Mitzi Eric is a 86 y.o. female with a past medical history significant for dementia, hypothyroidism, prior craniotomy secondary to possible brain mass, essential hypertension, atrial fibrillation no on anticoagulation, COPD, lung mass and frequent UTI's presents to the ED due to oxgyen saturation dropping.  Patient was recently admitted to [facility] from 21 to 21 secondary to altered mental status.  She was found to have a UTI.  Urine cultures grew >100 Klebsiella resistant to ampicillin.  She completed course of rocephin.  She was discharged back to [facility] on 21.  Today she was noted to have a RA oxygen saturation of 84% while resting therefore she was sent to the ED for further evaluation. Currently while at bedside patient is somnolent therefore HPI was not obtainable.  Patient will be admitted to [facility] under the care of the Hospitalist for further evaluation and treatment.       COVID Details:    Symptoms:    [] NONE [] Fever []  Cough [] Shortness of breath [] Change in taste/smell      The patient has started, but not completed, their COVID-19 vaccination series.      Review of Systems   Unable to perform ROS: Mental status change        All other systems reviewed and are negative.     Personal History     Past Medical History:   Diagnosis Date   • A-fib (HCC)    • Ataxic gait    • Chronic kidney disease, stage 3 (HCC)    • COPD (chronic obstructive pulmonary disease) (HCC)    • Dementia (HCC)    • Disease of thyroid gland    • Hyperlipidemia    • Hypertension        Past Surgical History:   Procedure Laterality Date   • BRAIN TUMOR EXCISION      BENIGN   • FRACTURE  SURGERY     • TRIGGER FINGER RELEASE         Family History: family history is not on file. Otherwise pertinent FHx was reviewed and unremarkable.     Social History:  reports that she has quit smoking. She has never used smokeless tobacco. She reports that she does not drink alcohol and does not use drugs.  Social History     Social History Narrative   • Not on file       Medications:  Menthol (Topical Analgesic), Nutritional Supplements, acetaminophen, amLODIPine, busPIRone, carBAMazepine XR, cetirizine, docusate sodium, folic acid, ipratropium, levothyroxine, meclizine, mirtazapine, pantoprazole, pregabalin, traMADol, and traZODone    Allergies   Allergen Reactions   • Bee Pollen Unknown - Low Severity       Objective   Objective     Vital Signs:   Temp:  [98 °F (36.7 °C)-98.6 °F (37 °C)] 98 °F (36.7 °C)  Heart Rate:  [] 76  Resp:  [16-20] 16  BP: (110-164)/(61-96) 110/61  Flow (L/min):  [2] 2    Physical Exam  Vitals and nursing note reviewed.   Constitutional:       General: She is not in acute distress.     Appearance: Normal appearance. She is not toxic-appearing or diaphoretic.      Comments: Awakes to physical stimuli but falls back to sleep    HENT:      Head: Normocephalic and atraumatic.      Nose: Nose normal.      Mouth/Throat:      Pharynx: Oropharynx is clear.   Eyes:      Extraocular Movements: Extraocular movements intact.      Conjunctiva/sclera: Conjunctivae normal.      Pupils: Pupils are equal, round, and reactive to light.   Cardiovascular:      Rate and Rhythm: Normal rate and regular rhythm.      Pulses: Normal pulses.      Heart sounds: Normal heart sounds.   Pulmonary:      Effort: Pulmonary effort is normal.      Breath sounds: Normal breath sounds.   Abdominal:      General: Bowel sounds are normal. There is no distension.      Palpations: Abdomen is soft. There is no mass.      Tenderness: There is no abdominal tenderness. There is no left CVA tenderness, guarding or rebound.       Hernia: No hernia is present.   Musculoskeletal:         General: No swelling, tenderness, deformity or signs of injury. Normal range of motion.      Cervical back: Normal range of motion and neck supple.      Right lower leg: No edema.      Left lower leg: No edema.   Skin:     General: Skin is dry.   Neurological:      Mental Status: Mental status is at baseline.      Comments: Arouses minimally to physical stimuli but falls quickly back asleep    Psychiatric:      Comments: Unable to assess             Results Reviewed:  I have personally reviewed most recent indicated data and agree with findings including:  [x]  Laboratory  [x]  Radiology  [x]  EKG/Telemetry  []  Pathology  []  Cardiac/Vascular Studies  []  Old records  []  Other:  Most pertinent findings include:      LAB RESULTS:      Lab 12/07/21  0058 12/05/21 0627 12/04/21 0457 12/02/21 0953 12/01/21  0657 11/30/21  0839 11/30/21  0839   WBC 5.76 6.88 7.72 5.58 6.26   < > 7.82   HEMOGLOBIN 12.3 11.6* 12.6 12.3 11.9*   < > 11.2*   HEMATOCRIT 37.1 34.7 38.1 36.7 36.2   < > 33.6*   PLATELETS 137* 108* 128* 112* 130*   < > 112*   NEUTROS ABS 3.44  --   --  3.56 3.75  --  5.58   IMMATURE GRANS (ABS) 0.06*  --   --  0.04 0.02  --  0.02   LYMPHS ABS 1.44  --   --  0.84 1.46  --  1.30   MONOS ABS 0.79  --   --  1.09* 0.83  --  0.74   EOS ABS 0.01  --   --  0.01 0.17  --  0.15   MCV 95.9 95.6 96.0 96.1 97.1*   < > 96.3    < > = values in this interval not displayed.         Lab 12/07/21  0058 12/06/21  0234 12/05/21 0627 12/04/21 0457 12/02/21  0953 12/01/21  0657 11/30/21  0839   SODIUM 142 142 143 146* 140   < > 142   POTASSIUM 4.2 5.2 3.4* 3.3* 3.6   < > 3.7   CHLORIDE 107 111* 109* 110* 107   < > 107   CO2 25.0 21.0* 22.0 22.0 20.0*   < > 23.0   ANION GAP 10.0 10.0 12.0 14.0 13.0   < > 12.0   BUN 17 17 23 25* 8   < > 17   CREATININE 0.90 0.75 0.79 1.03* 0.62   < > 0.93   GLUCOSE 101* 97 102* 119* 97   < > 82   CALCIUM 9.1 8.4* 8.4* 8.8 8.8   < > 8.3*    HEMOGLOBIN A1C  --   --   --   --   --   --  5.50    < > = values in this interval not displayed.         Lab 12/07/21  0058   TOTAL PROTEIN 6.7   ALBUMIN 3.90   GLOBULIN 2.8   ALT (SGPT) 27   AST (SGOT) 49*   BILIRUBIN 0.3   ALK PHOS 84         Lab 12/07/21  0058   PROBNP 2,267.0*   TROPONIN T <0.010         Lab 11/30/21  0839   CHOLESTEROL 158   LDL CHOL 97   HDL CHOL 40   TRIGLYCERIDES 118             Brief Urine Lab Results  (Last result in the past 365 days)      Color   Clarity   Blood   Leuk Est   Nitrite   Protein   CREAT   Urine HCG        11/29/21 1200 Yellow   Cloudy   Trace   Small (1+)   Positive   30 mg/dL (1+)               Microbiology Results (last 10 days)     Procedure Component Value - Date/Time    Blood Culture - Blood, Arm, Right [[id number]]  (Normal) Collected: 11/29/21 1330    Lab Status: Final result Specimen: Blood from Arm, Right Updated: 12/04/21 1516     Blood Culture No growth at 5 days    Blood Culture - Blood, Arm, Right [[id number]]  (Normal) Collected: 11/29/21 1320    Lab Status: Final result Specimen: Blood from Arm, Right Updated: 12/04/21 1516     Blood Culture No growth at 5 days    Urine Culture - Urine, Urine, Catheter [[id number]]  (Abnormal)  (Susceptibility) Collected: 11/29/21 1200    Lab Status: Final result Specimen: Urine, Catheter Updated: 12/01/21 0932     Urine Culture >100,000 CFU/mL Klebsiella pneumoniae ssp pneumoniae    Susceptibility      Klebsiella pneumoniae ssp pneumoniae     JOAN     Ampicillin Resistant     Ampicillin + Sulbactam Resistant     Cefazolin Susceptible     Cefepime Susceptible     Ceftazidime Susceptible     Ceftriaxone Susceptible     Gentamicin Susceptible     Levofloxacin Susceptible     Nitrofurantoin Intermediate     Piperacillin + Tazobactam Susceptible     Tetracycline Susceptible     Trimethoprim + Sulfamethoxazole Susceptible                         COVID-19 and FLU A/B PCR - Swab, Nasopharynx [[id number]]  (Normal) Collected: 11/29/21  1131    Lab Status: Final result Specimen: Swab from Nasopharynx Updated: 11/29/21 1213     COVID19 Not Detected     Influenza A PCR Not Detected     Influenza B PCR Not Detected    Narrative:      Fact sheet for providers: https://www.fda.gov/media/047164/download    Fact sheet for patients: https://www.fda.gov/media/818646/download    Test performed by PCR.          XR Chest 1 View    Result Date: 12/7/2021  CR Chest 1 Vw INDICATION: Hypoxia COMPARISON:  L3 21, 11/5/2021 FINDINGS: Portable AP view(s) of the chest.  Size and vascular normal. Left lung is clear. There is an area of density in the right upper lobe measuring about 4.2 cm in diameter. This unchanged from prior studies dating back to 11/5/2021. There is a chest CT from 7/15/2018 and this shows a large parenchymal density in that region.     Impression: Stable irregular density right upper lobe unchanged from CT scan from 2018 No active disease Signer Name: Vik Bedoya MD  Signed: 12/7/2021 1:01 AM  Workstation Name: [facility]  [facility]      Results for orders placed during the hospital encounter of 11/29/21    Adult Transthoracic Echo Complete W/ Cont if Necessary Per Protocol (With Agitated Saline)    Interpretation Summary  · Left ventricular ejection fraction appears to be 61 - 65%. Left ventricular systolic function is normal.  · Saline test results are negative.  · Estimated right ventricular systolic pressure from tricuspid regurgitation is markedly elevated (>55 mmHg).  · Moderate tricuspid valve regurgitation is present.  · Left ventricular diastolic function was normal.      Assessment/Plan   Assessment & Plan       Hypoxia    Essential hypertension    Hypothyroidism (acquired)    Lung mass      86 year old female nursing home resident presents to the ED after nursing home staff noted her RA oxygen saturation to be 84% while sleeping.     1) Hypoxia  -known right apex lung mass  -RA oxygen saturation observed at 84%,  improved to 95% with 2L NC administration in the ED  -will obtain CTA of chest to rule out other etiologies  -continuous pulse ox   -keep o2 sat >90%  -consult palliative in am, possibly hospice   -consult CM     2) Lung Mass  -right apex.  Noted on CT of chest back in 2018.  Family aware and has declined further workup/treatment in the past     3) Essential hypertension   -BP stable  -on metoprolol, continue with holding parameters     4) Hypothyroidism   -on synthroid     5) H/O Afib  -no on AC due to fall risk       DVT prophylaxis:  scds    CODE STATUS:  DNR/DNI        This note has been completed as part of a split-shared workflow.     Signature: Electronically signed by RAFAEL Eng, 12/07/21, 3:19 AM EST.          Attending   Admission Attestation       I have seen and examined the patient, performing an independent face-to-face diagnostic evaluation with plan of care reviewed and developed with the advanced practice clinician (APC).      Brief Summary Statement:   Mitzi Eric is a 86 y.o. female with past medical history of prior craniotomy secondary to brain mass, dementia, hypothyroidism, atrial fibrillation on no anticoagulation due to fall risk, COPD, essential hypertension, lung mass, frequent UTIs who was recently admitted from 11/29 through 12/6 with altered mental status, UTI.    Patient was discharged to [facility] on 12/6/2021.  Sent back tonight as staff noted that patient was hypoxic.  Review of the chart reveals that the patient was having a slight cough prior to discharge.  Chest x-ray at that time without any acute infiltrates.  Repeat chest x-ray tonight without any acute abnormalities other than her known lung mass.  CTA chest pending to rule out PE as she was tachycardic at admission.  Flu A/B and covid pending. Patient noted to be 84% on room air.          Remainder of detailed HPI is as noted by APC and has been reviewed and/or edited by me for  completeness.    Attending Physical Exam:  Constitutional: somnolent, will wake to tactile stimuli but nonverbal, appears chronically ill  Eyes: PERRLA, sclerae anicteric, no conjunctival injection  HENT: NCAT, mucous membranes dry  Neck: Supple, no thyromegaly, no lymphadenopathy, trachea midline  Respiratory: Clear to auscultation bilaterally, nonlabored respirations   Cardiovascular: RRR, no murmurs, rubs, or gallops, palpable pedal pulses bilaterally  Gastrointestinal: Positive bowel sounds, soft, nontender, nondistended  Musculoskeletal: No bilateral ankle edema, no clubbing or cyanosis to extremities  Psychiatric: Appropriate affect, non cooperative  Neurologic: moves all extremities, unable to fully assess as patient is noncooperative nonverbal  Skin: No rashes      Brief Assessment/Plan :  See detailed assessment and plan developed with APC which I have reviewed and/or edited for completeness.        Admission Status: I believe that this patient meets OBSERVATION status, however if further evaluation or treatment plans warrant, status may change.  Based upon current information, I predict patient's care encounter to be less than or equal to 2 midnights.        Keaton Monroe DO  12/07/21

## 2021-12-07 NOTE — PROGRESS NOTES
[facility] Medicine Services  ADMISSION FOLLOW-UP NOTE          Patient admitted after midnight, H&P by my partner performed earlier on today's date reviewed.  Interim findings, labs, and charting also reviewed.        [facility] Hospital Problem List has been managed and updated to include any new diagnoses:  Active Hospital Problems    Diagnosis  POA   • **Hypoxia [R09.02]  Yes     Priority: High   • Acute respiratory failure with hypoxia (HCC) [J96.01]  Yes     Priority: High   • Influenza A [J10.1]  Unknown     Priority: High   • Lung mass [R91.8]  Yes   • Hypothyroidism (acquired) [E03.9]  Yes   • Essential hypertension [I10]  Yes      Resolved Hospital Problems   No resolved problems to display.     Ms. Eric is an 87 yo F with past medical history of prior craniotomy secondary to brain mass, dementia, hypothyroidism, atrial fibrillation on no anticoagulation due to fall risk, COPD, essential hypertension, lung mass, frequent UTIs who was recently admitted from 11/29 through 12/6 with altered mental status, UTI who presented on 12/7 with hypoxia and was found to have Flu A.     ADDITIONAL PLAN:      Hypoxia  Influenza A  -known right apex lung mass  -RA oxygen saturation observed at 84%, improved to 95% with 2L NC administration in the ED  -CTA chest unremarkable/ no evidence of PE  -continuous pulse ox   -keep o2 sat >90%  -consulted palliative   -consult CM      Lung Mass  -right apex.  Noted on CT of chest back in 2018.  Family aware and has declined further workup/treatment in the past      Essential hypertension   -BP stable  -on metoprolol, continue with holding parameters      Hypothyroidism   -on synthroid      H/O Afib  -no on AC due to fall risk   -- currently rate controlled    Trigeminal neuralgia   -Held Tegretol during last admission/ at discharge.   -- lowered dose and frequency of Lyrica last admission     Dementia  Insomnia  -- trazodone decreased last admission to 25  mg QHS  -- Seroquel 12.5mg QHS PRN  -- continue scheduled Melatonin      Yu Cesar MD  12/07/21

## 2021-12-07 NOTE — PLAN OF CARE
Goal Outcome Evaluation:     Problem: Palliative Care  Goal: Enhanced Quality of Life  Outcome: Ongoing, Progressing  Intervention: Maximize Comfort  Flowsheets (Taken 12/7/2021 1533)  Pain Management Interventions: (pt denied pain) other (see comments)  Intervention: Promote Advance Care Planning  Flowsheets (Taken 12/7/2021 1533)  Life Transition/Adjustment: (known to service from previous admission)   palliative care initiated   other (see comments)  Intervention: Optimize Psychosocial Wellbeing  Flowsheets (Taken 12/7/2021 1533)  Spiritual Activities Assistance: (Spiritual Care consult) other (see comments)      Palliative consult for GOC/ACP. Pt known to service from prior admission, with discharge yesterday and Palliative referral for follow up at facility. Pt seen by ALEN HALL; discussed continued support with radha Sherman by phone.

## 2021-12-08 VITALS
HEIGHT: 62 IN | HEART RATE: 65 BPM | OXYGEN SATURATION: 95 % | WEIGHT: 112 LBS | TEMPERATURE: 98.1 F | SYSTOLIC BLOOD PRESSURE: 129 MMHG | BODY MASS INDEX: 20.61 KG/M2 | RESPIRATION RATE: 16 BRPM | DIASTOLIC BLOOD PRESSURE: 82 MMHG

## 2021-12-08 PROBLEM — E43 SEVERE MALNUTRITION: Status: ACTIVE | Noted: 2021-12-08

## 2021-12-08 PROBLEM — R09.02 HYPOXIA: Status: RESOLVED | Noted: 2021-12-07 | Resolved: 2021-12-08

## 2021-12-08 PROBLEM — J96.01 ACUTE RESPIRATORY FAILURE WITH HYPOXIA: Status: RESOLVED | Noted: 2021-12-07 | Resolved: 2021-12-08

## 2021-12-08 LAB
ANION GAP SERPL CALCULATED.3IONS-SCNC: 10 MMOL/L (ref 5–15)
BASOPHILS # BLD AUTO: 0.03 10*3/MM3 (ref 0–0.2)
BASOPHILS NFR BLD AUTO: 0.8 % (ref 0–1.5)
BUN SERPL-MCNC: 17 MG/DL (ref 8–23)
BUN/CREAT SERPL: 23.3 (ref 7–25)
CALCIUM SPEC-SCNC: 8.5 MG/DL (ref 8.6–10.5)
CHLORIDE SERPL-SCNC: 109 MMOL/L (ref 98–107)
CO2 SERPL-SCNC: 24 MMOL/L (ref 22–29)
CREAT SERPL-MCNC: 0.73 MG/DL (ref 0.57–1)
DEPRECATED RDW RBC AUTO: 44.4 FL (ref 37–54)
EOSINOPHIL # BLD AUTO: 0.08 10*3/MM3 (ref 0–0.4)
EOSINOPHIL NFR BLD AUTO: 2 % (ref 0.3–6.2)
ERYTHROCYTE [DISTWIDTH] IN BLOOD BY AUTOMATED COUNT: 12.1 % (ref 12.3–15.4)
GFR SERPL CREATININE-BSD FRML MDRD: 76 ML/MIN/1.73
GLUCOSE SERPL-MCNC: 83 MG/DL (ref 65–99)
HCT VFR BLD AUTO: 34.5 % (ref 34–46.6)
HGB BLD-MCNC: 11.1 G/DL (ref 12–15.9)
IMM GRANULOCYTES # BLD AUTO: 0.05 10*3/MM3 (ref 0–0.05)
IMM GRANULOCYTES NFR BLD AUTO: 1.3 % (ref 0–0.5)
LYMPHOCYTES # BLD AUTO: 1.69 10*3/MM3 (ref 0.7–3.1)
LYMPHOCYTES NFR BLD AUTO: 42.7 % (ref 19.6–45.3)
MCH RBC QN AUTO: 32 PG (ref 26.6–33)
MCHC RBC AUTO-ENTMCNC: 32.2 G/DL (ref 31.5–35.7)
MCV RBC AUTO: 99.4 FL (ref 79–97)
MONOCYTES # BLD AUTO: 0.73 10*3/MM3 (ref 0.1–0.9)
MONOCYTES NFR BLD AUTO: 18.4 % (ref 5–12)
NEUTROPHILS NFR BLD AUTO: 1.38 10*3/MM3 (ref 1.7–7)
NEUTROPHILS NFR BLD AUTO: 34.8 % (ref 42.7–76)
NRBC BLD AUTO-RTO: 0 /100 WBC (ref 0–0.2)
PLATELET # BLD AUTO: 122 10*3/MM3 (ref 140–450)
PMV BLD AUTO: 12.3 FL (ref 6–12)
POTASSIUM SERPL-SCNC: 4 MMOL/L (ref 3.5–5.2)
RBC # BLD AUTO: 3.47 10*6/MM3 (ref 3.77–5.28)
SODIUM SERPL-SCNC: 143 MMOL/L (ref 136–145)
WBC NRBC COR # BLD: 3.96 10*3/MM3 (ref 3.4–10.8)

## 2021-12-08 PROCEDURE — 80048 BASIC METABOLIC PNL TOTAL CA: CPT | Performed by: INTERNAL MEDICINE

## 2021-12-08 PROCEDURE — 97161 PT EVAL LOW COMPLEX 20 MIN: CPT

## 2021-12-08 PROCEDURE — 97535 SELF CARE MNGMENT TRAINING: CPT

## 2021-12-08 PROCEDURE — G0378 HOSPITAL OBSERVATION PER HR: HCPCS

## 2021-12-08 PROCEDURE — 85025 COMPLETE CBC W/AUTO DIFF WBC: CPT | Performed by: INTERNAL MEDICINE

## 2021-12-08 PROCEDURE — 99217 PR OBSERVATION CARE DISCHARGE MANAGEMENT: CPT | Performed by: HOSPITALIST

## 2021-12-08 PROCEDURE — 92523 SPEECH SOUND LANG COMPREHEN: CPT

## 2021-12-08 PROCEDURE — 97166 OT EVAL MOD COMPLEX 45 MIN: CPT

## 2021-12-08 RX ORDER — PREGABALIN 25 MG/1
50 CAPSULE ORAL 3 TIMES DAILY
Qty: 6 CAPSULE | Refills: 0 | Status: SHIPPED | OUTPATIENT
Start: 2021-12-08 | End: 2021-12-08 | Stop reason: SDUPTHER

## 2021-12-08 RX ORDER — OSELTAMIVIR PHOSPHATE 30 MG/1
30 CAPSULE ORAL EVERY 12 HOURS SCHEDULED
Qty: 7 CAPSULE | Refills: 0 | Status: SHIPPED | OUTPATIENT
Start: 2021-12-08 | End: 2021-12-12

## 2021-12-08 RX ORDER — TRAMADOL HYDROCHLORIDE 50 MG/1
50 TABLET ORAL 2 TIMES DAILY PRN
Qty: 4 TABLET | Refills: 0 | Status: SHIPPED | OUTPATIENT
Start: 2021-12-08 | End: 2022-03-17

## 2021-12-08 RX ORDER — QUETIAPINE FUMARATE 25 MG/1
12.5 TABLET, FILM COATED ORAL NIGHTLY PRN
Qty: 3 TABLET | Refills: 0 | Status: SHIPPED | OUTPATIENT
Start: 2021-12-08 | End: 2022-12-21

## 2021-12-08 RX ORDER — PREGABALIN 25 MG/1
25 CAPSULE ORAL 3 TIMES DAILY
Qty: 6 CAPSULE | Refills: 0 | Status: SHIPPED | OUTPATIENT
Start: 2021-12-08 | End: 2022-03-09

## 2021-12-08 RX ADMIN — ACETAMINOPHEN 650 MG: 325 TABLET, FILM COATED ORAL at 09:23

## 2021-12-08 RX ADMIN — OSELTAMIVIR PHOSPHATE 30 MG: 30 CAPSULE ORAL at 05:40

## 2021-12-08 RX ADMIN — AMLODIPINE BESYLATE 10 MG: 10 TABLET ORAL at 09:23

## 2021-12-08 RX ADMIN — SODIUM CHLORIDE, PRESERVATIVE FREE 10 ML: 5 INJECTION INTRAVENOUS at 09:24

## 2021-12-08 RX ADMIN — BUSPIRONE HYDROCHLORIDE 7.5 MG: 5 TABLET ORAL at 09:23

## 2021-12-08 RX ADMIN — CARBAMAZEPINE 400 MG: 400 TABLET, EXTENDED RELEASE ORAL at 09:24

## 2021-12-08 RX ADMIN — FOLIC ACID TAB 400 MCG 400 MCG: 400 TAB at 09:23

## 2021-12-08 RX ADMIN — PREGABALIN 50 MG: 50 CAPSULE ORAL at 09:24

## 2021-12-08 RX ADMIN — ACETAMINOPHEN 650 MG: 325 TABLET, FILM COATED ORAL at 16:14

## 2021-12-08 RX ADMIN — LEVOTHYROXINE SODIUM 25 MCG: 25 TABLET ORAL at 05:40

## 2021-12-08 RX ADMIN — CETIRIZINE HYDROCHLORIDE 5 MG: 10 TABLET, FILM COATED ORAL at 09:23

## 2021-12-08 RX ADMIN — PANTOPRAZOLE SODIUM 40 MG: 40 TABLET, DELAYED RELEASE ORAL at 09:24

## 2021-12-08 RX ADMIN — PREGABALIN 50 MG: 50 CAPSULE ORAL at 16:14

## 2021-12-08 NOTE — CASE MANAGEMENT/SOCIAL WORK
Continued Stay Note  [facility]     Patient Name: Mitzi Eric  MRN: [id number]  Today's Date: 12/8/2021    Admit Date: 12/6/2021     Discharge Plan     Row Name 12/08/21 1607       Plan    Plan Assisted Living with     Final Discharge Disposition Code 06 - home with home health care    Final Note Plan to return to [facility] assisted living today, 12-8. Nephew to transport. Call report to [phone] and fax discharge  summary [phone]. I talked with Barb at [facility] to let them know of discharge for today. I will fax discharge summary once done.  Lanny @ [phone]               Discharge Codes    No documentation.                     Ling Hale RN

## 2021-12-08 NOTE — PLAN OF CARE
Goal Outcome Evaluation:  Plan of Care Reviewed With: patient, family        Progress: improving  Outcome Summary: OT eval completed Pt presents with deficits in balance, activity tolerance and generalized weakness compared to baseline ADL completion, requires maxA for toilet hygiene in standing, setup LBD donning socks however will require Katerina estimated for donning undergarment/pants 2/2 balance, able to take steps to chair at [facility] with Katerina 1 person assist, recommend rehab prior to return to long term, if home/CHELSEA recommend 24/7 assist and HHOT

## 2021-12-08 NOTE — THERAPY EVALUATION
Patient Name: Mitzi Eric  : 1935    MRN: [id number]                              Today's Date: 2021       Admit Date: 2021    Visit Dx:     ICD-10-CM ICD-9-CM   1. Acute respiratory failure with hypoxia (HCC)  J96.01 518.81   2. Influenza A virus present  J10.1 487.1   3. Cognitive communication deficit  R41.841 799.52   4. Impaired functional mobility, balance, gait, and endurance  Z74.09 V49.89     Patient Active Problem List   Diagnosis   • Fall   • Delirium, acute   • Essential hypertension   • Cognitive decline   • Trigeminal neuralgia   • Hypothyroidism (acquired)   • Lung mass   • Hypoxia   • Acute respiratory failure with hypoxia (HCC)   • Influenza A     Past Medical History:   Diagnosis Date   • A-fib (HCC)    • Ataxic gait    • Chronic kidney disease, stage 3 (HCC)    • COPD (chronic obstructive pulmonary disease) (HCC)    • Dementia (HCC)    • Disease of thyroid gland    • Hyperlipidemia    • Hypertension      Past Surgical History:   Procedure Laterality Date   • BRAIN TUMOR EXCISION      BENIGN   • FRACTURE SURGERY     • TRIGGER FINGER RELEASE        General Information     [facility] Name [facility]          Physical Therapy Time and Intention    Document Type evaluation  -     Mode of Treatment physical therapy  -     Row Name [facility]          General Information    Patient Profile Reviewed yes  -     Prior Level of Function independent:; bed mobility; ADL's; min assist:; gait; transfer  -     Existing Precautions/Restrictions fall; oxygen therapy device and L/min; other (see comments)  hx dementia  -     Barriers to Rehab medically complex; previous functional deficit; cognitive status  -     Row Name 21 1117          Living Environment    Lives With facility resident  -     Row Name 21 [facility]          Home Main Entrance    Number of Stairs, Main Entrance none  -     Row Name 21 1117          Stairs Within Home, Primary    Number of Stairs,  Within Home, Primary none  -SJ     Row Name 12/08/21 1117          Cognition    Orientation Status (Cognition) oriented to; person; disoriented to; place; situation; time  -SJ     Row Name 12/08/21 1117          Safety Issues, Functional Mobility    Safety Issues Affecting Function (Mobility) awareness of need for assistance; insight into deficits/self-awareness; judgment; sequencing abilities; safety precaution awareness  -SJ     Impairments Affecting Function (Mobility) cognition; strength; postural/trunk control; balance; coordination  -SJ           User Key  (r) = Recorded By, (t) = Taken By, (c) = Cosigned By    Initials Name Provider Type    SJ Yudelka Chung, PT Physical Therapist               Mobility     Row Name 12/08/21 1155          Bed Mobility    Bed Mobility supine-sit  -SJ     Supine-Sit [place] (Bed Mobility) supervision; verbal cues  -SJ     Assistive Device (Bed Mobility) bed rails  -SJ     Comment (Bed Mobility) pt able to perform independently with extra time  -SJ     Row Name 12/08/21 1155          Transfers    Comment (Transfers) STS from EOB with CGA and RW. Cues for hand placement and sequencing.  -SJ     Row Name 12/08/21 1155          Sit-Stand Transfer    Sit-Stand [place] (Transfers) contact guard; 1 person assist; verbal cues  -SJ     Assistive Device (Sit-Stand Transfers) walker, front-wheeled  -SJ     Row Name 12/08/21 1155          Gait/Stairs (Locomotion)    [place] Level (Gait) minimum assist (75% patient effort); 1 person assist; verbal cues  -SJ     Assistive Device (Gait) walker, front-wheeled  -SJ     Distance in Feet (Gait) 20ft  -SJ     Deviations/Abnormal Patterns (Gait) base of support, narrow; mik decreased; stride length decreased  -SJ     Bilateral Gait Deviations forward flexed posture; heel strike decreased; weight shift ability decreased  -SJ     Comment (Gait/Stairs) Pt amb within room due to droplet precautions. Able to ambulate to door and  back with RW and Katerina. pt amb at slow pace, VSS on RA.  -SJ           User Key  (r) = Recorded By, (t) = Taken By, (c) = Cosigned By    Initials Name Provider Type    Yudelka River PT Physical Therapist               Obj/Interventions     Row Name 12/08/21 1157          Range of Motion Comprehensive    General Range of Motion bilateral lower extremity ROM WFL  -     Row Name 12/08/21 1157          Strength Comprehensive (MMT)    General Manual Muscle Testing (MMT) Assessment lower extremity strength deficits identified  -     Row Name 12/08/21 1157          Balance    Static Sitting Balance WFL; unsupported; sitting, edge of bed; long sitting  -SJ     Static Standing Balance mild impairment; supported  -SJ     Dynamic Standing Balance moderate impairment; supported  -SJ     Balance Interventions sit to stand; occupation based/functional task; weight shifting activity  -SJ           User Key  (r) = Recorded By, (t) = Taken By, (c) = Cosigned By    Initials Name Provider Type    Yudelka River PT Physical Therapist               Goals/Plan     Row Name 12/08/21 1201          Bed Mobility Goal 1 (PT)    Activity/Assistive Device (Bed Mobility Goal 1, PT) supine to sit  -SJ     [place]obe Level/Cues Needed (Bed Mobility Goal 1, PT) modified independence  -SJ     Time Frame (Bed Mobility Goal 1, PT) long term goal (LTG); 2 weeks  -SJ     Row Name 12/08/21 1201          Transfer Goal 1 (PT)    Activity/Assistive Device (Transfer Goal 1, PT) sit-to-stand/stand-to-sit; bed-to-chair/chair-to-bed  -SJ     [place] Level/Cues Needed (Transfer Goal 1, PT) modified independence  -SJ     Time Frame (Transfer Goal 1, PT) long term goal (LTG); 2 weeks  -SJ     Row Name 12/08/21 1201          Gait Training Goal 1 (PT)    Activity/Assistive Device (Gait Training Goal 1, PT) gait (walking locomotion); walker, rolling  -SJ     Globe Level (Gait Training Goal 1, PT) contact guard assist  -SJ     Distance  (Gait Training Goal 1, PT) 200 FEET  -     Time Frame (Gait Training Goal 1, PT) long term goal (LTG); 2 weeks  -           User Key  (r) = Recorded By, (t) = Taken By, (c) = Cosigned By    Initials Name Provider Type    SJ Yudelka Chung, PT Physical Therapist               Clinical Impression     Row Name 12/08/21 1158          Pain    Additional Documentation Pain Scale: Numbers Pre/Post-Treatment (Group)  -     Row Name 12/08/21 1158          Pain Scale: Numbers Pre/Post-Treatment    Pretreatment Pain Rating 0/10 - no pain  -     Posttreatment Pain Rating 0/10 - no pain  -     Row Name 12/08/21 1158          Plan of Care Review    Plan of Care Reviewed With patient; family  -     Progress improving  -     Outcome Summary PT eval completed. Pt presents with weakness and difficulty walking. Pt performed bed mobility with supervision, transfers with CGA, and amb within room to door and back with RW and Katerina. pt amb at slow pace, VSS on RA. PT rec d/c rehab prior to return to [facility].  -     Row Name 12/08/21 1158          Therapy Assessment/Plan (PT)    Rehab Potential (PT) good, to achieve stated therapy goals  -     Criteria for Skilled Interventions Met (PT) yes; meets criteria; skilled treatment is necessary  -     Predicted Duration of Therapy Intervention (PT) 2wks  -     Row Name 12/08/21 1158          Vital Signs    Pre Systolic BP Rehab 132  -SJ     Pre Treatment Diastolic BP 74  -SJ     Pretreatment Heart Rate (beats/min) 64  -SJ     Pre SpO2 (%) 90  -SJ     O2 Delivery Pre Treatment room air  -SJ     Pre Patient Position Supine  -SJ     Intra Patient Position Standing  -SJ     Post Patient Position Sitting  -     Row Name 12/08/21 1158          Positioning and Restraints    Pre-Treatment Position in bed  -SJ     Post Treatment Position chair  -SJ     In Chair notified nsg; reclined; call light within reach; encouraged to call for assist; exit alarm on; with family/caregiver; waffle  cushion; heels elevated  -           User Key  (r) = Recorded By, (t) = Taken By, (c) = Cosigned By    Initials Name Provider Type    Yudelka River, PT Physical Therapist               Outcome Measures     Row Name 12/08/21 1201 12/08/21 1010       How much help from another person do you currently need...    Turning from your back to your side while in flat bed without using bedrails? 4  -SJ 2  -CJ    Moving from lying on back to sitting on the side of a flat bed without bedrails? 4  -SJ 2  -CJ    Moving to and from a bed to a chair (including a wheelchair)? 3  -SJ 2  -CJ    Standing up from a chair using your arms (e.g., wheelchair, bedside chair)? 3  -SJ 2  -CJ    Climbing 3-5 steps with a railing? 3  -SJ 2  -CJ    To walk in hospital room? 3  -SJ 2  -CJ    AM-PAC 6 Clicks Score (PT) 20  - 12  -    Row Name 12/08/21 1201          Functional Assessment    Outcome Measure Options AM-PAC 6 Clicks Basic Mobility (PT)  -           User Key  (r) = Recorded By, (t) = Taken By, (c) = Cosigned By    Initials Name Provider Type    Yudelka River, CASEY Physical Therapist    Gagandeep Seymour, RN Registered Nurse                             Physical Therapy Education                 Title: PT OT SLP Therapies (In Progress)     Topic: Physical Therapy (In Progress)     Point: Mobility training (Done)     Learning Progress Summary           Patient Acceptance, E, VU,DU by  at 12/8/2021 1202   Family Acceptance, E, VU,DU by  at 12/8/2021 1202                   Point: Home exercise program (Not Started)     Learner Progress:  Not documented in this visit.          Point: Body mechanics (Done)     Learning Progress Summary           Patient Acceptance, E, VU,DU by  at 12/8/2021 1202   Family Acceptance, E, VU,DU by  at 12/8/2021 1202                   Point: Precautions (Done)     Learning Progress Summary           Patient Acceptance, E, VU,DU by  at 12/8/2021 1202   Family Acceptance, E, VU,DU by   at 12/8/2021 1202                               User Key     Initials Effective Dates Name Provider Type Discipline     06/16/21 -  Yudelka Chugn PT Physical Therapist PT              PT Recommendation and Plan  Planned Therapy Interventions (PT): balance training, bed mobility training, gait training, home exercise program, strengthening, patient/family education, transfer training  Plan of Care Reviewed With: patient, family  Progress: improving  Outcome Summary: PT eval completed. Pt presents with weakness and difficulty walking. Pt performed bed mobility with supervision, transfers with CGA, and amb within room to door and back with RW and Katerina. pt amb at slow pace, VSS on RA. PT rec d/c rehab prior to return to [facility].     Time Calculation:    PT Charges     Row Name 12/08/21 1202             Time Calculation    PT Non-Billable Time (min) 46 min  -      PT Received On 12/08/21  -      PT Goal Re-Cert Due Date 12/18/21  -            User Key  (r) = Recorded By, (t) = Taken By, (c) = Cosigned By    Initials Name Provider Type     Yudelka Chung PT Physical Therapist              Therapy Charges for Today     Code Description Service Date Service Provider Modifiers Qty    [id number] HC PT EVAL LOW COMPLEXITY 4 12/8/2021 Yudelka Chung PT GP 1          PT G-Codes  Outcome Measure Options: AM-PAC 6 Clicks Basic Mobility (PT)  AM-PAC 6 Clicks Score (PT): 20    Yudelka Chung PT  12/8/2021

## 2021-12-08 NOTE — THERAPY EVALUATION
Patient Name: Mitzi Eric  : 1935    MRN: [id number]                              Today's Date: 2021       Admit Date: 2021    Visit Dx:     ICD-10-CM ICD-9-CM   1. Acute respiratory failure with hypoxia (HCC)  J96.01 518.81   2. Influenza A virus present  J10.1 487.1   3. Cognitive communication deficit  R41.841 799.52   4. Impaired functional mobility, balance, gait, and endurance  Z74.09 V49.89     Patient Active Problem List   Diagnosis   • Fall   • Delirium, acute   • Essential hypertension   • Cognitive decline   • Trigeminal neuralgia   • Hypothyroidism (acquired)   • Lung mass   • Hypoxia   • Acute respiratory failure with hypoxia (HCC)   • Influenza A     Past Medical History:   Diagnosis Date   • A-fib (HCC)    • Ataxic gait    • Chronic kidney disease, stage 3 (HCC)    • COPD (chronic obstructive pulmonary disease) (HCC)    • Dementia (HCC)    • Disease of thyroid gland    • Hyperlipidemia    • Hypertension      Past Surgical History:   Procedure Laterality Date   • BRAIN TUMOR EXCISION      BENIGN   • FRACTURE SURGERY     • TRIGGER FINGER RELEASE        General Information     Row Name 21 1219          OT Time and Intention    Document Type evaluation  -KF     Mode of Treatment occupational therapy  -KF     Row Name 21 1219          General Information    Patient Profile Reviewed yes  -KF     Prior Level of Function independent:; all household mobility; ADL's; w/c or scooter; bed mobility; grooming; dressing; bathing  with shower chair, rollator and wc for longer distances  -KF     Existing Precautions/Restrictions fall; oxygen therapy device and L/min; other (see comments)  hx dementia  -KF     Barriers to Rehab previous functional deficit; cognitive status; medically complex  -KF     Row Name 21 1219          Living Environment    Lives With facility resident  -KF     Row Name 21 1219          Home Main Entrance    Number of Stairs, Main Entrance none  -KF      Row Name 12/08/21 1219          Stairs Within Home, Primary    Number of Stairs, Within Home, Primary none  -     Row Name 12/08/21 1219          Cognition    Orientation Status (Cognition) oriented to; person; disoriented to; place; situation; time  -     Row Name 12/08/21 1219          Safety Issues, Functional Mobility    Safety Issues Affecting Function (Mobility) awareness of need for assistance; insight into deficits/self-awareness; judgment; safety precaution awareness  -     Impairments Affecting Function (Mobility) cognition; strength; postural/trunk control; balance; coordination  -     Cognitive Impairments, Mobility Safety/Performance attention; awareness, need for assistance  -           User Key  (r) = Recorded By, (t) = Taken By, (c) = Cosigned By    Initials Name Provider Type     Marialuisa Flores, OT Occupational Therapist                 Mobility/ADL's     Row Name 12/08/21 1222          Bed Mobility    Comment (Bed Mobility) sitting EOB upon arrival  -     Row Name 12/08/21 1222          Transfers    Transfers sit-stand transfer; bed-chair transfer  -     Comment (Transfers) moments Katerina with dynamic standing tasks at W  -     Bed-Chair [place] (Transfers) minimum assist (75% patient effort); 1 person assist; verbal cues; nonverbal cues (demo/gesture)  -     Assistive Device (Bed-Chair Transfers) walker, front-wheeled  -     Sit-Stand [place] (Transfers) contact guard; 1 person assist; verbal cues  -     Row Name 12/08/21 1222          Sit-Stand Transfer    Assistive Device (Sit-Stand Transfers) walker, front-wheeled  -     Row Name 12/08/21 1222          Functional Mobility    Functional Mobility- Comment deferred to PT  -     Row Name 12/08/21 1222          Activities of Daily Living    BADL Assessment/Intervention upper body dressing; lower body dressing; grooming  -     Row Name 12/08/21 1222          Lower Body Dressing Assessment/Training     [place] Level (Lower Body Dressing) don; socks; set up  -KF     Position (Lower Body Dressing) edge of bed sitting  -KF     Row Name 12/08/21 1222          Grooming Assessment/Training    [place] Level (Grooming) oral care regimen; hair care, combing/brushing; wash face, hands; set up  -KF     Position (Grooming) supported sitting  -KF     Row Name 12/08/21 1222          Upper Body Dressing Assessment/Training    [place] Level (Upper Body Dressing) don; front opening garment; set up  -KF     Position (Upper Body Dressing) edge of bed sitting  -KF     Row Name 12/08/21 1222          Toileting Assessment/Training    [place] Level (Toileting) toileting skills; perform perineal hygiene; maximum assist (25% patient effort)  -KF     Position (Toileting) supported standing  -KF     Comment (Toileting) requires assist in standing for balance  -KF           User Key  (r) = Recorded By, (t) = Taken By, (c) = Cosigned By    Initials Name Provider Type    KF Marialuisa Flores, OT Occupational Therapist               Obj/Interventions     Row Name 12/08/21 1225          Sensory Assessment (Somatosensory)    Sensory Assessment (Somatosensory) UE sensation intact  -     Row Name 12/08/21 1225          Vision Assessment/Intervention    Visual Impairment/Limitations WFL; corrective lenses full-time  -     Row Name 12/08/21 1225          Range of Motion Comprehensive    General Range of Motion bilateral lower extremity ROM WFL  -     Row Name 12/08/21 1225          Strength Comprehensive (MMT)    General Manual Muscle Testing (MMT) Assessment upper extremity strength deficits identified  -KF     Comment, General Manual Muscle Testing (MMT) Assessment 4/5 BUE grossly  -     Row Name 12/08/21 1225          Balance    Balance Assessment sitting static balance; sitting dynamic balance; standing static balance; standing dynamic balance  -KF     Static Sitting Balance WNL  -KF     Dynamic Sitting Balance WFL   -KF     Static Standing Balance mild impairment; supported  -KF     Dynamic Standing Balance mild impairment; supported  -KF     Balance Interventions standing; sit to stand; weight shifting activity; occupation based/functional task  -KF           User Key  (r) = Recorded By, (t) = Taken By, (c) = Cosigned By    Initials Name Provider Type    Marialuisa Saleem, OT Occupational Therapist               Goals/Plan     Row Name 12/08/21 1216          Transfer Goal 1 (OT)    Activity/Assistive Device (Transfer Goal 1, OT) sit-to-stand/stand-to-sit; toilet; walker, rolling  -KF     [place] Level/Cues Needed (Transfer Goal 1, OT) contact guard assist  -KF     Time Frame (Transfer Goal 1, OT) long term goal (LTG); 10 days  -KF     Progress/Outcome (Transfer Goal 1, OT) goal ongoing  -KF     Row Name 12/08/21 1216          Dressing Goal 1 (OT)    Activity/Device (Dressing Goal 1, OT) lower body dressing  undergarment/pants  -KF     [place]/Cues Needed (Dressing Goal 1, OT) contact guard assist; verbal cues required  -KF     Time Frame (Dressing Goal 1, OT) long term goal (LTG); 10 days  -KF     Progress/Outcome (Dressing Goal 1, OT) goal ongoing  -KF     Row Name 12/08/21 1216          Toileting Goal 1 (OT)    Activity/Device (Toileting Goal 1, OT) adjust/manage clothing; perform perineal hygiene; commode, bedside without drop arms  -KF     [place] Level/Cues Needed (Toileting Goal 1, OT) contact guard assist; verbal cues required  -KF     Time Frame (Toileting Goal 1, OT) long term goal (LTG); 10 days  -KF     Progress/Outcome (Toileting Goal 1, OT) goal ongoing  -KF     Row Name 12/08/21 1216          Therapy Assessment/Plan (OT)    Planned Therapy Interventions (OT) activity tolerance training; adaptive equipment training; BADL retraining; cognitive/visual perception retraining; occupation/activity based interventions; strengthening exercise; transfer/mobility retraining; functional balance retraining;  wheelchair assessment/training; ROM/therapeutic exercise; patient/caregiver education/training  -KF           User Key  (r) = Recorded By, (t) = Taken By, (c) = Cosigned By    Initials Name Provider Type    KF Marialuisa Flores, OT Occupational Therapist               Clinical Impression     Row Name 12/08/21 1212          Pain Assessment    Additional Documentation Pain Scale: Numbers Pre/Post-Treatment (Group)  -KF     Row Name 12/08/21 1212          Pain Scale: Numbers Pre/Post-Treatment    Pretreatment Pain Rating 0/10 - no pain  -KF     Posttreatment Pain Rating 0/10 - no pain  -KF     Pre/Posttreatment Pain Comment tolerated  -KF     Pain Intervention(s) Repositioned; Ambulation/increased activity  -     Row Name 12/08/21 1212          Plan of Care Review    Plan of Care Reviewed With patient; family  -KF     Progress improving  -     Outcome Summary OT eval completed Pt presents with deficits in balance, activity tolerance and generalized weakness compared to baseline ADL completion, requires maxA for toilet hygiene in standing, setup LBD donning socks however will require Katerina estimated for donning undergarment/pants 2/2 balance, able to take steps to chair at FWW with Katerina 1 person assist, recommend rehab prior to return to [facility], if home/half-way recommend 24/7 assist and HHOT  -     Row Name 12/08/21 1212          Therapy Assessment/Plan (OT)    Rehab Potential (OT) good, to achieve stated therapy goals  -     Criteria for Skilled Therapeutic Interventions Met (OT) yes; meets criteria; skilled treatment is necessary  -KF     Therapy Frequency (OT) daily  -KF     Row Name 12/08/21 1212          Therapy Plan Review/Discharge Plan (OT)    Anticipated Discharge Disposition (OT) inpatient rehabilitation facility  -     Row Name 12/08/21 1212          Vital Signs    Pre Systolic BP Rehab --  RN cleared VSS  -KF     Pre SpO2 (%) 90  -KF     O2 Delivery Pre Treatment room air  -KF     Post SpO2 (%) 91  -KF      O2 Delivery Post Treatment room air  -KF     Pre Patient Position Sitting  -KF     Intra Patient Position Standing  -KF     Post Patient Position Sitting  -KF     Rest Breaks  1  -KF     Row Name 12/08/21 1212          Positioning and Restraints    Pre-Treatment Position in bed  -KF     Post Treatment Position chair  -KF     In Chair notified nsg; reclined; sitting; call light within reach; encouraged to call for assist; exit alarm on; with family/caregiver; legs elevated; waffle cushion  -KF           User Key  (r) = Recorded By, (t) = Taken By, (c) = Cosigned By    Initials Name Provider Type    KF Marialuisa Flores, OT Occupational Therapist               Outcome Measures     Row Name 12/08/21 1217          How much help from another is currently needed...    Putting on and taking off regular lower body clothing? 2  -KF     Bathing (including washing, rinsing, and drying) 2  -KF     Toileting (which includes using toilet bed pan or urinal) 2  -KF     Putting on and taking off regular upper body clothing 3  -KF     Taking care of personal grooming (such as brushing teeth) 3  -KF     Eating meals 4  -KF     AM-PAC 6 Clicks Score (OT) 16  -KF     Row Name 12/08/21 1201 12/08/21 1010       How much help from another person do you currently need...    Turning from your back to your side while in flat bed without using bedrails? 4  -SJ 2  -CJ    Moving from lying on back to sitting on the side of a flat bed without bedrails? 4  -SJ 2  -CJ    Moving to and from a bed to a chair (including a wheelchair)? 3  -SJ 2  -CJ    Standing up from a chair using your arms (e.g., wheelchair, bedside chair)? 3  -SJ 2  -CJ    Climbing 3-5 steps with a railing? 3  -SJ 2  -CJ    To walk in hospital room? 3  -SJ 2  -CJ    AM-PAC 6 Clicks Score (PT) 20  -SJ 12  -CJ    Row Name 12/08/21 1217 12/08/21 1201       Functional Assessment    Outcome Measure Options AM-PAC 6 Clicks Daily Activity (OT)  -KF AM-PAC 6 Clicks Basic Mobility (PT)   -          User Key  (r) = Recorded By, (t) = Taken By, (c) = Cosigned By    Initials Name Provider Type     Yudelka Chung, PT Physical Therapist    Marialuisa Saleem, OT Occupational Therapist    Gagandeep Seymour, RN Registered Nurse                Occupational Therapy Education                 Title: PT OT SLP Therapies (In Progress)     Topic: Occupational Therapy (In Progress)     Point: ADL training (Done)     Description:   Instruct learner(s) on proper safety adaptation and remediation techniques during self care or transfers.   Instruct in proper use of assistive devices.              Learning Progress Summary           Patient Acceptance, E,TB,D, VU,DU,NR by  at 12/8/2021 1217   Family Acceptance, E,TB,D, VU,DU,NR by  at 12/8/2021 1217                   Point: Home exercise program (Not Started)     Description:   Instruct learner(s) on appropriate technique for monitoring, assisting and/or progressing therapeutic exercises/activities.              Learner Progress:  Not documented in this visit.          Point: Precautions (Done)     Description:   Instruct learner(s) on prescribed precautions during self-care and functional transfers.              Learning Progress Summary           Patient Acceptance, E,TB,D, VU,DU,NR by  at 12/8/2021 1217   Family Acceptance, E,TB,D, VU,DU,NR by  at 12/8/2021 1217                   Point: Body mechanics (Done)     Description:   Instruct learner(s) on proper positioning and spine alignment during self-care, functional mobility activities and/or exercises.              Learning Progress Summary           Patient Acceptance, E,TB,D, VU,DU,NR by  at 12/8/2021 1217   Family Acceptance, E,TB,D, VU,DU,NR by  at 12/8/2021 1217                               User Key     Initials Effective Dates Name Provider Type [facility] 06/16/21 -  Marialuisa Flores, OT Occupational Therapist OT              OT Recommendation and Plan  Planned Therapy  Interventions (OT): activity tolerance training, adaptive equipment training, BADL retraining, cognitive/visual perception retraining, occupation/activity based interventions, strengthening exercise, transfer/mobility retraining, functional balance retraining, wheelchair assessment/training, ROM/therapeutic exercise, patient/caregiver education/training  Therapy Frequency (OT): daily  Plan of Care Review  Plan of Care Reviewed With: patient, family  Progress: improving  Outcome Summary: OT eval completed Pt presents with deficits in balance, activity tolerance and generalized weakness compared to baseline ADL completion, requires maxA for toilet hygiene in standing, setup LBD donning socks however will require Katerina estimated for donning undergarment/pants 2/2 balance, able to take steps to chair at FWW with Katerina 1 person assist, recommend rehab prior to return to custodial, if home/custodial recommend 24/7 assist and HHOT     Time Calculation:    Time Calculation- OT     Row Name 12/08/21 1118             Time Calculation- OT    OT Start Time 1109  -KF      OT Received On 12/08/21  -KF      OT Goal Re-Cert Due Date 12/18/21  -KF              Timed Charges    59784 - OT Self Care/Mgmt Minutes 10  -KF              Untimed Charges    OT Eval/Re-eval Minutes 35  -KF              Total Minutes    Timed Charges Total Minutes 10  -KF      Untimed Charges Total Minutes 35  -KF       Total Minutes 45  -KF            User Key  (r) = Recorded By, (t) = Taken By, (c) = Cosigned By    Initials Name Provider Type    KF Marialuisa Flores OT Occupational Therapist              Therapy Charges for Today     Code Description Service Date Service Provider Modifiers Qty    [id number]  OT SELF CARE/MGMT/TRAIN EA 15 MIN 12/8/2021 Marialuisa Flores OT GO 1    [id number]  OT EVAL MOD COMPLEXITY 3 12/8/2021 Marialuisa Flores OT GO 1               Marialuisa Flores OT  12/8/2021

## 2021-12-08 NOTE — CASE MANAGEMENT/SOCIAL WORK
Discharge Planning Assessment  [facility]     Patient Name: Mitzi Eric  MRN: [id number]  Today's Date: 12/8/2021    Admit Date: 12/6/2021     Discharge Needs Assessment    No documentation.                Discharge Plan     Row Name 12/08/21 1114       Plan    Plan Return to [facility]    Plan Comments Plan to return to [facility] once PT works with patient and see if patient is back to her baseline.  On room air. Nephew in the room and plans to drive her when d/c. I called [facility] and need to call report to [phone] and ask for her nurse. Fax discharge summary to [phone]. Lanny @ [phone]    Final Discharge Disposition Code 01 - home or self-care              Continued Care and Services - Admitted Since 12/6/2021    Coordination has not been started for this encounter.     Selected Continued Care - Prior Encounters Includes selections from prior encounters from 9/7/2021 to 12/8/2021    Discharged on 12/6/2021 Admission date: 11/29/2021 - Discharge disposition: Long Term Care (DC - External)    Home Medical Care     Service Provider Selected Services Address Phone Fax Patient Preferred    [facility] - [facility], located within [facility] [address], [place] [address] [phone] --                       Demographic Summary    No documentation.                Functional Status    No documentation.                Psychosocial    No documentation.                Abuse/Neglect    No documentation.                Legal    No documentation.                Substance Abuse    No documentation.                Patient Forms    No documentation.                   Ling Hale RN

## 2021-12-08 NOTE — PLAN OF CARE
Goal Outcome Evaluation:  Plan of Care Reviewed With: patient, family        Progress: no change   SLP evaluation completed. Will address cognitive-linguistic disorder in tx. Please see note for further details and recommendations.

## 2021-12-08 NOTE — PROGRESS NOTES
"Clinical Nutrition   Reason For Visit: Identified at risk by screening criteria, MST score 2+, Malnutrition Severity Assessment, Admission assessment, Reduced oral intake, \"Unsure\" unintentional weight loss    Patient Name: Mitzi Eric  YOB: 1935  MRN: [id number]  Date of Encounter: 12/08/21 10:02 EST  Admission date: 12/6/2021      Comments: Pt meets criteria for severe malnutrition, see MSA for full assessment.  TID ensure added to order      Nutrition Assessment     Admission Problem List:    Hypoxia    Essential hypertension    Hypothyroidism (acquired)    Lung mass    Acute respiratory failure with hypoxia (HCC)    Influenza A         Applicable PMH:  Dementia  Hx craniotomy due to brain mass- resulted in altered cognition  Lung mass since 2018        PMH: She  has a past medical history of A-fib (HCC), Ataxic gait, Chronic kidney disease, stage 3 (HCC), COPD (chronic obstructive pulmonary disease) (HCC), Dementia (HCC), Disease of thyroid gland, Hyperlipidemia, and Hypertension.   PSxH: She  has a past surgical history that includes Trigger finger release; Brain tumor excision; and Fracture surgery.        Reported/Observed/Food/Nutrition Related History     Pt and son at bedside report very low intakes for at least 6 months. She does not eat before dinner of which she only eats 25-50%. She does drink 2 ensures per day with added protein power at NH. Pt states she prefers Boost, she likes chocolate. She is unable to tell me any snacks/preferences she likes and unwilling to try any offered.      Anthropometrics   Height: 62in  Weight: 112lb  BMI: 20.49  BMI classification: Normal: 18.5-24.9kg/m2   IBW: 110lb    UBW: 112lb per pt  Weight change: unsure- pt and son report she did gain weight in the year after being admitted to NH- weight was 87lb(7/18/2018) is 112lb now. However, no other additional weight records since then and pt/son are unsure if she lost weight after this.      Nutrition " Focused Physical Exam     See MSA note      Labs reviewed   Labs reviewed: Yes    Results from last 7 days   Lab Units 12/08/21  0453 12/07/21  0058 12/06/21  0234   SODIUM mmol/L 143 142 142   POTASSIUM mmol/L 4.0 4.2 5.2   CHLORIDE mmol/L 109* 107 111*   CO2 mmol/L 24.0 25.0 21.0*   BUN mg/dL 17 17 17   CREATININE mg/dL 0.73 0.90 0.75   GLUCOSE mg/dL 83 101* 97   CALCIUM mg/dL 8.5* 9.1 8.4*     Results from last 7 days   Lab Units 12/08/21  0453 12/07/21 0058 12/05/21  0627   WBC 10*3/mm3 3.96 5.76 6.88   ALBUMIN g/dL  --  3.90  --          Lab Results   Lab Value Date/Time    HGBA1C 5.50 11/30/2021 0839     Medications reviewed   Medications reviewed: Yes  Scheduled: protonix        Current Nutrition Prescription   PO: Diet Regular; Cardiac  Oral Nutrition Supplement: Orders Placed This Encounter      Dietary Nutrition Supplements Boost Plus; chocolate  TID       Average PO intake: none yet documented  During recent admission 11/30-12/6-10% X 5 meals    Nutrition Diagnosis     Problem Malnutrition    Etiology Inadequate energy intake r/t dementia   Signs/Symptoms PO intakes<50% for 6 months, muscle wasting, and fat loss       Goal:   General: Nutrition to support treatment  PO: Increase intake       Intervention   Intervention: Follow treatment progress, Care plan reviewed, Advised available snacks, Interview for preferences, Encourage intake, Supplement provided        Monitoring/Evaluation:   Monitoring/Evaluation: Per protocol, PO intake, Supplement intake, Weight, POC/GOC    Luna Kennedy RD  Time Spent: 30   pt presents to ED s/p being shocked by defibrillator while moving heavey object at home. follows with dr fox reed. at time of my evaluation denies fever. denies HA or neck pain. no chest pain or sob. no abd pain. no n/v/d. no urinary f/u/d. no back pain. no motor or sensory deficits. denies illicit drug use. . no rash. no other acute issues symptoms or concerns

## 2021-12-08 NOTE — PROGRESS NOTES
Malnutrition Severity Assessment    Patient Name:  Mitzi Eric  YOB: 1935  MRN: [id number]  Admit Date:  12/6/2021    Patient meets criteria for : Severe Malnutrition      Malnutrition Severity Assessment  Malnutrition Type: Chronic Disease - Related Malnutrition  Malnutrition Type (last 8 hours)     Malnutrition Severity Assessment     Row Name 12/08/21 0956       Malnutrition Severity Assessment    Malnutrition Type Chronic Disease - Related Malnutrition    Row Name 12/08/21 0956       Insufficient Energy Intake     Insufficient Energy Intake Findings Moderate    Insufficient Energy Intake  <50% of est. energy requirement for >or equal to 1 month  Pt and caretaker at bedside report PO intake <50% needs for 6months    Row Name 12/08/21 0956       Unintentional Weight Loss     Unintentional Weight Loss Findings None  unable to confirm with limited EMR, pt unsure    Row Name 12/08/21 0956       Muscle Loss    Loss of Muscle Mass Findings Severe    [place] Region Severe - deep hollowing/scooping, lack of muscle to touch, facial bones well defined    Clavicle Bone Region Moderate - some protrusion in females, visible in males    Acromion Bone Region Moderate - acromion may slightly protrude    Scapular Bone Region Moderate - mild depression, bones may show slightly    Dorsal Hand Region Severe - prominent depression    Row Name 12/08/21 0956       Fat Loss    Subcutaneous Fat Loss Findings Severe    Orbital Region  Severe - pronounced hollowness/depression, dark circles, loose saggy skin    Upper Arm Region Severe - mostly skin, very little space between folds, fingers touch    Row Name 12/08/21 0956       Declining Functional Status    Declining Functional Status Findings N/A    Row Name 12/08/21 0956       Criteria Met (Must meet criteria for severity in at least 2 of these categories: M Wasting, Fat Loss, Fluid, Secondary Signs, Wt. Status, Intake)    Patient meets criteria for  Severe Malnutrition                 Electronically signed by:  Luna Kennedy RD  12/08/21 10:01 EST

## 2021-12-08 NOTE — PLAN OF CARE
Goal Outcome Evaluation:  Plan of Care Reviewed With: patient, family        Progress: improving  Outcome Summary: PT eval completed. Pt presents with weakness and difficulty walking. Pt performed bed mobility with supervision, transfers with CGA, and amb within room to door and back with RW and Katerina. pt amb at slow pace, VSS on RA. PT rec d/c rehab prior to return to [facility].

## 2021-12-08 NOTE — PLAN OF CARE
Goal Outcome Evaluation:  Plan of Care Reviewed With: patient, family (nephew Lane Eric)        Progress: improving  Outcome Summary: Pt up in recliner eating pizza brought for lunch by nephew, Lane; denied any pain or discomfort, Sat 96% on RA. Lane stated that he is hopeful for discharge back to [facility] today, and has requested order for prn O2 at the facility. Palliative referral for follow up at facility was sent at prior discharge on 12/6/21.    1300, Palliative team meeting: CARIDDA Parra RN, CHPN; SANDY Grijalva LCSW, [facility]-; ALEN Mars RN, CHPN; SARA Guillermo, ; SANDY Edge, APRN; BATSHEVA Cruz, RAFAEL    Problem: Palliative Care  Goal: Enhanced Quality of Life  Outcome: Adequate for Care Transition

## 2021-12-08 NOTE — DISCHARGE SUMMARY
[facility] Medicine Services  DISCHARGE SUMMARY    Patient Name: Mitzi Eric  : 1935  MRN: [id number]    Date of Admission: 2021 11:23 PM  Date of Discharge:  21  Primary Care Physician: Dm Cade APRN    Consults     Date and Time Order Name Status Description    2021  3:45 AM Inpatient Palliative Care MD Consult Completed     2021 11:15 AM Inpatient Palliative Care MD Consult Completed     2021 10:56 AM Inpatient Neurology Consult Stroke Completed           Hospital Course     Presenting Problem:   Acute respiratory failure with hypoxia (HCC) [J96.01]    Active Hospital Problems    Diagnosis  POA   • Severe malnutrition (CMS/HCC) [E43]  Yes   • Influenza A [J10.1]  Unknown   • Lung mass [R91.8]  Yes   • Hypothyroidism (acquired) [E03.9]  Yes   • Essential hypertension [I10]  Yes      Resolved Hospital Problems    Diagnosis Date Resolved POA   • **Hypoxia [R09.02] 2021 Yes   • Acute respiratory failure with hypoxia (HCC) [J96.01] 2021 Yes          Hospital Course:  Mitzi Eric is a 86 y.o. 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      Transient hypoxia, resolved  Influenza A  --RA oxygen saturation observed at 84%, improved to 95% with 2L NC administration in the ED.  --CTA chest unremarkable/ no evidence of PE.  --Continue Tamiflu to complete course.  --On room air.      Lung Mass  --Known right apex lung mass. Noted on CT of chest back in 2018. Family aware and has declined further workup/treatment in the past.       Essential hypertension   --BP stable.  --Continue Metoprolol.     Hypothyroidism   --Continue Synthroid.      Hx of Afib  --Not on AC due to fall risk.   --Currently rate controlled.     Trigeminal neuralgia   --Held Tegretol during last admission/at discharge--continue to hold at this time.   --Lowered dose and frequency of Lyrica last admission to 25 mg TID.     Dementia  Insomnia  --Trazodone decreased last admission to 25 mg QHS.  --Seroquel 12.5 mg QHS PRN.  --Continue scheduled Melatonin nightly.  --Remeron and Buspar discontinued last admission.     Goals of care planning  --POA refused hospice last admission, but did make Palliative Care referral to [facility].  --Palliative Care consulted.    Lots of medication changes made during last admission that were not reflected on her admission med rec this time around--I have made the appropriate changes and the med rec below reflects this.        Discharge Follow Up Recommendations for outpatient labs/diagnostics:  F/U with PCP in 1 week    Day of Discharge     HPI:   Patient seen this morning. Nephew at bedside. Patient doing well on room air.     Review of Systems  Gen-no fevers, no chills  CV-no chest pain, no palpitations  Resp-slight cough, no dyspnea  GI-no N/V/D, no abd pain    All other systems reviewed and negative except any additional pertinent positives and negatives as discussed in HPI.      Vital Signs:   Temp:  [97 °F (36.1 °C)-98.2 °F (36.8 °C)] 98.1 °F (36.7 °C)  Heart Rate:  [59-76] 68  Resp:  [16-20] 16  BP: (124-145)/(65-82) 132/74     Physical Exam:  Gen-no acute distress  HENT-NCAT, mucous membranes moist  CV-RRR, S1 S2 normal, no m/r/g  Resp-CTAB, no wheezes or rales  Abd-soft, NT, ND, +BS  Ext-no edema  Neuro-awake, alert, interactive, no focal deficits  Skin-no rashes  Psych-appropriate mood      Pertinent  and/or Most Recent Results     LAB RESULTS:      Lab 12/08/21  0453 12/07/21  0058 12/05/21  0627 12/04/21  0457  12/02/21  0953   WBC 3.96 5.76 6.88 7.72 5.58   HEMOGLOBIN 11.1* 12.3 11.6* 12.6 12.3   HEMATOCRIT 34.5 37.1 34.7 38.1 36.7   PLATELETS 122* 137* 108* 128* 112*   NEUTROS ABS 1.38* 3.44  --   --  3.56   IMMATURE GRANS (ABS) 0.05 0.06*  --   --  0.04   LYMPHS ABS 1.69 1.44  --   --  0.84   MONOS ABS 0.73 0.79  --   --  1.09*   EOS ABS 0.08 0.01  --   --  0.01   MCV 99.4* 95.9 95.6 96.0 96.1   PROCALCITONIN  --  0.12  --   --   --          Lab 12/08/21  0453 12/07/21  0058 12/06/21  0234 12/05/21  0627 12/04/21  0457   SODIUM 143 142 142 143 146*   POTASSIUM 4.0 4.2 5.2 3.4* 3.3*   CHLORIDE 109* 107 111* 109* 110*   CO2 24.0 25.0 21.0* 22.0 22.0   ANION GAP 10.0 10.0 10.0 12.0 14.0   BUN 17 17 17 23 25*   CREATININE 0.73 0.90 0.75 0.79 1.03*   GLUCOSE 83 101* 97 102* 119*   CALCIUM 8.5* 9.1 8.4* 8.4* 8.8         Lab 12/07/21  0058   TOTAL PROTEIN 6.7   ALBUMIN 3.90   GLOBULIN 2.8   ALT (SGPT) 27   AST (SGOT) 49*   BILIRUBIN 0.3   ALK PHOS 84         Lab 12/07/21  0058   PROBNP 2,267.0*   TROPONIN T <0.010                 Brief Urine Lab Results  (Last result in the past 365 days)      Color   Clarity   Blood   Leuk Est   Nitrite   Protein   CREAT   Urine HCG        11/29/21 1200 Yellow   Cloudy   Trace   Small (1+)   Positive   30 mg/dL (1+)               Microbiology Results (last 10 days)     Procedure Component Value - Date/Time    COVID PRE-OP / PRE-PROCEDURE SCREENING ORDER (NO ISOLATION) - Swab, Nasopharynx [[id number]]  (Abnormal) Collected: 12/07/21 0209    Lab Status: Final result Specimen: Swab from Nasopharynx Updated: 12/07/21 0333    Narrative:      The following orders were created for panel order COVID PRE-OP / PRE-PROCEDURE SCREENING ORDER (NO ISOLATION) - Swab, Nasopharynx.  Procedure                               Abnormality         Status                     ---------                               -----------         ------                     Respiratory Panel PCR w/...[[id number]]  Abnormal             Final result                 Please view results for these tests on the individual orders.    Respiratory Panel PCR w/COVID-19(SARS-CoV-2) GIANNA/GARRET/KATHLEEN/PAD/COR/MAD/JOSIANE In-House, NP Swab in UTM/VTM, 3-4 HR TAT - Swab, Nasopharynx [[id number]]  (Abnormal) Collected: 12/07/21 0209    Lab Status: Final result Specimen: Swab from Nasopharynx Updated: 12/07/21 0333     ADENOVIRUS, PCR Not Detected     Coronavirus 229E Not Detected     Coronavirus HKU1 Not Detected     Coronavirus NL63 Not Detected     Coronavirus OC43 Not Detected     COVID19 Not Detected     Human Metapneumovirus Not Detected     Human Rhinovirus/Enterovirus Not Detected     Influenza A H3 Detected     Influenza B PCR Not Detected     Parainfluenza Virus 1 Not Detected     Parainfluenza Virus 2 Not Detected     Parainfluenza Virus 3 Not Detected     Parainfluenza Virus 4 Not Detected     RSV, PCR Not Detected     Bordetella pertussis pcr Not Detected     Bordetella parapertussis PCR Not Detected     Chlamydophila pneumoniae PCR Not Detected     Mycoplasma pneumo by PCR Not Detected    Narrative:      In the setting of a positive respiratory panel with a viral infection PLUS a negative procalcitonin without other underlying concern for bacterial infection, consider observing off antibiotics or discontinuation of antibiotics and continue supportive care. If the respiratory panel is positive for atypical bacterial infection (Bordetella pertussis, Chlamydophila pneumoniae, or Mycoplasma pneumoniae), consider antibiotic de-escalation to target atypical bacterial infection.    Blood Culture - Blood, Arm, Right [[id number]]  (Normal) Collected: 11/29/21 1330    Lab Status: Final result Specimen: Blood from Arm, Right Updated: 12/04/21 1516     Blood Culture No growth at 5 days    Blood Culture - Blood, Arm, Right [[id number]]  (Normal) Collected: 11/29/21 1320    Lab Status: Final result Specimen: Blood from Arm, Right Updated: 12/04/21 1516     Blood Culture No  growth at 5 days    Urine Culture - Urine, Urine, Catheter [[id number]]  (Abnormal)  (Susceptibility) Collected: 11/29/21 1200    Lab Status: Final result Specimen: Urine, Catheter Updated: 12/01/21 0932     Urine Culture >100,000 CFU/mL Klebsiella pneumoniae ssp pneumoniae    Susceptibility      Klebsiella pneumoniae ssp pneumoniae     JOAN     Ampicillin Resistant     Ampicillin + Sulbactam Resistant     Cefazolin Susceptible     Cefepime Susceptible     Ceftazidime Susceptible     Ceftriaxone Susceptible     Gentamicin Susceptible     Levofloxacin Susceptible     Nitrofurantoin Intermediate     Piperacillin + Tazobactam Susceptible     Tetracycline Susceptible     Trimethoprim + Sulfamethoxazole Susceptible                         COVID-19 and FLU A/B PCR - Swab, Nasopharynx [[id number]]  (Normal) Collected: 11/29/21 1131    Lab Status: Final result Specimen: Swab from Nasopharynx Updated: 11/29/21 1213     COVID19 Not Detected     Influenza A PCR Not Detected     Influenza B PCR Not Detected    Narrative:      Fact sheet for providers: https://www.fda.gov/media/950119/download    Fact sheet for patients: https://www.fda.gov/media/781336/download    Test performed by PCR.          Adult Transthoracic Echo Complete W/ Cont if Necessary Per Protocol (With Agitated Saline)    Result Date: 11/29/2021  · Left ventricular ejection fraction appears to be 61 - 65%. Left ventricular systolic function is normal. · Saline test results are negative. · Estimated right ventricular systolic pressure from tricuspid regurgitation is markedly elevated (>55 mmHg). · Moderate tricuspid valve regurgitation is present. · Left ventricular diastolic function was normal.      CT Angiogram Neck    Result Date: 11/29/2021  EXAMINATION: CT ANGIOGRAM NECK-, CT ANGIOGRAM HEAD W AI ANALYSIS OF LVO-11/29/2021:  INDICATION: Stroke, follow-up.  TECHNIQUE: CT angiogram head and neck with intravenous contrast administration. 2-D and 3-D  reconstructions performed.  The radiation dose reduction device was turned on for each scan per the ALARA (As Low as Reasonably Achievable) protocol.  COMPARISON: Concurrent CT cerebral perfusion and CT head, noncontrast, stroke protocol head.  FINDINGS:  CTA NECK: Normal 3-vessel arch with patent great vessel origins. Proximal subclavian arteries are patent. The vertebral arteries demonstrate a mild left-sided dominance of caliber without focal severe stenosis, aneurysm or occlusion. The carotids demonstrate grossly normal course and branching pattern with atherosclerotic calcific disease and plaque formations of the ICA origins producing 20% right and 10% left luminal narrowing as measured by NASCET criteria with patency of the distal internal carotid arteries to the intracranial portions discussed further below. Cervical soft tissue is unremarkable. The thyroid has a low-density left thyroid nodules without calcifications measuring up to 1 cm. The lung apices demonstrate biapical pleural parenchymal scarring with confluent opacification in the right upper lung partially imaged and may represent airspace disease although underlying mass or nodule would not be excluded.  CTA HEAD: Distal internal carotid arteries demonstrate mild-to-moderate atherosclerotic calcific disease of the distal internal carotid arteries, left greater than right, without focal severe stenosis, aneurysm or occlusion. Anterior cerebral arteries are patent without hemodynamically significant stenosis, aneurysm or occlusion. Middle cerebral arteries are patent without hemodynamically significant stenosis, aneurysm or occlusion. Vertebrobasilar system and posterior cerebral arteries are patent without hemodynamically significant stenosis, aneurysm or occlusion. Fetal origin variance of the right posterior cerebral artery. The venous structures are partially imaged and unremarkable including superior sagittal sinus widely patent.      1. No  hemodynamically significant stenosis, aneurysm or occlusion throughout the CTA head and neck with plaque formation and calcific disease in the ICA origins and distal internal carotid arteries, however, no large vessel occlusion with [place] widely patent.  2.  Lung apices demonstrate biapical pleural parenchymal scarring with confluent opacification in the right upper lung partially imaged and may represent airspace disease although underlying mass or nodule cannot be excluded. Further evaluation with CT chest recommended when clinically appropriate.  D:  11/29/2021 E:  11/29/2021  This report was finalized on 11/29/2021 4:50 PM by Dr. Mikie Alfaro.      MRI Brain With & Without Contrast    Result Date: 12/1/2021  EXAMINATION: MRI BRAIN W WO CONTRAST-11/29/2021:  INDICATION: Seizure, AMS.  TECHNIQUE: Multiplanar MRI of the brain with and without intravenous contrast administration.  COMPARISON: CT head and angiogram as well as perfusion 11/29/2021 earlier same day.  FINDINGS: No restriction on diffusion-weighted sequences to suggest acute ischemia. Midline structures are asymmetric with encephalomalacia left frontal insult and prominence of the sulci towards the vertex with prior left craniotomy findings. No susceptibility artifact on susceptibility-weighted imaging. Postcontrast sequences without abnormal enhancement of the pulmonary parenchyma. Globes and orbits are unremarkable. The paranasal sinuses and mastoid air cells are grossly clear and well pneumatized with the exception of small right and trace left mastoid effusions. Pituitary and sella within normal limits. Cervicomedullary junction widely patent.      1. Postsurgical changes and encephalomalacic involvement of the left frontal lobe. 2. No acute infarction. 3. No abnormal enhancement.  D:  11/29/2021 E:  11/29/2021     This report was finalized on 12/1/2021 1:32 PM by Dr. Mikie Alfaro.      XR Chest 1 View    Result Date: 12/7/2021  CR Chest 1  Vw INDICATION: Hypoxia COMPARISON:  L3 21, 11/5/2021 FINDINGS: Portable AP view(s) of the chest.  Size and vascular normal. Left lung is clear. There is an area of density in the right upper lobe measuring about 4.2 cm in diameter. This unchanged from prior studies dating back to 11/5/2021. There is a chest CT from 7/15/2018 and this shows a large parenchymal density in that region.     Stable irregular density right upper lobe unchanged from CT scan from 2018 No active disease Signer Name: Vik Bedoya MD  Signed: 12/7/2021 1:01 AM  Workstation Name: [facility]  [facility]    XR Chest 1 View    Result Date: 12/3/2021  EXAMINATION: XR CHEST 1 VW-  INDICATION: Cough, hypoxia; N39.0-Urinary tract infection, site not specified; R41.0-Disorientation, unspecified; R47.1-Dysarthria and anarthria; R29.810-Facial weakness; N28.9-Disorder of kidney and ureter, unspecified; D72.829-Elevated white blood cell count, unspecified; R47.01-Aphasia.  COMPARISON: 11/29/2021  FINDINGS: Heart and vasculature appear normal in size. There are chronic changes of centrilobular emphysema, with hyperlucent upper lungs and coarsening of the basilar interstitial markings which appears similar to the prior study. Right apical lung mass, known since at least 2018, appear stable. No new pulmonary parenchymal disease, evidence of edema, effusion or pneumothorax is seen.      Stable right apical lung mass and chronic appearing changes of centrilobular emphysema. No clearly new chest pathology is identified.   D:  12/03/2021 E:  12/03/2021  This report was finalized on 12/3/2021 10:01 PM by Dr. Jorge Foote MD.      XR Chest 1 View    Result Date: 11/29/2021  EXAMINATION: XR CHEST 1 VW- 11/29/2021  INDICATION: Acute Stroke Protocol (onset < 12 hrs)  COMPARISON: Chest x-ray 11/05/2021  FINDINGS: Hyperinflated appearance bilateral with opacifications at the right lung apex similar to prior comparison 11/05/2021 likely atelectasis  or scarring without pleural effusion.      Chronic changes including atelectasis or scarring somewhat confluent at the right lung apex similar to 11/05/2021 comparison without effusion.  D: 11/29/2021 E: 11/29/2021  This report was finalized on 11/29/2021 4:51 PM by Dr. Mikie Alfaro.      CT Angiogram Chest    Result Date: 12/7/2021  CTA Chest INDICATION: Hypoxia, lung mass right upper lobe since 2018 TECHNIQUE: CT angiogram of the chest with IV contrast. 3-D reconstructions were obtained and reviewed.   Radiation dose reduction techniques included automated exposure control or exposure modulation based on body size. Count of known CT and cardiac nuc med studies performed in previous 12 months: 0. COMPARISON: 7/15/2018 FINDINGS: There are marked emphysematous changes in the upper lobes. There is a large area of soft tissue density in the right upper lobe measuring at least 4 cm in maximum dimension. It is unchanged in 2018. The aorta is normal in appearance. There is optimal opacification of pulmonary arteries and there is no CT evidence of pulmonary. Upper abdominal images are unremarkable. Bones are unremarkable.     No CT evidence pulmonary embolus. Stable right upper lobe soft tissue mass since 7/15/2018 Emphysematous changes upper lobes Signer Name: Vik Bedoya MD  Signed: 12/7/2021 4:20 AM  Workstation Name: [facility]  [facility]    CT Head Without Contrast Stroke Protocol    Result Date: 11/29/2021  EXAMINATION: CT HEAD WO CONTRAST, STROKE PROTOCOL-11/29/2021:  INDICATION: Neuro deficit, acute, stroke suspected.  TECHNIQUE: CT head without intravenous contrast following stroke protocol.  The radiation dose reduction device was turned on for each scan per the ALARA (As Low as Reasonably Achievable) protocol.  COMPARISON: CT head dated 11/05/2021.  FINDINGS: Prior left frontal craniotomy and encephalomalacia in the left frontal lobe without acute findings. No midline shift or  hydrocephalus. No intra-axial hemorrhage or extra-axial fluid collection. Globes and orbits are normal.      No acute intracranial finding specifically, no acute intracranial hemorrhage.  Scan performed on 11/29/2021 at 1055 hours. Scan report given to ER physician and stroke team in person at scanner by Dr. Alfaro on 11/29/2021 at 1105 hours.  D:  11/29/2021 E:  11/29/2021  This report was finalized on 11/29/2021 4:50 PM by Dr. Mikie Alfaro.      CT Angiogram Head w AI Analysis of LVO    Result Date: 11/29/2021  EXAMINATION: CT ANGIOGRAM NECK-, CT ANGIOGRAM HEAD W AI ANALYSIS OF LVO-11/29/2021:  INDICATION: Stroke, follow-up.  TECHNIQUE: CT angiogram head and neck with intravenous contrast administration. 2-D and 3-D reconstructions performed.  The radiation dose reduction device was turned on for each scan per the ALARA (As Low as Reasonably Achievable) protocol.  COMPARISON: Concurrent CT cerebral perfusion and CT head, noncontrast, stroke protocol head.  FINDINGS:  CTA NECK: Normal 3-vessel arch with patent great vessel origins. Proximal subclavian arteries are patent. The vertebral arteries demonstrate a mild left-sided dominance of caliber without focal severe stenosis, aneurysm or occlusion. The carotids demonstrate grossly normal course and branching pattern with atherosclerotic calcific disease and plaque formations of the ICA origins producing 20% right and 10% left luminal narrowing as measured by NASCET criteria with patency of the distal internal carotid arteries to the intracranial portions discussed further below. Cervical soft tissue is unremarkable. The thyroid has a low-density left thyroid nodules without calcifications measuring up to 1 cm. The lung apices demonstrate biapical pleural parenchymal scarring with confluent opacification in the right upper lung partially imaged and may represent airspace disease although underlying mass or nodule would not be excluded.  CTA HEAD: Distal internal carotid  arteries demonstrate mild-to-moderate atherosclerotic calcific disease of the distal internal carotid arteries, left greater than right, without focal severe stenosis, aneurysm or occlusion. Anterior cerebral arteries are patent without hemodynamically significant stenosis, aneurysm or occlusion. Middle cerebral arteries are patent without hemodynamically significant stenosis, aneurysm or occlusion. Vertebrobasilar system and posterior cerebral arteries are patent without hemodynamically significant stenosis, aneurysm or occlusion. Fetal origin variance of the right posterior cerebral artery. The venous structures are partially imaged and unremarkable including superior sagittal sinus widely patent.      1. No hemodynamically significant stenosis, aneurysm or occlusion throughout the CTA head and neck with plaque formation and calcific disease in the ICA origins and distal internal carotid arteries, however, no large vessel occlusion with [place] widely patent.  2.  Lung apices demonstrate biapical pleural parenchymal scarring with confluent opacification in the right upper lung partially imaged and may represent airspace disease although underlying mass or nodule cannot be excluded. Further evaluation with CT chest recommended when clinically appropriate.  D:  11/29/2021 E:  11/29/2021  This report was finalized on 11/29/2021 4:50 PM by Dr. Mikie Alfaro.      CT CEREBRAL PERFUSION WITH & WITHOUT CONTRAST    Result Date: 11/29/2021  EXAMINATION: CT CEREBRAL PERFUSION W WO CONTRAST- 11/29/2021  INDICATION: Neuro deficit, acute, stroke suspected  TECHNIQUE: CT cerebral perfusion with and without intravenous contrast administration. Multiple parametric maps including mean transit time, time to drain, cerebral blood flow and cerebral blood volume performed.  The radiation dose reduction device was turned on for each scan per the ALARA (As Low as Reasonably Achievable) protocol.  COMPARISON: CT noncontrast  stroke protocol and concurrent CT angiogram  FINDINGS: Area of artifact from prior insult left frontal region. No perfusion defect otherwise identified specifically no reversible ischemia within a specific vascular territory.      No reversible ischemia within a specific vascular territory.  D: 11/29/2021 E: 11/29/2021   This report was finalized on 11/29/2021 4:50 PM by Dr. Mikie Alfaro.      XR Hip With or Without Pelvis 2 - 3 View Left    Result Date: 11/29/2021  EXAMINATION: XR HIP W OR WO PELVIS 2-3 VIEW LEFT- 11/29/2021  INDICATION: fall  COMPARISON: Hip x-ray 10/21/2021  FINDINGS: SI joints symmetric and without widening. No displaced pelvic ring fracture. 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 Left hip without acute displaced fracture remaining well located at the left hip joint.      No displaced left rib fracture or displaced pelvic ring fracture with left hip remaining well located in the left acetabular cup.  D: 11/29/2021 E: 11/29/2021  This report was finalized on 11/29/2021 4:51 PM by Dr. Mikie Alfaro.                Results for orders placed during the hospital encounter of 11/29/21    Adult Transthoracic Echo Complete W/ Cont if Necessary Per Protocol (With Agitated Saline)    Interpretation Summary  · Left ventricular ejection fraction appears to be 61 - 65%. Left ventricular systolic function is normal.  · Saline test results are negative.  · Estimated right ventricular systolic pressure from tricuspid regurgitation is markedly elevated (>55 mmHg).  · Moderate tricuspid valve regurgitation is present.  · Left ventricular diastolic function was normal.      Discharge Details        Discharge Medications      New Medications      Instructions Start Date   Melatonin 5 MG capsule   5 mg, Oral, Nightly      oseltamivir 30 MG capsule  Commonly known as: TAMIFLU   30 mg, Oral, Every 12 Hours Scheduled      QUEtiapine 25 MG tablet  Commonly known as: SEROquel   12.5 mg, Oral, Nightly PRN         Changes to  Medications      Instructions Start Date   docusate sodium 100 MG capsule  What changed:   · when to take this  · reasons to take this   100 mg, Oral, 2 Times Daily      pregabalin 25 MG capsule  Commonly known as: LYRICA  What changed: when to take this   25 mg, Oral, 3 Times Daily      traZODone 50 MG tablet  Commonly known as: DESYREL  What changed: how much to take   25 mg, Oral, Nightly         Continue These Medications      Instructions Start Date   acetaminophen 325 MG tablet  Commonly known as: TYLENOL   650 mg, Oral, 3 Times Daily      amLODIPine 10 MG tablet  Commonly known as: NORVASC   10 mg, Oral, Daily      Biofreeze 4 % gel  Generic drug: Menthol (Topical Analgesic)   1 application, Apply externally, Every Early Morning, Apply to lower back      cetirizine 10 MG tablet  Commonly known as: zyrTEC   10 mg, Oral, Daily      ENSURE PO   Oral, 2 Times Daily, MIX 8OZ OF ENSURE, 1/4 SCOOP PROTEIN POWDER AND 2OZ OF MILK FOR A MILKSHAKE       folic acid 400 MCG tablet  Commonly known as: FOLVITE   400 mcg, Oral, 2 Times Daily      ipratropium 0.03 % nasal spray  Commonly known as: ATROVENT   2 sprays, Nasal, Daily      levothyroxine 25 MCG tablet  Commonly known as: SYNTHROID, LEVOTHROID   25 mcg, Oral, Daily      meclizine 25 MG chewable tablet chewable tablet   25 mg, Oral, 3 Times Daily PRN      pantoprazole 20 MG EC tablet  Commonly known as: PROTONIX   20 mg, Oral, Daily      traMADol 50 MG tablet  Commonly known as: ULTRAM   50 mg, Oral, 2 Times Daily PRN         Stop These Medications    busPIRone 7.5 MG tablet  Commonly known as: BUSPAR     carBAMazepine  MG 12 hr tablet  Commonly known as: TEGretol  XR     mirtazapine 15 MG tablet  Commonly known as: REMERON            Allergies   Allergen Reactions   • Bee Pollen Unknown - Low Severity   • Lorazepam Delirium         Discharge Disposition:  Home or Self Care    Diet:  Hospital:  Diet Order   Procedures   • Diet Regular; Cardiac        Activity:  Activity Instructions     Activity as Tolerated               CODE STATUS:    Code Status and Medical Interventions:   Ordered at: 12/07/21 0310     Medical Intervention Limits:    NO intubation (DNI)     Level Of Support Discussed With:    Patient     Code Status (Patient has no pulse and is not breathing):    No CPR (Do Not Attempt to Resuscitate)     Medical Interventions (Patient has pulse or is breathing):    Limited Support       Future Appointments   Date Time Provider Department Center   1/14/2022  9:30 AM Robin Guo MD [facility]       Additional Instructions for the Follow-ups that You Need to Schedule     Discharge Follow-up with PCP   As directed       Currently Documented PCP:    Dm Cade APRN    PCP Phone Number:    [phone]     Follow Up Details: 1 week                     Joaquina Douglas MD  12/08/21      Time Spent on Discharge:  I spent  35  minutes on this discharge activity which included: face-to-face encounter with the patient, reviewing the data in the system, coordination of the care with the nursing staff as well as consultants, documentation, and entering orders.

## 2021-12-08 NOTE — PROGRESS NOTES
"Palliative Care Consult Note    Patient Name: Mitzi Eric   : 1935  Sex: female    Date of Admission: 2021    Subjective: pt is awake, alert, in NAD.  answers questions appropriately. Denies pain, dyspnea, NV, anxiety. Nephew Lane is at bedside.     Review of Systems   Constitutional: Positive for activity change and appetite change.   Musculoskeletal: Positive for gait problem.   All other systems reviewed and are negative.      Reviewed current scheduled and prn medications for route, type, dose and frequency.     docusate sodium  •  meclizine  •  sodium chloride  •  traMADol    Objective:   /74 (BP Location: Left arm, Patient Position: Lying)   Pulse 66   Temp 98.1 °F (36.7 °C) (Oral)   Resp 16   Ht 157.5 cm (62\")   Wt 50.8 kg (112 lb)   SpO2 92%   BMI 20.49 kg/m²    Intake & Output (last day)           Urine (mL/kg/hr) 200 (0.2)     Total Output 200     Net -200               Lab Results (last 24 hours)     Procedure Component Value Units Date/Time    Basic Metabolic Panel [[id number]]  (Abnormal) Collected: 21    Specimen: Blood Updated: 21     Glucose 83 mg/dL      BUN 17 mg/dL      Creatinine 0.73 mg/dL      Sodium 143 mmol/L      Potassium 4.0 mmol/L      Chloride 109 mmol/L      CO2 24.0 mmol/L      Calcium 8.5 mg/dL      eGFR Non African Amer 76 mL/min/1.73      BUN/Creatinine Ratio 23.3     Anion Gap 10.0 mmol/L     Narrative:      GFR Normal >60  Chronic Kidney Disease <60  Kidney Failure <15      CBC & Differential [[id number]]  (Abnormal) Collected: 21    Specimen: Blood Updated: 21    Narrative:      The following orders were created for panel order CBC & Differential.  Procedure                               Abnormality         Status                     ---------                               -----------         ------                     CBC Auto Differential[[id number]]        " Abnormal            Final result                 Please view results for these tests on the individual orders.    CBC Auto Differential [[id number]]  (Abnormal) Collected: 12/08/21 0453    Specimen: Blood Updated: 12/08/21 0619     WBC 3.96 10*3/mm3      RBC 3.47 10*6/mm3      Hemoglobin 11.1 g/dL      Hematocrit 34.5 %      MCV 99.4 fL      MCH 32.0 pg      MCHC 32.2 g/dL      RDW 12.1 %      RDW-SD 44.4 fl      MPV 12.3 fL      Platelets 122 10*3/mm3      Neutrophil % 34.8 %      Lymphocyte % 42.7 %      Monocyte % 18.4 %      Eosinophil % 2.0 %      Basophil % 0.8 %      Immature Grans % 1.3 %      Neutrophils, Absolute 1.38 10*3/mm3      Lymphocytes, Absolute 1.69 10*3/mm3      Monocytes, Absolute 0.73 10*3/mm3      Eosinophils, Absolute 0.08 10*3/mm3      Basophils, Absolute 0.03 10*3/mm3      Immature Grans, Absolute 0.05 10*3/mm3      nRBC 0.0 /100 WBC         Imaging Results (Last 24 Hours)     ** No results found for the last 24 hours. **          PPS:   40% based on the following measures:   Ambulation: Mainly in bed  Activity and Evidence of Disease: Unable to do any work, extensive evidence of disease  Self-Care: Mainly assistance  Intake: Normal or reduced   LOC: Full, drowsy or confusion    Physical Exam:  Vitals and nursing note reviewed.  General Appearance: very thin, frail but awake and alert, in NAD  Head: Normocephalic; atraumatic  Eyes: PERTL, Conjunctivae clear,    Neck:   supple; trachea midline  Lungs: BBS = CTA, room air   Heart: RRR, S1S2, peripheral pulses pos, neg for edema   Abdomen: soft, flat, NTND, BS +   Extremities: JO willfully,   Skin: Warm and dry, no bleeding, bruising or rash  Neurological: Alert,   Psych: calm, cooperative       Hypoxia    Essential hypertension    Hypothyroidism (acquired)    Lung mass    Acute respiratory failure with hypoxia (HCC)    Influenza A      Assessment/Plan: 86 y.o. female  who was recently admitted to [facility] from 11/29/21 to 12/6/21 secondary to  altered mental status.  She was found to have a UTI,  completed course of rocephin. And discharged back to [facility] on 12/6/21. On 12/7 was noted to have a RA oxygen saturation of 84% while resting therefore she was sent to the ED for further evaluation. on arrival to ED she was recovered with sats > 95% on RA; she was admitted for overnight observation.  Palliative was consulted for goals of care.     Pain: PRN Tylenol. lyrica 50 mg TID;   Dyspnea: room air  Depression: Buspar 7.5 BID; mirtazipine 15 mg q HS  Bowel/bladder: docusate 100 mg BID   Sleep: trazadone 25 mg q HS     Nephew/POA Lane is hopeful that Ms Eric will be discharged back to [facility] today.     Educated patient/family about using palliative approach with advanced age and multiple chronic disease processes that cannot be reversed.     Total Visit Time: 20   Face to Face Time: 15     RAFAEL Williamson  [phone]  12/08/21  12:48 EST

## 2021-12-08 NOTE — THERAPY EVALUATION
Acute Care - Speech Language Pathology Initial Evaluation  [facility]   Cognitive-Communication Evaluation     Patient Name: Mitzi Eric  : 1935  MRN: [id number]  Today's Date: 2021               Admit Date: 2021     Visit Dx:    ICD-10-CM ICD-9-CM   1. Acute respiratory failure with hypoxia (HCC)  J96.01 518.81   2. Influenza A virus present  J10.1 487.1   3. Cognitive communication deficit  R41.841 799.52     Patient Active Problem List   Diagnosis   • Fall   • Delirium, acute   • Essential hypertension   • Cognitive decline   • Trigeminal neuralgia   • Hypothyroidism (acquired)   • Lung mass   • Hypoxia   • Acute respiratory failure with hypoxia (HCC)   • Influenza A     Past Medical History:   Diagnosis Date   • A-fib (HCC)    • Ataxic gait    • Chronic kidney disease, stage 3 (HCC)    • COPD (chronic obstructive pulmonary disease) (HCC)    • Dementia (HCC)    • Disease of thyroid gland    • Hyperlipidemia    • Hypertension      Past Surgical History:   Procedure Laterality Date   • BRAIN TUMOR EXCISION      BENIGN   • FRACTURE SURGERY     • TRIGGER FINGER RELEASE         SLP Recommendation and Plan  SLP Diagnosis: Moderate cognitive-linguistic disorder. Actually improved c/t last tx session . Suspect approaching baseline. Will f/u for cognitive-linguistic/education for improved ability to safely complete functional tasks. (21)        [facility] [place] Criteria for Skilled Therapy Interventions Met: yes (21)  Anticipated Discharge Disposition (SLP): anticipate therapy at next level of care, skilled nursing facility (21)        Predicted Duration Therapy Intervention (Days): until discharge (21)             Plan of Care Reviewed With: patient, family (21 1011)  Progress: no change (21 101)      SLP EVALUATION (last 72 hours)     SLP SLC Evaluation     Row Name 21                   Communication Assessment/Intervention    Document  Type evaluation  -AC        Subjective Information no complaints  -AC        Patient Observations alert; cooperative  -AC        Patient/Family/Caregiver Comments/Observations Nephew present.  -AC        Patient Effort good  -AC                  General Information    Patient Profile Reviewed yes  -AC        Pertinent History Of Current Problem See previous eval.  -AC        Precautions/Limitations, Vision WFL with corrective lenses; for purposes of eval  -AC        Precautions/Limitations, Hearing WFL; for purposes of eval  -AC        Prior Level of Function-Communication cognitive-linguistic impairment  -AC        Plans/Goals Discussed with patient and family; agreed upon  -        Barriers to Rehab previous functional deficit  -AC        Patient's Goals for Discharge patient did not state  -AC        Family Goals for Discharge family did not state  -AC                  Pain Scale: Numbers Pre/Post-Treatment    Pretreatment Pain Rating 0/10 - no pain  -AC        Posttreatment Pain Rating 0/10 - no pain  -AC                  Comprehension Assessment/Intervention    Comprehension Assessment/Intervention Auditory Comprehension  -AC                  Auditory Comprehension Assessment/Intervention    Auditory Comprehension (Communication) mild impairment; moderate impairment  -AC        Answers Questions (Communication) mild impairment; moderate impairment; complex; wh questions  -AC        Able to Follow Commands (Communication) mild impairment; 3-step  -AC        Narrative Discourse mild impairment; conversational level  -AC        Auditory Comprehension Communication, Comment Suspect r/t cog deficits.  -AC                  Verbal Expression Assessment/Intervention    Verbal Expression mild impairment  -AC        Automatic Speech (Communication) WFL; response to greeting  -AC        Responsive Naming WFL; simple  -AC        Confrontational Naming WFL; high frequency  -AC        Conversational Discourse/Fluency mild  impairment; delayed responses  -AC        Verbal Expression, Comment Suspect r/t cog deficits/baseline.  -AC                  Motor Speech Assessment/Intervention    Motor Speech Function WFL; unfamiliar listener  -AC        Conversational Speech (Communication) WFL  -AC        Motor Speech, Comment 100% intelligible to unfamilar listener in conv speech.  -AC                  Cognitive Assessment Intervention- SLP    Cognitive Function (Cognition) moderate impairment  -AC        Orientation Status (Cognition) moderate impairment; time; place; situation; WFL; person; other (see comments)  baseline per family  -AC        Memory (Cognitive) moderate impairment; short-term; immediate; functional; other (see comments)  baseline  -AC        Thought Organization (Cognitive) mild impairment; abstract convergent  -AC        Reasoning (Cognitive) moderate impairment; deductive  -AC        Problem Solving (Cognitive) moderate impairment; simple  -AC                  SLP Evaluation Clinical Impressions    SLP Diagnosis Moderate cognitive-linguistic disorder. Actually improved c/t last tx session 12/6. Suspect approaching baseline. Will f/u for cognitive-linguistic/education for improved ability to safely complete functional tasks.  -AC        Rehab Potential/Prognosis good  -AC        SLC Criteria for Skilled Therapy Interventions Met yes  -AC        Functional Impact decreased ability to respond to situations safely; Poor Judgement  -AC                  Recommendations    Therapy Frequency (SLP SLC) 3 days per week  -AC        Predicted Duration Therapy Intervention (Days) until discharge  -AC        Anticipated Discharge Disposition (SLP) anticipate therapy at next level of care; skilled nursing facility  -AC              User Key  (r) = Recorded By, (t) = Taken By, (c) = Cosigned By    Initials Name Effective Dates    AC Maribell Hunt MS CCC-SLP 06/16/21 -                    EDUCATION  The patient has been educated in the  following areas:     Cognitive Impairment.           [facility] GOALS     Row Name 12/08/21 0915             Comprehend Questions Goal 1 (SLP)    Improve Ability to Comprehend Questions Goal 1 (SLP) questions about personal information; complex yes/no questions; concrete general questions; 80%; with minimal cues (75-90%)  -AC      Time Frame (Comprehend Questions Goal 1, SLP) short term goal (STG)  -AC      Progress/Outcomes (Comprehend Questions Goal 1, SLP) goal no longer appropriate  -AC              Follow Directions Goal 2 (SLP)    Improve Ability to Follow Directions Goal 1 (SLP) 3 step commands; 80%; with minimal cues (75-90%)  -AC      Time Frame (Follow Directions Goal 1, SLP) short term goal (STG)  -AC      Progress/Outcomes (Follow Directions Goal 1, SLP) goal no longer appropriate  -AC              Word Retrieval Skills Goal 1 (SLP)    Improve Word Retrieval Skills By Goal 1 (SLP) confrontational naming task; low frequency; completing open ended unstructured sentence; completing a divergent task; completing a convergent task; 80%; with minimal cues (75-90%)  -AC      Time Frame (Word Retrieval Goal 1, SLP) short term goal (STG)  -AC      Progress/Outcomes (Word Retrieval Goal 1, SLP) goal no longer appropriate  -AC              Awareness of Basic Personal Information Goal 1 (SLP)    Improve Awareness of Basic Personal Information Goal 1 (SLP) answering yes/no questions regarding personal/biographical information; 70%; with moderate cues (50-74%)  -AC      Time Frame (Awareness of Basic Personal Information Goal 1, SLP) short term goal (STG)  -AC              Memory Skills Goal 1 (SLP)    Improve Memory Skills Through Goal 1 (SLP) use memory strategies; recall details of the day; 70%; with moderate cues (50-74%)  -AC      Time Frame (Memory Skills Goal 1, SLP) short term goal (STG)  -AC              Functional Problem Solving Skills Goal 1 (SLP)    Improve Problem Solving Through Goal 1 (SLP) determine solutions  to simple ADL/safety problems; 80%; with minimal cues (75-90%)  -AC      Time Frame (Problem Solving Goal 1, SLP) short term goal (STG)  -AC              Additional Goal 1 (SLP)    Additional Goal 1, SLP LTG: Pt will improve cognitive-linguistic skills in order to participate in care in hospital setting 100% of the time w/ min cues.  -AC      Time Frame (Additional Goal 1, SLP) by discharge  -AC      Progress/Outcomes (Additional Goal 1, SLP) goal revised this date  -AC            User Key  (r) = Recorded By, (t) = Taken By, (c) = Cosigned By    Initials Name Provider Type    Maribell Urrutia MS CCC-SLP Speech and Language Pathologist                        Time Calculation:      Time Calculation- SLP     Row Name 12/08/21 1014             Time Calculation- SLP    SLP Start Time 0915  -AC      SLP Received On 12/08/21  -AC              Untimed Charges    [id number] Eval Speech and Production w/ Language Minutes 54  -AC              Total Minutes    Untimed Charges Total Minutes 54  -AC       Total Minutes 54  -AC            User Key  (r) = Recorded By, (t) = Taken By, (c) = Cosigned By    Initials Name Provider Type    Maribell Urrutia MS CCC-SLP Speech and Language Pathologist                Therapy Charges for Today     Code Description Service Date Service Provider Modifiers Qty    [id number] HC ST EVAL SPEECH AND PROD W LANG  4 12/8/2021 Maribell Hunt MS CCC-SLP GN 1        Patient was not wearing a face mask and did exhibit coughing during this therapy encounter.  Procedure performed was not aerosolizing, involved close contact (within 6 feet for at least 15 minutes or longer), and did not involve contact with infectious secretions or specimens.  Therapist used appropriate personal protective equipment including gloves, standard procedure mask and eye protection.  Appropriate PPE was worn during the entire therapy session.  Hand hygiene was completed before and after therapy session.                Maribell  ARMIN Hunt, MS CCC-SLP  12/8/2021

## 2022-03-09 ENCOUNTER — OFFICE VISIT (OUTPATIENT)
Dept: NEUROLOGY | Facility: CLINIC | Age: 87
End: 2022-03-09

## 2022-03-09 ENCOUNTER — TELEPHONE (OUTPATIENT)
Dept: NEUROLOGY | Facility: CLINIC | Age: 87
End: 2022-03-09

## 2022-03-09 VITALS
OXYGEN SATURATION: 100 % | WEIGHT: 110 LBS | DIASTOLIC BLOOD PRESSURE: 66 MMHG | HEIGHT: 63 IN | BODY MASS INDEX: 19.49 KG/M2 | SYSTOLIC BLOOD PRESSURE: 118 MMHG | HEART RATE: 95 BPM

## 2022-03-09 DIAGNOSIS — Z98.890 HISTORY OF CRANIOTOMY: ICD-10-CM

## 2022-03-09 DIAGNOSIS — Z74.09 IMPAIRED FUNCTIONAL MOBILITY, BALANCE, GAIT, AND ENDURANCE: ICD-10-CM

## 2022-03-09 DIAGNOSIS — G50.0 TRIGEMINAL NEURALGIA: ICD-10-CM

## 2022-03-09 DIAGNOSIS — F03.90 DEMENTIA WITHOUT BEHAVIORAL DISTURBANCE, UNSPECIFIED DEMENTIA TYPE: Primary | ICD-10-CM

## 2022-03-09 PROCEDURE — 99215 OFFICE O/P EST HI 40 MIN: CPT | Performed by: NURSE PRACTITIONER

## 2022-03-09 RX ORDER — OXCARBAZEPINE 600 MG/1
600 TABLET, FILM COATED ORAL 2 TIMES DAILY
COMMUNITY

## 2022-03-09 RX ORDER — GUAIFENESIN 200 MG/10ML
200 LIQUID ORAL 3 TIMES DAILY PRN
COMMUNITY
End: 2022-03-09

## 2022-03-09 RX ORDER — CARBAMAZEPINE 300 MG/1
CAPSULE, EXTENDED RELEASE ORAL EVERY 12 HOURS
COMMUNITY
End: 2022-03-09

## 2022-03-09 RX ORDER — PREGABALIN 25 MG/1
25 CAPSULE ORAL 2 TIMES DAILY
COMMUNITY

## 2022-03-09 RX ORDER — DONEPEZIL HYDROCHLORIDE 5 MG/1
5 TABLET, FILM COATED ORAL EVERY MORNING
Qty: 90 TABLET | Refills: 1 | Status: SHIPPED | OUTPATIENT
Start: 2022-03-09 | End: 2022-06-22 | Stop reason: SDUPTHER

## 2022-03-09 NOTE — PROGRESS NOTES
Subjective:     Patient ID: Mitzi Eric is a 86 y.o. female.    CC:   Chief Complaint   Patient presents with   • Memory Loss       HPI:   History of Present Illness     Mitzi Eric is a 86 y.o. female who comes to clinic today for evaluation of Dementia . She has noted symptoms since at least 2019 and significantly worsened since COVID-19 pandemic per Nephew/POA marked initially by forgetfulness , repetitiveness  and word-finding difficulties . This has worsened  over time. Additional symptoms have included impairments in essentially all spheres of cognition. There have been associated  symptoms of delusions, hallucinations and UTIs recurrent November and December 2021 with hospitalizations and flu December 2021. She notes  impairments in ADL's. Her family  manages her finances and she is a current resident at [facility] since 2017. She is no longer driving . She is currently residing at [facility].     She is accompanied by her nephew today who is also her power of .  She has never been  and has no children.  She is 1 of 9 children.  She is currently wheelchair-bound but occasionally can stand with assistance.  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  She also has a right trigeminal neuralgia and is currently on low-dose pregabalin 25 mg twice a day along with oxcarbazepine 300 mg twice a day.  She was previously on carbamazepine along with higher doses of pregabalin and had some significant confusion and tremor and so these medications and dosages were adjusted with recent hospitalization.  She does have a history of atrial fibrillation but is on no anticoagulation due to high fall risk.  Her nephew has to provide most of her history.  She does not remember her recent hospitalizations.    She was admitted to [facility] 11/29/2021 to 12/6/2021 for concerns of a stroke  with history of dementia, hypothyroidism, prior craniotomy, hypertension, A. fib, COPD, lung mass (aware since 2018 and family declines further workup) and frequent UTIs with altered mental status, falls and tremors found to have UTI.  They felt she had delirium related to her dementia and UTI.  They made multiple medication adjustments.  Neurology Dr. Briseno reviewed MRI of the brain imaging with and without contrast which was completed on 11/29/2021 and this did show postsurgical changes and encephalomalacia in the left frontal lobe.  He did not find any acute abnormalities suggesting acute stroke.  There was no abnormal contrast-enhancement.  She also underwent CTA of the head, neck and CT cerebral perfusion which were all unremarkable.  Family did meet with palliative care and hospice during that admission but she is not currently under their services.  She was then readmitted to [facility] from 12/6/2021 to 12/8/2021 for acute respiratory failure with hypoxia and confirmed influenza A along with a UTI.  Medications were again adjusted.  Palliative care was consulted.  From the nephew's perspective she has not been treated for her memory difficulties.  He would like to try medication to help with her short-term memory.  She has been overall pleasant with no significant agitation unless she has a UTI.  Currently her appetite has been fine.  She feels that she is resting well at bedtime.    The following portions of the patient's history were reviewed and updated as appropriate: allergies, current medications, past family history, past medical history, past social history, past surgical history and problem list.    Past Medical History:   Diagnosis Date   • A-fib (HCC)    • Ataxic gait    • Chronic kidney disease, stage 3 (HCC)    • COPD (chronic obstructive pulmonary disease) ([facility])    • Dementia (HCC)    • Disease of thyroid gland    • Hyperlipidemia    • Hypertension        Past Surgical  "History:   Procedure Laterality Date   • BRAIN TUMOR EXCISION      BENIGN   • FRACTURE SURGERY     • TRIGGER FINGER RELEASE         Social History     Socioeconomic History   • Marital status: Single   Tobacco Use   • Smoking status: Former Smoker   • Smokeless tobacco: Never Used   Substance and Sexual Activity   • Alcohol use: No   • Drug use: No       Family History   Problem Relation Age of Onset   • Stroke Mother    • Alcohol abuse Father         Review of Systems   Constitutional: Negative for chills, fatigue, fever and unexpected weight change.   HENT: Negative for ear pain, hearing loss, nosebleeds, rhinorrhea and sore throat.    Eyes: Negative for photophobia, pain, discharge, itching and visual disturbance.   Respiratory: Negative for cough, chest tightness, shortness of breath and wheezing.    Cardiovascular: Negative for chest pain, palpitations and leg swelling.   Gastrointestinal: Negative for abdominal pain, blood in stool, constipation, diarrhea, nausea and vomiting.   Genitourinary: Negative for dysuria, frequency, hematuria and urgency.   Musculoskeletal: Positive for gait problem. Negative for arthralgias, back pain, joint swelling, myalgias, neck pain and neck stiffness.   Skin: Negative for rash and wound.   Allergic/Immunologic: Negative for environmental allergies and food allergies.   Neurological: Negative for dizziness, tremors, seizures, syncope, speech difficulty, weakness, light-headedness, numbness and headaches.        Facial pain right TGN   Hematological: Negative for adenopathy. Does not bruise/bleed easily.   Psychiatric/Behavioral: Positive for confusion and decreased concentration. Negative for agitation, hallucinations, sleep disturbance and suicidal ideas. The patient is not nervous/anxious.    All other systems reviewed and are negative.       Objective:  /66   Pulse 95   Ht 160 cm (63\")   Wt 49.9 kg (110 lb)   SpO2 100%   BMI 19.49 kg/m²     Neurologic Exam "     Mental Status   Oriented to person.   Disoriented to place.   Disoriented to time.   Registration: recalls 3 of 3 objects. Recall at 5 minutes: recalls 1 of 3 objects. Follows 3 step commands.   Attention: decreased. Concentration: decreased.   Speech: speech is normal   Level of consciousness: alert  Abnormal comprehension.     Cranial Nerves   Cranial nerves II through XII intact.     Motor Exam   Muscle bulk: normal  Overall muscle tone: normal    Strength   Strength 5/5 throughout.   generally weak and deconditioned, in wheelchair, cannot stand today     Sensory Exam   Light touch normal.   Vibration normal.   Proprioception normal.   Pinprick normal.     Gait, Coordination, and Reflexes     Gait  Gait: (in wheelchair and not assessed today)    Coordination   Finger to nose coordination: normal  Heel to shin coordination: normal    Tremor   Resting tremor: absent  Intention tremor: absent  Action tremor: absent    Reflexes   Right brachioradialis: 2+  Left brachioradialis: 2+  Right biceps: 2+  Left biceps: 2+  Right patellar: 2+  Left patellar: 2+  Right achilles: 2+  Left achilles: 2+  Right : 2+  Left : 2+  Right plantar: normal  Left plantar: normal  Right ankle clonus: absent  Left ankle clonus: absent      Physical Exam  Constitutional:       Appearance: Normal appearance.   Cardiovascular:      Rate and Rhythm: Tachycardia present.      Heart sounds: S1 normal and S2 normal.   Pulmonary:      Effort: Pulmonary effort is normal.      Breath sounds: Normal breath sounds.   Neurological:      Mental Status: She is alert.      Coordination: Finger-Nose-Finger Test and Heel to Shin Test normal.      Deep Tendon Reflexes: Strength normal.      Reflex Scores:       Bicep reflexes are 2+ on the right side and 2+ on the left side.       Brachioradialis reflexes are 2+ on the right side and 2+ on the left side.       Patellar reflexes are 2+ on the right side and 2+ on the left side.       Achilles  reflexes are 2+ on the right side and 2+ on the left side.  Psychiatric:         Mood and Affect: Affect is blunt.         Speech: Speech normal.         Behavior: Behavior is slowed.         Thought Content: Thought content normal.         Cognition and Memory: She exhibits impaired recent memory.         Judgment: Judgment normal.     She is very pleasant and cooperative.  Has a very poor short-term memory.  MMSE baseline 22 out of 30 with 1 out of 3 for recall.    Assessment/Plan:       Diagnoses and all orders for this visit:    1. Dementia without behavioral disturbance, unspecified dementia type (HCC) (Primary)  -     donepezil (Aricept) 5 MG tablet; Take 1 tablet by mouth Every Morning.  Dispense: 90 tablet; Refill: 1    2. Trigeminal neuralgia  Comments:  continue current medications    3. History of craniotomy  Comments:  left craniotomy d/t benign brain tumor excision    4. Impaired functional mobility, balance, gait, and endurance  Comments:  continue with [facility] services         Total time of visit today was 40 minutes which included reviewing [facility] hospitalization records, neurology notes, obtaining history from the patient, assisted living home papers, power of /nephew with her, discussing plan of care and recommendations moving forward, completing exam and documentation of today's visit.  I did review her [facility] medication list updated her medication list with our clinic.  We will do a trial of donepezil 5 mg to see if this helps with her short-term memory.  I have explained this is only a cognitive enhancer and will not change the progression of her dementia overall.  We hope to help improve her day-to-day functioning.  She will continue her other medications as prescribed.  She does request more pain medication today during visit but I have discussed with her that increasing the medication she is currently on can cause worsening cognition, confusion,  drowsiness, dizziness and falls.  After explaining this she does tell me that she feels like the oxcarbazepine with the pregabalin are helping.  She is also given Tylenol every day.  She is also on the tramadol for pain.  Once reminded she does feel like this is helping.  I reviewed her notes from 2 recent hospitalizations at [facility].  We are going to follow-up in 3 months for reevaluation.  Continue with [facility] orders.  We will fax our note to them today and notify nursing staff of new medication orders.    Reviewed medications, potential side effects and signs and symptoms to report. Discussed risk versus benefits of treatment plan with patient and/or family-including medications, labs and radiology that may be ordered. Addressed questions and concerns during visit. Patient and/or family verbalized understanding and agree with plan.    AS THE PROVIDER, I PERSONALLY WORE PPE DURING ENTIRE FACE TO FACE ENCOUNTER IN CLINIC WITH THE PATIENT. PATIENT ALSO WORE PPE DURING ENTIRE FACE TO FACE ENCOUNTER EXCEPT FOR A MAX OF 30 SECONDS DURING NEUROLOGICAL EVALUATION OF CRANIAL NERVES AND THEN MASK WAS PLACED BACK OVER PATIENT FACE FOR REMAINDER OF VISIT. I WASHED MY HANDS BEFORE AND AFTER VISIT.    During this visit the following were done:  Labs Reviewed [x] labs completed during hospitalization did include a normal ammonia level, normal hemoglobin A1c, normal lactic acid, normal magnesium and TSH level, consider folate and B12 level checks at follow-up if these have not been completed in the past 6 months.  Labs Ordered []    Radiology Reports Reviewed [x]    Radiology Ordered []    PCP Records Reviewed []    Referring Provider Records Reviewed []    ER Records Reviewed [x]    Hospital Records Reviewed [x]    History Obtained From Family [x]    Radiology Images Reviewed [x]    Other Reviewed [x]    Records Requested []      Ann-Marie Goodwin, RAFAEL  3/9/2022

## 2022-03-09 NOTE — TELEPHONE ENCOUNTER
----- Message from RAFAEL Sim sent at 3/9/2022 10:26 AM EST -----  Please call and notify nursing staff at [facility] that I sent script to [facility] pharmacy for donepezil 5mg daily to start tomorrow morning for memory. They can contact us if there are any issues or questions, once I sign my note we can fax it to them as well.

## 2022-03-09 NOTE — TELEPHONE ENCOUNTER
I CALLED AND HAD TO [facility] FOR NURSING STAFF TO GIVE OUR OFFICE A CALL BACK

## 2022-03-09 NOTE — TELEPHONE ENCOUNTER
----- Message from RAFAEL Smi sent at 3/9/2022 12:39 PM EST -----  Fax todays note to [facility] here in [place] [address] and make sure nursing staff have received it.

## 2022-03-10 NOTE — TELEPHONE ENCOUNTER
Angela Velásquez called back and asked if there was anything pertinent that Renetta had called about. I gave her the number for the [facility] office to reach Renetta with further questions and I relayed Ann-Marie's message regarding the donepezil to her.

## 2022-03-17 ENCOUNTER — APPOINTMENT (OUTPATIENT)
Dept: GENERAL RADIOLOGY | Facility: HOSPITAL | Age: 87
End: 2022-03-17

## 2022-03-17 ENCOUNTER — APPOINTMENT (OUTPATIENT)
Dept: CARDIOLOGY | Facility: HOSPITAL | Age: 87
End: 2022-03-17

## 2022-03-17 ENCOUNTER — HOSPITAL ENCOUNTER (INPATIENT)
Facility: HOSPITAL | Age: 87
LOS: 2 days | Discharge: HOME | End: 2022-03-20
Attending: EMERGENCY MEDICINE | Admitting: INTERNAL MEDICINE

## 2022-03-17 DIAGNOSIS — Z87.09 HISTORY OF COPD: ICD-10-CM

## 2022-03-17 DIAGNOSIS — I10 ELEVATED BLOOD PRESSURE READING WITH DIAGNOSIS OF HYPERTENSION: ICD-10-CM

## 2022-03-17 DIAGNOSIS — I48.91 ATRIAL FIBRILLATION WITH RAPID VENTRICULAR RESPONSE: ICD-10-CM

## 2022-03-17 DIAGNOSIS — J96.21 ACUTE ON CHRONIC RESPIRATORY FAILURE WITH HYPOXEMIA: Primary | ICD-10-CM

## 2022-03-17 DIAGNOSIS — I50.9 ACUTE ON CHRONIC CONGESTIVE HEART FAILURE, UNSPECIFIED HEART FAILURE TYPE: ICD-10-CM

## 2022-03-17 DIAGNOSIS — F02.80 ALZHEIMER'S DEMENTIA WITHOUT BEHAVIORAL DISTURBANCE, UNSPECIFIED TIMING OF DEMENTIA ONSET: ICD-10-CM

## 2022-03-17 DIAGNOSIS — G30.9 ALZHEIMER'S DEMENTIA WITHOUT BEHAVIORAL DISTURBANCE, UNSPECIFIED TIMING OF DEMENTIA ONSET: ICD-10-CM

## 2022-03-17 PROBLEM — J44.1 COPD WITH ACUTE EXACERBATION: Status: ACTIVE | Noted: 2022-03-17

## 2022-03-17 PROBLEM — J96.01 ACUTE RESPIRATORY FAILURE WITH HYPOXIA (HCC): Status: ACTIVE | Noted: 2022-03-17

## 2022-03-17 PROBLEM — D69.6 THROMBOCYTOPENIA: Status: ACTIVE | Noted: 2022-03-17

## 2022-03-17 PROBLEM — R74.01 ELEVATED TRANSAMINASE LEVEL: Status: ACTIVE | Noted: 2022-03-17

## 2022-03-17 LAB
ALBUMIN SERPL-MCNC: 4.2 G/DL (ref 3.5–5.2)
ALBUMIN/GLOB SERPL: 1.6 G/DL
ALP SERPL-CCNC: 97 U/L (ref 39–117)
ALT SERPL W P-5'-P-CCNC: 72 U/L (ref 1–33)
ANION GAP SERPL CALCULATED.3IONS-SCNC: 11 MMOL/L (ref 5–15)
AST SERPL-CCNC: 97 U/L (ref 1–32)
BASOPHILS # BLD AUTO: 0.12 10*3/MM3 (ref 0–0.2)
BASOPHILS NFR BLD AUTO: 1 % (ref 0–1.5)
BILIRUB SERPL-MCNC: 0.4 MG/DL (ref 0–1.2)
BUN SERPL-MCNC: 16 MG/DL (ref 8–23)
BUN/CREAT SERPL: 17.4 (ref 7–25)
CALCIUM SPEC-SCNC: 8.9 MG/DL (ref 8.6–10.5)
CHLORIDE SERPL-SCNC: 106 MMOL/L (ref 98–107)
CO2 SERPL-SCNC: 25 MMOL/L (ref 22–29)
CREAT SERPL-MCNC: 0.92 MG/DL (ref 0.57–1)
CRP SERPL-MCNC: 0.81 MG/DL (ref 0–0.5)
D-LACTATE SERPL-SCNC: 1.6 MMOL/L (ref 0.5–2)
DEPRECATED RDW RBC AUTO: 43.6 FL (ref 37–54)
EGFRCR SERPLBLD CKD-EPI 2021: 61.1 ML/MIN/1.73
EOSINOPHIL # BLD AUTO: 0.19 10*3/MM3 (ref 0–0.4)
EOSINOPHIL NFR BLD AUTO: 1.7 % (ref 0.3–6.2)
ERYTHROCYTE [DISTWIDTH] IN BLOOD BY AUTOMATED COUNT: 12.2 % (ref 12.3–15.4)
FIBRINOGEN PPP-MCNC: 283 MG/DL (ref 220–470)
FLUAV RNA RESP QL NAA+PROBE: NOT DETECTED
FLUBV RNA RESP QL NAA+PROBE: NOT DETECTED
GLOBULIN UR ELPH-MCNC: 2.6 GM/DL
GLUCOSE SERPL-MCNC: 127 MG/DL (ref 65–99)
HCT VFR BLD AUTO: 41.6 % (ref 34–46.6)
HGB BLD-MCNC: 13.6 G/DL (ref 12–15.9)
HOLD SPECIMEN: NORMAL
IMM GRANULOCYTES # BLD AUTO: 0.06 10*3/MM3 (ref 0–0.05)
IMM GRANULOCYTES NFR BLD AUTO: 0.5 % (ref 0–0.5)
LDH SERPL-CCNC: 282 U/L (ref 135–214)
LYMPHOCYTES # BLD AUTO: 1.57 10*3/MM3 (ref 0.7–3.1)
LYMPHOCYTES NFR BLD AUTO: 13.7 % (ref 19.6–45.3)
MCH RBC QN AUTO: 31.5 PG (ref 26.6–33)
MCHC RBC AUTO-ENTMCNC: 32.7 G/DL (ref 31.5–35.7)
MCV RBC AUTO: 96.3 FL (ref 79–97)
MONOCYTES # BLD AUTO: 0.89 10*3/MM3 (ref 0.1–0.9)
MONOCYTES NFR BLD AUTO: 7.7 % (ref 5–12)
NEUTROPHILS NFR BLD AUTO: 75.4 % (ref 42.7–76)
NEUTROPHILS NFR BLD AUTO: 8.67 10*3/MM3 (ref 1.7–7)
NRBC BLD AUTO-RTO: 0 /100 WBC (ref 0–0.2)
NT-PROBNP SERPL-MCNC: 6694 PG/ML (ref 0–1800)
PLATELET # BLD AUTO: 124 10*3/MM3 (ref 140–450)
PMV BLD AUTO: 12.3 FL (ref 6–12)
POTASSIUM SERPL-SCNC: 3.9 MMOL/L (ref 3.5–5.2)
PROCALCITONIN SERPL-MCNC: 0.08 NG/ML (ref 0–0.25)
PROT SERPL-MCNC: 6.8 G/DL (ref 6–8.5)
QT INTERVAL: 336 MS
QTC INTERVAL: 458 MS
RBC # BLD AUTO: 4.32 10*6/MM3 (ref 3.77–5.28)
SARS-COV-2 RNA RESP QL NAA+PROBE: NOT DETECTED
SODIUM SERPL-SCNC: 142 MMOL/L (ref 136–145)
TROPONIN T SERPL-MCNC: 0.03 NG/ML (ref 0–0.03)
WBC NRBC COR # BLD: 11.5 10*3/MM3 (ref 3.4–10.8)
WHOLE BLOOD HOLD SPECIMEN: NORMAL
WHOLE BLOOD HOLD SPECIMEN: NORMAL

## 2022-03-17 PROCEDURE — 84145 PROCALCITONIN (PCT): CPT | Performed by: EMERGENCY MEDICINE

## 2022-03-17 PROCEDURE — 87636 SARSCOV2 & INF A&B AMP PRB: CPT | Performed by: EMERGENCY MEDICINE

## 2022-03-17 PROCEDURE — 71045 X-RAY EXAM CHEST 1 VIEW: CPT

## 2022-03-17 PROCEDURE — 93306 TTE W/DOPPLER COMPLETE: CPT

## 2022-03-17 PROCEDURE — 83615 LACTATE (LD) (LDH) ENZYME: CPT | Performed by: EMERGENCY MEDICINE

## 2022-03-17 PROCEDURE — 93005 ELECTROCARDIOGRAM TRACING: CPT | Performed by: EMERGENCY MEDICINE

## 2022-03-17 PROCEDURE — 93306 TTE W/DOPPLER COMPLETE: CPT | Performed by: INTERNAL MEDICINE

## 2022-03-17 PROCEDURE — 94799 UNLISTED PULMONARY SVC/PX: CPT

## 2022-03-17 PROCEDURE — 94640 AIRWAY INHALATION TREATMENT: CPT

## 2022-03-17 PROCEDURE — 86140 C-REACTIVE PROTEIN: CPT | Performed by: EMERGENCY MEDICINE

## 2022-03-17 PROCEDURE — 94761 N-INVAS EAR/PLS OXIMETRY MLT: CPT

## 2022-03-17 PROCEDURE — 25010000002 HEPARIN (PORCINE) PER 1000 UNITS: Performed by: HOSPITALIST

## 2022-03-17 PROCEDURE — 85384 FIBRINOGEN ACTIVITY: CPT | Performed by: EMERGENCY MEDICINE

## 2022-03-17 PROCEDURE — 85025 COMPLETE CBC W/AUTO DIFF WBC: CPT | Performed by: EMERGENCY MEDICINE

## 2022-03-17 PROCEDURE — 99285 EMERGENCY DEPT VISIT HI MDM: CPT

## 2022-03-17 PROCEDURE — 83880 ASSAY OF NATRIURETIC PEPTIDE: CPT | Performed by: EMERGENCY MEDICINE

## 2022-03-17 PROCEDURE — 25010000002 METHYLPREDNISOLONE PER 125 MG: Performed by: HOSPITALIST

## 2022-03-17 PROCEDURE — 80053 COMPREHEN METABOLIC PANEL: CPT | Performed by: EMERGENCY MEDICINE

## 2022-03-17 PROCEDURE — 99223 1ST HOSP IP/OBS HIGH 75: CPT | Performed by: HOSPITALIST

## 2022-03-17 PROCEDURE — 84484 ASSAY OF TROPONIN QUANT: CPT | Performed by: EMERGENCY MEDICINE

## 2022-03-17 PROCEDURE — 83605 ASSAY OF LACTIC ACID: CPT | Performed by: EMERGENCY MEDICINE

## 2022-03-17 RX ORDER — AMOXICILLIN 250 MG
2 CAPSULE ORAL 2 TIMES DAILY
Status: DISCONTINUED | OUTPATIENT
Start: 2022-03-17 | End: 2022-03-20 | Stop reason: HOSPADM

## 2022-03-17 RX ORDER — SODIUM CHLORIDE 0.9 % (FLUSH) 0.9 %
10 SYRINGE (ML) INJECTION EVERY 12 HOURS SCHEDULED
Status: DISCONTINUED | OUTPATIENT
Start: 2022-03-17 | End: 2022-03-20 | Stop reason: HOSPADM

## 2022-03-17 RX ORDER — BUMETANIDE 0.25 MG/ML
0.5 INJECTION INTRAMUSCULAR; INTRAVENOUS ONCE
Status: COMPLETED | OUTPATIENT
Start: 2022-03-17 | End: 2022-03-17

## 2022-03-17 RX ORDER — POTASSIUM CHLORIDE 20 MEQ/1
20 TABLET, EXTENDED RELEASE ORAL DAILY
COMMUNITY
End: 2022-04-05 | Stop reason: SDUPTHER

## 2022-03-17 RX ORDER — SODIUM CHLORIDE 0.9 % (FLUSH) 0.9 %
10 SYRINGE (ML) INJECTION AS NEEDED
Status: DISCONTINUED | OUTPATIENT
Start: 2022-03-17 | End: 2022-03-20 | Stop reason: HOSPADM

## 2022-03-17 RX ORDER — OXCARBAZEPINE 300 MG/1
600 TABLET, FILM COATED ORAL 2 TIMES DAILY
Status: DISCONTINUED | OUTPATIENT
Start: 2022-03-17 | End: 2022-03-20 | Stop reason: HOSPADM

## 2022-03-17 RX ORDER — ONDANSETRON 4 MG/1
4 TABLET, FILM COATED ORAL EVERY 6 HOURS PRN
Status: DISCONTINUED | OUTPATIENT
Start: 2022-03-17 | End: 2022-03-20 | Stop reason: HOSPADM

## 2022-03-17 RX ORDER — LEVOTHYROXINE SODIUM 0.03 MG/1
25 TABLET ORAL
Status: DISCONTINUED | OUTPATIENT
Start: 2022-03-17 | End: 2022-03-20 | Stop reason: HOSPADM

## 2022-03-17 RX ORDER — POTASSIUM CHLORIDE 750 MG/1
20 CAPSULE, EXTENDED RELEASE ORAL DAILY
Status: DISCONTINUED | OUTPATIENT
Start: 2022-03-17 | End: 2022-03-20 | Stop reason: HOSPADM

## 2022-03-17 RX ORDER — PANTOPRAZOLE SODIUM 40 MG/1
40 TABLET, DELAYED RELEASE ORAL
Status: DISCONTINUED | OUTPATIENT
Start: 2022-03-17 | End: 2022-03-20 | Stop reason: HOSPADM

## 2022-03-17 RX ORDER — BISACODYL 10 MG
10 SUPPOSITORY, RECTAL RECTAL DAILY PRN
Status: DISCONTINUED | OUTPATIENT
Start: 2022-03-17 | End: 2022-03-20 | Stop reason: HOSPADM

## 2022-03-17 RX ORDER — BISACODYL 5 MG/1
5 TABLET, DELAYED RELEASE ORAL DAILY PRN
Status: DISCONTINUED | OUTPATIENT
Start: 2022-03-17 | End: 2022-03-20 | Stop reason: HOSPADM

## 2022-03-17 RX ORDER — IPRATROPIUM BROMIDE AND ALBUTEROL SULFATE 2.5; .5 MG/3ML; MG/3ML
3 SOLUTION RESPIRATORY (INHALATION)
Status: DISCONTINUED | OUTPATIENT
Start: 2022-03-17 | End: 2022-03-19

## 2022-03-17 RX ORDER — ACETAMINOPHEN 325 MG/1
650 TABLET ORAL EVERY 4 HOURS PRN
Status: DISCONTINUED | OUTPATIENT
Start: 2022-03-17 | End: 2022-03-20 | Stop reason: HOSPADM

## 2022-03-17 RX ORDER — METOPROLOL SUCCINATE 25 MG/1
25 TABLET, EXTENDED RELEASE ORAL DAILY
COMMUNITY

## 2022-03-17 RX ORDER — PANTOPRAZOLE SODIUM 20 MG/1
20 TABLET, DELAYED RELEASE ORAL DAILY
Status: DISCONTINUED | OUTPATIENT
Start: 2022-03-17 | End: 2022-03-17 | Stop reason: CLARIF

## 2022-03-17 RX ORDER — PREGABALIN 25 MG/1
25 CAPSULE ORAL 2 TIMES DAILY
Status: DISCONTINUED | OUTPATIENT
Start: 2022-03-17 | End: 2022-03-20 | Stop reason: HOSPADM

## 2022-03-17 RX ORDER — QUETIAPINE FUMARATE 25 MG/1
12.5 TABLET, FILM COATED ORAL NIGHTLY PRN
Status: DISCONTINUED | OUTPATIENT
Start: 2022-03-17 | End: 2022-03-20 | Stop reason: HOSPADM

## 2022-03-17 RX ORDER — LANOLIN ALCOHOL/MO/W.PET/CERES
400 CREAM (GRAM) TOPICAL 2 TIMES DAILY
Status: DISCONTINUED | OUTPATIENT
Start: 2022-03-17 | End: 2022-03-20 | Stop reason: HOSPADM

## 2022-03-17 RX ORDER — CHOLECALCIFEROL (VITAMIN D3) 125 MCG
5 CAPSULE ORAL NIGHTLY PRN
Status: DISCONTINUED | OUTPATIENT
Start: 2022-03-17 | End: 2022-03-20 | Stop reason: HOSPADM

## 2022-03-17 RX ORDER — METHYLPREDNISOLONE SODIUM SUCCINATE 125 MG/2ML
125 INJECTION, POWDER, LYOPHILIZED, FOR SOLUTION INTRAMUSCULAR; INTRAVENOUS ONCE
Status: COMPLETED | OUTPATIENT
Start: 2022-03-17 | End: 2022-03-17

## 2022-03-17 RX ORDER — DONEPEZIL HYDROCHLORIDE 10 MG/1
5 TABLET, FILM COATED ORAL EVERY MORNING
Status: DISCONTINUED | OUTPATIENT
Start: 2022-03-17 | End: 2022-03-20 | Stop reason: HOSPADM

## 2022-03-17 RX ORDER — POLYETHYLENE GLYCOL 3350 17 G/17G
17 POWDER, FOR SOLUTION ORAL DAILY PRN
Status: DISCONTINUED | OUTPATIENT
Start: 2022-03-17 | End: 2022-03-20 | Stop reason: HOSPADM

## 2022-03-17 RX ORDER — PREDNISONE 20 MG/1
40 TABLET ORAL
Status: DISCONTINUED | OUTPATIENT
Start: 2022-03-18 | End: 2022-03-20 | Stop reason: HOSPADM

## 2022-03-17 RX ORDER — HEPARIN SODIUM 5000 [USP'U]/ML
5000 INJECTION, SOLUTION INTRAVENOUS; SUBCUTANEOUS EVERY 12 HOURS SCHEDULED
Status: DISCONTINUED | OUTPATIENT
Start: 2022-03-17 | End: 2022-03-20 | Stop reason: HOSPADM

## 2022-03-17 RX ORDER — METOPROLOL SUCCINATE 25 MG/1
25 TABLET, EXTENDED RELEASE ORAL DAILY
Status: DISCONTINUED | OUTPATIENT
Start: 2022-03-17 | End: 2022-03-20 | Stop reason: HOSPADM

## 2022-03-17 RX ORDER — ONDANSETRON 2 MG/ML
4 INJECTION INTRAMUSCULAR; INTRAVENOUS EVERY 6 HOURS PRN
Status: DISCONTINUED | OUTPATIENT
Start: 2022-03-17 | End: 2022-03-20 | Stop reason: HOSPADM

## 2022-03-17 RX ADMIN — NITROGLYCERIN 1 INCH: 20 OINTMENT TOPICAL at 09:56

## 2022-03-17 RX ADMIN — HEPARIN SODIUM 5000 UNITS: 5000 INJECTION INTRAVENOUS; SUBCUTANEOUS at 21:12

## 2022-03-17 RX ADMIN — OXCARBAZEPINE 600 MG: 300 TABLET, FILM COATED ORAL at 21:12

## 2022-03-17 RX ADMIN — LEVOTHYROXINE SODIUM 25 MCG: 25 TABLET ORAL at 14:42

## 2022-03-17 RX ADMIN — FOLIC ACID TAB 400 MCG 400 MCG: 400 TAB at 14:42

## 2022-03-17 RX ADMIN — Medication 5 MG: at 21:13

## 2022-03-17 RX ADMIN — OXCARBAZEPINE 600 MG: 300 TABLET, FILM COATED ORAL at 14:41

## 2022-03-17 RX ADMIN — QUETIAPINE FUMARATE 12.5 MG: 25 TABLET ORAL at 21:12

## 2022-03-17 RX ADMIN — IPRATROPIUM BROMIDE AND ALBUTEROL SULFATE 3 ML: 2.5; .5 SOLUTION RESPIRATORY (INHALATION) at 18:47

## 2022-03-17 RX ADMIN — METOPROLOL SUCCINATE 25 MG: 25 TABLET, EXTENDED RELEASE ORAL at 14:42

## 2022-03-17 RX ADMIN — METHYLPREDNISOLONE SODIUM SUCCINATE 125 MG: 125 INJECTION, POWDER, FOR SOLUTION INTRAMUSCULAR; INTRAVENOUS at 10:18

## 2022-03-17 RX ADMIN — PREGABALIN 25 MG: 25 CAPSULE ORAL at 21:13

## 2022-03-17 RX ADMIN — BUMETANIDE 0.5 MG: 0.25 INJECTION INTRAMUSCULAR; INTRAVENOUS at 21:11

## 2022-03-17 RX ADMIN — BUMETANIDE 0.5 MG: 0.25 INJECTION INTRAMUSCULAR; INTRAVENOUS at 10:05

## 2022-03-17 RX ADMIN — FOLIC ACID TAB 400 MCG 400 MCG: 400 TAB at 21:13

## 2022-03-17 RX ADMIN — IPRATROPIUM BROMIDE AND ALBUTEROL SULFATE 3 ML: 2.5; .5 SOLUTION RESPIRATORY (INHALATION) at 14:01

## 2022-03-17 RX ADMIN — Medication 10 ML: at 21:13

## 2022-03-17 RX ADMIN — DONEPEZIL HYDROCHLORIDE 5 MG: 5 TABLET, FILM COATED ORAL at 14:42

## 2022-03-17 RX ADMIN — SENNOSIDES AND DOCUSATE SODIUM 2 TABLET: 50; 8.6 TABLET ORAL at 21:12

## 2022-03-17 NOTE — ED PROVIDER NOTES
"Subjective   Patient presents to the emergency department from her nursing home secondary to shortness of breath with hypoxemia.  Patient reports the symptoms all started this morning.  The nursing home reports that they noticed that the patient earlier this morning may have been having \"a panic attack\".  They report that she was short of breath and anxious.  Subsequently, the patient was evaluated and found to be hypoxemic with oxygen saturation around 80%.  The patient does not typically wear supplemental oxygen.  She does have a prior tobacco history and does have reported history of COPD, chronic kidney disease, dementia, hyperlipidemia, and hypertension.  Patient also has a past history of paroxysmal atrial fibrillation.  There is reported possible exposure to Covid approximately 2 weeks ago.  Patient denies any fever or chills.  EMS does report the patient had increased work of breathing with a coarse wet cough.      History provided by:  Patient and EMS personnel      Review of Systems   Constitutional: Negative.    Respiratory: Positive for cough and shortness of breath.    Cardiovascular: Negative.    Gastrointestinal: Negative.    Musculoskeletal: Negative.    Neurological: Negative.    Psychiatric/Behavioral: The patient is nervous/anxious.    All other systems reviewed and are negative.      Past Medical History:   Diagnosis Date   • A-fib (HCC)    • Ataxic gait    • Chronic kidney disease, stage 3 (HCC)    • COPD (chronic obstructive pulmonary disease) (HCC)    • Dementia (HCC)    • Disease of thyroid gland    • Hyperlipidemia    • Hypertension        Allergies   Allergen Reactions   • Bee Pollen Unknown - Low Severity   • Lorazepam Delirium       Past Surgical History:   Procedure Laterality Date   • BRAIN TUMOR EXCISION      BENIGN   • FRACTURE SURGERY     • TRIGGER FINGER RELEASE         Family History   Problem Relation Age of Onset   • Stroke Mother    • Alcohol abuse Father        Social History "     Socioeconomic History   • Marital status: Single   Tobacco Use   • Smoking status: Former Smoker   • Smokeless tobacco: Never Used   Substance and Sexual Activity   • Alcohol use: No   • Drug use: No           Objective   Physical Exam  Vitals and nursing note reviewed.   Constitutional:       Appearance: She is ill-appearing. She is not toxic-appearing.   HENT:      Head: Normocephalic and atraumatic.   Cardiovascular:      Rate and Rhythm: Tachycardia present. Rhythm irregular.      Pulses: Normal pulses.   Pulmonary:      Effort: Tachypnea and respiratory distress present.      Breath sounds: Rhonchi and rales present.      Comments: Increased work of breathing.  Musculoskeletal:         General: Normal range of motion.      Cervical back: Normal range of motion.   Skin:     General: Skin is warm and dry.   Neurological:      General: No focal deficit present.      Mental Status: She is alert.   Psychiatric:         Mood and Affect: Mood normal.         Behavior: Behavior normal.         Procedures           ED Course  ED Course as of 03/17/22 1339   Thu Mar 17, 2022   0847 Review of notes from the nursing home demonstrates the patient was actually diagnosed with Covid 2 weeks ago. [RS]   0922 proBNP(!): 6,694.0 [RS]   0927 Procalcitonin: 0.08 [RS]   0935 Patient with evidence of COPD/CHF exacerbation with atrial fibrillation, and hypoxemia.  We will plan to admit the patient to the hospitalist for further evaluation and management.  I discussed the case with Dr. Chao who is agreeable with the admission plan. [RS]      ED Course User Index  [RS] Carlos Espinal MD                                                 MDM  Number of Diagnoses or Management Options  Acute on chronic congestive heart failure, unspecified heart failure type (HCC)  Acute on chronic respiratory failure with hypoxemia (HCC)  Alzheimer's dementia without behavioral disturbance, unspecified timing of dementia onset (HCC)  Atrial  fibrillation with rapid ventricular response (HCC)  Elevated blood pressure reading with diagnosis of hypertension  History of COPD  Diagnosis management comments: Recent Results (from the past 24 hour(s))  -ECG 12 Lead:   Collection Time: 03/17/22  8:37 AM       Result                      Value             Ref Range           QT Interval                 336               ms                  QTC Interval                458               ms             -Comprehensive Metabolic Panel:   Collection Time: 03/17/22  8:48 AM  Specimen: Blood       Result                      Value             Ref Range           Glucose                     127 (H)           65 - 99 mg/dL       BUN                         16                8 - 23 mg/dL        Creatinine                  0.92              0.57 - 1.00 *       Sodium                      142               136 - 145 mm*       Potassium                   3.9               3.5 - 5.2 mm*       Chloride                    106               98 - 107 mmo*       CO2                         25.0              22.0 - 29.0 *       Calcium                     8.9               8.6 - 10.5 m*       Total Protein               6.8               6.0 - 8.5 g/*       Albumin                     4.20              3.50 - 5.20 *       ALT (SGPT)                  72 (H)            1 - 33 U/L          AST (SGOT)                  97 (H)            1 - 32 U/L          Alkaline Phosphatase        97                39 - 117 U/L        Total Bilirubin             0.4               0.0 - 1.2 mg*       Globulin                    2.6               gm/dL               A/G Ratio                   1.6               g/dL                BUN/Creatinine Ratio        17.4              7.0 - 25.0          Anion Gap                   11.0              5.0 - 15.0 m*       eGFR                        61.1              >60.0 mL/min*  -BNP:   Collection Time: 03/17/22  8:48 AM  Specimen: Blood       Result                       Value             Ref Range           proBNP                      6,694.0 (H)       0.0 - 1,800.*  -Troponin:   Collection Time: 03/17/22  8:48 AM  Specimen: Blood       Result                      Value             Ref Range           Troponin T                  0.028             0.000 - 0.03*  -Green Top (Gel):   Collection Time: 03/17/22  8:48 AM       Result                      Value             Ref Range           Extra Tube                                                    Hold for add-ons.  -Lavender Top:   Collection Time: 03/17/22  8:48 AM       Result                      Value             Ref Range           Extra Tube                                                    hold for add-on  -Gold Top - SST:   Collection Time: 03/17/22  8:48 AM       Result                      Value             Ref Range           Extra Tube                                                    Hold for add-ons.  -Gray Top:   Collection Time: 03/17/22  8:48 AM       Result                      Value             Ref Range           Extra Tube                                                    Hold for add-ons.  -CBC Auto Differential:   Collection Time: 03/17/22  8:48 AM  Specimen: Blood       Result                      Value             Ref Range           WBC                         11.50 (H)         3.40 - 10.80*       RBC                         4.32              3.77 - 5.28 *       Hemoglobin                  13.6              12.0 - 15.9 *       Hematocrit                  41.6              34.0 - 46.6 %       MCV                         96.3              79.0 - 97.0 *       MCH                         31.5              26.6 - 33.0 *       MCHC                        32.7              31.5 - 35.7 *       RDW                         12.2 (L)          12.3 - 15.4 %       RDW-SD                      43.6              37.0 - 54.0 *       MPV                         12.3 (H)          6.0 - 12.0 fL       Platelets                    124 (L)           140 - 450 10*       Neutrophil %                75.4              42.7 - 76.0 %       Lymphocyte %                13.7 (L)          19.6 - 45.3 %       Monocyte %                  7.7               5.0 - 12.0 %        Eosinophil %                1.7               0.3 - 6.2 %         Basophil %                  1.0               0.0 - 1.5 %         Immature Grans %            0.5               0.0 - 0.5 %         Neutrophils, Absolute       8.67 (H)          1.70 - 7.00 *       Lymphocytes, Absolute       1.57              0.70 - 3.10 *       Monocytes, Absolute         0.89              0.10 - 0.90 *       Eosinophils, Absolute       0.19              0.00 - 0.40 *       Basophils, Absolute         0.12              0.00 - 0.20 *       Immature Grans, Absolu*     0.06 (H)          0.00 - 0.05 *       nRBC                        0.0               0.0 - 0.2 /1*  -Lactate Dehydrogenase:   Collection Time: 03/17/22  8:48 AM  Specimen: Blood       Result                      Value             Ref Range           LDH                         282 (H)           135 - 214 U/L  -Procalcitonin:   Collection Time: 03/17/22  8:48 AM  Specimen: Blood       Result                      Value             Ref Range           Procalcitonin               0.08              0.00 - 0.25 *  -C-reactive Protein:   Collection Time: 03/17/22  8:48 AM  Specimen: Blood       Result                      Value             Ref Range           C-Reactive Protein          0.81 (H)          0.00 - 0.50 *  -Lactic Acid, Plasma:   Collection Time: 03/17/22  8:48 AM  Specimen: Blood       Result                      Value             Ref Range           Lactate                     1.6               0.5 - 2.0 mm*  -COVID-19 and FLU A/B PCR - Swab, Nasopharynx:   Collection Time: 03/17/22  8:50 AM  Specimen: Nasopharynx; Swab       Result                      Value             Ref Range           COVID19                      Not Detected      Not Detected*       Influenza A PCR             Not Detected      Not Detected        Influenza B PCR             Not Detected      Not Detected   -Light Blue Top:   Collection Time: 03/17/22  9:51 AM       Result                      Value             Ref Range           Extra Tube                                                    hold for add-on  -Fibrinogen:   Collection Time: 03/17/22  9:51 AM  Specimen: Blood       Result                      Value             Ref Range           Fibrinogen                  283               220 - 470 mg*  Note: In addition to lab results from this visit, the labs listed above may include labs taken at another facility or during a different encounter within the last 24 hours. Please correlate lab times with ED admission and discharge times for further clarification of the services performed during this visit.    XR Chest 1 View   Final Result         1. New opacities in the lower lung zones which may represent edema or    pneumonia    2. Bibasilar atelectasis    3. Pulmonary emphysema    4. Stable right upper lobe mass         This report was finalized on 3/17/2022 9:20 AM by Dinesh Brannon.          ------------------------------------------------------------            03/17/22 03/17/22 03/17/22 03/17/22                   1228      1233      1300         1316         ------------------------------------------------------------   BP:       140/88              148/86       146/98         BP Location:                                  Left arm        Patient Position:                                    Lying         Pulse:                87        90           92           Resp:                                        18           Temp:                                 98.7 °F (37.1 °C)   TempSrc:                                    Oral          SpO2:                93%       91%           95%           Weight:                                                   Height:                                                  ------------------------------------------------------------  Medications  sodium chloride 0.9 % flush 10 mL (has no administration in time range)  ipratropium-albuterol (DUO-NEB) nebulizer solution 3 mL (has no administration in time range)  predniSONE (DELTASONE) tablet 40 mg (has no administration in time range)  bumetanide (BUMEX) injection 0.5 mg (has no administration in time range)  donepezil (ARICEPT) tablet 5 mg (has no administration in time range)  folic acid (FOLVITE) tablet 400 mcg (has no administration in time range)  levothyroxine (SYNTHROID, LEVOTHROID) tablet 25 mcg (has no administration in time range)  metoprolol succinate XL (TOPROL-XL) 24 hr tablet 25 mg (has no administration in time range)  OXcarbazepine (TRILEPTAL) tablet 600 mg (has no administration in time range)  pregabalin (LYRICA) capsule 25 mg (has no administration in time range)  pantoprazole (PROTONIX) EC tablet 20 mg (has no administration in time range)  potassium chloride (MICRO-K) CR capsule 20 mEq (has no administration in time range)  QUEtiapine (SEROquel) half tablet 12.5 mg (has no administration in time range)  nitroglycerin (NITROSTAT) ointment 1 inch (1 inch Topical Given 3/17/22 9311)  bumetanide (BUMEX) injection 0.5 mg (0.5 mg Intravenous Given 3/17/22 1005)  methylPREDNISolone sodium succinate (SOLU-Medrol) injection 125 mg (125 mg Intravenous Given 3/17/22 1018)  ECG/EMG Results (last 24 hours)     Procedure Component Value Units Date/Time    ECG 12 Lead ([id number]) Collected: 03/17/22 0837     Updated: 03/17/22 1332     QT Interval 336 ms      QTC Interval 458 ms     Narrative:      Test Reason : SOA Protocol  Blood Pressure :   */*   mmHG  Vent. Rate : 112 BPM     Atrial Rate :  85 BPM     P-R Int :   * ms          QRS Dur :  78 ms      QT Int : 336 ms       P-R-T Axes :   *   6  -43 degrees     QTc Int : 458 ms    Atrial fibrillation with rapid ventricular response with premature   ventricular or aberrantly conducted complexes  T wave abnormality, consider inferior ischemia  Abnormal ECG  When compared with ECG of 07-DEC-2021 02:06,  Atrial fibrillation has replaced Sinus rhythm  T wave inversion now evident in Inferior leads  Confirmed by RUSSELL LOZANO MD ([id number]) on 3/17/2022 1:31:25 PM    Referred By: JUAREZ ROY           Confirmed By: RUSSELL LOZANO MD      ECG 12 Lead   Final Result    Test Reason : SOA Protocol    Blood Pressure :   */*   mmHG    Vent. Rate : 112 BPM     Atrial Rate :  85 BPM       P-R Int :   * ms          QRS Dur :  78 ms        QT Int : 336 ms       P-R-T Axes :   *   6 -43 degrees       QTc Int : 458 ms        Atrial fibrillation with rapid ventricular response with premature     ventricular or aberrantly conducted complexes    T wave abnormality, consider inferior ischemia    Abnormal ECG    When compared with ECG of 07-DEC-2021 02:06,    Atrial fibrillation has replaced Sinus rhythm    T wave inversion now evident in Inferior leads    Confirmed by RUSSELL LOZANO MD ([id number]) on 3/17/2022 1:31:25 PM        Referred By: JUAREZ ROY           Confirmed By: RUSSELL LOZANO MD            Amount and/or Complexity of Data Reviewed  Clinical lab tests: reviewed  Tests in the radiology section of CPT®: reviewed  Decide to obtain previous medical records or to obtain history from someone other than the patient: yes  Obtain history from someone other than the patient: yes  Discuss the patient with other providers: yes  Independent visualization of images, tracings, or specimens: yes        Final diagnoses:   Acute on chronic respiratory failure with hypoxemia (HCC)   Acute on chronic congestive heart failure, unspecified heart failure type (HCC)   Elevated blood pressure reading with diagnosis of hypertension   Atrial fibrillation with rapid ventricular response (HCC)   History of COPD    Alzheimer's dementia without behavioral disturbance, unspecified timing of dementia onset (HCC)       ED Disposition  ED Disposition     ED Disposition   Decision to Admit    Condition   --    Comment   Level of Care: Telemetry [5]   Diagnosis: Acute on chronic respiratory failure with hypoxemia (HCC) [9700572]   Admitting Physician: MAURICIO LOPEZ [[id number]]   Attending Physician: MAURICIO LOPEZ [[id number]]   Certification: I Certify That Inpatient Hospital Services Are Medically Necessary For Greater Than 2 Midnights               No follow-up provider specified.       Medication List      No changes were made to your prescriptions during this visit.          Carlos Espinal MD  03/17/22 2258

## 2022-03-17 NOTE — H&P
[facility] Medicine Services  HISTORY AND PHYSICAL    Patient Name: Mitzi Eric  : 1935  MRN: [id number]  Primary Care Physician: Dm Cade APRN  Date of admission: 3/17/2022      Subjective   Subjective     Chief Complaint:  SOA    HPI:  Mitzi Eric is a 86 y.o. female with hx of HTN, HLD, COPD, hypothyroidism, pulmonary HTN, PAF not on anticoagulation due to fall risk, trigeminal neuralgia, CKD 3, known lung mass, and dementia who presents from her memory care unit at [facility] due to SOA and hypoxia. Symptoms started this morning, however family at bedside report she had a milder episode of SOA two nights ago that passed on its own. Also reports wet cough. This morning she states she was making her bed when the symptoms began--she is usually in a wheelchair but is still able to maneuver to make her bed daily. Staff initially thought she may be having a panic attack, however O2 sats were noted to be 80% and her symptoms did not improve with staff trying to calm her down. Does not wear oxygen at baseline. On arrival to the ER she is tachypneic and in respiratory distress. Arrived on a NRB mask. She is in Afib with RVR, rate 112. CXR showed increased markings in the lower lung zones which may represent edema or pneumonia, with stable appearance of RUL mass and pulmonary emphysema. Labs showed elevated proBNP 6694, WBC 11.5, platelets 124, AST 97, ALT 72. Normal lactic acid and procalcitonin. She was given IV Bumex and admitted for further management. Currently she is feeling much better.     ADDENDUM: Apparently per facility notes, she was diagnosed with COVID-19 approximately 2 weeks ago. Her COVID-19 PCR is negative here.    COVID Details:    Symptoms:    [] NONE [] Fever [x]  Cough [x] Shortness of breath [] Change in taste/smell      Review of Systems   Gen-no fevers, no chills  CV-no chest pain, no palpitations  Resp-+cough, +dyspnea  GI-no N/V/D, no abd  pain    All other systems reviewed and negative except any additional pertinent positives and negatives as discussed in HPI.     All other systems reviewed and are negative.     Personal History     Past Medical History:   Diagnosis Date   • A-fib (HCC)    • Ataxic gait    • Chronic kidney disease, stage 3 (HCC)    • COPD (chronic obstructive pulmonary disease) (HCC)    • Dementia (HCC)    • Disease of thyroid gland    • Hyperlipidemia    • Hypertension        Past Surgical History:   Procedure Laterality Date   • BRAIN TUMOR EXCISION      BENIGN   • FRACTURE SURGERY     • TRIGGER FINGER RELEASE         Family History:  family history includes Alcohol abuse in her father; Stroke in her mother. Otherwise pertinent FHx was reviewed and unremarkable.     Social History:  reports that she has quit smoking. She has never used smokeless tobacco. She reports that she does not drink alcohol and does not use drugs.  Social History     Social History Narrative   • Not on file       Medications:  Available home medication information reviewed.  (Not in a hospital admission)      Allergies   Allergen Reactions   • Bee Pollen Unknown - Low Severity   • Lorazepam Delirium       Objective   Objective     Vital Signs:   Heart Rate:  [] 98  Resp:  [26] 26  BP: (158-160)/() 160/92  Flow (L/min):  [10] 10       Physical Exam   Constitutional: Awake, alert  Eyes: PERRLA, sclerae anicteric, no conjunctival injection  HENT: NCAT, mucous membranes moist  Neck: Supple, no thyromegaly, no lymphadenopathy, trachea midline  Respiratory: slightly diminished bilaterally, nonlabored respirations currently remains on NRB  Cardiovascular: RRR, no murmurs, rubs, or gallops, palpable pedal pulses bilaterally  Gastrointestinal: Positive bowel sounds, soft, nontender, nondistended  Musculoskeletal: No bilateral ankle edema, no clubbing or cyanosis to extremities  Psychiatric: Appropriate affect, cooperative  Neurologic: awake, alert,  conversant, able to tell me most of recent history but has trouble with the details (family member helps at bedside), strength symmetric in all extremities, Cranial Nerves grossly intact to confrontation, speech clear  Skin: No rashes      Result Review:  I have personally reviewed the results from the time of this admission to 3/17/2022 10:01 EDT and agree with these findings:  [x]  Laboratory  [x]  Microbiology  [x]  Radiology  [x]  EKG/Telemetry   []  Cardiology/Vascular   []  Pathology  [x]  Old records  []  Other:    LAB RESULTS:      Lab 03/17/22  0848   WBC 11.50*   HEMOGLOBIN 13.6   HEMATOCRIT 41.6   PLATELETS 124*   NEUTROS ABS 8.67*   IMMATURE GRANS (ABS) 0.06*   LYMPHS ABS 1.57   MONOS ABS 0.89   EOS ABS 0.19   MCV 96.3   CRP 0.81*   PROCALCITONIN 0.08   LACTATE 1.6   *         Lab 03/17/22  0848   SODIUM 142   POTASSIUM 3.9   CHLORIDE 106   CO2 25.0   ANION GAP 11.0   BUN 16   CREATININE 0.92   EGFR 61.1   GLUCOSE 127*   CALCIUM 8.9         Lab 03/17/22  0848   TOTAL PROTEIN 6.8   ALBUMIN 4.20   GLOBULIN 2.6   ALT (SGPT) 72*   AST (SGOT) 97*   BILIRUBIN 0.4   ALK PHOS 97         Lab 03/17/22  0848   PROBNP 6,694.0*   TROPONIN T 0.028                 UA    Urinalysis 11/5/21 11/29/21 11/29/21     1200 1200   Squamous Epithelial Cells, UA   None Seen   Specific [place], UA 1.012 1.026    Ketones, UA Negative Negative    Blood, UA Negative Trace (A)    Leukocytes, UA Negative Small (1+) (A)    Nitrite, UA Negative Positive (A)    RBC, UA   0-2   WBC, UA   21-30 (A)   Bacteria, UA   4+ (A)   (A) Abnormal value              Microbiology Results (last 10 days)     Procedure Component Value - Date/Time    COVID-19 and FLU A/B PCR - Swab, Nasopharynx [[id number]]  (Normal) Collected: 03/17/22 0850    Lab Status: Final result Specimen: Swab from Nasopharynx Updated: 03/17/22 0918     COVID19 Not Detected     Influenza A PCR Not Detected     Influenza B PCR Not Detected    Narrative:      Fact sheet for  providers: https://www.fda.gov/media/676742/download    Fact sheet for patients: https://www.fda.gov/media/816663/download    Test performed by PCR.          XR Chest 1 View    Result Date: 3/17/2022  DATE OF EXAM: 3/17/2022 9:03 AM  PROCEDURE: XR CHEST 1 VW-  INDICATIONS: SOA triage protocol  COMPARISON: 12/7/2021  TECHNIQUE: Single radiographic AP view of the chest was obtained.  FINDINGS: Heart size and pulmonary vasculature are normal. Lungs are hyperinflated with bullous changes in the upper lobes. There is a stable mass in the right upper lobe with pleural tags extending to the apex where there is focal pleural thickening. There are increased markings in the lower lung zones that appear to be a combination of interstitial and groundglass opacities greater on the right. There is thickening of the pleural stripe laterally on the right. There is strandy atelectasis in the medial lung bases      Impression:  1. New opacities in the lower lung zones which may represent edema or pneumonia 2. Bibasilar atelectasis 3. Pulmonary emphysema 4. Stable right upper lobe mass  This report was finalized on 3/17/2022 9:20 AM by Dinesh Brannon.        Results for orders placed during the hospital encounter of 11/29/21    Adult Transthoracic Echo Complete W/ Cont if Necessary Per Protocol (With Agitated Saline)    Interpretation Summary  · Left ventricular ejection fraction appears to be 61 - 65%. Left ventricular systolic function is normal.  · Saline test results are negative.  · Estimated right ventricular systolic pressure from tricuspid regurgitation is markedly elevated (>55 mmHg).  · Moderate tricuspid valve regurgitation is present.  · Left ventricular diastolic function was normal.      Assessment/Plan   Assessment & Plan     Active Hospital Problems    Diagnosis  POA   • **Acute respiratory failure with hypoxia (HCC) [J96.01]  Yes   • Acute CHF (congestive heart failure) (HCC) [I50.9]  Yes   • Thrombocytopenia (HCC)  [D69.6]  Yes   • Elevated transaminase level [R74.01]  Yes   • Atrial fibrillation with RVR (HCC) [I48.91]  Yes   • COPD with acute exacerbation (HCC) [J44.1]  Yes   • Dementia without behavioral disturbance (HCC) [F03.90]  Yes   • Hypothyroidism (acquired) [E03.9]  Yes   • Trigeminal neuralgia [G50.0]  Yes   • Essential hypertension [I10]  Yes       85 yo F with hx of HTN, HLD, COPD, hypothyroidism, pulmonary HTN, PAF not on anticoagulation due to fall risk, trigeminal neuralgia, CKD 3, known lung mass, and dementia who presents from her memory care unit at [facility] due to SOA and hypoxia. O2 sats were noted to be 80%. Does not wear oxygen at baseline. On arrival to the ER she is tachypneic and in respiratory distress. Arrived on a NRB mask. She is in Afib with RVR, rate 112. CXR showed increased markings in the lower lung zones which may represent edema or pneumonia, with stable appearance of RUL mass and pulmonary emphysema. Labs showed elevated proBNP 6694, WBC 11.5, platelets 124, AST 97, ALT 72. Normal lactic acid and procalcitonin. She was given IV Bumex and admitted for further management.     Acute respiratory failure with hypoxia  Acute CHF/valvular heart disease  Pulmonary hypertension  Moderate tricuspid regurgitation  COPD with exacerbation  --Overall likely due to CHF + possible component of COPD exacerbation. Doubt pneumonia given lack of fever, normal procalcitonin. Mild leukocytosis could be reactive.   --s/p IV Bumex in ER, will schedule another dose this evening and monitor.   --Give 125 mg IV Solumedrol now, then continue on prednisone 40 mg daily x 5 days (may be able to stop early as she has no wheezing on exam currently). Will add Doxycycline x 5 days as well.   --Repeat Echo. Last Echo 11/29/21 reviewed: EF 61-65%, normal diastolic function, evidence of pulmonary hypertension with moderate TR and RVSP >55 mmHg.   --Wean O2 as tolerated.     Afib with RVR  Hx of PAF  --Resume home  Metoprolol XL 25 mg once daily for rate control.  --No anticoagulation due to fall risk.    Elevated transaminases  --Possibly due to decompensated CHF.  --Will trend. If worsens, will plan for further workup with liver US.    Thrombocytopenia  --Appears to be chronic based on recent values.  --Stable, monitor.     Hypothyroidism  --Continue Synthroid.    Trigeminal neuralgia  --Continue Trileptal, Lyrica.    Dementia  --Saw Neurology as outpatient on 3/9/22--started on Aricept 5 mg daily, will continue.  --Need to confirm if still taking Seroquel. Will add PRN dose to be available should she need it.     Recent COVID-19  --Per facility notes, she was diagnosed with COVID-19 roughly 2 weeks ago. She has tested negative here and is out of the window for treatment/isolation. Her symptoms are clearly more related to CHF/COPD.     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     DVT prophylaxis:  [facility]      CODE STATUS:    Code Status and Medical Interventions:   Ordered at: 03/17/22 1000     Medical Intervention Limits:    NO intubation (DNI)     Code Status (Patient has no pulse and is not breathing):    No CPR (Do Not Attempt to Resuscitate)     Medical Interventions (Patient has pulse or is breathing):    Limited Support         Joaquina Douglas MD  03/17/22

## 2022-03-18 PROBLEM — J96.21 ACUTE ON CHRONIC RESPIRATORY FAILURE WITH HYPOXEMIA (HCC): Status: ACTIVE | Noted: 2022-03-18

## 2022-03-18 LAB
ALBUMIN SERPL-MCNC: 3.7 G/DL (ref 3.5–5.2)
ALBUMIN/GLOB SERPL: 1.9 G/DL
ALP SERPL-CCNC: 74 U/L (ref 39–117)
ALT SERPL W P-5'-P-CCNC: 57 U/L (ref 1–33)
ANION GAP SERPL CALCULATED.3IONS-SCNC: 11 MMOL/L (ref 5–15)
AST SERPL-CCNC: 66 U/L (ref 1–32)
BH CV ECHO MEAS - AO MAX PG (FULL): 2.1 MMHG
BH CV ECHO MEAS - AO MAX PG: 5 MMHG
BH CV ECHO MEAS - AO MEAN PG (FULL): 1.4 MMHG
BH CV ECHO MEAS - AO MEAN PG: 3 MMHG
BH CV ECHO MEAS - AO ROOT AREA (BSA CORRECTED): 1.9
BH CV ECHO MEAS - AO ROOT AREA: 6.5 CM^2
BH CV ECHO MEAS - AO ROOT DIAM: 2.9 CM
BH CV ECHO MEAS - AO V2 MAX: 107.9 CM/SEC
BH CV ECHO MEAS - AO V2 MEAN: 82.4 CM/SEC
BH CV ECHO MEAS - AO V2 VTI: 21.1 CM
BH CV ECHO MEAS - AVA(I,A): 1.9 CM^2
BH CV ECHO MEAS - AVA(I,D): 1.9 CM^2
BH CV ECHO MEAS - AVA(V,A): 2.3 CM^2
BH CV ECHO MEAS - AVA(V,D): 2.3 CM^2
BH CV ECHO MEAS - BSA(HAYCOCK): 1.5 M^2
BH CV ECHO MEAS - BSA: 1.5 M^2
BH CV ECHO MEAS - BZI_BMI: 19.5 KILOGRAMS/M^2
BH CV ECHO MEAS - BZI_METRIC_HEIGHT: 160 CM
BH CV ECHO MEAS - BZI_METRIC_WEIGHT: 49.9 KG
BH CV ECHO MEAS - EDV(CUBED): 48.9 ML
BH CV ECHO MEAS - EDV(MOD-SP2): 67.6 ML
BH CV ECHO MEAS - EDV(MOD-SP4): 86.6 ML
BH CV ECHO MEAS - EDV(TEICH): 56.5 ML
BH CV ECHO MEAS - EF(CUBED): 64.1 %
BH CV ECHO MEAS - EF(MOD-BP): 45 %
BH CV ECHO MEAS - EF(MOD-SP2): 41.7 %
BH CV ECHO MEAS - EF(MOD-SP4): 55.7 %
BH CV ECHO MEAS - EF(TEICH): 56.5 %
BH CV ECHO MEAS - ESV(CUBED): 17.6 ML
BH CV ECHO MEAS - ESV(MOD-SP2): 39.4 ML
BH CV ECHO MEAS - ESV(MOD-SP4): 38.4 ML
BH CV ECHO MEAS - ESV(TEICH): 24.6 ML
BH CV ECHO MEAS - FS: 28.9 %
BH CV ECHO MEAS - IVS/LVPW: 0.7
BH CV ECHO MEAS - IVSD: 0.87 CM
BH CV ECHO MEAS - LA DIMENSION: 3 CM
BH CV ECHO MEAS - LA/AO: 1.1
BH CV ECHO MEAS - LAD MAJOR: 3.9 CM
BH CV ECHO MEAS - LAT PEAK E' VEL: 7.8 CM/SEC
BH CV ECHO MEAS - LATERAL E/E' RATIO: 7.3
BH CV ECHO MEAS - LV DIASTOLIC VOL/BSA (35-75): 57.7 ML/M^2
BH CV ECHO MEAS - LV MASS(C)D: 118.3 GRAMS
BH CV ECHO MEAS - LV MASS(C)DI: 78.8 GRAMS/M^2
BH CV ECHO MEAS - LV MAX PG: 2.9 MMHG
BH CV ECHO MEAS - LV MEAN PG: 1.6 MMHG
BH CV ECHO MEAS - LV SYSTOLIC VOL/BSA (12-30): 25.6 ML/M^2
BH CV ECHO MEAS - LV V1 MAX: 85.6 CM/SEC
BH CV ECHO MEAS - LV V1 MEAN: 61.6 CM/SEC
BH CV ECHO MEAS - LV V1 VTI: 14.1 CM
BH CV ECHO MEAS - LVIDD: 3.7 CM
BH CV ECHO MEAS - LVIDS: 2.6 CM
BH CV ECHO MEAS - LVLD AP2: 7.8 CM
BH CV ECHO MEAS - LVLD AP4: 6.5 CM
BH CV ECHO MEAS - LVLS AP2: 7 CM
BH CV ECHO MEAS - LVLS AP4: 5.6 CM
BH CV ECHO MEAS - LVOT AREA (M): 2.8 CM^2
BH CV ECHO MEAS - LVOT AREA: 2.9 CM^2
BH CV ECHO MEAS - LVOT DIAM: 1.9 CM
BH CV ECHO MEAS - LVPWD: 1.2 CM
BH CV ECHO MEAS - MR MAX PG: 149 MMHG
BH CV ECHO MEAS - MR MAX VEL: 613.5 CM/SEC
BH CV ECHO MEAS - MR MEAN PG: 97 MMHG
BH CV ECHO MEAS - MR MEAN VEL: 462 CM/SEC
BH CV ECHO MEAS - MR VTI: 200.1 CM
BH CV ECHO MEAS - MV A MAX VEL: 77.4 CM/SEC
BH CV ECHO MEAS - MV DEC TIME: 0.12 SEC
BH CV ECHO MEAS - MV E MAX VEL: 57.5 CM/SEC
BH CV ECHO MEAS - MV E/A: 0.74
BH CV ECHO MEAS - MV MAX PG: 4.2 MMHG
BH CV ECHO MEAS - MV MEAN PG: 1.6 MMHG
BH CV ECHO MEAS - MV V2 MAX: 101.4 CM/SEC
BH CV ECHO MEAS - MV V2 MEAN: 58 CM/SEC
BH CV ECHO MEAS - MV V2 VTI: 22.3 CM
BH CV ECHO MEAS - MVA(VTI): 1.8 CM^2
BH CV ECHO MEAS - PA ACC TIME: 0.12 SEC
BH CV ECHO MEAS - PA MAX PG: 2.9 MMHG
BH CV ECHO MEAS - PA PR(ACCEL): 23.5 MMHG
BH CV ECHO MEAS - PA V2 MAX: 84.8 CM/SEC
BH CV ECHO MEAS - RAP SYSTOLE: 3 MMHG
BH CV ECHO MEAS - RVSP: 54 MMHG
BH CV ECHO MEAS - SI(AO): 91.3 ML/M^2
BH CV ECHO MEAS - SI(CUBED): 20.9 ML/M^2
BH CV ECHO MEAS - SI(LVOT): 26.9 ML/M^2
BH CV ECHO MEAS - SI(MOD-SP2): 18.8 ML/M^2
BH CV ECHO MEAS - SI(MOD-SP4): 32.1 ML/M^2
BH CV ECHO MEAS - SI(TEICH): 21.3 ML/M^2
BH CV ECHO MEAS - SV(AO): 136.9 ML
BH CV ECHO MEAS - SV(CUBED): 31.3 ML
BH CV ECHO MEAS - SV(LVOT): 40.3 ML
BH CV ECHO MEAS - SV(MOD-SP2): 28.2 ML
BH CV ECHO MEAS - SV(MOD-SP4): 48.2 ML
BH CV ECHO MEAS - SV(TEICH): 31.9 ML
BH CV ECHO MEAS - TAPSE (>1.6): 2 CM
BH CV ECHO MEAS - TR MAX PG: 51 MMHG
BH CV ECHO MEAS - TR MAX VEL: 348.4 CM/SEC
BH CV XLRA - RV BASE: 3.9 CM
BH CV XLRA - RV LENGTH: 5.1 CM
BH CV XLRA - RV MID: 2.9 CM
BILIRUB SERPL-MCNC: 0.5 MG/DL (ref 0–1.2)
BUN SERPL-MCNC: 23 MG/DL (ref 8–23)
BUN/CREAT SERPL: 28.4 (ref 7–25)
CALCIUM SPEC-SCNC: 9 MG/DL (ref 8.6–10.5)
CHLORIDE SERPL-SCNC: 104 MMOL/L (ref 98–107)
CO2 SERPL-SCNC: 27 MMOL/L (ref 22–29)
CREAT SERPL-MCNC: 0.81 MG/DL (ref 0.57–1)
DEPRECATED RDW RBC AUTO: 41 FL (ref 37–54)
EGFRCR SERPLBLD CKD-EPI 2021: 71.2 ML/MIN/1.73
ERYTHROCYTE [DISTWIDTH] IN BLOOD BY AUTOMATED COUNT: 12 % (ref 12.3–15.4)
GLOBULIN UR ELPH-MCNC: 1.9 GM/DL
GLUCOSE SERPL-MCNC: 87 MG/DL (ref 65–99)
HCT VFR BLD AUTO: 33 % (ref 34–46.6)
HGB BLD-MCNC: 11.2 G/DL (ref 12–15.9)
LEFT ATRIUM VOLUME INDEX: 30.1 ML/M^2
LEFT ATRIUM VOLUME: 45.2 ML
MAXIMAL PREDICTED HEART RATE: 135 BPM
MCH RBC QN AUTO: 31.9 PG (ref 26.6–33)
MCHC RBC AUTO-ENTMCNC: 33.9 G/DL (ref 31.5–35.7)
MCV RBC AUTO: 94 FL (ref 79–97)
PLATELET # BLD AUTO: 110 10*3/MM3 (ref 140–450)
PMV BLD AUTO: 12.6 FL (ref 6–12)
POTASSIUM SERPL-SCNC: 3.8 MMOL/L (ref 3.5–5.2)
PROT SERPL-MCNC: 5.6 G/DL (ref 6–8.5)
RBC # BLD AUTO: 3.51 10*6/MM3 (ref 3.77–5.28)
SODIUM SERPL-SCNC: 142 MMOL/L (ref 136–145)
STRESS TARGET HR: 115 BPM
WBC NRBC COR # BLD: 7.63 10*3/MM3 (ref 3.4–10.8)

## 2022-03-18 PROCEDURE — 97165 OT EVAL LOW COMPLEX 30 MIN: CPT

## 2022-03-18 PROCEDURE — 94761 N-INVAS EAR/PLS OXIMETRY MLT: CPT

## 2022-03-18 PROCEDURE — 97530 THERAPEUTIC ACTIVITIES: CPT

## 2022-03-18 PROCEDURE — 94799 UNLISTED PULMONARY SVC/PX: CPT

## 2022-03-18 PROCEDURE — G0378 HOSPITAL OBSERVATION PER HR: HCPCS

## 2022-03-18 PROCEDURE — 63710000001 PREDNISONE PER 1 MG: Performed by: HOSPITALIST

## 2022-03-18 PROCEDURE — 85027 COMPLETE CBC AUTOMATED: CPT | Performed by: HOSPITALIST

## 2022-03-18 PROCEDURE — 80053 COMPREHEN METABOLIC PANEL: CPT | Performed by: HOSPITALIST

## 2022-03-18 PROCEDURE — 97161 PT EVAL LOW COMPLEX 20 MIN: CPT

## 2022-03-18 PROCEDURE — 99232 SBSQ HOSP IP/OBS MODERATE 35: CPT | Performed by: INTERNAL MEDICINE

## 2022-03-18 PROCEDURE — 25010000002 HEPARIN (PORCINE) PER 1000 UNITS: Performed by: HOSPITALIST

## 2022-03-18 RX ORDER — DOXYCYCLINE 100 MG/1
100 CAPSULE ORAL EVERY 12 HOURS SCHEDULED
Status: DISCONTINUED | OUTPATIENT
Start: 2022-03-18 | End: 2022-03-20 | Stop reason: HOSPADM

## 2022-03-18 RX ADMIN — PREGABALIN 25 MG: 25 CAPSULE ORAL at 08:15

## 2022-03-18 RX ADMIN — IPRATROPIUM BROMIDE AND ALBUTEROL SULFATE 3 ML: 2.5; .5 SOLUTION RESPIRATORY (INHALATION) at 19:48

## 2022-03-18 RX ADMIN — IPRATROPIUM BROMIDE AND ALBUTEROL SULFATE 3 ML: 2.5; .5 SOLUTION RESPIRATORY (INHALATION) at 08:23

## 2022-03-18 RX ADMIN — LEVOTHYROXINE SODIUM 25 MCG: 25 TABLET ORAL at 05:22

## 2022-03-18 RX ADMIN — Medication 10 ML: at 09:51

## 2022-03-18 RX ADMIN — DOXYCYCLINE 100 MG: 100 CAPSULE ORAL at 20:50

## 2022-03-18 RX ADMIN — OXCARBAZEPINE 600 MG: 300 TABLET, FILM COATED ORAL at 08:19

## 2022-03-18 RX ADMIN — Medication 10 ML: at 20:51

## 2022-03-18 RX ADMIN — SENNOSIDES AND DOCUSATE SODIUM 2 TABLET: 50; 8.6 TABLET ORAL at 20:50

## 2022-03-18 RX ADMIN — DOXYCYCLINE 100 MG: 100 CAPSULE ORAL at 14:01

## 2022-03-18 RX ADMIN — POTASSIUM CHLORIDE 20 MEQ: 750 CAPSULE, EXTENDED RELEASE ORAL at 08:15

## 2022-03-18 RX ADMIN — HEPARIN SODIUM 5000 UNITS: 5000 INJECTION INTRAVENOUS; SUBCUTANEOUS at 20:51

## 2022-03-18 RX ADMIN — PANTOPRAZOLE SODIUM 40 MG: 40 TABLET, DELAYED RELEASE ORAL at 08:15

## 2022-03-18 RX ADMIN — PREDNISONE 40 MG: 20 TABLET ORAL at 08:15

## 2022-03-18 RX ADMIN — QUETIAPINE FUMARATE 12.5 MG: 25 TABLET ORAL at 20:50

## 2022-03-18 RX ADMIN — OXCARBAZEPINE 600 MG: 300 TABLET, FILM COATED ORAL at 20:52

## 2022-03-18 RX ADMIN — IPRATROPIUM BROMIDE AND ALBUTEROL SULFATE 3 ML: 2.5; .5 SOLUTION RESPIRATORY (INHALATION) at 15:38

## 2022-03-18 RX ADMIN — IPRATROPIUM BROMIDE AND ALBUTEROL SULFATE 3 ML: 2.5; .5 SOLUTION RESPIRATORY (INHALATION) at 13:05

## 2022-03-18 RX ADMIN — HEPARIN SODIUM 5000 UNITS: 5000 INJECTION INTRAVENOUS; SUBCUTANEOUS at 08:15

## 2022-03-18 RX ADMIN — Medication 5 MG: at 20:50

## 2022-03-18 RX ADMIN — FOLIC ACID TAB 400 MCG 400 MCG: 400 TAB at 20:51

## 2022-03-18 RX ADMIN — DONEPEZIL HYDROCHLORIDE 5 MG: 5 TABLET, FILM COATED ORAL at 05:22

## 2022-03-18 RX ADMIN — FOLIC ACID TAB 400 MCG 400 MCG: 400 TAB at 08:15

## 2022-03-18 RX ADMIN — SENNOSIDES AND DOCUSATE SODIUM 2 TABLET: 50; 8.6 TABLET ORAL at 08:14

## 2022-03-18 RX ADMIN — METOPROLOL SUCCINATE 25 MG: 25 TABLET, EXTENDED RELEASE ORAL at 08:15

## 2022-03-18 RX ADMIN — PREGABALIN 25 MG: 25 CAPSULE ORAL at 20:50

## 2022-03-18 NOTE — NURSING NOTE
Woc consulted for: Stage I right gluteal    Wound/ Skin Assessment: Area is charted as a red area that is nonblanching on the right gluteal.  Per [facility] policy we will offload the area and reassess in 24 hours.    Recommendations/ Intervention performed: Based on documentation of refusal of breakfast and patient's advanced age and presence of stage I Woc decided order specialty bed.  Woc also placed orders for barrier cream to the area offloading and other pressure injury prevention (see below:)    Pressure Injury Protocol (initiate for Miguel A Score of 18 or less):   *Maintain good skin care, keep dry, turn q 2 hr, keep heels elevated and offloaded with heel boots.    *Apply z-guard to sacrococcygeal area/ perineal area BID or daily and PRN per incontinent episodes.  *Follow C.A.R.E protocol if medical devices (Bipap, lewis, Ng tube, etc) are being used.     Specialty bed: ELLEN mattress ordered    Woc will sign off.    Please contact with questions or concerns.

## 2022-03-18 NOTE — PROGRESS NOTES
Clinical Nutrition       Patient Name: Mitzi Eric  YOB: 1935  MRN: [id number]  Date of Encounter: 22 16:09 EDT  Admission date: 3/17/2022      Reason for Visit   MST score 2+, Unintentional Weight Loss, Reduced Oral Intake    EMR  Reviewed   Yes    Last 15 Recorded Weights  View Complete Flowsheet  Weight Weight (kg) Weight (lbs) Weight Method VISIT REPORT   3/18/2022 50.349 kg 111 lb Bed scale -   3/17/2022 49.896 kg 110 lb Estimated -   3/9/2022 49.896 kg 110 lb - Report   2021 50.803 kg 112 lb Stated -   2021 - - Bed scale -   2021 50.803 kg 112 lb Bed scale -   2021 50.803 kg 112 lb Estimated -   2021 50.803 kg 112 lb - -   10/21/2021 40.824 kg 90 lb Stated -   2018 39.191 kg 86 lb 6.4 oz - -   2018 39.8 kg 87 lb 11.9 oz Bed scale -   2018 39.917 kg 88 lb Stated -   7/15/2018 39.917 kg 88 lb Stated -      Reported/Observed/Food/Nutrition Related - Comments     Unintentional weight loss and reduced oral intake reported on MST. Pt with dementia but very alert at time RD visit. She states she has been eating normally and weight is stable. She provides a thorough diet hx stating she eats the same thing every day- ensure for breakfast, soup and salad for lunch, and a larger meal for dinner. She states she eats in the cafeteria at NH with friends and enjoys this. Per EMR, weight has been stable. Intakes likely inconsistent r/t dementia but appears no acute nutritional concerns.     Current Nutrition Prescription     Diet Regular; Cardiac  No active supplement orders      Average Intake from [facility]% x 1 supplement documented      Actions     Follow treatment progress, Care plan reviewed, Advise alternate selection, Advised available snacks, Interview for preferences, Menu provided, Encourage intake, Supplement provided    Boost 2x/day  Preferences in diet order    Monitor Per Protocol      Luna Kennedy RD  Time Spent:  25

## 2022-03-18 NOTE — PLAN OF CARE
Goal Outcome Evaluation:           Progress: no change  Outcome Evaluation: PT eval completed. Patient alert and oriented x4 with prompt for place. Demonstrates mild deficits in BLE strength and functional endurance effecting functional mobility at baseline. Demonstrates Manny for bed mobility and CGA for SPT. Non-ambulatory at baseline. SpO2 levels decrease to 86% on RA with activity but recover to > 90% on RA with 1-2 min of rest. Patient will benefit from skilled IP PT services in order to address impairments for return to PLOF. Recommend return to LTC at [place].

## 2022-03-18 NOTE — THERAPY EVALUATION
Patient Name: Mitzi Eric  : 1935    MRN: [id number]                              Today's Date: 3/18/2022       Admit Date: 3/17/2022    Visit Dx:     ICD-10-CM ICD-9-CM   1. Acute on chronic respiratory failure with hypoxemia (HCC)  J96.21 518.84   2. Acute on chronic congestive heart failure, unspecified heart failure type (HCC)  I50.9 428.0   3. Elevated blood pressure reading with diagnosis of hypertension  I10 401.9   4. Atrial fibrillation with rapid ventricular response (HCC)  I48.91 427.31   5. History of COPD  Z87.09 V12.69   6. Alzheimer's dementia without behavioral disturbance, unspecified timing of dementia onset ([facility])  G30.9 331.0    F02.80 294.10     Patient Active Problem List   Diagnosis   • Fall   • Delirium, acute   • Essential hypertension   • Cognitive decline   • Trigeminal neuralgia   • Hypothyroidism (acquired)   • Lung mass   • Influenza A   • Severe malnutrition (CMS/HCC)   • Dementia without behavioral disturbance (HCC)   • History of craniotomy   • Impaired functional mobility, balance, gait, and endurance   • Acute respiratory failure with hypoxia (HCC)   • Acute CHF (congestive heart failure) (HCC)   • Thrombocytopenia (HCC)   • Elevated transaminase level   • Atrial fibrillation with RVR (HCC)   • COPD with acute exacerbation (HCC)   • Acute on chronic respiratory failure with hypoxemia (HCC)     Past Medical History:   Diagnosis Date   • A-fib (HCC)    • Ataxic gait    • Chronic kidney disease, stage 3 (HCC)    • COPD (chronic obstructive pulmonary disease) (HCC)    • Dementia (HCC)    • Disease of thyroid gland    • Hyperlipidemia    • Hypertension      Past Surgical History:   Procedure Laterality Date   • BRAIN TUMOR EXCISION      BENIGN   • FRACTURE SURGERY     • TRIGGER FINGER RELEASE        General Information     Row Name 22 1134          Physical Therapy Time and Intention    Document Type evaluation  -LO     Mode of Treatment individual therapy;physical  therapy  -LO     Row Name 03/18/22 1134          General Information    Patient Profile Reviewed yes  -LO     Prior Level of Function min assist:;transfer  Patient WC bound at basline, but was transferring on her own from WC>commode in bathroom  -LO     Existing Precautions/Restrictions fall  -LO     Barriers to Rehab previous functional deficit  -LO     Row Name 03/18/22 1134          Living Environment    People in Home facility resident  -LO     Row Name 03/18/22 1134          Home Main Entrance    Number of Stairs, Main Entrance none  -LO     Row Name 03/18/22 1134          Stairs Within Home, Primary    Number of Stairs, Within Home, Primary none  -LO     Row Name 03/18/22 1134          Cognition    Orientation Status (Cognition) oriented to;person;place;situation;time;verbal cues/prompts needed for orientation  -LO     Row Name 03/18/22 1134          Safety Issues, Functional Mobility    Safety Issues Affecting Function (Mobility) insight into deficits/self-awareness  -LO     Impairments Affecting Function (Mobility) balance;strength;endurance/activity tolerance  -LO           User Key  (r) = Recorded By, (t) = Taken By, (c) = Cosigned By    Initials Name Provider Type    Marie Le, PT Physical Therapist               Mobility     Row Name 03/18/22 1138          Bed Mobility    Bed Mobility supine-sit  -LO     Supine-Sit [place] (Bed Mobility) contact guard  -LO     Assistive Device (Bed Mobility) bed rails;head of bed elevated  -LO     Comment, (Bed Mobility) no physical assist required, vc for sequencing  -LO     Row Name 03/18/22 1138          Transfers    Comment, (Transfers) EOB>recliner with CGA, safe sequencing noted  -LO     Row Name 03/18/22 1138          Bed-Chair Transfer    Bed-Chair [place] (Transfers) contact guard  -LO     Assistive Device (Bed-Chair Transfers) other (see comments)  SPT  -LO     Row Name 03/18/22 1138          Sit-Stand Transfer    Sit-Stand [place]  (Transfers) contact guard  -LO     Assistive Device (Sit-Stand Transfers) other (see comments)  for SPT  -LO     Row Name 03/18/22 1138          Gait/Stairs (Locomotion)    [place] Level (Gait) not tested  -LO     [place] Level (Stairs) not tested  -LO     Comment, (Gait/Stairs) WC bound at baseline  -LO           User Key  (r) = Recorded By, (t) = Taken By, (c) = Cosigned By    Initials Name Provider Type    Marie Le PT Physical Therapist               Obj/Interventions     Row Name 03/18/22 1141          Range of Motion Comprehensive    General Range of Motion bilateral lower extremity ROM WNL  -LO     Row Name 03/18/22 1141          Strength Comprehensive (MMT)    General Manual Muscle Testing (MMT) Assessment lower extremity strength deficits identified  -LO     Comment, General Manual Muscle Testing (MMT) Assessment BLE grossly 3+/5  -LO     Row Name 03/18/22 1141          Balance    Balance Assessment sitting static balance;sitting dynamic balance;standing static balance;standing dynamic balance  -LO     Static Sitting Balance modified independence  -LO     Dynamic Sitting Balance modified independence  -LO     Position, Sitting Balance unsupported;sitting edge of bed  -LO     Static Standing Balance minimal assist  -LO     Dynamic Standing Balance contact guard  -LO     Position/Device Used, Standing Balance supported  -LO     Comment, Balance stands for SPT only requiring CGA for safety  -LO     Row Name 03/18/22 1141          Sensory Assessment (Somatosensory)    Sensory Assessment (Somatosensory) sensation intact  -LO           User Key  (r) = Recorded By, (t) = Taken By, (c) = Cosigned By    Initials Name Provider Type    Marie Le PT Physical Therapist               Goals/Plan     Row Name 03/18/22 1147          Transfer Goal 1 (PT)    Activity/Assistive Device (Transfer Goal 1, PT) sit-to-stand/stand-to-sit;car transfer;bed-to-chair/chair-to-bed  -LO     [place] Level/Cues  Needed (Transfer Goal 1, PT) modified independence  -LO     Time Frame (Transfer Goal 1, PT) long term goal (LTG);10 days  -LO           User Key  (r) = Recorded By, (t) = Taken By, (c) = Cosigned By    Initials Name Provider Type    Marie Le, PT Physical Therapist               Clinical Impression     Row Name 03/18/22 1143          Pain    Pretreatment Pain Rating 0/10 - no pain  -LO     Posttreatment Pain Rating 0/10 - no pain  -LO     Row Name 03/18/22 1143          Plan of Care Review    Progress no change  -LO     Outcome Evaluation PT eval completed. Patient alert and oriented x4 with prompt for place. Demonstrates mild deficits in BLE strength and functional endurance effecting functional mobility at baseline. Demonstrates Manny for bed mobility and CGA for SPT. Non-ambulatory at baseline. SpO2 levels decrease to 86% on RA with activity but recover to > 90% on RA with 1-2 min of rest. Patient will benefit from skilled IP PT services in order to address impairments for return to OF. Recommend return to LTC at [place].  -LO     Row Name 03/18/22 1143          Therapy Assessment/Plan (PT)    Patient/Family Therapy Goals Statement (PT) return home  -LO     Rehab Potential (PT) good, to achieve stated therapy goals  -LO     Criteria for Skilled Interventions Met (PT) yes;skilled treatment is necessary  -LO     Row Name 03/18/22 1143          Vital Signs    Pre Systolic BP Rehab 133  -LO     Pre Treatment Diastolic BP 80  -LO     Pretreatment Heart Rate (beats/min) 86  -LO     Pre SpO2 (%) 95  -LO     O2 Delivery Pre Treatment room air  -LO     Intra SpO2 (%) 86  -LO     O2 Delivery Intra Treatment room air  -LO     Post SpO2 (%) 93  -LO     Pre Patient Position Supine  -LO     Intra Patient Position Standing  -LO     Post Patient Position Sitting  -LO     Row Name 03/18/22 1143          Positioning and Restraints    Pre-Treatment Position in bed  -LO     Post Treatment Position chair  -LO     In Chair notified  nsg;reclined;call light within reach;encouraged to call for assist;exit alarm on;waffle cushion  -           User Key  (r) = Recorded By, (t) = Taken By, (c) = Cosigned By    Initials Name Provider Type    Marie Le, CASEY Physical Therapist               Outcome Measures     Row Name 03/18/22 1148          How much help from another person do you currently need...    Turning from your back to your side while in flat bed without using bedrails? 3  -LO     Moving from lying on back to sitting on the side of a flat bed without bedrails? 3  -LO     Moving to and from a bed to a chair (including a wheelchair)? 3  -LO     Standing up from a chair using your arms (e.g., wheelchair, bedside chair)? 3  -LO     Climbing 3-5 steps with a railing? 1  -LO     To walk in hospital room? 2  -LO     AM-PAC 6 Clicks Score (PT) 15  -LO     Row Name 03/18/22 1148          Functional Assessment    Outcome Measure Options AM-PAC 6 Clicks Basic Mobility (PT)  -           User Key  (r) = Recorded By, (t) = Taken By, (c) = Cosigned By    Initials Name Provider Type    Marie Le, CASEY Physical Therapist                             Physical Therapy Education                 Title: PT OT SLP Therapies (Done)     Topic: Physical Therapy (Done)     Point: Mobility training (Done)     Learning Progress Summary           Patient Acceptance, E, VU,NR by  at 3/18/2022 1103    Comment: PT POC                   Point: Home exercise program (Done)     Learning Progress Summary           Patient Acceptance, E, VU,NR by  at 3/18/2022 1103    Comment: PT POC                   Point: Body mechanics (Done)     Learning Progress Summary           Patient Acceptance, E, VU,NR by  at 3/18/2022 1103    Comment: PT POC                   Point: Precautions (Done)     Learning Progress Summary           Patient Acceptance, E, VU,NR by  at 3/18/2022 1103    Comment: PT POC                               User Key     Initials Effective Dates Name  Provider Type Discipline     06/16/21 -  Marie Terrell, PT Physical Therapist PT              PT Recommendation and Plan  Planned Therapy Interventions (PT): balance training, home exercise program, patient/family education, neuromuscular re-education, strengthening, transfer training  Progress: no change  Outcome Evaluation: PT eval completed. Patient alert and oriented x4 with prompt for place. Demonstrates mild deficits in BLE strength and functional endurance effecting functional mobility at baseline. Demonstrates Manny for bed mobility and CGA for SPT. Non-ambulatory at baseline. SpO2 levels decrease to 86% on RA with activity but recover to > 90% on RA with 1-2 min of rest. Patient will benefit from skilled IP PT services in order to address impairments for return to PLOF. Recommend return to LTC at [place].     Time Calculation:    PT Charges     Row Name 03/18/22 1103             Time Calculation    Start Time 1103  -LO      PT Received On 03/18/22  -LO      PT Goal Re-Cert Due Date 03/28/22  -LO              Timed Charges    72663 - PT Therapeutic Activity Minutes 12  -LO              Untimed Charges    PT Eval/Re-eval Minutes 36  -LO              Total Minutes    Timed Charges Total Minutes 12  -LO      Untimed Charges Total Minutes 36  -LO       Total Minutes 48  -LO            User Key  (r) = Recorded By, (t) = Taken By, (c) = Cosigned By    Initials Name Provider Type     Marie Terrell, PT Physical Therapist              Therapy Charges for Today     Code Description Service Date Service Provider Modifiers Qty    83968421965 HC PT THERAPEUTIC ACT EA 15 MIN 3/18/2022 Marie Terrell, PT GP 1    79719264878 HC PT EVAL LOW COMPLEXITY 3 3/18/2022 Marie Terrell, PT GP 1          PT G-Codes  Outcome Measure Options: AM-PAC 6 Clicks Basic Mobility (PT)  AM-PAC 6 Clicks Score (PT): 15    Marie Terrell PT  3/18/2022

## 2022-03-18 NOTE — THERAPY EVALUATION
Patient Name: Mitzi Eric  : 1935    MRN: [id number]                              Today's Date: 3/18/2022       Admit Date: 3/17/2022    Visit Dx:     ICD-10-CM ICD-9-CM   1. Acute on chronic respiratory failure with hypoxemia (HCC)  J96.21 518.84   2. Acute on chronic congestive heart failure, unspecified heart failure type (HCC)  I50.9 428.0   3. Elevated blood pressure reading with diagnosis of hypertension  I10 401.9   4. Atrial fibrillation with rapid ventricular response (HCC)  I48.91 427.31   5. History of COPD  Z87.09 V12.69   6. Alzheimer's dementia without behavioral disturbance, unspecified timing of dementia onset ([facility])  G30.9 331.0    F02.80 294.10     Patient Active Problem List   Diagnosis   • Fall   • Delirium, acute   • Essential hypertension   • Cognitive decline   • Trigeminal neuralgia   • Hypothyroidism (acquired)   • Lung mass   • Influenza A   • Severe malnutrition (CMS/HCC)   • Dementia without behavioral disturbance (HCC)   • History of craniotomy   • Impaired functional mobility, balance, gait, and endurance   • Acute respiratory failure with hypoxia (HCC)   • Acute CHF (congestive heart failure) (HCC)   • Thrombocytopenia (HCC)   • Elevated transaminase level   • Atrial fibrillation with RVR (HCC)   • COPD with acute exacerbation (HCC)   • Acute on chronic respiratory failure with hypoxemia (HCC)     Past Medical History:   Diagnosis Date   • A-fib (HCC)    • Ataxic gait    • Chronic kidney disease, stage 3 (HCC)    • COPD (chronic obstructive pulmonary disease) (HCC)    • Dementia (HCC)    • Disease of thyroid gland    • Hyperlipidemia    • Hypertension      Past Surgical History:   Procedure Laterality Date   • BRAIN TUMOR EXCISION      BENIGN   • FRACTURE SURGERY     • TRIGGER FINGER RELEASE        General Information     Row Name 22 1432          OT Time and Intention    Document Type evaluation  -KF     Mode of Treatment occupational therapy  -KF     Row Name 22  1432          General Information    Patient Profile Reviewed yes  -KF     Prior Level of Function min assist:;transfer;w/c or scooter;ADL's  wc for mobility  -KF     Existing Precautions/Restrictions fall  -KF     Barriers to Rehab medically complex;previous functional deficit;cognitive status  -KF     Row Name 03/18/22 1432          Occupational Profile    Environmental Supports and Barriers (Occupational Profile) wc, toilet with rails, step in shower with bath bench  -KF     Row Name 03/18/22 1432          Living Environment    People in Home facility resident  [facility]  -KF     Row Name 03/18/22 1432          Home Main Entrance    Number of Stairs, Main Entrance none  -KF     Row Name 03/18/22 1432          Stairs Within Home, Primary    Number of Stairs, Within Home, Primary none  -KF     Row Name 03/18/22 1432          Cognition    Orientation Status (Cognition) oriented to;person;place;situation;time;verbal cues/prompts needed for orientation  -KF     Row Name 03/18/22 1432          Safety Issues, Functional Mobility    Safety Issues Affecting Function (Mobility) awareness of need for assistance;insight into deficits/self-awareness;safety precaution awareness;problem-solving;judgment  -KF     Impairments Affecting Function (Mobility) balance;strength;endurance/activity tolerance  -KF           User Key  (r) = Recorded By, (t) = Taken By, (c) = Cosigned By    Initials Name Provider Type    KF Marialuisa Flores OT Occupational Therapist                 Mobility/ADL's     Row Name 03/18/22 1439          Bed Mobility    Bed Mobility scooting/bridging;supine-sit  -KF     Scooting/Bridging [place] (Bed Mobility) modified independence  -KF     Supine-Sit [place] (Bed Mobility) modified independence  -KF     Bed Mobility, Safety Issues cognitive deficits limit understanding  -KF     Assistive Device (Bed Mobility) bed rails;head of bed elevated  -KF     Comment, (Bed Mobility) no LOB  -KF     Row Name  03/18/22 1439          Transfers    Transfers bed-chair transfer;sit-stand transfer  -KF     Comment, (Transfers) cues for seq only and no LOB CGA throughout  -KF     Bed-Chair [place] (Transfers) contact guard  -KF     Assistive Device (Bed-Chair Transfers) --  none  -KF     Sit-Stand [place] (Transfers) contact guard  -KF     Row Name 03/18/22 1439          Functional Mobility    Functional Mobility- Comment nonambulatory at baseline  -[facility] Name 03/18/22 1439          Activities of Daily Living    BADL Assessment/Intervention lower body dressing  -KF     [facility] Name 03/18/22 1439          Lower Body Dressing Assessment/Training    [place] Level (Lower Body Dressing) don;doff;socks;set up  -KF     Position (Lower Body Dressing) unsupported sitting  -KF           User Key  (r) = Recorded By, (t) = Taken By, (c) = Cosigned By    Initials Name Provider Type    KF Marialuisa Flores, OT Occupational Therapist               Obj/Interventions     [facility] Name 03/18/22 1440          Sensory Assessment (Somatosensory)    Sensory Assessment (Somatosensory) UE sensation intact  -[facility] Name 03/18/22 1440          Vision Assessment/Intervention    Visual Impairment/Limitations WFL  -KF     [facility] Name 03/18/22 1440          Range of Motion Comprehensive    General Range of Motion bilateral upper extremity ROM WNL  -[facility] Name 03/18/22 1440          Strength Comprehensive (MMT)    General Manual Muscle Testing (MMT) Assessment upper extremity strength deficits identified  -KF     Comment, General Manual Muscle Testing (MMT) Assessment BUE grossly 4/5  -KF     [facility] Name 03/18/22 1440          Motor Skills    Motor Skills coordination  -KF     Coordination WFL;bilateral;upper extremity  -[facility] Name 03/18/22 1440          Balance    Balance Assessment sitting static balance;sitting dynamic balance;standing static balance;standing dynamic balance  -KF     Static Sitting Balance independent  -KF     Dynamic  Sitting Balance independent  -KF     Position, Sitting Balance unsupported;sitting edge of bed  -KF     Static Standing Balance contact guard  -KF     Dynamic Standing Balance minimal assist  -KF     Position/Device Used, Standing Balance supported  single UE support  -KF     Balance Interventions sitting;standing;sit to stand;weight shifting activity;occupation based/functional task  -KF     Comment, Balance no LOB stand pivot with CGA for safety  -KF           User Key  (r) = Recorded By, (t) = Taken By, (c) = Cosigned By    Initials Name Provider Type    KF Marialuisa Flores, OT Occupational Therapist               Goals/Plan     Row Name 03/18/22 1447          Transfer Goal 1 (OT)    Activity/Assistive Device (Transfer Goal 1, OT) commode, bedside without drop arms  -KF     [place] Level/Cues Needed (Transfer Goal 1, OT) contact guard required  -KF     Time Frame (Transfer Goal 1, OT) long term goal (LTG);10 days  -KF     Progress/Outcome (Transfer Goal 1, OT) goal ongoing  -KF     Row Name 03/18/22 1447          Toileting Goal 1 (OT)    Activity/Device (Toileting Goal 1, OT) adjust/manage clothing;perform perineal hygiene;commode, bedside without drop arms  -KF     [place] Level/Cues Needed (Toileting Goal 1, OT) contact guard required;verbal cues required  -KF     Time Frame (Toileting Goal 1, OT) long term goal (LTG);10 days  -KF     Progress/Outcome (Toileting Goal 1, OT) goal ongoing  -KF     Row Name 03/18/22 1447          Therapy Assessment/Plan (OT)    Planned Therapy Interventions (OT) activity tolerance training;adaptive equipment training;BADL retraining;cognitive/visual perception retraining;occupation/activity based interventions;strengthening exercise;transfer/mobility retraining;functional balance retraining;ROM/therapeutic exercise;patient/caregiver education/training  -KF           User Key  (r) = Recorded By, (t) = Taken By, (c) = Cosigned By    Initials Name Provider Type    KF  Marialuisa Flores, OT Occupational Therapist               Clinical Impression     Row Name 03/18/22 1442          Pain Assessment    Pretreatment Pain Rating 0/10 - no pain  -KF     Posttreatment Pain Rating 0/10 - no pain  -KF     Pre/Posttreatment Pain Comment tolerated  -KF     Pain Intervention(s) Repositioned;Ambulation/increased activity  -KF     Row Name 03/18/22 1442          Plan of Care Review    Plan of Care Reviewed With patient;family  -KF     Progress improving  -KF     Outcome Evaluation OT eval completed Pt presents with deficits in activity tolerance and balance compared to baseline ADL completion, SPT with CGA, mod I bed mob, setup LBD socks, cues for safety awareness minimally, recom IPOT d/c return to prior senior living facility  -KF     Row Name 03/18/22 1442          Therapy Assessment/Plan (OT)    Rehab Potential (OT) good, to achieve stated therapy goals  -KF     Criteria for Skilled Therapeutic Interventions Met (OT) yes;meets criteria;skilled treatment is necessary  -KF     Therapy Frequency (OT) daily  -KF     Row Name 03/18/22 1442          Therapy Plan Review/Discharge Plan (OT)    Anticipated Discharge Disposition (OT) assisted living  -KF     Row Name 03/18/22 1442          Vital Signs    Pre Systolic BP Rehab 133  RN cleared VSS  -KF     Pre Treatment Diastolic BP 80  -KF     Pre SpO2 (%) 95  -KF     O2 Delivery Pre Treatment room air  -KF     Post SpO2 (%) 94  -KF     O2 Delivery Post Treatment room air  -KF     Pre Patient Position Supine  -KF     Intra Patient Position Standing  -KF     Post Patient Position Sitting  -KF     Rest Breaks  1  -KF     Row Name 03/18/22 1442          Positioning and Restraints    Pre-Treatment Position in bed  -KF     Post Treatment Position chair  -KF     In Chair notified nsg;reclined;sitting;call light within reach;encouraged to call for assist;with family/caregiver;legs elevated  -KF           User Key  (r) = Recorded By, (t) = Taken By, (c) =  Cosigned By    Initials Name Provider Type    Marialuisa Saleem OT Occupational Therapist               Outcome Measures     Row Name 03/18/22 1449          How much help from another is currently needed...    Putting on and taking off regular lower body clothing? 4  -KF     Bathing (including washing, rinsing, and drying) 3  -KF     Toileting (which includes using toilet bed pan or urinal) 3  -KF     Putting on and taking off regular upper body clothing 4  -KF     Taking care of personal grooming (such as brushing teeth) 3  -KF     Eating meals 4  -KF     AM-PAC 6 Clicks Score (OT) 21  -KF     Row Name 03/18/22 1148          How much help from another person do you currently need...    Turning from your back to your side while in flat bed without using bedrails? 3  -LO     Moving from lying on back to sitting on the side of a flat bed without bedrails? 3  -LO     Moving to and from a bed to a chair (including a wheelchair)? 3  -LO     Standing up from a chair using your arms (e.g., wheelchair, bedside chair)? 3  -LO     Climbing 3-5 steps with a railing? 1  -LO     To walk in hospital room? 2  -LO     AM-PAC 6 Clicks Score (PT) 15  -LO     Row Name 03/18/22 1449 03/18/22 1148       Functional Assessment    Outcome Measure Options AM-PAC 6 Clicks Daily Activity (OT)  -KF AM-PAC 6 Clicks Basic Mobility (PT)  -LO          User Key  (r) = Recorded By, (t) = Taken By, (c) = Cosigned By    Initials Name Provider Type    Marialuisa Saleem OT Occupational Therapist    Marie Le PT Physical Therapist                Occupational Therapy Education                 Title: PT OT SLP Therapies (In Progress)     Topic: Occupational Therapy (In Progress)     Point: ADL training (Done)     Description:   Instruct learner(s) on proper safety adaptation and remediation techniques during self care or transfers.   Instruct in proper use of assistive devices.              Learning Progress Summary           Patient  Acceptance, E,TB,D, VU,DU,NR by  at 3/18/2022 1450   Family Acceptance, E,TB,D, VU,DU,NR by  at 3/18/2022 1450                   Point: Home exercise program (Not Started)     Description:   Instruct learner(s) on appropriate technique for monitoring, assisting and/or progressing therapeutic exercises/activities.              Learner Progress:  Not documented in this visit.          Point: Precautions (Done)     Description:   Instruct learner(s) on prescribed precautions during self-care and functional transfers.              Learning Progress Summary           Patient Acceptance, E,TB,D, VU,DU,NR by  at 3/18/2022 1450   Family Acceptance, E,TB,D, VU,DU,NR by  at 3/18/2022 1450                   Point: Body mechanics (Done)     Description:   Instruct learner(s) on proper positioning and spine alignment during self-care, functional mobility activities and/or exercises.              Learning Progress Summary           Patient Acceptance, E,TB,D, VU,DU,NR by  at 3/18/2022 1450   Family Acceptance, E,TB,D, VU,DU,NR by  at 3/18/2022 1450                               User Key     Initials Effective Dates Name Provider Type Discipline     06/16/21 -  Marialuisa Flores, OT Occupational Therapist OT              OT Recommendation and Plan  Planned Therapy Interventions (OT): activity tolerance training, adaptive equipment training, BADL retraining, cognitive/visual perception retraining, occupation/activity based interventions, strengthening exercise, transfer/mobility retraining, functional balance retraining, ROM/therapeutic exercise, patient/caregiver education/training  Therapy Frequency (OT): daily  Plan of Care Review  Plan of Care Reviewed With: patient, family  Progress: improving  Outcome Evaluation: OT eval completed Pt presents with deficits in activity tolerance and balance compared to baseline ADL completion, SPT with CGA, mod I bed mob, setup LBD socks, cues for safety awareness minimally,  recom IPOT d/c return to prior senior living facility     Time Calculation:    Time Calculation- OT     Row Name 03/18/22 1347             Time Calculation- OT    OT Start Time 1347  -KF      OT Received On 03/18/22  -KF      OT Goal Re-Cert Due Date 03/28/22  -KF              Untimed Charges    OT Eval/Re-eval Minutes 48  -KF              Total Minutes    Untimed Charges Total Minutes 48  -KF       Total Minutes 48  -KF            User Key  (r) = Recorded By, (t) = Taken By, (c) = Cosigned By    Initials Name Provider Type    Marialuisa Saleem OT Occupational Therapist              Therapy Charges for Today     Code Description Service Date Service Provider Modifiers Qty    [id number] HC OT EVAL LOW COMPLEXITY 4 3/18/2022 Marialuisa Flores OT GO 1               Marialuisa Flores OT  3/18/2022

## 2022-03-18 NOTE — PLAN OF CARE
Goal Outcome Evaluation:  Plan of Care Reviewed With: patient, family        Progress: improving  Outcome Evaluation: OT eval completed Pt presents with deficits in activity tolerance and balance compared to baseline ADL completion, SPT with CGA, mod I bed mob, setup LBD socks, cues for safety awareness minimally, recom IPOT d/c return to prior senior living facility

## 2022-03-18 NOTE — CASE MANAGEMENT/SOCIAL WORK
Discharge Planning Assessment  [facility]     Patient Name: Mitzi Eric  MRN: [id number]  Today's Date: 3/18/2022    Admit Date: 3/17/2022     Discharge Needs Assessment    No documentation.                Discharge Plan     Row Name 03/18/22 1439       Plan    Plan Return to [facility]    Patient/Family in Agreement with Plan yes    Plan Comments Observation status: I talked with patient and nephew Lane(POA) at [facility]. Patient is a resident at [facility] and plans to return there. Patient uses a W/C but is able to stand and pivot and takes steps to get to the bathroom. Mostly independent but her ADL's are meet being at [facility]. Nephew will provide transportation at d/c. Lanny S    Final Discharge Disposition Code 01 - home or self-care              Continued Care and Services - Admitted Since 3/17/2022    Coordination has not been started for this encounter.          Demographic Summary    No documentation.                Functional Status    No documentation.                Psychosocial    No documentation.                Abuse/Neglect    No documentation.                Legal    No documentation.                Substance Abuse    No documentation.                Patient Forms    No documentation.                   Ling Hale RN

## 2022-03-18 NOTE — PROGRESS NOTES
[facility]  PROGRESS NOTE    Patient Name: Mitzi Eric  : 1935  MRN: [id number]    Date of Admission: 3/17/2022  Primary Care Physician: Dm Cade APRN    Subjective   Subjective     CC: Follow-up SOA    HPI: No acute events overnight, patient that she feels much better, breathing is improved, she is now on 2 L NC    ROS:  Gen- No fevers, chills  CV- No chest pain, palpitations  Resp- No cough, dyspnea  GI- No N/V/D, abd pain      Objective   Objective     Vital Signs:   Temp:  [97.3 °F (36.3 °C)-98.7 °F (37.1 °C)] 98.3 °F (36.8 °C)  Heart Rate:  [] 93  Resp:  [14-18] 16  BP: (125-170)/() 133/80  Flow (L/min):  [2-3] 3     Physical Exam:  Constitutional: Elderly lady, in no acute distress, awake, alert  HENT: NCAT, mucous membranes moist  Respiratory: Clear to auscultation bilaterally, respiratory effort normal on 2 L NC  Cardiovascular: RRR, no murmurs, rubs, or gallops  Gastrointestinal: Positive bowel sounds, soft, nontender, nondistended  Musculoskeletal: No bilateral ankle edema  Psychiatric: Appropriate affect, cooperative  Neurologic: Nonfocal  Skin: No rashes    Results Reviewed:  LAB RESULTS:      Lab 22  0554 22  0848   WBC 7.63 11.50*   HEMOGLOBIN 11.2* 13.6   HEMATOCRIT 33.0* 41.6   PLATELETS 110* 124*   NEUTROS ABS  --  8.67*   IMMATURE GRANS (ABS)  --  0.06*   LYMPHS ABS  --  1.57   MONOS ABS  --  0.89   EOS ABS  --  0.19   MCV 94.0 96.3   CRP  --  0.81*   PROCALCITONIN  --  0.08   LACTATE  --  1.6   LDH  --  282*         Lab 22  0554 22  0848   SODIUM 142 142   POTASSIUM 3.8 3.9   CHLORIDE 104 106   CO2 27.0 25.0   ANION GAP 11.0 11.0   BUN 23 16   CREATININE 0.81 0.92   EGFR 71.2 61.1   GLUCOSE 87 127*   CALCIUM 9.0 8.9         Lab 22  0554 22  0848   TOTAL PROTEIN 5.6* 6.8   ALBUMIN 3.70 4.20   GLOBULIN 1.9 2.6   ALT (SGPT) 57* 72*   AST (SGOT) 66* 97*   BILIRUBIN 0.5 0.4   ALK PHOS 74  97         Lab 03/17/22  0848   PROBNP 6,694.0*   TROPONIN T 0.028                 Brief Urine Lab Results  (Last result in the past 365 days)      Color   Clarity   Blood   Leuk Est   Nitrite   Protein   CREAT   Urine HCG        11/29/21 1200 Yellow   Cloudy   Trace   Small (1+)   Positive   30 mg/dL (1+)                 Microbiology Results Abnormal     Procedure Component Value - Date/Time    COVID-19 and FLU A/B PCR - Swab, Nasopharynx [[id number]]  (Normal) Collected: 03/17/22 0850    Lab Status: Final result Specimen: Swab from Nasopharynx Updated: 03/17/22 0918     COVID19 Not Detected     Influenza A PCR Not Detected     Influenza B PCR Not Detected    Narrative:      Fact sheet for providers: https://www.fda.gov/media/729207/download    Fact sheet for patients: https://www.fda.gov/media/212419/download    Test performed by PCR.          Adult Transthoracic Echo Complete W/ Cont if Necessary Per Protocol    Result Date: 3/18/2022  · Left ventricular ejection fraction appears to be 46 - 50%. Left ventricular systolic function is mildly decreased. · Left ventricular diastolic function is consistent with (grade I) impaired relaxation. · Mild to moderate mitral regurgitation. · Mild to moderate tricuspid regurgitation, RVSP 54 mmHg.      XR Chest 1 View    Result Date: 3/17/2022  DATE OF EXAM: 3/17/2022 9:03 AM  PROCEDURE: XR CHEST 1 VW-  INDICATIONS: SOA triage protocol  COMPARISON: 12/7/2021  TECHNIQUE: Single radiographic AP view of the chest was obtained.  FINDINGS: Heart size and pulmonary vasculature are normal. Lungs are hyperinflated with bullous changes in the upper lobes. There is a stable mass in the right upper lobe with pleural tags extending to the apex where there is focal pleural thickening. There are increased markings in the lower lung zones that appear to be a combination of interstitial and groundglass opacities greater on the right. There is thickening of the pleural stripe laterally on the  right. There is strandy atelectasis in the medial lung bases      Impression:  1. New opacities in the lower lung zones which may represent edema or pneumonia 2. Bibasilar atelectasis 3. Pulmonary emphysema 4. Stable right upper lobe mass  This report was finalized on 3/17/2022 9:20 AM by Dinesh Brannon.        Results for orders placed during the hospital encounter of 03/17/22    Adult Transthoracic Echo Complete W/ Cont if Necessary Per Protocol    Interpretation Summary  · Left ventricular ejection fraction appears to be 46 - 50%. Left ventricular systolic function is mildly decreased.  · Left ventricular diastolic function is consistent with (grade I) impaired relaxation.  · Mild to moderate mitral regurgitation.  · Mild to moderate tricuspid regurgitation, RVSP 54 mmHg.      I have reviewed the medications:  Scheduled Meds:donepezil, 5 mg, Oral, QAM  folic acid, 400 mcg, Oral, BID  heparin (porcine), 5,000 Units, Subcutaneous, Q12H  ipratropium-albuterol, 3 mL, Nebulization, 4x Daily - RT  levothyroxine, 25 mcg, Oral, Q AM  metoprolol succinate XL, 25 mg, Oral, Daily  OXcarbazepine, 600 mg, Oral, BID  pantoprazole, 40 mg, Oral, QAM AC  potassium chloride, 20 mEq, Oral, Daily  predniSONE, 40 mg, Oral, Daily With Breakfast  pregabalin, 25 mg, Oral, BID  senna-docusate sodium, 2 tablet, Oral, BID  sodium chloride, 10 mL, Intravenous, Q12H      Continuous Infusions:   PRN Meds:.•  acetaminophen  •  senna-docusate sodium **AND** polyethylene glycol **AND** bisacodyl **AND** bisacodyl  •  melatonin  •  ondansetron **OR** ondansetron  •  QUEtiapine  •  sodium chloride  •  sodium chloride    Assessment/Plan   Assessment & Plan     Active Hospital Problems    Diagnosis  POA   • **Acute respiratory failure with hypoxia (HCC) [J96.01]  Yes   • Acute on chronic respiratory failure with hypoxemia (HCC) [J96.21]  Yes   • Acute CHF (congestive heart failure) (HCC) [I50.9]  Yes   • Thrombocytopenia ([facility]) [D69.6]  Yes   •  Elevated transaminase level [R74.01]  Yes   • Atrial fibrillation with RVR (HCC) [I48.91]  Yes   • COPD with acute exacerbation (HCC) [J44.1]  Yes   • Dementia without behavioral disturbance (HCC) [F03.90]  Yes   • Hypothyroidism (acquired) [E03.9]  Yes   • Trigeminal neuralgia [G50.0]  Yes   • Essential hypertension [I10]  Yes      Resolved Hospital Problems   No resolved problems to display.        Brief Hospital Course to date:  Mitzi Eric is a 86 y.o. female with hx of HTN, HLD, COPD, hypothyroidism, pulmonary HTN, PAF not on anticoagulation due to fall risk, trigeminal neuralgia, CKD 3, known lung mass, and dementia who presents to the ED with SOA etiology likely multifactorial    Acute respiratory failure with hypoxia  Acute CHF/valvular heart disease  Pulmonary hypertension  Moderate tricuspid regurgitation  COPD with exacerbation  --Overall likely due to CHF + possible component of COPD exacerbation. Doubt pneumonia given lack of fever, normal procalcitonin. Mild leukocytosis could be reactive.  -O2 requirements have improved, on 2 L NC, will probably be okay on RA we will wean   --s/p IV Bumex in ER, will schedule another dose.  --s/p 125 mg IV Solumedrol,  Continue prednisone 40 mg daily x 5 days and Doxycycline x 5 days.  --Follow-up repeat echo  Echo. Last Echo 11/29/21 reviewed: EF 61-65%, normal diastolic function, evidence of pulmonary hypertension with moderate TR and RVSP >55 mmHg.     Afib with RVR  Hx of PAF  --Continue home metoprolol  XL 25 mg once daily  --No anticoagulation due to fall risk.     Elevated transaminases  --Possibly due to decompensated CHF.  --Will trend. If worsens, will plan for further workup with liver US.     Thrombocytopenia  -Chronic and stable     Hypothyroidism  --Continue Synthroid.     Trigeminal neuralgia  --Continue Trileptal, Lyrica.     Dementia  Continue Aricept, pharmacy to confirm if patient is on Seroquel, continue as needed for now     Recent  COVID-19  --Per facility notes, she was diagnosed with COVID-19 roughly 2 weeks ago. She has tested negative here and is out of the window for treatment/isolation. Her symptoms are clearly more related to CHF/COPD.      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     DVT prophylaxis:  Medical DVT prophylaxis orders are present.     Disposition: TBD, anticipate discharge back to [facility] in the a.m. if she remains stable    CODE STATUS:   Code Status and Medical Interventions:   Ordered at: 03/17/22 1000     Medical Intervention Limits:    NO intubation (DNI)     Code Status (Patient has no pulse and is not breathing):    No CPR (Do Not Attempt to Resuscitate)     Medical Interventions (Patient has pulse or is breathing):    Limited Support       Prosper Muniz MD  03/18/22

## 2022-03-19 ENCOUNTER — APPOINTMENT (OUTPATIENT)
Dept: GENERAL RADIOLOGY | Facility: HOSPITAL | Age: 87
End: 2022-03-19

## 2022-03-19 LAB — NT-PROBNP SERPL-MCNC: 8038 PG/ML (ref 0–1800)

## 2022-03-19 PROCEDURE — 94799 UNLISTED PULMONARY SVC/PX: CPT

## 2022-03-19 PROCEDURE — 25010000002 FUROSEMIDE PER 20 MG: Performed by: INTERNAL MEDICINE

## 2022-03-19 PROCEDURE — 63710000001 PREDNISONE PER 1 MG: Performed by: HOSPITALIST

## 2022-03-19 PROCEDURE — 99232 SBSQ HOSP IP/OBS MODERATE 35: CPT | Performed by: INTERNAL MEDICINE

## 2022-03-19 PROCEDURE — 25010000002 HEPARIN (PORCINE) PER 1000 UNITS: Performed by: HOSPITALIST

## 2022-03-19 PROCEDURE — 94761 N-INVAS EAR/PLS OXIMETRY MLT: CPT

## 2022-03-19 PROCEDURE — 71045 X-RAY EXAM CHEST 1 VIEW: CPT

## 2022-03-19 PROCEDURE — 93005 ELECTROCARDIOGRAM TRACING: CPT | Performed by: INTERNAL MEDICINE

## 2022-03-19 PROCEDURE — 93010 ELECTROCARDIOGRAM REPORT: CPT | Performed by: INTERNAL MEDICINE

## 2022-03-19 PROCEDURE — 83880 ASSAY OF NATRIURETIC PEPTIDE: CPT | Performed by: INTERNAL MEDICINE

## 2022-03-19 RX ORDER — IPRATROPIUM BROMIDE AND ALBUTEROL SULFATE 2.5; .5 MG/3ML; MG/3ML
3 SOLUTION RESPIRATORY (INHALATION) EVERY 6 HOURS PRN
Status: DISCONTINUED | OUTPATIENT
Start: 2022-03-19 | End: 2022-03-20 | Stop reason: HOSPADM

## 2022-03-19 RX ORDER — FUROSEMIDE 10 MG/ML
40 INJECTION INTRAMUSCULAR; INTRAVENOUS ONCE
Status: COMPLETED | OUTPATIENT
Start: 2022-03-19 | End: 2022-03-19

## 2022-03-19 RX ADMIN — DOXYCYCLINE 100 MG: 100 CAPSULE ORAL at 21:11

## 2022-03-19 RX ADMIN — OXCARBAZEPINE 600 MG: 300 TABLET, FILM COATED ORAL at 12:23

## 2022-03-19 RX ADMIN — DONEPEZIL HYDROCHLORIDE 5 MG: 5 TABLET, FILM COATED ORAL at 05:15

## 2022-03-19 RX ADMIN — Medication 10 ML: at 21:12

## 2022-03-19 RX ADMIN — FOLIC ACID TAB 400 MCG 400 MCG: 400 TAB at 21:12

## 2022-03-19 RX ADMIN — PREGABALIN 25 MG: 25 CAPSULE ORAL at 21:11

## 2022-03-19 RX ADMIN — PREDNISONE 40 MG: 20 TABLET ORAL at 12:23

## 2022-03-19 RX ADMIN — FOLIC ACID TAB 400 MCG 400 MCG: 400 TAB at 12:07

## 2022-03-19 RX ADMIN — HEPARIN SODIUM 5000 UNITS: 5000 INJECTION INTRAVENOUS; SUBCUTANEOUS at 12:23

## 2022-03-19 RX ADMIN — IPRATROPIUM BROMIDE AND ALBUTEROL SULFATE 3 ML: 2.5; .5 SOLUTION RESPIRATORY (INHALATION) at 12:43

## 2022-03-19 RX ADMIN — PREGABALIN 25 MG: 25 CAPSULE ORAL at 12:23

## 2022-03-19 RX ADMIN — METOPROLOL SUCCINATE 25 MG: 25 TABLET, EXTENDED RELEASE ORAL at 12:23

## 2022-03-19 RX ADMIN — Medication 10 ML: at 12:24

## 2022-03-19 RX ADMIN — PANTOPRAZOLE SODIUM 40 MG: 40 TABLET, DELAYED RELEASE ORAL at 10:25

## 2022-03-19 RX ADMIN — DOXYCYCLINE 100 MG: 100 CAPSULE ORAL at 12:23

## 2022-03-19 RX ADMIN — SENNOSIDES AND DOCUSATE SODIUM 2 TABLET: 50; 8.6 TABLET ORAL at 21:11

## 2022-03-19 RX ADMIN — HEPARIN SODIUM 5000 UNITS: 5000 INJECTION INTRAVENOUS; SUBCUTANEOUS at 21:12

## 2022-03-19 RX ADMIN — LEVOTHYROXINE SODIUM 25 MCG: 25 TABLET ORAL at 05:15

## 2022-03-19 RX ADMIN — POTASSIUM CHLORIDE 20 MEQ: 750 CAPSULE, EXTENDED RELEASE ORAL at 12:06

## 2022-03-19 RX ADMIN — FUROSEMIDE 40 MG: 10 INJECTION, SOLUTION INTRAMUSCULAR; INTRAVENOUS at 10:06

## 2022-03-19 RX ADMIN — OXCARBAZEPINE 600 MG: 300 TABLET, FILM COATED ORAL at 21:11

## 2022-03-19 RX ADMIN — IPRATROPIUM BROMIDE AND ALBUTEROL SULFATE 3 ML: 2.5; .5 SOLUTION RESPIRATORY (INHALATION) at 17:00

## 2022-03-19 NOTE — PROGRESS NOTES
[facility] Medicine Services  PROGRESS NOTE    Patient Name: Mitzi Eric  : 1935  MRN: [id number]    Date of Admission: 3/17/2022  Primary Care Physician: Dm Cade APRN    Subjective   Subjective     CC:SOA    HPI: No acute events overnight, patient that she did rest some, however, complaining of some dyspnea/SOA this morning    ROS:  Gen- No fevers, chills  CV- No chest pain, palpitations  Resp- No cough, +dyspnea  GI- No N/V/D, abd pain      Objective   Objective     Vital Signs:   Temp:  [97.6 °F (36.4 °C)-98 °F (36.7 °C)] 98 °F (36.7 °C)  Heart Rate:  [] 91  Resp:  [16-18] 18  BP: (130-156)/() 154/106  Flow (L/min):  [2] 2     Physical Exam:  Constitutional: Elderly lady, uncomfortable   HENT: NCAT, mucous membranes moist  Respiratory: Moderately labored respirations, diffuse coarse breath sounds on 2 L NC Cardiovascular: RRR, no murmurs, rubs, or gallops  Gastrointestinal: Positive bowel sounds, soft, nontender, nondistended  Musculoskeletal: No bilateral ankle edema  Psychiatric: Appropriate affect, cooperative  Neurologic: non-focal  Skin: No rashes    Results Reviewed:  LAB RESULTS:      Lab 22  0554 22  0848   WBC 7.63 11.50*   HEMOGLOBIN 11.2* 13.6   HEMATOCRIT 33.0* 41.6   PLATELETS 110* 124*   NEUTROS ABS  --  8.67*   IMMATURE GRANS (ABS)  --  0.06*   LYMPHS ABS  --  1.57   MONOS ABS  --  0.89   EOS ABS  --  0.19   MCV 94.0 96.3   CRP  --  0.81*   PROCALCITONIN  --  0.08   LACTATE  --  1.6   LDH  --  282*         Lab 22  0554 22  0848   SODIUM 142 142   POTASSIUM 3.8 3.9   CHLORIDE 104 106   CO2 27.0 25.0   ANION GAP 11.0 11.0   BUN 23 16   CREATININE 0.81 0.92   EGFR 71.2 61.1   GLUCOSE 87 127*   CALCIUM 9.0 8.9         Lab 22  0554 22  0848   TOTAL PROTEIN 5.6* 6.8   ALBUMIN 3.70 4.20   GLOBULIN 1.9 2.6   ALT (SGPT) 57* 72*   AST (SGOT) 66* 97*   BILIRUBIN 0.5 0.4   ALK PHOS 74 97         Lab 22  0848    PROBNP 6,694.0*   TROPONIN T 0.028                 Brief Urine Lab Results  (Last result in the past 365 days)      Color   Clarity   Blood   Leuk Est   Nitrite   Protein   CREAT   Urine HCG        11/29/21 1200 Yellow   Cloudy   Trace   Small (1+)   Positive   30 mg/dL (1+)                 Microbiology Results Abnormal     Procedure Component Value - Date/Time    COVID-19 and FLU A/B PCR - Swab, Nasopharynx [[id number]]  (Normal) Collected: 03/17/22 0850    Lab Status: Final result Specimen: Swab from Nasopharynx Updated: 03/17/22 0918     COVID19 Not Detected     Influenza A PCR Not Detected     Influenza B PCR Not Detected    Narrative:      Fact sheet for providers: https://www.fda.gov/media/143769/download    Fact sheet for patients: https://www.fda.gov/media/593850/download    Test performed by PCR.          Adult Transthoracic Echo Complete W/ Cont if Necessary Per Protocol    Result Date: 3/18/2022  · Left ventricular ejection fraction appears to be 46 - 50%. Left ventricular systolic function is mildly decreased. · Left ventricular diastolic function is consistent with (grade I) impaired relaxation. · Mild to moderate mitral regurgitation. · Mild to moderate tricuspid regurgitation, RVSP 54 mmHg.        Results for orders placed during the hospital encounter of 03/17/22    Adult Transthoracic Echo Complete W/ Cont if Necessary Per Protocol    Interpretation Summary  · Left ventricular ejection fraction appears to be 46 - 50%. Left ventricular systolic function is mildly decreased.  · Left ventricular diastolic function is consistent with (grade I) impaired relaxation.  · Mild to moderate mitral regurgitation.  · Mild to moderate tricuspid regurgitation, RVSP 54 mmHg.      I have reviewed the medications:  Scheduled Meds:donepezil, 5 mg, Oral, QAM  doxycycline, 100 mg, Oral, Q12H  folic acid, 400 mcg, Oral, BID  heparin (porcine), 5,000 Units, Subcutaneous, Q12H  ipratropium-albuterol, 3 mL, Nebulization,  4x Daily - RT  levothyroxine, 25 mcg, Oral, Q AM  metoprolol succinate XL, 25 mg, Oral, Daily  OXcarbazepine, 600 mg, Oral, BID  pantoprazole, 40 mg, Oral, QAM AC  potassium chloride, 20 mEq, Oral, Daily  predniSONE, 40 mg, Oral, Daily With Breakfast  pregabalin, 25 mg, Oral, BID  senna-docusate sodium, 2 tablet, Oral, BID  sodium chloride, 10 mL, Intravenous, Q12H      Continuous Infusions:   PRN Meds:.•  acetaminophen  •  senna-docusate sodium **AND** polyethylene glycol **AND** bisacodyl **AND** bisacodyl  •  melatonin  •  ondansetron **OR** ondansetron  •  QUEtiapine  •  sodium chloride  •  sodium chloride    Assessment/Plan   Assessment & Plan     Active Hospital Problems    Diagnosis  POA   • **Acute respiratory failure with hypoxia (HCC) [J96.01]  Yes   • Acute on chronic respiratory failure with hypoxemia (HCC) [J96.21]  Yes   • Acute CHF (congestive heart failure) (HCC) [I50.9]  Yes   • Thrombocytopenia (HCC) [D69.6]  Yes   • Elevated transaminase level [R74.01]  Yes   • Atrial fibrillation with RVR (HCC) [I48.91]  Yes   • COPD with acute exacerbation (HCC) [J44.1]  Yes   • Dementia without behavioral disturbance (HCC) [F03.90]  Yes   • Hypothyroidism (acquired) [E03.9]  Yes   • Trigeminal neuralgia [G50.0]  Yes   • Essential hypertension [I10]  Yes      Resolved Hospital Problems   No resolved problems to display.        Brief Hospital Course to date:  Mitzi Eric is a 86 y.o. female with hx of HTN, HLD, COPD, hypothyroidism, pulmonary HTN, PAF not on anticoagulation due to fall risk, trigeminal neuralgia, CKD 3, known lung mass, and dementia who presents to the ED with SOA etiology likely multifactorial     Acute respiratory failure with hypoxia  Acute CHF/valvular heart disease  Pulmonary hypertension  Moderate tricuspid regurgitation  COPD with exacerbation  --Overall likely due to CHF + possible component of COPD exacerbation. Doubt pneumonia given lack of fever, normal procalcitonin. Mild  leukocytosis could be reactive.  -O2 requirements have improved, on 2 L NC, will probably be okay on RA we will wean   -Repeat echo with EF 46 -50%, grade 1 diastolic dysfunction, mildly decreased left ventricular systolic function, RVSP of 54 mmHg.  She is s/p IV Bumex,   -Get repeat BNP, chest x-ray, will give a dose of 40 mg IV Lasix this morning, continue strict I/O.  --Continue prednisone 40 mg daily x 5 days and Doxycycline x 5 days.     Afib with RVR  Hx of PAF  -F/u repeat EKG  --Continue home metoprolol  XL 25 mg once daily  --No anticoagulation due to fall risk.     Elevated transaminases, improving  --Possibly due to decompensated CHF.  --Hold on further work-up at this time     Thrombocytopenia  -Chronic and stable     Hypothyroidism  --Continue Synthroid.     Trigeminal neuralgia  --Continue Trileptal, Lyrica.     Dementia  Continue Aricept, pharmacy to confirm if patient is on Seroquel, continue as needed for now     Recent COVID-19  --Per facility notes, she was diagnosed with COVID-19 roughly 2 weeks ago. She has tested negative here and is out of the window for treatment/isolation. Her symptoms are clearly more related to CHF/COPD.      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      DVT prophylaxis:  Medical DVT prophylaxis orders are present.     AM-PAC 6 Clicks Score (PT): 15 (03/18/22 1148)    Disposition:TBD    CODE STATUS:   Code Status and Medical Interventions:   Ordered at: 03/17/22 1000     Medical Intervention Limits:    NO intubation (DNI)     Code Status (Patient has no pulse and is not breathing):    No CPR (Do Not Attempt to Resuscitate)     Medical Interventions (Patient has pulse or is breathing):    Limited Support       Prosper Muniz MD  03/19/22

## 2022-03-19 NOTE — DISCHARGE SUMMARY
[facility]  DISCHARGE SUMMARY    Patient Name: Mitzi Eric  : 1935  MRN: [id number]    Date of Admission: 3/17/2022  8:31 AM  Date of Discharge: 3/20/2022  Primary Care Physician: Dm Cade APRN    Consults     No orders found from 2022 to 3/18/2022.          Hospital Course     Presenting Problem:   Acute on chronic respiratory failure with hypoxemia (HCC) [J96.21]    Active Hospital Problems    Diagnosis  POA   • **Acute respiratory failure with hypoxia (HCC) [J96.01]  Yes   • Acute on chronic respiratory failure with hypoxemia (HCC) [J96.21]  Yes   • Acute CHF (congestive heart failure) (HCC) [I50.9]  Yes   • Thrombocytopenia (HCC) [D69.6]  Yes   • Elevated transaminase level [R74.01]  Yes   • Atrial fibrillation with RVR (HCC) [I48.91]  Yes   • COPD with acute exacerbation (HCC) [J44.1]  Yes   • Dementia without behavioral disturbance (HCC) [F03.90]  Yes   • Hypothyroidism (acquired) [E03.9]  Yes   • Trigeminal neuralgia [G50.0]  Yes   • Essential hypertension [I10]  Yes      Resolved Hospital Problems   No resolved problems to display.      Hospital Course:  Mitzi Eric is a 86 y.o. female with hx of HTN, HLD, COPD, hypothyroidism, pulmonary HTN, PAF not on anticoagulation due to fall risk, trigeminal neuralgia, CKD 3, known lung mass, and dementia who presents to the ED with SOA etiology likely multifactorial, but suspect this is secondary to CHF     Acute respiratory failure with hypoxia  Acute dHF/valvular heart disease  Pulmonary hypertension  Moderate tricuspid regurgitation  COPD with exacerbation  --Overall likely due to CHF + possible component of COPD exacerbation. Doubt pneumonia given lack of fever, normal procalcitonin. Mild leukocytosis could be reactive.  -O2 requirements have improved was on 2 L NC but has been weaned to room air   --s/p 125 mg IV Solumedrol,   we will continue prednisone 40 mg daily and doxycycline for a  5-day course.  --Repeat echo with EF 46 -50%, grade 1 diastolic dysfunction, mildly decreased left ventricular systolic function, RVSP of 54 mmHg.  This shows a change from her previous echo.  She is s/p IV Bumex and Lasix., will discharge with 20 mg of p.o. Lasix every other day for 3 doses and follow-up in heart and valve clinic next week for further adjustments if needed.     Afib with RVR  Hx of PAF  --Continue home metoprolol  XL 25 mg once daily  --No anticoagulation due to fall risk.     Elevated transaminases  --Possibly due to decompensated CHF, improving.  --Follow-up with PCP     Thrombocytopenia  -Chronic and stable     Hypothyroidism  --Continue Synthroid.     Trigeminal neuralgia  --Continue Trileptal, Lyrica.     Dementia  Continue Aricept, pharmacy to confirm if patient is on Seroquel, continue as needed for now     Recent COVID-19  --Per facility notes, she was diagnosed with COVID-19 roughly 2 weeks ago. She has tested negative here and is out of the window for treatment/isolation. Her symptoms are clearly more related to CHF/COPD.      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     Discharge Follow Up Recommendations for outpatient labs/diagnostics:  Follow-up with PCP in 1 week  Follow-up in heart and valve clinic in the next week    Day of Discharge     HPI: No acute events overnight, patient states that she slept well, breathing is much improved, feels like she is ready to go home.  Nephew is at bedside.    Review of Systems  Gen- No fevers, chills  CV- No chest pain, palpitations  Resp- No cough, dyspnea  GI- No N/V/D, abd pain    Vital Signs:   Temp:  [97.5 °F (36.4 °C)-98.3 °F (36.8 °C)] 98.3 °F (36.8 °C)  Heart Rate:  [] 90  Resp:  [18] 18  BP: ()/(57-91) 132/72  Flow (L/min):  [2] 2    Physical Exam:  Constitutional: Elderly lady, in no acute distress, awake, alert  HENT: NCAT, mucous membranes  moist  Respiratory: Clear to auscultation bilaterally, respiratory effort normal on RA  Cardiovascular: RRR, no murmurs, rubs, or gallops  Gastrointestinal: Positive bowel sounds, soft, nontender, nondistended  Musculoskeletal: No bilateral ankle edema  Psychiatric: Appropriate affect, cooperative  Neurologic: Nonfocal  Skin: No rashes    Pertinent  and/or Most Recent Results     LAB RESULTS:      Lab 03/18/22  0554 03/17/22  0848   WBC 7.63 11.50*   HEMOGLOBIN 11.2* 13.6   HEMATOCRIT 33.0* 41.6   PLATELETS 110* 124*   NEUTROS ABS  --  8.67*   IMMATURE GRANS (ABS)  --  0.06*   LYMPHS ABS  --  1.57   MONOS ABS  --  0.89   EOS ABS  --  0.19   MCV 94.0 96.3   CRP  --  0.81*   PROCALCITONIN  --  0.08   LACTATE  --  1.6   LDH  --  282*         Lab 03/18/22  0554 03/17/22  0848   SODIUM 142 142   POTASSIUM 3.8 3.9   CHLORIDE 104 106   CO2 27.0 25.0   ANION GAP 11.0 11.0   BUN 23 16   CREATININE 0.81 0.92   EGFR 71.2 61.1   GLUCOSE 87 127*   CALCIUM 9.0 8.9         Lab 03/18/22  0554 03/17/22  0848   TOTAL PROTEIN 5.6* 6.8   ALBUMIN 3.70 4.20   GLOBULIN 1.9 2.6   ALT (SGPT) 57* 72*   AST (SGOT) 66* 97*   BILIRUBIN 0.5 0.4   ALK PHOS 74 97         Lab 03/19/22  1148 03/17/22  0848   PROBNP 8,038.0* 6,694.0*   TROPONIN T  --  0.028                 Brief Urine Lab Results  (Last result in the past 365 days)      Color   Clarity   Blood   Leuk Est   Nitrite   Protein   CREAT   Urine HCG        11/29/21 1200 Yellow   Cloudy   Trace   Small (1+)   Positive   30 mg/dL (1+)               Microbiology Results (last 10 days)     Procedure Component Value - Date/Time    COVID-19 and FLU A/B PCR - Swab, Nasopharynx [[id number]]  (Normal) Collected: 03/17/22 0850    Lab Status: Final result Specimen: Swab from Nasopharynx Updated: 03/17/22 0918     COVID19 Not Detected     Influenza A PCR Not Detected     Influenza B PCR Not Detected    Narrative:      Fact sheet for providers: https://www.fda.gov/media/098836/download    Fact sheet for  patients: https://www.[employer].gov/media/756212/download    Test performed by PCR.          Adult Transthoracic Echo Complete W/ Cont if Necessary Per Protocol    Result Date: 3/18/2022  · Left ventricular ejection fraction appears to be 46 - 50%. Left ventricular systolic function is mildly decreased. · Left ventricular diastolic function is consistent with (grade I) impaired relaxation. · Mild to moderate mitral regurgitation. · Mild to moderate tricuspid regurgitation, RVSP 54 mmHg.      XR Chest 1 View    Result Date: 3/19/2022  DATE OF EXAM: 3/19/2022 10:10 AM  PROCEDURE: XR CHEST 1 VW-  INDICATIONS: SOA; J96.21-Acute and chronic respiratory failure with hypoxia; I50.9-Heart failure, unspecified; I10-Essential (primary) hypertension; I48.91-Unspecified atrial fibrillation; Z87.09-Personal history of other diseases of the respiratory system; G30.9-Alzheimer's disease, unspecified; F02.80-Dementia in other diseases classified elsewhere without behavioral disturbance  COMPARISON: 3/17/2022 and  TECHNIQUE: Single radiographic AP view of the chest was obtained.  FINDINGS: Right apical scar or mass appears unchanged. Advanced changes of centrilobular emphysema are again noted. Diffuse interstitial disease in the lung bases appears similar to yesterday's study, mildly improved on the right. This probably only trace pleural effusion. No new pulmonary parenchymal disease is seen.        Mild improved aeration of the right lung base, stable interstitial changes elsewhere. Stable right upper lobe mass and chronic changes of COPD.  This report was finalized on 3/19/2022 1:19 PM by Dr. Jorge Foote MD.      XR Chest 1 View    Result Date: 3/17/2022  DATE OF EXAM: 3/17/2022 9:03 AM  PROCEDURE: XR CHEST 1 VW-  INDICATIONS: SOA triage protocol  COMPARISON: 12/7/2021  TECHNIQUE: Single radiographic AP view of the chest was obtained.  FINDINGS: Heart size and pulmonary vasculature are normal. Lungs are hyperinflated with bullous  changes in the upper lobes. There is a stable mass in the right upper lobe with pleural tags extending to the apex where there is focal pleural thickening. There are increased markings in the lower lung zones that appear to be a combination of interstitial and groundglass opacities greater on the right. There is thickening of the pleural stripe laterally on the right. There is strandy atelectasis in the medial lung bases       1. New opacities in the lower lung zones which may represent edema or pneumonia 2. Bibasilar atelectasis 3. Pulmonary emphysema 4. Stable right upper lobe mass  This report was finalized on 3/17/2022 9:20 AM by Dinesh Brannon.                Results for orders placed during the hospital encounter of 03/17/22    Adult Transthoracic Echo Complete W/ Cont if Necessary Per Protocol    Interpretation Summary  · Left ventricular ejection fraction appears to be 46 - 50%. Left ventricular systolic function is mildly decreased.  · Left ventricular diastolic function is consistent with (grade I) impaired relaxation.  · Mild to moderate mitral regurgitation.  · Mild to moderate tricuspid regurgitation, RVSP 54 mmHg.      Plan for Follow-up of Pending Labs/Results:    Discharge Details        Discharge Medications      New Medications      Instructions Start Date   doxycycline 100 MG capsule  Commonly known as: MONODOX   100 mg, Oral, Every 12 Hours Scheduled      furosemide 20 MG tablet  Commonly known as: Lasix   20 mg, Oral, Every Other Day      predniSONE 20 MG tablet  Commonly known as: DELTASONE   40 mg, Oral, Daily With Breakfast   Start Date: March 21, 2022        Changes to Medications      Instructions Start Date   QUEtiapine 25 MG tablet  Commonly known as: SEROquel  What changed: when to take this   12.5 mg, Oral, Nightly PRN         Continue These Medications      Instructions Start Date   acetaminophen 325 MG tablet  Commonly known as: TYLENOL   650 mg, Oral, 3 Times Daily      Biofreeze 4 %  gel  Generic drug: Menthol (Topical Analgesic)   1 application, Apply externally, Every Early Morning, Apply to lower back      cetirizine 10 MG tablet  Commonly known as: zyrTEC   10 mg, Oral, Daily      docusate sodium 100 MG capsule   100 mg, Oral, 2 Times Daily      donepezil 5 MG tablet  Commonly known as: Aricept   5 mg, Oral, Every Morning      ENSURE PO   240 mL, Oral, 2 Times Daily, MIX 8OZ OF ENSURE, 1/4 SCOOP PROTEIN POWDER AND 2OZ OF MILK FOR A MILKSHAKE       folic acid 400 MCG tablet  Commonly known as: FOLVITE   400 mcg, Oral, 2 Times Daily      levothyroxine 25 MCG tablet  Commonly known as: SYNTHROID, LEVOTHROID   25 mcg, Oral, Every Early Morning      metoprolol succinate XL 25 MG 24 hr tablet  Commonly known as: TOPROL-XL   25 mg, Oral, Daily      OXcarbazepine 600 MG tablet  Commonly known as: TRILEPTAL   600 mg, Oral, 2 Times Daily      pantoprazole 20 MG EC tablet  Commonly known as: PROTONIX   20 mg, Oral, Daily      potassium chloride 20 MEQ CR tablet  Commonly known as: K-DUR,KLOR-CON   20 mEq, Oral, Daily      pregabalin 25 MG capsule  Commonly known as: LYRICA   25 mg, Oral, 2 Times Daily             Allergies   Allergen Reactions   • Bee Pollen Unknown - Low Severity   • Lorazepam Delirium       [facility]charge Disposition: [facility] ([facility])  Diet:  Hospital:  Diet Order   Procedures   • Diet Regular; Cardiac       Activity: As tolerated    Restrictions or Other Recommendations:  None       CODE STATUS:    Code Status and Medical Interventions:   Ordered at: 03/17/22 1000     Medical Intervention Limits:    NO intubation (DNI)     Code Status (Patient has no pulse and is not breathing):    No CPR (Do Not Attempt to Resuscitate)     Medical Interventions (Patient has pulse or is breathing):    Limited Support       Future Appointments   Date Time Provider Department Center   6/22/2022 10:00 AM Ann-Marie Goodwin APRN [facility] L[facility]  MD  03/20/22    Time Spent on Discharge:  I spent  35 minutes on this discharge activity which included: face-to-face encounter with the patient, reviewing the data in the system, coordination of the care with the nursing staff as well as consultants, documentation, and entering orders.

## 2022-03-20 VITALS
DIASTOLIC BLOOD PRESSURE: 72 MMHG | HEIGHT: 63 IN | TEMPERATURE: 98.3 F | RESPIRATION RATE: 18 BRPM | WEIGHT: 111 LBS | BODY MASS INDEX: 19.67 KG/M2 | OXYGEN SATURATION: 96 % | HEART RATE: 90 BPM | SYSTOLIC BLOOD PRESSURE: 132 MMHG

## 2022-03-20 PROCEDURE — 63710000001 PREDNISONE PER 1 MG: Performed by: HOSPITALIST

## 2022-03-20 PROCEDURE — 25010000002 HEPARIN (PORCINE) PER 1000 UNITS: Performed by: HOSPITALIST

## 2022-03-20 PROCEDURE — 99239 HOSP IP/OBS DSCHRG MGMT >30: CPT | Performed by: INTERNAL MEDICINE

## 2022-03-20 RX ORDER — FUROSEMIDE 10 MG/ML
40 INJECTION INTRAMUSCULAR; INTRAVENOUS ONCE
Status: DISCONTINUED | OUTPATIENT
Start: 2022-03-20 | End: 2022-03-20

## 2022-03-20 RX ORDER — FUROSEMIDE 40 MG/1
40 TABLET ORAL DAILY
Status: COMPLETED | OUTPATIENT
Start: 2022-03-20 | End: 2022-03-20

## 2022-03-20 RX ORDER — FUROSEMIDE 20 MG/1
20 TABLET ORAL EVERY OTHER DAY
Qty: 3 TABLET | Refills: 0 | Status: SHIPPED | OUTPATIENT
Start: 2022-03-20 | End: 2022-03-25

## 2022-03-20 RX ORDER — DOXYCYCLINE 100 MG/1
100 CAPSULE ORAL EVERY 12 HOURS SCHEDULED
Qty: 5 CAPSULE | Refills: 0 | Status: SHIPPED | OUTPATIENT
Start: 2022-03-20 | End: 2022-03-23

## 2022-03-20 RX ORDER — PREDNISONE 20 MG/1
40 TABLET ORAL
Qty: 4 TABLET | Refills: 0 | Status: SHIPPED | OUTPATIENT
Start: 2022-03-21 | End: 2022-03-23

## 2022-03-20 RX ADMIN — FOLIC ACID TAB 400 MCG 400 MCG: 400 TAB at 08:44

## 2022-03-20 RX ADMIN — PANTOPRAZOLE SODIUM 40 MG: 40 TABLET, DELAYED RELEASE ORAL at 05:33

## 2022-03-20 RX ADMIN — DONEPEZIL HYDROCHLORIDE 5 MG: 5 TABLET, FILM COATED ORAL at 05:32

## 2022-03-20 RX ADMIN — METOPROLOL SUCCINATE 25 MG: 25 TABLET, EXTENDED RELEASE ORAL at 08:45

## 2022-03-20 RX ADMIN — Medication 10 ML: at 08:07

## 2022-03-20 RX ADMIN — OXCARBAZEPINE 600 MG: 300 TABLET, FILM COATED ORAL at 08:45

## 2022-03-20 RX ADMIN — LEVOTHYROXINE SODIUM 25 MCG: 25 TABLET ORAL at 05:33

## 2022-03-20 RX ADMIN — PREDNISONE 40 MG: 20 TABLET ORAL at 08:44

## 2022-03-20 RX ADMIN — DOXYCYCLINE 100 MG: 100 CAPSULE ORAL at 08:44

## 2022-03-20 RX ADMIN — POTASSIUM CHLORIDE 20 MEQ: 750 CAPSULE, EXTENDED RELEASE ORAL at 08:45

## 2022-03-20 RX ADMIN — HEPARIN SODIUM 5000 UNITS: 5000 INJECTION INTRAVENOUS; SUBCUTANEOUS at 08:44

## 2022-03-20 RX ADMIN — FUROSEMIDE 40 MG: 40 TABLET ORAL at 13:44

## 2022-03-20 RX ADMIN — PREGABALIN 25 MG: 25 CAPSULE ORAL at 08:45

## 2022-03-21 LAB
QT INTERVAL: 362 MS
QTC INTERVAL: 445 MS

## 2022-03-24 NOTE — PROGRESS NOTES
[facility]  [facility]    Chief Complaint  Congestive Heart Failure and Establish Care    Subjective    History of Present Illness {CC  Problem List  Visit  Diagnosis   Encounters  Notes  Medications  Labs  Result Review Imaging  Media :23}     Mitzi Eric is a 86 y.o. female with Hypertension, hyperlipidemia, COPD, hypothyroidism, pulmonary hypertension, PAF not on anticoagulation secondary to fall risk, trigeminal neuralgia, CKD stage III, known lung mass and dementia who presents today as a hospital referral for acute on chronic diastolic heart failure.  Patient resides at [facility] skilled nursing facility.  Patient admitted 3/17-3/20 with acute respiratory failure thought to be secondary to acute diastolic heart failure and possible COPD exacerbation.  She was able to be weaned to room air.  Echo showed EF of 46 to 50% with grade 1 diastolic dysfunction, RVSP of 54.  She required IV Bumex and Lasix. ProBNP over 8000.   She was discharged with 20 mg of Lasix every other day for 3 doses.  According to facility she had been diagnosed with Covid approximately 2 weeks prior, but tested negative while at [facility].    She reports she is feeling much better.  She resides at [facility].  She reports that she is mostly independent in her daily living.  She is mostly wheelchair-bound.  Her nephew is with her today and he is power of  and sees her every day.  They both deny any cardiac history.  She is unaware of history of A. fib.  She says she is never seen a cardiologist.  He reports she has never been hospitalized for anything that was cardiac related.  He reports his symptoms that brought her to the hospital came on suddenly.  He reports the time they were talking she was laughing and then she became hypoxic.  Denies any lower extremity edema, palpitations, chest pain, shortness of breath, weight gain, dizziness or lightheadedness          Objective     Vital  "Signs:   Vitals:    03/25/22 0901 03/25/22 0905 03/25/22 0906   BP: 179/79 156/76 164/81   BP Location: Right arm Left arm Left arm   Patient Position: Sitting Standing Sitting   Cuff Size: Small Adult Small Adult Small Adult   Pulse: 75 83 79   Resp:   20   Temp:   97.2 °F (36.2 °C)   TempSrc:   Temporal   SpO2: 96% 96% 97%   Weight:   45.4 kg (100 lb)   Height:   160 cm (63\")     Body mass index is 17.71 kg/m².  Physical Exam  Vitals reviewed.   Constitutional:       Appearance: Normal appearance.   HENT:      Head: Normocephalic.   Neck:      Vascular: No carotid bruit.   Cardiovascular:      Rate and Rhythm: Normal rate. Rhythm irregularly irregular.      Pulses: Normal pulses.      Heart sounds: Normal heart sounds, S1 normal and S2 normal. No murmur heard.  Pulmonary:      Effort: Pulmonary effort is normal. No respiratory distress.      Breath sounds: Decreased breath sounds present.   Chest:      Chest wall: No tenderness.   Abdominal:      General: Abdomen is flat.      Palpations: Abdomen is soft.   Musculoskeletal:      Cervical back: Neck supple.      Right lower leg: No edema.      Left lower leg: No edema.   Skin:     General: Skin is warm and dry.   Neurological:      General: No focal deficit present.      Mental Status: She is alert and oriented to person, place, and time. Mental status is at baseline.   Psychiatric:         Mood and Affect: Mood normal.         Behavior: Behavior normal.         Thought Content: Thought content normal.              Result Review  Data Reviewed:{ Labs  Result Review  Imaging  Med Tab  Media :23}   ECG 12 Lead (03/19/2022 11:50)  Adult Transthoracic Echo Complete W/ Cont if Necessary Per Protocol (03/17/2022 17:14)  proBNP (03/19/2022 11:48)  CBC (No Diff) (03/18/2022 05:54)  Comprehensive Metabolic Panel (03/18/2022 05:54)  XR Chest 1 View (03/19/2022 10:42)    Reviewed hospital records           Assessment and Plan {CC Problem List  Visit Diagnosis  ROS  " Review (Popup)  [facility] Maintenance  Quality  BestPractice  Medications  SmartSets  SnapShot Encounters  Media :23}   1. Heart failure with preserved ejection fraction, borderline, class III (HCC)  EF 45-50% with pulmonary HTN  She appears euvolemic.  However, my concern is that she has episodes of flash pulmonary edema.  Discussed with her and her nephew and recommended that she be maintained on low-dose diuretic.  We will stop her Lasix and start her on torsemide 10 mg daily.  She can take an extra 10 mg as needed for weight gain.  Continue potassium  Encouraged daily weights if possible at facility  Recheck labs next week  - Basic Metabolic Panel; Future  - proBNP; Future    2. Essential hypertension  Elevated today, but usually controlled.  Likely secondary to anxiety/whitecoat hypertension  Continue to monitor at skilled nursing facility    3. Longstanding persistent atrial fibrillation (HCC)  Rate controlled on metoprolol and asymptomatic.  She was unaware of this history.  I did discuss risk of stroke versus bleeding with her and her nephew and they both agree that she should not be on anticoagulation.  He reports a history of her having multiple falls  Recommend ASA 81mg daily. She has no history of bleeding. Advised her POA to discuss with PCP and if they feel her risk is still to high for this she can stop            Follow Up {Instructions Charge Capture  Follow-up Communications :23}   Return in about 2 weeks (around 4/8/2022) for Video Visit, HF.    Patient was given instructions and counseling regarding her condition or for health maintenance advice. Please see specific information pulled into the AVS if appropriate.  Advised to call the Heart and Valve Center with any questions, concerns, or worsening symptoms.

## 2022-03-25 ENCOUNTER — OFFICE VISIT (OUTPATIENT)
Dept: CARDIOLOGY | Facility: HOSPITAL | Age: 87
End: 2022-03-25

## 2022-03-25 ENCOUNTER — APPOINTMENT (OUTPATIENT)
Dept: CT IMAGING | Facility: HOSPITAL | Age: 87
End: 2022-03-25

## 2022-03-25 ENCOUNTER — HOSPITAL ENCOUNTER (EMERGENCY)
Facility: HOSPITAL | Age: 87
Discharge: HOME | End: 2022-03-25
Attending: EMERGENCY MEDICINE | Admitting: EMERGENCY MEDICINE

## 2022-03-25 ENCOUNTER — APPOINTMENT (OUTPATIENT)
Dept: GENERAL RADIOLOGY | Facility: HOSPITAL | Age: 87
End: 2022-03-25

## 2022-03-25 VITALS
BODY MASS INDEX: 17.72 KG/M2 | DIASTOLIC BLOOD PRESSURE: 81 MMHG | HEART RATE: 79 BPM | SYSTOLIC BLOOD PRESSURE: 164 MMHG | TEMPERATURE: 97.2 F | HEIGHT: 63 IN | OXYGEN SATURATION: 97 % | RESPIRATION RATE: 20 BRPM | WEIGHT: 100 LBS

## 2022-03-25 VITALS
BODY MASS INDEX: 19.14 KG/M2 | OXYGEN SATURATION: 96 % | RESPIRATION RATE: 18 BRPM | SYSTOLIC BLOOD PRESSURE: 180 MMHG | DIASTOLIC BLOOD PRESSURE: 94 MMHG | HEART RATE: 93 BPM | TEMPERATURE: 98.3 F | WEIGHT: 108 LBS | HEIGHT: 63 IN

## 2022-03-25 DIAGNOSIS — R07.81 RIB PAIN: ICD-10-CM

## 2022-03-25 DIAGNOSIS — I10 ESSENTIAL HYPERTENSION: ICD-10-CM

## 2022-03-25 DIAGNOSIS — Z23 NEED FOR TDAP VACCINATION: ICD-10-CM

## 2022-03-25 DIAGNOSIS — S09.90XA INJURY OF HEAD, INITIAL ENCOUNTER: ICD-10-CM
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
## 2022-03-25 DIAGNOSIS — S00.83XA FACIAL CONTUSION, INITIAL ENCOUNTER: ICD-10-CM

## 2022-03-25 DIAGNOSIS — I50.30 HEART FAILURE WITH PRESERVED EJECTION FRACTION, BORDERLINE, CLASS III: Primary | ICD-10-CM

## 2022-03-25 DIAGNOSIS — S01.81XA FACIAL LACERATION, INITIAL ENCOUNTER: ICD-10-CM

## 2022-03-25 DIAGNOSIS — I48.11 LONGSTANDING PERSISTENT ATRIAL FIBRILLATION: ICD-10-CM

## 2022-03-25 PROCEDURE — 70486 CT MAXILLOFACIAL W/O DYE: CPT

## 2022-03-25 PROCEDURE — 99214 OFFICE O/P EST MOD 30 MIN: CPT | Performed by: NURSE PRACTITIONER

## 2022-03-25 PROCEDURE — 90471 IMMUNIZATION ADMIN: CPT | Performed by: EMERGENCY MEDICINE

## 2022-03-25 PROCEDURE — 90715 TDAP VACCINE 7 YRS/> IM: CPT | Performed by: EMERGENCY MEDICINE

## 2022-03-25 PROCEDURE — 70450 CT HEAD/BRAIN W/O DYE: CPT

## 2022-03-25 PROCEDURE — 99283 EMERGENCY DEPT VISIT LOW MDM: CPT

## 2022-03-25 PROCEDURE — 71045 X-RAY EXAM CHEST 1 VIEW: CPT

## 2022-03-25 PROCEDURE — 25010000002 TETANUS-DIPHTH-ACELL PERTUSSIS 5-2.5-18.5 LF-MCG/0.5 SUSPENSION PREFILLED SYRINGE: Performed by: EMERGENCY MEDICINE

## 2022-03-25 RX ORDER — TORSEMIDE 10 MG/1
10 TABLET ORAL DAILY
Qty: 30 TABLET | Refills: 1 | Status: SHIPPED | OUTPATIENT
Start: 2022-03-25 | End: 2022-04-05 | Stop reason: SDUPTHER

## 2022-03-25 RX ORDER — ASPIRIN 81 MG/1
81 TABLET ORAL DAILY
Qty: 30 TABLET | Refills: 3 | Status: SHIPPED | OUTPATIENT
Start: 2022-03-25

## 2022-03-25 RX ADMIN — TETANUS TOXOID, REDUCED DIPHTHERIA TOXOID AND ACELLULAR PERTUSSIS VACCINE, ADSORBED 0.5 ML: 5; 2.5; 8; 8; 2.5 SUSPENSION INTRAMUSCULAR at 14:30

## 2022-03-25 NOTE — ED PROVIDER NOTES
Subjective   This is a very pleasant 86-year-old female who is accompanied by her nephew who is her power of .  [facility].  She is essentially wheelchair-bound but can transfer herself to bed in the chair.  She has dementia.  She has had previous craniotomy for brain tumor many years ago.    She comes the emergency room today with a fall and a bruise to her face and chin perhaps some right rib pain.    She was recently hospitalized for heart failure was actually seen by cardiology today as an outpatient.  Her nephew took her back to her residence and somehow she ended up on the floor.  It is unclear what precipitated her if she had loss of consciousness with this but she is brought to the ED now for further evaluation.    The patient herself can give a very rudimentary history.  Is unclear if she has had a tetanus shot the past 10 years.  Currently she has little bit of jaw pain and right posterior rib pain but really has no headache or visual field changes.          Review of Systems   Unable to perform ROS: Dementia       Past Medical History:   Diagnosis Date   • A-fib ([facility])    • Ataxic gait    • Chronic kidney disease, stage 3 ([facility])    • COPD (chronic obstructive pulmonary disease) ([facility])    • Dementia ([facility])    • Disease of thyroid gland    • Heart failure ([facility])    • Hyperlipidemia    • Hypertension        Allergies   Allergen Reactions   • Bee Pollen Unknown - Low Severity   • Lorazepam Delirium       Past Surgical History:   Procedure Laterality Date   • BRAIN TUMOR EXCISION      BENIGN   • FRACTURE SURGERY     • TRIGGER FINGER RELEASE         Family History   Problem Relation Age of Onset   • Stroke Mother    • Alcohol abuse Father        Social History     Socioeconomic History   • Marital status: Single   Tobacco Use   • Smoking status: Former Smoker     Packs/day: 1.00     Years: 20.00     Pack years: 20.00     Start date: 1960     Quit date: 1980     Years since  "quittin.2   • Smokeless tobacco: Never Used   Substance and Sexual Activity   • Alcohol use: Not Currently     Comment: \"social drinker\"   • Drug use: No   • Sexual activity: Defer           Objective   Physical Exam  Vitals and nursing note reviewed.   Constitutional:       Comments: This is a delightful 86-year-old no acute distress she is alert knows who her nephew is engage in basic conversation.  She appears thin.   HENT:      Head:      Comments: She is a 1.5 cm laceration just above her left eye.  Bleeding is controlled.  She is bruised on her left chin and is mildly tender there but there is no deformity of her jaw no TMJ pain.     Right Ear: External ear normal.      Left Ear: External ear normal.      Nose: Nose normal.      Mouth/Throat:      Mouth: Mucous membranes are moist.      Pharynx: Oropharynx is clear. No oropharyngeal exudate.      Comments: Mentation is in good repair.  Eyes:      Extraocular Movements: Extraocular movements intact.      Conjunctiva/sclera: Conjunctivae normal.      Pupils: Pupils are equal, round, and reactive to light.   Cardiovascular:      Rate and Rhythm: Normal rate and regular rhythm.      Pulses: Normal pulses.   Pulmonary:      Effort: Pulmonary effort is normal.      Breath sounds: Normal breath sounds.      Comments: She is mildly tender to palpation of the right posterior rib area without bruising or crepitance or step-offs.  Abdominal:      General: Abdomen is flat. Bowel sounds are normal. There is no distension.      Palpations: Abdomen is soft. There is no mass.   Musculoskeletal:      Cervical back: Normal range of motion and neck supple. No rigidity.      Comments: Equal pulses without edema, synovitis, rash, or venous cords.  She has no point tenderness   Lymphadenopathy:      Cervical: No cervical adenopathy.   Skin:     General: Skin is warm and dry.      Capillary Refill: Capillary refill takes less than 2 seconds.   Neurological:      Mental Status: " She is alert.      Comments: Modest dementia.  Face symmetric except for the cut.  Voice soft.  Tongue midline.  Vision, hearing, and speech are preserved.  Modest generalized weakness without focality.         Laceration Repair    Date/Time: 3/25/2022 3:52 PM  Performed by: Dieudonne Pizarro MD  Authorized by: Dieudonne Pizarro MD     Consent:     Consent obtained:  Verbal    Consent given by:  Patient and healthcare agent    Risks, benefits, and alternatives were discussed: yes    Comments:      I discussed risk and benefit of closure of this wound I think the best option least invasive would be to use tissue adhesive the patient and her family agreed.    Her left face was prepped in a sterile fashion.  The wound was carefully approximated using finger tension and tissue adhesive was used with good effect to approximate the wound margins.  There was no bleeding from this and the cosmetic result was very good.  No immediate complications.  Blood loss 0.  Discussed wound care.  All agreeable with the plan               ED Course           No results found for this or any previous visit (from the past 24 hour(s)).  Note: In addition to lab results from this visit, the labs listed above may include labs taken at another facility or during a different encounter within the last 24 hours. Please correlate lab times with ED admission and discharge times for further clarification of the services performed during this visit.    XR Chest 1 View   Final Result   Extensive chronic changes are again noted. There is stable irregular   density within the right upper lobe. No new consolidations or pleural   effusions are identified.       This report was finalized on 3/25/2022 2:44 PM by Jonas Anglin MD.          CT Head Without Contrast   Final Result   1.  No acute intracranial abnormality.   2.  Encephalomalacia left frontal lobe   3.  White matter changes which could reflect more chronic small vessel   ischemic change.   4.   Subtle fracture deformity left nasal bone age uncertain.   5.  Multilevel degenerative change cervical spine.       This report was finalized on 3/25/2022 2:10 PM by Estrada Pretty MD.          CT Facial Bones Without Contrast   Final Result   1.  No acute intracranial abnormality.   2.  Encephalomalacia left frontal lobe   3.  White matter changes which could reflect more chronic small vessel   ischemic change.   4.  Subtle fracture deformity left nasal bone age uncertain.   5.  Multilevel degenerative change cervical spine.       This report was finalized on 3/25/2022 2:10 PM by Estrada Pretty MD.            Vitals:    03/25/22 1414 03/25/22 1430 03/25/22 1528 03/25/22 1529   BP: (!) 176/110 (!) 180/113 180/94    BP Location: Right arm      Patient Position: Lying      Pulse: 93 93     Resp: 19 18     Temp:       SpO2: 94% 96%  96%   Weight:       Height:         Medications   Tetanus-Diphth-Acell Pertussis (BOOSTRIX) injection 0.5 mL (0.5 mL Intramuscular Given 3/25/22 1430)     ECG/EMG Results (last 24 hours)     ** No results found for the last 24 hours. **        No orders to display                                           MDM  Number of Diagnoses or Management Options  Facial contusion, initial encounter  Facial laceration, initial encounter  Fall, initial encounter  Injury of head, initial encounter  Need for Tdap vaccination  Rib pain  Diagnosis management comments:       I reviewed all available studies at the bedside with the patient and her nephew is her power of .  Head CT reassuring nothing acute.  Old nasal fracture the patient has no nasal tenderness currently.  Her chin appears normal on CT scan I think she just has a contusion.    Laceration was repaired.  Chest x-ray shows no rib fractures just an old right upper lobe issue.    At this point will be discharged back to her facility with follow-up with her PCP and return to the ED if worse in any way.  Wound care discussed.    All are  agreeable plan.    Patient made up-to-date on tetanus shot       Amount and/or Complexity of Data Reviewed  Tests in the radiology section of CPT®: reviewed        Final diagnoses:   Fall, initial encounter   Injury of head, initial encounter   Facial laceration, initial encounter   Facial contusion, initial encounter   Rib pain   Need for Tdap vaccination       ED Disposition  ED Disposition     ED Disposition   Discharge    Condition   Stable    Comment   --             Dm Cade, APRN  [address] [facility]  [place] [address]  [phone]      As needed         Medication List      Changed    QUEtiapine 25 MG tablet  Commonly known as: SEROquel  Take 0.5 tablets by mouth At Night As Needed (agitation, sleep).  What changed: when to take this             Dieudonne Pizarro MD  03/25/22 7816

## 2022-03-29 ENCOUNTER — APPOINTMENT (OUTPATIENT)
Dept: CT IMAGING | Facility: HOSPITAL | Age: 87
End: 2022-03-29

## 2022-03-29 ENCOUNTER — HOSPITAL ENCOUNTER (EMERGENCY)
Facility: HOSPITAL | Age: 87
Discharge: NURSING, REHAB OR LONG-TERM CARE | End: 2022-03-29
Attending: EMERGENCY MEDICINE | Admitting: EMERGENCY MEDICINE

## 2022-03-29 VITALS
WEIGHT: 111 LBS | HEART RATE: 91 BPM | TEMPERATURE: 97.8 F | BODY MASS INDEX: 19.67 KG/M2 | HEIGHT: 63 IN | SYSTOLIC BLOOD PRESSURE: 121 MMHG | DIASTOLIC BLOOD PRESSURE: 63 MMHG | OXYGEN SATURATION: 95 % | RESPIRATION RATE: 17 BRPM

## 2022-03-29 DIAGNOSIS — S01.111A EYEBROW LACERATION, RIGHT, INITIAL ENCOUNTER: Primary | ICD-10-CM
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
## 2022-03-29 LAB
BILIRUB UR QL STRIP: NEGATIVE
BILIRUB UR QL STRIP: NEGATIVE
CLARITY UR: CLEAR
CLARITY UR: CLEAR
COLOR UR: YELLOW
COLOR UR: YELLOW
GLUCOSE UR STRIP-MCNC: NEGATIVE MG/DL
GLUCOSE UR STRIP-MCNC: NEGATIVE MG/DL
HGB UR QL STRIP.AUTO: NEGATIVE
HGB UR QL STRIP.AUTO: NEGATIVE
KETONES UR QL STRIP: NEGATIVE
KETONES UR QL STRIP: NEGATIVE
LEUKOCYTE ESTERASE UR QL STRIP.AUTO: NEGATIVE
LEUKOCYTE ESTERASE UR QL STRIP.AUTO: NEGATIVE
NITRITE UR QL STRIP: NEGATIVE
NITRITE UR QL STRIP: NEGATIVE
PH UR STRIP.AUTO: 6 [PH] (ref 5–8)
PH UR STRIP.AUTO: 6.5 [PH] (ref 5–8)
PROT UR QL STRIP: NEGATIVE
PROT UR QL STRIP: NEGATIVE
SP GR UR STRIP: 1.01 (ref 1–1.03)
SP GR UR STRIP: 1.01 (ref 1–1.03)
UROBILINOGEN UR QL STRIP: NORMAL
UROBILINOGEN UR QL STRIP: NORMAL

## 2022-03-29 PROCEDURE — 70486 CT MAXILLOFACIAL W/O DYE: CPT

## 2022-03-29 PROCEDURE — 72125 CT NECK SPINE W/O DYE: CPT

## 2022-03-29 PROCEDURE — 81003 URINALYSIS AUTO W/O SCOPE: CPT | Performed by: EMERGENCY MEDICINE

## 2022-03-29 PROCEDURE — 81003 URINALYSIS AUTO W/O SCOPE: CPT | Performed by: PHYSICIAN ASSISTANT

## 2022-03-29 PROCEDURE — 70450 CT HEAD/BRAIN W/O DYE: CPT

## 2022-03-29 PROCEDURE — P9612 CATHETERIZE FOR URINE SPEC: HCPCS

## 2022-03-29 PROCEDURE — 99283 EMERGENCY DEPT VISIT LOW MDM: CPT

## 2022-03-29 RX ORDER — LIDOCAINE HYDROCHLORIDE AND EPINEPHRINE 10; 10 MG/ML; UG/ML
10 INJECTION, SOLUTION INFILTRATION; PERINEURAL ONCE
Status: DISCONTINUED | OUTPATIENT
Start: 2022-03-29 | End: 2022-03-29 | Stop reason: HOSPADM

## 2022-03-29 RX ADMIN — Medication 5 ML: at 13:32

## 2022-04-04 NOTE — PROGRESS NOTES
[facility]  Heart and Valve [facility]      Mode of Visit: Video  Location of patient: other: [facility]  You have chosen to receive care through a telehealth visit.  Does the patient consent to use a video/audio connection for your medical care today? Yes  The visit included audio and video interaction. No technical issues occurred during this visit.     Chief Complaint  Follow-up and Congestive Heart Failure    History of Present Illness    Mitzi Eric is a 86 y.o. female with atrial fibrillation (no AC secondary to fall risk), heart failure with borderline EF (46-50%), pulmonary hypertension, COPD, dementia, hypothyroidism, CKD and frequent falls who presents today as a telemedicine follow-up for heart failure.  Patient is mostly wheelchair-bound and resides at [facility].  She was seen in our office couple weeks ago as a hospital follow-up due to hospitalization for acute pulmonary edema.  She was discharged from the hospital with Lasix every other day for only 3 doses.  Due to history of acute pulmonary edema she was started on torsemide 10 mg daily.    Unfortunately, she has been to the ED twice since our visit for falls. Required stitches above her right eye. Her nephew reports that he thinks her foot got caught in the wheelchair. He says she won't call for help when she has to use the bathroom. Her nephew reports she won't drink water. She doesn't recall falling. Denies dizziness/lightheadedness, shortness of breath or edema. SBPs mostly running in the 140s. Her nephew reports weight has been stable. Last weight in our office was reported and likely inaccurate. Weights around 94-96lbs.     Objective     Vital Signs:   Vitals:    04/05/22 1003   BP: 118/70   SpO2: 95%   Weight: 41.9 kg (92 lb 6.4 oz)     Body mass index is 16.37 kg/m².    Virtual Visit Physical Exam  Physical Exam  Constitutional:       Appearance: Normal appearance.   HENT:      Head:  Normocephalic.   Pulmonary:      Effort: Pulmonary effort is normal. No respiratory distress.   Neurological:      Mental Status: She is alert and oriented to person, place, and time.   Psychiatric:         Mood and Affect: Mood normal.         Behavior: Behavior normal.         Thought Content: Thought content normal.              Result Review  Data Reviewed:{ Labs  Result Review  Imaging  Med Tab  Media :23}   Adult Transthoracic Echo Complete W/ Cont if Necessary Per Protocol (03/17/2022 17:14)  Basic Metabolic Panel (03/25/2022 09:41)  proBNP (03/25/2022 09:41)    Reviewed ED notes    Lab 4/4/2020 glucose 87, sodium 146, potassium 4.2, chloride 112, carbon dioxide 28, BUN 20, creatinine 0.9.  proBNP 2359           Assessment and Plan {CC Problem List  Visit Diagnosis  ROS  Review (Popup)  Health Maintenance  Quality  BestPractice  Medications  SmartSets  SnapShot Encounters  Media :23}   1. Heart failure with preserved ejection fraction, borderline, class III (HCC)  Stable symptoms   Due to frequent falls will decrease torsemide to 5mg daily with 10meq of potassium. May take additional tablet PRN 2-3lb wt gain, increased shortness of breath  Continue daily weights    2. Essential hypertension  Well controlled  Avoid aggressive BP lowering due to risk for falls    3. Longstanding persistent atrial fibrillation (HCC)  Rate controlled. HR in the 70s per VS at facility  No AC secondary to frequent falls  Continue ASA for now    4. Frequent falls  Decrease torsemide as above due to risk for dehydration, increased urgency  If frequent falls persist, may need to stop ASA          Follow Up {Instructions Charge Capture  Follow-up Communications :23}   Return in about 4 weeks (around 5/3/2022) for HF, Video Visit.    Patient was given instructions and counseling regarding her condition or for health maintenance advice. Please see specific information pulled into the AVS if appropriate.  Advised to  call the [facility] Center with any questions, concerns, or worsening symptoms.    Dictated Utilizing Dragon Dictation

## 2022-04-05 ENCOUNTER — TELEMEDICINE (OUTPATIENT)
Dept: CARDIOLOGY | Facility: HOSPITAL | Age: 87
End: 2022-04-05

## 2022-04-05 VITALS
BODY MASS INDEX: 16.37 KG/M2 | DIASTOLIC BLOOD PRESSURE: 70 MMHG | WEIGHT: 92.4 LBS | OXYGEN SATURATION: 95 % | SYSTOLIC BLOOD PRESSURE: 118 MMHG

## 2022-04-05 DIAGNOSIS — R29.6 FREQUENT FALLS: ICD-10-CM

## 2022-04-05 DIAGNOSIS — I50.30 HEART FAILURE WITH PRESERVED EJECTION FRACTION, BORDERLINE, CLASS III: Primary | ICD-10-CM

## 2022-04-05 DIAGNOSIS — I48.11 LONGSTANDING PERSISTENT ATRIAL FIBRILLATION: ICD-10-CM

## 2022-04-05 DIAGNOSIS — I10 ESSENTIAL HYPERTENSION: ICD-10-CM

## 2022-04-05 PROCEDURE — 99214 OFFICE O/P EST MOD 30 MIN: CPT | Performed by: NURSE PRACTITIONER

## 2022-04-05 RX ORDER — TORSEMIDE 5 MG/1
5 TABLET ORAL DAILY
Qty: 60 TABLET | Refills: 3 | Status: ON HOLD | OUTPATIENT
Start: 2022-04-05 | End: 2022-04-24 | Stop reason: SDUPTHER

## 2022-04-05 RX ORDER — POTASSIUM CHLORIDE 750 MG/1
10 TABLET, EXTENDED RELEASE ORAL DAILY
Qty: 60 TABLET | Refills: 3 | Status: SHIPPED | OUTPATIENT
Start: 2022-04-05 | End: 2022-04-22 | Stop reason: ALTCHOICE

## 2022-04-05 RX ORDER — TORSEMIDE 10 MG/1
TABLET ORAL
Qty: 30 TABLET | Refills: 1 | OUTPATIENT
Start: 2022-04-05

## 2022-04-22 ENCOUNTER — APPOINTMENT (OUTPATIENT)
Dept: CT IMAGING | Facility: HOSPITAL | Age: 87
End: 2022-04-22

## 2022-04-22 ENCOUNTER — HOSPITAL ENCOUNTER (OUTPATIENT)
Facility: HOSPITAL | Age: 87
Setting detail: OBSERVATION
LOS: 1 days | Discharge: HOME | End: 2022-04-24
Attending: EMERGENCY MEDICINE | Admitting: INTERNAL MEDICINE

## 2022-04-22 ENCOUNTER — APPOINTMENT (OUTPATIENT)
Dept: GENERAL RADIOLOGY | Facility: HOSPITAL | Age: 87
End: 2022-04-22

## 2022-04-22 DIAGNOSIS — J96.01 ACUTE RESPIRATORY FAILURE WITH HYPOXIA: Primary | ICD-10-CM

## 2022-04-22 DIAGNOSIS — Z86.79 HISTORY OF CHF (CONGESTIVE HEART FAILURE): ICD-10-CM

## 2022-04-22 DIAGNOSIS — J44.1 COPD WITH ACUTE EXACERBATION: ICD-10-CM

## 2022-04-22 LAB
ALBUMIN SERPL-MCNC: 4.5 G/DL (ref 3.5–5.2)
ALBUMIN/GLOB SERPL: 1.7 G/DL
ALP SERPL-CCNC: 175 U/L (ref 39–117)
ALT SERPL W P-5'-P-CCNC: 30 U/L (ref 1–33)
ANION GAP SERPL CALCULATED.3IONS-SCNC: 10 MMOL/L (ref 5–15)
ANION GAP SERPL CALCULATED.3IONS-SCNC: 12 MMOL/L (ref 5–15)
AST SERPL-CCNC: 49 U/L (ref 1–32)
BASOPHILS # BLD AUTO: 0.09 10*3/MM3 (ref 0–0.2)
BASOPHILS NFR BLD AUTO: 0.8 % (ref 0–1.5)
BILIRUB SERPL-MCNC: 0.2 MG/DL (ref 0–1.2)
BUN SERPL-MCNC: 15 MG/DL (ref 8–23)
BUN SERPL-MCNC: 16 MG/DL (ref 8–23)
BUN/CREAT SERPL: 17.6 (ref 7–25)
BUN/CREAT SERPL: 19 (ref 7–25)
CALCIUM SPEC-SCNC: 9.2 MG/DL (ref 8.6–10.5)
CALCIUM SPEC-SCNC: 9.2 MG/DL (ref 8.6–10.5)
CHLORIDE SERPL-SCNC: 105 MMOL/L (ref 98–107)
CHLORIDE SERPL-SCNC: 106 MMOL/L (ref 98–107)
CO2 SERPL-SCNC: 26 MMOL/L (ref 22–29)
CO2 SERPL-SCNC: 28 MMOL/L (ref 22–29)
CREAT SERPL-MCNC: 0.79 MG/DL (ref 0.57–1)
CREAT SERPL-MCNC: 0.91 MG/DL (ref 0.57–1)
D DIMER PPP FEU-MCNC: 2.2 MCGFEU/ML (ref 0.01–0.5)
D-LACTATE SERPL-SCNC: 1.5 MMOL/L (ref 0.5–2)
D-LACTATE SERPL-SCNC: 2.2 MMOL/L (ref 0.5–2)
DEPRECATED RDW RBC AUTO: 42.8 FL (ref 37–54)
DEPRECATED RDW RBC AUTO: 43.5 FL (ref 37–54)
EGFRCR SERPLBLD CKD-EPI 2021: 62 ML/MIN/1.73
EGFRCR SERPLBLD CKD-EPI 2021: 73.4 ML/MIN/1.73
EOSINOPHIL # BLD AUTO: 0.31 10*3/MM3 (ref 0–0.4)
EOSINOPHIL NFR BLD AUTO: 2.8 % (ref 0.3–6.2)
ERYTHROCYTE [DISTWIDTH] IN BLOOD BY AUTOMATED COUNT: 12.6 % (ref 12.3–15.4)
ERYTHROCYTE [DISTWIDTH] IN BLOOD BY AUTOMATED COUNT: 12.6 % (ref 12.3–15.4)
FLUAV RNA RESP QL NAA+PROBE: NOT DETECTED
FLUBV RNA RESP QL NAA+PROBE: NOT DETECTED
GLOBULIN UR ELPH-MCNC: 2.6 GM/DL
GLUCOSE SERPL-MCNC: 123 MG/DL (ref 65–99)
GLUCOSE SERPL-MCNC: 134 MG/DL (ref 65–99)
HBA1C MFR BLD: 5.2 % (ref 4.8–5.6)
HCT VFR BLD AUTO: 39.8 % (ref 34–46.6)
HCT VFR BLD AUTO: 43 % (ref 34–46.6)
HGB BLD-MCNC: 13.4 G/DL (ref 12–15.9)
HGB BLD-MCNC: 14.1 G/DL (ref 12–15.9)
HOLD SPECIMEN: NORMAL
IMM GRANULOCYTES # BLD AUTO: 0.04 10*3/MM3 (ref 0–0.05)
IMM GRANULOCYTES NFR BLD AUTO: 0.4 % (ref 0–0.5)
LYMPHOCYTES # BLD AUTO: 2.46 10*3/MM3 (ref 0.7–3.1)
LYMPHOCYTES NFR BLD AUTO: 22 % (ref 19.6–45.3)
MCH RBC QN AUTO: 30.9 PG (ref 26.6–33)
MCH RBC QN AUTO: 31.5 PG (ref 26.6–33)
MCHC RBC AUTO-ENTMCNC: 32.8 G/DL (ref 31.5–35.7)
MCHC RBC AUTO-ENTMCNC: 33.7 G/DL (ref 31.5–35.7)
MCV RBC AUTO: 93.4 FL (ref 79–97)
MCV RBC AUTO: 94.3 FL (ref 79–97)
MONOCYTES # BLD AUTO: 1.23 10*3/MM3 (ref 0.1–0.9)
MONOCYTES NFR BLD AUTO: 11 % (ref 5–12)
NEUTROPHILS NFR BLD AUTO: 63 % (ref 42.7–76)
NEUTROPHILS NFR BLD AUTO: 7.05 10*3/MM3 (ref 1.7–7)
NRBC BLD AUTO-RTO: 0 /100 WBC (ref 0–0.2)
NT-PROBNP SERPL-MCNC: 4143 PG/ML (ref 0–1800)
PLATELET # BLD AUTO: 124 10*3/MM3 (ref 140–450)
PLATELET # BLD AUTO: 160 10*3/MM3 (ref 140–450)
PMV BLD AUTO: 12 FL (ref 6–12)
PMV BLD AUTO: 12.2 FL (ref 6–12)
POTASSIUM SERPL-SCNC: 3.9 MMOL/L (ref 3.5–5.2)
POTASSIUM SERPL-SCNC: 4.1 MMOL/L (ref 3.5–5.2)
PROCALCITONIN SERPL-MCNC: 0.04 NG/ML (ref 0–0.25)
PROT SERPL-MCNC: 7.1 G/DL (ref 6–8.5)
RBC # BLD AUTO: 4.26 10*6/MM3 (ref 3.77–5.28)
RBC # BLD AUTO: 4.56 10*6/MM3 (ref 3.77–5.28)
SARS-COV-2 RNA RESP QL NAA+PROBE: NOT DETECTED
SODIUM SERPL-SCNC: 143 MMOL/L (ref 136–145)
SODIUM SERPL-SCNC: 144 MMOL/L (ref 136–145)
TROPONIN T SERPL-MCNC: <0.01 NG/ML (ref 0–0.03)
WBC NRBC COR # BLD: 11.18 10*3/MM3 (ref 3.4–10.8)
WBC NRBC COR # BLD: 6.79 10*3/MM3 (ref 3.4–10.8)
WHOLE BLOOD HOLD SPECIMEN: NORMAL
WHOLE BLOOD HOLD SPECIMEN: NORMAL

## 2022-04-22 PROCEDURE — 85027 COMPLETE CBC AUTOMATED: CPT | Performed by: PHYSICIAN ASSISTANT

## 2022-04-22 PROCEDURE — G0378 HOSPITAL OBSERVATION PER HR: HCPCS

## 2022-04-22 PROCEDURE — 71045 X-RAY EXAM CHEST 1 VIEW: CPT

## 2022-04-22 PROCEDURE — 94640 AIRWAY INHALATION TREATMENT: CPT

## 2022-04-22 PROCEDURE — 84484 ASSAY OF TROPONIN QUANT: CPT | Performed by: EMERGENCY MEDICINE

## 2022-04-22 PROCEDURE — 87040 BLOOD CULTURE FOR BACTERIA: CPT | Performed by: PHYSICIAN ASSISTANT

## 2022-04-22 PROCEDURE — 96365 THER/PROPH/DIAG IV INF INIT: CPT

## 2022-04-22 PROCEDURE — 93005 ELECTROCARDIOGRAM TRACING: CPT | Performed by: EMERGENCY MEDICINE

## 2022-04-22 PROCEDURE — 96366 THER/PROPH/DIAG IV INF ADDON: CPT

## 2022-04-22 PROCEDURE — 83036 HEMOGLOBIN GLYCOSYLATED A1C: CPT | Performed by: PHYSICIAN ASSISTANT

## 2022-04-22 PROCEDURE — 94660 CPAP INITIATION&MGMT: CPT

## 2022-04-22 PROCEDURE — 87636 SARSCOV2 & INF A&B AMP PRB: CPT | Performed by: EMERGENCY MEDICINE

## 2022-04-22 PROCEDURE — 99285 EMERGENCY DEPT VISIT HI MDM: CPT

## 2022-04-22 PROCEDURE — 85025 COMPLETE CBC W/AUTO DIFF WBC: CPT | Performed by: EMERGENCY MEDICINE

## 2022-04-22 PROCEDURE — 94799 UNLISTED PULMONARY SVC/PX: CPT

## 2022-04-22 PROCEDURE — 0 IOPAMIDOL PER 1 ML: Performed by: EMERGENCY MEDICINE

## 2022-04-22 PROCEDURE — 25010000002 DEXAMETHASONE SODIUM PHOSPHATE 100 MG/10ML SOLUTION: Performed by: EMERGENCY MEDICINE

## 2022-04-22 PROCEDURE — 36415 COLL VENOUS BLD VENIPUNCTURE: CPT

## 2022-04-22 PROCEDURE — 92610 EVALUATE SWALLOWING FUNCTION: CPT

## 2022-04-22 PROCEDURE — 83605 ASSAY OF LACTIC ACID: CPT | Performed by: PHYSICIAN ASSISTANT

## 2022-04-22 PROCEDURE — 96372 THER/PROPH/DIAG INJ SC/IM: CPT

## 2022-04-22 PROCEDURE — 80053 COMPREHEN METABOLIC PANEL: CPT | Performed by: EMERGENCY MEDICINE

## 2022-04-22 PROCEDURE — 85379 FIBRIN DEGRADATION QUANT: CPT | Performed by: EMERGENCY MEDICINE

## 2022-04-22 PROCEDURE — 25010000002 METHYLPREDNISOLONE PER 125 MG: Performed by: PHYSICIAN ASSISTANT

## 2022-04-22 PROCEDURE — 96375 TX/PRO/DX INJ NEW DRUG ADDON: CPT

## 2022-04-22 PROCEDURE — 71275 CT ANGIOGRAPHY CHEST: CPT

## 2022-04-22 PROCEDURE — 99220 PR INITIAL OBSERVATION CARE/DAY 70 MINUTES: CPT | Performed by: INTERNAL MEDICINE

## 2022-04-22 PROCEDURE — 84145 PROCALCITONIN (PCT): CPT | Performed by: PHYSICIAN ASSISTANT

## 2022-04-22 PROCEDURE — 25010000002 HEPARIN (PORCINE) PER 1000 UNITS: Performed by: PHYSICIAN ASSISTANT

## 2022-04-22 PROCEDURE — 83880 ASSAY OF NATRIURETIC PEPTIDE: CPT | Performed by: EMERGENCY MEDICINE

## 2022-04-22 RX ORDER — PANTOPRAZOLE SODIUM 40 MG/1
40 TABLET, DELAYED RELEASE ORAL
Status: DISCONTINUED | OUTPATIENT
Start: 2022-04-22 | End: 2022-04-24 | Stop reason: HOSPADM

## 2022-04-22 RX ORDER — PREDNISONE 20 MG/1
40 TABLET ORAL
Status: DISCONTINUED | OUTPATIENT
Start: 2022-04-23 | End: 2022-04-24 | Stop reason: HOSPADM

## 2022-04-22 RX ORDER — SODIUM CHLORIDE 0.9 % (FLUSH) 0.9 %
10 SYRINGE (ML) INJECTION EVERY 12 HOURS SCHEDULED
Status: DISCONTINUED | OUTPATIENT
Start: 2022-04-22 | End: 2022-04-24 | Stop reason: HOSPADM

## 2022-04-22 RX ORDER — TRAMADOL HYDROCHLORIDE 50 MG/1
50 TABLET ORAL EVERY 6 HOURS PRN
COMMUNITY
End: 2022-04-22

## 2022-04-22 RX ORDER — METHYLPREDNISOLONE SODIUM SUCCINATE 125 MG/2ML
60 INJECTION, POWDER, LYOPHILIZED, FOR SOLUTION INTRAMUSCULAR; INTRAVENOUS EVERY 8 HOURS SCHEDULED
Status: DISCONTINUED | OUTPATIENT
Start: 2022-04-22 | End: 2022-04-22

## 2022-04-22 RX ORDER — LEVOTHYROXINE SODIUM 0.03 MG/1
25 TABLET ORAL
Status: DISCONTINUED | OUTPATIENT
Start: 2022-04-22 | End: 2022-04-24 | Stop reason: HOSPADM

## 2022-04-22 RX ORDER — ACETAMINOPHEN 325 MG/1
650 TABLET ORAL EVERY 4 HOURS PRN
Status: DISCONTINUED | OUTPATIENT
Start: 2022-04-22 | End: 2022-04-24 | Stop reason: HOSPADM

## 2022-04-22 RX ORDER — CHOLECALCIFEROL (VITAMIN D3) 125 MCG
5 CAPSULE ORAL NIGHTLY PRN
Status: DISCONTINUED | OUTPATIENT
Start: 2022-04-22 | End: 2022-04-24 | Stop reason: HOSPADM

## 2022-04-22 RX ORDER — DOXYCYCLINE 100 MG/1
100 CAPSULE ORAL EVERY 12 HOURS SCHEDULED
Status: DISCONTINUED | OUTPATIENT
Start: 2022-04-22 | End: 2022-04-24 | Stop reason: HOSPADM

## 2022-04-22 RX ORDER — POTASSIUM CHLORIDE 750 MG/1
10 TABLET, FILM COATED, EXTENDED RELEASE ORAL DAILY
COMMUNITY

## 2022-04-22 RX ORDER — QUETIAPINE FUMARATE 25 MG/1
25 TABLET, FILM COATED ORAL NIGHTLY PRN
Status: DISCONTINUED | OUTPATIENT
Start: 2022-04-22 | End: 2022-04-24 | Stop reason: HOSPADM

## 2022-04-22 RX ORDER — OXCARBAZEPINE 300 MG/1
600 TABLET, FILM COATED ORAL 2 TIMES DAILY
Status: DISCONTINUED | OUTPATIENT
Start: 2022-04-22 | End: 2022-04-24 | Stop reason: HOSPADM

## 2022-04-22 RX ORDER — SODIUM CHLORIDE 0.9 % (FLUSH) 0.9 %
10 SYRINGE (ML) INJECTION AS NEEDED
Status: DISCONTINUED | OUTPATIENT
Start: 2022-04-22 | End: 2022-04-24 | Stop reason: HOSPADM

## 2022-04-22 RX ORDER — PREGABALIN 25 MG/1
25 CAPSULE ORAL 2 TIMES DAILY
Status: DISCONTINUED | OUTPATIENT
Start: 2022-04-22 | End: 2022-04-24 | Stop reason: HOSPADM

## 2022-04-22 RX ORDER — ONDANSETRON 2 MG/ML
4 INJECTION INTRAMUSCULAR; INTRAVENOUS EVERY 6 HOURS PRN
Status: DISCONTINUED | OUTPATIENT
Start: 2022-04-22 | End: 2022-04-24 | Stop reason: HOSPADM

## 2022-04-22 RX ORDER — ALBUTEROL SULFATE 2.5 MG/3ML
2.5 SOLUTION RESPIRATORY (INHALATION) ONCE
Status: COMPLETED | OUTPATIENT
Start: 2022-04-22 | End: 2022-04-22

## 2022-04-22 RX ORDER — LANOLIN ALCOHOL/MO/W.PET/CERES
400 CREAM (GRAM) TOPICAL 2 TIMES DAILY
Status: DISCONTINUED | OUTPATIENT
Start: 2022-04-22 | End: 2022-04-24 | Stop reason: HOSPADM

## 2022-04-22 RX ORDER — IPRATROPIUM BROMIDE AND ALBUTEROL SULFATE 2.5; .5 MG/3ML; MG/3ML
3 SOLUTION RESPIRATORY (INHALATION) ONCE
Status: DISCONTINUED | OUTPATIENT
Start: 2022-04-22 | End: 2022-04-24 | Stop reason: HOSPADM

## 2022-04-22 RX ORDER — DONEPEZIL HYDROCHLORIDE 5 MG/1
5 TABLET, FILM COATED ORAL EVERY MORNING
Status: DISCONTINUED | OUTPATIENT
Start: 2022-04-22 | End: 2022-04-24 | Stop reason: HOSPADM

## 2022-04-22 RX ORDER — MECLIZINE HYDROCHLORIDE 25 MG/1
25 TABLET ORAL 3 TIMES DAILY PRN
COMMUNITY
End: 2022-04-22

## 2022-04-22 RX ORDER — ASPIRIN 81 MG/1
81 TABLET ORAL DAILY
Status: DISCONTINUED | OUTPATIENT
Start: 2022-04-22 | End: 2022-04-24 | Stop reason: HOSPADM

## 2022-04-22 RX ORDER — DOCUSATE SODIUM 100 MG/1
100 CAPSULE, LIQUID FILLED ORAL 2 TIMES DAILY
Status: DISCONTINUED | OUTPATIENT
Start: 2022-04-22 | End: 2022-04-24 | Stop reason: HOSPADM

## 2022-04-22 RX ORDER — HEPARIN SODIUM 5000 [USP'U]/ML
5000 INJECTION, SOLUTION INTRAVENOUS; SUBCUTANEOUS EVERY 12 HOURS SCHEDULED
Status: DISCONTINUED | OUTPATIENT
Start: 2022-04-22 | End: 2022-04-24 | Stop reason: HOSPADM

## 2022-04-22 RX ORDER — CETIRIZINE HYDROCHLORIDE 10 MG/1
5 TABLET ORAL DAILY
Status: DISCONTINUED | OUTPATIENT
Start: 2022-04-22 | End: 2022-04-24 | Stop reason: HOSPADM

## 2022-04-22 RX ORDER — METOPROLOL SUCCINATE 25 MG/1
25 TABLET, EXTENDED RELEASE ORAL DAILY
Status: DISCONTINUED | OUTPATIENT
Start: 2022-04-22 | End: 2022-04-24 | Stop reason: HOSPADM

## 2022-04-22 RX ADMIN — PREGABALIN 25 MG: 25 CAPSULE ORAL at 12:00

## 2022-04-22 RX ADMIN — DOCUSATE SODIUM 100 MG: 100 CAPSULE, LIQUID FILLED ORAL at 21:47

## 2022-04-22 RX ADMIN — ACETAMINOPHEN 650 MG: 325 TABLET ORAL at 21:47

## 2022-04-22 RX ADMIN — DONEPEZIL HYDROCHLORIDE 5 MG: 5 TABLET ORAL at 11:56

## 2022-04-22 RX ADMIN — FOLIC ACID TAB 400 MCG 400 MCG: 400 TAB at 21:47

## 2022-04-22 RX ADMIN — IOPAMIDOL 75 ML: 755 INJECTION, SOLUTION INTRAVENOUS at 05:30

## 2022-04-22 RX ADMIN — DEXAMETHASONE SODIUM PHOSPHATE 10 MG: 10 INJECTION, SOLUTION INTRAMUSCULAR; INTRAVENOUS at 04:23

## 2022-04-22 RX ADMIN — HEPARIN SODIUM 5000 UNITS: 5000 INJECTION, SOLUTION INTRAVENOUS; SUBCUTANEOUS at 12:01

## 2022-04-22 RX ADMIN — PANTOPRAZOLE SODIUM 40 MG: 40 TABLET, DELAYED RELEASE ORAL at 12:00

## 2022-04-22 RX ADMIN — ASPIRIN 81 MG: 81 TABLET, COATED ORAL at 11:55

## 2022-04-22 RX ADMIN — Medication 10 ML: at 21:47

## 2022-04-22 RX ADMIN — DOXYCYCLINE 100 MG: 100 INJECTION, POWDER, LYOPHILIZED, FOR SOLUTION INTRAVENOUS at 06:16

## 2022-04-22 RX ADMIN — METHYLPREDNISOLONE SODIUM SUCCINATE 60 MG: 125 INJECTION, POWDER, FOR SOLUTION INTRAMUSCULAR; INTRAVENOUS at 06:15

## 2022-04-22 RX ADMIN — METOPROLOL SUCCINATE 25 MG: 25 TABLET, EXTENDED RELEASE ORAL at 11:56

## 2022-04-22 RX ADMIN — DOXYCYCLINE 100 MG: 100 CAPSULE ORAL at 21:47

## 2022-04-22 RX ADMIN — OXCARBAZEPINE 600 MG: 300 TABLET, FILM COATED ORAL at 11:56

## 2022-04-22 RX ADMIN — LEVOTHYROXINE SODIUM 25 MCG: 25 TABLET ORAL at 11:56

## 2022-04-22 RX ADMIN — Medication 5 MG: at 21:47

## 2022-04-22 RX ADMIN — PREGABALIN 25 MG: 25 CAPSULE ORAL at 21:47

## 2022-04-22 RX ADMIN — FOLIC ACID TAB 400 MCG 400 MCG: 400 TAB at 11:57

## 2022-04-22 RX ADMIN — HEPARIN SODIUM 5000 UNITS: 5000 INJECTION, SOLUTION INTRAVENOUS; SUBCUTANEOUS at 21:47

## 2022-04-22 RX ADMIN — ALBUTEROL SULFATE 2.5 MG: 2.5 SOLUTION RESPIRATORY (INHALATION) at 04:08

## 2022-04-22 RX ADMIN — OXCARBAZEPINE 600 MG: 300 TABLET, FILM COATED ORAL at 21:47

## 2022-04-22 RX ADMIN — DOCUSATE SODIUM 100 MG: 100 CAPSULE, LIQUID FILLED ORAL at 12:00

## 2022-04-22 RX ADMIN — CETIRIZINE HYDROCHLORIDE 5 MG: 10 TABLET, FILM COATED ORAL at 12:01

## 2022-04-22 RX ADMIN — QUETIAPINE FUMARATE 25 MG: 25 TABLET ORAL at 21:47

## 2022-04-22 NOTE — NURSING NOTE
"WO Nursing Consult: \"Stage I and blanchable redness; coccyx, sacrum\"    Per [facility] skin policy, stage I pressure injuries do not require a WOC consult.  No LDA charted reflecting suspected wounds.  Please apply waffle mattress if not already done so.  Turn patient every 2 hours using offloading foam wedge and reassess. Apply z-guard to bottom and bony prominences. Offload heels with offloading heel boots.  Keep patient skin clean and dry.    Re consult WOC if wound progresses.     Recommendation(s) for management of wound: See wound care orders please for management of a stage I pressure injury    Specialty support surface: Foam  (Please apply waffle mattress overlay if not already completed)    Pressure Injury Prevention Protocol (initiate for Miguel A Score of 18 or less): Most recent Miguel A Scale score: 15  *Keep skin dry, turn q 2 hr, keep heels elevated and offloaded with offloading heel boots.    *Apply z-guard to bottom and bony prominences daily and as needed with incontinence episodes.  *Follow C.A.R.E protocol if medical devices (Bipap, lewis, Ng tube, etc) are being used.     Thank you for consulting the WOC Nurse.  WOC Team will sign off patient.  Please re consult if wound(s) worsens.     Lazaro Ely RN, BSN, CCRN   Wound, Ostomy and Continence (WOC) Department  [facility]      "

## 2022-04-22 NOTE — PLAN OF CARE
Goal Outcome Evaluation:  .SLP evaluation completed. Will continue to address dysphagia. Please see note for further details and recommendations.

## 2022-04-22 NOTE — H&P
[facility] Medicine Services  HISTORY AND PHYSICAL    Patient Name: Mitzi Eric  : 1935  MRN: [id number]  Primary Care Physician: Dm Cade APRN  Date of admission: 2022    Subjective   Subjective     Chief Complaint:  SOB    HPI:  Mitzi Eric is an 86 y.o. female with a past medical history significant for hypertension, HLD, pulmonary hypertension, PAF not on anticoagulation due to fall risk, trigeminal neuralgia, CKD III, known lung mass, dementia, and hypothyroidism. She presents today from [facility] with complaints of progressively worsening SOB going on for the past 24 hours. Dyspnea worse with exertion. Patient apparently awoke from sleep early this morning with dyspnea. Family member at bedside states she got in her wheelchair and wheeled herself to the nursing station and informed them of her symptoms. They called the physician on call who recommended she get a dose of her torsemide. She does not feel this helped but has urinated several times sine administration. Patient was subsequently sent to ED for further evaluation and treatment.  Currently there are no complaints of fever, cough, congestion, or syncope. No lower extremity pain or swelling. No N/v/D. No dysuria or flank pain. She is fully vaccinated with a booster. Will admit to inpatient.      COVID Details:    Symptoms:    [] NONE [] Fever []  Cough [x] Shortness of breath [] Change in taste/smell      Review of Systems   Constitutional: Negative for chills, fatigue and fever.   HENT: Negative for congestion and trouble swallowing.    Eyes: Negative for photophobia and visual disturbance.   Respiratory: Positive for shortness of breath. Negative for cough.    Cardiovascular: Negative for chest pain and leg swelling.   Gastrointestinal: Negative for abdominal pain, nausea and vomiting.   Endocrine: Negative for cold intolerance and heat intolerance.   Genitourinary:  Negative for dysuria and frequency.   Musculoskeletal: Negative for back pain and gait problem.   Skin: Negative for pallor and rash.   Allergic/Immunologic: Positive for immunocompromised state.   Neurological: Positive for weakness. Negative for dizziness and headaches.   Hematological: Negative for adenopathy.   Psychiatric/Behavioral: Negative for agitation and confusion.        All other systems reviewed and are negative.     Personal History     Past Medical History:   Diagnosis Date   • A-fib (HCC)    • Ataxic gait    • Chronic kidney disease, stage 3 (HCC)    • COPD (chronic obstructive pulmonary disease) (HCC)    • Dementia (HCC)    • Disease of thyroid gland    • Heart failure (HCC)    • Hyperlipidemia    • Hypertension              Past Surgical History:   Procedure Laterality Date   • BRAIN TUMOR EXCISION      BENIGN   • FRACTURE SURGERY     • TRIGGER FINGER RELEASE         Family History:  family history includes Alcohol abuse in her father; Stroke in her mother. Otherwise pertinent FHx was reviewed and unremarkable.     Social History:  reports that she quit smoking about 42 years ago. She started smoking about 62 years ago. She has a 20.00 pack-year smoking history. She has never used smokeless tobacco. She reports previous alcohol use. She reports that she does not use drugs.  Social History     Social History Narrative   • Not on file       Medications:  Menthol (Topical Analgesic), Nutritional Supplements, OXcarbazepine, QUEtiapine, acetaminophen, aspirin, cetirizine, docusate sodium, donepezil, folic acid, levothyroxine, metoprolol succinate XL, potassium chloride, pregabalin, and torsemide    Allergies   Allergen Reactions   • Bee Pollen Unknown - Low Severity   • Lorazepam Delirium       Objective   Objective     Vital Signs:   Temp:  [97.8 °F (36.6 °C)] 97.8 °F (36.6 °C)  Heart Rate:  [] 101  Resp:  [12-32] 12  BP: ()/() 158/94  Flow (L/min):  [2-4] 4    Physical Exam    Constitutional: Awake, alert, chachectic  Eyes: PERRLA, sclerae anicteric, no conjunctival injection  HENT: NCAT, mucous membranes moist  Neck: Supple, no thyromegaly, no lymphadenopathy, trachea midline  Respiratory: Clear to auscultation bilaterally, nonlabored respirations   Cardiovascular: RRR, no murmurs, rubs, or gallops, palpable pedal pulses bilaterally  Gastrointestinal: Positive bowel sounds, soft, nontender, nondistended  Musculoskeletal: No bilateral ankle edema, no clubbing or cyanosis to extremities  Psychiatric: Appropriate affect, cooperative  Neurologic: Oriented x 3, strength symmetric in all extremities, Cranial Nerves grossly intact to confrontation, speech clear  Skin: No rashes      Results Reviewed:  I have personally reviewed most recent indicated data and agree with findings including:  [x]  Laboratory  [x]  Radiology  []  EKG/Telemetry  []  Pathology  []  Cardiac/Vascular Studies  []  Old records  []  Other:  Most pertinent findings include: HR 1234. /108. Hypoxic to 92 % on room air d-dimer 2.2. WBC 11.1. glucose 123. Chemistry and hematology otherwise favorable.        LAB RESULTS:      Lab 04/22/22  0848 04/22/22  0612 04/22/22  0343   WBC 6.79  --  11.18*   HEMOGLOBIN 13.4  --  14.1   HEMATOCRIT 39.8  --  43.0   PLATELETS 124*  --  160   NEUTROS ABS  --   --  7.05*   IMMATURE GRANS (ABS)  --   --  0.04   LYMPHS ABS  --   --  2.46   MONOS ABS  --   --  1.23*   EOS ABS  --   --  0.31   MCV 93.4  --  94.3   PROCALCITONIN  --   --  0.04   LACTATE  --  2.2*  --    D DIMER QUANT  --   --  2.20*         Lab 04/22/22  0343   SODIUM 143   POTASSIUM 3.9   CHLORIDE 105   CO2 28.0   ANION GAP 10.0   BUN 16   CREATININE 0.91   EGFR 62.0   GLUCOSE 123*   CALCIUM 9.2         Lab 04/22/22  0343   TOTAL PROTEIN 7.1   ALBUMIN 4.50   GLOBULIN 2.6   ALT (SGPT) 30   AST (SGOT) 49*   BILIRUBIN 0.2   ALK PHOS 175*         Lab 04/22/22  0343   PROBNP 4,143.0*   TROPONIN T <0.010                 Brief  Urine Lab Results  (Last result in the past 365 days)      Color   Clarity   Blood   Leuk Est   Nitrite   Protein   CREAT   Urine HCG        03/29/22 1155 Yellow   Clear   Negative   Negative   Negative   Negative               Microbiology Results (last 10 days)     Procedure Component Value - Date/Time    COVID PRE-OP / PRE-PROCEDURE SCREENING ORDER (NO ISOLATION) - Swab, Nasopharynx [[id number]]  (Normal) Collected: 04/22/22 0358    Lab Status: Final result Specimen: Swab from Nasopharynx Updated: 04/22/22 0429    Narrative:      The following orders were created for panel order COVID PRE-OP / PRE-PROCEDURE SCREENING ORDER (NO ISOLATION) - Swab, Nasopharynx.  Procedure                               Abnormality         Status                     ---------                               -----------         ------                     COVID-19 and FLU A/B PCR...[[id number]]  Normal              Final result                 Please view results for these tests on the individual orders.    COVID-19 and FLU A/B PCR - Swab, Nasopharynx [[id number]]  (Normal) Collected: 04/22/22 0358    Lab Status: Final result Specimen: Swab from Nasopharynx Updated: 04/22/22 0429     COVID19 Not Detected     Influenza A PCR Not Detected     Influenza B PCR Not Detected    Narrative:      Fact sheet for providers: https://www.fda.gov/media/685478/download    Fact sheet for patients: https://www.fda.gov/media/252094/download    Test performed by PCR.          XR Chest 1 View    Result Date: 4/22/2022  EXAM:  XR CHEST 1 VW   DATE: 4/22/2022 3:39 AM HISTORY: Shortness of breath COMPARISON:  3/25/2022. FINDINGS:  Scattered coarse airspace opacities Coarse scarlike opacity at the right lung apex. Basilar opacities mildly increased. Heart size is normal. No acute bony findings.     Impression: 1.  Mildly increased basilar opacities (infection, aspiration) 2.  Scarlike opacity at the right lung apex, with scattered coarse airspace opacities,  consider chronic lung findings. Electronically signed by:  Venita Reid M.D.  4/22/2022 2:12 AM Mountain Time    CT Angiogram Chest    Result Date: 4/22/2022  EXAMINATION:  CTA CHEST WITH IV CONTRAST, CT PULMONARY ANGIOGRAM DATE OF EXAM: 4/22/2022 5:24 AM HISTORY: Shortness of breath/hypoxia TECHNIQUE: CTA examination of the chest was performed following the intravenous administration of 75 mL Isovue-370. Sagittal, coronal and 3-D reformatted images were provided. CT dose lowering techniques were used, to include: automated exposure control,  adjustment for patient size, and or use of iterative reconstruction. COMPARISON: 4/22/2022. 3/25/2022. FINDINGS: Lungs: No new opacity. Chronic changes of the right upper lobe when compared to 3/25/2022. Emphysematous changes. Pleura: Trace bilateral pleural effusions. Probable loculated component within the right upper lung. Mediastinum and Jamila: Normal. Pulmonary Arteries: No filling defects. Cardiovascular: No evidence of acute abnormality heavy coronary artery calcification. Mild to moderate calcification of the thoracic aorta..  Heavy calcification of the visualized abdominal aorta. Mild cardiomegaly predominantly of the left atrium. Upper Abdomen: No acute abnormality. Chest Wall: No acute abnormality. Musculoskeletal: Normal.     Impression: 1.  No evidence of pulmonary embolus. 2.  No evidence of acute cardiopulmonary abnormality. 3.  Chronic changes of the right upper lung. 4.  Emphysematous changes. Electronically signed by:  Fei Bolanos D.O.  4/22/2022 4:16 AM Mountain Time      Results for orders placed during the hospital encounter of 03/17/22    Adult Transthoracic Echo Complete W/ Cont if Necessary Per Protocol    Interpretation Summary  · Left ventricular ejection fraction appears to be 46 - 50%. Left ventricular systolic function is mildly decreased.  · Left ventricular diastolic function is consistent with (grade I) impaired relaxation.  · Mild to  moderate mitral regurgitation.  · Mild to moderate tricuspid regurgitation, RVSP 54 mmHg.      Assessment/Plan   Assessment & Plan       Acute respiratory failure with hypoxia (HCC)    Essential hypertension    Trigeminal neuralgia    Hypothyroidism (acquired)    Severe malnutrition (CMS/HCC)    Dementia without behavioral disturbance (HCC)    History of craniotomy    Impaired functional mobility, balance, gait, and endurance    chronic CHF (congestive heart failure) (HCC)    atrial fibrillation    COPD with acute exacerbation (HCC)      1. Acute Respiratory Failure With Hypoxia:  - likely secondary to COPD exacerbation  - XR concerning for pneumonia CTA chest pending  - administered decadron in ED, for now will scehdule solumedrol and duo nebs  - add doxycycline  - obtain blood cultures, procalcitonin, lactic acid, urine antigens, and MRSA PCR  - Now on BiPAP. ABG as needed  - recent COVID-19 was almost 2 months ago. She is vaccinated and boosted  - am labs    2. CHF/ valvular heart disease     Pulmonary hypertension  - stable and compensated. On torsemide as needed  - daily weights, strict I&O  - recent Repeat echo with EF 46 -50%, grade 1 diastolic dysfunction, mildly decreased left ventricular systolic function, RVSP of 54 mmHg.    3. Afib with RVR  Hx of PAF  --Continue home metoprolol  XL 25 mg once daily  --No anticoagulation due to fall risk    4. Thrombocytopenia  -Chronic and stable     5. Hypothyroidism  --Continue Synthroid.     6. Trigeminal neuralgia  --Continue Trileptal, Lyrica.     7. Dementia  Continue Aricept, pharmacy to confirm if patient is on Seroquel, continue as needed for now     8. 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       DVT prophylaxis:  SARA    CODE STATUS:  Full code  Code Status (Patient has no pulse and is not breathing): CPR (Attempt to Resuscitate)  Medical Interventions (Patient has pulse or is  "breathing): Full Support      This note has been completed as part of a split-shared workflow.     Electronically signed by Clinton Allen PA-C, 04/22/22, 5:36 AM EDT.        Attending   Admission Attestation       I have seen and examined the patient, performing an independent face-to-face diagnostic evaluation with plan of care reviewed and developed with the advanced practice clinician (APC).      Brief Summary Statement:   Mtizi Eric is a 86 y.o. female brought in from SNF with SOA. At time of my arrival patient is on bipap and says she feels normal. Tells me that her respiratory issue hit her \"out of the blue\" and described her breathing as feeling \"loose.\" Denies f/c, cough.     Remainder of detailed HPI is as noted by APC and has been reviewed and/or edited by me for completeness.    Attending Physical Exam:  Constitutional: Awake, alert, elderly female  Eyes: PERRLA, sclerae anicteric, no conjunctival injection  HENT: NCAT, mucous membranes moist, bipap  Neck: Supple, no thyromegaly, no lymphadenopathy, trachea midline  Respiratory: Normal WOB, clear bilaterally  Cardiovascular: RRR, no murmurs, rubs, or gallops, palpable pedal pulses bilaterally  Gastrointestinal: Positive bowel sounds, soft, nontender, nondistended  Musculoskeletal: No bilateral ankle edema, no clubbing or cyanosis to extremities  Psychiatric: Appropriate affect, cooperative  Neurologic: Oriented x 3, strength symmetric in all extremities, Cranial Nerves grossly intact to confrontation, speech clear  Skin: No rashes    Brief Assessment/Plan :    87 y/o female sent from SNF with difficulty breathing which was treated with bipap and IV steroids in ED. Patient has returned to baseline by time of my arrival.  --Will wean to nasal canula and transition to PO steroids/abx and obs for next 24 hours. Hopefully will be able to return to SNF fairly quickly.    See detailed assessment and plan developed with APC which I have reviewed and/or " edited for completeness.      Admission Status: I believe that this patient meets OBSERVATION status, however if further evaluation or treatment plans warrant, status may change.  Based upon current information, I predict patient's care encounter to be less than or equal to 2 midnights.      Allie Medel II, DO  04/22/22

## 2022-04-22 NOTE — ED PROVIDER NOTES
[place]    EMERGENCY DEPARTMENT ENCOUNTER      Pt Name: Mitzi Eric  MRN: [id number]  YOB: 1935  Date of evaluation: 4/22/2022  Provider: Otis Alvarado MD    CHIEF COMPLAINT       Chief Complaint   Patient presents with   • Shortness of Breath         HISTORY OF PRESENT ILLNESS  (Location/Symptom, Timing/Onset, Context/Setting, Quality, Duration, Modifying Factors, Severity.)   Mitzi Eric is a 86 y.o. female who presents to the emergency department with 1 day of progressively worsening now severe shortness of breath is worse with exertion with associated dry cough but no fever, chest pain, abdominal pain, vomiting, or diarrhea in the setting of a history of COPD.  No prior history of VTE.      Nursing notes were reviewed.    REVIEW OF SYSTEMS    (2-9 systems for level 4, 10 or more for level 5)   ROS:  General:  No fevers, no chills, no weakness  Cardiovascular:  No chest pain, no palpitations  Respiratory: + Shortness of breath, cough  Gastrointestinal:  No pain, no nausea, no vomiting, no diarrhea  Musculoskeletal:  No muscle pain, no joint pain  Skin:  No rash  Neurologic:  No speech problems, no headache, no extremity numbness, no extremity tingling, no extremity weakness  Psychiatric:  No anxiety  Genitourinary:  No dysuria, no hematuria    Except as noted above the remainder of the review of systems was reviewed and negative.       PAST MEDICAL HISTORY     Past Medical History:   Diagnosis Date   • A-fib (HCC)    • Ataxic gait    • Chronic kidney disease, stage 3 (HCC)    • COPD (chronic obstructive pulmonary disease) (HCC)    • Dementia (HCC)    • Disease of thyroid gland    • Heart failure (HCC)    • Hyperlipidemia    • Hypertension          SURGICAL HISTORY       Past Surgical History:   Procedure Laterality Date   • BRAIN TUMOR EXCISION      BENIGN   • FRACTURE SURGERY     • TRIGGER FINGER RELEASE           CURRENT MEDICATIONS     No current facility-administered medications for  this encounter.    Current Outpatient Medications:   •  acetaminophen (TYLENOL) 325 MG tablet, Take 650 mg by mouth 3 (Three) Times a Day., Disp: , Rfl:   •  aspirin 81 MG EC tablet, Take 1 tablet by mouth Daily., Disp: 30 tablet, Rfl: 3  •  budesonide-formoterol (SYMBICORT) 80-4.5 MCG/ACT inhaler, Inhale 2 puffs 2 (Two) Times a Day for 30 days., Disp: 10.2 g, Rfl: 3  •  cetirizine (zyrTEC) 10 MG tablet, Take 10 mg by mouth Daily., Disp: , Rfl:   •  docusate sodium 100 MG capsule, Take 100 mg by mouth 2 (Two) Times a Day., Disp: , Rfl:   •  doxycycline (MONODOX) 100 MG capsule, Take 1 capsule by mouth Every 12 (Twelve) Hours for 6 doses. Indications: Upper Respiratory Tract Infection, Disp: 6 capsule, Rfl: 0  •  folic acid (FOLVITE) 400 MCG tablet, Take 400 mcg by mouth 2 (Two) Times a Day., Disp: , Rfl:   •  levothyroxine (SYNTHROID, LEVOTHROID) 25 MCG tablet, Take 25 mcg by mouth Every Morning., Disp: , Rfl:   •  Menthol, Topical Analgesic, (Biofreeze) 4 % gel, Apply 1 application topically Every Morning. Apply to lower back, Disp: , Rfl:   •  metoprolol succinate XL (TOPROL-XL) 25 MG 24 hr tablet, Take 25 mg by mouth Daily., Disp: , Rfl:   •  Nutritional Supplements (ENSURE PO), Take 240 mL by mouth 2 (Two) Times a Day. MIX 8OZ OF ENSURE, 1/4 SCOOP PROTEIN POWDER AND 2OZ OF MILK FOR A MILKSHAKE, Disp: , Rfl:   •  OXcarbazepine (TRILEPTAL) 600 MG tablet, Take 600 mg by mouth 2 (Two) Times a Day., Disp: , Rfl:   •  predniSONE (DELTASONE) 20 MG tablet, Take 2 tablets by mouth Daily With Breakfast for 3 doses., Disp: 6 tablet, Rfl: 0  •  pregabalin (LYRICA) 25 MG capsule, Take 25 mg by mouth 2 (Two) Times a Day., Disp: , Rfl:   •  QUEtiapine (SEROquel) 25 MG tablet, Take 0.5 tablets by mouth At Night As Needed (agitation, sleep). (Patient taking differently: Take 12.5 mg by mouth Every Night.), Disp: 3 tablet, Rfl: 0  •  torsemide (DEMADEX) 5 MG tablet, Take 2 tablets by mouth Daily. Take additional tablet as needed  "for 3lb weight gain in 24 hours or 5lbs in a week or increased shortness of breath, Disp: 60 tablet, Rfl: 3  •  albuterol sulfate  (90 Base) MCG/ACT inhaler, Inhale 2 puffs Every 4 (Four) Hours As Needed for Wheezing for up to 30 days., Disp: 18 g, Rfl: 3  •  donepezil (Aricept) 5 MG tablet, Take 1 tablet by mouth Every Morning., Disp: 90 tablet, Rfl: 1  •  potassium chloride 10 MEQ CR tablet, Take 10 mEq by mouth Daily As Needed. Give once daily and extra with extra torsemide, Disp: , Rfl:     ALLERGIES     Bee pollen and Lorazepam    FAMILY HISTORY       Family History   Problem Relation Age of Onset   • Stroke Mother    • Alcohol abuse Father           SOCIAL HISTORY       Social History     Socioeconomic History   • Marital status: Single   Tobacco Use   • Smoking status: Former Smoker     Packs/day: 1.00     Years: 20.00     Pack years: 20.00     Start date:      Quit date:      Years since quittin.3   • Smokeless tobacco: Never Used   Substance and Sexual Activity   • Alcohol use: Not Currently     Comment: \"social drinker\"   • Drug use: No   • Sexual activity: Defer         PHYSICAL EXAM    (up to 7 for level 4, 8 or more for level 5)     Vitals:    22 1132 22 1156 22 1200 22 1216   BP: 133/78      BP Location: Left arm      Patient Position: Sitting      Pulse: 87 85 80 93   Resp: 18 18     Temp: 97.9 °F (36.6 °C)      TempSrc: Oral      SpO2: 96% 94% 99% (!) 87%   Weight:       Height:           Physical Exam  General: Awake, alert, moderate distress  HEENT: Conjunctivae normal.  Neck: Trachea midline.  Cardiac: Heart regular rate, rhythm, no murmurs, rubs, or gallops  Lungs: Tachypneic, diffuse wheezing  Chest wall: There is no tenderness to palpation over the chest wall or over ribs  Abdomen: Abdomen is soft, nontender, nondistended. There are no firm or pulsatile masses, no rebound rigidity or guarding.   Musculoskeletal: No deformity.  Neuro: Alert and " oriented x 4.  Dermatology: Skin is warm and dry  Psych: Mentation is grossly normal, cognition is grossly normal. Affect is appropriate.        DIAGNOSTIC RESULTS     EKG: All EKGs are interpreted by the Emergency Department Physician who either signs or Co-signs this chart in the absence of a cardiologist.    ECG 12 Lead   Final Result   Test Reason : SOA Protocol   Blood Pressure :   */*   mmHG   Vent. Rate : 128 BPM     Atrial Rate :  55 BPM      P-R Int :   * ms          QRS Dur :  78 ms       QT Int : 270 ms       P-R-T Axes :   *  20  43 degrees      QTc Int : 394 ms      Accelerated Junctional rhythm   Septal infarct , age undetermined   Abnormal ECG   When compared with ECG of 19-MAR-2022 11:50,   Junctional rhythm has replaced Atrial fibrillation   Septal infarct is now present   Borderline criteria for Lateral infarct are no longer present   ST now depressed in Inferior leads   Nonspecific T wave abnormality, improved in Inferior leads   Confirmed by JOJO ARCHULETA ([id number]) on 4/25/2022 10:12:52 PM      Referred By: EDMD           Confirmed By: JOJO ARCHULETA      ECG 12 Lead    (Results Pending)       RADIOLOGY:   Non-plain film images such as CT, Ultrasound and MRI are read by the radiologist. Plain radiographic images are visualized and preliminarily interpreted by the emergency physician with the below findings:      [x] Radiologist's Report Reviewed:  CT Angiogram Chest   Final Result      1.  No evidence of pulmonary embolus.   2.  No evidence of acute cardiopulmonary abnormality.   3.  Chronic changes of the right upper lung.   4.  Emphysematous changes.         Electronically signed by:  Fei Bolanos D.O.     4/22/2022 4:16 AM Mountain Time      XR Chest 1 View   Final Result   1.  Mildly increased basilar opacities (infection, aspiration)   2.  Scarlike opacity at the right lung apex, with scattered coarse airspace opacities, consider chronic lung findings.      Electronically signed by:   Venita Reid M.D.     4/22/2022 2:12 AM Mountain Time            ED BEDSIDE ULTRASOUND:   Performed by ED Physician - none    LABS:    I have reviewed and interpreted all of the currently available lab results from this visit (if applicable):  Results for orders placed or performed during the hospital encounter of 04/22/22   COVID-19 and FLU A/B PCR - Swab, Nasopharynx    Specimen: Nasopharynx; Swab   Result Value Ref Range    COVID19 Not Detected Not Detected - Ref. Range    Influenza A PCR Not Detected Not Detected    Influenza B PCR Not Detected Not Detected   Blood Culture - Blood, Arm, Right    Specimen: Arm, Right; Blood   Result Value Ref Range    Blood Culture No growth at 3 days    Blood Culture - Blood, Arm, Left    Specimen: Arm, Left; Blood   Result Value Ref Range    Blood Culture No growth at 3 days    Comprehensive Metabolic Panel    Specimen: Blood   Result Value Ref Range    Glucose 123 (H) 65 - 99 mg/dL    BUN 16 8 - 23 mg/dL    Creatinine 0.91 0.57 - 1.00 mg/dL    Sodium 143 136 - 145 mmol/L    Potassium 3.9 3.5 - 5.2 mmol/L    Chloride 105 98 - 107 mmol/L    CO2 28.0 22.0 - 29.0 mmol/L    Calcium 9.2 8.6 - 10.5 mg/dL    Total Protein 7.1 6.0 - 8.5 g/dL    Albumin 4.50 3.50 - 5.20 g/dL    ALT (SGPT) 30 1 - 33 U/L    AST (SGOT) 49 (H) 1 - 32 U/L    Alkaline Phosphatase 175 (H) 39 - 117 U/L    Total Bilirubin 0.2 0.0 - 1.2 mg/dL    Globulin 2.6 gm/dL    A/G Ratio 1.7 g/dL    BUN/Creatinine Ratio 17.6 7.0 - 25.0    Anion Gap 10.0 5.0 - 15.0 mmol/L    eGFR 62.0 >60.0 mL/min/1.73   BNP    Specimen: Blood   Result Value Ref Range    proBNP 4,143.0 (H) 0.0 - 1,800.0 pg/mL   Troponin    Specimen: Blood   Result Value Ref Range    Troponin T <0.010 0.000 - 0.030 ng/mL   CBC Auto Differential    Specimen: Blood   Result Value Ref Range    WBC 11.18 (H) 3.40 - 10.80 10*3/mm3    RBC 4.56 3.77 - 5.28 10*6/mm3    Hemoglobin 14.1 12.0 - 15.9 g/dL    Hematocrit 43.0 34.0 - 46.6 %    MCV 94.3 79.0 - 97.0 fL     MCH 30.9 26.6 - 33.0 pg    MCHC 32.8 31.5 - 35.7 g/dL    RDW 12.6 12.3 - 15.4 %    RDW-SD 43.5 37.0 - 54.0 fl    MPV 12.2 (H) 6.0 - 12.0 fL    Platelets 160 140 - 450 10*3/mm3    Neutrophil % 63.0 42.7 - 76.0 %    Lymphocyte % 22.0 19.6 - 45.3 %    Monocyte % 11.0 5.0 - 12.0 %    Eosinophil % 2.8 0.3 - 6.2 %    Basophil % 0.8 0.0 - 1.5 %    Immature Grans % 0.4 0.0 - 0.5 %    Neutrophils, Absolute 7.05 (H) 1.70 - 7.00 10*3/mm3    Lymphocytes, Absolute 2.46 0.70 - 3.10 10*3/mm3    Monocytes, Absolute 1.23 (H) 0.10 - 0.90 10*3/mm3    Eosinophils, Absolute 0.31 0.00 - 0.40 10*3/mm3    Basophils, Absolute 0.09 0.00 - 0.20 10*3/mm3    Immature Grans, Absolute 0.04 0.00 - 0.05 10*3/mm3    nRBC 0.0 0.0 - 0.2 /100 WBC   D-dimer, Quantitative    Specimen: Blood   Result Value Ref Range    D-Dimer, Quantitative 2.20 (H) 0.01 - 0.50 MCGFEU/mL   Procalcitonin    Specimen: Blood   Result Value Ref Range    Procalcitonin 0.04 0.00 - 0.25 ng/mL   Lactic Acid, Plasma    Specimen: Blood   Result Value Ref Range    Lactate 2.2 (C) 0.5 - 2.0 mmol/L   STAT Lactic Acid, Reflex    Specimen: Blood   Result Value Ref Range    Lactate 1.5 0.5 - 2.0 mmol/L   Basic Metabolic Panel    Specimen: Blood   Result Value Ref Range    Glucose 134 (H) 65 - 99 mg/dL    BUN 15 8 - 23 mg/dL    Creatinine 0.79 0.57 - 1.00 mg/dL    Sodium 144 136 - 145 mmol/L    Potassium 4.1 3.5 - 5.2 mmol/L    Chloride 106 98 - 107 mmol/L    CO2 26.0 22.0 - 29.0 mmol/L    Calcium 9.2 8.6 - 10.5 mg/dL    BUN/Creatinine Ratio 19.0 7.0 - 25.0    Anion Gap 12.0 5.0 - 15.0 mmol/L    eGFR 73.4 >60.0 mL/min/1.73   CBC (No Diff)    Specimen: Blood   Result Value Ref Range    WBC 6.79 3.40 - 10.80 10*3/mm3    RBC 4.26 3.77 - 5.28 10*6/mm3    Hemoglobin 13.4 12.0 - 15.9 g/dL    Hematocrit 39.8 34.0 - 46.6 %    MCV 93.4 79.0 - 97.0 fL    MCH 31.5 26.6 - 33.0 pg    MCHC 33.7 31.5 - 35.7 g/dL    RDW 12.6 12.3 - 15.4 %    RDW-SD 42.8 37.0 - 54.0 fl    MPV 12.0 6.0 - 12.0 fL     Platelets 124 (L) 140 - 450 10*3/mm3   Hemoglobin A1c    Specimen: Blood   Result Value Ref Range    Hemoglobin A1C 5.20 4.80 - 5.60 %   Basic Metabolic Panel    Specimen: Blood   Result Value Ref Range    Glucose 86 65 - 99 mg/dL    BUN 27 (H) 8 - 23 mg/dL    Creatinine 0.72 0.57 - 1.00 mg/dL    Sodium 140 136 - 145 mmol/L    Potassium 3.2 (L) 3.5 - 5.2 mmol/L    Chloride 103 98 - 107 mmol/L    CO2 29.0 22.0 - 29.0 mmol/L    Calcium 8.6 8.6 - 10.5 mg/dL    BUN/Creatinine Ratio 37.5 (H) 7.0 - 25.0    Anion Gap 8.0 5.0 - 15.0 mmol/L    eGFR 82.1 >60.0 mL/min/1.73   CBC Auto Differential    Specimen: Blood   Result Value Ref Range    WBC 6.29 3.40 - 10.80 10*3/mm3    RBC 3.42 (L) 3.77 - 5.28 10*6/mm3    Hemoglobin 10.8 (L) 12.0 - 15.9 g/dL    Hematocrit 31.1 (L) 34.0 - 46.6 %    MCV 90.9 79.0 - 97.0 fL    MCH 31.6 26.6 - 33.0 pg    MCHC 34.7 31.5 - 35.7 g/dL    RDW 12.4 12.3 - 15.4 %    RDW-SD 41.0 37.0 - 54.0 fl    MPV 12.4 (H) 6.0 - 12.0 fL    Platelets 102 (L) 140 - 450 10*3/mm3    Neutrophil % 55.3 42.7 - 76.0 %    Lymphocyte % 30.0 19.6 - 45.3 %    Monocyte % 12.9 (H) 5.0 - 12.0 %    Eosinophil % 0.8 0.3 - 6.2 %    Basophil % 0.5 0.0 - 1.5 %    Immature Grans % 0.5 0.0 - 0.5 %    Neutrophils, Absolute 3.48 1.70 - 7.00 10*3/mm3    Lymphocytes, Absolute 1.89 0.70 - 3.10 10*3/mm3    Monocytes, Absolute 0.81 0.10 - 0.90 10*3/mm3    Eosinophils, Absolute 0.05 0.00 - 0.40 10*3/mm3    Basophils, Absolute 0.03 0.00 - 0.20 10*3/mm3    Immature Grans, Absolute 0.03 0.00 - 0.05 10*3/mm3    nRBC 0.0 0.0 - 0.2 /100 WBC   ECG 12 Lead   Result Value Ref Range    QT Interval 270 ms    QTC Interval 394 ms   Green Top (Gel)   Result Value Ref Range    Extra Tube Hold for add-ons.    Lavender Top   Result Value Ref Range    Extra Tube hold for add-on    Gold Top - SST   Result Value Ref Range    Extra Tube Hold for add-ons.    Gray Top   Result Value Ref Range    Extra Tube Hold for add-ons.    Light Blue Top   Result Value Ref  Range    Extra Tube hold for add-on         All other labs were within normal range or not returned as of this dictation.      EMERGENCY DEPARTMENT COURSE and DIFFERENTIAL DIAGNOSIS/MDM:   Vitals:    Vitals:    04/24/22 1132 04/24/22 1156 04/24/22 1200 04/24/22 1216   BP: 133/78      BP Location: Left arm      Patient Position: Sitting      Pulse: 87 85 80 93   Resp: 18 18     Temp: 97.9 °F (36.6 °C)      TempSrc: Oral      SpO2: 96% 94% 99% (!) 87%   Weight:       Height:           ED Course as of 04/26/22 0255   Fri Apr 22, 2022   0516 Spoke w/ Dr. Calderon who accepts the pt for admission [NS]      ED Course User Index  [NS] Otis Alvarado MD       Patient presents with acute hypoxic respiratory failure secondary to acute exacerbation of COPD requiring BiPAP.  Also provided with steroids and nebulizer treatment with improvement in the ED.  No evidence of PE, pneumonia, ACS, dysrhythmia, significant anemia, or other acute intrathoracic process.  Patient will be admitted to the hospital service for further management.      MEDICATIONS ADMINISTERED IN ED:  Medications   albuterol (PROVENTIL) nebulizer solution 0.083% 2.5 mg/3mL (2.5 mg Nebulization Given 4/22/22 0408)   dexamethasone (DECADRON) IVPB 10 mg (0 mg Intravenous Stopped 4/22/22 0438)   iopamidol (ISOVUE-370) 76 % injection 100 mL (75 mL Intravenous Given 4/22/22 0530)   furosemide (LASIX) injection 20 mg (20 mg Intravenous Given 4/23/22 1227)   potassium chloride (MICRO-K) CR capsule 40 mEq (40 mEq Oral Given 4/24/22 0832)         CRITICAL CARE TIME    Approximately 35 minutes of discontinuous critical care time was provided to this patient by myself absent of any time spent performing procedures.  Patient presents critically ill with acute hypoxic respiratory failure secondary to COPD exacerbation placing the respiratory, cardiovascular, neurologic system at risk requiring the following interventions: Initiation of BiPAP therapy, nebulizer therapy,  interpretation of lab/ECG/imaging, frequent reassessment, coordination of admission with the following response: Resolution of hypoxia.  Patient at high risk of deterioration and possibly death without these interventions.        FINAL IMPRESSION      1. Acute respiratory failure with hypoxia (HCC)    2. COPD with acute exacerbation (HCC)    3. History of CHF (congestive heart failure)          DISPOSITION/PLAN     ED Disposition     ED Disposition   Decision to Admit    Condition   --    Comment   Level of Care: Telemetry [5]   Diagnosis: Acute respiratory failure with hypoxia (HCC) [522259]   Admitting Physician: CHUCKIE DUPONT II [[id number]]   Attending Physician: CHUCKIE DUPONT II [[id number]]               Otis Alvarado MD  Attending Emergency Physician               Otis Alvarado MD  04/26/22 0253

## 2022-04-22 NOTE — PLAN OF CARE
Goal Outcome Evaluation:     Pt has history of falls and prior hip injury. Will continue to monitor and reassess.

## 2022-04-22 NOTE — THERAPY EVALUATION
Acute Care - Speech Language Pathology   Swallow Initial Evaluation [facility]   Clinical Swallow Evaluation     Patient Name: Mitzi Eric  : 1935  MRN: [id number]  Today's Date: 2022               Admit Date: 2022    Visit Dx:     ICD-10-CM ICD-9-CM   1. Acute respiratory failure with hypoxia (HCC)  J96.01 518.81   2. COPD with acute exacerbation (HCC)  J44.1 491.21   3. History of CHF (congestive heart failure)  Z86.79 V12.59     Patient Active Problem List   Diagnosis   • Fall   • Delirium, acute   • Essential hypertension   • Cognitive decline   • Trigeminal neuralgia   • Hypothyroidism (acquired)   • Lung mass   • Influenza A   • Severe malnutrition (CMS/HCC)   • Dementia without behavioral disturbance (HCC)   • History of craniotomy   • Impaired functional mobility, balance, gait, and endurance   • Acute respiratory failure with hypoxia (HCC)   • chronic CHF (congestive heart failure) (HCC)   • Thrombocytopenia (HCC)   • Elevated transaminase level   • atrial fibrillation   • COPD with acute exacerbation (HCC)   • Acute on chronic respiratory failure with hypoxemia (HCC)     Past Medical History:   Diagnosis Date   • A-fib (HCC)    • Ataxic gait    • Chronic kidney disease, stage 3 (HCC)    • COPD (chronic obstructive pulmonary disease) (HCC)    • Dementia (HCC)    • Disease of thyroid gland    • Heart failure (HCC)    • Hyperlipidemia    • Hypertension      Past Surgical History:   Procedure Laterality Date   • BRAIN TUMOR EXCISION      BENIGN   • FRACTURE SURGERY     • TRIGGER FINGER RELEASE         SLP Recommendation and Plan  SLP Swallowing Diagnosis: (P) other (see comments), oral dysphagia (No suspected pharyngeal impairment) (22 140)  SLP Diet Recommendation: (P) regular textures, thin liquids (22)  Recommended Precautions and Strategies: (P) alternate between small bites of food and sips of liquid, general aspiration precautions (22)  SLP Rec. for  Method of Medication Administration: (P) meds whole, with thin liquids, as tolerated (04/22/22 1405)     Monitor for Signs of Aspiration: (P) yes, notify SLP if any concerns (04/22/22 1405)  Recommended Diagnostics: (P) other (see comments) (diet tolerance) (04/22/22 1405)  Swallow Criteria for Skilled Therapeutic Interventions Met: (P) demonstrates skilled criteria (04/22/22 1405)  Anticipated Discharge Disposition (SLP): (P) unknown (04/22/22 1405)  Rehab Potential/Prognosis, Swallowing: (P) good, to achieve stated therapy goals (04/22/22 1405)  Therapy Frequency (Swallow): (P) PRN (04/22/22 1405)  Predicted Duration Therapy Intervention (Days): (P) until discharge (04/22/22 1405)                                         SWALLOW EVALUATION (last 72 hours)     SLP Adult Swallow Evaluation     Row Name 04/22/22 1405                   Rehab Evaluation    Document Type evaluation (P)   -        Subjective Information no complaints (P)   -        Patient Observations alert;cooperative (P)   -        Patient/Family/Caregiver Comments/Observations no family present (P)   -        Patient Effort good (P)   -        Symptoms Noted During/After Treatment none (P)   -                  General Information    Patient Profile Reviewed yes (P)   -        Pertinent History Of Current Problem Adm w/ worsening SOA. CXR: Chronic changes of R upper lobe, trace bilateral pleural effusions. H/o: dementia, trigeminqal neuralgia, CKD III, lung mass, HTN, HLD, pulmonary HTN (P)   -        Current Method of Nutrition NPO;other (see comments) (P)   until CSE  -        Precautions/Limitations, Vision WFL;for purposes of eval (P)   -        Precautions/Limitations, Hearing WFL;for purposes of eval (P)   -        Prior Level of Function-Communication cognitive-linguistic impairment;other (see comments) (P)   Dementia per chart review  -        Prior Level of Function-Swallowing unknown (P)   -        Plans/Goals  Discussed with patient (P)   -        Barriers to Rehab none identified (P)   -        Patient's Goals for Discharge patient did not state (P)   -                  Pain    Additional Documentation Pain Scale: FACES Pre/Post-Treatment (Group) (P)   -                  Pain Scale: FACES Pre/Post-Treatment    Pain: FACES Scale, Pretreatment 0-->no hurt (P)   -        Posttreatment Pain Rating 0-->no hurt (P)   -                  Oral Motor Structure and Function    Dentition Assessment natural, present and adequate (P)   -        Secretion Management WNL/WFL (P)   -        Mucosal Quality moist, healthy (P)   -                  Oral Musculature and Cranial Nerve Assessment    Oral Motor General Assessment WFL (P)   -                  General Eating/Swallowing Observations    Respiratory Support Currently in Use nasal cannula (P)   -        Eating/Swallowing Skills self-fed;fed by SLP (P)   -        Positioning During Eating upright in bed (P)   -        Utensils Used spoon;cup;straw (P)   -        Consistencies Trialed ice chips;thin liquids;regular textures (P)   -                  Clinical Swallow Eval    Oral Residue impaired (P)   -        Pharyngeal Phase no overt signs/symptoms of pharyngeal impairment (P)   -        Clinical Swallow Evaluation Summary No overt s/s of aspiration or pharyngeal impairment observed. Pt with trace amoung of residue on lingual surface following regular solid trial. Pt able to fully clear w/ single liquid wash independently. SLP will f/u for assessment of diet tolerance (P)   -                  Oral Residue Concerns    Oral Residue Concerns other (see comments) (P)   Trace residue on lingual surface  -                  SLP Evaluation Clinical Impression    SLP Swallowing Diagnosis other (see comments);oral dysphagia (P)   No suspected pharyngeal impairment  -        Functional Impact risk of aspiration/pneumonia (P)   -        Rehab  Potential/Prognosis, Swallowing good, to achieve stated therapy goals (P)   -        Swallow Criteria for Skilled Therapeutic Interventions Met demonstrates skilled criteria (P)   -                  Recommendations    Therapy Frequency (Swallow) PRN (P)   -        Predicted Duration Therapy Intervention (Days) until discharge (P)   -        SLP Diet Recommendation regular textures;thin liquids (P)   -        Recommended Diagnostics other (see comments) (P)   diet tolerance  -        Recommended Precautions and Strategies alternate between small bites of food and sips of liquid;general aspiration precautions (P)   -        Oral Care Recommendations Oral Care BID/PRN (P)   -        SLP Rec. for Method of Medication Administration meds whole;with thin liquids;as tolerated (P)   -        Monitor for Signs of Aspiration yes;notify SLP if any concerns (P)   -        Anticipated Discharge Disposition (SLP) unknown (P)   -              User Key  (r) = Recorded By, (t) = Taken By, (c) = Cosigned By    Initials Name Effective Dates     Ebony Cesar, Speech Therapy Student 01/24/22 -                 EDUCATION  The patient has been educated in the following areas:   Dysphagia (Swallowing Impairment).        SLP GOALS     Row Name 04/22/22 1405             Oral Nutrition/Hydration Goal 1 (SLP)    Oral Nutrition/Hydration Goal 1, SLP LTG: Pt will tolerate safest, least restrictive diet w/o s/s of aspiration w/ 100% accuracy by discharge (P)   -      Time Frame (Oral Nutrition/Hydration Goal 1, SLP) by discharge (P)   [facility]              Oral Nutrition/Hydration Goal 2 (SLP)    Oral Nutrition/Hydration Goal 2, SLP Pt will tolerate trials of regular solids and thin liquids w/o s/s of aspiration in 90% of trials by discharge (P)   -      Time Frame (Oral Nutrition/Hydration Goal 2, SLP) short term goal (STG) (P)   -            User Key  (r) = Recorded By, (t) = Taken By, (c) = Cosigned By    Initials  Name Provider Type     Ebony Cesar, Speech Therapy Student SLP Student                   Time Calculation:    Time Calculation- SLP     Row Name 04/22/22 1526             Time Calculation- [facility]    SLP Start Time 1405 (P)   -      SLP Received On 04/22/22 (P)   -              Untimed Charges    32520-PK Eval Oral Pharyng Swallow Minutes 55 (P)   -              Total Minutes    Untimed Charges Total Minutes 55 (P)   -       Total Minutes 55 (P)   -            User Key  (r) = Recorded By, (t) = Taken By, (c) = Cosigned By    Initials Name Provider Type     Ebony Cesar, Speech Therapy Student SLP Student                Therapy Charges for Today     Code Description Service Date Service Provider Modifiers Qty    [id number] HC ST EVAL ORAL PHARYNG SWALLOW 4 4/22/2022 Ebony Cesar, Speech Therapy Student GN 1            Patient was not wearing a face mask and did not exhibit coughing during this therapy encounter.  Procedure performed was not aerosolizing, did not involve close contact (within 6 feet for at least 15 minutes or longer), and did not involve contact with infectious secretions or specimens.  Therapist used appropriate personal protective equipment including gloves, standard procedure mask and eye protection.  Appropriate PPE was worn during the entire therapy session.  Hand hygiene was completed before and after therapy session.     Ebony Cesar Speech Therapy Student  4/22/2022

## 2022-04-23 PROBLEM — R63.6 UNDERWEIGHT: Status: ACTIVE | Noted: 2022-04-23

## 2022-04-23 PROBLEM — J96.10 CHRONIC RESPIRATORY FAILURE: Status: ACTIVE | Noted: 2022-04-23

## 2022-04-23 PROCEDURE — 25010000002 HEPARIN (PORCINE) PER 1000 UNITS: Performed by: PHYSICIAN ASSISTANT

## 2022-04-23 PROCEDURE — 96375 TX/PRO/DX INJ NEW DRUG ADDON: CPT

## 2022-04-23 PROCEDURE — 63710000001 PREDNISONE PER 1 MG: Performed by: INTERNAL MEDICINE

## 2022-04-23 PROCEDURE — 96372 THER/PROPH/DIAG INJ SC/IM: CPT

## 2022-04-23 PROCEDURE — 99226 PR SBSQ OBSERVATION CARE/DAY 35 MINUTES: CPT | Performed by: INTERNAL MEDICINE

## 2022-04-23 PROCEDURE — 94799 UNLISTED PULMONARY SVC/PX: CPT

## 2022-04-23 PROCEDURE — 97165 OT EVAL LOW COMPLEX 30 MIN: CPT

## 2022-04-23 PROCEDURE — G0378 HOSPITAL OBSERVATION PER HR: HCPCS

## 2022-04-23 PROCEDURE — 94640 AIRWAY INHALATION TREATMENT: CPT

## 2022-04-23 PROCEDURE — 25010000002 FUROSEMIDE PER 20 MG: Performed by: INTERNAL MEDICINE

## 2022-04-23 RX ORDER — IPRATROPIUM BROMIDE AND ALBUTEROL SULFATE 2.5; .5 MG/3ML; MG/3ML
3 SOLUTION RESPIRATORY (INHALATION)
Status: DISCONTINUED | OUTPATIENT
Start: 2022-04-23 | End: 2022-04-24 | Stop reason: HOSPADM

## 2022-04-23 RX ORDER — FUROSEMIDE 10 MG/ML
20 INJECTION INTRAMUSCULAR; INTRAVENOUS ONCE
Status: COMPLETED | OUTPATIENT
Start: 2022-04-23 | End: 2022-04-23

## 2022-04-23 RX ORDER — BUDESONIDE AND FORMOTEROL FUMARATE DIHYDRATE 80; 4.5 UG/1; UG/1
2 AEROSOL RESPIRATORY (INHALATION)
Status: DISCONTINUED | OUTPATIENT
Start: 2022-04-23 | End: 2022-04-24 | Stop reason: HOSPADM

## 2022-04-23 RX ADMIN — Medication 5 MG: at 20:05

## 2022-04-23 RX ADMIN — LEVOTHYROXINE SODIUM 25 MCG: 25 TABLET ORAL at 06:24

## 2022-04-23 RX ADMIN — OXCARBAZEPINE 600 MG: 300 TABLET, FILM COATED ORAL at 09:10

## 2022-04-23 RX ADMIN — IPRATROPIUM BROMIDE AND ALBUTEROL SULFATE 3 ML: .5; 3 SOLUTION RESPIRATORY (INHALATION) at 20:09

## 2022-04-23 RX ADMIN — HEPARIN SODIUM 5000 UNITS: 5000 INJECTION, SOLUTION INTRAVENOUS; SUBCUTANEOUS at 20:06

## 2022-04-23 RX ADMIN — IPRATROPIUM BROMIDE AND ALBUTEROL SULFATE 3 ML: .5; 3 SOLUTION RESPIRATORY (INHALATION) at 15:31

## 2022-04-23 RX ADMIN — DOCUSATE SODIUM 100 MG: 100 CAPSULE, LIQUID FILLED ORAL at 09:11

## 2022-04-23 RX ADMIN — DOCUSATE SODIUM 100 MG: 100 CAPSULE, LIQUID FILLED ORAL at 20:05

## 2022-04-23 RX ADMIN — PANTOPRAZOLE SODIUM 40 MG: 40 TABLET, DELAYED RELEASE ORAL at 06:30

## 2022-04-23 RX ADMIN — DOXYCYCLINE 100 MG: 100 CAPSULE ORAL at 09:11

## 2022-04-23 RX ADMIN — DOXYCYCLINE 100 MG: 100 CAPSULE ORAL at 20:06

## 2022-04-23 RX ADMIN — OXCARBAZEPINE 600 MG: 300 TABLET, FILM COATED ORAL at 20:05

## 2022-04-23 RX ADMIN — Medication 10 ML: at 09:12

## 2022-04-23 RX ADMIN — BUDESONIDE AND FORMOTEROL FUMARATE DIHYDRATE 2 PUFF: 80; 4.5 AEROSOL RESPIRATORY (INHALATION) at 20:09

## 2022-04-23 RX ADMIN — PREDNISONE 40 MG: 20 TABLET ORAL at 09:09

## 2022-04-23 RX ADMIN — CETIRIZINE HYDROCHLORIDE 5 MG: 10 TABLET, FILM COATED ORAL at 09:10

## 2022-04-23 RX ADMIN — METOPROLOL SUCCINATE 25 MG: 25 TABLET, EXTENDED RELEASE ORAL at 09:11

## 2022-04-23 RX ADMIN — Medication 10 ML: at 20:06

## 2022-04-23 RX ADMIN — PREGABALIN 25 MG: 25 CAPSULE ORAL at 20:05

## 2022-04-23 RX ADMIN — HEPARIN SODIUM 5000 UNITS: 5000 INJECTION, SOLUTION INTRAVENOUS; SUBCUTANEOUS at 09:09

## 2022-04-23 RX ADMIN — FUROSEMIDE 20 MG: 10 INJECTION INTRAMUSCULAR; INTRAVENOUS at 12:27

## 2022-04-23 RX ADMIN — FOLIC ACID TAB 400 MCG 400 MCG: 400 TAB at 09:11

## 2022-04-23 RX ADMIN — FOLIC ACID TAB 400 MCG 400 MCG: 400 TAB at 20:05

## 2022-04-23 RX ADMIN — DONEPEZIL HYDROCHLORIDE 5 MG: 5 TABLET ORAL at 06:24

## 2022-04-23 RX ADMIN — PREGABALIN 25 MG: 25 CAPSULE ORAL at 09:11

## 2022-04-23 RX ADMIN — ASPIRIN 81 MG: 81 TABLET, COATED ORAL at 09:11

## 2022-04-23 NOTE — CASE MANAGEMENT/SOCIAL WORK
Discharge Planning Assessment  Psychiatric     Patient Name: Mitzi Eric  MRN: [id number]  Today's Date: 4/23/2022    Admit Date: 4/22/2022     Discharge Needs Assessment     Row Name 04/23/22 1423       Living Environment    People in Home facility resident  [facility]    Current Living Arrangements assisted living facility    Primary Care Provided by self;other (see comments)  facility staff    Provides Primary Care For no one    Family Caregiver if Needed other relative(s)    Family Caregiver Names Lane Eric    Quality of Family Relationships involved;supportive;helpful    Able to Return to Prior Arrangements yes       Transition Planning    Patient/Family Anticipates Transition to home    Patient/Family Anticipated Services at Transition none       Discharge Needs Assessment    Readmission Within the Last 30 Days no previous admission in last 30 days    Equipment Currently Used at Home wheelchair    Concerns to be Addressed no discharge needs identified;denies needs/concerns at this time    Anticipated Changes Related to Illness none    Equipment Needed After Discharge none    Discharge Facility/Level of Care Needs assisted living facility    Current Discharge Risk dependent with mobility/activities of daily living    Discharge Coordination/Progress [facility]               Discharge Plan     Row Name 04/23/22 1903       Plan    Plan [facility]    Patient/Family in Agreement with Plan yes    Plan Comments Spoke with patient at bedside regarding discharge planning.  Patient unable to answer most questions and request CM call her Nephew/POA.  Called patient Nephew, Lane, regarding discharge planning who advises patient currently living at [facility] and is very independent with a wheelchair to incude bathing herself, dressing, makin gher bed and going to meals.  She uses not other DME.  She is currently on 2LO2NC but does not use O2 at home.  She has received the COVID vaccine.  He  anticipates discharge tomorrow.  No immediate discharge needs verbalized.  CM following.  Patient plan is to discharge home to [facility] via car with family to transport.    Final Discharge Disposition Code 01 - home or self-care              Continued Care and Services - Admitted Since 4/22/2022    Coordination has not been started for this encounter.          Demographic Summary     Row Name 04/23/22 1421       General Information    Admission Type observation    Arrived From home    Referral Source admission list    Reason for Consult discharge planning    Preferred Language English    General Information Comments RAFAEL Lewis       Contact Information    Permission Granted to Share Info With     Contact Information Obtained for     Contact Information Comments Lane [place] nephew/CADE  [phone]  Jaxson [place], nephew  [phone]               Functional Status     Row Name 04/23/22 1422       Functional Status    Usual Activity Tolerance moderate    Current Activity Tolerance moderate       Functional Status, IADL    Medications assistive person    Meal Preparation completely dependent    Housekeeping assistive equipment    Laundry completely dependent    Shopping assistive equipment and person       Employment/    Employment/ Comments [employer] Replacement               Psychosocial    No documentation.                Abuse/Neglect    No documentation.                Legal    No documentation.                Substance Abuse    No documentation.                Patient Forms    No documentation.                   Carol Wang, RN

## 2022-04-23 NOTE — PLAN OF CARE
Goal Outcome Evaluation:  Plan of Care Reviewed With: patient     Patient VSS on 2L O2 by NC.  No acute changes in patient condition.  No reports of pain or SOB.  Patient remained A/O and afebrile throughout the shift.

## 2022-04-23 NOTE — PLAN OF CARE
Goal Outcome Evaluation:  Plan of Care Reviewed With: patient        Progress: no change (Initial Eval)  Outcome Evaluation: OT eval completed. Pt presenting w/ ROGER, generalized weakness/deconditioning, impaired balance/transfers/mobility and increased need for assistance w/ self-care tasks. Independent w/ LB dressing, Elías for bed mobility and STS. IP OT warranted, POC initiated. Recommendation for return to [facility] w/ HH OT/PT or therapy services from facility.

## 2022-04-23 NOTE — PLAN OF CARE
Goal Outcome Evaluation:  Plan of Care Reviewed With: patient   Assumed care of the patient at 1900 on 4/22. Patient is alert and oriented x2, confused at times and needs frequent orientation. C/O generalized pain and given PRN Tylenol before bed. Pt attempting to get out of bed x2 without help. Lung sounds noted witdh fine crackles at the bases, Sp02 96& on 2LPM via NC. Tele monitor shows NSR. PIV SL at right AC.                                                              Progress: no change

## 2022-04-23 NOTE — THERAPY EVALUATION
Patient Name: Mitzi Eric  : 1935    MRN: [id number]                              Today's Date: 2022       Admit Date: 2022    Visit Dx:     ICD-10-CM ICD-9-CM   1. Acute respiratory failure with hypoxia (HCC)  J96.01 518.81   2. COPD with acute exacerbation (HCC)  J44.1 491.21   3. History of CHF (congestive heart failure)  Z86.79 V12.59     Patient Active Problem List   Diagnosis   • Fall   • Delirium, acute   • Essential hypertension   • Cognitive decline   • Trigeminal neuralgia   • Hypothyroidism (acquired)   • Lung mass   • Influenza A   • Severe malnutrition (CMS/HCC)   • Dementia without behavioral disturbance (HCC)   • History of craniotomy   • Impaired functional mobility, balance, gait, and endurance   • Acute respiratory failure with hypoxia (HCC)   • chronic CHF (congestive heart failure) (HCC)   • Thrombocytopenia (HCC)   • Elevated transaminase level   • atrial fibrillation   • COPD with acute exacerbation (HCC)   • Acute on chronic respiratory failure with hypoxemia (HCC)   • Chronic respiratory failure (HCC)   • Underweight     Past Medical History:   Diagnosis Date   • A-fib (HCC)    • Ataxic gait    • Chronic kidney disease, stage 3 (HCC)    • COPD (chronic obstructive pulmonary disease) (HCC)    • Dementia (HCC)    • Disease of thyroid gland    • Heart failure (HCC)    • Hyperlipidemia    • Hypertension      Past Surgical History:   Procedure Laterality Date   • BRAIN TUMOR EXCISION      BENIGN   • FRACTURE SURGERY     • TRIGGER FINGER RELEASE        General Information     Row Name 22 1459          OT Time and Intention    Document Type evaluation  -MR     Mode of Treatment individual therapy;occupational therapy  -MR     Row Name 22 1459          General Information    Patient Profile Reviewed yes  -MR     Prior Level of Function independent:;all household mobility;transfer;bed mobility;min assist:;bathing  PTA pt was living at [facility], utilizes w/c for  mobility. Pt reporting she could transfer from bed to w/c independently. I w/ dressing and toileting requiring assistance for bathing. Facility assists w/ meds, meals and home management  -MR     Existing Precautions/Restrictions fall;oxygen therapy device and L/min;other (see comments)  Dementia  -MR     Barriers to Rehab medically complex;previous functional deficit;cognitive status  -MR     Row Name 04/23/22 1459          Living Environment    People in Home facility resident;other (see comments)  [facility]  -MR     Row Name 04/23/22 1459          Home Main Entrance    Number of Stairs, Main Entrance none  -MR     Row Name 04/23/22 1459          Stairs Within Home, Primary    Number of Stairs, Within Home, Primary none  -MR     Row Name 04/23/22 1459          Cognition    Orientation Status (Cognition) oriented to;person;disoriented to;place;situation;time  w/ cueing pt able to answer that she was in hospital  -MR     Row Name 04/23/22 1459          Safety Issues, Functional Mobility    Safety Issues Affecting Function (Mobility) awareness of need for assistance;impulsivity;insight into deficits/self-awareness;safety precaution awareness;safety precautions follow-through/compliance;sequencing abilities  -MR     Impairments Affecting Function (Mobility) balance;cognition;endurance/activity tolerance;strength;shortness of breath  -MR     Cognitive Impairments, Mobility Safety/Performance attention;awareness, need for assistance;impulsivity;insight into deficits/self-awareness;problem-solving/reasoning;safety precaution awareness;safety precaution follow-through  -MR           User Key  (r) = Recorded By, (t) = Taken By, (c) = Cosigned By    Initials Name Provider Type    MR Anita Heaton, OT Occupational Therapist                 Mobility/ADL's     Row Name 04/23/22 7882          Bed Mobility    Bed Mobility supine-sit  -MR     Supine-Sit [place] (Bed Mobility) minimum assist (75% patient effort);verbal  cues;nonverbal cues (demo/gesture)  -MR     Bed Mobility, Safety Issues decreased use of arms for pushing/pulling;decreased use of legs for bridging/pushing  -MR     Assistive Device (Bed Mobility) bed rails;head of bed elevated  -MR     Row Name 04/23/22 1502          Transfers    Transfers sit-stand transfer  -MR     Comment, (Transfers) STS from EOB to upright Elías  -MR     Sit-Stand [place] (Transfers) minimum assist (75% patient effort);verbal cues;nonverbal cues (demo/gesture)  -MR     Row Name 04/23/22 1502          Sit-Stand Transfer    Assistive Device (Sit-Stand Transfers) other (see comments)  Unilateral HHA  -MR     Row Name 04/23/22 1502          Activities of Daily Living    BADL Assessment/Intervention lower body dressing  -MR     Row Name 04/23/22 1502          Lower Body Dressing Assessment/Training    [place] Level (Lower Body Dressing) don;doff;independent;socks  -MR     Position (Lower Body Dressing) long sitting  -MR           User Key  (r) = Recorded By, (t) = Taken By, (c) = Cosigned By    Initials Name Provider Type    MR Anita Heaton OT Occupational Therapist               Obj/Interventions     Row Name 04/23/22 1503          Sensory Assessment (Somatosensory)    Sensory Assessment (Somatosensory) UE sensation intact  -MR     Row Name 04/23/22 1503          Vision Assessment/Intervention    Visual Impairment/Limitations WFL  -MR     Row Name 04/23/22 1503          Range of Motion Comprehensive    General Range of Motion bilateral upper extremity ROM WFL  -MR     Row Name 04/23/22 1503          Strength Comprehensive (MMT)    Comment, General Manual Muscle Testing (MMT) Assessment BUE grossly 3+/5 in all functional planes  -MR     Row Name 04/23/22 1503          Balance    Balance Assessment sitting static balance;sitting dynamic balance;standing static balance;standing dynamic balance  -MR     Static Sitting Balance contact guard  -MR     Dynamic Sitting Balance contact guard   -MR     Position, Sitting Balance sitting edge of bed;supported  -MR     Static Standing Balance contact guard  -MR     Dynamic Standing Balance minimal assist  -MR     Position/Device Used, Standing Balance supported;other (see comments)  unilateral UE support  -MR     Balance Interventions sitting;standing;sit to stand;supported;static;dynamic;minimal challenge;occupation based/functional task  -MR     Comment, Balance No LOB noted w/ STS. Pt fearful of falling once standing  -MR           User Key  (r) = Recorded By, (t) = Taken By, (c) = Cosigned By    Initials Name Provider Type    MR Anita Heaton, OT Occupational Therapist               Goals/Plan     Row Name 04/23/22 1513          Transfer Goal 1 (OT)    Activity/Assistive Device (Transfer Goal 1, OT) bed-to-chair/chair-to-bed  -MR     [place] Level/Cues Needed (Transfer Goal 1, OT) contact guard required  -MR     Time Frame (Transfer Goal 1, OT) long term goal (LTG);by discharge  -MR     Row Name 04/23/22 1513          Grooming Goal 1 (OT)    Activity/Device (Grooming Goal 1, OT) grooming skills, all  -MR     [place] (Grooming Goal 1, OT) set-up required  -MR     Time Frame (Grooming Goal 1, OT) long term goal (LTG);by discharge  -MR     Row Name 04/23/22 1513          Problem Specific Goal 1 (OT)    Problem Specific Goal 1 (OT) Patient will demo ability to tolerate pulmonary ther ex x10 w/ O2 sats maintained >90%.  -MR     Time Frame (Problem Specific Goal 1, OT) long term goal (LTG);by discharge  -MR     Row Name 04/23/22 1513          Therapy Assessment/Plan (OT)    Planned Therapy Interventions (OT) activity tolerance training;adaptive equipment training;BADL retraining;functional balance retraining;IADL retraining;patient/caregiver education/training;transfer/mobility retraining;strengthening exercise;occupation/activity based interventions;ROM/therapeutic exercise  -MR           User Key  (r) = Recorded By, (t) = Taken By, (c) = Cosigned  By    Initials Name Provider Type    MR Anita Heaton, OT Occupational Therapist               Clinical Impression     Row Name 04/23/22 1504          Pain Assessment    Pretreatment Pain Rating 0/10 - no pain  -MR     Posttreatment Pain Rating 0/10 - no pain  -MR     Pre/Posttreatment Pain Comment Denied pain pre and post session  -MR     Pain Intervention(s) Ambulation/increased activity;Repositioned  -     Row Name 04/23/22 1504          Plan of Care Review    Plan of Care Reviewed With patient  -MR     Progress no change  Initial Eval  -MR     Outcome Evaluation OT eval completed. Pt presenting w/ ROGER, generalized weakness/deconditioning, impaired balance/transfers/mobility and increased need for assistance w/ self-care tasks. Independent w/ LB dressing, Elías for bed mobility and STS. IP OT warranted, POC initiated. Recommendation for return to [facility] w/ HH OT/PT or therapy services from facility.  -     Row Name 04/23/22 1504          Therapy Assessment/Plan (OT)    Patient/Family Therapy Goal Statement (OT) Return to PLOF  -MR     Rehab Potential (OT) good, to achieve stated therapy goals  -MR     Criteria for Skilled Therapeutic Interventions Met (OT) yes;meets criteria;skilled treatment is necessary  -MR     Therapy Frequency (OT) daily  -MR Saunders Name 04/23/22 1504          Therapy Plan Review/Discharge Plan (OT)    Anticipated Discharge Disposition (OT) assisted living;home with home health  -MR Saunders Name 04/23/22 1504          Vital Signs    Pre Systolic BP Rehab 132  -MR     Pre Treatment Diastolic BP 81  -MR     Pretreatment Heart Rate (beats/min) 85  -MR     Pre SpO2 (%) 95  -MR     O2 Delivery Pre Treatment nasal cannula  -MR     Intra SpO2 (%) 85  -MR     O2 Delivery Intra Treatment nasal cannula  -MR     Post SpO2 (%) 94  -MR     O2 Delivery Post Treatment nasal cannula  -MR     Pre Patient Position Supine  -MR     Intra Patient Position Standing  -MR     Post Patient Position  Supine  -MR     Row Name 04/23/22 1504          Positioning and Restraints    Pre-Treatment Position in bed  -MR     Post Treatment Position bed  -MR     In Bed notified nsg;supine;fowlers;sitting;call light within reach;encouraged to call for assist;exit alarm on  -MR           User Key  (r) = Recorded By, (t) = Taken By, (c) = Cosigned By    Initials Name Provider Type     Sudarshan Anita OT Occupational Therapist               Outcome Measures     Row Name 04/23/22 1514          How much help from another is currently needed...    Putting on and taking off regular lower body clothing? 4  -MR     Bathing (including washing, rinsing, and drying) 2  -MR     Toileting (which includes using toilet bed pan or urinal) 2  -MR     Putting on and taking off regular upper body clothing 4  -MR     Taking care of personal grooming (such as brushing teeth) 4  -MR     Eating meals 4  -MR     AM-PAC 6 Clicks Score (OT) 20  -MR     Row Name 04/23/22 1514          Functional Assessment    Outcome Measure Options AM-PAC 6 Clicks Daily Activity (OT)  -MR           User Key  (r) = Recorded By, (t) = Taken By, (c) = Cosigned By    Initials Name Provider Type     Sudarshan Anita, REBECCA Occupational Therapist                Occupational Therapy Education                 Title: PT OT SLP Therapies (In Progress)     Topic: Occupational Therapy (In Progress)     Point: ADL training (Done)     Description:   Instruct learner(s) on proper safety adaptation and remediation techniques during self care or transfers.   Instruct in proper use of assistive devices.              Learning Progress Summary           Patient Acceptance, E, VU,NR by MR at 4/23/2022 1515                   Point: Home exercise program (Not Started)     Description:   Instruct learner(s) on appropriate technique for monitoring, assisting and/or progressing therapeutic exercises/activities.              Learner Progress:  Not documented in this visit.          Point:  Precautions (Done)     Description:   Instruct learner(s) on prescribed precautions during self-care and functional transfers.              Learning Progress Summary           Patient Acceptance, E, VU,NR by MR at 4/23/2022 1515                   [place]: Body mechanics (Done)     Description:   Instruct learner(s) on proper positioning and spine alignment during self-care, functional mobility activities and/or exercises.              Learning Progress Summary           Patient Acceptance, E, VU,NR by MR at 4/23/2022 1515                               User Key     Initials Effective Dates Name Provider Type Discipline    MR 10/06/21 -  Anita Heaton, REBECCA Occupational Therapist OT              OT Recommendation and Plan  Planned Therapy Interventions (OT): activity tolerance training, adaptive equipment training, BADL retraining, functional balance retraining, IADL retraining, patient/caregiver education/training, transfer/mobility retraining, strengthening exercise, occupation/activity based interventions, ROM/therapeutic exercise  Therapy Frequency (OT): daily  Plan of Care Review  Plan of Care Reviewed With: patient  Progress: no change (Initial Eval)  Outcome Evaluation: OT eval completed. Pt presenting w/ ROGER, generalized weakness/deconditioning, impaired balance/transfers/mobility and increased need for assistance w/ self-care tasks. Independent w/ LB dressing, Elías for bed mobility and STS. IP OT warranted, POC initiated. Recommendation for return to [facility] w/ HH OT/PT or therapy services from facility.     Time Calculation:    Time Calculation- OT     Row Name 04/23/22 1516             Time Calculation- OT    OT Start Time 1433  -MR      OT Received On 04/23/22  -MR      OT Goal Re-Cert Due Date 05/03/22  -MR              Untimed Charges    OT Eval/Re-eval Minutes 36  -MR              Total Minutes    Untimed Charges Total Minutes 36  -MR       Total Minutes 36  -MR            User Key  (r) = Recorded  By, (t) = Taken By, (c) = Cosigned By    Initials Name Provider Type    Barbara Estrella, REBECCA Occupational Therapist              Therapy Charges for Today     Code Description Service Date Service Provider Modifiers Qty    [id number]  OT EVAL LOW COMPLEXITY 3 4/23/2022 Barbara Heaton OT GO 1               BARBARA HEATON OT  4/23/2022

## 2022-04-24 ENCOUNTER — READMISSION MANAGEMENT (OUTPATIENT)
Dept: CALL CENTER | Facility: HOSPITAL | Age: 87
End: 2022-04-24

## 2022-04-24 VITALS
DIASTOLIC BLOOD PRESSURE: 78 MMHG | TEMPERATURE: 97.9 F | HEIGHT: 63 IN | OXYGEN SATURATION: 87 % | BODY MASS INDEX: 17.45 KG/M2 | SYSTOLIC BLOOD PRESSURE: 133 MMHG | WEIGHT: 98.5 LBS | RESPIRATION RATE: 18 BRPM | HEART RATE: 93 BPM

## 2022-04-24 PROBLEM — E44.0 MODERATE MALNUTRITION: Status: ACTIVE | Noted: 2022-04-24

## 2022-04-24 LAB
ANION GAP SERPL CALCULATED.3IONS-SCNC: 8 MMOL/L (ref 5–15)
BASOPHILS # BLD AUTO: 0.03 10*3/MM3 (ref 0–0.2)
BASOPHILS NFR BLD AUTO: 0.5 % (ref 0–1.5)
BUN SERPL-MCNC: 27 MG/DL (ref 8–23)
BUN/CREAT SERPL: 37.5 (ref 7–25)
CALCIUM SPEC-SCNC: 8.6 MG/DL (ref 8.6–10.5)
CHLORIDE SERPL-SCNC: 103 MMOL/L (ref 98–107)
CO2 SERPL-SCNC: 29 MMOL/L (ref 22–29)
CREAT SERPL-MCNC: 0.72 MG/DL (ref 0.57–1)
DEPRECATED RDW RBC AUTO: 41 FL (ref 37–54)
EGFRCR SERPLBLD CKD-EPI 2021: 82.1 ML/MIN/1.73
EOSINOPHIL # BLD AUTO: 0.05 10*3/MM3 (ref 0–0.4)
EOSINOPHIL NFR BLD AUTO: 0.8 % (ref 0.3–6.2)
ERYTHROCYTE [DISTWIDTH] IN BLOOD BY AUTOMATED COUNT: 12.4 % (ref 12.3–15.4)
GLUCOSE SERPL-MCNC: 86 MG/DL (ref 65–99)
HCT VFR BLD AUTO: 31.1 % (ref 34–46.6)
HGB BLD-MCNC: 10.8 G/DL (ref 12–15.9)
IMM GRANULOCYTES # BLD AUTO: 0.03 10*3/MM3 (ref 0–0.05)
IMM GRANULOCYTES NFR BLD AUTO: 0.5 % (ref 0–0.5)
LYMPHOCYTES # BLD AUTO: 1.89 10*3/MM3 (ref 0.7–3.1)
LYMPHOCYTES NFR BLD AUTO: 30 % (ref 19.6–45.3)
MCH RBC QN AUTO: 31.6 PG (ref 26.6–33)
MCHC RBC AUTO-ENTMCNC: 34.7 G/DL (ref 31.5–35.7)
MCV RBC AUTO: 90.9 FL (ref 79–97)
MONOCYTES # BLD AUTO: 0.81 10*3/MM3 (ref 0.1–0.9)
MONOCYTES NFR BLD AUTO: 12.9 % (ref 5–12)
NEUTROPHILS NFR BLD AUTO: 3.48 10*3/MM3 (ref 1.7–7)
NEUTROPHILS NFR BLD AUTO: 55.3 % (ref 42.7–76)
NRBC BLD AUTO-RTO: 0 /100 WBC (ref 0–0.2)
PLATELET # BLD AUTO: 102 10*3/MM3 (ref 140–450)
PMV BLD AUTO: 12.4 FL (ref 6–12)
POTASSIUM SERPL-SCNC: 3.2 MMOL/L (ref 3.5–5.2)
RBC # BLD AUTO: 3.42 10*6/MM3 (ref 3.77–5.28)
SODIUM SERPL-SCNC: 140 MMOL/L (ref 136–145)
WBC NRBC COR # BLD: 6.29 10*3/MM3 (ref 3.4–10.8)

## 2022-04-24 PROCEDURE — G0378 HOSPITAL OBSERVATION PER HR: HCPCS

## 2022-04-24 PROCEDURE — 63710000001 PREDNISONE PER 1 MG: Performed by: INTERNAL MEDICINE

## 2022-04-24 PROCEDURE — 25010000002 HEPARIN (PORCINE) PER 1000 UNITS: Performed by: PHYSICIAN ASSISTANT

## 2022-04-24 PROCEDURE — 94799 UNLISTED PULMONARY SVC/PX: CPT

## 2022-04-24 PROCEDURE — 99217 PR OBSERVATION CARE DISCHARGE MANAGEMENT: CPT | Performed by: INTERNAL MEDICINE

## 2022-04-24 PROCEDURE — 96372 THER/PROPH/DIAG INJ SC/IM: CPT

## 2022-04-24 PROCEDURE — 94664 DEMO&/EVAL PT USE INHALER: CPT

## 2022-04-24 PROCEDURE — 85025 COMPLETE CBC W/AUTO DIFF WBC: CPT | Performed by: INTERNAL MEDICINE

## 2022-04-24 PROCEDURE — 80048 BASIC METABOLIC PNL TOTAL CA: CPT | Performed by: INTERNAL MEDICINE

## 2022-04-24 RX ORDER — DOXYCYCLINE 100 MG/1
100 CAPSULE ORAL EVERY 12 HOURS SCHEDULED
Qty: 6 CAPSULE | Refills: 0 | Status: SHIPPED | OUTPATIENT
Start: 2022-04-24 | End: 2022-04-27

## 2022-04-24 RX ORDER — TORSEMIDE 5 MG/1
10 TABLET ORAL DAILY
Qty: 60 TABLET | Refills: 3 | Status: SHIPPED | OUTPATIENT
Start: 2022-04-24

## 2022-04-24 RX ORDER — BUDESONIDE AND FORMOTEROL FUMARATE DIHYDRATE 80; 4.5 UG/1; UG/1
2 AEROSOL RESPIRATORY (INHALATION)
Qty: 10.2 G | Refills: 3 | Status: SHIPPED | OUTPATIENT
Start: 2022-04-24 | End: 2022-06-23 | Stop reason: SDUPTHER

## 2022-04-24 RX ORDER — PREDNISONE 20 MG/1
40 TABLET ORAL
Qty: 6 TABLET | Refills: 0 | Status: SHIPPED | OUTPATIENT
Start: 2022-04-25 | End: 2022-04-28

## 2022-04-24 RX ORDER — ALBUTEROL SULFATE 90 UG/1
2 AEROSOL, METERED RESPIRATORY (INHALATION) EVERY 4 HOURS PRN
Qty: 18 G | Refills: 3 | Status: SHIPPED | OUTPATIENT
Start: 2022-04-24 | End: 2022-05-24

## 2022-04-24 RX ORDER — POTASSIUM CHLORIDE 750 MG/1
40 CAPSULE, EXTENDED RELEASE ORAL ONCE
Status: COMPLETED | OUTPATIENT
Start: 2022-04-24 | End: 2022-04-24

## 2022-04-24 RX ADMIN — PREDNISONE 40 MG: 20 TABLET ORAL at 08:32

## 2022-04-24 RX ADMIN — DONEPEZIL HYDROCHLORIDE 5 MG: 5 TABLET ORAL at 06:11

## 2022-04-24 RX ADMIN — ASPIRIN 81 MG: 81 TABLET, COATED ORAL at 08:31

## 2022-04-24 RX ADMIN — OXCARBAZEPINE 600 MG: 300 TABLET, FILM COATED ORAL at 08:41

## 2022-04-24 RX ADMIN — LEVOTHYROXINE SODIUM 25 MCG: 25 TABLET ORAL at 06:11

## 2022-04-24 RX ADMIN — IPRATROPIUM BROMIDE AND ALBUTEROL SULFATE 3 ML: .5; 3 SOLUTION RESPIRATORY (INHALATION) at 11:56

## 2022-04-24 RX ADMIN — FOLIC ACID TAB 400 MCG 400 MCG: 400 TAB at 08:31

## 2022-04-24 RX ADMIN — BUDESONIDE AND FORMOTEROL FUMARATE DIHYDRATE 2 PUFF: 80; 4.5 AEROSOL RESPIRATORY (INHALATION) at 08:23

## 2022-04-24 RX ADMIN — PREGABALIN 25 MG: 25 CAPSULE ORAL at 08:32

## 2022-04-24 RX ADMIN — CETIRIZINE HYDROCHLORIDE 5 MG: 10 TABLET, FILM COATED ORAL at 08:31

## 2022-04-24 RX ADMIN — HEPARIN SODIUM 5000 UNITS: 5000 INJECTION, SOLUTION INTRAVENOUS; SUBCUTANEOUS at 08:32

## 2022-04-24 RX ADMIN — IPRATROPIUM BROMIDE AND ALBUTEROL SULFATE 3 ML: .5; 3 SOLUTION RESPIRATORY (INHALATION) at 08:23

## 2022-04-24 RX ADMIN — PANTOPRAZOLE SODIUM 40 MG: 40 TABLET, DELAYED RELEASE ORAL at 06:11

## 2022-04-24 RX ADMIN — POTASSIUM CHLORIDE 40 MEQ: 750 CAPSULE, EXTENDED RELEASE ORAL at 08:32

## 2022-04-24 RX ADMIN — DOXYCYCLINE 100 MG: 100 CAPSULE ORAL at 08:31

## 2022-04-24 RX ADMIN — Medication 10 ML: at 08:32

## 2022-04-24 RX ADMIN — METOPROLOL SUCCINATE 25 MG: 25 TABLET, EXTENDED RELEASE ORAL at 08:32

## 2022-04-24 RX ADMIN — DOCUSATE SODIUM 100 MG: 100 CAPSULE, LIQUID FILLED ORAL at 08:32

## 2022-04-24 NOTE — CASE MANAGEMENT/SOCIAL WORK
Continued Stay Note  [facility]     Patient Name: Mitzi Eric  MRN: [id number]  Today's Date: 4/24/2022    Admit Date: 4/22/2022     Discharge Plan     Row Name 04/24/22 1140       Plan    Plan Comments CM received a consult for possible home O2 needs. Pt currently has a room air sat of 96% and in order to qualify for home O2 it needs to be less than 88%. CM has sent precert for pts COPD meds to cover my meds. This can take up to 48 hours for the precert to be determined. Pt can return to facility with the inhaler she has been using here.    Final Discharge Disposition Code 01 - home or self-care               Discharge Codes    No documentation.               Expected Discharge Date and Time     Expected Discharge Date Expected Discharge Time    Apr 24, 2022             Aide Blum RN

## 2022-04-24 NOTE — DISCHARGE PLACEMENT REQUEST
"Mitzi Zapata (86 y.o. Female)       Pt is in room N616    Aide Blum RN   [phone]         Pts room air sat is 86%              Date of Birth   1935    Social Security Number       Address   [address] [facility] [address]    Home Phone   [phone]    MRN   [id number]       Yarsani   Christian    Marital Status   Single                            Admission Date   4/22/22    Admission Type   Emergency    Admitting Provider   Sacha Saenz MD    Attending Provider   Sacha Saenz MD    Department, Room/Bed   [facility] 6A, N616/1       Discharge Date       Discharge Disposition   Home or Self Care    Discharge Destination                               Attending Provider: Sacha Saenz MD    Allergies: Bee Pollen, Lorazepam    Isolation: None   Infection: None   Code Status: CPR   Advance Care Planning Activity    Ht: 160 cm (63\")   Wt: 44.7 kg (98 lb 8 oz)    Admission Cmt: None   Principal Problem: COPD ex with volume overload [J96.01]                 Active Insurance as of 4/22/2022     Primary Coverage     Payor Plan Insurance Group Employer/Plan Group    HUMANA MEDICARE REPLACEMENT HUMANA MEDICARE REPLACEMENT [id number]     Payor Plan Address Payor Plan Phone Number Payor Plan Fax Number Effective Dates    [address] [address] [phone]  1/1/2018 - None Entered    [facility] [address]       Subscriber Name Subscriber Birth Date Member ID       MITZI ZAPATA 1935 [id number]                 Emergency Contacts      (Rel.) Home Phone Work Phone Mobile Phone    CHELE ZAPATA (Power of ) [phone] -- [phone]    ALICIA ZAPATA (Relative) [phone] -- [phone]          [facility] 6A  [address] [facility] [place] [address]  Dept. Phone:  [phone]  Dept. Fax:  [phone] Date Ordered: Apr 24, 2022         Patient:  Mitzi Zapata MRN:  [id number]   [address]  [address]  [place] " "[place] [id number] :  1935  SSN:    Phone: [phone] Sex:  F     Weight: 44.7 kg (98 lb 8 oz)         Ht Readings from Last 1 Encounters:   22 160 cm (63\")         Oxygen Therapy         (Order ID: [id number])    Diagnosis:  COPD with acute exacerbation (HCC) (J44.1 [ICD-10-CM] 491.21 [ICD-9-CM])   Quantity:  1     Delivery Modality: Nasal Cannula  Liters Per Minute: 2  Duration: Continuous  Equipment:  Oxygen Concentrator &  &  Portable Gaseous Oxygen System & Portable Oxygen Contents Gaseous &  Conserving Regulator  The face to face evaluation was performed on: 2022  Length of Need (99 Months = Lifetime): 99 Months = Lifetime        Authorizing Provider's Phone: [phone]  Authorizing Provider:Sacha Saenz MD  Authorizing Provider's NPI: [id number]  Order Entered By: Aide Blum RN 2022 12:41 PM     Electronically signed by: Sacha Saenz MD 2022 12:41 PM         Insurance Information                HUMANA MEDICARE REPLACEMENT/HUMANA MEDICARE REPLACEMENT Phone: [phone]    Subscriber: Mitzi Eric Subscriber#: [id number]    Group#: [id number] Precert#: --             History & Physical      Allie Medel NAOMY VILLARREAL, DO at 22 [address] [place] [address] Medicine Services  HISTORY AND PHYSICAL    Patient Name: Mitzi Eric  : 1935  MRN: [id number]  Primary Care Physician: Dm Cade, RAFAEL  Date of admission: 2022    Subjective   Subjective     Chief Complaint:  SOB    HPI:  Mitzi Eric is an 86 y.o. female with a past medical history significant for hypertension, HLD, pulmonary hypertension, PAF not on anticoagulation due to fall risk, trigeminal neuralgia, CKD III, known lung mass, dementia, and hypothyroidism. She presents today from [facility] with complaints of progressively worsening SOB going on for the past 24 hours. Dyspnea worse with exertion. Patient " apparently awoke from sleep early this morning with dyspnea. Family member at bedside states she got in her wheelchair and wheeled herself to the nursing station and informed them of her symptoms. They called the physician on call who recommended she get a dose of her torsemide. She does not feel this helped but has urinated several times sine administration. Patient was subsequently sent to ED for further evaluation and treatment.  Currently there are no complaints of fever, cough, congestion, or syncope. No lower extremity pain or swelling. No N/v/D. No dysuria or flank pain. She is fully vaccinated with a booster. Will admit to inpatient.      COVID Details:    Symptoms:    [] NONE [] Fever []  Cough [x] Shortness of breath [] Change in taste/smell      Review of Systems   Constitutional: Negative for chills, fatigue and fever.   HENT: Negative for congestion and trouble swallowing.    Eyes: Negative for photophobia and visual disturbance.   Respiratory: Positive for shortness of breath. Negative for cough.    Cardiovascular: Negative for chest pain and leg swelling.   Gastrointestinal: Negative for abdominal pain, nausea and vomiting.   Endocrine: Negative for cold intolerance and heat intolerance.   Genitourinary: Negative for dysuria and frequency.   Musculoskeletal: Negative for back pain and gait problem.   Skin: Negative for pallor and rash.   Allergic/Immunologic: Positive for immunocompromised state.   Neurological: Positive for weakness. Negative for dizziness and headaches.   Hematological: Negative for adenopathy.   Psychiatric/Behavioral: Negative for agitation and confusion.        All other systems reviewed and are negative.     Personal History     Past Medical History:   Diagnosis Date   • A-fib (HCC)    • Ataxic gait    • Chronic kidney disease, stage 3 (HCC)    • COPD (chronic obstructive pulmonary disease) (HCC)    • Dementia (HCC)    • Disease of thyroid gland    • Heart failure (HCC)    •  Hyperlipidemia    • Hypertension              Past Surgical History:   Procedure Laterality Date   • BRAIN TUMOR EXCISION      BENIGN   • FRACTURE SURGERY     • TRIGGER FINGER RELEASE         Family History:  family history includes Alcohol abuse in her father; Stroke in her mother. Otherwise pertinent FHx was reviewed and unremarkable.     Social History:  reports that she quit smoking about 42 years ago. She started smoking about 62 years ago. She has a 20.00 pack-year smoking history. She has never used smokeless tobacco. She reports previous alcohol use. She reports that she does not use drugs.  Social History     Social History Narrative   • Not on file       Medications:  Menthol (Topical Analgesic), Nutritional Supplements, OXcarbazepine, QUEtiapine, acetaminophen, aspirin, cetirizine, docusate sodium, donepezil, folic acid, levothyroxine, metoprolol succinate XL, potassium chloride, pregabalin, and torsemide    Allergies   Allergen Reactions   • Bee Pollen Unknown - Low Severity   • Lorazepam Delirium       Objective   Objective     Vital Signs:   Temp:  [97.8 °F (36.6 °C)] 97.8 °F (36.6 °C)  Heart Rate:  [] 101  Resp:  [12-32] 12  BP: ()/() 158/94  Flow (L/min):  [2-4] 4    Physical Exam   Constitutional: Awake, alert, chachectic  Eyes: PERRLA, sclerae anicteric, no conjunctival injection  HENT: NCAT, mucous membranes moist  Neck: Supple, no thyromegaly, no lymphadenopathy, trachea midline  Respiratory: Clear to auscultation bilaterally, nonlabored respirations   Cardiovascular: RRR, no murmurs, rubs, or gallops, palpable pedal pulses bilaterally  Gastrointestinal: Positive bowel sounds, soft, nontender, nondistended  Musculoskeletal: No bilateral ankle edema, no clubbing or cyanosis to extremities  Psychiatric: Appropriate affect, cooperative  Neurologic: Oriented x 3, strength symmetric in all extremities, Cranial Nerves grossly intact to confrontation, speech clear  Skin: No  john      Results Reviewed:  I have personally reviewed most recent indicated data and agree with findings including:  [x]  Laboratory  [x]  Radiology  []  EKG/Telemetry  []  Pathology  []  Cardiac/Vascular Studies  []  Old records  []  Other:  Most pertinent findings include: HR 1234. /108. Hypoxic to 92 % on room air d-dimer 2.2. WBC 11.1. glucose 123. Chemistry and hematology otherwise favorable.        LAB RESULTS:      Lab 04/22/22  0848 04/22/22  0612 04/22/22  0343   WBC 6.79  --  11.18*   HEMOGLOBIN 13.4  --  14.1   HEMATOCRIT 39.8  --  43.0   PLATELETS 124*  --  160   NEUTROS ABS  --   --  7.05*   IMMATURE GRANS (ABS)  --   --  0.04   LYMPHS ABS  --   --  2.46   MONOS ABS  --   --  1.23*   EOS ABS  --   --  0.31   MCV 93.4  --  94.3   PROCALCITONIN  --   --  0.04   LACTATE  --  2.2*  --    D DIMER QUANT  --   --  2.20*         Lab 04/22/22  0343   SODIUM 143   POTASSIUM 3.9   CHLORIDE 105   CO2 28.0   ANION GAP 10.0   BUN 16   CREATININE 0.91   EGFR 62.0   GLUCOSE 123*   CALCIUM 9.2         Lab 04/22/22  0343   TOTAL PROTEIN 7.1   ALBUMIN 4.50   GLOBULIN 2.6   ALT (SGPT) 30   AST (SGOT) 49*   BILIRUBIN 0.2   ALK PHOS 175*         Lab 04/22/22  0343   PROBNP 4,143.0*   TROPONIN T <0.010                 Brief Urine Lab Results  (Last result in the past 365 days)      Color   Clarity   Blood   Leuk Est   Nitrite   Protein   CREAT   Urine HCG        03/29/22 1155 Yellow   Clear   Negative   Negative   Negative   Negative               Microbiology Results (last 10 days)     Procedure Component Value - Date/Time    COVID PRE-OP / PRE-PROCEDURE SCREENING ORDER (NO ISOLATION) - Swab, Nasopharynx [[id number]]  (Normal) Collected: 04/22/22 0358    Lab Status: Final result Specimen: Swab from Nasopharynx Updated: 04/22/22 0429    Narrative:      The following orders were created for panel order COVID PRE-OP / PRE-PROCEDURE SCREENING ORDER (NO ISOLATION) - Swab, Nasopharynx.  Procedure                                Abnormality         Status                     ---------                               -----------         ------                     COVID-19 and FLU A/B PCR...[[id number]]  Normal              Final result                 Please view results for these tests on the individual orders.    COVID-19 and FLU A/B PCR - Swab, Nasopharynx [[id number]]  (Normal) Collected: 04/22/22 0358    Lab Status: Final result Specimen: Swab from Nasopharynx Updated: 04/22/22 0429     COVID19 Not Detected     Influenza A PCR Not Detected     Influenza B PCR Not Detected    Narrative:      Fact sheet for providers: https://www.fda.gov/media/401888/download    Fact sheet for patients: https://www.fda.gov/media/143112/download    Test performed by PCR.          XR Chest 1 View    Result Date: 4/22/2022  EXAM:  XR CHEST 1 VW   DATE: 4/22/2022 3:39 AM HISTORY: Shortness of breath COMPARISON:  3/25/2022. FINDINGS:  Scattered coarse airspace opacities Coarse scarlike opacity at the right lung apex. Basilar opacities mildly increased. Heart size is normal. No acute bony findings.     Impression: 1.  Mildly increased basilar opacities (infection, aspiration) 2.  Scarlike opacity at the right lung apex, with scattered coarse airspace opacities, consider chronic lung findings. Electronically signed by:  Venita Reid M.D.  4/22/2022 2:12 AM Mountain Time    CT Angiogram Chest    Result Date: 4/22/2022  EXAMINATION:  CTA CHEST WITH IV CONTRAST, CT PULMONARY ANGIOGRAM DATE OF EXAM: 4/22/2022 5:24 AM HISTORY: Shortness of breath/hypoxia TECHNIQUE: CTA examination of the chest was performed following the intravenous administration of 75 mL Isovue-370. Sagittal, coronal and 3-D reformatted images were provided. CT dose lowering techniques were used, to include: automated exposure control,  adjustment for patient size, and or use of iterative reconstruction. COMPARISON: 4/22/2022. 3/25/2022. FINDINGS: Lungs: No new opacity. Chronic changes of the  right upper lobe when compared to 3/25/2022. Emphysematous changes. Pleura: Trace bilateral pleural effusions. Probable loculated component within the right upper lung. Mediastinum and Jamila: Normal. Pulmonary Arteries: No filling defects. Cardiovascular: No evidence of acute abnormality heavy coronary artery calcification. Mild to moderate calcification of the thoracic aorta..  Heavy calcification of the visualized abdominal aorta. Mild cardiomegaly predominantly of the left atrium. Upper Abdomen: No acute abnormality. Chest Wall: No acute abnormality. Musculoskeletal: Normal.     Impression: 1.  No evidence of pulmonary embolus. 2.  No evidence of acute cardiopulmonary abnormality. 3.  Chronic changes of the right upper lung. 4.  Emphysematous changes. Electronically signed by:  Fei Bolanos D.O.  4/22/2022 4:16 AM Mountain Time      Results for orders placed during the hospital encounter of 03/17/22    Adult Transthoracic Echo Complete W/ Cont if Necessary Per Protocol    Interpretation Summary  · Left ventricular ejection fraction appears to be 46 - 50%. Left ventricular systolic function is mildly decreased.  · Left ventricular diastolic function is consistent with (grade I) impaired relaxation.  · Mild to moderate mitral regurgitation.  · Mild to moderate tricuspid regurgitation, RVSP 54 mmHg.      Assessment/Plan   Assessment & Plan       Acute respiratory failure with hypoxia (HCC)    Essential hypertension    Trigeminal neuralgia    Hypothyroidism (acquired)    Severe malnutrition (CMS/HCC)    Dementia without behavioral disturbance (HCC)    History of craniotomy    Impaired functional mobility, balance, gait, and endurance    chronic CHF (congestive heart failure) (HCC)    atrial fibrillation    COPD with acute exacerbation (HCC)      1. Acute Respiratory Failure With Hypoxia:  - likely secondary to COPD exacerbation  - XR concerning for pneumonia CTA chest pending  - administered decadron in ED, for  "now will scehdule solumedrol and duo nebs  - add doxycycline  - obtain blood cultures, procalcitonin, lactic acid, urine antigens, and MRSA PCR  - Now on BiPAP. ABG as needed  - recent COVID-19 was almost 2 months ago. She is vaccinated and boosted  - am labs    2. CHF/ valvular heart disease     Pulmonary hypertension  - stable and compensated. On torsemide as needed  - daily weights, strict I&O  - recent Repeat echo with EF 46 -50%, grade 1 diastolic dysfunction, mildly decreased left ventricular systolic function, RVSP of 54 mmHg.    3. Afib with RVR  Hx of PAF  --Continue home metoprolol  XL 25 mg once daily  --No anticoagulation due to fall risk    4. Thrombocytopenia  -Chronic and stable     5. Hypothyroidism  --Continue Synthroid.     6. Trigeminal neuralgia  --Continue Trileptal, Lyrica.     7. Dementia  Continue Aricept, pharmacy to confirm if patient is on Seroquel, continue as needed for now     8. 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       DVT prophylaxis:  SARA    CODE STATUS:  Full code  Code Status (Patient has no pulse and is not breathing): CPR (Attempt to Resuscitate)  Medical Interventions (Patient has pulse or is breathing): Full Support      This note has been completed as part of a split-shared workflow.     Electronically signed by Clinton Allen PA-C, 04/22/22, 5:36 AM EDT.        Attending   Admission Attestation       I have seen and examined the patient, performing an independent face-to-face diagnostic evaluation with plan of care reviewed and developed with the advanced practice clinician (APC).      Brief Summary Statement:   Mitzi Eric is a 86 y.o. female brought in from SNF with SOA. At time of my arrival patient is on bipap and says she feels normal. Tells me that her respiratory issue hit her \"out of the blue\" and described her breathing as feeling \"loose.\" Denies f/c, cough.     Remainder of " detailed HPI is as noted by APC and has been reviewed and/or edited by me for completeness.    Attending Physical Exam:  Constitutional: Awake, alert, elderly female  Eyes: PERRLA, sclerae anicteric, no conjunctival injection  HENT: NCAT, mucous membranes moist, bipap  Neck: Supple, no thyromegaly, no lymphadenopathy, trachea midline  Respiratory: Normal WOB, clear bilaterally  Cardiovascular: RRR, no murmurs, rubs, or gallops, palpable pedal pulses bilaterally  Gastrointestinal: Positive bowel sounds, soft, nontender, nondistended  Musculoskeletal: No bilateral ankle edema, no clubbing or cyanosis to extremities  Psychiatric: Appropriate affect, cooperative  Neurologic: Oriented x 3, strength symmetric in all extremities, Cranial Nerves grossly intact to confrontation, speech clear  Skin: No rashes    Brief Assessment/Plan :    85 y/o female sent from SNF with difficulty breathing which was treated with bipap and IV steroids in ED. Patient has returned to baseline by time of my arrival.  --Will wean to nasal canula and transition to PO steroids/abx and obs for next 24 hours. Hopefully will be able to return to SNF fairly quickly.    See detailed assessment and plan developed with APC which I have reviewed and/or edited for completeness.      Admission Status: I believe that this patient meets OBSERVATION status, however if further evaluation or treatment plans warrant, status may change.  Based upon current information, I predict patient's care encounter to be less than or equal to 2 midnights.      Allie Medel II, DO  22                        Electronically signed by Allie Medel II, DO at 22 0918          Discharge Summary      Sacha Saenz MD at 22 0945              [facility] Medicine Services  DISCHARGE SUMMARY    Patient Name: Mitzi Eric  : 1935  MRN: [id number]    Date of Admission: 2022  3:30 AM  Date of Discharge:  4/24/2022  Primary Care Physician: Dm Cade APRN    Hospital Course     Presenting Problem:   Acute respiratory failure with hypoxia (HCC) [J96.01]    Active Hospital Problems    Diagnosis  POA   • **COPD ex with volume overload [J96.01]  Yes   • Moderate malnutrition (CMS/HCC) [E44.0]  Yes   • Chronic respiratory failure (HCC) [J96.10]  Yes   • Underweight [R63.6]  Yes   • COPD with acute exacerbation (HCC) [J44.1]  Yes   • chronic CHF (congestive heart failure) (HCC) [I50.9]  Yes   • atrial fibrillation [I48.91]  Yes   • Dementia without behavioral disturbance (HCC) [F03.90]  Yes   • History of craniotomy [Z98.890]  Not Applicable   • Impaired functional mobility, balance, gait, and endurance [Z74.09]  Yes   • Severe malnutrition (CMS/HCC) [E43]  Yes   • Trigeminal neuralgia [G50.0]  Yes   • Hypothyroidism (acquired) [E03.9]  Yes   • Essential hypertension [I10]  Yes      Resolved Hospital Problems   No resolved problems to display.      Brief Hospital Course to date:  Mitzi Eric is an 86 y.o. female with a past medical history significant for advanced COPD/emphysema, hypertension, HLD, pulmonary hypertension, PAF not on anticoagulation due to fall risk, trigeminal neuralgia, CKD III, known lung mass, dementia, and hypothyroidism who presented to the hospital with worsening shortness of breath.  CTA chest was done with no evidence of PE or acute pneumonia but did show severe emphysema and peripheral groundglass opacities concerning for volume overload and/or bronchitis.  Her respiratory symptoms markedly improved with IV diuresis and initiation of nebulizer treatment, and steroid therapy.  6-minute walk test was done in bed (patient cannot walk) and she desatted within 2 minutes to 84% on room air and her oxygen saturation improved on 2 L nasal cannula to 95% and arrangements were made for her to go back to her SNF facility with 24/7 oxygen supplementation.  She smoked in the past and quit many  years ago but her CT scan is consistent with severe emphysema and she was referred to pulmonary service upon discharge for further evaluation and started on Symbicort and as needed albuterol upon discharge and her home dose torsemide was increased from 5 mg daily to 10 mg daily.  She was discharged in a stable condition     Assessment and plan:  Acute hypoxia secondary to COPD exacerbation with bronchitis and volume overload  COPD/advanced emphysema  Moderate pulmonary hypertension  · CT scan chest with IV contrast did not show any evidence of PE but did show advanced emphysema/bullous disease.  Echocardiogram from last month showed moderate pulm hypertension with RVSP of 45 mmHg.  · Even though she does have COPD, she is not on any treatment or inhaler therapy at home  · Her respiratory symptoms/shortness of breath markedly improved with IV diuresis, initiation of doxycycline and IV steroids and nebulizer treatment.  · She was discharged short course of oral steroids and doxycycline as well as 1 month supply of Symbicort and albuterol as needed till she sees pulmonary service in the next 6 weeks for evaluation of her untreated COPD  · Also her torsemide was increased from 5 mg daily to 10 mg daily upon discharge  · She cannot walk so 6-minute walk test was performed by letting the patient exercise in bed moving her arms and legs.  Starting oxygen saturation is 90% on room air at rest and dropped quickly to 84% within 2 minutes of starting exercising and the test was abolished.  Oxygen saturation quickly improved to 95% on 2 L nasal cannula.  She will need to go home with home O2 at 2 L/min     Systolic and diastolic heart failure, with mild decompensation, improved  Ejection fraction 45% with grade 1 diastolic dysfunction per echo done in March 2022  · Increase p.o. torsemide from 5 mg daily to 10 mg daily upon discharge        Paroxysmal atrial fibrillation with RVR  · Currently rate controlled continue home  metoprolol  XL 25 mg once daily  · No anticoagulation due to fall risk     Thrombocytopenia  · Chronic and stable     Hypothyroidism  · Continue Synthroid.     Trigeminal neuralgia  · Continue Trileptal, Lyrica.     Dementia  · Continue Aricept, pharmacy to confirm if patient is on Seroquel, continue as needed for now     RUL lung mass  · Has been known since at least 2018, family aware and has declined further workup/treatment in the past       Discharge Follow Up Recommendations for outpatient labs/diagnostics:  PCP in 1 week  Dr. Beverly/pulmonary in 6 weeks     Day of Discharge     HPI:   I have seen and evaluated the patient this morning.  Comfortable in bed.  Shortness of breath markedly improved and she feels that she returned to her baseline  Diuresed well overnight.  Nephew at bedside and okay with transporting his aunt to the assisted-living facility    Review of Systems  General : no fevers, chills  CVS: No chest pain, palpitations  Respiratory: Minimal cough, minimal dyspnea  GI: No N/V/D, abd pain  10 point review of systems is negative except for what is mentioned in HPI    Vital Signs:   Temp:  [97.8 °F (36.6 °C)-98.1 °F (36.7 °C)] 98.1 °F (36.7 °C)  Heart Rate:  [74-99] 86  Resp:  [16-18] 18  BP: (121-138)/(69-90) 136/69  Flow (L/min):  [2] 2    Physical Exam:  General: Chronically looking, in mild distress.  Pleasant and conversant  Head: Atraumatic and normocephalic, without obvious abnormality  Eyes:   Conjunctivae and sclerae normal, no Icterus. No pallor  Ears:  Ears appear intact with no abnormalities noted  Throat: No oral lesions, no thrush, oral mucosa moist  Neck: Supple, trachea midline, no thyromegaly  Back:   No kyphoscoliosis present. No tenderness to palpation,   no sacral edema  Lungs: Globally diminished air entry bilaterally.  Improved crackles or wheezing.  Heart:  Normal S1 and S2, no murmur, no gallop, No JVD, no lower extremity swelling  Abdomen:  Soft, no tenderness, no  organomegaly, normal bowel sounds  Normal bowel sounds, no masses, no organomegaly. Soft, nontender, nondistended, no guarding, no rebound tenderness.  Extremities: No gross abnormalities, no clubbing, pulses palpable and equal bilaterally  Skin: No bleeding, bruising or rash, normal skin turgor and elasticity  Neurologic: Cranial nerves appear intact with no evidence of facial asymmetry, normal motor and sensory functions in all 4 extremities  Psych: Alert and oriented x 3, normal mood     Pertinent  and/or Most Recent Results     LAB RESULTS:      Lab 04/24/22  0347 04/22/22  0848 04/22/22  0612 04/22/22  0343   WBC 6.29 6.79  --  11.18*   HEMOGLOBIN 10.8* 13.4  --  14.1   HEMATOCRIT 31.1* 39.8  --  43.0   PLATELETS 102* 124*  --  160   NEUTROS ABS 3.48  --   --  7.05*   IMMATURE GRANS (ABS) 0.03  --   --  0.04   LYMPHS ABS 1.89  --   --  2.46   MONOS ABS 0.81  --   --  1.23*   EOS ABS 0.05  --   --  0.31   MCV 90.9 93.4  --  94.3   PROCALCITONIN  --   --   --  0.04   LACTATE  --  1.5 2.2*  --    D DIMER QUANT  --   --   --  2.20*         Lab 04/24/22  0347 04/22/22  0848 04/22/22  0343   SODIUM 140 144 143   POTASSIUM 3.2* 4.1 3.9   CHLORIDE 103 106 105   CO2 29.0 26.0 28.0   ANION GAP 8.0 12.0 10.0   BUN 27* 15 16   CREATININE 0.72 0.79 0.91   EGFR 82.1 73.4 62.0   GLUCOSE 86 134* 123*   CALCIUM 8.6 9.2 9.2   HEMOGLOBIN A1C  --  5.20  --          Lab 04/22/22  0343   TOTAL PROTEIN 7.1   ALBUMIN 4.50   GLOBULIN 2.6   ALT (SGPT) 30   AST (SGOT) 49*   BILIRUBIN 0.2   ALK PHOS 175*         Lab 04/22/22  0343   PROBNP 4,143.0*   TROPONIN T <0.010       Brief Urine Lab Results  (Last result in the past 365 days)      Color   Clarity   Blood   Leuk Est   Nitrite   Protein   CREAT   Urine HCG        03/29/22 1155 Yellow   Clear   Negative   Negative   Negative   Negative               Microbiology Results (last 10 days)     Procedure Component Value - Date/Time    Blood Culture - Blood, Arm, Left [[id number]]  (Normal)  Collected: 04/22/22 0605    Lab Status: Preliminary result Specimen: Blood from Arm, Left Updated: 04/24/22 0902     Blood Culture No growth at 2 days    Blood Culture - Blood, Arm, Right [[id number]]  (Normal) Collected: 04/22/22 0555    Lab Status: Preliminary result Specimen: Blood from Arm, Right Updated: 04/24/22 0902     Blood Culture No growth at 2 days    COVID PRE-OP / PRE-PROCEDURE SCREENING ORDER (NO ISOLATION) - Swab, Nasopharynx [[id number]]  (Normal) Collected: 04/22/22 0358    Lab Status: Final result Specimen: Swab from Nasopharynx Updated: 04/22/22 0429    Narrative:      The following orders were created for panel order COVID PRE-OP / PRE-PROCEDURE SCREENING ORDER (NO ISOLATION) - Swab, Nasopharynx.  Procedure                               Abnormality         Status                     ---------                               -----------         ------                     COVID-19 and FLU A/B PCR...[[id number]]  Normal              Final result                 Please view results for these tests on the individual orders.    COVID-19 and FLU A/B PCR - Swab, Nasopharynx [[id number]]  (Normal) Collected: 04/22/22 0358    Lab Status: Final result Specimen: Swab from Nasopharynx Updated: 04/22/22 0429     COVID19 Not Detected     Influenza A PCR Not Detected     Influenza B PCR Not Detected    Narrative:      Fact sheet for providers: https://www.fda.gov/media/112588/download    Fact sheet for patients: https://www.fda.gov/media/359692/download    Test performed by PCR.        XR Chest 1 View    Result Date: 4/22/2022  EXAM:  XR CHEST 1 VW   DATE: 4/22/2022 3:39 AM HISTORY: Shortness of breath COMPARISON:  3/25/2022. FINDINGS:  Scattered coarse airspace opacities Coarse scarlike opacity at the right lung apex. Basilar opacities mildly increased. Heart size is normal. No acute bony findings.     1.  Mildly increased basilar opacities (infection, aspiration) 2.  Scarlike opacity at the right lung apex, with  scattered coarse airspace opacities, consider chronic lung findings. Electronically signed by:  Venita Reid M.D.  4/22/2022 2:12 AM Mountain Time    CT Angiogram Chest    Result Date: 4/22/2022  EXAMINATION:  CTA CHEST WITH IV CONTRAST, CT PULMONARY ANGIOGRAM DATE OF EXAM: 4/22/2022 5:24 AM HISTORY: Shortness of breath/hypoxia TECHNIQUE: CTA examination of the chest was performed following the intravenous administration of 75 mL Isovue-370. Sagittal, coronal and 3-D reformatted images were provided. CT dose lowering techniques were used, to include: automated exposure control,  adjustment for patient size, and or use of iterative reconstruction. COMPARISON: 4/22/2022. 3/25/2022. FINDINGS: Lungs: No new opacity. Chronic changes of the right upper lobe when compared to 3/25/2022. Emphysematous changes. Pleura: Trace bilateral pleural effusions. Probable loculated component within the right upper lung. Mediastinum and Jamila: Normal. Pulmonary Arteries: No filling defects. Cardiovascular: No evidence of acute abnormality heavy coronary artery calcification. Mild to moderate calcification of the thoracic aorta..  Heavy calcification of the visualized abdominal aorta. Mild cardiomegaly predominantly of the left atrium. Upper Abdomen: No acute abnormality. Chest Wall: No acute abnormality. Musculoskeletal: Normal.     1.  No evidence of pulmonary embolus. 2.  No evidence of acute cardiopulmonary abnormality. 3.  Chronic changes of the right upper lung. 4.  Emphysematous changes. Electronically signed by:  Fei Bolanos D.O.  4/22/2022 4:16 AM Mountain Time    Results for orders placed during the hospital encounter of 03/17/22    Adult Transthoracic Echo Complete W/ Cont if Necessary Per Protocol    Interpretation Summary  · Left ventricular ejection fraction appears to be 46 - 50%. Left ventricular systolic function is mildly decreased.  · Left ventricular diastolic function is consistent with (grade I) impaired  relaxation.  · Mild to moderate mitral regurgitation.  · Mild to moderate tricuspid regurgitation, RVSP 54 mmHg.    Plan for Follow-up of Pending Labs/Results:   Pending Labs     Order Current Status    Blood Culture - Blood, Arm, Left Preliminary result    Blood Culture - Blood, Arm, Right Preliminary result        Discharge Details        Discharge Medications      New Medications      Instructions Start Date   albuterol sulfate  (90 Base) MCG/ACT inhaler  Commonly known as: PROVENTIL HFA;VENTOLIN HFA;PROAIR HFA   2 puffs, Inhalation, Every 4 Hours PRN      budesonide-formoterol 80-4.5 MCG/ACT inhaler  Commonly known as: SYMBICORT   2 puffs, Inhalation, 2 Times Daily - RT      doxycycline 100 MG capsule  Commonly known as: MONODOX   100 mg, Oral, Every 12 Hours Scheduled      predniSONE 20 MG tablet  Commonly known as: DELTASONE   40 mg, Oral, Daily With Breakfast   Start Date: April 25, 2022        Changes to Medications      Instructions Start Date   QUEtiapine 25 MG tablet  Commonly known as: SEROquel  What changed: when to take this   12.5 mg, Oral, Nightly PRN      torsemide 5 MG tablet  Commonly known as: DEMADEX  What changed: how much to take   10 mg, Oral, Daily, Take additional tablet as needed for 3lb weight gain in 24 hours or 5lbs in a week or increased shortness of breath         Continue These Medications      Instructions Start Date   acetaminophen 325 MG tablet  Commonly known as: TYLENOL   650 mg, Oral, 3 Times Daily      aspirin 81 MG EC tablet   81 mg, Oral, Daily      Biofreeze 4 % gel  Generic drug: Menthol (Topical Analgesic)   1 application, Apply externally, Every Early Morning, Apply to lower back      cetirizine 10 MG tablet  Commonly known as: zyrTEC   10 mg, Oral, Daily      docusate sodium 100 MG capsule   100 mg, Oral, 2 Times Daily      donepezil 5 MG tablet  Commonly known as: Aricept   5 mg, Oral, Every Morning      ENSURE PO   240 mL, Oral, 2 Times Daily, MIX 8OZ OF  ENSURE, 1/4 SCOOP PROTEIN POWDER AND 2OZ OF MILK FOR A MILKSHAKE       folic acid 400 MCG tablet  Commonly known as: FOLVITE   400 mcg, Oral, 2 Times Daily      levothyroxine 25 MCG tablet  Commonly known as: SYNTHROID, LEVOTHROID   25 mcg, Oral, Every Early Morning      metoprolol succinate XL 25 MG 24 hr tablet  Commonly known as: TOPROL-XL   25 mg, Oral, Daily      OXcarbazepine 600 MG tablet  Commonly known as: TRILEPTAL   600 mg, Oral, 2 Times Daily      potassium chloride 10 MEQ CR tablet   10 mEq, Oral, Daily PRN, Give once daily and extra with extra torsemide      pregabalin 25 MG capsule  Commonly known as: LYRICA   25 mg, Oral, 2 Times Daily           Allergies   Allergen Reactions   • Bee Pollen Unknown - Low Severity   • Lorazepam Delirium     Discharge Disposition:  Home or Self Care    Diet:  Hospital:  Diet Order   Procedures   • Diet Regular; Consistent Carbohydrate     Activity:  Activity Instructions     Activity as Tolerated          Restrictions or Other Recommendations:  None       CODE STATUS:    Code Status and Medical Interventions:   Ordered at: 04/22/22 0600     Code Status (Patient has no pulse and is not breathing):    CPR (Attempt to Resuscitate)     Medical Interventions (Patient has pulse or is breathing):    Full Support     Future Appointments   Date Time Provider Department Center   5/3/2022 10:00 AM Lauren Pina APRN [facility]   6/22/2022 10:00 AM Ann-Marie Goodwin APRN [facility]       Sacha Saenz MD  04/24/22    Time Spent on Discharge:  I spent  45  minutes on this discharge activity which included: face-to-face encounter with the patient, reviewing the data in the system, coordination of the care with the nursing staff as well as consultants, documentation, and entering orders.          Electronically signed by Sacha Saenz MD at 04/24/22 5641

## 2022-04-24 NOTE — CONSULTS
"                  Clinical Nutrition     Nutrition Assessment  Reason for Visit:   Identified at risk by screening criteria, Malnutrition Severity Assessment      Patient Name: Mitzi Eric  YOB: 1935  MRN: [id number]  Date of Encounter: 04/23/22 20:13 EDT  Admission date: 4/22/2022      Comments:   Pt meets criteria for non severe chronic malnutrition based on wasting. See flowsheet note.     Admission Diagnosis    Acute respiratory failure with hypoxia (HCC) [J96.01]     Hospital Problem List   Chronic respiratory failure (HCC)   chronic CHF (congestive heart failure) (HCC)   Essential hypertension   atrial fibrillation   Hypothyroidism (acquired)   Trigeminal neuralgia    Lung mass    TCP    Dementia without behavioral disturbance (HCC)    History of craniotomy    Impaired functional mobility, balance, gait, and endurance    COPD with acute exacerbation (HCC)    Underweight per care facility records    Other Applicable : stage 1 coccyx wound    Applicable Interval History:  4/22 SLP evaluation ok for regular textures thin liquids    Applicable PMH/PSxH:     PMH: She  has a past medical history of A-fib (HCC), Ataxic gait, Chronic kidney disease, stage 3 (HCC), COPD (chronic obstructive pulmonary disease) (HCC), Dementia (HCC), Disease of thyroid gland, Heart failure (HCC), Hyperlipidemia, and Hypertension.   PSxH: She  has a past surgical history that includes Trigger finger release; Brain tumor excision; and Fracture surgery.         Diet/Nutrition Related History:     Pt rpts she takes Ensure HP for bfst soup and salad for lunch and a supper meal. Allows this is filling for her. Believes she was 97 lbs some weeks ago.       Anthropometrics     Admission Height 160 cm (63\") Documented at 04/22/2022 0338   Admission Weight 48.5 kg (107 lb) Documented at 04/22/2022 0338        Height: 160 cm (63\")    Last filed wt: Weight: 48.5 kg (107 lb) (04/22/22 0338)       BMI: BMI (Calculated): 19  Normal: " 18.5-24.9kg/m2     lbs 52.27 Kg   Note at 92 lbs at facility 80% IBW Based on last filed wt w no method indicated 93% IBW     Weight Change   UBW: wt hx inconsistent per EMR care facility rpt UBW of 97 lbs in Jan/Feb this year  Weight change: facility rpt wt of 92 lbs this month, 95 lbs last month loss of 5 lbs Since Feb  % wt change: 5%  Time frame of weight loss: 2 mo     Labs reviewed   Yes      Medications reviewed   Yes  Colace, Abx, Folvite, Steriod    Intake/Ouptut 24 hrs reviewed   Yes      Nutrition Focused Physical Exam     Pt meets criteria for non severe chronic malnutrition based on wasting. See flowsheet note.       Current Nutrition Prescription     PO: Diet Regular; Consistent Carbohydrate  Orders Placed This Encounter      DIET MESSAGE Chocolate ensure with each meal, no substiutions      Dietary Nutrition Supplements Other (see comment); Ensure HP      Intake: 27% x 3 meals if count Ensure at bfst.       Nutrition Diagnosis     4/23  Problem Malnutrition  non severe chronic   Etiology Limited intake   Signs/Symptoms Muscle/fat wasting   Status:  4/23  Problem Increased nutrient needs   Etiology Skin breakdown   Signs/Symptoms Stage 1 wound   Status:      Goal:   General: Nutrition to support treatment  PO: Increase intake as feasible  Additional goals:      Nutrition Intervention     Follow treatment progress, Care plan reviewed, Advise alternate selection, Menu provided      Monitoring/Evaluation:   Per protocol, PO intake, Supplement intake, Pertinent labs, Weight, Symptoms        Sheila Agudelo RD,   Time Spent: 30 min

## 2022-04-24 NOTE — PLAN OF CARE
Goal Outcome Evaluation:  Plan of Care Reviewed With: patient     Patient VSS on 2L O2 with SpO2 maintained above 90% while at rest.  SpO2 recorded at 87% on room air with light activity.  No complaints of pain, discomfort, or SOA reported.  Education completed.  Patient discharged to [facility].

## 2022-04-24 NOTE — DISCHARGE SUMMARY
[facility] Medicine Services  DISCHARGE SUMMARY    Patient Name: Mitzi Eric  : 1935  MRN: [id number]    Date of Admission: 2022  3:30 AM  Date of Discharge: 2022  Primary Care Physician: Dm Cade APRN    Hospital Course     Presenting Problem:   Acute respiratory failure with hypoxia (HCC) [J96.01]    Active Hospital Problems    Diagnosis  POA   • **COPD ex with volume overload [J96.01]  Yes   • Moderate malnutrition (CMS/HCC) [E44.0]  Yes   • Chronic respiratory failure (HCC) [J96.10]  Yes   • Underweight [R63.6]  Yes   • COPD with acute exacerbation (HCC) [J44.1]  Yes   • chronic CHF (congestive heart failure) (HCC) [I50.9]  Yes   • atrial fibrillation [I48.91]  Yes   • Dementia without behavioral disturbance (HCC) [F03.90]  Yes   • History of craniotomy [Z98.890]  Not Applicable   • Impaired functional mobility, balance, gait, and endurance [Z74.09]  Yes   • Severe malnutrition (CMS/HCC) [E43]  Yes   • Trigeminal neuralgia [G50.0]  Yes   • Hypothyroidism (acquired) [E03.9]  Yes   • Essential hypertension [I10]  Yes      Resolved Hospital Problems   No resolved problems to display.      Brief Hospital Course to date:  Mitzi Eric is an 86 y.o. female with a past medical history significant for advanced COPD/emphysema, hypertension, HLD, pulmonary hypertension, PAF not on anticoagulation due to fall risk, trigeminal neuralgia, CKD III, known lung mass, dementia, and hypothyroidism who presented to the hospital with worsening shortness of breath.  CTA chest was done with no evidence of PE or acute pneumonia but did show severe emphysema and peripheral groundglass opacities concerning for volume overload and/or bronchitis.  Her respiratory symptoms markedly improved with IV diuresis and initiation of nebulizer treatment, and steroid therapy.  6-minute walk test was done in bed (patient cannot walk) and she desatted within 2 minutes to 84% on room air and  her oxygen saturation improved on 2 L nasal cannula to 95% and arrangements were made for her to go back to her SNF facility with 24/7 oxygen supplementation.  She smoked in the past and quit many years ago but her CT scan is consistent with severe emphysema and she was referred to pulmonary service upon discharge for further evaluation and started on Symbicort and as needed albuterol upon discharge and her home dose torsemide was increased from 5 mg daily to 10 mg daily.  She was discharged in a stable condition     Assessment and plan:  Acute hypoxia secondary to COPD exacerbation with bronchitis and volume overload  COPD/advanced emphysema  Moderate pulmonary hypertension  · CT scan chest with IV contrast did not show any evidence of PE but did show advanced emphysema/bullous disease.  Echocardiogram from last month showed moderate pulm hypertension with RVSP of 45 mmHg.  · Even though she does have COPD, she is not on any treatment or inhaler therapy at home  · Her respiratory symptoms/shortness of breath markedly improved with IV diuresis, initiation of doxycycline and IV steroids and nebulizer treatment.  · She was discharged short course of oral steroids and doxycycline as well as 1 month supply of Symbicort and albuterol as needed till she sees pulmonary service in the next 6 weeks for evaluation of her untreated COPD  · Also her torsemide was increased from 5 mg daily to 10 mg daily upon discharge  · She cannot walk so 6-minute walk test was performed by letting the patient exercise in bed moving her arms and legs.  Starting oxygen saturation is 90% on room air at rest and dropped quickly to 84% within 2 minutes of starting exercising and the test was abolished.  Oxygen saturation quickly improved to 95% on 2 L nasal cannula.  She will need to go home with home O2 at 2 L/min     Systolic and diastolic heart failure, with mild decompensation, improved  Ejection fraction 45% with grade 1 diastolic dysfunction  per echo done in March 2022  · Increase p.o. torsemide from 5 mg daily to 10 mg daily upon discharge        Paroxysmal atrial fibrillation with RVR  · Currently rate controlled continue home metoprolol  XL 25 mg once daily  · No anticoagulation due to fall risk     Thrombocytopenia  · Chronic and stable     Hypothyroidism  · Continue Synthroid.     Trigeminal neuralgia  · Continue Trileptal, Lyrica.     Dementia  · Continue Aricept, pharmacy to confirm if patient is on Seroquel, continue as needed for now     RUL lung mass  · Has been known since at least 2018, family aware and has declined further workup/treatment in the past       Discharge Follow Up Recommendations for outpatient labs/diagnostics:  PCP in 1 week  Dr. Beverly/pulmonary in 6 weeks     Day of Discharge     HPI:   I have seen and evaluated the patient this morning.  Comfortable in bed.  Shortness of breath markedly improved and she feels that she returned to her baseline  Diuresed well overnight.  Nephew at bedside and okay with transporting his aunt to the assisted-living facility    Review of Systems  General : no fevers, chills  CVS: No chest pain, palpitations  Respiratory: Minimal cough, minimal dyspnea  GI: No N/V/D, abd pain  10 point review of systems is negative except for what is mentioned in HPI    Vital Signs:   Temp:  [97.8 °F (36.6 °C)-98.1 °F (36.7 °C)] 98.1 °F (36.7 °C)  Heart Rate:  [74-99] 86  Resp:  [16-18] 18  BP: (121-138)/(69-90) 136/69  Flow (L/min):  [2] 2    Physical Exam:  General: Chronically looking, in mild distress.  Pleasant and conversant  Head: Atraumatic and normocephalic, without obvious abnormality  Eyes:   Conjunctivae and sclerae normal, no Icterus. No pallor  Ears:  Ears appear intact with no abnormalities noted  Throat: No oral lesions, no thrush, oral mucosa moist  Neck: Supple, trachea midline, no thyromegaly  Back:   No kyphoscoliosis present. No tenderness to palpation,   no sacral edema  Lungs:  Globally diminished air entry bilaterally.  Improved crackles or wheezing.  Heart:  Normal S1 and S2, no murmur, no gallop, No JVD, no lower extremity swelling  Abdomen:  Soft, no tenderness, no organomegaly, normal bowel sounds  Normal bowel sounds, no masses, no organomegaly. Soft, nontender, nondistended, no guarding, no rebound tenderness.  Extremities: No gross abnormalities, no clubbing, pulses palpable and equal bilaterally  Skin: No bleeding, bruising or rash, normal skin turgor and elasticity  Neurologic: Cranial nerves appear intact with no evidence of facial asymmetry, normal motor and sensory functions in all 4 extremities  Psych: Alert and oriented x 3, normal mood     Pertinent  and/or Most Recent Results     LAB RESULTS:      Lab 04/24/22  0347 04/22/22  0848 04/22/22  0612 04/22/22  0343   WBC 6.29 6.79  --  11.18*   HEMOGLOBIN 10.8* 13.4  --  14.1   HEMATOCRIT 31.1* 39.8  --  43.0   PLATELETS 102* 124*  --  160   NEUTROS ABS 3.48  --   --  7.05*   IMMATURE GRANS (ABS) 0.03  --   --  0.04   LYMPHS ABS 1.89  --   --  2.46   MONOS ABS 0.81  --   --  1.23*   EOS ABS 0.05  --   --  0.31   MCV 90.9 93.4  --  94.3   PROCALCITONIN  --   --   --  0.04   LACTATE  --  1.5 2.2*  --    D DIMER QUANT  --   --   --  2.20*         Lab 04/24/22  0347 04/22/22  0848 04/22/22  0343   SODIUM 140 144 143   POTASSIUM 3.2* 4.1 3.9   CHLORIDE 103 106 105   CO2 29.0 26.0 28.0   ANION GAP 8.0 12.0 10.0   BUN 27* 15 16   CREATININE 0.72 0.79 0.91   EGFR 82.1 73.4 62.0   GLUCOSE 86 134* 123*   CALCIUM 8.6 9.2 9.2   HEMOGLOBIN A1C  --  5.20  --          Lab 04/22/22  0343   TOTAL PROTEIN 7.1   ALBUMIN 4.50   GLOBULIN 2.6   ALT (SGPT) 30   AST (SGOT) 49*   BILIRUBIN 0.2   ALK PHOS 175*         Lab 04/22/22  0343   PROBNP 4,143.0*   TROPONIN T <0.010       Brief Urine Lab Results  (Last result in the past 365 days)      Color   Clarity   Blood   Leuk Est   Nitrite   Protein   CREAT   Urine HCG        03/29/22 1155 Yellow    Clear   Negative   Negative   Negative   Negative               Microbiology Results (last 10 days)     Procedure Component Value - Date/Time    Blood Culture - Blood, Arm, Left [[id number]]  (Normal) Collected: 04/22/22 0605    Lab Status: Preliminary result Specimen: Blood from Arm, Left Updated: 04/24/22 0902     Blood Culture No growth at 2 days    Blood Culture - Blood, Arm, Right [[id number]]  (Normal) Collected: 04/22/22 0555    Lab Status: Preliminary result Specimen: Blood from Arm, Right Updated: 04/24/22 0902     Blood Culture No growth at 2 days    COVID PRE-OP / PRE-PROCEDURE SCREENING ORDER (NO ISOLATION) - Swab, Nasopharynx [[id number]]  (Normal) Collected: 04/22/22 0358    Lab Status: Final result Specimen: Swab from Nasopharynx Updated: 04/22/22 0429    Narrative:      The following orders were created for panel order COVID PRE-OP / PRE-PROCEDURE SCREENING ORDER (NO ISOLATION) - Swab, Nasopharynx.  Procedure                               Abnormality         Status                     ---------                               -----------         ------                     COVID-19 and FLU A/B PCR...[[id number]]  Normal              Final result                 Please view results for these tests on the individual orders.    COVID-19 and FLU A/B PCR - Swab, Nasopharynx [[id number]]  (Normal) Collected: 04/22/22 0358    Lab Status: Final result Specimen: Swab from Nasopharynx Updated: 04/22/22 0429     COVID19 Not Detected     Influenza A PCR Not Detected     Influenza B PCR Not Detected    Narrative:      Fact sheet for providers: https://www.fda.gov/media/027979/download    Fact sheet for patients: https://www.fda.gov/media/996345/download    Test performed by PCR.        XR Chest 1 View    Result Date: 4/22/2022  EXAM:  XR CHEST 1 VW   DATE: 4/22/2022 3:39 AM HISTORY: Shortness of breath COMPARISON:  3/25/2022. FINDINGS:  Scattered coarse airspace opacities Coarse scarlike opacity at the right lung apex.  Basilar opacities mildly increased. Heart size is normal. No acute bony findings.     1.  Mildly increased basilar opacities (infection, aspiration) 2.  Scarlike opacity at the right lung apex, with scattered coarse airspace opacities, consider chronic lung findings. Electronically signed by:  Venita Reid M.D.  4/22/2022 2:12 AM Mountain Time    CT Angiogram Chest    Result Date: 4/22/2022  EXAMINATION:  CTA CHEST WITH IV CONTRAST, CT PULMONARY ANGIOGRAM DATE OF EXAM: 4/22/2022 5:24 AM HISTORY: Shortness of breath/hypoxia TECHNIQUE: CTA examination of the chest was performed following the intravenous administration of 75 mL Isovue-370. Sagittal, coronal and 3-D reformatted images were provided. CT dose lowering techniques were used, to include: automated exposure control,  adjustment for patient size, and or use of iterative reconstruction. COMPARISON: 4/22/2022. 3/25/2022. FINDINGS: Lungs: No new opacity. Chronic changes of the right upper lobe when compared to 3/25/2022. Emphysematous changes. Pleura: Trace bilateral pleural effusions. Probable loculated component within the right upper lung. Mediastinum and Jamila: Normal. Pulmonary Arteries: No filling defects. Cardiovascular: No evidence of acute abnormality heavy coronary artery calcification. Mild to moderate calcification of the thoracic aorta..  Heavy calcification of the visualized abdominal aorta. Mild cardiomegaly predominantly of the left atrium. Upper Abdomen: No acute abnormality. Chest Wall: No acute abnormality. Musculoskeletal: Normal.     1.  No evidence of pulmonary embolus. 2.  No evidence of acute cardiopulmonary abnormality. 3.  Chronic changes of the right upper lung. 4.  Emphysematous changes. Electronically signed by:  Fei Bolanos D.O.  4/22/2022 4:16 AM Mountain Time    Results for orders placed during the hospital encounter of 03/17/22    Adult Transthoracic Echo Complete W/ Cont if Necessary Per Protocol    Interpretation  Summary  · Left ventricular ejection fraction appears to be 46 - 50%. Left ventricular systolic function is mildly decreased.  · Left ventricular diastolic function is consistent with (grade I) impaired relaxation.  · Mild to moderate mitral regurgitation.  · Mild to moderate tricuspid regurgitation, RVSP 54 mmHg.    Plan for Follow-up of Pending Labs/Results:   Pending Labs     Order Current Status    Blood Culture - Blood, Arm, Left Preliminary result    Blood Culture - Blood, Arm, Right Preliminary result        Discharge Details        Discharge Medications      New Medications      Instructions Start Date   albuterol sulfate  (90 Base) MCG/ACT inhaler  Commonly known as: PROVENTIL HFA;VENTOLIN HFA;PROAIR HFA   2 puffs, Inhalation, Every 4 Hours PRN      budesonide-formoterol 80-4.5 MCG/ACT inhaler  Commonly known as: SYMBICORT   2 puffs, Inhalation, 2 Times Daily - RT      doxycycline 100 MG capsule  Commonly known as: MONODOX   100 mg, Oral, Every 12 Hours Scheduled      predniSONE 20 MG tablet  Commonly known as: DELTASONE   40 mg, Oral, Daily With Breakfast   Start Date: April 25, 2022        Changes to Medications      Instructions Start Date   QUEtiapine 25 MG tablet  Commonly known as: SEROquel  What changed: when to take this   12.5 mg, Oral, Nightly PRN      torsemide 5 MG tablet  Commonly known as: DEMADEX  What changed: how much to take   10 mg, Oral, Daily, Take additional tablet as needed for 3lb weight gain in 24 hours or 5lbs in a week or increased shortness of breath         Continue These Medications      Instructions Start Date   acetaminophen 325 MG tablet  Commonly known as: TYLENOL   650 mg, Oral, 3 Times Daily      aspirin 81 MG EC tablet   81 mg, Oral, Daily      Biofreeze 4 % gel  Generic drug: Menthol (Topical Analgesic)   1 application, Apply externally, Every Early Morning, Apply to lower back      cetirizine 10 MG tablet  Commonly known as: zyrTEC   10 mg, Oral, Daily       docusate sodium 100 MG capsule   100 mg, Oral, 2 Times Daily      donepezil 5 MG tablet  Commonly known as: Aricept   5 mg, Oral, Every Morning      ENSURE PO   240 mL, Oral, 2 Times Daily, MIX 8OZ OF ENSURE, 1/4 SCOOP PROTEIN POWDER AND 2OZ OF MILK FOR A MILKSHAKE       folic acid 400 MCG tablet  Commonly known as: FOLVITE   400 mcg, Oral, 2 Times Daily      levothyroxine 25 MCG tablet  Commonly known as: SYNTHROID, LEVOTHROID   25 mcg, Oral, Every Early Morning      metoprolol succinate XL 25 MG 24 hr tablet  Commonly known as: TOPROL-XL   25 mg, Oral, Daily      OXcarbazepine 600 MG tablet  Commonly known as: TRILEPTAL   600 mg, Oral, 2 Times Daily      potassium chloride 10 MEQ CR tablet   10 mEq, Oral, Daily PRN, Give once daily and extra with extra torsemide      pregabalin 25 MG capsule  Commonly known as: LYRICA   25 mg, Oral, 2 Times Daily           Allergies   Allergen Reactions   • Bee Pollen Unknown - Low Severity   • Lorazepam Delirium     Discharge Disposition:  Home or Self Care    Diet:  Hospital:  Diet Order   Procedures   • Diet Regular; Consistent Carbohydrate     Activity:  Activity Instructions     Activity as Tolerated          Restrictions or Other Recommendations:  None       CODE STATUS:    Code Status and Medical Interventions:   Ordered at: 04/22/22 0600     Code Status (Patient has no pulse and is not breathing):    CPR (Attempt to Resuscitate)     Medical Interventions (Patient has pulse or is breathing):    Full Support     Future Appointments   Date Time Provider Department Center   5/3/2022 10:00 AM Lauren Pina APRN [facility]   6/22/2022 10:00 AM Ann-Marie Goodwin APRN [facility]       Sacha Saenz MD  04/24/22    Time Spent on Discharge:  I spent  45  minutes on this discharge activity which included: face-to-face encounter with the patient, reviewing the data in the system, coordination of the care with the nursing staff as well as consultants,  documentation, and entering orders.

## 2022-04-24 NOTE — PROGRESS NOTES
Malnutrition Severity Assessment    Patient Name:  Mitzi Eric  YOB: 1935  MRN: [id number]  Admit Date:  4/22/2022    Patient meets criteria for : Moderate (non-severe) Malnutrition (Pt meets criteria for non severe chronic malnutrition based on wasting.)    Comments:      Malnutrition Severity Assessment  Malnutrition Type: Chronic Disease - Related Malnutrition  Malnutrition Type (last 8 hours)     Malnutrition Severity Assessment     Row Name 04/23/22 2010       Malnutrition Severity Assessment    Malnutrition Type Chronic Disease - Related Malnutrition    Row Name 04/23/22 2010       Insufficient Energy Intake     Insufficient Energy Intake Findings --  unable to quantify    Row Name 04/23/22 2010       Unintentional Weight Loss     Unintentional Weight Loss Findings --  Per care facility records 92 lbs in April, 95 in March 97 in Feb and Jan representing 5% loss over 2 months.    Row Name 04/23/22 2010       Muscle Loss    Loss of Muscle Mass Findings Mild    Sabianism Region None    Clavicle Bone Region Moderate - some protrusion in females, visible in males    Acromion Bone Region Moderate - acromion may slightly protrude    Scapular Bone Region Moderate - mild depression, bones may show slightly    Dorsal Hand Region None    Patellar Region None    Anterior Thigh Region --  mild    Posterior Calf Region Moderate - some roundness, slight firmness    Row Name 04/23/22 2010       Fat Loss    Subcutaneous Fat Loss Findings Moderate    Orbital Region  Moderate -  somewhat hollowness, slightly dark circles    Upper Arm Region Moderate - some fat tissue, not ample    Thoracic & Lumbar Region Moderate - ribs visible with mild depressions, iliac crest somewhat prominent    Row Name 04/23/22 2010       Criteria Met (Must meet criteria for severity in at least 2 of these categories: M Wasting, Fat Loss, Fluid, Secondary Signs, Wt. Status, Intake)    Patient meets criteria for  Moderate (non-severe)  Malnutrition  Pt meets criteria for non severe chronic malnutrition based on wasting.                Electronically signed by:  Sheila Agudelo RD  04/23/22 20:29 EDT

## 2022-04-24 NOTE — PLAN OF CARE
Goal Outcome Evaluation:  Plan of Care Reviewed With: patient        Progress: no change   VSS. SR with PACs per monitor. Pt alert but confused. No complaints of pain or discomfort. No acute events overnight. Plan to d/c tomorrow.

## 2022-04-25 LAB
QT INTERVAL: 270 MS
QTC INTERVAL: 394 MS

## 2022-04-25 NOTE — OUTREACH NOTE
Prep Survey    Flowsheet Row Responses   [facility] facility patient discharged from? [facility]   Is LACE score < 7 ? No   Emergency Room discharge w/ pulse ox? No   Eligibility Readm Mgmt   Discharge diagnosis **COPD ex with volume overload    Does the patient have one of the following disease processes/diagnoses(primary or secondary)? COPD/Pneumonia   Does the patient have Home health ordered? No   Is there a DME ordered? Yes   What DME was ordered? Oxygen per [employer]   Prep survey completed? Yes          YARI TYLER - Registered Nurse

## 2022-04-27 LAB
BACTERIA SPEC AEROBE CULT: NORMAL
BACTERIA SPEC AEROBE CULT: NORMAL

## 2022-04-28 ENCOUNTER — READMISSION MANAGEMENT (OUTPATIENT)
Dept: CALL CENTER | Facility: HOSPITAL | Age: 87
End: 2022-04-28

## 2022-04-28 NOTE — OUTREACH NOTE
COPD/PN Week 1 Survey    Flowsheet Row Responses   [facility] patient discharged from? [facility]   Does the patient have one of the following disease processes/diagnoses(primary or secondary)? COPD/Pneumonia   Was the primary reason for admission: COPD exacerbation   Week 1 attempt successful? Yes   Call start time 1215   Call end time 1225   Discharge diagnosis **COPD ex with volume overload    Person spoke with today (if not patient) and relationship CADE Sherman nephew   Meds reviewed with patient/caregiver? Yes   Is the patient having any side effects they believe may be caused by any medication additions or changes? No   Does the patient have all medications ordered at discharge? Yes   Is the patient taking all medications as directed (includes completed medication regime)? Yes   Does the patient have a primary care provider?  Yes   Does the patient have an appointment with their PCP or specialist within 7 days of discharge? Yes   Comments regarding PCP 5/2/22   Has the patient kept scheduled appointments due by today? N/A   What DME was ordered? Oxygen per [employer]   Has all DME been delivered? Yes   Pulse Ox monitoring Intermittent   Pulse Ox device source Other   O2 Sat comments [facility] checks O2 sats   Psychosocial issues? No   Did the patient receive a copy of their discharge instructions? Yes   Nursing interventions Reviewed instructions with patient   What is the patient's perception of their health status since discharge? Improving   Nursing Interventions Nurse provided patient education   Nursing interventions Educated on importance of maintaining up to date immunizations as advised by provider   If the patient is a current smoker, are they able to teach back resources for cessation? Not a smoker   Is the patient/caregiver able to teach back the hierarchy of who to call/visit for symptoms/problems? PCP, Specialist, Home health nurse, Urgent Care, ED, 911 Yes   Is the patient able  to teach back COPD zones? Yes   Nursing interventions Education provided on various zones   Patient reports what zone on this call? Green Zone   Green Zone Reports doing well, Sleeping well   Green Zone interventions: Take daily medications, Use oxygen as prescribed  [Uses 2LO2]   Week 1 call completed? Yes   Wrap up additional comments CADE Sherman, states pt is doing better since on continuous 2LO2. Lane adds pt resides at [facility]. Lane given number to call [facility] with questions regarding oxygen.          SOREN FUNEZ - Registered Nurse

## 2022-05-02 NOTE — PROGRESS NOTES
"[facility]  Heart and Valve Center      Mode of Visit: Video  Location of patient: other: [facility]  You have chosen to receive care through a telehealth visit.  Does the patient consent to use a video/audio connection for your medical care today? Yes  The visit included audio and video interaction. No technical issues occurred during this visit.     Chief Complaint  Follow-up and Congestive Heart Failure    History of Present Illness    Mitzi Eric is a 86 y.o. female with atrial fibrillation (no AC secondary to fall risk), heart failure with borderline EF (46-50%), pulmonary hypertension, COPD, dementia, hypothyroidism, CKD and frequent falls who presents today as a telemedicine follow-up for heart failure.  Patient is mostly wheelchair-bound and resides at [facility].  She had a recent hospitalization for acute pulmonary edema and has been maintained on torsemide.  After starting torsemide she developed some frequent falls, it was unclear if it was related but she did have some urinary urgency and therefore her torsemide was decreased to 5 mg daily.  Unfortunately, she was readmitted 4/22-4/24 with acute respiratory failure.  CTA of the chest showed severe emphysema and peripheral groundglass opacities concerning for volume overload and/or bronchitis.  Respiration is improved with IV diuresis and nebulizers as well as steroids.  Home O2 was arranged.  Her torsemide was increased from 5 mg to 10 mg daily. She reports that she is doing well. No recent falls. Denies shortness of breath, dizziness or lightheadedness. She has been referred to pulmonary for COPD/emphysema    Spoke with patient's nurse and nephew    Objective     Vital Signs:   Vitals:    05/03/22 1052   BP: 112/75   Pulse: 95   SpO2: 95%   Weight: 43.7 kg (96 lb 4.8 oz)   Height: 160 cm (63\")     Body mass index is 17.06 kg/m².    Virtual Visit Physical Exam  Physical Exam  Constitutional:       " Appearance: Normal appearance.   HENT:      Head: Normocephalic.   Pulmonary:      Effort: Pulmonary effort is normal. No respiratory distress.   Neurological:      Mental Status: She is alert and oriented to person, place, and time.   Psychiatric:         Mood and Affect: Mood normal.         Behavior: Behavior normal.         Thought Content: Thought content normal.              Result Review  Data Reviewed:{ Labs  Result Review  Imaging  Med Tab  Media :23}   Adult Transthoracic Echo Complete W/ Cont if Necessary Per Protocol (03/17/2022 17:14)  Basic Metabolic Panel (04/24/2022 03:47)  CBC & Differential (04/24/2022 03:47)  Hemoglobin A1c (04/22/2022 08:48)  BNP (04/22/2022 03:43)  CT Angiogram Chest (04/22/2022 05:31)           Assessment and Plan {CC Problem List  Visit Diagnosis  ROS  Review (Popup)  Health Maintenance  Quality  BestPractice  Medications  SmartSets  SnapShot Encounters  Media :23}   1. Heart failure with preserved ejection fraction, borderline, class III (HCC)  Stable symptoms   Continue torsemide 10mg daily and daily weights  Labs monitored at facility, SNF to fax results when available    2. Essential hypertension  Well controlled  Avoid aggressive BP lowering due to risk for falls    3. Longstanding persistent atrial fibrillation (HCC)  Rate controlled. HR in the 70s per VS at facility  No AC secondary to frequent falls  Continue ASA for now              Follow Up {Instructions Charge Capture  Follow-up Communications :23}   Return in about 2 weeks (around 5/17/2022) for HF, Video Visit. Morning visit.    Patient was given instructions and counseling regarding her condition or for health maintenance advice. Please see specific information pulled into the AVS if appropriate.  Advised to call the Heart and Valve Center with any questions, concerns, or worsening symptoms.    Dictated Utilizing Dragon Dictation

## 2022-05-03 ENCOUNTER — TELEMEDICINE (OUTPATIENT)
Dept: CARDIOLOGY | Facility: HOSPITAL | Age: 87
End: 2022-05-03

## 2022-05-03 VITALS
OXYGEN SATURATION: 95 % | DIASTOLIC BLOOD PRESSURE: 75 MMHG | HEART RATE: 95 BPM | SYSTOLIC BLOOD PRESSURE: 112 MMHG | WEIGHT: 96.3 LBS | BODY MASS INDEX: 17.06 KG/M2 | HEIGHT: 63 IN

## 2022-05-03 PROCEDURE — 99214 OFFICE O/P EST MOD 30 MIN: CPT | Performed by: NURSE PRACTITIONER

## 2022-05-05 ENCOUNTER — READMISSION MANAGEMENT (OUTPATIENT)
Dept: CALL CENTER | Facility: HOSPITAL | Age: 87
End: 2022-05-05

## 2022-05-05 NOTE — OUTREACH NOTE
COPD/PN Week 2 Survey    Flowsheet Row Responses   [facility] patient discharged from? [place]   Does the patient have one of the following disease processes/diagnoses(primary or secondary)? COPD/Pneumonia   Was the primary reason for admission: COPD exacerbation   Week 2 attempt successful? Yes   Call start time 1205   Call end time 1209   Person spoke with today (if not patient) and relationship CADE Sherman nephew.   Meds reviewed with patient/caregiver? Yes   Is the patient having any side effects they believe may be caused by any medication additions or changes? No   Does the patient have all medications ordered at discharge? Yes   Is the patient taking all medications as directed (includes completed medication regime)? Yes   Comments regarding appointments Pulmonology appt 06/22/22.   Does the patient have a primary care provider?  Yes   Does the patient have an appointment with their PCP or specialist within 7 days of discharge? Yes   Has the patient kept scheduled appointments due by today? Yes   Has home health visited the patient within 72 hours of discharge? N/A   Has all DME been delivered? Yes   DME comments Using home O2 at 2L continuous.   Pulse Ox monitoring Intermittent   Psychosocial issues? No   What is the patient's perception of their health status since discharge? Improving   Is the patient/caregiver able to teach back the hierarchy of who to call/visit for symptoms/problems? PCP, Specialist, Home health nurse, Urgent Care, ED, 911 Yes   Is the patient able to teach back COPD zones? Yes   Patient reports what zone on this call? Green Zone   Green Zone Reports doing well   Week 2 call completed? Yes   Wrap up additional comments CADE Sherman, states patient is doing well. States adjusting to using home O2. Denies any needs today.          TIMOTEO MARTINEZ - Registered Nurse

## 2022-05-13 ENCOUNTER — READMISSION MANAGEMENT (OUTPATIENT)
Dept: CALL CENTER | Facility: HOSPITAL | Age: 87
End: 2022-05-13

## 2022-05-13 NOTE — OUTREACH NOTE
COPD/PN Week 3 Survey    Flowsheet Row Responses   [facility] patient discharged from? [place]   Does the patient have one of the following disease processes/diagnoses(primary or secondary)? COPD/Pneumonia   Was the primary reason for admission: COPD exacerbation   Week 3 attempt successful? No   Unsuccessful attempts Attempt 1  [Both numbers attempted- no answer ]          COLLIN H - Registered Nurse

## 2022-05-16 NOTE — PROGRESS NOTES
"[facility]  Heart and Valve Center      Mode of Visit: Video  Location of patient: other: [facility]   You have chosen to receive care through a telehealth visit.  Does the patient consent to use a video/audio connection for your medical care today? Yes  The visit included audio and video interaction. No technical issues occurred during this visit.     Chief Complaint  Follow-up and Congestive Heart Failure    History of Present Illness    Mitzi Eric is a 86 y.o. female with atrial fibrillation (no AC secondary to fall risk), heart failure with borderline EF (46-50%) with readmissions for acute pulmonary edema, pulmonary hypertension, COPD, dementia, hypothyroidism, CKD and frequent falls who presents today as a telemedicine follow-up for heart failure.  Patient is mostly wheelchair-bound and resides at [facility].  She currently is on 3 days of prednisone, which her nephew believes is for her lungs. He is unaware of any respiratory issues, unless she goes without oxygen. She was also started on spironolactone to \"prevent fluid on her lungs\" but he says she has had no further HF exacerbations. Patient reports that she feels well. Denies shortness of breath, swelling, orthopnea/PND. No recent falls. Her nephew does feel short term memory is slightly worse. Appetite is not great        Objective     Vital Signs:   Vitals:    05/17/22 0855   BP: 126/83   Pulse: 80   SpO2: 96%   Weight: 44 kg (96 lb 14.4 oz)   Height: 160 cm (63\")     Body mass index is 17.17 kg/m².    Virtual Visit Physical Exam  Physical Exam  Constitutional:       Appearance: Normal appearance.   HENT:      Head: Normocephalic.   Pulmonary:      Effort: Pulmonary effort is normal. No respiratory distress.   Neurological:      Mental Status: She is alert. Mental status is at baseline.   Psychiatric:         Mood and Affect: Mood normal.         Behavior: Behavior normal.         Thought Content: " Thought content normal.              Result Review  Data Reviewed:{ Labs  Result Review  Imaging  Med Tab  Media :23}   Adult Transthoracic Echo Complete W/ Cont if Necessary Per Protocol (03/17/2022 17:14)  Reviewed notes from [facility]  Lab 5/13/2022 glucose 87, sodium 143, potassium 3.7, chloride 109, carbon dioxide 29, BUN 17, creatinine 0.8.           Assessment and Plan {CC Problem List  Visit Diagnosis  ROS  Review (Popup)  Health Maintenance  Quality  BestPractice  Medications  SmartSets  SnapShot Encounters  Media :23}   1. Heart failure with preserved ejection fraction, borderline, class III (HCC)  Stable symptoms   Continue torsemide 10mg daily and daily weights. Take extra 5mg as needed for increased shortness of breath, 3lb wt gain in 24 hours  Continue spironolactone  Labs stable    2. Essential hypertension  Well controlled  Avoid aggressive BP lowering due to risk for falls    3. Longstanding persistent atrial fibrillation (HCC)  Rate controlled.   No AC secondary to frequent falls  Continue ASA for now    4. Cachexia  Discussed the importance of adequate nutrition, ideally would prefer BMI be closer to 20. Add additional ensure or ice cream at night. Discussed options to add additional 250-500 calories a day.             Follow Up {Instructions Charge Capture  Follow-up Communications :23}   Return in about 4 weeks (around 6/14/2022) for Video Visit, HF.    Patient was given instructions and counseling regarding her condition or for health maintenance advice. Please see specific information pulled into the AVS if appropriate.  Advised to call the Heart and Valve Center with any questions, concerns, or worsening symptoms.    Dictated Utilizing Dragon Dictation

## 2022-05-17 ENCOUNTER — TELEMEDICINE (OUTPATIENT)
Dept: CARDIOLOGY | Facility: HOSPITAL | Age: 87
End: 2022-05-17

## 2022-05-17 VITALS
OXYGEN SATURATION: 96 % | DIASTOLIC BLOOD PRESSURE: 83 MMHG | BODY MASS INDEX: 17.17 KG/M2 | HEART RATE: 80 BPM | WEIGHT: 96.9 LBS | SYSTOLIC BLOOD PRESSURE: 126 MMHG | HEIGHT: 63 IN

## 2022-05-17 DIAGNOSIS — R64 CACHEXIA: ICD-10-CM

## 2022-05-17 DIAGNOSIS — I10 ESSENTIAL HYPERTENSION: ICD-10-CM

## 2022-05-17 DIAGNOSIS — I50.30 HEART FAILURE WITH PRESERVED EJECTION FRACTION, BORDERLINE, CLASS III: Primary | ICD-10-CM

## 2022-05-17 DIAGNOSIS — I48.11 LONGSTANDING PERSISTENT ATRIAL FIBRILLATION: ICD-10-CM

## 2022-05-17 PROCEDURE — 99214 OFFICE O/P EST MOD 30 MIN: CPT | Performed by: NURSE PRACTITIONER

## 2022-05-17 RX ORDER — SPIRONOLACTONE 25 MG/1
25 TABLET ORAL DAILY
COMMUNITY

## 2022-05-17 RX ORDER — PREDNISONE 20 MG/1
40 TABLET ORAL DAILY
COMMUNITY
End: 2022-06-23

## 2022-05-17 RX ORDER — MECLIZINE HCL 25MG 25 MG/1
25 TABLET, CHEWABLE ORAL 3 TIMES DAILY PRN
COMMUNITY

## 2022-05-17 RX ORDER — PANTOPRAZOLE SODIUM 20 MG/1
20 TABLET, DELAYED RELEASE ORAL DAILY
COMMUNITY

## 2022-06-13 NOTE — PROGRESS NOTES
"[facility]  Heart and Valve Center      Mode of Visit: Video  Location of patient: other: [facility]  You have chosen to receive care through a telehealth visit.  Does the patient consent to use a video/audio connection for your medical care today? Yes  The visit included audio and video interaction. No technical issues occurred during this visit.     Chief Complaint  Follow-up and Congestive Heart Failure    History of Present Illness    Mitzi Eric is a 86 y.o. female with atrial fibrillation (no AC secondary to fall risk), heart failure with borderline EF (46-50%) with readmissions for acute pulmonary edema, pulmonary hypertension, COPD, dementia, hypothyroidism, CKD and frequent falls who presents today as a telemedicine follow-up for heart failure.  Patient is mostly wheelchair-bound and resides at [facility].      Her nephew  reports that she is doing well and has been able to wean off oxygen during the day and only uses at night. She is steadily gaining some weight with nutritional supplements. No sudden weight gain. She denies shortness of breath except if she does more than normal activities. She does her chair exercises without dyspnea.     She has upcoming with pulmonary    Objective     Vital Signs:   Vitals:    06/14/22 1259   BP: 142/82   Pulse: 76   SpO2: 96%   Weight: 45.4 kg (100 lb 1.6 oz)   Height: 160 cm (63\")     Body mass index is 17.73 kg/m².    Virtual Visit Physical Exam  Physical Exam  Constitutional:       Appearance: Normal appearance.   HENT:      Head: Normocephalic.   Pulmonary:      Effort: Pulmonary effort is normal. No respiratory distress.   Neurological:      Mental Status: She is alert. Mental status is at baseline.   Psychiatric:         Mood and Affect: Mood normal.         Behavior: Behavior normal.         Thought Content: Thought content normal.              Result Review  Data Reviewed:{ Labs  Result Review  Imaging "  Med Tab  Media :23}   Adult Transthoracic Echo Complete W/ Cont if Necessary Per Protocol (03/17/2022 17:14)  Reviewed notes from [facility]  Lab 5/13/2022 glucose 87, sodium 143, potassium 3.7, chloride 109, carbon dioxide 29, BUN 17, creatinine 0.8.           Assessment and Plan {CC Problem List  Visit Diagnosis  ROS  Review (Popup)  Health Maintenance  Quality  BestPractice  Medications  SmartSets  SnapShot Encounters  Media :23}   1. Heart failure with preserved ejection fraction, borderline, class III (HCC)  Stable symptoms   Continue torsemide 10mg daily and daily weights. Take extra 5mg as needed for increased shortness of breath, 3lb wt gain in 24 hours  Continue spironolactone  Labs monitored by facility    2. Essential hypertension  Well controlled  Avoid aggressive BP lowering due to risk for falls    3. Longstanding persistent atrial fibrillation (HCC)  Rate controlled.   No AC secondary to frequent falls  Continue ASA for now        Keep scheduled appt with pulmonary  Patient's nephew prefers to continue to be followed via telemedicine but will call us if she experiences any new or worsening symptoms      Follow Up {Instructions Charge Capture  Follow-up Communications :23}   Return in about 3 months (around 9/14/2022) for Video Visit, HF.    Patient was given instructions and counseling regarding her condition or for health maintenance advice. Please see specific information pulled into the AVS if appropriate.  Advised to call the Heart and Valve Center with any questions, concerns, or worsening symptoms.    Dictated Utilizing Dragon Dictation

## 2022-06-14 ENCOUNTER — TELEMEDICINE (OUTPATIENT)
Dept: CARDIOLOGY | Facility: HOSPITAL | Age: 87
End: 2022-06-14

## 2022-06-14 VITALS
SYSTOLIC BLOOD PRESSURE: 142 MMHG | WEIGHT: 100.1 LBS | OXYGEN SATURATION: 96 % | DIASTOLIC BLOOD PRESSURE: 82 MMHG | HEIGHT: 63 IN | BODY MASS INDEX: 17.73 KG/M2 | HEART RATE: 76 BPM

## 2022-06-14 PROCEDURE — 99213 OFFICE O/P EST LOW 20 MIN: CPT | Performed by: NURSE PRACTITIONER

## 2022-06-14 RX ORDER — ALBUTEROL SULFATE 90 UG/1
2 AEROSOL, METERED RESPIRATORY (INHALATION) EVERY 4 HOURS PRN
COMMUNITY
End: 2022-06-23 | Stop reason: SDUPTHER

## 2022-06-22 ENCOUNTER — OFFICE VISIT (OUTPATIENT)
Dept: NEUROLOGY | Facility: CLINIC | Age: 87
End: 2022-06-22

## 2022-06-22 VITALS
DIASTOLIC BLOOD PRESSURE: 66 MMHG | OXYGEN SATURATION: 93 % | HEART RATE: 63 BPM | HEIGHT: 63 IN | WEIGHT: 99 LBS | SYSTOLIC BLOOD PRESSURE: 104 MMHG | BODY MASS INDEX: 17.54 KG/M2

## 2022-06-22 DIAGNOSIS — Z74.09 IMPAIRED FUNCTIONAL MOBILITY, BALANCE, GAIT, AND ENDURANCE: ICD-10-CM

## 2022-06-22 DIAGNOSIS — Z98.890 HISTORY OF CRANIOTOMY: ICD-10-CM

## 2022-06-22 DIAGNOSIS — F03.90 DEMENTIA WITHOUT BEHAVIORAL DISTURBANCE, UNSPECIFIED DEMENTIA TYPE: Primary | ICD-10-CM

## 2022-06-22 PROCEDURE — 99213 OFFICE O/P EST LOW 20 MIN: CPT | Performed by: NURSE PRACTITIONER

## 2022-06-22 RX ORDER — TRAMADOL HYDROCHLORIDE 50 MG/1
50 TABLET ORAL EVERY 6 HOURS PRN
COMMUNITY

## 2022-06-22 RX ORDER — DONEPEZIL HYDROCHLORIDE 5 MG/1
5 TABLET, FILM COATED ORAL EVERY MORNING
Qty: 90 TABLET | Refills: 3 | Status: SHIPPED | OUTPATIENT
Start: 2022-06-22 | End: 2022-12-21 | Stop reason: SDUPTHER

## 2022-06-22 NOTE — PROGRESS NOTES
Subjective:     Patient ID: Mitzi Eric is a 86 y.o. female.    CC:   Chief Complaint   Patient presents with   • Dementia       HPI:   History of Present Illness   Mitzi Eric is a 86 y.o. female who comes to clinic today for 3 month follow up on Dementia. She has noted symptoms since at least 2019 and significantly worsened since COVID-19 pandemic per Nephew/POA marked initially by forgetfulness , repetitiveness  and word-finding difficulties . This has worsened  over time. Additional symptoms have included impairments in essentially all spheres of cognition. There have been associated  symptoms of delusions, hallucinations and UTIs recurrent November and December 2021 with hospitalizations and flu December 2021. She notes  impairments in ADL's. Her family  manages her finances and she is a current resident at [facility] since 2017. She is no longer driving . She is currently residing at [facility].      She is accompanied by her nephew today who is also her power of .  She has never been  and has no children.  She is 1 of 9 children.  She is currently wheelchair-bound but occasionally can stand with assistance.  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  She also has a right trigeminal neuralgia and is currently on low-dose pregabalin 25 mg twice a day along with oxcarbazepine 300 mg twice a day.  She was previously on carbamazepine along with higher doses of pregabalin and had some significant confusion and tremor and so these medications and dosages were adjusted with recent hospitalization.  She does have a history of atrial fibrillation but is on no anticoagulation due to high fall risk.     Today 6/22/2022-     This is an 86-year-old female who presents for a 3-month neurology follow up on diagnosis of dementia. At her initial visit to the clinic on 03/09/2022, we started her on  "donepezil 5 mg daily to improve her memory. She is accompanied today by her nephew who is also her power of .    She was hospitalized 4/22/22-4/24/22 due to \"breathing issues\" and will see a pulmonologist tomorrow, 06/23/2022. She uses 2 L of oxygen at night. She does not need oxygen during the day. Her nephew states that she was retaining fluid which is why she was hospitalized. She is taking a diuretic which is helping. Per hospital notes, she was hospitalized from 04/22/2022 to 04/24/2022 due to chronic obstructive pulmonary disease with volume overload. Her diuretic dosage was increased.     Her memory has not improved taking donepezil, but it is stable. Her short-term memory is poor. She does not take Seroquel at night, and her nephew does not think she needs this medication; this is available as needed along with trazodone for which he does not believe she takes either. She is very pleasant and is not having issues with her mood. Her nephew reports that she socializes well with the other residents at [facility].     She cannot ambulate and has to use a wheelchair. She is able to make her bed, take a shower, and use the bathroom on her own. She participates in daily wheelchair-exercise classes with the other residents at [facility]. The last time she was hospitalized, she had occupational therapy and physical therapy which helped. However, due to her short term memory being poor, what she learned comes and goes. The hospital staff also tried to get her up and walking thinking this would help her, but her nephew knows she will never ambulate again. She is very comfortable using a wheelchair. She has been hospitalized twice due to falling from trying to use a walker instead of a wheelchair. She fell on 03/29/2022 and subsequently had a CT scan performed of her head. Her scan was showing no acute process with old craniotomy changes.     She takes Lyrica and oxcarbazepine long term for for " trigeminal neuralgia.     Prior significant medical history and workup:  She was admitted to [facility] 11/29/2021 to 12/6/2021 for concerns of a stroke with history of dementia, hypothyroidism, prior craniotomy, hypertension, A. fib, COPD, lung mass (aware since 2018 and family declines further workup) and frequent UTIs with altered mental status, falls and tremors found to have UTI.  They felt she had delirium related to her dementia and UTI.  They made multiple medication adjustments.  Neurology Dr. Briseno reviewed MRI of the brain imaging with and without contrast which was completed on 11/29/2021 and this did show postsurgical changes and encephalomalacia in the left frontal lobe.  He did not find any acute abnormalities suggesting acute stroke.  There was no abnormal contrast-enhancement.  She also underwent CTA of the head, neck and CT cerebral perfusion which were all unremarkable.  Family did meet with palliative care and hospice during that admission but she is not currently under their services.  She was then readmitted to [facility] from 12/6/2021 to 12/8/2021 for acute respiratory failure with hypoxia and confirmed influenza A along with a UTI.  Medications were again adjusted.  Palliative care was consulted.     The following portions of the patient's history were reviewed and updated as appropriate: allergies, current medications, past family history, past medical history, past social history, past surgical history and problem list.    Past Medical History:   Diagnosis Date   • A-fib (HCC)    • Ataxic gait    • Chronic kidney disease, stage 3 (HCC)    • COPD (chronic obstructive pulmonary disease) (HCC)    • Dementia (HCC)    • Disease of thyroid gland    • Heart failure (HCC)    • Hyperlipidemia    • Hypertension        Past Surgical History:   Procedure Laterality Date   • BRAIN TUMOR EXCISION      BENIGN   • FRACTURE SURGERY     • TRIGGER FINGER RELEASE         Social  "History     Socioeconomic History   • Marital status: Single   Tobacco Use   • Smoking status: Former Smoker     Packs/day: 1.00     Years: 15.00     Pack years: 15.00     Start date:      Quit date:      Years since quittin.5   • Smokeless tobacco: Never Used   Vaping Use   • Vaping Use: Never used   Substance and Sexual Activity   • Alcohol use: Never     Comment: \"social drinker\"   • Drug use: Never   • Sexual activity: Never       Family History   Problem Relation Age of Onset   • Stroke Mother    • Alcohol abuse Father           Current Outpatient Medications:   •  acetaminophen (TYLENOL) 325 MG tablet, Take 650 mg by mouth 3 (Three) Times a Day., Disp: , Rfl:   •  albuterol sulfate  (90 Base) MCG/ACT inhaler, Inhale 2 puffs Every 4 (Four) Hours As Needed for Wheezing., Disp: , Rfl:   •  aspirin 81 MG EC tablet, Take 1 tablet by mouth Daily., Disp: 30 tablet, Rfl: 3  •  docusate sodium 100 MG capsule, Take 100 mg by mouth 2 (Two) Times a Day., Disp: , Rfl:   •  donepezil (Aricept) 5 MG tablet, Take 1 tablet by mouth Every Morning., Disp: 90 tablet, Rfl: 3  •  folic acid (FOLVITE) 400 MCG tablet, Take 400 mcg by mouth 2 (Two) Times a Day., Disp: , Rfl:   •  levothyroxine (SYNTHROID, LEVOTHROID) 25 MCG tablet, Take 25 mcg by mouth Every Morning., Disp: , Rfl:   •  metoprolol succinate XL (TOPROL-XL) 25 MG 24 hr tablet, Take 25 mg by mouth Daily., Disp: , Rfl:   •  OXcarbazepine (TRILEPTAL) 600 MG tablet, Take 600 mg by mouth 2 (Two) Times a Day., Disp: , Rfl:   •  potassium chloride 10 MEQ CR tablet, Take 10 mEq by mouth Daily. Give once daily and extra with extra torsemide, Disp: , Rfl:   •  pregabalin (LYRICA) 25 MG capsule, Take 25 mg by mouth 2 (Two) Times a Day., Disp: , Rfl:   •  QUEtiapine (SEROquel) 25 MG tablet, Take 0.5 tablets by mouth At Night As Needed (agitation, sleep). (Patient taking differently: Take 12.5 mg by mouth Every Night.), Disp: 3 tablet, Rfl: 0  •  spironolactone " (ALDACTONE) 25 MG tablet, Take 25 mg by mouth Daily., Disp: , Rfl:   •  torsemide (DEMADEX) 5 MG tablet, Take 2 tablets by mouth Daily. Take additional tablet as needed for 3lb weight gain in 24 hours or 5lbs in a week or increased shortness of breath, Disp: 60 tablet, Rfl: 3  •  traMADol (ULTRAM) 50 MG tablet, Take 50 mg by mouth Every 6 (Six) Hours As Needed for Moderate Pain ., Disp: , Rfl:   •  TRAZODONE HCL PO, Take 150 mg by mouth Daily., Disp: , Rfl:   •  budesonide-formoterol (SYMBICORT) 80-4.5 MCG/ACT inhaler, Inhale 2 puffs 2 (Two) Times a Day for 30 days., Disp: 10.2 g, Rfl: 3  •  cetirizine (zyrTEC) 10 MG tablet, Take 10 mg by mouth Daily., Disp: , Rfl:   •  meclizine 25 MG chewable tablet chewable tablet, Chew 25 mg 3 (Three) Times a Day As Needed., Disp: , Rfl:   •  Menthol, Topical Analgesic, (Biofreeze) 4 % gel, Apply 1 application topically Every Morning. Apply to lower back, Disp: , Rfl:   •  Nutritional Supplements (ENSURE PO), Take 240 mL by mouth 2 (Two) Times a Day. MIX 8OZ OF ENSURE, 1/4 SCOOP PROTEIN POWDER AND 2OZ OF MILK FOR A MILKSHAKE, Disp: , Rfl:   •  pantoprazole (PROTONIX) 20 MG EC tablet, Take 20 mg by mouth Daily., Disp: , Rfl:   •  predniSONE (DELTASONE) 20 MG tablet, Take 40 mg by mouth Daily. X 3 doses, Disp: , Rfl:      Review of Systems   Constitutional: Negative for chills, fatigue, fever and unexpected weight change.   HENT: Negative for ear pain, hearing loss, nosebleeds, rhinorrhea and sore throat.    Eyes: Negative for photophobia, pain, discharge, itching and visual disturbance.   Respiratory: Negative for cough, chest tightness, shortness of breath and wheezing.    Cardiovascular: Negative for chest pain, palpitations and leg swelling.   Gastrointestinal: Negative for abdominal pain, blood in stool, constipation, diarrhea, nausea and vomiting.   Genitourinary: Negative for dysuria, frequency, hematuria and urgency.   Musculoskeletal: Positive for gait problem. Negative  "for arthralgias, back pain, joint swelling, myalgias, neck pain and neck stiffness.   Skin: Negative for rash and wound.   Allergic/Immunologic: Negative for environmental allergies and food allergies.   Neurological: Positive for weakness. Negative for dizziness, tremors, seizures, syncope, speech difficulty, light-headedness, numbness and headaches.   Hematological: Negative for adenopathy. Does not bruise/bleed easily.   Psychiatric/Behavioral: Positive for decreased concentration. Negative for agitation, confusion, hallucinations, sleep disturbance and suicidal ideas. The patient is not nervous/anxious.    All other systems reviewed and are negative.       Objective:  /66   Pulse 63   Ht 160 cm (63\")   Wt 44.9 kg (99 lb)   SpO2 93%   BMI 17.54 kg/m²     Neurologic Exam     Mental Status   Oriented to person.   Disoriented to place.   Disoriented to time.   Registration: recalls 3 of 3 objects. Recall at 5 minutes: recalls 1 of 3 objects.   Speech: speech is normal   Level of consciousness: alert  Abnormal comprehension.     Cranial Nerves   Cranial nerves II through XII intact.     Motor Exam   Muscle bulk: normal  Overall muscle tone: normal    Strength   Strength 5/5 except as noted.   Generally weak, frail, in no acute distress, deconditioned     Gait, Coordination, and Reflexes     Gait  Gait: (in wheelchair)    Coordination   Finger to nose coordination: normal    Tremor   Resting tremor: absent  Intention tremor: absent  Action tremor: absent    Reflexes   Right : 2+  Left : 2+      Physical Exam  Constitutional:       Appearance: Normal appearance.      Comments: BMI 17.5   Neurological:      Mental Status: She is alert.      Coordination: Finger-Nose-Finger Test normal.   Psychiatric:         Mood and Affect: Mood and affect normal.         Speech: Speech normal.         Behavior: Behavior is slowed.         Thought Content: Thought content normal.         Cognition and Memory: She " exhibits impaired recent memory.          MMSE today is 23 out of 30 with 1 out of 3 for word recall. Previous MMSE was 22 out of 30 with 1 out of 3 for word recall.     Assessment/Plan:       Diagnoses and all orders for this visit:    1. Dementia without behavioral disturbance, unspecified dementia type (HCC) (Primary)  -     donepezil (Aricept) 5 MG tablet; Take 1 tablet by mouth Every Morning.  Dispense: 90 tablet; Refill: 3    2. Impaired functional mobility, balance, gait, and endurance    3. History of craniotomy           All of the plan and discussion was with the patient as well as her nephew who is her power of  today. She will continue donepezil 5 mg daily. She will follow up in 6 months or sooner if needed. Continue close monitoring with PCP as well as all specialists. Continue wheelchair and falls prevention.    Reviewed medications, potential side effects and signs and symptoms to report. Discussed risk versus benefits of treatment plan with patient and/or family-including medications, labs and radiology that may be ordered. Addressed questions and concerns during visit. Patient and/or family verbalized understanding and agree with plan.    AS THE PROVIDER, I PERSONALLY WORE PPE DURING ENTIRE FACE TO FACE ENCOUNTER IN CLINIC WITH THE PATIENT. PATIENT ALSO WORE PPE DURING ENTIRE FACE TO FACE ENCOUNTER EXCEPT FOR A MAX OF 30 SECONDS DURING NEUROLOGICAL EVALUATION OF CRANIAL NERVES AND THEN MASK WAS PLACED BACK OVER PATIENT FACE FOR REMAINDER OF VISIT. I WASHED MY HANDS BEFORE AND AFTER VISIT.    During this visit the following were done:  Labs Reviewed [x]    Labs Ordered []    Radiology Reports Reviewed [x]    Radiology Ordered []    PCP Records Reviewed []    Referring Provider Records Reviewed []    ER Records Reviewed [x]    Hospital Records Reviewed [x]    History Obtained From Family [x]  Nephew/poa  Radiology Images Reviewed []    Other Reviewed []    Records Requested []      Transcribed  from ambient dictation for RAFAEL Sim by Ning Gupta.  06/22/22   09:54 EDT    Patient verbalized consent to the visit recording.

## 2022-06-23 ENCOUNTER — TELEPHONE (OUTPATIENT)
Dept: NEUROLOGY | Facility: CLINIC | Age: 87
End: 2022-06-23

## 2022-06-23 ENCOUNTER — OFFICE VISIT (OUTPATIENT)
Dept: PULMONOLOGY | Facility: CLINIC | Age: 87
End: 2022-06-23

## 2022-06-23 VITALS
WEIGHT: 100 LBS | HEART RATE: 83 BPM | HEIGHT: 63 IN | SYSTOLIC BLOOD PRESSURE: 112 MMHG | OXYGEN SATURATION: 94 % | TEMPERATURE: 97.3 F | DIASTOLIC BLOOD PRESSURE: 64 MMHG | BODY MASS INDEX: 17.72 KG/M2

## 2022-06-23 DIAGNOSIS — R91.8 LUNG MASS: ICD-10-CM

## 2022-06-23 DIAGNOSIS — J96.10 CHRONIC RESPIRATORY FAILURE, UNSPECIFIED WHETHER WITH HYPOXIA OR HYPERCAPNIA: Primary | ICD-10-CM

## 2022-06-23 DIAGNOSIS — J43.2 CENTRILOBULAR EMPHYSEMA: ICD-10-CM

## 2022-06-23 PROCEDURE — 94060 EVALUATION OF WHEEZING: CPT | Performed by: INTERNAL MEDICINE

## 2022-06-23 PROCEDURE — 94729 DIFFUSING CAPACITY: CPT | Performed by: INTERNAL MEDICINE

## 2022-06-23 PROCEDURE — 99203 OFFICE O/P NEW LOW 30 MIN: CPT | Performed by: INTERNAL MEDICINE

## 2022-06-23 PROCEDURE — 94726 PLETHYSMOGRAPHY LUNG VOLUMES: CPT | Performed by: INTERNAL MEDICINE

## 2022-06-23 RX ORDER — BUDESONIDE AND FORMOTEROL FUMARATE DIHYDRATE 80; 4.5 UG/1; UG/1
2 AEROSOL RESPIRATORY (INHALATION)
Qty: 10.2 G | Refills: 3 | Status: SHIPPED | OUTPATIENT
Start: 2022-06-23 | End: 2022-12-21

## 2022-06-23 RX ORDER — ALBUTEROL SULFATE 90 UG/1
2 AEROSOL, METERED RESPIRATORY (INHALATION) EVERY 4 HOURS PRN
Qty: 18 G | Refills: 11 | Status: SHIPPED | OUTPATIENT
Start: 2022-06-23

## 2022-06-23 RX ORDER — ALBUTEROL SULFATE 90 UG/1
4 AEROSOL, METERED RESPIRATORY (INHALATION)
Status: DISCONTINUED | OUTPATIENT
Start: 2022-06-23 | End: 2022-06-23

## 2022-06-23 RX ORDER — ALBUTEROL SULFATE 90 UG/1
4 AEROSOL, METERED RESPIRATORY (INHALATION) ONCE
Status: SHIPPED | OUTPATIENT
Start: 2022-06-23

## 2022-06-23 RX ADMIN — ALBUTEROL SULFATE 4 PUFF: 90 AEROSOL, METERED RESPIRATORY (INHALATION) at 13:53

## 2022-06-23 NOTE — TELEPHONE ENCOUNTER
----- Message from RAFAEL Sim sent at 6/22/2022  4:08 PM EDT -----  Fax copy of todays visit note to [facility] facility please

## 2022-06-23 NOTE — PROGRESS NOTES
"Mitzi Eric is a 86 y.o. female here for evaluation of   Chief Complaint   Patient presents with   • COPD       Problem list:  1. Right upper lobe lung mass  2. Chronic respiratory failure on oxygen  3. Severe COPD  4. Combined systolic and diastolic heart failure, EF 45%  5. Dementia  6. Trigeminal neuralgia  7. Hypothyroid  8. Hypertension  9. Dyslipidemia  10. Paroxysmal atrial fibrillation  11. Impaired balance, mobility  12. Thrombocytopenia  13. Malnutrition  14. Chronic kidney disease stage III  15. Craniotomy with brain tumor excision, benign  16. Hip fracture repair  17. Trigger finger release  18. Remote tobacco use  19. Allergy bee pollen, lorazepam causes delirium    History of Present Illness    86-year-old woman with a history of COPD, oxygen dependent, hypertension, dyslipidemia, combined systolic and diastolic heart failure, dementia, chronic kidney disease who was recently hospitalized in April with worsening shortness of air.  CTA of the chest revealed no PE but severe underlying emphysema, possible superimposed volume overload and persistent right upper lobe density.  Since her hip fracture several years ago she has lived in a skilled nursing facility.  The right upper lobe lesion, present for \"a long time\" and on xray from  2018. Diagnosis  has not been pursued because her lung disease is so severe and she is a no resuscitation.  She is currently wearing supplemental oxygen 2 L at night.  She was started on Symbicort and an albuterol rescue inhaler.  She started smoking either in high school or when she was in college and smoked for approximately 35 years before quitting sometime in her 50s.  She does not have a chronic mucoid cough, wheezing, chest tightness.  She currently has no edema or palpitations.  She has a right upper lobe mass that her nephew says has been present for many many years.  She does not have a history of TB or hemoptysis.  She is a [place] native.  Since she broke her " hip approximately 4 years ago she has been mostly in a wheelchair.  She does transfer from the wheelchair to a bedside commode and is able to bathe and dress herself.  She is residing in assisted living.  After her emergency room visit in April she did become COVID-positive.  She is fully vaccinated with 2 boosters and had minimal symptoms of just a little bit of fatigue.    Review of Systems   Constitutional: Positive for appetite change.        Poor appetite   HENT: Negative for trouble swallowing.    Eyes: Negative for visual disturbance.   Respiratory: Negative for shortness of breath and wheezing.    Cardiovascular: Negative for chest pain and leg swelling.   Gastrointestinal: Negative for blood in stool.   Endocrine: Negative.    Genitourinary: Negative for difficulty urinating.   Musculoskeletal: Positive for gait problem.   Skin: Negative.    Allergic/Immunologic: Negative.    Psychiatric/Behavioral:        Dementia         Current Outpatient Medications:   •  acetaminophen (TYLENOL) 325 MG tablet, Take 650 mg by mouth 3 (Three) Times a Day., Disp: , Rfl:   •  albuterol sulfate  (90 Base) MCG/ACT inhaler, Inhale 2 puffs Every 4 (Four) Hours As Needed for Wheezing., Disp: 18 g, Rfl: 11  •  aspirin 81 MG EC tablet, Take 1 tablet by mouth Daily., Disp: 30 tablet, Rfl: 3  •  budesonide-formoterol (SYMBICORT) 80-4.5 MCG/ACT inhaler, Inhale 2 puffs 2 (Two) Times a Day for 30 days., Disp: 10.2 g, Rfl: 3  •  cetirizine (zyrTEC) 10 MG tablet, Take 10 mg by mouth Daily., Disp: , Rfl:   •  docusate sodium 100 MG capsule, Take 100 mg by mouth 2 (Two) Times a Day., Disp: , Rfl:   •  donepezil (Aricept) 5 MG tablet, Take 1 tablet by mouth Every Morning., Disp: 90 tablet, Rfl: 3  •  folic acid (FOLVITE) 400 MCG tablet, Take 400 mcg by mouth 2 (Two) Times a Day., Disp: , Rfl:   •  levothyroxine (SYNTHROID, LEVOTHROID) 25 MCG tablet, Take 25 mcg by mouth Every Morning., Disp: , Rfl:   •  meclizine 25 MG chewable  tablet chewable tablet, Chew 25 mg 3 (Three) Times a Day As Needed., Disp: , Rfl:   •  Menthol, Topical Analgesic, (Biofreeze) 4 % gel, Apply 1 application topically Every Morning. Apply to lower back, Disp: , Rfl:   •  metoprolol succinate XL (TOPROL-XL) 25 MG 24 hr tablet, Take 25 mg by mouth Daily., Disp: , Rfl:   •  Nutritional Supplements (ENSURE PO), Take 240 mL by mouth 2 (Two) Times a Day. MIX 8OZ OF ENSURE, 1/4 SCOOP PROTEIN POWDER AND 2OZ OF MILK FOR A MILKSHAKE, Disp: , Rfl:   •  OXcarbazepine (TRILEPTAL) 600 MG tablet, Take 600 mg by mouth 2 (Two) Times a Day., Disp: , Rfl:   •  pantoprazole (PROTONIX) 20 MG EC tablet, Take 20 mg by mouth Daily., Disp: , Rfl:   •  potassium chloride 10 MEQ CR tablet, Take 10 mEq by mouth Daily. Give once daily and extra with extra torsemide, Disp: , Rfl:   •  pregabalin (LYRICA) 25 MG capsule, Take 25 mg by mouth 2 (Two) Times a Day., Disp: , Rfl:   •  QUEtiapine (SEROquel) 25 MG tablet, Take 0.5 tablets by mouth At Night As Needed (agitation, sleep). (Patient taking differently: Take 12.5 mg by mouth Every Night.), Disp: 3 tablet, Rfl: 0  •  spironolactone (ALDACTONE) 25 MG tablet, Take 25 mg by mouth Daily., Disp: , Rfl:   •  torsemide (DEMADEX) 5 MG tablet, Take 2 tablets by mouth Daily. Take additional tablet as needed for 3lb weight gain in 24 hours or 5lbs in a week or increased shortness of breath, Disp: 60 tablet, Rfl: 3  •  traMADol (ULTRAM) 50 MG tablet, Take 50 mg by mouth Every 6 (Six) Hours As Needed for Moderate Pain ., Disp: , Rfl:   •  TRAZODONE HCL PO, Take 150 mg by mouth Daily., Disp: , Rfl:     Current Facility-Administered Medications:   •  albuterol sulfate HFA (PROVENTIL HFA;VENTOLIN HFA;PROAIR HFA) inhaler 4 puff, 4 puff, Inhalation, Once, Jennifer Ace MD    Past Medical History:   Diagnosis Date   • A-fib (HCC)    • Ataxic gait    • Chronic kidney disease, stage 3 (HCC)    • COPD (chronic obstructive pulmonary disease) (HCC)    •  "Dementia (HCC)    • Disease of thyroid gland    • Heart failure (HCC)    • Hyperlipidemia    • Hypertension      Past Surgical History:   Procedure Laterality Date   • BRAIN TUMOR EXCISION      BENIGN   • FRACTURE SURGERY     • TRIGGER FINGER RELEASE       Social History     Socioeconomic History   • Marital status: Single   Tobacco Use   • Smoking status: Former Smoker     Packs/day: 1.00     Years: 15.00     Pack years: 15.00     Start date:      Quit date:      Years since quittin.5   • Smokeless tobacco: Never Used   Vaping Use   • Vaping Use: Never used   Substance and Sexual Activity   • Alcohol use: Never     Comment: \"social drinker\"   • Drug use: Never   • Sexual activity: Never     Family History   Problem Relation Age of Onset   • Stroke Mother    • Alcohol abuse Father      Blood pressure 112/64, pulse 83, temperature 97.3 °F (36.3 °C), temperature source Infrared, height 160 cm (63\"), weight 45.4 kg (100 lb), SpO2 94 %.    Physical Exam  Constitutional:       Comments: underweight   HENT:      Head:      Comments: tymperal wasting     Nose:      Comments: masked  Eyes:      Conjunctiva/sclera: Conjunctivae normal.   Cardiovascular:      Rate and Rhythm: Normal rate. Rhythm irregular.   Pulmonary:      Effort: Pulmonary effort is normal.      Breath sounds: No wheezing or rhonchi.   Abdominal:      General: There is no distension.      Palpations: Abdomen is soft.   Musculoskeletal:      Right lower leg: No edema.      Left lower leg: No edema.   Lymphadenopathy:      Cervical: No cervical adenopathy.   Skin:     General: Skin is warm and dry.   Neurological:      Comments: Oriented x2           PFTs:    Radiology:    COMPARISON: 2022. 3/25/2022.     FINDINGS:     Lungs: No new opacity. Chronic changes of the right upper lobe when compared to 3/25/2022. Emphysematous changes.     Pleura: Trace bilateral pleural effusions. Probable loculated component within the right upper " lung.     Mediastinum and Jamila: Normal.     Pulmonary Arteries: No filling defects.      Cardiovascular: No evidence of acute abnormality heavy coronary artery calcification. Mild to moderate calcification of the thoracic aorta..  Heavy calcification of the visualized abdominal aorta. Mild cardiomegaly predominantly of the left atrium.     Upper Abdomen: No acute abnormality.     Chest Wall: No acute abnormality.     Musculoskeletal: Normal.         IMPRESSION:     1.  No evidence of pulmonary embolus.  2.  No evidence of acute cardiopulmonary abnormality.  3.  Chronic changes of the right upper lung.  4.  Emphysematous changes.        Electronically signed by:  Fei Bolanos D.O.    4/22/2022 4:16 AM Mountain Time    Personally reviewed her CTA of the chest from April 22 and her image from 2018.  She has a lot of apical pleural scarring with that dense masslike area and volume loss in the right upper lobe.  When I measured the lesion was over 4 cm in 2018 and was 3.7 cm on the April film.  She also has very impressive and diffuse emphysematous changes.      Interpretation Summary echocardiogram March 17, 2022    · Left ventricular ejection fraction appears to be 46 - 50%. Left ventricular systolic function is mildly decreased.  · Left ventricular diastolic function is consistent with (grade I) impaired relaxation.  · Mild to moderate mitral regurgitation.  · Mild to moderate tricuspid regurgitation, RVSP 54 mmHg.      Lab:    May 6, 2022 glucose 155, BUN 16, creatinine 0.87, sodium 147, potassium 4.7, chloride 105, bicarbonate 30, calcium 8.7, total protein 5.9, albumin 3.9, ALT 18, AST 30, alk phos 101, total bilirubin 0.4, GFR 65, white count 6.87, hemoglobin 11.4, hematocrit 35.3, platelet count 130, 77 polys, 14 lymphocytes, proBNP 13,255    Diagnoses and all orders for this visit:    1. Chronic respiratory failure, unspecified whether with hypoxia or hypercapnia (HCC) (Primary)    2. Centrilobular emphysema  ([facility])  -     Discontinue: albuterol sulfate HFA (PROVENTIL HFA;VENTOLIN HFA;PROAIR HFA) inhaler 4 puff  -     Pulmonary Function Test  -     albuterol sulfate HFA (PROVENTIL HFA;VENTOLIN HFA;PROAIR HFA) inhaler 4 puff    3. Lung mass    Other orders  -     budesonide-formoterol (SYMBICORT) 80-4.5 MCG/ACT inhaler; Inhale 2 puffs 2 (Two) Times a Day for 30 days.  Dispense: 10.2 g; Refill: 3  -     albuterol sulfate  (90 Base) MCG/ACT inhaler; Inhale 2 puffs Every 4 (Four) Hours As Needed for Wheezing.  Dispense: 18 g; Refill: 11        Discussion:     Delightful 86-year-old woman with underlying emphysema and a right upper lobe mass.  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  She is from [facility] and there is a lot of pleural reaction in that right apex.  It has gotten a little smaller compared to the 2018 film.  While cancer is a possibility I think it is less likely in represent postinflammatory scarring.  She does not have purulent sputum, hemoptysis, fever to suggest a granulomatous infection.  Resting room air saturation is 94%.  Given her paucity of symptoms from this lesion she and her nephew do not wish to pursue a diagnosis at this time.    I will refill her Symbicort, albuterol  Follow-up in 1 year with a chest x-ray  Follow-up sooner with a CT scan if symptoms worsen  She does not wish heroic measures        Jennifer Ace MD

## 2022-09-12 NOTE — PROGRESS NOTES
"[facility]  Heart and Valve Center      Mode of Visit: Video  Location of patient: other: [facility]  You have chosen to receive care through a telehealth visit.  Does the patient consent to use a video/audio connection for your medical care today? Yes  The visit included audio and video interaction. No technical issues occurred during this visit.     Chief Complaint  Follow-up and Congestive Heart Failure    History of Present Illness    Mitzi Eric is a 87 y.o. female with atrial fibrillation (no AC secondary to fall risk), heart failure with borderline EF (46-50%) with readmissions for acute pulmonary edema/COPD exacerbations, pulmonary hypertension, COPD, dementia, hypothyroidism, CKD and frequent falls who presents today as a telemedicine follow-up for heart failure.  Patient is mostly wheelchair-bound and resides at [facility].  Her nephew reports that she wears oxygen only at night. He does report that her memory has gotten worse and one night she took the oxygen off and had to wear it the remainder of the day. Was unable to recognize his brother recently. Appetite is still fairly poor and has to be encouraged to drink. Weight has been stable around 100-102. Denies any edema, orthopnea or PND.       Objective     Vital Signs:   Vitals:    09/13/22 1250   BP: 142/72   Pulse: 75   SpO2: 94%   Weight: 45.9 kg (101 lb 4.8 oz)   Height: 160 cm (63\")     Body mass index is 17.94 kg/m².    Virtual Visit Physical Exam  Physical Exam  Constitutional:       Appearance: Normal appearance.   HENT:      Head: Normocephalic.   Pulmonary:      Effort: Pulmonary effort is normal. No respiratory distress.   Neurological:      Mental Status: She is alert. Mental status is at baseline.   Psychiatric:         Mood and Affect: Mood normal.         Behavior: Behavior normal.         Thought Content: Thought content normal.              Result Review  Data Reviewed:{ Labs  " Result Review  Imaging  Med Tab  Media :23}   Adult Transthoracic Echo Complete W/ Cont if Necessary Per Protocol (03/17/2022 17:14)  Comprehensive Metabolic Panel (08/08/2022 15:22)  BNP (08/08/2022 15:22)  CBC & Differential (08/08/2022 15:22)  Troponin (08/08/2022 15:22)           Assessment and Plan {CC Problem List  Visit Diagnosis  ROS  Review (Popup)  Health Maintenance  Quality  BestPractice  Medications  SmartSets  SnapShot Encounters  Media :23}   1. Heart failure with preserved ejection fraction, borderline, class III (HCC)  Stable symptoms   Continue torsemide 10mg daily and daily weights. Take extra 5mg as needed for increased shortness of breath, 3lb wt gain in 24 hours  Continue spironolactone  Labs monitored by facility. Last labs showed mild bump in creatinine. Will see if labs have been repeated. If not, recommend repeating in a month or 2    2. Essential hypertension  Well controlled  Avoid aggressive BP lowering due to risk for falls    3. Longstanding persistent atrial fibrillation (HCC)  Rate controlled.   No AC secondary to frequent falls  Continue ASA for now    4. JUAN F  Creatinine 1.47 last check, but patient could have been dry. Currently being checked for UTI due to recent memory issues. Recommend routine monitoring due to poor PO intake. However, would be hesitant to lower torsemide to 5mg since last time we tried this she was readmitted.       Will continue to follow up via telemedicine. Her nephew will call us with any worsening symptoms. If any new symptoms arise, will change to office visit      Follow Up {Instructions Charge Capture  Follow-up Communications :23}   No follow-ups on file.    Patient was given instructions and counseling regarding her condition or for health maintenance advice. Please see specific information pulled into the AVS if appropriate.  Advised to call the Heart and Valve Center with any questions, concerns, or worsening symptoms.    Dictated  Utilizing Dragon Dictation

## 2022-09-13 ENCOUNTER — TELEMEDICINE (OUTPATIENT)
Dept: CARDIOLOGY | Facility: HOSPITAL | Age: 87
End: 2022-09-13

## 2022-09-13 VITALS
WEIGHT: 101.3 LBS | SYSTOLIC BLOOD PRESSURE: 142 MMHG | HEART RATE: 75 BPM | OXYGEN SATURATION: 94 % | DIASTOLIC BLOOD PRESSURE: 72 MMHG | HEIGHT: 63 IN | BODY MASS INDEX: 17.95 KG/M2

## 2022-09-13 DIAGNOSIS — I48.11 LONGSTANDING PERSISTENT ATRIAL FIBRILLATION: ICD-10-CM

## 2022-09-13 DIAGNOSIS — I10 ESSENTIAL HYPERTENSION: ICD-10-CM

## 2022-09-13 DIAGNOSIS — N17.9 AKI (ACUTE KIDNEY INJURY): ICD-10-CM

## 2022-09-13 DIAGNOSIS — I50.30 HEART FAILURE WITH PRESERVED EJECTION FRACTION, BORDERLINE, CLASS III: Primary | ICD-10-CM

## 2022-09-13 PROCEDURE — 99214 OFFICE O/P EST MOD 30 MIN: CPT | Performed by: NURSE PRACTITIONER

## 2022-09-16 ENCOUNTER — DOCUMENTATION (OUTPATIENT)
Dept: CARDIOLOGY | Facility: HOSPITAL | Age: 87
End: 2022-09-16

## 2022-09-16 NOTE — PROGRESS NOTES
Received MAR from SNF and it appears her torsemide was increased to 20mg on 6/27. Labs monitored by SNF.

## 2022-12-12 NOTE — PROGRESS NOTES
"[facility]  Heart and Valve Center      Mode of Visit: Video  Location of patient: other: [facility]  You have chosen to receive care through a telehealth visit.  Does the patient consent to use a video/audio connection for your medical care today? Yes  The visit included audio and video interaction. No technical issues occurred during this visit.     Chief Complaint  Follow-up and Congestive Heart Failure    History of Present Illness    Mitzi Eric is a 87 y.o. female with atrial fibrillation (no AC secondary to fall risk), heart failure with borderline EF (46-50%) with readmissions for acute pulmonary edema/COPD exacerbations, pulmonary hypertension, COPD, dementia, hypothyroidism, CKD and frequent falls who presents today as a telemedicine follow-up for heart failure.      Patient is mostly wheelchair-bound and resides at [facility].  Her nephew is present. He reports she is doing well. He feels she urinates often and that the torsemide helps her not have UTIs. She denies any shortness of breath or edema. No orthopnea or PND. Wears oxygen at night. Her nephew does note worsening short term memory    Objective     Vital Signs:   Vitals:    12/13/22 1303   BP: 154/60   Pulse: 82   SpO2: 93%   Weight: 44.8 kg (98 lb 12.8 oz)   Height: 160 cm (63\")     Body mass index is 17.5 kg/m².    Virtual Visit Physical Exam  Physical Exam  Constitutional:       Appearance: Normal appearance.   HENT:      Head: Normocephalic.   Pulmonary:      Effort: Pulmonary effort is normal. No respiratory distress.   Neurological:      Mental Status: She is alert. Mental status is at baseline.   Psychiatric:         Mood and Affect: Mood normal.         Behavior: Behavior normal.         Thought Content: Thought content normal.              Result Review  Data Reviewed:{ Labs  Result Review  Imaging  Med Tab  Media :23}   Adult Transthoracic Echo Complete W/ Cont if Necessary Per " Protocol (03/17/2022 17:14)  Comprehensive Metabolic Panel (08/08/2022 15:22)  BNP (08/08/2022 15:22)  CBC & Differential (08/08/2022 15:22)  Troponin (08/08/2022 15:22)           Assessment and Plan {CC Problem List  Visit Diagnosis  ROS  Review (Popup)  Health Maintenance  Quality  BestPractice  Medications  SmartSets  SnapShot Encounters  Media :23}   1. Heart failure with preserved ejection fraction, borderline, class III (HCC)  Stable symptoms   Her torsemide has been increased to 20mg per facility  Tried to obtain last labs but no results. Per nursing, they will draw labs and send results  Continue spironolactone (added per facility)    2. Essential hypertension  Slightly elevated but usually controlled  Avoid aggressive BP lowering due to risk for falls    3. Longstanding persistent atrial fibrillation (HCC)  Rate controlled.   No AC secondary to frequent falls  Continue ASA for now    4. CKD IIIa  Creatinine 1.47  In August (baseline around 0.8) but patient could have been dry and also might have had a UTI  Her nephew is going to make sure she has labs rechecked. Recommend lab monitoring every 3 months      Will continue to follow up via telemedicine. Her nephew will call us with any worsening symptoms. If any new symptoms arise, will change to office visit      Follow Up {Instructions Charge Capture  Follow-up Communications :23}   No follow-ups on file.    Patient was given instructions and counseling regarding her condition or for health maintenance advice. Please see specific information pulled into the AVS if appropriate.  Advised to call the Heart and Valve Center with any questions, concerns, or worsening symptoms.    Dictated Utilizing Dragon Dictation

## 2022-12-13 ENCOUNTER — TELEMEDICINE (OUTPATIENT)
Dept: CARDIOLOGY | Facility: HOSPITAL | Age: 87
End: 2022-12-13

## 2022-12-13 VITALS
HEART RATE: 82 BPM | WEIGHT: 98.8 LBS | SYSTOLIC BLOOD PRESSURE: 154 MMHG | HEIGHT: 63 IN | OXYGEN SATURATION: 93 % | BODY MASS INDEX: 17.5 KG/M2 | DIASTOLIC BLOOD PRESSURE: 60 MMHG

## 2022-12-13 DIAGNOSIS — I48.11 LONGSTANDING PERSISTENT ATRIAL FIBRILLATION: ICD-10-CM

## 2022-12-13 DIAGNOSIS — I10 ESSENTIAL HYPERTENSION: ICD-10-CM

## 2022-12-13 DIAGNOSIS — N18.31 STAGE 3A CHRONIC KIDNEY DISEASE: ICD-10-CM

## 2022-12-13 DIAGNOSIS — I50.30 HEART FAILURE WITH PRESERVED EJECTION FRACTION, BORDERLINE, CLASS III: Primary | ICD-10-CM

## 2022-12-13 PROCEDURE — 99214 OFFICE O/P EST MOD 30 MIN: CPT | Performed by: NURSE PRACTITIONER

## 2022-12-13 RX ORDER — TORSEMIDE 20 MG/1
20 TABLET ORAL DAILY
COMMUNITY

## 2022-12-13 RX ORDER — LORATADINE 10 MG/1
CAPSULE, LIQUID FILLED ORAL
COMMUNITY

## 2022-12-21 ENCOUNTER — TELEPHONE (OUTPATIENT)
Dept: CARDIOLOGY | Facility: HOSPITAL | Age: 87
End: 2022-12-21

## 2022-12-21 ENCOUNTER — OFFICE VISIT (OUTPATIENT)
Dept: NEUROLOGY | Facility: CLINIC | Age: 87
End: 2022-12-21

## 2022-12-21 VITALS
SYSTOLIC BLOOD PRESSURE: 146 MMHG | WEIGHT: 97 LBS | TEMPERATURE: 98 F | BODY MASS INDEX: 17.19 KG/M2 | HEART RATE: 71 BPM | DIASTOLIC BLOOD PRESSURE: 62 MMHG | HEIGHT: 63 IN | OXYGEN SATURATION: 98 % | RESPIRATION RATE: 15 BRPM

## 2022-12-21 DIAGNOSIS — Z74.09 IMPAIRED FUNCTIONAL MOBILITY, BALANCE, GAIT, AND ENDURANCE: ICD-10-CM

## 2022-12-21 DIAGNOSIS — G25.0 ESSENTIAL TREMOR: ICD-10-CM

## 2022-12-21 DIAGNOSIS — F03.90 DEMENTIA WITHOUT BEHAVIORAL DISTURBANCE: Primary | ICD-10-CM

## 2022-12-21 PROCEDURE — 99214 OFFICE O/P EST MOD 30 MIN: CPT | Performed by: NURSE PRACTITIONER

## 2022-12-21 RX ORDER — DONEPEZIL HYDROCHLORIDE 5 MG/1
5 TABLET, FILM COATED ORAL EVERY MORNING
Qty: 90 TABLET | Refills: 3 | Status: SHIPPED | OUTPATIENT
Start: 2022-12-21 | End: 2023-12-21

## 2022-12-21 RX ORDER — POTASSIUM CHLORIDE 750 MG/1
TABLET, EXTENDED RELEASE ORAL
COMMUNITY
Start: 2022-09-19 | End: 2023-02-18 | Stop reason: HOSPADM

## 2022-12-21 NOTE — TELEPHONE ENCOUNTER
Spoke with patient's nephew. Discussed CHADSVasc score of 3, HAS BLED score and annual risk for stroke. No recent falls. Discussed risks vs benefits of anticoagulation. Advised that I am ok with her starting eliquis as long as provider and his family feels she is not a significant fall risk at this time. Recommend eliquis 2.5mg BID. He says he will think about it and discuss with PCP

## 2022-12-21 NOTE — PROGRESS NOTES
Subjective:     Patient ID: Mitzi Eric is a 87 y.o. female.    CC:   Chief Complaint   Patient presents with   • Memory Loss     MMSE         HPI:   History of Present Illness   Today 12/21/2022- This is an 87-year-old female who presents for a 6-month follow-up on dementia. Symptoms have been present since 2019 and worsened during COVID-19 pandemic. She is on donepezil 5 mg daily, here for follow-up. Her nephew is her power of . His name is Freddy Eric and he is with her during today's visit.    Today, the patient states she is doing very well. She denies any changes in her health. Her nephew reports that the patient's primary care physician is through [facility] and she has been there for 5 years. He states that the patient is on her third PCP in the facility and that he met with them yesterday and he thinks that she might benefit from Eliquis to prevent a stroke. He states that they had a video conference with RAFAEL Lloyd with Cardiology on Monday and they are already scheduled for another video conference in 03/2023. He states that he will call Cardiology to get their input.    The patient's nephew states that the patient's mother passed away from a stroke when she was 6 years old. He states that none of her other relatives have passed away from strokes.    The patient's nephew states that she has had a craniotomy a long time ago. He states that the patient can get in and out of bed without any problems. He states that she can go to the bathroom without any problems. He states that she can get in the shower without any problems. He states that she rolls herself up to the dining room and back and she goes to chair exercises. He states that she has had falls in the past. He states that they were trying to get her a seat and use a walker. He states that being in a wheelchair is not affecting the quality of her life and it is not worth the risk. He states that she is still able to get around in  "her wheelchair. Her last fall was in March 2022.    Her nephew states that she is being treated with donepezil and her memory testing scores are not changing. Her short term memory is progressively worsening but otherwise she seems pretty stable. He states that she does suffer from congenital heart failure and she is on oxygen at night to sleep. He states that she wears it all night and that means she does not have to wear it during the day. He states that she is on a high level of a diuretic and that seems to be working. He states that he is a little hesitant to add another memory medication, because he does not know that she necessarily needs it. He states that she does not know what day of the week it is or what date it is,  because she's in her assisted living facility all the time. He states that he sees her every day because it is very close to where he lives. He states that the nursing staff has been very impressive to him. He states that the aides and the senior living staff is \"a constantly revolving door\". He states that the medicines are most important and the nurses are there and they are getting that to her.    He states that she has not had any falls since 03/2022. He states that she fell in 03/2022 and completed a CT scan. He states that she had breathing troubles in 04/2022. He states that he thinks she had a UTI and she was completely not herself, and was out of her head and combative. He states that she was sent to the hospital, most likely for these reasons. She states that she is sleeping, \"like a log\". He denies any hallucinations, delusions, or agitation. He states that she jerks both her hands and legs. He states that he is not sure if it is a \"touch of Parkinson's\". He states that it is a little bit of an annoyance, but it does not really affect the quality of her. This happens when she is picking something up or holding something. Notes no resting tremors.     Prior significant medical history " and workup:  She has noted symptoms since at least 2019 and significantly worsened since COVID-19 pandemic per Nephew/POA marked initially by forgetfulness , repetitiveness  and word-finding difficulties . This has worsened  over time. Additional symptoms have included impairments in essentially all spheres of cognition. There have been associated  symptoms of delusions, hallucinations and UTIs recurrent November and December 2021 with hospitalizations and flu December 2021. She notes  impairments in ADL's. Her family  manages her finances and she is a current resident at [facility] since 2017. She is no longer driving . She is currently residing at [facility].     She was admitted to [facility] 11/29/2021 to 12/6/2021 for concerns of a stroke with history of dementia, hypothyroidism, prior craniotomy, hypertension, A. fib, COPD, lung mass (aware since 2018 and family declines further workup) and frequent UTIs with altered mental status, falls and tremors found to have UTI.  They felt she had delirium related to her dementia and UTI.  They made multiple medication adjustments.  Neurology Dr. Briseno reviewed MRI of the brain imaging with and without contrast which was completed on 11/29/2021 and this did show postsurgical changes and encephalomalacia in the left frontal lobe.  He did not find any acute abnormalities suggesting acute stroke.  There was no abnormal contrast-enhancement.  She also underwent CTA of the head, neck and CT cerebral perfusion which were all unremarkable.  Family did meet with palliative care and hospice during that admission but she is not currently under their services.  She was then readmitted to [facility] from 12/6/2021 to 12/8/2021 for acute respiratory failure with hypoxia and confirmed influenza A along with a UTI.  Medications were again adjusted.  Palliative care was consulted.     She takes Lyrica and oxcarbazepine  "long term for for trigeminal neuralgia.     She fell on 2022 and subsequently had a CT scan performed of her head. Her scan was showing no acute process with old craniotomy changes.   Remote craniotomy with benign brain tumor excision.    The following portions of the patient's history were reviewed and updated as appropriate: allergies, current medications, past family history, past medical history, past social history, past surgical history and problem list.    Past Medical History:   Diagnosis Date   • A-fib (HCC)    • Ataxic gait    • Chronic kidney disease, stage 3 (HCC)    • COPD (chronic obstructive pulmonary disease) (HCC)    • Dementia (HCC)    • Disease of thyroid gland    • Essential tremor 2022   • Heart failure (HCC)    • Hyperlipidemia    • Hypertension    • Shingles        Past Surgical History:   Procedure Laterality Date   • BRAIN TUMOR EXCISION      BENIGN   • FRACTURE SURGERY     • TRIGGER FINGER RELEASE         Social History     Socioeconomic History   • Marital status: Single   Tobacco Use   • Smoking status: Former     Packs/day: 1.50     Years: 15.00     Pack years: 22.50     Types: Cigarettes     Start date:      Quit date:      Years since quittin.0   • Smokeless tobacco: Never   Vaping Use   • Vaping Use: Never used   Substance and Sexual Activity   • Alcohol use: Never     Comment: \"social drinker\"   • Drug use: Never   • Sexual activity: Never       Family History   Problem Relation Age of Onset   • Stroke Mother    • Alcohol abuse Father           Current Outpatient Medications:   •  acetaminophen (TYLENOL) 325 MG tablet, Take 650 mg by mouth 3 (Three) Times a Day., Disp: , Rfl:   •  albuterol sulfate  (90 Base) MCG/ACT inhaler, Inhale 2 puffs Every 4 (Four) Hours As Needed for Wheezing., Disp: 18 g, Rfl: 11  •  aspirin 81 MG EC tablet, Take 1 tablet by mouth Daily., Disp: 30 tablet, Rfl: 3  •  docusate sodium 100 MG capsule, Take 100 mg by mouth 2 (Two) " Times a Day., Disp: , Rfl:   •  donepezil (Aricept) 5 MG tablet, Take 1 tablet by mouth Every Morning., Disp: 90 tablet, Rfl: 3  •  folic acid (FOLVITE) 400 MCG tablet, Take 400 mcg by mouth 2 (Two) Times a Day., Disp: , Rfl:   •  levothyroxine (SYNTHROID, LEVOTHROID) 25 MCG tablet, Take 25 mcg by mouth Every Morning., Disp: , Rfl:   •  Loratadine 10 MG capsule, Take  by mouth., Disp: , Rfl:   •  meclizine 25 MG chewable tablet chewable tablet, Chew 25 mg 3 (Three) Times a Day As Needed., Disp: , Rfl:   •  Menthol, Topical Analgesic, (Biofreeze) 4 % gel, Apply 1 application topically Every Morning. Apply to lower back, Disp: , Rfl:   •  metoprolol succinate XL (TOPROL-XL) 25 MG 24 hr tablet, Take 25 mg by mouth Daily., Disp: , Rfl:   •  Nutritional Supplements (ENSURE PO), Take 240 mL by mouth 2 (Two) Times a Day. MIX 8OZ OF ENSURE, 1/4 SCOOP PROTEIN POWDER AND 2OZ OF MILK FOR A MILKSHAKE, Disp: , Rfl:   •  O2 (OXYGEN), Inhale 2 L Every Night., Disp: , Rfl:   •  OXcarbazepine (TRILEPTAL) 600 MG tablet, Take 600 mg by mouth 2 (Two) Times a Day., Disp: , Rfl:   •  pantoprazole (PROTONIX) 20 MG EC tablet, Take 20 mg by mouth Daily., Disp: , Rfl:   •  potassium chloride (K-DUR,KLOR-CON) 10 MEQ CR tablet, , Disp: , Rfl:   •  potassium chloride 10 MEQ CR tablet, Take 10 mEq by mouth Daily. Give once daily and extra with extra torsemide, Disp: , Rfl:   •  pregabalin (LYRICA) 25 MG capsule, Take 25 mg by mouth 2 (Two) Times a Day., Disp: , Rfl:   •  spironolactone (ALDACTONE) 25 MG tablet, Take 25 mg by mouth Daily., Disp: , Rfl:   •  torsemide (DEMADEX) 20 MG tablet, Take 20 mg by mouth Daily., Disp: , Rfl:   •  torsemide (DEMADEX) 5 MG tablet, Take 2 tablets by mouth Daily. Take additional tablet as needed for 3lb weight gain in 24 hours or 5lbs in a week or increased shortness of breath (Patient taking differently: Take 5 mg by mouth Daily As Needed (SOB, wt gain).), Disp: 60 tablet, Rfl: 3  •  traMADol (ULTRAM) 50 MG  "tablet, Take 50 mg by mouth Every 6 (Six) Hours As Needed for Moderate Pain ., Disp: , Rfl:   •  TRAZODONE HCL PO, Take 150 mg by mouth Daily., Disp: , Rfl:     Current Facility-Administered Medications:   •  albuterol sulfate HFA (PROVENTIL HFA;VENTOLIN HFA;PROAIR HFA) inhaler 4 puff, 4 puff, Inhalation, Once, Jennifer Ace MD     Review of Systems   Constitutional: Negative for chills, fatigue, fever and unexpected weight change.   HENT: Negative for ear pain, hearing loss, nosebleeds, rhinorrhea and sore throat.    Eyes: Negative for photophobia, pain, discharge, itching and visual disturbance.   Respiratory: Negative for cough, chest tightness, shortness of breath and wheezing.    Cardiovascular: Negative for chest pain, palpitations and leg swelling.   Gastrointestinal: Negative for abdominal pain, blood in stool, constipation, diarrhea, nausea and vomiting.   Genitourinary: Negative for dysuria, frequency, hematuria and urgency.   Musculoskeletal: Positive for gait problem. Negative for arthralgias, back pain, joint swelling, myalgias, neck pain and neck stiffness.   Skin: Negative for rash and wound.   Allergic/Immunologic: Negative for environmental allergies and food allergies.   Neurological: Positive for weakness. Negative for dizziness, tremors, seizures, syncope, speech difficulty, light-headedness, numbness and headaches.   Hematological: Negative for adenopathy. Does not bruise/bleed easily.   Psychiatric/Behavioral: Positive for confusion and decreased concentration. Negative for agitation, hallucinations, sleep disturbance and suicidal ideas. The patient is not nervous/anxious.         Objective:  /62 (BP Location: Left arm, Patient Position: Sitting, Cuff Size: Adult)   Pulse 71   Temp 98 °F (36.7 °C) (Infrared)   Resp 15   Ht 160 cm (62.99\")   Wt 44 kg (97 lb)   SpO2 98%   BMI 17.19 kg/m²     Neurologic Exam  Mental Status   Oriented to person.   Disoriented to place. "   Disoriented to time.   Registration: recalls 3 of 3 objects. Recall at 5 minutes: recalls 2 of 3 objects.   Speech: speech is normal   Level of consciousness: alert  Abnormal comprehension.      Cranial Nerves   Cranial nerves II through XII intact.      Motor Exam   Muscle bulk: normal  Overall muscle tone: normal     Strength   Strength 5/5 except as noted.   Generally weak, frail, in no acute distress, deconditioned      Gait, Coordination, and Reflexes      Gait  Gait: (in wheelchair & not observed today)     Coordination   Finger to nose coordination: normal     Tremor   Resting tremor: absent  Intention tremor: mild bilateral hand fine kinetic hand tremor  Action tremor: absent  Normal finger taps bilaterally  No bradykinesis or cogwheel rigidity noted    Reflexes   Right : 2+  Left : 2+  2 + DTRs bilaterally upper and lower extremities    Physical Exam  Constitutional:       Appearance: Normal appearance.      Comments: BMI 17.2   Neurological:      Mental Status: She is alert.      Coordination: Finger-Nose-Finger Test normal.   Psychiatric:         Mood and Affect: Mood and affect normal.  More talkative today.     Speech: Speech normal.         Behavior: Behavior is slowed.         Thought Content: Thought content normal.         Cognition and Memory: She exhibits impaired recent memory.    Results: MMSE today is a 23 out of 30 with 2 out of 3 for word recall.   Prior MMSE was a 23 out of 30 with 1 out of 3 for word recall.    Assessment/Plan:       Diagnoses and all orders for this visit:    1. Dementia without behavioral disturbance (HCC) (Primary)  -     donepezil (Aricept) 5 MG tablet; Take 1 tablet by mouth Every Morning.  Dispense: 90 tablet; Refill: 3    2. Impaired functional mobility, balance, gait, and endurance  Comments:  CONTINUE WHEELCHAIR, FALLS PREVENTION    3. Essential tremor  Comments:  monitor           She is relatively stable in regards to her memory with slight worsening of  short term recall. We have options to continue the donepezil same dose. She is in the wheelchair. I have deferred the Eliquis questions to cardiology, although it clearly states in the last cardiology note that the patient should only be on aspirin 81 mg daily due to fall risk and recurrent falls and did not recommend any additional anticoagulation. She is doing well on her donepezil. No big changes in her memory. No hallucinations or delusions. Still very short-term memory difficulties. Continue falls prevention. Continue current medication. Discussed everything in detail with the patient and her nephew. Also noted some tremors in both hands-consistent with essential tremors, no bradykinesia, no cogwheel rigidity, no resting tremors. I do not suspect parkinson's at this time. We will continue to monitor the tremors since this is a newer symptom for her.    We will follow up in 6 months or sooner if needed.     Reviewed medications, potential side effects and signs and symptoms to report. Discussed risk versus benefits of treatment plan with patient and/or family-including medications, labs and radiology that may be ordered. Addressed questions and concerns during visit. Patient and/or family verbalized understanding and agree with plan.    AS THE PROVIDER, I PERSONALLY WORE PPE DURING ENTIRE FACE TO FACE ENCOUNTER IN CLINIC WITH THE PATIENT. PATIENT ALSO WORE PPE DURING ENTIRE FACE TO FACE ENCOUNTER EXCEPT FOR A MAX OF 30 SECONDS DURING NEUROLOGICAL EVALUATION OF CRANIAL NERVES AND THEN MASK WAS PLACED BACK OVER PATIENT FACE FOR REMAINDER OF VISIT. I WASHED MY HANDS BEFORE AND AFTER VISIT.    During this visit the following were done:  Labs Reviewed []    Labs Ordered []    Radiology Reports Reviewed []    Radiology Ordered []    PCP Records Reviewed []    Referring Provider Records Reviewed []    ER Records Reviewed []    Hospital Records Reviewed []    History Obtained From Family [x]  nephew  Radiology Images  Reviewed []    Other Reviewed [x]  [facility] medications reconciled, cardiology notes reviewed  Records Requested []      Transcribed from ambient dictation for RAFAEL Sim by Marily Thornton.  12/21/22   11:47 EST    Patient or patient representative verbalized consent to the visit recording.  I have personally performed the services described in this document as transcribed by the above individual, and it is both accurate and complete.  RAFAEL Sim  12/21/2022  13:39 EST

## 2022-12-21 NOTE — TELEPHONE ENCOUNTER
----- Message from Stacia Hampton MA sent at 12/21/2022  4:23 PM EST -----  Pt's POA called and stated that she saw a new physician at [facility] and he recommended Eliquis. He stated that he would like to speak to you on this matter.I did request the progress note from [facility] and they are supposed to fax it here. Jelani stated that  you can call at your convenience. He doesn't plan on speaking again to the physician until later next week.

## 2023-01-01 ENCOUNTER — TELEPHONE (OUTPATIENT)
Dept: NEUROLOGY | Facility: CLINIC | Age: 88
End: 2023-01-01

## 2023-01-01 ENCOUNTER — TELEPHONE (OUTPATIENT)
Dept: PEDIATRICS | Facility: OTHER | Age: 88
End: 2023-01-01

## 2023-02-12 ENCOUNTER — APPOINTMENT (OUTPATIENT)
Dept: CT IMAGING | Facility: HOSPITAL | Age: 88
End: 2023-02-12
Payer: MEDICARE

## 2023-02-12 ENCOUNTER — HOSPITAL ENCOUNTER (OUTPATIENT)
Facility: HOSPITAL | Age: 88
Setting detail: OBSERVATION
Discharge: HOME | End: 2023-02-18
Attending: EMERGENCY MEDICINE | Admitting: INTERNAL MEDICINE
Payer: MEDICARE

## 2023-02-12 ENCOUNTER — APPOINTMENT (OUTPATIENT)
Dept: GENERAL RADIOLOGY | Facility: HOSPITAL | Age: 88
End: 2023-02-12
Payer: MEDICARE
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
## 2023-02-12 DIAGNOSIS — Z74.09 IMPAIRED FUNCTIONAL MOBILITY, BALANCE, GAIT, AND ENDURANCE: ICD-10-CM

## 2023-02-12 DIAGNOSIS — I10 ESSENTIAL HYPERTENSION: ICD-10-CM

## 2023-02-12 DIAGNOSIS — D69.6 THROMBOCYTOPENIA: ICD-10-CM

## 2023-02-12 DIAGNOSIS — J96.21 ACUTE ON CHRONIC RESPIRATORY FAILURE WITH HYPOXEMIA: ICD-10-CM

## 2023-02-12 DIAGNOSIS — I50.9 ACUTE CONGESTIVE HEART FAILURE, UNSPECIFIED HEART FAILURE TYPE: ICD-10-CM

## 2023-02-12 DIAGNOSIS — M62.838 CERVICAL PARASPINOUS MUSCLE SPASM: ICD-10-CM
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
## 2023-02-12 DIAGNOSIS — R53.1 GENERALIZED WEAKNESS: ICD-10-CM

## 2023-02-12 DIAGNOSIS — J44.1 COPD WITH ACUTE EXACERBATION: ICD-10-CM

## 2023-02-12 DIAGNOSIS — J18.9 PNEUMONIA OF BOTH UPPER LOBES DUE TO INFECTIOUS ORGANISM: Primary | ICD-10-CM

## 2023-02-12 DIAGNOSIS — J96.01 ACUTE RESPIRATORY FAILURE WITH HYPOXIA: ICD-10-CM

## 2023-02-12 LAB
ALBUMIN SERPL-MCNC: 3.6 G/DL (ref 3.5–5.2)
ALBUMIN/GLOB SERPL: 1.3 G/DL
ALP SERPL-CCNC: 138 U/L (ref 39–117)
ALT SERPL W P-5'-P-CCNC: 8 U/L (ref 1–33)
ANION GAP SERPL CALCULATED.3IONS-SCNC: 9 MMOL/L (ref 5–15)
AST SERPL-CCNC: 23 U/L (ref 1–32)
BASOPHILS # BLD AUTO: 0.05 10*3/MM3 (ref 0–0.2)
BASOPHILS NFR BLD AUTO: 0.4 % (ref 0–1.5)
BILIRUB SERPL-MCNC: 0.4 MG/DL (ref 0–1.2)
BUN SERPL-MCNC: 23 MG/DL (ref 8–23)
BUN/CREAT SERPL: 24.2 (ref 7–25)
CALCIUM SPEC-SCNC: 8.9 MG/DL (ref 8.6–10.5)
CHLORIDE SERPL-SCNC: 102 MMOL/L (ref 98–107)
CO2 SERPL-SCNC: 27 MMOL/L (ref 22–29)
CREAT SERPL-MCNC: 0.95 MG/DL (ref 0.57–1)
D-LACTATE SERPL-SCNC: 1.4 MMOL/L (ref 0.5–2)
DEPRECATED RDW RBC AUTO: 47.2 FL (ref 37–54)
EGFRCR SERPLBLD CKD-EPI 2021: 58.5 ML/MIN/1.73
EOSINOPHIL # BLD AUTO: 0.07 10*3/MM3 (ref 0–0.4)
EOSINOPHIL NFR BLD AUTO: 0.6 % (ref 0.3–6.2)
ERYTHROCYTE [DISTWIDTH] IN BLOOD BY AUTOMATED COUNT: 13.1 % (ref 12.3–15.4)
FLUAV RNA RESP QL NAA+PROBE: NOT DETECTED
FLUBV RNA RESP QL NAA+PROBE: NOT DETECTED
GLOBULIN UR ELPH-MCNC: 2.8 GM/DL
GLUCOSE BLDC GLUCOMTR-MCNC: 82 MG/DL (ref 70–130)
GLUCOSE SERPL-MCNC: 104 MG/DL (ref 65–99)
HCT VFR BLD AUTO: 34.2 % (ref 34–46.6)
HGB BLD-MCNC: 11.1 G/DL (ref 12–15.9)
IMM GRANULOCYTES # BLD AUTO: 0.11 10*3/MM3 (ref 0–0.05)
IMM GRANULOCYTES NFR BLD AUTO: 0.9 % (ref 0–0.5)
LYMPHOCYTES # BLD AUTO: 0.69 10*3/MM3 (ref 0.7–3.1)
LYMPHOCYTES NFR BLD AUTO: 5.6 % (ref 19.6–45.3)
MCH RBC QN AUTO: 32.3 PG (ref 26.6–33)
MCHC RBC AUTO-ENTMCNC: 32.5 G/DL (ref 31.5–35.7)
MCV RBC AUTO: 99.4 FL (ref 79–97)
MONOCYTES # BLD AUTO: 1.43 10*3/MM3 (ref 0.1–0.9)
MONOCYTES NFR BLD AUTO: 11.6 % (ref 5–12)
NEUTROPHILS NFR BLD AUTO: 10 10*3/MM3 (ref 1.7–7)
NEUTROPHILS NFR BLD AUTO: 80.9 % (ref 42.7–76)
NRBC BLD AUTO-RTO: 0 /100 WBC (ref 0–0.2)
PLATELET # BLD AUTO: 223 10*3/MM3 (ref 140–450)
PMV BLD AUTO: 11.5 FL (ref 6–12)
POTASSIUM SERPL-SCNC: 4.7 MMOL/L (ref 3.5–5.2)
PROCALCITONIN SERPL-MCNC: 0.07 NG/ML (ref 0–0.25)
PROT SERPL-MCNC: 6.4 G/DL (ref 6–8.5)
RBC # BLD AUTO: 3.44 10*6/MM3 (ref 3.77–5.28)
RSV RNA NPH QL NAA+NON-PROBE: NOT DETECTED
SARS-COV-2 RNA RESP QL NAA+PROBE: NOT DETECTED
SODIUM SERPL-SCNC: 138 MMOL/L (ref 136–145)
TSH SERPL DL<=0.05 MIU/L-ACNC: 1.25 UIU/ML (ref 0.27–4.2)
WBC NRBC COR # BLD: 12.35 10*3/MM3 (ref 3.4–10.8)

## 2023-02-12 PROCEDURE — 99222 1ST HOSP IP/OBS MODERATE 55: CPT | Performed by: INTERNAL MEDICINE

## 2023-02-12 PROCEDURE — 73030 X-RAY EXAM OF SHOULDER: CPT

## 2023-02-12 PROCEDURE — 96365 THER/PROPH/DIAG IV INF INIT: CPT

## 2023-02-12 PROCEDURE — G0378 HOSPITAL OBSERVATION PER HR: HCPCS

## 2023-02-12 PROCEDURE — 94664 DEMO&/EVAL PT USE INHALER: CPT

## 2023-02-12 PROCEDURE — 71045 X-RAY EXAM CHEST 1 VIEW: CPT

## 2023-02-12 PROCEDURE — 72125 CT NECK SPINE W/O DYE: CPT

## 2023-02-12 PROCEDURE — 94640 AIRWAY INHALATION TREATMENT: CPT

## 2023-02-12 PROCEDURE — 99285 EMERGENCY DEPT VISIT HI MDM: CPT

## 2023-02-12 PROCEDURE — 25010000002 PIPERACILLIN SOD-TAZOBACTAM PER 1 G: Performed by: EMERGENCY MEDICINE

## 2023-02-12 PROCEDURE — 83605 ASSAY OF LACTIC ACID: CPT | Performed by: EMERGENCY MEDICINE

## 2023-02-12 PROCEDURE — 93005 ELECTROCARDIOGRAM TRACING: CPT | Performed by: EMERGENCY MEDICINE

## 2023-02-12 PROCEDURE — 25010000002 VANCOMYCIN PER 500 MG: Performed by: EMERGENCY MEDICINE

## 2023-02-12 PROCEDURE — 82962 GLUCOSE BLOOD TEST: CPT

## 2023-02-12 PROCEDURE — 84145 PROCALCITONIN (PCT): CPT | Performed by: INTERNAL MEDICINE

## 2023-02-12 PROCEDURE — 94799 UNLISTED PULMONARY SVC/PX: CPT

## 2023-02-12 PROCEDURE — 87637 SARSCOV2&INF A&B&RSV AMP PRB: CPT | Performed by: EMERGENCY MEDICINE

## 2023-02-12 PROCEDURE — 80053 COMPREHEN METABOLIC PANEL: CPT | Performed by: EMERGENCY MEDICINE

## 2023-02-12 PROCEDURE — 96367 TX/PROPH/DG ADDL SEQ IV INF: CPT

## 2023-02-12 PROCEDURE — 84443 ASSAY THYROID STIM HORMONE: CPT | Performed by: INTERNAL MEDICINE

## 2023-02-12 PROCEDURE — C9803 HOPD COVID-19 SPEC COLLECT: HCPCS

## 2023-02-12 PROCEDURE — 71250 CT THORAX DX C-: CPT

## 2023-02-12 PROCEDURE — 87040 BLOOD CULTURE FOR BACTERIA: CPT | Performed by: EMERGENCY MEDICINE

## 2023-02-12 PROCEDURE — 85025 COMPLETE CBC W/AUTO DIFF WBC: CPT | Performed by: EMERGENCY MEDICINE

## 2023-02-12 PROCEDURE — 36415 COLL VENOUS BLD VENIPUNCTURE: CPT

## 2023-02-12 RX ORDER — LEVOTHYROXINE SODIUM 0.03 MG/1
25 TABLET ORAL
Status: DISCONTINUED | OUTPATIENT
Start: 2023-02-13 | End: 2023-02-18 | Stop reason: HOSPADM

## 2023-02-12 RX ORDER — PANTOPRAZOLE SODIUM 40 MG/1
40 TABLET, DELAYED RELEASE ORAL DAILY
Status: DISCONTINUED | OUTPATIENT
Start: 2023-02-13 | End: 2023-02-18 | Stop reason: HOSPADM

## 2023-02-12 RX ORDER — METOPROLOL SUCCINATE 25 MG/1
25 TABLET, EXTENDED RELEASE ORAL DAILY
Status: DISCONTINUED | OUTPATIENT
Start: 2023-02-12 | End: 2023-02-18 | Stop reason: HOSPADM

## 2023-02-12 RX ORDER — PANTOPRAZOLE SODIUM 20 MG/1
20 TABLET, DELAYED RELEASE ORAL DAILY
Status: DISCONTINUED | OUTPATIENT
Start: 2023-02-12 | End: 2023-02-12

## 2023-02-12 RX ORDER — OXCARBAZEPINE 300 MG/1
600 TABLET, FILM COATED ORAL 2 TIMES DAILY
Status: DISCONTINUED | OUTPATIENT
Start: 2023-02-12 | End: 2023-02-18 | Stop reason: HOSPADM

## 2023-02-12 RX ORDER — PREGABALIN 25 MG/1
25 CAPSULE ORAL 2 TIMES DAILY
Status: DISCONTINUED | OUTPATIENT
Start: 2023-02-12 | End: 2023-02-18 | Stop reason: HOSPADM

## 2023-02-12 RX ORDER — SODIUM CHLORIDE 0.9 % (FLUSH) 0.9 %
10 SYRINGE (ML) INJECTION AS NEEDED
Status: DISCONTINUED | OUTPATIENT
Start: 2023-02-12 | End: 2023-02-18 | Stop reason: HOSPADM

## 2023-02-12 RX ORDER — POLYETHYLENE GLYCOL 3350 17 G/17G
17 POWDER, FOR SOLUTION ORAL DAILY PRN
Status: DISCONTINUED | OUTPATIENT
Start: 2023-02-12 | End: 2023-02-15

## 2023-02-12 RX ORDER — BISACODYL 5 MG/1
5 TABLET, DELAYED RELEASE ORAL DAILY PRN
Status: DISCONTINUED | OUTPATIENT
Start: 2023-02-12 | End: 2023-02-18 | Stop reason: HOSPADM

## 2023-02-12 RX ORDER — VANCOMYCIN HYDROCHLORIDE 1 G/200ML
20 INJECTION, SOLUTION INTRAVENOUS ONCE
Status: COMPLETED | OUTPATIENT
Start: 2023-02-12 | End: 2023-02-12

## 2023-02-12 RX ORDER — SODIUM CHLORIDE 9 MG/ML
75 INJECTION, SOLUTION INTRAVENOUS CONTINUOUS
Status: ACTIVE | OUTPATIENT
Start: 2023-02-12 | End: 2023-02-13

## 2023-02-12 RX ORDER — DONEPEZIL HYDROCHLORIDE 5 MG/1
5 TABLET, FILM COATED ORAL EVERY MORNING
Status: DISCONTINUED | OUTPATIENT
Start: 2023-02-13 | End: 2023-02-18 | Stop reason: HOSPADM

## 2023-02-12 RX ORDER — SODIUM CHLORIDE 9 MG/ML
40 INJECTION, SOLUTION INTRAVENOUS AS NEEDED
Status: DISCONTINUED | OUTPATIENT
Start: 2023-02-12 | End: 2023-02-18 | Stop reason: HOSPADM

## 2023-02-12 RX ORDER — BISACODYL 10 MG
10 SUPPOSITORY, RECTAL RECTAL DAILY PRN
Status: DISCONTINUED | OUTPATIENT
Start: 2023-02-12 | End: 2023-02-15

## 2023-02-12 RX ORDER — SODIUM CHLORIDE 0.9 % (FLUSH) 0.9 %
10 SYRINGE (ML) INJECTION EVERY 12 HOURS SCHEDULED
Status: DISCONTINUED | OUTPATIENT
Start: 2023-02-12 | End: 2023-02-18 | Stop reason: HOSPADM

## 2023-02-12 RX ORDER — TRAMADOL HYDROCHLORIDE 50 MG/1
50 TABLET ORAL EVERY 6 HOURS PRN
Status: DISCONTINUED | OUTPATIENT
Start: 2023-02-12 | End: 2023-02-18 | Stop reason: HOSPADM

## 2023-02-12 RX ORDER — AMOXICILLIN 250 MG
2 CAPSULE ORAL 2 TIMES DAILY
Status: DISCONTINUED | OUTPATIENT
Start: 2023-02-12 | End: 2023-02-15

## 2023-02-12 RX ORDER — ASPIRIN 81 MG/1
81 TABLET ORAL DAILY
Status: DISCONTINUED | OUTPATIENT
Start: 2023-02-12 | End: 2023-02-18 | Stop reason: HOSPADM

## 2023-02-12 RX ORDER — BUDESONIDE AND FORMOTEROL FUMARATE DIHYDRATE 80; 4.5 UG/1; UG/1
2 AEROSOL RESPIRATORY (INHALATION)
COMMUNITY
End: 2023-03-17

## 2023-02-12 RX ORDER — BUDESONIDE AND FORMOTEROL FUMARATE DIHYDRATE 160; 4.5 UG/1; UG/1
2 AEROSOL RESPIRATORY (INHALATION)
Status: DISCONTINUED | OUTPATIENT
Start: 2023-02-12 | End: 2023-02-18 | Stop reason: HOSPADM

## 2023-02-12 RX ORDER — IPRATROPIUM BROMIDE AND ALBUTEROL SULFATE 2.5; .5 MG/3ML; MG/3ML
3 SOLUTION RESPIRATORY (INHALATION)
Status: DISCONTINUED | OUTPATIENT
Start: 2023-02-12 | End: 2023-02-18 | Stop reason: HOSPADM

## 2023-02-12 RX ORDER — ONDANSETRON 2 MG/ML
4 INJECTION INTRAMUSCULAR; INTRAVENOUS EVERY 6 HOURS PRN
Status: DISCONTINUED | OUTPATIENT
Start: 2023-02-12 | End: 2023-02-18 | Stop reason: HOSPADM

## 2023-02-12 RX ADMIN — IPRATROPIUM BROMIDE AND ALBUTEROL SULFATE 3 ML: 2.5; .5 SOLUTION RESPIRATORY (INHALATION) at 19:38

## 2023-02-12 RX ADMIN — BUDESONIDE AND FORMOTEROL FUMARATE DIHYDRATE 2 PUFF: 160; 4.5 AEROSOL RESPIRATORY (INHALATION) at 19:38

## 2023-02-12 RX ADMIN — TAZOBACTAM SODIUM AND PIPERACILLIN SODIUM 3.38 G: 375; 3 INJECTION, SOLUTION INTRAVENOUS at 14:09

## 2023-02-12 RX ADMIN — PREGABALIN 25 MG: 25 CAPSULE ORAL at 21:27

## 2023-02-12 RX ADMIN — SENNOSIDES AND DOCUSATE SODIUM 2 TABLET: 50; 8.6 TABLET ORAL at 21:25

## 2023-02-12 RX ADMIN — VANCOMYCIN HYDROCHLORIDE 1000 MG: 1 INJECTION, SOLUTION INTRAVENOUS at 14:39

## 2023-02-12 RX ADMIN — OXCARBAZEPINE 600 MG: 300 TABLET, FILM COATED ORAL at 22:33

## 2023-02-12 RX ADMIN — SODIUM CHLORIDE 75 ML/HR: 9 INJECTION, SOLUTION INTRAVENOUS at 18:51

## 2023-02-12 RX ADMIN — Medication 10 ML: at 21:26

## 2023-02-12 RX ADMIN — TRAMADOL HYDROCHLORIDE 50 MG: 50 TABLET, COATED ORAL at 18:43

## 2023-02-12 RX ADMIN — ASPIRIN 81 MG: 81 TABLET, COATED ORAL at 18:43

## 2023-02-12 NOTE — H&P
[facility]  HISTORY AND PHYSICAL    Patient Name: Mitzi Eric  : 1935  MRN: [id number]  Primary Care Physician: System, Provider Not In  Date of admission: 2023      Subjective   Subjective     Chief Complaint: Neck and left shoulder pain, fall    HPI:  Mitzi Eric is a 87 y.o. female  with past medical history of known right upper lobe mass, CKD stage III, trigeminal neuralgia, COPD on 2 L NC, moderate pulm hypertension paroxysmal fibrillation, hypothyroidism, dementia, hypertension, chronic combined systolic and diastolic heart failure, thrombocytopenia who presents to the ED from nursing home with neck and left shoulder pain s/p fall a couple of days back.  Per family patient has not been feeling well for the last 5 to 6 days, p.o. intake has decreased, she has been losing weight, has generally become more weak. She apparently fell 2-3 days ago while putting on her pajamas and has been complaining of neck and left shoulder pain hence her precentation to the ED. at the time of my evaluation she denies any overt shortness of air, denies any cough, no fevers or chills, no nausea or vomiting.  Initial work-up has included. Left shoulder x-ray that was unremarkable, chest x-ray however was suggestive of bilateral upper lobe pneumonia.  Patient was started on antibiotics and hospital medicine was asked to admit.     Review of Systems   Gen- No fevers, chills  CV- No chest pain, palpitations  Resp- No cough, dyspnea  GI- No N/V/D, abd pain    All other systems reviewed and are negative.     Personal History     Past Medical History:   Diagnosis Date   • A-fib (HCC)    • Ataxic gait    • Chronic kidney disease, stage 3 (HCC)    • COPD (chronic obstructive pulmonary disease) (HCC)    • Dementia (HCC)    • Disease of thyroid gland    • Essential tremor 2022   • Heart failure ([facility])    • Hyperlipidemia    • Hypertension    • Shingles              Past  Surgical History:   Procedure Laterality Date   • BRAIN TUMOR EXCISION      BENIGN   • FRACTURE SURGERY     • TRIGGER FINGER RELEASE         Family History:  family history includes Alcohol abuse in her father; Stroke in her mother. Otherwise pertinent FHx was reviewed and unremarkable.     Social History:  reports that she quit smoking about 43 years ago. Her smoking use included cigarettes. She started smoking about 63 years ago. She has a 22.50 pack-year smoking history. She has never used smokeless tobacco. She reports that she does not drink alcohol and does not use drugs.  Social History     Social History Narrative   • Not on file       Medications:  Available home medication information reviewed.  Loratadine, Menthol (Topical Analgesic), Nutritional Supplements, O2, OXcarbazepine, acetaminophen, albuterol sulfate HFA, aspirin, budesonide-formoterol, docusate sodium, donepezil, folic acid, levothyroxine, meclizine, metoprolol succinate XL, pantoprazole, potassium chloride, pregabalin, spironolactone, torsemide, traMADol, and traZODone HCl      Allergies   Allergen Reactions   • Bee Pollen Unknown - Low Severity   • Lorazepam Delirium       Objective   Objective     Vital Signs:   Temp:  [98.4 °F (36.9 °C)] 98.4 °F (36.9 °C)  Heart Rate:  [70-87] 70  Resp:  [18] 18  BP: ()/(51-67) 112/51  Flow (L/min):  [2] 2       Physical Exam    Constitutional: Chronically ill-appearing elderly female awake, alert  Eyes: PERRLA, sclerae anicteric, no conjunctival injection  HENT: NCAT, mucous membranes moist  Neck: Supple, no thyromegaly, no lymphadenopathy, trachea midline  Respiratory: Nonlabored respirations, diffuse coarse breath sounds on 2 L NC  Cardiovascular: RRR, no murmurs, rubs, or gallops, palpable pedal pulses bilaterally  Gastrointestinal: Positive bowel sounds, soft, nontender, nondistended  Musculoskeletal: No bilateral ankle edema, no clubbing or cyanosis to extremities  Psychiatric: Appropriate  affect, cooperative  Neurologic: Nonfocal, strength symmetric in all extremities, Cranial Nerves grossly intact to confrontation, speech clear  Skin: No rashes    Result Review:  I have personally reviewed the results from the time of this admission to 2/12/2023 15:41 EST and agree with these findings:  [x]  Laboratory list / accordion  []  Microbiology  [x]  Radiology  []  EKG/Telemetry   []  Cardiology/Vascular   []  Pathology  []  Old records  []  Other:  Most notable findings include:        LAB RESULTS:      Lab 02/12/23  1358   WBC 12.35*   HEMOGLOBIN 11.1*   HEMATOCRIT 34.2   PLATELETS 223   NEUTROS ABS 10.00*   IMMATURE GRANS (ABS) 0.11*   LYMPHS ABS 0.69*   MONOS ABS 1.43*   EOS ABS 0.07   MCV 99.4*   LACTATE 1.4         Lab 02/12/23  1358   SODIUM 138   POTASSIUM 4.7   CHLORIDE 102   CO2 27.0   ANION GAP 9.0   BUN 23   CREATININE 0.95   EGFR 58.5*   GLUCOSE 104*   CALCIUM 8.9         Lab 02/12/23  1358   TOTAL PROTEIN 6.4   ALBUMIN 3.6   GLOBULIN 2.8   ALT (SGPT) 8   AST (SGOT) 23   BILIRUBIN 0.4   ALK PHOS 138*                     UA    Urinalysis 3/29/22 3/29/22    1120 1155   Specific [place], UA 1.014 1.013   Ketones, UA Negative Negative   Blood, UA Negative Negative   Leukocytes, UA Negative Negative   Nitrite, UA Negative Negative             Microbiology Results (last 10 days)     Procedure Component Value - Date/Time    COVID PRE-OP / PRE-PROCEDURE SCREENING ORDER (NO ISOLATION) - Swab, Nasopharynx [[id number]]  (Normal) Collected: 02/12/23 1400    Lab Status: Final result Specimen: Swab from Nasopharynx Updated: 02/12/23 1528    Narrative:      The following orders were created for panel order COVID PRE-OP / PRE-PROCEDURE SCREENING ORDER (NO ISOLATION) - Swab, Nasopharynx.  Procedure                               Abnormality         Status                     ---------                               -----------         ------                     COVID-19, FLU A/B, RSV P...[[id number]]  Normal               Final result                 Please view results for these tests on the individual orders.    COVID-19, FLU A/B, RSV PCR - Swab, Nasopharynx [[id number]]  (Normal) Collected: 02/12/23 1400    Lab Status: Final result Specimen: Swab from Nasopharynx Updated: 02/12/23 1528     COVID19 Not Detected     Influenza A PCR Not Detected     Influenza B PCR Not Detected     RSV, PCR Not Detected    Narrative:      Fact sheet for providers: https://www.fda.gov/media/015924/download    Fact sheet for patients: https://www.fda.gov/media/766316/download    Test performed by PCR.          XR Shoulder 2+ View Left    Result Date: 2/12/2023  XR SHOULDER 2+ VW LEFT Date of Exam: 2/12/2023 12:39 PM EST Indication: Trauma. Comparison: 10/21/2021 Findings: There is no acute fracture or dislocation. Acromioclavicular and glenohumeral joints appear intact. No erosions. Acromiohumeral and coracoclavicular distances are well-maintained. Mild to moderate acromioclavicular and glenohumeral osteoarthritic changes  are present. The adjacent lung and ribs appear intact.     Impression: Impression: No acute osseous abnormality. Mild to moderate acromial clavicular and glenohumeral osteoarthritic changes are present. Electronically Signed: Amy Kelly  2/12/2023 1:05 PM EST  Workstation ID: [id number]    CT Cervical Spine Without Contrast    Result Date: 2/12/2023  CT CERVICAL SPINE WO CONTRAST Date of Exam: 2/12/2023 1:02 PM EST Indication: Fell 2 days ago, now upper neck pain. Comparison: CT 3/29/2022, 2/18/2018 Technique: Axial CT images were obtained of the cervical spine without contrast administration.  Reconstructed coronal and sagittal images were also obtained. Automated exposure control and iterative construction methods were used. Findings: No evidence of fracture or compression deformity. Craniocervical junction appears unremarkable. Prevertebral soft tissues appear unremarkable. No significant spondylolisthesis identified. Mild  hypertrophic changes are seen about the dens. Nutrient foramen are present within the C2 vertebral body which appear unchanged as compared to the previous study. Moderate multilevel degenerative changes are present throughout the spine. There is trace retrolisthesis of C3 on C4 measuring approximately 1 to 2  mm, anterolisthesis of C4 and C5 measuring approximately 1 to 2 mm and anterolisthesis of C5 on C6 measuring approximately 2 mm. Moderate multilevel degenerative changes are present throughout the spine with multilevel facet disease. There is no evidence of significant bony canal stenosis. Multilevel disc osteophyte complexes are present with varying degrees of mild to moderate neuroforaminal stenosis bilaterally. No evidence of severe stenosis at any level. Significant airspace disease present within the bilateral upper lobes which appears consolidative and necrotic, new as compared to the previous study.     Impression: Impression: 1. No acute osseous abnormality. 2. Moderate multilevel degenerative changes present throughout the spine, similar as compared to the previous study. 3. Significant airspace disease present within the bilateral upper lobes, new as compared to the previous study consistent with pneumonia. A necrotizing pneumonia particularly given the appearance on the left cannot be excluded. Follow-up to resolution recommended given the masslike appearance. Electronically Signed: Amy Kelly  2/12/2023 1:21 PM EST  Workstation ID: [id number]    XR Chest 1 View    Result Date: 2/12/2023  XR CHEST 1 VW Date of Exam: 2/12/2023 1:39 PM EST Indication: Pneumonia seen on CT. Comparison: 4/22/2022, 3/25/2022 Findings: Patchy airspace disease is seen within the bilateral upper lobes. Surrounding scarring is present. Remaining portions of the lungs are clear. No significant pleural effusion. The heart appears normal in size. No acute osseous abnormality identified.     Impression: Impression: Bilateral upper  lobe pneumonia. Follow-up to resolution recommended given the nodular and masslike appearance. Electronically Signed: Amy Kelly  2/12/2023 2:10 PM EST  Workstation ID: [id number]      Results for orders placed during the hospital encounter of 03/17/22    Adult Transthoracic Echo Complete W/ Cont if Necessary Per Protocol    Interpretation Summary  · Left ventricular ejection fraction appears to be 46 - 50%. Left ventricular systolic function is mildly decreased.  · Left ventricular diastolic function is consistent with (grade I) impaired relaxation.  · Mild to moderate mitral regurgitation.  · Mild to moderate tricuspid regurgitation, RVSP 54 mmHg.      Assessment & Plan   Assessment & Plan     Active Hospital Problems    Diagnosis  POA   • Essential tremor [G25.0]  Yes   • chronic CHF (congestive heart failure) (HCC) [I50.9]  Yes   • Dementia without behavioral disturbance (HCC) [F03.90]  Yes   • Severe malnutrition (CMS/HCC) [E43]  Yes   • Lung mass [R91.8]  Yes   • Hypothyroidism (acquired) [E03.9]  Yes   • Essential hypertension [I10]  Yes     87 y.o. female  with past medical history of known right upper lobe mass, CKD stage III, trigeminal neuralgia, COPD on 2 L NC, moderate pulm hypertension paroxysmal fibrillation, hypothyroidism, dementia, hypertension, chronic combined systolic and diastolic heart failure, thrombocytopenia who presents to the ED from nursing home with neck and left shoulder pain s/p fall.    Neck and left shoulder pain s/p fall  -Left shoulder x-ray is unremarkable  -Pain control with tramadol  -PT/OT to evaluate    Chronic respiratory failure with hypoxia  Suspected bilateral upper lobe pneumonia  History of COPD  -Chest x-ray reveals bilateral upper lobe pneumonia, patient is without any cough, fevers or chills  -Follow-up blood cultures, urinary antigens, MRSA PCR, procalcitonin  -CT chest ordered for better pictures  -She s/p vancomycin and Zosyn, we will hold off on further  antibiotics at this time, if CT is confirmatory for pneumonia we will start Rocephin and azithromycin  -She is on 2 L NC which is her baseline ([facility])  -Continue DuoNebs    Combined systolic and diastolic heart failure  Moderate pulm hypertension  -Currently seems compensated  -Hold diuretics, Aldactone, beta-blocker as patient is hypotensive    Paroxysmal atrial fibrillation  -Rates are controlled, continue metoprolol      CKD stage III  -Renal functions to be stable at baseline, continue to monitor    Hypothyroidism  -We will check a baseline TSH, continue Synthroid    Trigeminal neuralgia  -Continue Trileptal and Lyrica    Hypertension  -BP currently low, holding hypertensives,  -start gentle fluids normal saline at 75 mL/h for 6 hours.    Dementia  -Continue Aricept    Right upper lobe lung mass  -This has apparently been known since 2018, family aware and have previously declined any further work-up/treatment    DVT prophylaxis:  mechanical    CODE STATUS:  DNR/DNI  There are no questions and answers to display.     Expected Discharge   Expected Discharge Date and Time     Expected Discharge Date Expected Discharge Time    Feb 15, 2023            Prosper Muniz MD  02/12/23

## 2023-02-12 NOTE — ED PROVIDER NOTES
"Subjective   History of Present Illness  Mrs. Eric is brought by ambulance from [facility] with neck pain and left shoulder pain.  They report that she fell 2 days ago.  X-rays were performed of her neck that were reportedly negative for fracture.  They have started her on tramadol and Tylenol.  Family visited today and she reported ongoing pain in her neck.  She was given tramadol and Tylenol and sent here for further evaluation.  She indicates the pain is in her upper neck and is on both sides.  She indicates her shoulder pain is along her upper trapezius area into her shoulder.  She denies numbness or weakness of her hands.  She is not able to tell me any details of the fall.  She has history of dementia.        Review of Systems    Past Medical History:   Diagnosis Date   • A-fib ([facility])    • Ataxic gait    • Chronic kidney disease, stage 3 ([facility])    • COPD (chronic obstructive pulmonary disease) ([facility])    • Dementia ([facility])    • Disease of thyroid gland    • Essential tremor 2022   • Heart failure ([facility])    • Hyperlipidemia    • Hypertension    • Shingles        Allergies   Allergen Reactions   • Bee Pollen Unknown - Low Severity   • Lorazepam Delirium       Past Surgical History:   Procedure Laterality Date   • BRAIN TUMOR EXCISION      BENIGN   • FRACTURE SURGERY     • TRIGGER FINGER RELEASE         Family History   Problem Relation Age of Onset   • Stroke Mother    • Alcohol abuse Father        Social History     Socioeconomic History   • Marital status: Single   Tobacco Use   • Smoking status: Former     Packs/day: 1.50     Years: 15.00     Pack years: 22.50     Types: Cigarettes     Start date:      Quit date:      Years since quittin.1   • Smokeless tobacco: Never   Vaping Use   • Vaping Use: Never used   Substance and Sexual Activity   • Alcohol use: Never     Comment: \"social drinker\"   • Drug use: Never   • Sexual activity: Never           Objective   Physical Exam  Vitals " "and nursing note reviewed.   Constitutional:       General: She is not in acute distress.     Appearance: Normal appearance.      Comments: She is alert and able to answer questions   HENT:      Head: Normocephalic and atraumatic.      Nose: Nose normal. No congestion or rhinorrhea.   Eyes:      General: No scleral icterus.     Conjunctiva/sclera: Conjunctivae normal.   Neck:      Comments: No JVD, she is tender of the right side of her neck.  She has no posterior midline tenderness, she is rubbing the right side of her neck as I am talking to her  Cardiovascular:      Rate and Rhythm: Normal rate and regular rhythm.      Heart sounds: No murmur heard.    No friction rub.   Pulmonary:      Effort: Pulmonary effort is normal.      Breath sounds: Normal breath sounds. No wheezing or rales.   Abdominal:      General: Bowel sounds are normal.      Palpations: Abdomen is soft.      Tenderness: There is no abdominal tenderness. There is no guarding or rebound.   Musculoskeletal:      Cervical back: Normal range of motion and neck supple. Tenderness present.      Right lower leg: No edema.      Left lower leg: No edema.   Skin:     General: Skin is warm and dry.      Coloration: Skin is not pale.      Findings: No erythema.   Neurological:      General: No focal deficit present.      Mental Status: She is alert.      Motor: No weakness.      Coordination: Coordination normal.   Psychiatric:         Mood and Affect: Mood normal.         Behavior: Behavior normal.         Thought Content: Thought content normal.         Procedures           ED Course  ED Course as of 02/12/23 2027   Sun Feb 12, 2023   1330 CT scan of her neck shows no fracture but there is a new bilateral upper lobe pneumonia.  This is new from March of last year.  Will obtain chest x-ray and labs. [DT]   134 Her nephew is here.  He has power of .  He tells me he sees her every day.  He reports that over the last week she has been \"puny\".  He tells " "me she had a chest x-ray done a few days ago in the nursing home that showed \"a touch of pneumonia\".  She was placed on oral antibiotics.  He tells me she has been losing weight and becoming weaker.  He reports that she has a known mass in her upper right lung.  She has seen a pulmonologist for that and it was elected to watch it closely.  I reviewed a CAT scan of her chest from April of last year.  There was a right upper lobe mass versus scarring.  There was nothing in the left upper lobe at that time. [DT]   0956 I have reviewed her x-ray as well as the report.  It looks like pneumonia.  The findings in the right upper lobe are probably chronic and actually look better than CT scan from April of last year.  The findings on the left are new.  Her caregiver tells me she has not been feeling well over the last week and has not responded to oral antibiotics.  Will seek admission. [DT]      ED Course User Index  [DT] Aakash Erazo MD                                           Medical Decision Making  Please refer to emergency department course.  I initially obtained history from paramedics.  I reviewed records.  Ended up obtaining history from her nephew who is POA.  I reviewed and compared multiple prior radiological studies.  I gave IV antibiotics for pneumonia and admitted her to the hospital    Cervical paraspinous muscle spasm: complicated acute illness or injury  Fall at nursing home, initial encounter: complicated acute illness or injury  Generalized weakness: complicated acute illness or injury  Pneumonia of both upper lobes due to infectious organism: acute illness or injury that poses a threat to life or bodily functions  Amount and/or Complexity of Data Reviewed  Independent Historian: caregiver  External Data Reviewed: radiology and notes.  Labs: ordered. Decision-making details documented in ED Course.  Radiology: ordered. Decision-making details documented in ED Course.  ECG/medicine tests: " ordered.      Risk  Prescription drug management.  Decision regarding hospitalization.          Final diagnoses:   Pneumonia of both upper lobes due to infectious organism   Generalized weakness   Cervical paraspinous muscle spasm   Fall at nursing home, initial encounter       ED Disposition  ED Disposition     ED Disposition   Decision to Admit    Condition   --    Comment   Level of Care: Telemetry [5]   Diagnosis: Pneumonia of both upper lobes due to infectious organism [[id number]]   Admitting Physician: JACKY JACKSON [[id number]]   Attending Physician: JACKY JACKSON [[id number]]               No follow-up provider specified.       Medication List      No changes were made to your prescriptions during this visit.          Aakash Erazo MD  02/12/23 2027

## 2023-02-13 LAB
BASOPHILS # BLD AUTO: 0.05 10*3/MM3 (ref 0–0.2)
BASOPHILS NFR BLD AUTO: 0.5 % (ref 0–1.5)
DEPRECATED RDW RBC AUTO: 46.8 FL (ref 37–54)
EOSINOPHIL # BLD AUTO: 0.14 10*3/MM3 (ref 0–0.4)
EOSINOPHIL NFR BLD AUTO: 1.4 % (ref 0.3–6.2)
ERYTHROCYTE [DISTWIDTH] IN BLOOD BY AUTOMATED COUNT: 12.7 % (ref 12.3–15.4)
HCT VFR BLD AUTO: 34.8 % (ref 34–46.6)
HGB BLD-MCNC: 11.3 G/DL (ref 12–15.9)
IMM GRANULOCYTES # BLD AUTO: 0.08 10*3/MM3 (ref 0–0.05)
IMM GRANULOCYTES NFR BLD AUTO: 0.8 % (ref 0–0.5)
L PNEUMO1 AG UR QL IA: NEGATIVE
LYMPHOCYTES # BLD AUTO: 0.68 10*3/MM3 (ref 0.7–3.1)
LYMPHOCYTES NFR BLD AUTO: 6.7 % (ref 19.6–45.3)
MCH RBC QN AUTO: 32.5 PG (ref 26.6–33)
MCHC RBC AUTO-ENTMCNC: 32.5 G/DL (ref 31.5–35.7)
MCV RBC AUTO: 100 FL (ref 79–97)
MONOCYTES # BLD AUTO: 1.28 10*3/MM3 (ref 0.1–0.9)
MONOCYTES NFR BLD AUTO: 12.6 % (ref 5–12)
MRSA DNA SPEC QL NAA+PROBE: NEGATIVE
NEUTROPHILS NFR BLD AUTO: 7.93 10*3/MM3 (ref 1.7–7)
NEUTROPHILS NFR BLD AUTO: 78 % (ref 42.7–76)
NRBC BLD AUTO-RTO: 0 /100 WBC (ref 0–0.2)
PLATELET # BLD AUTO: 194 10*3/MM3 (ref 140–450)
PMV BLD AUTO: 12.1 FL (ref 6–12)
RBC # BLD AUTO: 3.48 10*6/MM3 (ref 3.77–5.28)
S PNEUM AG SPEC QL LA: NEGATIVE
WBC NRBC COR # BLD: 10.16 10*3/MM3 (ref 3.4–10.8)

## 2023-02-13 PROCEDURE — 94799 UNLISTED PULMONARY SVC/PX: CPT

## 2023-02-13 PROCEDURE — 87449 NOS EACH ORGANISM AG IA: CPT | Performed by: INTERNAL MEDICINE

## 2023-02-13 PROCEDURE — 87641 MR-STAPH DNA AMP PROBE: CPT | Performed by: INTERNAL MEDICINE

## 2023-02-13 PROCEDURE — G0378 HOSPITAL OBSERVATION PER HR: HCPCS

## 2023-02-13 PROCEDURE — 85025 COMPLETE CBC W/AUTO DIFF WBC: CPT | Performed by: INTERNAL MEDICINE

## 2023-02-13 PROCEDURE — 99232 SBSQ HOSP IP/OBS MODERATE 35: CPT | Performed by: INTERNAL MEDICINE

## 2023-02-13 PROCEDURE — 87899 AGENT NOS ASSAY W/OPTIC: CPT | Performed by: INTERNAL MEDICINE

## 2023-02-13 PROCEDURE — 97165 OT EVAL LOW COMPLEX 30 MIN: CPT | Performed by: OCCUPATIONAL THERAPIST

## 2023-02-13 PROCEDURE — 94761 N-INVAS EAR/PLS OXIMETRY MLT: CPT

## 2023-02-13 PROCEDURE — 96375 TX/PRO/DX INJ NEW DRUG ADDON: CPT

## 2023-02-13 PROCEDURE — 97162 PT EVAL MOD COMPLEX 30 MIN: CPT

## 2023-02-13 PROCEDURE — 25010000002 KETOROLAC TROMETHAMINE PER 15 MG: Performed by: INTERNAL MEDICINE

## 2023-02-13 PROCEDURE — 96366 THER/PROPH/DIAG IV INF ADDON: CPT

## 2023-02-13 PROCEDURE — 25010000002 PIPERACILLIN SOD-TAZOBACTAM PER 1 G: Performed by: INTERNAL MEDICINE

## 2023-02-13 RX ORDER — LIDOCAINE 50 MG/G
3 PATCH TOPICAL
Status: DISCONTINUED | OUTPATIENT
Start: 2023-02-13 | End: 2023-02-18 | Stop reason: HOSPADM

## 2023-02-13 RX ORDER — KETOROLAC TROMETHAMINE 15 MG/ML
15 INJECTION, SOLUTION INTRAMUSCULAR; INTRAVENOUS EVERY 6 HOURS PRN
Status: DISPENSED | OUTPATIENT
Start: 2023-02-13 | End: 2023-02-18

## 2023-02-13 RX ADMIN — LIDOCAINE 3 PATCH: 700 PATCH TOPICAL at 13:18

## 2023-02-13 RX ADMIN — BUDESONIDE AND FORMOTEROL FUMARATE DIHYDRATE 2 PUFF: 160; 4.5 AEROSOL RESPIRATORY (INHALATION) at 09:22

## 2023-02-13 RX ADMIN — PREGABALIN 25 MG: 25 CAPSULE ORAL at 21:58

## 2023-02-13 RX ADMIN — TAZOBACTAM SODIUM AND PIPERACILLIN SODIUM 3.38 G: 375; 3 INJECTION, SOLUTION INTRAVENOUS at 09:36

## 2023-02-13 RX ADMIN — KETOROLAC TROMETHAMINE 15 MG: 15 INJECTION, SOLUTION INTRAMUSCULAR; INTRAVENOUS at 13:18

## 2023-02-13 RX ADMIN — PANTOPRAZOLE SODIUM 40 MG: 40 TABLET, DELAYED RELEASE ORAL at 08:09

## 2023-02-13 RX ADMIN — SENNOSIDES AND DOCUSATE SODIUM 2 TABLET: 50; 8.6 TABLET ORAL at 08:14

## 2023-02-13 RX ADMIN — OXCARBAZEPINE 600 MG: 300 TABLET, FILM COATED ORAL at 08:09

## 2023-02-13 RX ADMIN — Medication 10 ML: at 21:58

## 2023-02-13 RX ADMIN — IPRATROPIUM BROMIDE AND ALBUTEROL SULFATE 3 ML: 2.5; .5 SOLUTION RESPIRATORY (INHALATION) at 16:28

## 2023-02-13 RX ADMIN — LEVOTHYROXINE SODIUM 25 MCG: 25 TABLET ORAL at 05:26

## 2023-02-13 RX ADMIN — IPRATROPIUM BROMIDE AND ALBUTEROL SULFATE 3 ML: 2.5; .5 SOLUTION RESPIRATORY (INHALATION) at 09:21

## 2023-02-13 RX ADMIN — PREGABALIN 25 MG: 25 CAPSULE ORAL at 08:09

## 2023-02-13 RX ADMIN — METOPROLOL SUCCINATE 25 MG: 25 TABLET, EXTENDED RELEASE ORAL at 08:09

## 2023-02-13 RX ADMIN — TRAMADOL HYDROCHLORIDE 50 MG: 50 TABLET, COATED ORAL at 08:09

## 2023-02-13 RX ADMIN — SENNOSIDES AND DOCUSATE SODIUM 2 TABLET: 50; 8.6 TABLET ORAL at 21:58

## 2023-02-13 RX ADMIN — ASPIRIN 81 MG: 81 TABLET, COATED ORAL at 08:09

## 2023-02-13 RX ADMIN — BUDESONIDE AND FORMOTEROL FUMARATE DIHYDRATE 2 PUFF: 160; 4.5 AEROSOL RESPIRATORY (INHALATION) at 20:50

## 2023-02-13 RX ADMIN — DOXYCYCLINE 100 MG: 100 INJECTION, POWDER, LYOPHILIZED, FOR SOLUTION INTRAVENOUS at 15:36

## 2023-02-13 RX ADMIN — OXCARBAZEPINE 600 MG: 300 TABLET, FILM COATED ORAL at 21:58

## 2023-02-13 RX ADMIN — TAZOBACTAM SODIUM AND PIPERACILLIN SODIUM 3.38 G: 375; 3 INJECTION, SOLUTION INTRAVENOUS at 17:44

## 2023-02-13 RX ADMIN — DONEPEZIL HYDROCHLORIDE 5 MG: 5 TABLET, FILM COATED ORAL at 05:26

## 2023-02-13 RX ADMIN — IPRATROPIUM BROMIDE AND ALBUTEROL SULFATE 3 ML: 2.5; .5 SOLUTION RESPIRATORY (INHALATION) at 20:50

## 2023-02-13 NOTE — THERAPY EVALUATION
Patient Name: Mitzi Eric  : 1935    MRN: [id number]                              Today's Date: 2023       Admit Date: 2023    Visit Dx:     ICD-10-CM ICD-9-CM   1. Pneumonia of both upper lobes due to infectious organism  J18.9 486   2. Generalized weakness  R53.1 780.79   3. Cervical paraspinous muscle spasm  M62.838 728.85   4. Fall at nursing home, initial encounter  W19.XXXA E888.9    Y92.129 E849.7   5. Impaired functional mobility, balance, gait, and endurance  Z74.09 V49.89     Patient Active Problem List   Diagnosis   • Fall   • Delirium, acute   • Essential hypertension   • Cognitive decline   • Trigeminal neuralgia   • Hypothyroidism (acquired)   • Lung mass   • Influenza A   • Severe malnutrition (CMS/HCC)   • Dementia without behavioral disturbance (HCC)   • History of craniotomy   • Impaired functional mobility, balance, gait, and endurance   • COPD ex with volume overload   • chronic CHF (congestive heart failure) (HCC)   • Thrombocytopenia (HCC)   • Elevated transaminase level   • atrial fibrillation   • COPD with acute exacerbation (HCC)   • Acute on chronic respiratory failure with hypoxemia (HCC)   • Chronic respiratory failure (HCC)   • Underweight   • Moderate malnutrition (CMS/HCC)   • Centrilobular emphysema (HCC)   • Essential tremor   • Pneumonia of both upper lobes due to infectious organism     Past Medical History:   Diagnosis Date   • A-fib (HCC)    • Ataxic gait    • Chronic kidney disease, stage 3 (HCC)    • COPD (chronic obstructive pulmonary disease) (HCC)    • Dementia (HCC)    • Disease of thyroid gland    • Essential tremor 2022   • Heart failure (HCC)    • Hyperlipidemia    • Hypertension    • Shingles      Past Surgical History:   Procedure Laterality Date   • BRAIN TUMOR EXCISION      BENIGN   • FRACTURE SURGERY     • TRIGGER FINGER RELEASE        General Information     Row Name 23 1000          Physical Therapy Time and Intention    Document  Type evaluation  -     Mode of Treatment physical therapy  -     Row Name 02/13/23 1000          General Information    Patient Profile Reviewed yes  -     Prior Level of Function independent:;transfer;bed mobility;ADL's;w/c or scooter  pt's nephew states she could independently t/f to her WC, self-propel with BUE's and BLE's, make her bed, perform toileting all from WC level  -     Existing Precautions/Restrictions fall;oxygen therapy device and L/min  -     Barriers to Rehab medically complex;previous functional deficit;cognitive status  -[facility] Name 02/13/23 1000          Living Environment    People in Home facility resident  group home  -[facility] Name 02/13/23 1000          Home Main Entrance    Number of Stairs, Main Entrance none  -[facility] Name 02/13/23 1000          Stairs Within Home, Primary    Number of Stairs, Within Home, Primary none  -[facility] Name 02/13/23 1000          Cognition    Orientation Status (Cognition) oriented to;person;disoriented to;place;situation;verbal cues/prompts needed for orientation;time  -[facility] Name 02/13/23 1000          Safety Issues, Functional Mobility    Safety Issues Affecting Function (Mobility) awareness of need for assistance;friction/shear risk;insight into deficits/self-awareness;judgment;sequencing abilities;safety precaution awareness;problem-solving  -     Impairments Affecting Function (Mobility) balance;cognition;strength;endurance/activity tolerance;coordination  -           User Key  (r) = Recorded By, (t) = Taken By, (c) = Cosigned By    Initials Name Provider Type     Yudelka Chung PT Physical Therapist               Mobility     Row Name 02/13/23 1050          Bed Mobility    Bed Mobility supine-sit  -     Supine-Sit [place] (Bed Mobility) contact guard  -     Assistive Device (Bed Mobility) bed rails;head of bed elevated  -     Comment, (Bed Mobility) extra time and effort needed  -     Row Name 02/13/23 1059           Transfers    Comment, (Transfers) SPT from EOB to recliner placed on pt's R side with Katerina x2 through gait belt. Pt's nephew states pt does not use RW at all for t/f's at baseline, and that RW has contributed to her falls in the past.  -SJ     Row Name 02/13/23 1058          Bed-Chair Transfer    Bed-Chair [place] (Transfers) minimum assist (75% patient effort);2 person assist;verbal cues;nonverbal cues (demo/gesture)  -SJ     Row Name 02/13/23 1058          Sit-Stand Transfer    Sit-Stand [place] (Transfers) minimum assist (75% patient effort);2 person assist  -[facility] 02/13/23 1058          Gait/Stairs (Locomotion)    [place] Level (Gait) not tested  -     Comment, (Gait/Stairs) non-ambulatory at baseline  -           User Key  (r) = Recorded By, (t) = Taken By, (c) = Cosigned By    Initials Name Provider Type     Yudelka Chung PT Physical Therapist               Obj/Interventions     Row Name 02/13/23 1059          Range of Motion Comprehensive    General Range of Motion bilateral lower extremity ROM WFL  -SJ     Row Name 02/13/23 1059          Strength Comprehensive (MMT)    General Manual Muscle Testing (MMT) Assessment lower extremity strength deficits identified  -     Comment, General Manual Muscle Testing (MMT) Assessment BLE's 4/5  -SJ     Row Name 02/13/23 1059          Motor Skills    Therapeutic Exercise hip;knee  -SJ     Row Name 02/13/23 1059          Hip (Therapeutic Exercise)    Hip (Therapeutic Exercise) AAROM (active assistive range of motion)  -     Hip AAROM (Therapeutic Exercise) bilateral;flexion;sitting;10 repetitions  -SJ     Row Name 02/13/23 1059          Knee (Therapeutic Exercise)    Knee (Therapeutic Exercise) AROM (active range of motion)  -     Knee AROM (Therapeutic Exercise) bilateral;LAQ (long arc quad);10 repetitions;sitting  -[facility] 02/13/23 1059          Balance    Balance Assessment sitting static balance  -     Static  Sitting Balance supervision  -SJ     Position, Sitting Balance unsupported;sitting edge of bed  -SJ           User Key  (r) = Recorded By, (t) = Taken By, (c) = Cosigned By    Initials Name Provider Type    Yudelka River PT Physical Therapist               Goals/Plan     Row Name 02/13/23 1103          Bed Mobility Goal 1 (PT)    Activity/Assistive Device (Bed Mobility Goal 1, PT) rolling to left;rolling to right;sit to supine;supine to sit  -SJ     [place] Level/Cues Needed (Bed Mobility Goal 1, PT) modified independence  -SJ     Time Frame (Bed Mobility Goal 1, PT) long term goal (LTG);2 weeks  -SJ     Row Name 02/13/23 1103          Transfer Goal 1 (PT)    Activity/Assistive Device (Transfer Goal 1, PT) bed-to-chair/chair-to-bed;sit-to-stand/stand-to-sit;wheelchair transfer  -SJ     [place] Level/Cues Needed (Transfer Goal 1, PT) standby assist  -SJ     Time Frame (Transfer Goal 1, PT) long term goal (LTG);2 weeks  -SJ     Row Name 02/13/23 1103          Problem Specific Goal 1 (PT)    Problem Specific Goal 1 (PT) self-propel WC x 100ft  -SJ     Time Frame (Problem Specific Goal 1, PT) long-term goal (LTG);2 weeks  -SJ     Row Name 02/13/23 1103          Therapy Assessment/Plan (PT)    Planned Therapy Interventions (PT) balance training;bed mobility training;home exercise program;patient/family education;transfer training;strengthening;stretching;wheelchair management/propulsion training;postural re-education  -           User Key  (r) = Recorded By, (t) = Taken By, (c) = Cosigned By    Initials Name Provider Type    Yudelka River PT Physical Therapist               Clinical Impression     Row Name 02/13/23 1100          Pain    Pretreatment Pain Rating 0/10 - no pain  -SJ     Posttreatment Pain Rating 0/10 - no pain  -SJ     Row Name 02/13/23 1100          Plan of Care Review    Plan of Care Reviewed With patient  -SJ     Outcome Evaluation Pt presents with generalized weakness and  decreased functional mobility independence per PLOF. Pt t/f to EOB with CGA, SPT from EOB to recliner placed on pt's R side with Katerina x2 through gait belt. Pt's nephew states pt does not use RW at all for t/f's at baseline, and that RW has contributed to her falls in the past. Pt cooperative with therex. VSS. PT rec d/c to [facility] with HHPT.  -     Row Name 02/13/23 1100          Therapy Assessment/Plan (PT)    Patient/Family Therapy Goals Statement (PT) [facility]  -     Rehab Potential (PT) good, to achieve stated therapy goals  -     Criteria for Skilled Interventions Met (PT) yes;skilled treatment is necessary  -     Therapy Frequency (PT) daily  -     Predicted Duration of Therapy Intervention (PT) 2wks  -     Row Name 02/13/23 1100          Vital Signs    Pre Systolic BP Rehab 138  -SJ     Pre Treatment Diastolic BP 70  -SJ     Post Systolic BP Rehab 134  -SJ     Post Treatment Diastolic BP 63  -SJ     Pretreatment Heart Rate (beats/min) 102  -SJ     Posttreatment Heart Rate (beats/min) 89  -SJ     Pre SpO2 (%) 93  -SJ     O2 Delivery Pre Treatment nasal cannula  -SJ     Post SpO2 (%) 94  -SJ     O2 Delivery Post Treatment nasal cannula  -SJ     Pre Patient Position Supine  -     Post Patient Position Sitting  -     Row Name 02/13/23 1100          Positioning and Restraints    Pre-Treatment Position in bed  -SJ     Post Treatment Position chair  -SJ     In Chair notified nsg;reclined;call light within reach;encouraged to call for assist;exit alarm on;waffle cushion;heels elevated;compression device  -           User Key  (r) = Recorded By, (t) = Taken By, (c) = Cosigned By    Initials Name Provider Type     Yudelka Chung, PT Physical Therapist               Outcome Measures     Row Name 02/13/23 1103 02/13/23 0800       How much help from another person do you currently need...    Turning from your back to your side while in flat bed without using bedrails? 3  -SJ 3  -AC     Moving from lying on back to sitting on the side of a flat bed without bedrails? 4  -SJ 3  -AC    Moving to and from a bed to a chair (including a wheelchair)? 2  -SJ 2  -AC    Standing up from a chair using your arms (e.g., wheelchair, bedside chair)? 2  -SJ 1  -AC    Climbing 3-5 steps with a railing? 1  -SJ 1  -AC    To walk in hospital room? 2  -SJ 2  -AC    AM-PAC 6 Clicks Score (PT) 14  - 12  -AC    Highest level of mobility 4 --> Transferred to chair/commode  -SJ 4 --> Transferred to chair/commode  -AC    Row Name 02/13/23 1103          Functional Assessment    Outcome Measure Options AM-PAC 6 Clicks Basic Mobility (PT)  -           User Key  (r) = Recorded By, (t) = Taken By, (c) = Cosigned By    Initials Name Provider Type    Yudelka River, CASEY Physical Therapist    Jennie De Luna, RN Registered Nurse                             Physical Therapy Education     Title: PT OT SLP Therapies (In Progress)     Topic: Physical Therapy (In Progress)     Point: Mobility training (In Progress)     Learning Progress Summary           Patient Acceptance, E,D, NR by  at 2/13/2023 1104                   Point: Home exercise program (In Progress)     Learning Progress Summary           Patient Acceptance, E,D, NR by  at 2/13/2023 1104                   Point: Body mechanics (In Progress)     Learning Progress Summary           Patient Acceptance, E,D, NR by  at 2/13/2023 1104                   Point: Precautions (In Progress)     Learning Progress Summary           Patient Acceptance, E,D, NR by  at 2/13/2023 1104                               User Key     Initials Effective Dates Name Provider Type [facility] 02/03/23 -  Yudelka Chung, PT Physical Therapist PT              PT Recommendation and Plan  Planned Therapy Interventions (PT): balance training, bed mobility training, home exercise program, patient/family education, transfer training, strengthening, stretching, wheelchair  management/propulsion training, postural re-education  Plan of Care Reviewed With: patient  Outcome Evaluation: Pt presents with generalized weakness and decreased functional mobility independence per PLOF. Pt t/f to EOB with CGA, SPT from EOB to recliner placed on pt's R side with Katerina x2 through gait belt. Pt's nephew states pt does not use RW at all for t/f's at baseline, and that RW has contributed to her falls in the past. Pt cooperative with therex. VSS. PT rec d/c to [facility] with HHPT.     Time Calculation:    PT Charges     Row Name 02/13/23 1104             Time Calculation    Start Time 1000  -SJ      PT Non-Billable Time (min) 52 min  -      PT Received On 02/13/23  -      PT Goal Re-Cert Due Date 02/23/23  -            User Key  (r) = Recorded By, (t) = Taken By, (c) = Cosigned By    Initials Name Provider Type     Yudelka Chung, PT Physical Therapist              Therapy Charges for Today     Code Description Service Date Service Provider Modifiers Qty    [id number] HC PT EVAL MOD COMPLEXITY 4 2/13/2023 Yudelka Chung, CASEY GP 1          PT G-Codes  Outcome Measure Options: AM-PAC 6 Clicks Basic Mobility (PT)  AM-PAC 6 Clicks Score (PT): 14  PT Discharge Summary  Anticipated Discharge Disposition (PT): assisted living, home with home health    Yudelka Chung, PT  2/13/2023

## 2023-02-13 NOTE — PLAN OF CARE
Goal Outcome Evaluation:  Plan of Care Reviewed With: patient           Outcome Evaluation: Pt completed bed mobility with CGA, transfer to chair with min assist x2 and UB dressing with mod assist. Pt limited with decreased balance, pain, generalized weakness and dyspnea with exertion. She is performing below baseline status, recommend DC to SNF unless staff are able to provide 24/7 assist.

## 2023-02-13 NOTE — PLAN OF CARE
Goal Outcome Evaluation:  Plan of Care Reviewed With: patient           Outcome Evaluation: Pt presents with generalized weakness and decreased functional mobility independence per PLOF. Pt t/f to EOB with CGA, SPT from EOB to recliner placed on pt's R side with Katerina x2 through gait belt. Pt's nephew states pt does not use RW at all for t/f's at baseline, and that RW has contributed to her falls in the past. Pt cooperative with therex. VSS. PT rec d/c to [facility] with HHPT.

## 2023-02-13 NOTE — PROGRESS NOTES
[facility] Medicine Services  PROGRESS NOTE    Patient Name: Mitzi Eric  : 1935  MRN: [id number]    Date of Admission: 2023  Primary Care Physician: System, Provider Not In    Subjective   Subjective     CC:  Fall, lung infiltrate    HPI:  Complaining of shoulder pain, tremor secondary to pain.  Not having cough, no fevers.  Son at bedside    ROS:  Limited ROS, son provides most of the history    Objective   Objective     Vital Signs:   Temp:  [96.4 °F (35.8 °C)-98.4 °F (36.9 °C)] 97.6 °F (36.4 °C)  Heart Rate:  [70-95] 85  Resp:  [16-18] 16  BP: ()/(51-98) 138/70  Flow (L/min):  [2] 2     Physical Exam:  Constitutional: Uncomfortable secondary to pain, frail  HENT: NCAT, mucous membranes moist  Respiratory: Coarse on the left, on 2 L, no cough  Gastrointestinal: Soft, nondistended  Musculoskeletal: No bilateral ankle edema  Psychiatric: Appropriate affect, cooperative  Neurologic: Oriented x 3, speech clear  Skin: No rashes      Results Reviewed:  LAB RESULTS:      Lab 23  0430 23  1358   WBC 10.16 12.35*   HEMOGLOBIN 11.3* 11.1*   HEMATOCRIT 34.8 34.2   PLATELETS 194 223   NEUTROS ABS 7.93* 10.00*   IMMATURE GRANS (ABS) 0.08* 0.11*   LYMPHS ABS 0.68* 0.69*   MONOS ABS 1.28* 1.43*   EOS ABS 0.14 0.07   .0* 99.4*   PROCALCITONIN  --  0.07   LACTATE  --  1.4         Lab 23  1358   SODIUM 138   POTASSIUM 4.7   CHLORIDE 102   CO2 27.0   ANION GAP 9.0   BUN 23   CREATININE 0.95   EGFR 58.5*   GLUCOSE 104*   CALCIUM 8.9   TSH 1.250         Lab 23  1358   TOTAL PROTEIN 6.4   ALBUMIN 3.6   GLOBULIN 2.8   ALT (SGPT) 8   AST (SGOT) 23   BILIRUBIN 0.4   ALK PHOS 138*                     Brief Urine Lab Results  (Last result in the past 365 days)      Color   Clarity   Blood   Leuk Est   Nitrite   Protein   CREAT   Urine HCG        22 1155 Yellow   Clear   Negative   Negative   Negative   Negative                 Microbiology Results  Abnormal     Procedure Component Value - Date/Time    COVID PRE-OP / PRE-PROCEDURE SCREENING ORDER (NO ISOLATION) - Swab, Nasopharynx [[id number]]  (Normal) Collected: 02/12/23 1400    Lab Status: Final result Specimen: Swab from Nasopharynx Updated: 02/12/23 1528    Narrative:      The following orders were created for panel order COVID PRE-OP / PRE-PROCEDURE SCREENING ORDER (NO ISOLATION) - Swab, Nasopharynx.  Procedure                               Abnormality         Status                     ---------                               -----------         ------                     COVID-19, FLU A/B, RSV P...[[id number]]  Normal              Final result                 Please view results for these tests on the individual orders.    COVID-19, FLU A/B, RSV PCR - Swab, Nasopharynx [[id number]]  (Normal) Collected: 02/12/23 1400    Lab Status: Final result Specimen: Swab from Nasopharynx Updated: 02/12/23 1528     COVID19 Not Detected     Influenza A PCR Not Detected     Influenza B PCR Not Detected     RSV, PCR Not Detected    Narrative:      Fact sheet for providers: https://www.fda.gov/media/759794/download    Fact sheet for patients: https://www.fda.gov/media/255270/download    Test performed by PCR.          XR Shoulder 2+ View Left    Result Date: 2/12/2023  XR SHOULDER 2+ VW LEFT Date of Exam: 2/12/2023 12:39 PM EST Indication: Trauma. Comparison: 10/21/2021 Findings: There is no acute fracture or dislocation. Acromioclavicular and glenohumeral joints appear intact. No erosions. Acromiohumeral and coracoclavicular distances are well-maintained. Mild to moderate acromioclavicular and glenohumeral osteoarthritic changes  are present. The adjacent lung and ribs appear intact.     Impression: Impression: No acute osseous abnormality. Mild to moderate acromial clavicular and glenohumeral osteoarthritic changes are present. Electronically Signed: Amy Kelly  2/12/2023 1:05 PM EST  Workstation ID: [id number]    CT Chest  Without Contrast Diagnostic    Result Date: 2/12/2023  CT CHEST WO CONTRAST DIAGNOSTIC Date of Exam: 2/12/2023 3:57 PM EST Indication: PNA on cxr. Comparison: Radiograph 2/12/2023, CT 4/22/2022, 7/5/2018 Technique: Axial CT images were obtained of the chest without contrast administration.  Reconstructed coronal and sagittal images were also obtained. Automated exposure control and iterative construction methods were used. Findings: Hilum and Mediastinum: No pathologically enlarged lymph nodes.  Normal heart size.   No pericardial effusion.  Atherosclerotic calcifications are present including within the coronary arteries. Unremarkable thoracic aorta and pulmonary arteries. Lung Parenchyma and Pleura: Redilatation of significant consolidative changes present within the bilateral upper lobes. Lucency present within the areas of consolidation present on the left which may related to necrosis or related to emphysema. Area of involvement measures approximately 8.3 x 9.5 cm on the left. Masslike consolidation present within the right upper lobe measuring up to 4.7 x 4.2 cm (series 4 image 20). This previously measured up to 4.3 x 4.5 cm. No significant pleural effusion. No additional pulmonary nodule identified. Upper Abdomen: No acute process. Soft tissues: Unremarkable. Osseous structures: No aggressive focal lytic or sclerotic osseous lesions.     Impression: Impression: 1. Significant consolidative changes present within the left upper lobe, new as compared to the previous study with areas of lucency which may be related to pulmonary necrosis versus significant emphysema surrounded by pneumonia. Underlying true nodule or mass cannot be excluded. Follow-up to resolution recommended. 2. Redemonstration of masslike consolidation present within the right upper lobe which appears similar as compared to CT from 4/22/2022 and remote study from 7/15/2018 consistent with a benign process. 3. Ancillary findings as described  above. Electronically Signed: Amy Kelly  2/12/2023 4:12 PM EST  Workstation ID: [id number]    CT Cervical Spine Without Contrast    Result Date: 2/12/2023  CT CERVICAL SPINE WO CONTRAST Date of Exam: 2/12/2023 1:02 PM EST Indication: Fell 2 days ago, now upper neck pain. Comparison: CT 3/29/2022, 2/18/2018 Technique: Axial CT images were obtained of the cervical spine without contrast administration.  Reconstructed coronal and sagittal images were also obtained. Automated exposure control and iterative construction methods were used. Findings: No evidence of fracture or compression deformity. Craniocervical junction appears unremarkable. Prevertebral soft tissues appear unremarkable. No significant spondylolisthesis identified. Mild hypertrophic changes are seen about the dens. Nutrient foramen are present within the C2 vertebral body which appear unchanged as compared to the previous study. Moderate multilevel degenerative changes are present throughout the spine. There is trace retrolisthesis of C3 on C4 measuring approximately 1 to 2  mm, anterolisthesis of C4 and C5 measuring approximately 1 to 2 mm and anterolisthesis of C5 on C6 measuring approximately 2 mm. Moderate multilevel degenerative changes are present throughout the spine with multilevel facet disease. There is no evidence of significant bony canal stenosis. Multilevel disc osteophyte complexes are present with varying degrees of mild to moderate neuroforaminal stenosis bilaterally. No evidence of severe stenosis at any level. Significant airspace disease present within the bilateral upper lobes which appears consolidative and necrotic, new as compared to the previous study.     Impression: Impression: 1. No acute osseous abnormality. 2. Moderate multilevel degenerative changes present throughout the spine, similar as compared to the previous study. 3. Significant airspace disease present within the bilateral upper lobes, new as compared to the  previous study consistent with pneumonia. A necrotizing pneumonia particularly given the appearance on the left cannot be excluded. Follow-up to resolution recommended given the masslike appearance. Electronically Signed: Amy Kelly  2/12/2023 1:21 PM EST  Workstation ID: [id number]    XR Chest 1 View    Result Date: 2/12/2023  XR CHEST 1 VW Date of Exam: 2/12/2023 1:39 PM EST Indication: Pneumonia seen on CT. Comparison: 4/22/2022, 3/25/2022 Findings: Patchy airspace disease is seen within the bilateral upper lobes. Surrounding scarring is present. Remaining portions of the lungs are clear. No significant pleural effusion. The heart appears normal in size. No acute osseous abnormality identified.     Impression: Impression: Bilateral upper lobe pneumonia. Follow-up to resolution recommended given the nodular and masslike appearance. Electronically Signed: Amy Kelly  2/12/2023 2:10 PM EST  Workstation ID: [id number]      Results for orders placed during the hospital encounter of 03/17/22    Adult Transthoracic Echo Complete W/ Cont if Necessary Per Protocol    Interpretation Summary  · Left ventricular ejection fraction appears to be 46 - 50%. Left ventricular systolic function is mildly decreased.  · Left ventricular diastolic function is consistent with (grade I) impaired relaxation.  · Mild to moderate mitral regurgitation.  · Mild to moderate tricuspid regurgitation, RVSP 54 mmHg.      I have reviewed the medications:  Scheduled Meds:aspirin, 81 mg, Oral, Daily  budesonide-formoterol, 2 puff, Inhalation, BID - RT  donepezil, 5 mg, Oral, QAM  ipratropium-albuterol, 3 mL, Nebulization, 4x Daily - RT  levothyroxine, 25 mcg, Oral, Q AM  metoprolol succinate XL, 25 mg, Oral, Daily  OXcarbazepine, 600 mg, Oral, BID  pantoprazole, 40 mg, Oral, Daily  piperacillin-tazobactam, 3.375 g, Intravenous, Once  piperacillin-tazobactam, 3.375 g, Intravenous, Q8H  pregabalin, 25 mg, Oral, BID  senna-docusate sodium, 2 tablet,  Oral, BID  sodium chloride, 10 mL, Intravenous, Q12H      Continuous Infusions:   PRN Meds:.•  senna-docusate sodium **AND** polyethylene glycol **AND** bisacodyl **AND** bisacodyl  •  Calcium Replacement - Initiate Nurse / BPA Driven Protocol  •  Magnesium Standard Dose Replacement - Initiate Nurse / BPA Driven Protocol  •  ondansetron  •  Phosphorus Replacement - Initiate Nurse / BPA Driven Protocol  •  Potassium Replacement - Initiate Nurse / BPA Driven Protocol  •  sodium chloride  •  sodium chloride  •  traMADol    Assessment & Plan   Assessment & Plan     Active Hospital Problems    Diagnosis  POA   • **Pneumonia of both upper lobes due to infectious organism [J18.9]  Yes   • Essential tremor [G25.0]  Yes   • chronic CHF (congestive heart failure) (HCC) [I50.9]  Yes   • Dementia without behavioral disturbance (HCC) [F03.90]  Yes   • Severe malnutrition (CMS/HCC) [E43]  Yes   • Lung mass [R91.8]  Yes   • Hypothyroidism (acquired) [E03.9]  Yes   • Essential hypertension [I10]  Yes      Resolved Hospital Problems   No resolved problems to display.        Brief Hospital Course to date:  Mitzi Eric is a 87 y.o. female with history of CKD, COPD with known right upper lobe mass, paroxysmal A-fib, dementia who was admitted for left shoulder pain after fall.  CT chest concerning for new area of consolidation with possible necrosis versus mass.  She already has known right upper lobe mass and family previously had not wanted work-up for diagnosis.  Pro-Abimael noted to be normal on admission    Neck and left shoulder pain s/p fall  -Left shoulder x-ray is unremarkable  -Continue tramadol  -Add Toradol  -Add lidocaine patches    Chronic respiratory failure with hypoxia  Suspected bilateral upper lobe pneumonia  History of COPD  -Chest x-ray reveals bilateral upper lobe pneumonia, patient is without any cough, fevers or chills  -Follow-up blood cultures, urinary antigens, MRSA PCR, procalcitonin  -CT chest concerning for  necrotizing pneumonia versus possible mass in left upper lobe, restart Zosyn, had received dose of vancomycin and Zosyn in the ER on admission  -Discussed case with on-call pulmonology, will add Doxy, check sputum cultures and fungal cultures if able, patient is not coughing much and not producing much sputum    -Will need outpatient follow-up in 3 to 4 weeks with repeat chest CT for resolution  -Baseline O2 is 2 L  -Continue DuoNebs     Combined systolic and diastolic heart failure  Moderate pulm hypertension  -Currently seems compensated  -Hold diuretics, Aldactone, beta-blocker for hypotension, restart when blood pressure improved     Paroxysmal atrial fibrillation  -Rates are controlled, continue metoprolol       CKD stage III  -Renal function stable, monitor     Hypothyroidism  -Continue Synthroid    Trigeminal neuralgia  -Continue Trileptal and Lyrica     Hypertension  -BP currently low, holding hypertensives,     Dementia  -Continue Aricept     Right upper lobe lung mass  -This has apparently been known since 2018, family aware and have previously declined any further work-up/treatment    Expected Discharge Location and Transportation: Back to assisted living  Expected Discharge   Expected Discharge Date and Time     Expected Discharge Date Expected Discharge Time    Feb 15, 2023            DVT prophylaxis:  Mechanical DVT prophylaxis orders are present.     AM-PAC 6 Clicks Score (PT): 8 (02/12/23 2058)    CODE STATUS:   Code Status and Medical Interventions:   Ordered at: 02/12/23 1837     Medical Intervention Limits:    NO intubation (DNI)     Level Of Support Discussed With:    Health Care Surrogate     Code Status (Patient has no pulse and is not breathing):    No CPR (Do Not Attempt to Resuscitate)     Medical Interventions (Patient has pulse or is breathing):    Limited Support     Release to patient:    Routine Release       Lyudmila Grant,   02/13/23

## 2023-02-13 NOTE — PLAN OF CARE
Problem: Fall Injury Risk  Goal: Absence of Fall and Fall-Related Injury  Outcome: Ongoing, Progressing     Problem: Skin Injury Risk Increased  Goal: Skin Health and Integrity  Outcome: Ongoing, Progressing  Intervention: Optimize Skin Protection  Description: Perform a full pressure injury risk assessment, as indicated by screening, upon admission to care unit.  Reassess skin (injury risk, full inspection) frequently (e.g., scheduled interval, with change in condition) to provide optimal early detection and prevention.  Maintain adequate tissue perfusion (e.g., encourage fluid balance; avoid crossing legs, constrictive clothing or devices) to promote tissue oxygenation.  Maintain head of bed at lowest degree of elevation tolerated, considering medical condition and other restrictions.  Avoid positioning onto an area that remains reddened.  Minimize incontinence and moisture (e.g., toileting schedule; moisture-wicking pad, diaper or incontinence collection device; skin moisture barrier).  Cleanse skin promptly and gently when soiled utilizing a pH-balanced cleanser.  Relieve and redistribute pressure (e.g., scheduled position changes, weight shifts, use of support surface, medical device repositioning, protective dressing application, use of positioning device, microclimate control, use of pressure-injury-monitor  Encourage increased activity, such as sitting in a chair at the bedside or early mobilization, when able to tolerate.  Recent Flowsheet Documentation  Taken 2/13/2023 0800 by Jewell Gaviria LPN  Pressure Reduction Techniques: heels elevated off bed  Pressure Reduction Devices: specialty bed utilized  Skin Protection:   incontinence pads utilized   pulse oximeter probe site changed     Problem: COPD (Chronic Obstructive Pulmonary Disease) Comorbidity  Goal: Maintenance of COPD Symptom Control  Outcome: Ongoing, Progressing     Problem: Pain Chronic (Persistent) (Comorbidity Management)  Goal:  Acceptable Pain Control and Functional Ability  Outcome: Ongoing, Progressing  Intervention: Develop Pain Management Plan  Description: Acknowledge patient as the expert in pain self-management.  Use a consistent, validated tool for pain assessment; include function and quality of life.  Evaluate risk for opioid use and dependence.  Set pain management goals; determine acceptable level of discomfort to allow for maximal functioning and quality of life.  Determine [id number] pain management plan, including both pharmacologic and nonpharmacologic measures.  Identify and integrate past successful treatment measures, if able.  Encourage patient and caregiver involvement in pain assessment, interventions and safety measures.  Re-evaluate plan regularly.  Recent Flowsheet Documentation  Taken 2/13/2023 1115 by Jewell Gaviria LPN  Pain Management Interventions: heat applied  Taken 2/13/2023 0800 by Jewell Gaviria LPN  Pain Management Interventions: see MAR   Goal Outcome Evaluation:

## 2023-02-13 NOTE — DISCHARGE PLACEMENT REQUEST
"Case Management  [phone]    HernestoKulwantMitzi BARBARA (87 y.o. Female)     Date of Birth   1935    Social Security Number       Address   [address] [facility] [address]    Home Phone   [phone]    MRN   [id number]       Yarsanism   Scientologist    Marital Status   Single                            Admission Date   23    Admission Type   Emergency    Admitting Provider   Lyudmila Grant DO    Attending Provider   Lyudmila Grant DO    Department, Room/Bed   [facility] 5G, S560/1       Discharge Date       Discharge Disposition       Discharge Destination                               Attending Provider: Lyudmila Grant DO    Allergies: Bee Pollen, Lorazepam    Isolation: None   Infection: None   Code Status: No CPR    Ht: 157.5 cm (62\")   Wt: 47.6 kg (105 lb)    Admission Cmt: None   Principal Problem: Pneumonia of both upper lobes due to infectious organism [J18.9]                 Active Insurance as of 2023     Primary Coverage     Payor Plan Insurance Group Employer/Plan Group    HUMANA MEDICARE REPLACEMENT HUMANA MEDICARE REPLACEMENT [id number]     Payor Plan Address Payor Plan Phone Number Payor Plan Fax Number Effective Dates    [address] [address] [phone]  2018 - None Entered    [facility] [address]       Subscriber Name Subscriber Birth Date Member ID       MITZI ZAPATA BARBARA 1935 [id number]                 Emergency Contacts      (Rel.) Home Phone Work Phone Mobile Phone    CHELE ZAPATA (Power of ) [phone] -- [phone]    ALICIA ZAPATA (Relative) [phone] -- [phone]               History & Physical      Prosper Muniz MD at [facility]              [facility]  HISTORY AND PHYSICAL    Patient Name: Mitzi Zapata  : 1935  MRN: [id number]  Primary Care Physician: System, Provider Not In  Date of admission: 2023      Subjective    Subjective "     Chief Complaint: Neck and left shoulder pain, fall    HPI:  Mitzi Eric is a 87 y.o. female  with past medical history of known right upper lobe mass, CKD stage III, trigeminal neuralgia, COPD on 2 L NC, moderate pulm hypertension paroxysmal fibrillation, hypothyroidism, dementia, hypertension, chronic combined systolic and diastolic heart failure, thrombocytopenia who presents to the ED from nursing home with neck and left shoulder pain s/p fall a couple of days back.  Per family patient has not been feeling well for the last 5 to 6 days, p.o. intake has decreased, she has been losing weight, has generally become more weak. She apparently fell 2-3 days ago while putting on her pajamas and has been complaining of neck and left shoulder pain hence her precentation to the ED. at the time of my evaluation she denies any overt shortness of air, denies any cough, no fevers or chills, no nausea or vomiting.  Initial work-up has included. Left shoulder x-ray that was unremarkable, chest x-ray however was suggestive of bilateral upper lobe pneumonia.  Patient was started on antibiotics and hospital medicine was asked to admit.     Review of Systems   Gen- No fevers, chills  CV- No chest pain, palpitations  Resp- No cough, dyspnea  GI- No N/V/D, abd pain    All other systems reviewed and are negative.     Personal History     Past Medical History:   Diagnosis Date   • A-fib (HCC)    • Ataxic gait    • Chronic kidney disease, stage 3 (HCC)    • COPD (chronic obstructive pulmonary disease) (HCC)    • Dementia (HCC)    • Disease of thyroid gland    • Essential tremor 12/21/2022   • Heart failure (HCC)    • Hyperlipidemia    • Hypertension    • Shingles              Past Surgical History:   Procedure Laterality Date   • BRAIN TUMOR EXCISION      BENIGN   • FRACTURE SURGERY     • TRIGGER FINGER RELEASE         Family History:  family history includes Alcohol abuse in her father; Stroke in her mother. Otherwise pertinent  FHx was reviewed and unremarkable.     Social History:  reports that she quit smoking about 43 years ago. Her smoking use included cigarettes. She started smoking about 63 years ago. She has a 22.50 pack-year smoking history. She has never used smokeless tobacco. She reports that she does not drink alcohol and does not use drugs.  Social History     Social History Narrative   • Not on file       Medications:  Available home medication information reviewed.  Loratadine, Menthol (Topical Analgesic), Nutritional Supplements, O2, OXcarbazepine, acetaminophen, albuterol sulfate HFA, aspirin, budesonide-formoterol, docusate sodium, donepezil, folic acid, levothyroxine, meclizine, metoprolol succinate XL, pantoprazole, potassium chloride, pregabalin, spironolactone, torsemide, traMADol, and traZODone HCl      Allergies   Allergen Reactions   • Bee Pollen Unknown - Low Severity   • Lorazepam Delirium       Objective    Objective     Vital Signs:   Temp:  [98.4 °F (36.9 °C)] 98.4 °F (36.9 °C)  Heart Rate:  [70-87] 70  Resp:  [18] 18  BP: ()/(51-67) 112/51  Flow (L/min):  [2] 2       Physical Exam    Constitutional: Chronically ill-appearing elderly female awake, alert  Eyes: PERRLA, sclerae anicteric, no conjunctival injection  HENT: NCAT, mucous membranes moist  Neck: Supple, no thyromegaly, no lymphadenopathy, trachea midline  Respiratory: Nonlabored respirations, diffuse coarse breath sounds on 2 L NC  Cardiovascular: RRR, no murmurs, rubs, or gallops, palpable pedal pulses bilaterally  Gastrointestinal: Positive bowel sounds, soft, nontender, nondistended  Musculoskeletal: No bilateral ankle edema, no clubbing or cyanosis to extremities  Psychiatric: Appropriate affect, cooperative  Neurologic: Nonfocal, strength symmetric in all extremities, Cranial Nerves grossly intact to confrontation, speech clear  Skin: No rashes    Result Review:  I have personally reviewed the results from the time of this admission to  2/12/2023 15:41 EST and agree with these findings:  [x]  Laboratory list / accordion  []  Microbiology  [x]  Radiology  []  EKG/Telemetry   []  Cardiology/Vascular   []  Pathology  []  Old records  []  Other:  Most notable findings include:        LAB RESULTS:      Lab 02/12/23  1358   WBC 12.35*   HEMOGLOBIN 11.1*   HEMATOCRIT 34.2   PLATELETS 223   NEUTROS ABS 10.00*   IMMATURE GRANS (ABS) 0.11*   LYMPHS ABS 0.69*   MONOS ABS 1.43*   EOS ABS 0.07   MCV 99.4*   LACTATE 1.4         Lab 02/12/23  1358   SODIUM 138   POTASSIUM 4.7   CHLORIDE 102   CO2 27.0   ANION GAP 9.0   BUN 23   CREATININE 0.95   EGFR 58.5*   GLUCOSE 104*   CALCIUM 8.9         Lab 02/12/23  1358   TOTAL PROTEIN 6.4   ALBUMIN 3.6   GLOBULIN 2.8   ALT (SGPT) 8   AST (SGOT) 23   BILIRUBIN 0.4   ALK PHOS 138*                     UA    Urinalysis 3/29/22 3/29/22    1120 1155   Specific [place], UA 1.014 1.013   Ketones, UA Negative Negative   Blood, UA Negative Negative   Leukocytes, UA Negative Negative   Nitrite, UA Negative Negative             Microbiology Results (last 10 days)     Procedure Component Value - Date/Time    COVID PRE-OP / PRE-PROCEDURE SCREENING ORDER (NO ISOLATION) - Swab, Nasopharynx [[id number]]  (Normal) Collected: 02/12/23 1400    Lab Status: Final result Specimen: Swab from Nasopharynx Updated: 02/12/23 1528    Narrative:      The following orders were created for panel order COVID PRE-OP / PRE-PROCEDURE SCREENING ORDER (NO ISOLATION) - Swab, Nasopharynx.  Procedure                               Abnormality         Status                     ---------                               -----------         ------                     COVID-19, FLU A/B, RSV P...[[id number]]  Normal              Final result                 Please view results for these tests on the individual orders.    COVID-19, FLU A/B, RSV PCR - Swab, Nasopharynx [[id number]]  (Normal) Collected: 02/12/23 1400    Lab Status: Final result Specimen: Swab from  Nasopharynx Updated: 02/12/23 1528     COVID19 Not Detected     Influenza A PCR Not Detected     Influenza B PCR Not Detected     RSV, PCR Not Detected    Narrative:      Fact sheet for providers: https://www.fda.gov/media/292897/download    Fact sheet for patients: https://www.fda.gov/media/714985/download    Test performed by PCR.          XR Shoulder 2+ View Left    Result Date: 2/12/2023  XR SHOULDER 2+ VW LEFT Date of Exam: 2/12/2023 12:39 PM EST Indication: Trauma. Comparison: 10/21/2021 Findings: There is no acute fracture or dislocation. Acromioclavicular and glenohumeral joints appear intact. No erosions. Acromiohumeral and coracoclavicular distances are well-maintained. Mild to moderate acromioclavicular and glenohumeral osteoarthritic changes  are present. The adjacent lung and ribs appear intact.     Impression: Impression: No acute osseous abnormality. Mild to moderate acromial clavicular and glenohumeral osteoarthritic changes are present. Electronically Signed: Amy Kelly  2/12/2023 1:05 PM EST  Workstation ID: [id number]    CT Cervical Spine Without Contrast    Result Date: 2/12/2023  CT CERVICAL SPINE WO CONTRAST Date of Exam: 2/12/2023 1:02 PM EST Indication: Fell 2 days ago, now upper neck pain. Comparison: CT 3/29/2022, 2/18/2018 Technique: Axial CT images were obtained of the cervical spine without contrast administration.  Reconstructed coronal and sagittal images were also obtained. Automated exposure control and iterative construction methods were used. Findings: No evidence of fracture or compression deformity. Craniocervical junction appears unremarkable. Prevertebral soft tissues appear unremarkable. No significant spondylolisthesis identified. Mild hypertrophic changes are seen about the dens. Nutrient foramen are present within the C2 vertebral body which appear unchanged as compared to the previous study. Moderate multilevel degenerative changes are present throughout the spine. There is  trace retrolisthesis of C3 on C4 measuring approximately 1 to 2  mm, anterolisthesis of C4 and C5 measuring approximately 1 to 2 mm and anterolisthesis of C5 on C6 measuring approximately 2 mm. Moderate multilevel degenerative changes are present throughout the spine with multilevel facet disease. There is no evidence of significant bony canal stenosis. Multilevel disc osteophyte complexes are present with varying degrees of mild to moderate neuroforaminal stenosis bilaterally. No evidence of severe stenosis at any level. Significant airspace disease present within the bilateral upper lobes which appears consolidative and necrotic, new as compared to the previous study.     Impression: Impression: 1. No acute osseous abnormality. 2. Moderate multilevel degenerative changes present throughout the spine, similar as compared to the previous study. 3. Significant airspace disease present within the bilateral upper lobes, new as compared to the previous study consistent with pneumonia. A necrotizing pneumonia particularly given the appearance on the left cannot be excluded. Follow-up to resolution recommended given the masslike appearance. Electronically Signed: Amy Kelly  2/12/2023 1:21 PM EST  Workstation ID: [id number]    XR Chest 1 View    Result Date: 2/12/2023  XR CHEST 1 VW Date of Exam: 2/12/2023 1:39 PM EST Indication: Pneumonia seen on CT. Comparison: 4/22/2022, 3/25/2022 Findings: Patchy airspace disease is seen within the bilateral upper lobes. Surrounding scarring is present. Remaining portions of the lungs are clear. No significant pleural effusion. The heart appears normal in size. No acute osseous abnormality identified.     Impression: Impression: Bilateral upper lobe pneumonia. Follow-up to resolution recommended given the nodular and masslike appearance. Electronically Signed: Amy Kelly  2/12/2023 2:10 PM EST  Workstation ID: [id number]      Results for orders placed during the hospital encounter of  03/17/22    Adult Transthoracic Echo Complete W/ Cont if Necessary Per Protocol    Interpretation Summary  · Left ventricular ejection fraction appears to be 46 - 50%. Left ventricular systolic function is mildly decreased.  · Left ventricular diastolic function is consistent with (grade I) impaired relaxation.  · Mild to moderate mitral regurgitation.  · Mild to moderate tricuspid regurgitation, RVSP 54 mmHg.      Assessment & Plan   Assessment & Plan     Active Hospital Problems    Diagnosis  POA   • Essential tremor [G25.0]  Yes   • chronic CHF (congestive heart failure) (HCC) [I50.9]  Yes   • Dementia without behavioral disturbance (HCC) [F03.90]  Yes   • Severe malnutrition (CMS/HCC) [E43]  Yes   • Lung mass [R91.8]  Yes   • Hypothyroidism (acquired) [E03.9]  Yes   • Essential hypertension [I10]  Yes     87 y.o. female  with past medical history of known right upper lobe mass, CKD stage III, trigeminal neuralgia, COPD on 2 L NC, moderate pulm hypertension paroxysmal fibrillation, hypothyroidism, dementia, hypertension, chronic combined systolic and diastolic heart failure, thrombocytopenia who presents to the ED from nursing home with neck and left shoulder pain s/p fall.    Neck and left shoulder pain s/p fall  -Left shoulder x-ray is unremarkable  -Pain control with tramadol  -PT/OT to evaluate    Chronic respiratory failure with hypoxia  Suspected bilateral upper lobe pneumonia  History of COPD  -Chest x-ray reveals bilateral upper lobe pneumonia, patient is without any cough, fevers or chills  -Follow-up blood cultures, urinary antigens, MRSA PCR, procalcitonin  -CT chest ordered for better pictures  -She s/p vancomycin and Zosyn, we will hold off on further antibiotics at this time, if CT is confirmatory for pneumonia we will start Rocephin and azithromycin  -She is on 2 L NC which is her baseline ([facility])  -Continue DuoNebs    Combined systolic and diastolic heart failure  Moderate pulm  hypertension  -Currently seems compensated  -Hold diuretics, Aldactone, beta-blocker as patient is hypotensive    Paroxysmal atrial fibrillation  -Rates are controlled, continue metoprolol      CKD stage III  -Renal functions to be stable at baseline, continue to monitor    Hypothyroidism  -We will check a baseline TSH, continue Synthroid    Trigeminal neuralgia  -Continue Trileptal and Lyrica    Hypertension  -BP currently low, holding hypertensives,  -start gentle fluids normal saline at 75 mL/h for 6 hours.    Dementia  -Continue Aricept    Right upper lobe lung mass  -This has apparently been known since 2018, family aware and have previously declined any further work-up/treatment    DVT prophylaxis:  mechanical    CODE STATUS:  DNR/DNI  There are no questions and answers to display.     Expected Discharge   Expected Discharge Date and Time     Expected Discharge Date Expected Discharge Time    Feb 15, 2023            Prosper Muniz MD  23    Electronically signed by Prosper Muniz MD at 23 1542        [address] [address]  Phone:  [phone]  Fax:  [phone] Date: 2023      Ambulatory Referral to Physical Therapy Evaluate and treat     Patient:  Mitzi Eric MRN:  [id number]   [address]  [place] [address] :  1935  SSN:    Phone: [phone] Sex:  F      INSURANCE PAYOR PLAN GROUP # SUBSCRIBER ID   Primary:    HUMANA MEDICARE REPLACEMENT [id number]      Referring Provider Information:  LAVELL OROZCO Phone: [phone] Fax: [phone]       Referral Information:   # Visits:  1 Referral Type: Physical Therapy [AE1]   Urgency:  Routine Referral Reason: Specialty Services Required   Start Date: 2023 End Date:  To be determined by Insurer   Diagnosis: Pneumonia of both upper lobes due to infectious organism (J18.9 [ICD-10-CM] 486 [ICD-9-CM])  Generalized weakness  (R53.1 [ICD-10-CM] 780.79 [ICD-9-CM])  Acute on chronic respiratory failure with hypoxemia (HCC) (J96.21 [ICD-10-CM] 518.84 [ICD-9-CM])  COPD with acute exacerbation (HCC) (J44.1 [ICD-10-CM] 491.21 [ICD-9-CM])  Thrombocytopenia (HCC) (D69.6 [ICD-10-CM] 287.5 [ICD-9-CM])  Acute congestive heart failure, unspecified heart failure type (HCC) (I50.9 [ICD-10-CM] 428.0 [ICD-9-CM])  Acute respiratory failure with hypoxia (HCC) (J96.01 [ICD-10-CM] 518.81 [ICD-9-CM])  Fall, initial encounter (W19.XXXA [ICD-10-CM] E888.9 [ICD-9-CM])  Essential hypertension (I10 [ICD-10-CM] 401.9 [ICD-9-CM])      Refer to Dept:   Refer to Provider:   Refer to Provider Phone:   Refer to Facility:       Specialty needed: Evaluate and treat  Follow-up needed: Yes     This document serves as a request of services and does not constitute Insurance authorization or approval of services.  To determine eligibility, please contact the members Insurance carrier to verify and review coverage.     If you have medical questions regarding this request for services. Please contact [address] at [phone] during normal business hours.        Verbal Order Mode: Verbal with readback   Authorizing Provider: Lyudmila Grant DO  Authorizing Provider's NPI: [id number]     Order Entered By: Araceli Alfred, RN 2/13/2023  3:42 PM     Electronically signed by:

## 2023-02-13 NOTE — CONSULTS
Nutrition Services    Patient Name:  Mitzi Eric  YOB: 1935  MRN: [id number]  Admit Date:  2/12/2023      RD received nutrition consult based on nurse admission screen. Patient unable to provide information d/t dementia. RD unable to speak with patient's POA, Freddy, at this time. RD will speak with POA tomorrow morning. RN reports patient likes chocolate Ensure (dislikes Boost). RD did ask patient if she likes juice (with Boost Breeze/Ensure Clear in mind) and patient reports she does not like juice. RD will send chocolate Magic Cup and chocolate Premier Protein to see if she likes these since we do not carry chocolate Ensure (only chocolate Boost).      Electronically signed by:  Kayla Sebastian RD  02/13/23 14:57 EST

## 2023-02-13 NOTE — NURSING NOTE
"Reason for Wound, Ostomy and Continence (WOC) Nursing Consultation: \"Right great toe pressure injury\"    Patient is a small 0.1x0.1x0 cm area of erythema, with defined edges noted at her right great medial toe.  The discoloration is over a bony prominence however it is 100% blanchable.    Wound Image:       Follow up summary and recommendation(s) for management of wound:     - Practice pressure injury prevention protocol.    Most recent Miguel A Scale score:  Sensory Perception: 3-->slightly limited  Moisture: 3-->occasionally moist  Activity: 2-->chairfast  Mobility: 2-->very limited  Nutrition: 2-->probably inadequate  Friction and Shear: 2-->potential problem  Miguel A Score: 14 (02/12/23 2058)     Specialty support surface: Foam Mattress with Waffle Overlay       Pressure Injury Prevention Protocol (initiate for Miguel A Score of 18 or less):   *Keep skin dry, turn q 2 hr, keep heels elevated and offloaded with offloading heel boots.    *Apply z-guard to bottom and bony prominences daily and as needed with incontinence episodes.  *Follow C.A.R.E protocol if medical devices (Bipap, lewis, Ng tube, etc) are being used.     Thank you for consulting the WO Nurse.  WOC Team will sign off.  Please re consult if the wound(s) worsens.               "

## 2023-02-13 NOTE — THERAPY EVALUATION
Patient Name: Mitzi Eric  : 1935    MRN: [id number]                              Today's Date: 2023       Admit Date: 2023    Visit Dx:     ICD-10-CM ICD-9-CM   1. Pneumonia of both upper lobes due to infectious organism  J18.9 486   2. Generalized weakness  R53.1 780.79   3. Cervical paraspinous muscle spasm  M62.838 728.85   4. Fall at nursing home, initial encounter  W19.XXXA E888.9    Y92.129 E849.7   5. Impaired functional mobility, balance, gait, and endurance  Z74.09 V49.89     Patient Active Problem List   Diagnosis   • Fall   • Delirium, acute   • Essential hypertension   • Cognitive decline   • Trigeminal neuralgia   • Hypothyroidism (acquired)   • Lung mass   • Influenza A   • Severe malnutrition (CMS/HCC)   • Dementia without behavioral disturbance (HCC)   • History of craniotomy   • Impaired functional mobility, balance, gait, and endurance   • COPD ex with volume overload   • chronic CHF (congestive heart failure) (HCC)   • Thrombocytopenia (HCC)   • Elevated transaminase level   • atrial fibrillation   • COPD with acute exacerbation (HCC)   • Acute on chronic respiratory failure with hypoxemia (HCC)   • Chronic respiratory failure (HCC)   • Underweight   • Moderate malnutrition (CMS/HCC)   • Centrilobular emphysema (HCC)   • Essential tremor   • Pneumonia of both upper lobes due to infectious organism     Past Medical History:   Diagnosis Date   • A-fib (HCC)    • Ataxic gait    • Chronic kidney disease, stage 3 (HCC)    • COPD (chronic obstructive pulmonary disease) (HCC)    • Dementia (HCC)    • Disease of thyroid gland    • Essential tremor 2022   • Heart failure (HCC)    • Hyperlipidemia    • Hypertension    • Shingles      Past Surgical History:   Procedure Laterality Date   • BRAIN TUMOR EXCISION      BENIGN   • FRACTURE SURGERY     • TRIGGER FINGER RELEASE        General Information     Row Name 23 1143          OT Time and Intention    Document Type evaluation   -AR     Mode of Treatment occupational therapy  -AR     Row Name 02/13/23 1143          General Information    Patient Profile Reviewed yes  -AR     Prior Level of Function independent:;transfer;ADL's;bed mobility;w/c or scooter  performs pivot transfers in/out of wc with independence, uses wc for all mobility  -AR     Existing Precautions/Restrictions fall;oxygen therapy device and L/min  -AR     Barriers to Rehab previous functional deficit;medically complex;cognitive status  -AR     Row Name 02/13/23 1143          Living Environment    People in Home facility resident  -AR     Row Name 02/13/23 1143          Home Main Entrance    Number of Stairs, Main Entrance none  -AR     Row Name 02/13/23 1143          Stairs Within Home, Primary    Number of Stairs, Within Home, Primary none  -AR     Row Name 02/13/23 1143          Cognition    Orientation Status (Cognition) oriented to;person;disoriented to;place;situation;verbal cues/prompts needed for orientation;time  -AR     Row Name 02/13/23 1143          Safety Issues, Functional Mobility    Safety Issues Affecting Function (Mobility) awareness of need for assistance;impulsivity;insight into deficits/self-awareness;judgment;problem-solving;safety precaution awareness;safety precautions follow-through/compliance;sequencing abilities  -AR     Impairments Affecting Function (Mobility) balance;cognition;strength;endurance/activity tolerance;coordination;pain;shortness of breath  -AR     Cognitive Impairments, Mobility Safety/Performance awareness, need for assistance;impulsivity;insight into deficits/self-awareness;judgment;problem-solving/reasoning;safety precaution awareness;safety precaution follow-through;sequencing abilities  -AR           User Key  (r) = Recorded By, (t) = Taken By, (c) = Cosigned By    Initials Name Provider Type    Stacia Bahena OT Occupational Therapist                 Mobility/ADL's     Row Name 02/13/23 1145          Bed Mobility     Bed Mobility supine-sit;scooting/bridging  -AR     Scooting/Bridging [place] (Bed Mobility) contact guard  -AR     Supine-Sit [place] (Bed Mobility) contact guard  -AR     Assistive Device (Bed Mobility) head of bed elevated;bed rails  -AR     Row Name 02/13/23 1145          Transfers    Transfers sit-stand transfer;stand-sit transfer;bed-chair transfer  -AR     Row Name 02/13/23 1145          Bed-Chair Transfer    Bed-Chair [place] (Transfers) minimum assist (75% patient effort);2 person assist;verbal cues;nonverbal cues (demo/gesture)  -AR     Assistive Device (Bed-Chair Transfers) other (see comments)  BUE HHA  -AR     Row Name 02/13/23 1145          Sit-Stand Transfer    Sit-Stand [place] (Transfers) minimum assist (75% patient effort);2 person assist  -AR     Assistive Device (Sit-Stand Transfers) other (see comments)  -AR     Row Name 02/13/23 1145          Stand-Sit Transfer    Stand-Sit [place] (Transfers) minimum assist (75% patient effort);2 person assist;verbal cues  -AR     Assistive Device (Stand-Sit Transfers) other (see comments)  -AR     Row Name 02/13/23 1145          Functional Mobility    Functional Mobility- Comment non-ambulatory at baseline  -AR     Row Name 02/13/23 1145          Activities of Daily Living    BADL Assessment/Intervention upper body dressing;lower body dressing  -AR     Row Name 02/13/23 1145          Upper Body Dressing Assessment/Training    [place] Level (Upper Body Dressing) don;pajama/robe;moderate assist (50% patient effort)  -AR     Position (Upper Body Dressing) edge of bed sitting  -AR           User Key  (r) = Recorded By, (t) = Taken By, (c) = Cosigned By    Initials Name Provider Type    Stacia Bahena OT Occupational Therapist               Obj/Interventions     Row Name 02/13/23 1146          Sensory Assessment (Somatosensory)    Sensory Assessment (Somatosensory) UE sensation intact  -AR     Row Name 02/13/23 1146           Range of Motion Comprehensive    General Range of Motion bilateral upper extremity ROM WFL  -AR     Row Name 02/13/23 1146          Strength Comprehensive (MMT)    General Manual Muscle Testing (MMT) Assessment upper extremity strength deficits identified  -AR     Comment, General Manual Muscle Testing (MMT) Assessment BUE 4/5  -AR     Row Name 02/13/23 1146          Shoulder (Therapeutic Exercise)    Shoulder (Therapeutic Exercise) AAROM (active assistive range of motion)  -AR     Shoulder AAROM (Therapeutic Exercise) bilateral;flexion;extension;sitting;10 repetitions  -AR     Row Name 02/13/23 1146          Elbow/Forearm (Therapeutic Exercise)    Elbow/Forearm (Therapeutic Exercise) AROM (active range of motion)  -AR     Elbow/Forearm AROM (Therapeutic Exercise) bilateral;flexion;extension;10 repetitions;sitting  -AR     Row Name 02/13/23 1146          Hand (Therapeutic Exercise)    Hand (Therapeutic Exercise) AROM (active range of motion)  -AR     Hand AROM/AAROM (Therapeutic Exercise) bilateral;finger flexion;finger extension;10 repetitions  -AR     Row Name 02/13/23 1146          Motor Skills    Therapeutic Exercise shoulder;elbow/forearm;hand  -AR     Row Name 02/13/23 1146          Balance    Balance Assessment sitting static balance;sitting dynamic balance;standing static balance;standing dynamic balance  -AR     Static Sitting Balance supervision  -AR     Dynamic Sitting Balance contact guard  -AR     Position, Sitting Balance unsupported;sitting edge of bed  -AR     Static Standing Balance minimal assist  -AR     Dynamic Standing Balance minimal assist;2-person assist  -AR     Position/Device Used, Standing Balance supported  -AR           User Key  (r) = Recorded By, (t) = Taken By, (c) = Cosigned By    Initials Name Provider Type    Stacia Bahena OT Occupational Therapist               Goals/Plan     Row Name 02/13/23 1151          Transfer Goal 1 (OT)    Activity/Assistive Device (Transfer  Goal 1, OT) sit-to-stand/stand-to-sit;commode, bedside without drop arms  -AR     [place] Level/Cues Needed (Transfer Goal 1, OT) contact guard required;verbal cues required  -AR     Time Frame (Transfer Goal 1, OT) long term goal (LTG);by discharge  -AR     Progress/Outcome (Transfer Goal 1, OT) goal ongoing  -AR     Row Name 02/13/23 1151          Dressing Goal 1 (OT)    Activity/Device (Dressing Goal 1, OT) lower body dressing  AD PRN  -AR     [place]/Cues Needed (Dressing Goal 1, OT) minimum assist (75% or more patient effort);verbal cues required  -AR     Time Frame (Dressing Goal 1, OT) long term goal (LTG);by discharge  -AR     Progress/Outcome (Dressing Goal 1, OT) goal ongoing  -AR     Row Name 02/13/23 1151          Toileting Goal 1 (OT)    Activity/Device (Toileting Goal 1, OT) toileting skills, all;commode, bedside without drop arms  -AR     [place] Level/Cues Needed (Toileting Goal 1, OT) verbal cues required;contact guard required  -AR     Time Frame (Toileting Goal 1, OT) long term goal (LTG);by discharge  -AR     Progress/Outcome (Toileting Goal 1, OT) goal ongoing  -AR     Row Name 02/13/23 1151          Therapy Assessment/Plan (OT)    Planned Therapy Interventions (OT) activity tolerance training;adaptive equipment training;BADL retraining;functional balance retraining;IADL retraining;occupation/activity based interventions;patient/caregiver education/training;ROM/therapeutic exercise;transfer/mobility retraining;strengthening exercise  -AR           User Key  (r) = Recorded By, (t) = Taken By, (c) = Cosigned By    Initials Name Provider Type    Stacia Bahena, OT Occupational Therapist               Clinical Impression     Row Name 02/13/23 1148          Pain Assessment    Pretreatment Pain Rating 0/10 - no pain  -AR     Posttreatment Pain Rating 2/10  -AR     Pain Location - Side/Orientation Left  -AR     Pain Location posterior  -AR     Pain Location - neck  -AR     Pain  Intervention(s) Repositioned;Ambulation/increased activity  -AR     Row Name 02/13/23 1148          Plan of Care Review    Plan of Care Reviewed With patient  -AR     Outcome Evaluation Pt completed bed mobility with CGA, transfer to chair with min assist x2 and UB dressing with mod assist. Pt limited with decreased balance, pain, generalized weakness and dyspnea with exertion. She is performing below baseline status, recommend DC to SNF unless staff are able to provide 24/7 assist.  -AR     Row Name 02/13/23 1148          Therapy Assessment/Plan (OT)    Rehab Potential (OT) good, to achieve stated therapy goals  -AR     Criteria for Skilled Therapeutic Interventions Met (OT) yes  -AR     Therapy Frequency (OT) daily  -AR     Row Name 02/13/23 1148          Therapy Plan Review/Discharge Plan (OT)    Anticipated Discharge Disposition (OT) skilled nursing facility  -AR     Row Name 02/13/23 1148          Vital Signs    Pre Systolic BP Rehab 138  -AR     Pre Treatment Diastolic BP 70  -AR     Post Systolic BP Rehab 134  -AR     Post Treatment Diastolic BP 63  -AR     Pretreatment Heart Rate (beats/min) 88  -AR     Posttreatment Heart Rate (beats/min) 88  -AR     Pre SpO2 (%) 98  -AR     O2 Delivery Pre Treatment supplemental O2  -AR     Post SpO2 (%) 94  -AR     O2 Delivery Post Treatment supplemental O2  -AR     Pre Patient Position Supine  -AR     Intra Patient Position Standing  -AR     Post Patient Position Sitting  -AR     Row Name 02/13/23 1148          Positioning and Restraints    Pre-Treatment Position in bed  -AR     Post Treatment Position chair  -AR     In Chair reclined;call light within reach;encouraged to call for assist;exit alarm on;compression device;waffle cushion;legs elevated  -AR           User Key  (r) = Recorded By, (t) = Taken By, (c) = Cosigned By    Initials Name Provider Type    Stacia Bahena, OT Occupational Therapist               Outcome Measures     Row Name 02/13/23 1159           How much help from another is currently needed...    Putting on and taking off regular lower body clothing? 2  -AR     Bathing (including washing, rinsing, and drying) 2  -AR     Toileting (which includes using toilet bed pan or urinal) 2  -AR     Putting on and taking off regular upper body clothing 2  -AR     Taking care of personal grooming (such as brushing teeth) 3  -AR     Eating meals 3  -AR     AM-PAC 6 Clicks Score (OT) 14  -AR     Row Name 02/13/23 1103 02/13/23 0800       How much help from another person do you currently need...    Turning from your back to your side while in flat bed without using bedrails? 3  -SJ 3  -AC    Moving from lying on back to sitting on the side of a flat bed without bedrails? 4  -SJ 3  -AC    Moving to and from a bed to a chair (including a wheelchair)? 2  -SJ 2  -AC    Standing up from a chair using your arms (e.g., wheelchair, bedside chair)? 2  -SJ 1  -AC    Climbing 3-5 steps with a railing? 1  -SJ 1  -AC    To walk in hospital room? 2  -SJ 2  -AC    AM-PAC 6 Clicks Score (PT) 14  -SJ 12  -AC    Highest level of mobility 4 --> Transferred to chair/commode  -SJ 4 --> Transferred to chair/commode  -AC    Row Name 02/13/23 1152 02/13/23 1103       Functional Assessment    Outcome Measure Options AM-PAC 6 Clicks Daily Activity (OT)  -AR AM-PAC 6 Clicks Basic Mobility (PT)  -SJ          User Key  (r) = Recorded By, (t) = Taken By, (c) = Cosigned By    Initials Name Provider Type    SJ Yudelka Chung, PT Physical Therapist    Stacia Bahena OT Occupational Therapist    Jennie De Luna, RN Registered Nurse                Occupational Therapy Education     Title: PT OT SLP Therapies (In Progress)     Topic: Occupational Therapy (Done)     Point: ADL training (Done)     Description:   Instruct learner(s) on proper safety adaptation and remediation techniques during self care or transfers.   Instruct in proper use of assistive devices.              Learning  Progress Summary           Patient Vale, E,TB,D, VU,NR by AR at 2/13/2023 1152                   Point: Home exercise program (Done)     Description:   Instruct learner(s) on appropriate technique for monitoring, assisting and/or progressing therapeutic exercises/activities.              Learning Progress Summary           Patient Vale, E,TB,D, VU,NR by AR at 2/13/2023 1152                   Point: Precautions (Done)     Description:   Instruct learner(s) on prescribed precautions during self-care and functional transfers.              Learning Progress Summary           Patient Roberther, E,TB,D, VU,NR by AR at 2/13/2023 1152                   Point: Body mechanics (Done)     Description:   Instruct learner(s) on proper positioning and spine alignment during self-care, functional mobility activities and/or exercises.              Learning Progress Summary           Patient Vale, E,TB,D, VU,NR by AR at 2/13/2023 1152                               User Key     Initials Effective Dates Name Provider Type Discipline    AR 02/03/23 -  Stacia Woods, OT Occupational Therapist OT              OT Recommendation and Plan  Planned Therapy Interventions (OT): activity tolerance training, adaptive equipment training, BADL retraining, functional balance retraining, IADL retraining, occupation/activity based interventions, patient/caregiver education/training, ROM/therapeutic exercise, transfer/mobility retraining, strengthening exercise  Therapy Frequency (OT): daily  Plan of Care Review  Plan of Care Reviewed With: patient  Outcome Evaluation: Pt completed bed mobility with CGA, transfer to chair with min assist x2 and UB dressing with mod assist. Pt limited with decreased balance, pain, generalized weakness and dyspnea with exertion. She is performing below baseline status, recommend DC to SNF unless staff are able to provide 24/7 assist.     Time Calculation:    Time Calculation- OT     Row Name 02/13/23 1153              Time Calculation- OT    OT Start Time 1001  -AR      OT Received On 02/13/23  -AR      OT Goal Re-Cert Due Date 02/23/23  -AR         Untimed Charges    OT Eval/Re-eval Minutes 55  -AR         Total Minutes    Untimed Charges Total Minutes 55  -AR       Total Minutes 55  -AR            User Key  (r) = Recorded By, (t) = Taken By, (c) = Cosigned By    Initials Name Provider Type    Stacia Bahena OT Occupational Therapist              Therapy Charges for Today     Code Description Service Date Service Provider Modifiers Qty    [id number] HC OT EVAL LOW COMPLEXITY 4 2/13/2023 Stacia Woods OT GO 1               Stacia Woods OT  2/13/2023

## 2023-02-13 NOTE — CASE MANAGEMENT/SOCIAL WORK
Continued Stay Note  [facility]     Patient Name: Mitzi Eric  MRN: [id number]  Today's Date: 2/13/2023    Admit Date: 2/12/2023    Plan: Home with    Discharge Plan     Row Name 02/13/23 1207       Plan    Plan Home     Patient/Family in Agreement with Plan yes    Plan Comments I have met with Ms. Eric and her son at the bedside today to discuss the discharge plan.  Her son Lloyd states that his  mom lives at [facility].  She is primarily wheelchair bound and is able to self transfer at baseline.  She needs assistance with activities of daily living.  She also requires home oxygen at night and PRN.  Lane is available to assist daily and states that he plans for her return to [facility] soon.  He denies current receipt of home health /outpatient services.  Per MDR, Ms. Eric may be ready for discharge in 1-2 days.  PT recommends home with home health services.  CM will cont to follow the plan of care and discuss home health/PT services options with [facility].    02/13/23  16:23 EST  I have faxed OP PT ambulatory referral to [facility].      Final Discharge Disposition Code 01 Home               Discharge Codes    No documentation.               Expected Discharge Date and Time     Expected Discharge Date Expected Discharge Time    Feb 15, 2023             rAaceli Alfred RN

## 2023-02-14 LAB
ALBUMIN SERPL-MCNC: 3 G/DL (ref 3.5–5.2)
ANION GAP SERPL CALCULATED.3IONS-SCNC: 10 MMOL/L (ref 5–15)
BASOPHILS # BLD AUTO: 0.03 10*3/MM3 (ref 0–0.2)
BASOPHILS NFR BLD AUTO: 0.3 % (ref 0–1.5)
BUN SERPL-MCNC: 13 MG/DL (ref 8–23)
BUN/CREAT SERPL: 19.4 (ref 7–25)
CALCIUM SPEC-SCNC: 8.3 MG/DL (ref 8.6–10.5)
CHLORIDE SERPL-SCNC: 101 MMOL/L (ref 98–107)
CO2 SERPL-SCNC: 24 MMOL/L (ref 22–29)
CREAT SERPL-MCNC: 0.67 MG/DL (ref 0.57–1)
DEPRECATED RDW RBC AUTO: 43.3 FL (ref 37–54)
EGFRCR SERPLBLD CKD-EPI 2021: 85.2 ML/MIN/1.73
EOSINOPHIL # BLD AUTO: 0.22 10*3/MM3 (ref 0–0.4)
EOSINOPHIL NFR BLD AUTO: 2.4 % (ref 0.3–6.2)
ERYTHROCYTE [DISTWIDTH] IN BLOOD BY AUTOMATED COUNT: 12.3 % (ref 12.3–15.4)
GLUCOSE SERPL-MCNC: 95 MG/DL (ref 65–99)
HCT VFR BLD AUTO: 29.7 % (ref 34–46.6)
HGB BLD-MCNC: 9.8 G/DL (ref 12–15.9)
IMM GRANULOCYTES # BLD AUTO: 0.05 10*3/MM3 (ref 0–0.05)
IMM GRANULOCYTES NFR BLD AUTO: 0.5 % (ref 0–0.5)
LYMPHOCYTES # BLD AUTO: 0.84 10*3/MM3 (ref 0.7–3.1)
LYMPHOCYTES NFR BLD AUTO: 9 % (ref 19.6–45.3)
MCH RBC QN AUTO: 32 PG (ref 26.6–33)
MCHC RBC AUTO-ENTMCNC: 33 G/DL (ref 31.5–35.7)
MCV RBC AUTO: 97.1 FL (ref 79–97)
MONOCYTES # BLD AUTO: 1.18 10*3/MM3 (ref 0.1–0.9)
MONOCYTES NFR BLD AUTO: 12.6 % (ref 5–12)
NEUTROPHILS NFR BLD AUTO: 7.03 10*3/MM3 (ref 1.7–7)
NEUTROPHILS NFR BLD AUTO: 75.2 % (ref 42.7–76)
NRBC BLD AUTO-RTO: 0 /100 WBC (ref 0–0.2)
PHOSPHATE SERPL-MCNC: 3 MG/DL (ref 2.5–4.5)
PLATELET # BLD AUTO: 184 10*3/MM3 (ref 140–450)
PMV BLD AUTO: 11.7 FL (ref 6–12)
POTASSIUM SERPL-SCNC: 3.7 MMOL/L (ref 3.5–5.2)
RBC # BLD AUTO: 3.06 10*6/MM3 (ref 3.77–5.28)
SODIUM SERPL-SCNC: 135 MMOL/L (ref 136–145)
WBC NRBC COR # BLD: 9.35 10*3/MM3 (ref 3.4–10.8)

## 2023-02-14 PROCEDURE — 97110 THERAPEUTIC EXERCISES: CPT

## 2023-02-14 PROCEDURE — 94799 UNLISTED PULMONARY SVC/PX: CPT

## 2023-02-14 PROCEDURE — 25010000002 KETOROLAC TROMETHAMINE PER 15 MG: Performed by: INTERNAL MEDICINE

## 2023-02-14 PROCEDURE — 96375 TX/PRO/DX INJ NEW DRUG ADDON: CPT

## 2023-02-14 PROCEDURE — 85025 COMPLETE CBC W/AUTO DIFF WBC: CPT | Performed by: INTERNAL MEDICINE

## 2023-02-14 PROCEDURE — G0378 HOSPITAL OBSERVATION PER HR: HCPCS

## 2023-02-14 PROCEDURE — 25010000002 PIPERACILLIN SOD-TAZOBACTAM PER 1 G: Performed by: INTERNAL MEDICINE

## 2023-02-14 PROCEDURE — 80069 RENAL FUNCTION PANEL: CPT | Performed by: INTERNAL MEDICINE

## 2023-02-14 PROCEDURE — 99232 SBSQ HOSP IP/OBS MODERATE 35: CPT | Performed by: INTERNAL MEDICINE

## 2023-02-14 RX ADMIN — Medication 10 ML: at 08:49

## 2023-02-14 RX ADMIN — PANTOPRAZOLE SODIUM 40 MG: 40 TABLET, DELAYED RELEASE ORAL at 08:44

## 2023-02-14 RX ADMIN — ASPIRIN 81 MG: 81 TABLET, COATED ORAL at 08:44

## 2023-02-14 RX ADMIN — DOXYCYCLINE 100 MG: 100 INJECTION, POWDER, LYOPHILIZED, FOR SOLUTION INTRAVENOUS at 04:57

## 2023-02-14 RX ADMIN — METOPROLOL SUCCINATE 25 MG: 25 TABLET, EXTENDED RELEASE ORAL at 08:44

## 2023-02-14 RX ADMIN — PREGABALIN 25 MG: 25 CAPSULE ORAL at 08:44

## 2023-02-14 RX ADMIN — BUDESONIDE AND FORMOTEROL FUMARATE DIHYDRATE 2 PUFF: 160; 4.5 AEROSOL RESPIRATORY (INHALATION) at 20:33

## 2023-02-14 RX ADMIN — TAZOBACTAM SODIUM AND PIPERACILLIN SODIUM 3.38 G: 375; 3 INJECTION, SOLUTION INTRAVENOUS at 08:44

## 2023-02-14 RX ADMIN — TAZOBACTAM SODIUM AND PIPERACILLIN SODIUM 3.38 G: 375; 3 INJECTION, SOLUTION INTRAVENOUS at 01:06

## 2023-02-14 RX ADMIN — BUDESONIDE AND FORMOTEROL FUMARATE DIHYDRATE 2 PUFF: 160; 4.5 AEROSOL RESPIRATORY (INHALATION) at 07:15

## 2023-02-14 RX ADMIN — IPRATROPIUM BROMIDE AND ALBUTEROL SULFATE 3 ML: 2.5; .5 SOLUTION RESPIRATORY (INHALATION) at 10:52

## 2023-02-14 RX ADMIN — DONEPEZIL HYDROCHLORIDE 5 MG: 5 TABLET, FILM COATED ORAL at 05:44

## 2023-02-14 RX ADMIN — IPRATROPIUM BROMIDE AND ALBUTEROL SULFATE 3 ML: 2.5; .5 SOLUTION RESPIRATORY (INHALATION) at 07:15

## 2023-02-14 RX ADMIN — TAZOBACTAM SODIUM AND PIPERACILLIN SODIUM 3.38 G: 375; 3 INJECTION, SOLUTION INTRAVENOUS at 20:47

## 2023-02-14 RX ADMIN — SENNOSIDES AND DOCUSATE SODIUM 2 TABLET: 50; 8.6 TABLET ORAL at 20:46

## 2023-02-14 RX ADMIN — OXCARBAZEPINE 600 MG: 300 TABLET, FILM COATED ORAL at 08:44

## 2023-02-14 RX ADMIN — Medication 10 ML: at 20:47

## 2023-02-14 RX ADMIN — OXCARBAZEPINE 600 MG: 300 TABLET, FILM COATED ORAL at 20:46

## 2023-02-14 RX ADMIN — DOXYCYCLINE 100 MG: 100 INJECTION, POWDER, LYOPHILIZED, FOR SOLUTION INTRAVENOUS at 15:43

## 2023-02-14 RX ADMIN — IPRATROPIUM BROMIDE AND ALBUTEROL SULFATE 3 ML: 2.5; .5 SOLUTION RESPIRATORY (INHALATION) at 20:33

## 2023-02-14 RX ADMIN — IPRATROPIUM BROMIDE AND ALBUTEROL SULFATE 3 ML: 2.5; .5 SOLUTION RESPIRATORY (INHALATION) at 16:37

## 2023-02-14 RX ADMIN — SENNOSIDES AND DOCUSATE SODIUM 2 TABLET: 50; 8.6 TABLET ORAL at 08:44

## 2023-02-14 RX ADMIN — KETOROLAC TROMETHAMINE 15 MG: 15 INJECTION, SOLUTION INTRAMUSCULAR; INTRAVENOUS at 11:57

## 2023-02-14 RX ADMIN — LIDOCAINE 1 PATCH: 700 PATCH TOPICAL at 08:44

## 2023-02-14 RX ADMIN — LEVOTHYROXINE SODIUM 25 MCG: 25 TABLET ORAL at 05:44

## 2023-02-14 RX ADMIN — KETOROLAC TROMETHAMINE 15 MG: 15 INJECTION, SOLUTION INTRAMUSCULAR; INTRAVENOUS at 01:11

## 2023-02-14 RX ADMIN — PREGABALIN 25 MG: 25 CAPSULE ORAL at 20:46

## 2023-02-14 NOTE — PROGRESS NOTES
[facility] Medicine Services  PROGRESS NOTE    Patient Name: Mitzi Eric  : 1935  MRN: [id number]    Date of Admission: 2023  Primary Care Physician: System, Provider Not In    Subjective   Subjective     CC:  PNA    HPI:  Pain is better controlled with lidocaine patches and Toradol.  Discussed with family at bedside that can only use Toradol for short period of time due to GI side effects, more sleepy today but nephew says she had trouble sleeping last night    ROS:  Gen- No fevers, chills  CV- No chest pain, palpitations  Resp- No cough, dyspnea  GI- No N/V/D, abd pain    Objective   Objective     Vital Signs:   Temp:  [96.6 °F (35.9 °C)-97.7 °F (36.5 °C)] 97.5 °F (36.4 °C)  Heart Rate:  [70-93] 91  Resp:  [16-18] 18  BP: (127-154)/(54-76) 127/54  Flow (L/min):  [2] 2     Physical Exam:  Constitutional: More lethargic but answers questions  HENT: NCAT, mucous membranes moist  Respiratory: Lungs overall relatively clear, on 2 L  Cardiovascular: RRR, no murmurs, rubs, or gallops  Gastrointestinal: Thin, soft, nontender, nondistended  Musculoskeletal: No LE edema, muscle atrophy  Psychiatric: Appropriate affect, cooperative  Neurologic: Oriented x 2-3, speech clear  Skin: No rashes      Results Reviewed:  LAB RESULTS:      Lab 23  03323  0430 23  1358   WBC 9.35 10.16 12.35*   HEMOGLOBIN 9.8* 11.3* 11.1*   HEMATOCRIT 29.7* 34.8 34.2   PLATELETS 184 194 223   NEUTROS ABS 7.03* 7.93* 10.00*   IMMATURE GRANS (ABS) 0.05 0.08* 0.11*   LYMPHS ABS 0.84 0.68* 0.69*   MONOS ABS 1.18* 1.28* 1.43*   EOS ABS 0.22 0.14 0.07   MCV 97.1* 100.0* 99.4*   PROCALCITONIN  --   --  0.07   LACTATE  --   --  1.4         Lab 23  0331 23  1358   SODIUM 135* 138   POTASSIUM 3.7 4.7   CHLORIDE 101 102   CO2 24.0 27.0   ANION GAP 10.0 9.0   BUN 13 23   CREATININE 0.67 0.95   EGFR 85.2 58.5*   GLUCOSE 95 104*   CALCIUM 8.3* 8.9   PHOSPHORUS 3.0  --    TSH  --  1.250          Lab 02/14/23  0331 02/12/23  1358   TOTAL PROTEIN  --  6.4   ALBUMIN 3.0* 3.6   GLOBULIN  --  2.8   ALT (SGPT)  --  8   AST (SGOT)  --  23   BILIRUBIN  --  0.4   ALK PHOS  --  138*                     Brief Urine Lab Results  (Last result in the past 365 days)      Color   Clarity   Blood   Leuk Est   Nitrite   Protein   CREAT   Urine HCG        03/29/22 1155 Yellow   Clear   Negative   Negative   Negative   Negative                 Microbiology Results Abnormal     Procedure Component Value - Date/Time    Blood Culture - Blood, Arm, Left [[id number]]  (Normal) Collected: 02/12/23 1400    Lab Status: Preliminary result Specimen: Blood from Arm, Left Updated: 02/14/23 1430     Blood Culture No growth at 2 days    Blood Culture - Blood, Arm, Right [[id number]]  (Normal) Collected: 02/12/23 1350    Lab Status: Preliminary result Specimen: Blood from Arm, Right Updated: 02/14/23 1430     Blood Culture No growth at 2 days    MRSA Screen, PCR (Inpatient) - Swab, Nares [[id number]]  (Normal) Collected: 02/13/23 1519    Lab Status: Final result Specimen: Swab from Nares Updated: 02/13/23 1721     MRSA PCR Negative    Narrative:      The negative predictive value of this diagnostic test is high and should only be used to consider de-escalating anti-MRSA therapy. A positive result may indicate colonization with MRSA and must be correlated clinically.  MRSA Negative    S. Pneumo Ag Urine or CSF - Urine, Urine, Clean Catch [[id number]]  (Normal) Collected: 02/13/23 0557    Lab Status: Final result Specimen: Urine, Clean Catch Updated: 02/13/23 0926     Strep Pneumo Ag Negative    Legionella Antigen, Urine - Urine, Urine, Clean Catch [[id number]]  (Normal) Collected: 02/13/23 0557    Lab Status: Final result Specimen: Urine, Clean Catch Updated: 02/13/23 0926     LEGIONELLA ANTIGEN, URINE Negative    COVID PRE-OP / PRE-PROCEDURE SCREENING ORDER (NO ISOLATION) - Swab, Nasopharynx [[id number]]  (Normal) Collected: 02/12/23 1400    Lab  Status: Final result Specimen: Swab from Nasopharynx Updated: 02/12/23 1528    Narrative:      The following orders were created for panel order COVID PRE-OP / PRE-PROCEDURE SCREENING ORDER (NO ISOLATION) - Swab, Nasopharynx.  Procedure                               Abnormality         Status                     ---------                               -----------         ------                     COVID-19, FLU A/B, RSV P...[[id number]]  Normal              Final result                 Please view results for these tests on the individual orders.    COVID-19, FLU A/B, RSV PCR - Swab, Nasopharynx [[id number]]  (Normal) Collected: 02/12/23 1400    Lab Status: Final result Specimen: Swab from Nasopharynx Updated: 02/12/23 1528     COVID19 Not Detected     Influenza A PCR Not Detected     Influenza B PCR Not Detected     RSV, PCR Not Detected    Narrative:      Fact sheet for providers: https://www.fda.gov/media/964222/download    Fact sheet for patients: https://www.fda.gov/media/818989/download    Test performed by PCR.          CT Chest Without Contrast Diagnostic    Result Date: 2/12/2023  CT CHEST WO CONTRAST DIAGNOSTIC Date of Exam: 2/12/2023 3:57 PM EST Indication: PNA on cxr. Comparison: Radiograph 2/12/2023, CT 4/22/2022, 7/5/2018 Technique: Axial CT images were obtained of the chest without contrast administration.  Reconstructed coronal and sagittal images were also obtained. Automated exposure control and iterative construction methods were used. Findings: Hilum and Mediastinum: No pathologically enlarged lymph nodes.  Normal heart size.   No pericardial effusion.  Atherosclerotic calcifications are present including within the coronary arteries. Unremarkable thoracic aorta and pulmonary arteries. Lung Parenchyma and Pleura: Redilatation of significant consolidative changes present within the bilateral upper lobes. Lucency present within the areas of consolidation present on the left which may related to  necrosis or related to emphysema. Area of involvement measures approximately 8.3 x 9.5 cm on the left. Masslike consolidation present within the right upper lobe measuring up to 4.7 x 4.2 cm (series 4 image 20). This previously measured up to 4.3 x 4.5 cm. No significant pleural effusion. No additional pulmonary nodule identified. Upper Abdomen: No acute process. Soft tissues: Unremarkable. Osseous structures: No aggressive focal lytic or sclerotic osseous lesions.     Impression: Impression: 1. Significant consolidative changes present within the left upper lobe, new as compared to the previous study with areas of lucency which may be related to pulmonary necrosis versus significant emphysema surrounded by pneumonia. Underlying true nodule or mass cannot be excluded. Follow-up to resolution recommended. 2. Redemonstration of masslike consolidation present within the right upper lobe which appears similar as compared to CT from 4/22/2022 and remote study from 7/15/2018 consistent with a benign process. 3. Ancillary findings as described above. Electronically Signed: Aym Kelly  2/12/2023 4:12 PM EST  Workstation ID: [id number]      Results for orders placed during the hospital encounter of 03/17/22    Adult Transthoracic Echo Complete W/ Cont if Necessary Per Protocol    Interpretation Summary  · Left ventricular ejection fraction appears to be 46 - 50%. Left ventricular systolic function is mildly decreased.  · Left ventricular diastolic function is consistent with (grade I) impaired relaxation.  · Mild to moderate mitral regurgitation.  · Mild to moderate tricuspid regurgitation, RVSP 54 mmHg.      I have reviewed the medications:  Scheduled Meds:aspirin, 81 mg, Oral, Daily  budesonide-formoterol, 2 puff, Inhalation, BID - RT  donepezil, 5 mg, Oral, QAM  doxycycline, 100 mg, Intravenous, Q12H  ipratropium-albuterol, 3 mL, Nebulization, 4x Daily - RT  levothyroxine, 25 mcg, Oral, Q AM  lidocaine, 3 patch,  Transdermal, Q24H  metoprolol succinate XL, 25 mg, Oral, Daily  OXcarbazepine, 600 mg, Oral, BID  pantoprazole, 40 mg, Oral, Daily  piperacillin-tazobactam, 3.375 g, Intravenous, Q8H  pregabalin, 25 mg, Oral, BID  senna-docusate sodium, 2 tablet, Oral, BID  sodium chloride, 10 mL, Intravenous, Q12H      Continuous Infusions:   PRN Meds:.•  senna-docusate sodium **AND** polyethylene glycol **AND** bisacodyl **AND** bisacodyl  •  Calcium Replacement - Initiate Nurse / BPA Driven Protocol  •  ketorolac  •  Magnesium Standard Dose Replacement - Initiate Nurse / BPA Driven Protocol  •  ondansetron  •  Phosphorus Replacement - Initiate Nurse / BPA Driven Protocol  •  Potassium Replacement - Initiate Nurse / BPA Driven Protocol  •  sodium chloride  •  sodium chloride  •  traMADol    Assessment & Plan   Assessment & Plan     Active Hospital Problems    Diagnosis  POA   • **Pneumonia of both upper lobes due to infectious organism [J18.9]  Yes   • Essential tremor [G25.0]  Yes   • chronic CHF (congestive heart failure) (HCC) [I50.9]  Yes   • Dementia without behavioral disturbance (HCC) [F03.90]  Yes   • Severe malnutrition (CMS/HCC) [E43]  Yes   • Lung mass [R91.8]  Yes   • Hypothyroidism (acquired) [E03.9]  Yes   • Essential hypertension [I10]  Yes      Resolved Hospital Problems   No resolved problems to display.        Brief Hospital Course to date:  Mitzi Eric is a 87 y.o. female with history of CKD, COPD with known right upper lobe mass, paroxysmal A-fib, dementia who was admitted for left shoulder pain after fall.  CT chest concerning for new area of consolidation with possible necrosis versus mass.  She already has known right upper lobe mass and family previously had not wanted work-up for diagnosis.  Pro-Abimael noted to be normal on admission     Neck and left shoulder pain s/p fall  -Left shoulder x-ray is unremarkable  -Continue tramadol  -Continue Toradol and lidocaine patches    Chronic respiratory failure with  hypoxia  Suspected bilateral upper lobe pneumonia  History of COPD  -Chest x-ray reveals bilateral upper lobe pneumonia, patient is without any cough, fevers or chills  -Follow-up blood cultures, urinary antigens, MRSA PCR negative, procalcitonin normal  -CT chest concerning for necrotizing pneumonia versus possible mass in left upper lobe, restart Zosyn, had received dose of vancomycin and Zosyn in the ER on admission  -Discussed case with on-call pulmonology, continue Doxy and Zosyn  -check sputum cultures and fungal cultures if able, patient is not coughing much and not producing much sputum     -Will need outpatient follow-up in 3 to 4 weeks with repeat chest CT for resolution  -Baseline O2 is 2 L  -Continue DuoNebs     Combined systolic and diastolic heart failure  Moderate pulm hypertension  -Currently seems compensated  -Hold diuretics, Aldactone, beta-blocker for hypotension, restart when blood pressure improved     Paroxysmal atrial fibrillation  -Rates are controlled, continue metoprolol       CKD stage III  -Renal function stable, monitor     Hypothyroidism  -Continue Synthroid     Trigeminal neuralgia  -Continue Trileptal and Lyrica     Hypertension  -BP currently low, holding hypertensives, can restart if needed     Dementia  -Continue Aricept     Right upper lobe lung mass  -This has apparently been known since 2018, family aware and have previously declined any further work-up/treatment      Expected Discharge Location and Transportation: skilled nursing  Expected Discharge  Expected Discharge Date and Time     Expected Discharge Date Expected Discharge Time    Feb 17, 2023            DVT prophylaxis:  Mechanical DVT prophylaxis orders are present.     AM-PAC 6 Clicks Score (PT): 12 (02/14/23 9180)    CODE STATUS:   Code Status and Medical Interventions:   Ordered at: 02/12/23 5110     Medical Intervention Limits:    NO intubation (DNI)     Level Of Support Discussed With:    Health Care Surrogate     Code Status  (Patient has no pulse and is not breathing):    No CPR (Do Not Attempt to Resuscitate)     Medical Interventions (Patient has pulse or is breathing):    Limited Support     Release to patient:    Routine Release       Lyudmila Grant,   02/14/23

## 2023-02-14 NOTE — THERAPY TREATMENT NOTE
Patient Name: Mitzi Eric  : 1935    MRN: [id number]                              Today's Date: 2023       Admit Date: 2023    Visit Dx:     ICD-10-CM ICD-9-CM   1. Pneumonia of both upper lobes due to infectious organism  J18.9 486   2. Generalized weakness  R53.1 780.79   3. Cervical paraspinous muscle spasm  M62.838 728.85   4. Fall at nursing home, initial encounter  W19.XXXA E888.9    Y92.129 E849.7   5. Impaired functional mobility, balance, gait, and endurance  Z74.09 V49.89   6. Acute on chronic respiratory failure with hypoxemia (HCC)  J96.21 518.84   7. COPD with acute exacerbation (HCC)  J44.1 491.21   8. Thrombocytopenia ([facility])  D69.6 287.5   9. Acute congestive heart failure, unspecified heart failure type ([facility])  I50.9 428.0   10. COPD ex with volume overload  J96.01 518.81   11. Fall, initial encounter  W19.XXXA E888.9   12. Essential hypertension  I10 401.9     Patient Active Problem List   Diagnosis   • Fall   • Delirium, acute   • Essential hypertension   • Cognitive decline   • Trigeminal neuralgia   • Hypothyroidism (acquired)   • Lung mass   • Influenza A   • Severe malnutrition (CMS/HCC)   • Dementia without behavioral disturbance (HCC)   • History of craniotomy   • Impaired functional mobility, balance, gait, and endurance   • COPD ex with volume overload   • chronic CHF (congestive heart failure) (HCC)   • Thrombocytopenia (HCC)   • Elevated transaminase level   • atrial fibrillation   • COPD with acute exacerbation (HCC)   • Acute on chronic respiratory failure with hypoxemia (HCC)   • Chronic respiratory failure (HCC)   • Underweight   • Moderate malnutrition (CMS/HCC)   • Centrilobular emphysema (HCC)   • Essential tremor   • Pneumonia of both upper lobes due to infectious organism     Past Medical History:   Diagnosis Date   • A-fib ([facility])    • Ataxic gait    • Chronic kidney disease, stage 3 (HCC)    • COPD (chronic obstructive pulmonary disease) (HCC)    • Dementia  (HCC)    • Disease of thyroid gland    • Essential tremor 12/21/2022   • Heart failure (HCC)    • Hyperlipidemia    • Hypertension    • Shingles      Past Surgical History:   Procedure Laterality Date   • BRAIN TUMOR EXCISION      BENIGN   • FRACTURE SURGERY     • TRIGGER FINGER RELEASE        General Information     Row Name 02/14/23 1305          Physical Therapy Time and Intention    Document Type therapy note (daily note)  -     Mode of Treatment physical therapy  -     Row Name 02/14/23 1305          General Information    Existing Precautions/Restrictions fall;oxygen therapy device and L/min  -     Row Name 02/14/23 1305          Cognition    Orientation Status (Cognition) oriented to;person;place;disoriented to;situation;time  -     Row Name 02/14/23 1305          Safety Issues, Functional Mobility    Safety Issues Affecting Function (Mobility) awareness of need for assistance;friction/shear risk;insight into deficits/self-awareness;judgment;sequencing abilities;safety precaution awareness  -     Impairments Affecting Function (Mobility) balance;cognition;strength;endurance/activity tolerance;coordination;pain;shortness of breath  -           User Key  (r) = Recorded By, (t) = Taken By, (c) = Cosigned By    Initials Name Provider Type     Yudelka Chung, CASEY Physical Therapist               Mobility     Row Name 02/14/23 1340          Bed Mobility    Bed Mobility scooting/bridging;supine-sit;sit-supine  -SJ     Scooting/Bridging [place] (Bed Mobility) minimum assist (75% patient effort);1 person assist  -     Supine-Sit [place] (Bed Mobility) moderate assist (50% patient effort);verbal cues  -     Sit-Supine [place] (Bed Mobility) minimum assist (75% patient effort);verbal cues  -     Assistive Device (Bed Mobility) bed rails;draw sheet  -     Comment, (Bed Mobility) pt needed increased assistance this date  -     Row Name 02/14/23 1340          Transfers    Comment,  (Transfers) Pt refused OOB this date  -     Row Name 02/14/23 1340          Bed-Chair Transfer    Bed-Chair [place] (Transfers) not tested  -[facility] Name 02/14/23 1340          Sit-Stand Transfer    Sit-Stand [place] (Transfers) not tested  -[facility] Name 02/14/23 1340          Gait/Stairs (Locomotion)    [place] Level (Gait) not tested  -           User Key  (r) = Recorded By, (t) = Taken By, (c) = Cosigned By    Initials Name Provider Type     Yudelka Chung PT Physical Therapist               Obj/Interventions     [facility] Name 02/14/23 1341          Motor Skills    Therapeutic Exercise shoulder;hip;knee;ankle  -SJ     Row Name 02/14/23 1341          Shoulder (Therapeutic Exercise)    Shoulder (Therapeutic Exercise) AROM (active range of motion)  -     Shoulder AROM (Therapeutic Exercise) bilateral;flexion;10 repetitions;2 sets;sitting;supine  1 set in supine, 1 set in sitting  -SJ     Row Name 02/14/23 1341          Hip (Therapeutic Exercise)    Hip AAROM (Therapeutic Exercise) bilateral;flexion;aBduction;aDduction;sitting;supine;10 repetitions;2 sets  1 set in supine, 1 set in sitting  -SJ     Row Name 02/14/23 1341          Knee (Therapeutic Exercise)    Knee AROM (Therapeutic Exercise) bilateral;LAQ (long arc quad);10 repetitions;sitting  -[facility] 02/14/23 1341          Ankle (Therapeutic Exercise)    Ankle (Therapeutic Exercise) AAROM (active assistive range of motion)  -     Ankle AAROM (Therapeutic Exercise) bilateral;dorsiflexion;plantarflexion;supine;10 repetitions  -SJ     Row Name 02/14/23 1341          Balance    Balance Assessment sitting static balance  -     Static Sitting Balance supervision  -     Position, Sitting Balance unsupported;sitting edge of bed  -     Balance Interventions sitting;static;dynamic  -     Comment, Balance seated neck AROM for 5 reps each of flex, ext, side bending, rotation  -           User Key  (r) = Recorded By, (t) = Taken  By, (c) = Cosigned By    Initials Name Provider Type    Yudelka River PT Physical Therapist               Goals/Plan    No documentation.                Clinical Impression     Row Name 02/14/23 1344          Pain    Pretreatment Pain Rating 0/10 - no pain  -     Posttreatment Pain Rating 0/10 - no pain  -SJ     Row Name 02/14/23 1344          Plan of Care Review    Plan of Care Reviewed With patient  -SJ     Progress no change  -     Outcome Evaluation Pt t/f supine > sit EOB and ana BLE and BLE therex as well as cervical AROM. Pt declined OOB this date. VSS. Continue to progress as ana.  -SJ     Row Name 02/14/23 1344          Vital Signs    Pre Systolic BP Rehab 127  -SJ     Pre Treatment Diastolic BP 54  -SJ     Pretreatment Heart Rate (beats/min) 71  -SJ     Pre SpO2 (%) 97  -     O2 Delivery Pre Treatment nasal cannula  -SJ     Pre Patient Position Supine  -SJ     Post Patient Position Supine  -SJ     Row Name 02/14/23 1344          Positioning and Restraints    Pre-Treatment Position in bed  -SJ     Post Treatment Position bed  -SJ     In Bed fowlers;call light within reach;encouraged to call for assist;exit alarm on;heels elevated  -SJ           User Key  (r) = Recorded By, (t) = Taken By, (c) = Cosigned By    Initials Name Provider Type    Yudelka River PT Physical Therapist               Outcome Measures     Row Name 02/14/23 1351 02/14/23 0845       How much help from another person do you currently need...    Turning from your back to your side while in flat bed without using bedrails? 3  -SJ 2  -AC    Moving from lying on back to sitting on the side of a flat bed without bedrails? 2  -SJ 1  -AC    Moving to and from a bed to a chair (including a wheelchair)? 2  -SJ 2  -AC    Standing up from a chair using your arms (e.g., wheelchair, bedside chair)? 2  -SJ 1  -AC    Climbing 3-5 steps with a railing? 1  -SJ 1  -AC    To walk in hospital room? 2  -SJ 2  -AC    AM-PAC 6 Clicks Score  (PT) 12  - 9  -    Highest level of mobility 4 --> Transferred to chair/commode  - 3 --> Sat at edge of bed  -    Row Name 02/14/23 [facility]          Functional Assessment    Outcome Measure Options AM-PAC 6 Clicks Basic Mobility (PT)  -           User Key  (r) = Recorded By, (t) = Taken By, (c) = Cosigned By    Initials Name Provider Type     Yudelka Chung, CASEY Physical Therapist    AC Jennie Yan, RN Registered Nurse                             Physical Therapy Education     Title: PT OT SLP Therapies (In Progress)     Topic: Physical Therapy (In Progress)     Point: Mobility training (In Progress)     Learning Progress Summary           Patient Nonacceptance, E,D, NR by  at 2/14/2023 1352    Acceptance, E,D, NR by  at 2/13/2023 1104                   Point: Home exercise program (In Progress)     Learning Progress Summary           Patient Nonacceptance, E,D, NR by  at 2/14/2023 1352    Acceptance, E,D, NR by  at 2/13/2023 1104                   Point: Body mechanics (In Progress)     Learning Progress Summary           Patient Nonacceptance, E,D, NR by  at 2/14/2023 1352    Acceptance, E,D, NR by  at 2/13/2023 1104                   Point: Precautions (In Progress)     Learning Progress Summary           Patient Nonacceptance, E,D, NR by  at 2/14/2023 1352    Acceptance, E,D, NR by  at 2/13/2023 1104                               User Key     Initials Effective Dates Name Provider Type [facility] 02/03/23 -  Yudelka Chung, PT Physical Therapist PT              PT Recommendation and Plan  Planned Therapy Interventions (PT): balance training, bed mobility training, home exercise program, patient/family education, transfer training, strengthening, stretching, wheelchair management/propulsion training, postural re-education  Plan of Care Reviewed With: patient  Progress: no change  Outcome Evaluation: Pt t/f supine > sit EOB and ana BLE and BLE therex as well as cervical AROM.  Pt declined OOB this date. VSS. Continue to progress as ana.     Time Calculation:    PT Charges     Row Name 02/14/23 1352             Time Calculation    Start Time 1305  -SJ      PT Received On 02/14/23  -      PT Goal Re-Cert Due Date 02/23/23  -         Time Calculation- PT    Total Timed Code Minutes- PT 17 minute(s)  -SJ         Timed Charges    81212 - PT Therapeutic Exercise Minutes 17  -SJ         Total Minutes    Timed Charges Total Minutes 17  -SJ       Total Minutes 17  -SJ            User Key  (r) = Recorded By, (t) = Taken By, (c) = Cosigned By    Initials Name Provider Type     Yudelka Chung, PT Physical Therapist              Therapy Charges for Today     Code Description Service Date Service Provider Modifiers Qty    22970989356 HC PT EVAL MOD COMPLEXITY 4 2/13/2023 Yudelka Chung, PT GP 1    63299770618 HC PT THER PROC EA 15 MIN 2/14/2023 Yudelka Chung, PT GP 1          PT G-Codes  Outcome Measure Options: AM-PAC 6 Clicks Basic Mobility (PT)  AM-PAC 6 Clicks Score (PT): 12  AM-PAC 6 Clicks Score (OT): 14  PT Discharge Summary  Anticipated Discharge Disposition (PT): assisted living, home with home health    Yudelka Chung PT  2/14/2023

## 2023-02-14 NOTE — PLAN OF CARE
Problem: Fall Injury Risk  Goal: Absence of Fall and Fall-Related Injury  Outcome: Ongoing, Progressing     Problem: Skin Injury Risk Increased  Goal: Skin Health and Integrity  Outcome: Ongoing, Progressing  Intervention: Optimize Skin Protection  Recent Flowsheet Documentation  Taken 2/14/2023 1100 by Jennie Yan RN  Head of Bed (HOB) Positioning: HOB elevated  Taken 2/14/2023 0845 by Jennie Yan RN  Pressure Reduction Techniques:   frequent weight shift encouraged   pressure points protected  Pressure Reduction Devices: positioning supports utilized  Skin Protection:   adhesive use limited   incontinence pads utilized     Problem: COPD (Chronic Obstructive Pulmonary Disease) Comorbidity  Goal: Maintenance of COPD Symptom Control  Outcome: Ongoing, Progressing     Problem: Heart Failure Comorbidity  Goal: Maintenance of Heart Failure Symptom Control  Outcome: Ongoing, Progressing     Problem: Hypertension Comorbidity  Goal: Blood Pressure in Desired Range  Outcome: Ongoing, Progressing     Problem: Osteoarthritis Comorbidity  Goal: Maintenance of Osteoarthritis Symptom Control  Outcome: Ongoing, Progressing     Problem: Pain Chronic (Persistent) (Comorbidity Management)  Goal: Acceptable Pain Control and Functional Ability  Outcome: Ongoing, Progressing  Intervention: Manage Persistent Pain  Recent Flowsheet Documentation  Taken 2/14/2023 0845 by Jennie Yan RN  Sleep/Rest Enhancement: noise level reduced  Intervention: Develop Pain Management Plan  Recent Flowsheet Documentation  Taken 2/14/2023 0845 by Jennie Yan RN  Pain Management Interventions: see MAR   Goal Outcome Evaluation:

## 2023-02-14 NOTE — PLAN OF CARE
Goal Outcome Evaluation:  Plan of Care Reviewed With: patient        Progress: no change  Outcome Evaluation: Pt t/f supine > sit EOB and ana BLE and BLE therex as well as cervical AROM. Pt declined OOB this date. VSS. Continue to progress as ana.

## 2023-02-14 NOTE — PROGRESS NOTES
Clinical Nutrition     Nutrition Support Assessment  Reason for Visit: Identified at risk by screening criteria, MST score 2+, Reduced oral intake      Patient Name: Mitzi Eric  YOB: 1935  MRN: [id number]  Date of Encounter: 02/14/23 11:46 EST  Admission date: 2/12/2023    Comments:  -Continue ONS. Patient may choose Ensure from home whenever she'd like (being kept in nourishment fridge).  -Communicated preferences to diet office.      Nutrition Assessment   Admission Diagnosis:  Pneumonia of both upper lobes due to infectious organism [J18.9]      Problem List:    Pneumonia of both upper lobes due to infectious organism    Essential hypertension    Hypothyroidism (acquired)    Lung mass    Severe malnutrition (CMS/HCC)    Dementia without behavioral disturbance (HCC)    chronic CHF (congestive heart failure) (HCC)    Essential tremor        PMH:   She  has a past medical history of A-fib (HCC), Ataxic gait, Chronic kidney disease, stage 3 (HCC), COPD (chronic obstructive pulmonary disease) (HCC), Dementia (HCC), Disease of thyroid gland, Essential tremor (12/21/2022), Heart failure (HCC), Hyperlipidemia, Hypertension, and Shingles.    PSH:  She  has a past surgical history that includes Trigger finger release; Brain tumor excision; and Fracture surgery.      Applicable Nutrition Concerns:   Skin:  Oral:  GI:      Applicable Interval History:         Reported/Observed/Food/Nutrition Related History:   Patient and nephew (POA), Lane, present at time of visit. Lane provides all information. Reports patient is feeling better and stronger today. Yesterday patient did not eat anything at lunch, but did eat a few bites of chicken and broccoli at dinner. This morning consumed an entire carton of chocolate Premier Protein. Patient resides at an [facility] where she is able to care for herself and uses a wheelchair. The [facility] provides 3 meals daily and Lane provides her with chocolate  "Ensure Original drinks to keep her fridge (drinks daily, Lane encourages 2 daily). Lane brought Ensure drinks to keep in fridge for patient while here. Able to feed herself without difficulty. Denies food allergies and difficulty chewing/swallowing. Does not wear/need dentures. Patient's baseline PO intake consists of very small portions. Eats fruit for breakfast, salad/soup for lunch, and dinner varies. No changes in appetite/PO intake prior to admission. Her CHELSEA likes for patient to weigh 100-105 lbs and she is weighed daily d/t being on chronic diuretic. Weighed 96 lbs on (2/9).    Likes: fruit (melon, apples, pineapple), side salad with ranch, soups (not tomato, typically prefers broth-based rather than creamy), anything chocolate, rice crispie treats, oreos, water to drink, pizza, tator tots    Dislikes: tea, juice, coffee, eggs, yogurt, cottage cheese, macaroni and cheese, baked potatoes    Reports patient has always been conscious about her diet and feels that certain \"dislikes\" are related more to the caloric/fat content rather than taste preferences.      Labs    Labs Reviewed: Yes     Results from last 7 days   Lab Units 02/14/23  0331 02/12/23  1358   GLUCOSE mg/dL 95 104*   BUN mg/dL 13 23   CREATININE mg/dL 0.67 0.95   SODIUM mmol/L 135* 138   CHLORIDE mmol/L 101 102   POTASSIUM mmol/L 3.7 4.7   PHOSPHORUS mg/dL 3.0  --    ALT (SGPT) U/L  --  8       Results from last 7 days   Lab Units 02/14/23  0331 02/12/23  1358   ALBUMIN g/dL 3.0* 3.6       Results from last 7 days   Lab Units 02/12/23  1800   GLUCOSE mg/dL 82     Lab Results   Lab Value Date/Time    HGBA1C 5.20 04/22/2022 0848    HGBA1C 5.50 11/30/2021 0839                 Medications    Medications Reviewed: Yes  Pertinent: aricept, protonix, antibiotic, pericolace  Infusion:  PRN:     Intake/Ouptut 24 hrs (0701 - 0700)   I&O's Reviewed: Yes       Anthropometrics     Flowsheet Rows    Flowsheet Row First Filed Value   Admission Height 157.5 " "cm (62\") Documented at 02/12/2023 1224   Admission Weight 47.6 kg (105 lb) Documented at 02/12/2023 1224          Height: Height: 157.5 cm (62\")  Last Filed Weight: 105 lbs  Method: bed scale (obtained by RD on 2/13)  BMI: BMI (Calculated): 19.2  BMI classification: Normal: 18.5-24.9kg/m2  IBW: 110 lbs    UBW: Per EMR  97 lbs (12/21/22) MDOV  100 lbs (6/23/22) MDOV  98 lbs (4/24/22) bed scale    Weight change: No recent unintentional weight loss based on available data    Nutrition Focused Physical Exam     Date:   Unable to perform exam due to: Defer pending indication    Current Nutrition Prescription     PO: Diet: Cardiac Diets, Diabetic Diets; Healthy Heart (2-3 Na+); Consistent Carbohydrate; Texture: Regular Texture (IDDSI 7); Fluid Consistency: Thin (IDDSI 0)  Oral Nutrition Supplement: Premier Protein 1x daily, Magic Cup 1x daily    Intake: 25% x 1 meal      Nutrition Diagnosis     Date:  Updated:   Problem Inadequate oral intake   Etiology Food choices   Signs/Symptoms Family reports diet often consists of fruit, salad, soup   Status:     Goal:   General: Nutrition to support treatment  PO: Establish PO  EN/PN: N/A    Nutrition Intervention      Follow treatment progress, Care plan reviewed, Interview for preferences, Encourage intake, Supplement provided    -Continue ONS. Patient may choose Ensure from home whenever she'd like (being kept in nourishment fridge).  -Communicated preferences to diet office.    Monitoring/Evaluation:   Per protocol, I&O, PO intake, Supplement intake, Pertinent labs, Weight      Kayla Sebastian RD  Time Spent: 45 min  "

## 2023-02-15 LAB
ALBUMIN SERPL-MCNC: 3.2 G/DL (ref 3.5–5.2)
ANION GAP SERPL CALCULATED.3IONS-SCNC: 9 MMOL/L (ref 5–15)
BASOPHILS # BLD AUTO: 0.04 10*3/MM3 (ref 0–0.2)
BASOPHILS NFR BLD AUTO: 0.4 % (ref 0–1.5)
BUN SERPL-MCNC: 11 MG/DL (ref 8–23)
BUN/CREAT SERPL: 17.5 (ref 7–25)
CALCIUM SPEC-SCNC: 8.3 MG/DL (ref 8.6–10.5)
CHLORIDE SERPL-SCNC: 101 MMOL/L (ref 98–107)
CO2 SERPL-SCNC: 26 MMOL/L (ref 22–29)
CREAT SERPL-MCNC: 0.63 MG/DL (ref 0.57–1)
DEPRECATED RDW RBC AUTO: 42.6 FL (ref 37–54)
EGFRCR SERPLBLD CKD-EPI 2021: 86.5 ML/MIN/1.73
EOSINOPHIL # BLD AUTO: 0.16 10*3/MM3 (ref 0–0.4)
EOSINOPHIL NFR BLD AUTO: 1.6 % (ref 0.3–6.2)
ERYTHROCYTE [DISTWIDTH] IN BLOOD BY AUTOMATED COUNT: 12.2 % (ref 12.3–15.4)
GLUCOSE SERPL-MCNC: 92 MG/DL (ref 65–99)
HCT VFR BLD AUTO: 30.9 % (ref 34–46.6)
HGB BLD-MCNC: 10.3 G/DL (ref 12–15.9)
IMM GRANULOCYTES # BLD AUTO: 0.05 10*3/MM3 (ref 0–0.05)
IMM GRANULOCYTES NFR BLD AUTO: 0.5 % (ref 0–0.5)
LYMPHOCYTES # BLD AUTO: 0.75 10*3/MM3 (ref 0.7–3.1)
LYMPHOCYTES NFR BLD AUTO: 7.5 % (ref 19.6–45.3)
MCH RBC QN AUTO: 32 PG (ref 26.6–33)
MCHC RBC AUTO-ENTMCNC: 33.3 G/DL (ref 31.5–35.7)
MCV RBC AUTO: 96 FL (ref 79–97)
MONOCYTES # BLD AUTO: 1.26 10*3/MM3 (ref 0.1–0.9)
MONOCYTES NFR BLD AUTO: 12.6 % (ref 5–12)
NEUTROPHILS NFR BLD AUTO: 7.71 10*3/MM3 (ref 1.7–7)
NEUTROPHILS NFR BLD AUTO: 77.4 % (ref 42.7–76)
NRBC BLD AUTO-RTO: 0 /100 WBC (ref 0–0.2)
PHOSPHATE SERPL-MCNC: 2.9 MG/DL (ref 2.5–4.5)
PLATELET # BLD AUTO: 204 10*3/MM3 (ref 140–450)
PMV BLD AUTO: 11.5 FL (ref 6–12)
POTASSIUM SERPL-SCNC: 3.7 MMOL/L (ref 3.5–5.2)
QT INTERVAL: 446 MS
QTC INTERVAL: 518 MS
RBC # BLD AUTO: 3.22 10*6/MM3 (ref 3.77–5.28)
SODIUM SERPL-SCNC: 136 MMOL/L (ref 136–145)
WBC NRBC COR # BLD: 9.97 10*3/MM3 (ref 3.4–10.8)

## 2023-02-15 PROCEDURE — 94664 DEMO&/EVAL PT USE INHALER: CPT

## 2023-02-15 PROCEDURE — 94799 UNLISTED PULMONARY SVC/PX: CPT

## 2023-02-15 PROCEDURE — 99232 SBSQ HOSP IP/OBS MODERATE 35: CPT | Performed by: INTERNAL MEDICINE

## 2023-02-15 PROCEDURE — 80069 RENAL FUNCTION PANEL: CPT | Performed by: INTERNAL MEDICINE

## 2023-02-15 PROCEDURE — 85025 COMPLETE CBC W/AUTO DIFF WBC: CPT | Performed by: INTERNAL MEDICINE

## 2023-02-15 PROCEDURE — 25010000002 PIPERACILLIN SOD-TAZOBACTAM PER 1 G: Performed by: INTERNAL MEDICINE

## 2023-02-15 PROCEDURE — G0378 HOSPITAL OBSERVATION PER HR: HCPCS

## 2023-02-15 RX ORDER — SACCHAROMYCES BOULARDII 250 MG
250 CAPSULE ORAL 2 TIMES DAILY
Status: DISCONTINUED | OUTPATIENT
Start: 2023-02-15 | End: 2023-02-18 | Stop reason: HOSPADM

## 2023-02-15 RX ORDER — DOXYCYCLINE 100 MG/1
100 CAPSULE ORAL EVERY 12 HOURS SCHEDULED
Status: COMPLETED | OUTPATIENT
Start: 2023-02-15 | End: 2023-02-18

## 2023-02-15 RX ADMIN — METOPROLOL SUCCINATE 25 MG: 25 TABLET, EXTENDED RELEASE ORAL at 09:16

## 2023-02-15 RX ADMIN — Medication 250 MG: at 20:34

## 2023-02-15 RX ADMIN — OXCARBAZEPINE 600 MG: 300 TABLET, FILM COATED ORAL at 09:17

## 2023-02-15 RX ADMIN — LEVOTHYROXINE SODIUM 25 MCG: 25 TABLET ORAL at 05:58

## 2023-02-15 RX ADMIN — IPRATROPIUM BROMIDE AND ALBUTEROL SULFATE 3 ML: 2.5; .5 SOLUTION RESPIRATORY (INHALATION) at 20:21

## 2023-02-15 RX ADMIN — DONEPEZIL HYDROCHLORIDE 5 MG: 5 TABLET, FILM COATED ORAL at 06:00

## 2023-02-15 RX ADMIN — TAZOBACTAM SODIUM AND PIPERACILLIN SODIUM 3.38 G: 375; 3 INJECTION, SOLUTION INTRAVENOUS at 05:58

## 2023-02-15 RX ADMIN — ASPIRIN 81 MG: 81 TABLET, COATED ORAL at 09:16

## 2023-02-15 RX ADMIN — TAZOBACTAM SODIUM AND PIPERACILLIN SODIUM 3.38 G: 375; 3 INJECTION, SOLUTION INTRAVENOUS at 13:21

## 2023-02-15 RX ADMIN — IPRATROPIUM BROMIDE AND ALBUTEROL SULFATE 3 ML: 2.5; .5 SOLUTION RESPIRATORY (INHALATION) at 08:19

## 2023-02-15 RX ADMIN — DOXYCYCLINE 100 MG: 100 CAPSULE ORAL at 20:34

## 2023-02-15 RX ADMIN — OXCARBAZEPINE 600 MG: 300 TABLET, FILM COATED ORAL at 20:34

## 2023-02-15 RX ADMIN — LIDOCAINE 3 PATCH: 700 PATCH TOPICAL at 09:17

## 2023-02-15 RX ADMIN — DOXYCYCLINE 100 MG: 100 INJECTION, POWDER, LYOPHILIZED, FOR SOLUTION INTRAVENOUS at 02:50

## 2023-02-15 RX ADMIN — BUDESONIDE AND FORMOTEROL FUMARATE DIHYDRATE 2 PUFF: 160; 4.5 AEROSOL RESPIRATORY (INHALATION) at 20:21

## 2023-02-15 RX ADMIN — Medication 10 ML: at 20:34

## 2023-02-15 RX ADMIN — PREGABALIN 25 MG: 25 CAPSULE ORAL at 09:16

## 2023-02-15 RX ADMIN — Medication 10 ML: at 09:18

## 2023-02-15 RX ADMIN — TAZOBACTAM SODIUM AND PIPERACILLIN SODIUM 3.38 G: 375; 3 INJECTION, SOLUTION INTRAVENOUS at 20:33

## 2023-02-15 RX ADMIN — IPRATROPIUM BROMIDE AND ALBUTEROL SULFATE 3 ML: 2.5; .5 SOLUTION RESPIRATORY (INHALATION) at 16:06

## 2023-02-15 RX ADMIN — PREGABALIN 25 MG: 25 CAPSULE ORAL at 20:34

## 2023-02-15 RX ADMIN — BUDESONIDE AND FORMOTEROL FUMARATE DIHYDRATE 2 PUFF: 160; 4.5 AEROSOL RESPIRATORY (INHALATION) at 08:19

## 2023-02-15 RX ADMIN — PANTOPRAZOLE SODIUM 40 MG: 40 TABLET, DELAYED RELEASE ORAL at 09:16

## 2023-02-15 NOTE — PROGRESS NOTES
[facility] Medicine Services  PROGRESS NOTE    Patient Name: Mitzi Eric  : 1935  MRN: [id number]    Date of Admission: 2023  Primary Care Physician: System, Provider Not In    Subjective   Subjective     CC:  PNA    HPI:  Sitting up in the chair, pain relatively well controlled and able to work with therapy, no respiratory complaints, no fevers    ROS:  Gen- No fevers, chills  CV- No chest pain, palpitations  Resp- No cough, dyspnea  GI- No N/V/D, abd pain         Objective   Objective     Vital Signs:   Temp:  [96.8 °F (36 °C)-98.3 °F (36.8 °C)] 98.3 °F (36.8 °C)  Heart Rate:  [] 91  Resp:  [16-20] 16  BP: (121-152)/(54-96) 143/64  Flow (L/min):  [1-2] 1     Physical Exam:  Constitutional: No acute distress, awake, alert  HENT: NCAT, mucous membranes moist  Respiratory: coarse on L, respiratory effort normal   Cardiovascular: RRR, no murmurs, rubs, or gallops  Gastrointestinal: thin, soft, nontender, nondistended  Musculoskeletal: trace LE edema  Psychiatric: Appropriate affect, cooperative  Neurologic: Oriented x 2-3, speech clear  Skin: No rashes      Results Reviewed:  LAB RESULTS:      Lab 02/15/23  0409 23  0331 23  0430 23  1358   WBC 9.97 9.35 10.16 12.35*   HEMOGLOBIN 10.3* 9.8* 11.3* 11.1*   HEMATOCRIT 30.9* 29.7* 34.8 34.2   PLATELETS 204 184 194 223   NEUTROS ABS 7.71* 7.03* 7.93* 10.00*   IMMATURE GRANS (ABS) 0.05 0.05 0.08* 0.11*   LYMPHS ABS 0.75 0.84 0.68* 0.69*   MONOS ABS 1.26* 1.18* 1.28* 1.43*   EOS ABS 0.16 0.22 0.14 0.07   MCV 96.0 97.1* 100.0* 99.4*   PROCALCITONIN  --   --   --  0.07   LACTATE  --   --   --  1.4         Lab 02/15/23  0409 23  0331 23  1358   SODIUM 136 135* 138   POTASSIUM 3.7 3.7 4.7   CHLORIDE 101 101 102   CO2 26.0 24.0 27.0   ANION GAP 9.0 10.0 9.0   BUN 11 13 23   CREATININE 0.63 0.67 0.95   EGFR 86.5 85.2 58.5*   GLUCOSE 92 95 104*   CALCIUM 8.3* 8.3* 8.9   PHOSPHORUS 2.9 3.0  --    TSH  --    --  1.250         Lab 02/15/23  0409 02/14/23  0331 02/12/23  1358   TOTAL PROTEIN  --   --  6.4   ALBUMIN 3.2* 3.0* 3.6   GLOBULIN  --   --  2.8   ALT (SGPT)  --   --  8   AST (SGOT)  --   --  23   BILIRUBIN  --   --  0.4   ALK PHOS  --   --  138*                     Brief Urine Lab Results  (Last result in the past 365 days)      Color   Clarity   Blood   Leuk Est   Nitrite   Protein   CREAT   Urine HCG        03/29/22 1155 Yellow   Clear   Negative   Negative   Negative   Negative                 Microbiology Results Abnormal     Procedure Component Value - Date/Time    Blood Culture - Blood, Arm, Left [[id number]]  (Normal) Collected: 02/12/23 1400    Lab Status: Preliminary result Specimen: Blood from Arm, Left Updated: 02/14/23 1430     Blood Culture No growth at 2 days    Blood Culture - Blood, Arm, Right [[id number]]  (Normal) Collected: 02/12/23 1350    Lab Status: Preliminary result Specimen: Blood from Arm, Right Updated: 02/14/23 1430     Blood Culture No growth at 2 days    MRSA Screen, PCR (Inpatient) - Swab, Nares [[id number]]  (Normal) Collected: 02/13/23 1519    Lab Status: Final result Specimen: Swab from Nares Updated: 02/13/23 1721     MRSA PCR Negative    Narrative:      The negative predictive value of this diagnostic test is high and should only be used to consider de-escalating anti-MRSA therapy. A positive result may indicate colonization with MRSA and must be correlated clinically.  MRSA Negative    S. Pneumo Ag Urine or CSF - Urine, Urine, Clean Catch [[id number]]  (Normal) Collected: 02/13/23 0557    Lab Status: Final result Specimen: Urine, Clean Catch Updated: 02/13/23 0926     Strep Pneumo Ag Negative    Legionella Antigen, Urine - Urine, Urine, Clean Catch [[id number]]  (Normal) Collected: 02/13/23 0557    Lab Status: Final result Specimen: Urine, Clean Catch Updated: 02/13/23 0926     LEGIONELLA ANTIGEN, URINE Negative    COVID PRE-OP / PRE-PROCEDURE SCREENING ORDER (NO ISOLATION) -  Swab, Nasopharynx [[id number]]  (Normal) Collected: 02/12/23 1400    Lab Status: Final result Specimen: Swab from Nasopharynx Updated: 02/12/23 1528    Narrative:      The following orders were created for panel order COVID PRE-OP / PRE-PROCEDURE SCREENING ORDER (NO ISOLATION) - Swab, Nasopharynx.  Procedure                               Abnormality         Status                     ---------                               -----------         ------                     COVID-19, FLU A/B, RSV P...[[id number]]  Normal              Final result                 Please view results for these tests on the individual orders.    COVID-19, FLU A/B, RSV PCR - Swab, Nasopharynx [[id number]]  (Normal) Collected: 02/12/23 1400    Lab Status: Final result Specimen: Swab from Nasopharynx Updated: 02/12/23 1528     COVID19 Not Detected     Influenza A PCR Not Detected     Influenza B PCR Not Detected     RSV, PCR Not Detected    Narrative:      Fact sheet for providers: https://www.fda.gov/media/598513/download    Fact sheet for patients: https://www.fda.gov/media/096636/download    Test performed by PCR.          No radiology results from the last 24 hrs    Results for orders placed during the hospital encounter of 03/17/22    Adult Transthoracic Echo Complete W/ Cont if Necessary Per Protocol    Interpretation Summary  · Left ventricular ejection fraction appears to be 46 - 50%. Left ventricular systolic function is mildly decreased.  · Left ventricular diastolic function is consistent with (grade I) impaired relaxation.  · Mild to moderate mitral regurgitation.  · Mild to moderate tricuspid regurgitation, RVSP 54 mmHg.      I have reviewed the medications:  Scheduled Meds:aspirin, 81 mg, Oral, Daily  budesonide-formoterol, 2 puff, Inhalation, BID - RT  donepezil, 5 mg, Oral, QAM  doxycycline, 100 mg, Intravenous, Q12H  ipratropium-albuterol, 3 mL, Nebulization, 4x Daily - RT  levothyroxine, 25 mcg, Oral, Q AM  lidocaine, 3 patch,  Transdermal, Q24H  metoprolol succinate XL, 25 mg, Oral, Daily  OXcarbazepine, 600 mg, Oral, BID  pantoprazole, 40 mg, Oral, Daily  piperacillin-tazobactam, 3.375 g, Intravenous, Q8H  pregabalin, 25 mg, Oral, BID  senna-docusate sodium, 2 tablet, Oral, BID  sodium chloride, 10 mL, Intravenous, Q12H      Continuous Infusions:   PRN Meds:.•  senna-docusate sodium **AND** polyethylene glycol **AND** bisacodyl **AND** bisacodyl  •  Calcium Replacement - Initiate Nurse / BPA Driven Protocol  •  ketorolac  •  Magnesium Standard Dose Replacement - Initiate Nurse / BPA Driven Protocol  •  ondansetron  •  Phosphorus Replacement - Initiate Nurse / BPA Driven Protocol  •  Potassium Replacement - Initiate Nurse / BPA Driven Protocol  •  sodium chloride  •  sodium chloride  •  traMADol    Assessment & Plan   Assessment & Plan     Active Hospital Problems    Diagnosis  POA   • **Pneumonia of both upper lobes due to infectious organism [J18.9]  Yes   • Essential tremor [G25.0]  Yes   • chronic CHF (congestive heart failure) (HCC) [I50.9]  Yes   • Dementia without behavioral disturbance (HCC) [F03.90]  Yes   • Severe malnutrition (CMS/HCC) [E43]  Yes   • Lung mass [R91.8]  Yes   • Hypothyroidism (acquired) [E03.9]  Yes   • Essential hypertension [I10]  Yes      Resolved Hospital Problems   No resolved problems to display.        Brief Hospital Course to date:  Mitzi Eric is a 87 y.o. female with history of CKD, COPD with known right upper lobe mass, paroxysmal A-fib, dementia who was admitted for left shoulder pain after fall.  CT chest concerning for new area of consolidation with possible necrosis versus mass.  She already has known right upper lobe mass and family previously had not wanted work-up for diagnosis.  Pro-Abimael noted to be normal on admission     Neck and left shoulder pain s/p fall  -Left shoulder x-ray is unremarkable  -Continue tramadol  -Continue Toradol and lidocaine patches     Chronic respiratory failure  with hypoxia  Suspected bilateral upper lobe pneumonia  History of COPD  -Chest x-ray reveals bilateral upper lobe pneumonia, patient is without any cough, fevers or chills    -CT chest concerning for necrotizing pneumonia versus possible mass in left upper lobe  -Discussed case with on-call pulmonology, continue Doxy and Zosyn  -check sputum cultures and fungal cultures if able, patient is not coughing much and not producing much sputum  -Repeat chest x-ray in a.m.     -Will need outpatient follow-up in 3 to 4 weeks with repeat chest CT for resolution       Combined systolic and diastolic heart failure  Moderate pulm hypertension  -Currently seems compensated  -Hold diuretics, Aldactone, beta-blocker for hypotension, restart when blood pressure improved     Paroxysmal atrial fibrillation  -Rates are controlled, continue metoprolol       CKD stage III  -Renal function stable, monitor     Hypothyroidism  -Continue Synthroid     Trigeminal neuralgia  -Continue Trileptal and Lyrica     Hypertension  -BP currently low, holding hypertensives, can restart if needed     Dementia  -Continue Aricept     Right upper lobe lung mass  -This has apparently been known since 2018, family aware and have previously declined any further work-up/treatment    Expected Discharge Location and Transportation: senior care pending PT progress  Expected Discharge   Expected Discharge Date and Time     Expected Discharge Date Expected Discharge Time    Feb 17, 2023            DVT prophylaxis:  Mechanical DVT prophylaxis orders are present.     AM-PAC 6 Clicks Score (PT): 12 (02/14/23 2000)    CODE STATUS:   Code Status and Medical Interventions:   Ordered at: 02/12/23 0517     Medical Intervention Limits:    NO intubation (DNI)     Level Of Support Discussed With:    Health Care Surrogate     Code Status (Patient has no pulse and is not breathing):    No CPR (Do Not Attempt to Resuscitate)     Medical Interventions (Patient has pulse or is breathing):     Limited Support     Release to patient:    Routine Release       Lyudmila Grant, DO  02/15/23

## 2023-02-16 LAB
ALBUMIN SERPL-MCNC: 2.8 G/DL (ref 3.5–5.2)
ANION GAP SERPL CALCULATED.3IONS-SCNC: 10 MMOL/L (ref 5–15)
BASOPHILS # BLD AUTO: 0.06 10*3/MM3 (ref 0–0.2)
BASOPHILS NFR BLD AUTO: 0.7 % (ref 0–1.5)
BUN SERPL-MCNC: 12 MG/DL (ref 8–23)
BUN/CREAT SERPL: 17.4 (ref 7–25)
CALCIUM SPEC-SCNC: 8.2 MG/DL (ref 8.6–10.5)
CHLORIDE SERPL-SCNC: 104 MMOL/L (ref 98–107)
CO2 SERPL-SCNC: 24 MMOL/L (ref 22–29)
CREAT SERPL-MCNC: 0.69 MG/DL (ref 0.57–1)
DEPRECATED RDW RBC AUTO: 43 FL (ref 37–54)
EGFRCR SERPLBLD CKD-EPI 2021: 84.6 ML/MIN/1.73
EOSINOPHIL # BLD AUTO: 0.17 10*3/MM3 (ref 0–0.4)
EOSINOPHIL NFR BLD AUTO: 1.9 % (ref 0.3–6.2)
ERYTHROCYTE [DISTWIDTH] IN BLOOD BY AUTOMATED COUNT: 12.2 % (ref 12.3–15.4)
GLUCOSE SERPL-MCNC: 95 MG/DL (ref 65–99)
HCT VFR BLD AUTO: 30.8 % (ref 34–46.6)
HGB BLD-MCNC: 10.2 G/DL (ref 12–15.9)
IMM GRANULOCYTES # BLD AUTO: 0.08 10*3/MM3 (ref 0–0.05)
IMM GRANULOCYTES NFR BLD AUTO: 0.9 % (ref 0–0.5)
LYMPHOCYTES # BLD AUTO: 0.73 10*3/MM3 (ref 0.7–3.1)
LYMPHOCYTES NFR BLD AUTO: 8 % (ref 19.6–45.3)
MCH RBC QN AUTO: 31.7 PG (ref 26.6–33)
MCHC RBC AUTO-ENTMCNC: 33.1 G/DL (ref 31.5–35.7)
MCV RBC AUTO: 95.7 FL (ref 79–97)
MONOCYTES # BLD AUTO: 1.33 10*3/MM3 (ref 0.1–0.9)
MONOCYTES NFR BLD AUTO: 14.6 % (ref 5–12)
NEUTROPHILS NFR BLD AUTO: 6.71 10*3/MM3 (ref 1.7–7)
NEUTROPHILS NFR BLD AUTO: 73.9 % (ref 42.7–76)
NRBC BLD AUTO-RTO: 0 /100 WBC (ref 0–0.2)
PHOSPHATE SERPL-MCNC: 3 MG/DL (ref 2.5–4.5)
PLATELET # BLD AUTO: 213 10*3/MM3 (ref 140–450)
PMV BLD AUTO: 11.8 FL (ref 6–12)
POTASSIUM SERPL-SCNC: 3.3 MMOL/L (ref 3.5–5.2)
POTASSIUM SERPL-SCNC: 4.1 MMOL/L (ref 3.5–5.2)
RBC # BLD AUTO: 3.22 10*6/MM3 (ref 3.77–5.28)
SODIUM SERPL-SCNC: 138 MMOL/L (ref 136–145)
WBC NRBC COR # BLD: 9.08 10*3/MM3 (ref 3.4–10.8)

## 2023-02-16 PROCEDURE — 97535 SELF CARE MNGMENT TRAINING: CPT

## 2023-02-16 PROCEDURE — 94761 N-INVAS EAR/PLS OXIMETRY MLT: CPT

## 2023-02-16 PROCEDURE — 85025 COMPLETE CBC W/AUTO DIFF WBC: CPT | Performed by: INTERNAL MEDICINE

## 2023-02-16 PROCEDURE — 94799 UNLISTED PULMONARY SVC/PX: CPT

## 2023-02-16 PROCEDURE — 25010000002 PIPERACILLIN SOD-TAZOBACTAM PER 1 G: Performed by: INTERNAL MEDICINE

## 2023-02-16 PROCEDURE — 84132 ASSAY OF SERUM POTASSIUM: CPT | Performed by: INTERNAL MEDICINE

## 2023-02-16 PROCEDURE — 80069 RENAL FUNCTION PANEL: CPT | Performed by: INTERNAL MEDICINE

## 2023-02-16 PROCEDURE — 97110 THERAPEUTIC EXERCISES: CPT

## 2023-02-16 PROCEDURE — G0378 HOSPITAL OBSERVATION PER HR: HCPCS

## 2023-02-16 PROCEDURE — 97530 THERAPEUTIC ACTIVITIES: CPT

## 2023-02-16 PROCEDURE — 99232 SBSQ HOSP IP/OBS MODERATE 35: CPT | Performed by: INTERNAL MEDICINE

## 2023-02-16 RX ORDER — POTASSIUM CHLORIDE 750 MG/1
40 CAPSULE, EXTENDED RELEASE ORAL EVERY 4 HOURS
Status: COMPLETED | OUTPATIENT
Start: 2023-02-16 | End: 2023-02-16

## 2023-02-16 RX ADMIN — Medication 10 ML: at 08:39

## 2023-02-16 RX ADMIN — TAZOBACTAM SODIUM AND PIPERACILLIN SODIUM 3.38 G: 375; 3 INJECTION, SOLUTION INTRAVENOUS at 13:25

## 2023-02-16 RX ADMIN — LEVOTHYROXINE SODIUM 25 MCG: 25 TABLET ORAL at 06:00

## 2023-02-16 RX ADMIN — PREGABALIN 25 MG: 25 CAPSULE ORAL at 08:38

## 2023-02-16 RX ADMIN — TAZOBACTAM SODIUM AND PIPERACILLIN SODIUM 3.38 G: 375; 3 INJECTION, SOLUTION INTRAVENOUS at 06:00

## 2023-02-16 RX ADMIN — OXCARBAZEPINE 600 MG: 300 TABLET, FILM COATED ORAL at 20:31

## 2023-02-16 RX ADMIN — METOPROLOL SUCCINATE 25 MG: 25 TABLET, EXTENDED RELEASE ORAL at 08:38

## 2023-02-16 RX ADMIN — Medication 10 ML: at 20:29

## 2023-02-16 RX ADMIN — IPRATROPIUM BROMIDE AND ALBUTEROL SULFATE 3 ML: 2.5; .5 SOLUTION RESPIRATORY (INHALATION) at 20:32

## 2023-02-16 RX ADMIN — Medication 250 MG: at 20:28

## 2023-02-16 RX ADMIN — ASPIRIN 81 MG: 81 TABLET, COATED ORAL at 08:38

## 2023-02-16 RX ADMIN — LIDOCAINE 3 PATCH: 700 PATCH TOPICAL at 08:38

## 2023-02-16 RX ADMIN — TRAMADOL HYDROCHLORIDE 50 MG: 50 TABLET, COATED ORAL at 21:19

## 2023-02-16 RX ADMIN — PREGABALIN 25 MG: 25 CAPSULE ORAL at 20:28

## 2023-02-16 RX ADMIN — PANTOPRAZOLE SODIUM 40 MG: 40 TABLET, DELAYED RELEASE ORAL at 08:38

## 2023-02-16 RX ADMIN — BUDESONIDE AND FORMOTEROL FUMARATE DIHYDRATE 2 PUFF: 160; 4.5 AEROSOL RESPIRATORY (INHALATION) at 09:28

## 2023-02-16 RX ADMIN — IPRATROPIUM BROMIDE AND ALBUTEROL SULFATE 3 ML: 2.5; .5 SOLUTION RESPIRATORY (INHALATION) at 13:33

## 2023-02-16 RX ADMIN — POTASSIUM CHLORIDE 40 MEQ: 750 CAPSULE, EXTENDED RELEASE ORAL at 08:38

## 2023-02-16 RX ADMIN — Medication 250 MG: at 08:38

## 2023-02-16 RX ADMIN — OXCARBAZEPINE 600 MG: 300 TABLET, FILM COATED ORAL at 08:40

## 2023-02-16 RX ADMIN — POTASSIUM CHLORIDE 40 MEQ: 750 CAPSULE, EXTENDED RELEASE ORAL at 13:25

## 2023-02-16 RX ADMIN — BUDESONIDE AND FORMOTEROL FUMARATE DIHYDRATE 2 PUFF: 160; 4.5 AEROSOL RESPIRATORY (INHALATION) at 20:32

## 2023-02-16 RX ADMIN — IPRATROPIUM BROMIDE AND ALBUTEROL SULFATE 3 ML: 2.5; .5 SOLUTION RESPIRATORY (INHALATION) at 09:28

## 2023-02-16 RX ADMIN — DOXYCYCLINE 100 MG: 100 CAPSULE ORAL at 08:38

## 2023-02-16 RX ADMIN — TAZOBACTAM SODIUM AND PIPERACILLIN SODIUM 3.38 G: 375; 3 INJECTION, SOLUTION INTRAVENOUS at 20:28

## 2023-02-16 RX ADMIN — DONEPEZIL HYDROCHLORIDE 5 MG: 5 TABLET, FILM COATED ORAL at 06:00

## 2023-02-16 RX ADMIN — DOXYCYCLINE 100 MG: 100 CAPSULE ORAL at 20:28

## 2023-02-16 NOTE — CASE MANAGEMENT/SOCIAL WORK
"Continued Stay Note  [facility]     Patient Name: Mitzi Eric  MRN: [id number]  Today's Date: 2/16/2023    Admit Date: 2/12/2023    Plan: Home   Discharge Plan     Row Name 02/16/23 5662       Plan    Plan Home    Patient/Family in Agreement with Plan yes    Plan Comments Per MDR, Ms. Eric will likley be ready for discharge on Saturday after her IV antibiotics are completed.  I have spoken to Lane her POA who remains agreeable to her returning to her assisted living accomodations at [facility] with in-house PT services to be provided.  Per PT notes, \"transferred to chair with MinAx2/BUE support for stand pivot, cues/extra time needed for weight shifting.  She continues to function below baseline\".  Lane also prefers to provide transportation.  CM will cont to follow the plan of care, update Marie at [facility] and assist with discharge needs as recommendations become available.    Final Discharge Disposition Code 01 - home or self-care               Discharge Codes    No documentation.               Expected Discharge Date and Time     Expected Discharge Date Expected Discharge Time    Feb 18, 2023             Araceli Alfred RN    "

## 2023-02-16 NOTE — PLAN OF CARE
Goal Outcome Evaluation:  Plan of Care Reviewed With: patient, family, other (see comments) (nephew)        Progress: improving  Outcome Evaluation: Physical therapy treatment complete. The patient transferred from supine to sit with minAx1. Patient able to complete stand pivot transfer from EOB to chair with minAx2. Patient continues to present below baseline for functional mobility. Patient would continue to benefit from skilled PT to address functional mobility deficits. Continue current PT POC.

## 2023-02-16 NOTE — THERAPY TREATMENT NOTE
Patient Name: Mitzi Eric  : 1935    MRN: [id number]                              Today's Date: 2023       Admit Date: 2023    Visit Dx:     ICD-10-CM ICD-9-CM   1. Pneumonia of both upper lobes due to infectious organism  J18.9 486   2. Generalized weakness  R53.1 780.79   3. Cervical paraspinous muscle spasm  M62.838 728.85   4. Fall at nursing home, initial encounter  W19.XXXA E888.9    Y92.129 E849.7   5. Impaired functional mobility, balance, gait, and endurance  Z74.09 V49.89   6. Acute on chronic respiratory failure with hypoxemia (HCC)  J96.21 518.84   7. COPD with acute exacerbation (HCC)  J44.1 491.21   8. Thrombocytopenia ([facility])  D69.6 287.5   9. Acute congestive heart failure, unspecified heart failure type ([facility])  I50.9 428.0   10. COPD ex with volume overload  J96.01 518.81   11. Fall, initial encounter  W19.XXXA E888.9   12. Essential hypertension  I10 401.9     Patient Active Problem List   Diagnosis   • Fall   • Delirium, acute   • Essential hypertension   • Cognitive decline   • Trigeminal neuralgia   • Hypothyroidism (acquired)   • Lung mass   • Influenza A   • Severe malnutrition (CMS/HCC)   • Dementia without behavioral disturbance (HCC)   • History of craniotomy   • Impaired functional mobility, balance, gait, and endurance   • COPD ex with volume overload   • chronic CHF (congestive heart failure) (HCC)   • Thrombocytopenia (HCC)   • Elevated transaminase level   • atrial fibrillation   • COPD with acute exacerbation (HCC)   • Acute on chronic respiratory failure with hypoxemia (HCC)   • Chronic respiratory failure (HCC)   • Underweight   • Moderate malnutrition (CMS/HCC)   • Centrilobular emphysema (HCC)   • Essential tremor   • Pneumonia of both upper lobes due to infectious organism     Past Medical History:   Diagnosis Date   • A-fib ([facility])    • Ataxic gait    • Chronic kidney disease, stage 3 (HCC)    • COPD (chronic obstructive pulmonary disease) (HCC)    • Dementia  (HCC)    • Disease of thyroid gland    • Essential tremor 12/21/2022   • Heart failure (HCC)    • Hyperlipidemia    • Hypertension    • Shingles      Past Surgical History:   Procedure Laterality Date   • BRAIN TUMOR EXCISION      BENIGN   • FRACTURE SURGERY     • TRIGGER FINGER RELEASE        General Information     Row Name 02/16/23 3729          OT Time and Intention    Document Type therapy note (daily note)  -JESSICA     Mode of Treatment occupational therapy  -JESSICA     Row Name 02/16/23 1347          General Information    Patient Profile Reviewed yes  -JESSICA     Existing Precautions/Restrictions fall;other (see comments)  hx dementia  -JESSICA     Row Name 02/16/23 1343          Cognition    Orientation Status (Cognition) person;place;verbal cues/prompts needed for orientation;time  -JESSICA     Row Name 02/16/23 4980          Safety Issues, Functional Mobility    Safety Issues Affecting Function (Mobility) awareness of need for assistance;insight into deficits/self-awareness;judgment;problem-solving;safety precaution awareness;safety precautions follow-through/compliance;sequencing abilities  -JESSICA     Impairments Affecting Function (Mobility) balance;cognition;endurance/activity tolerance;motor planning;postural/trunk control;strength  -JESSICA     Cognitive Impairments, Mobility Safety/Performance awareness, need for assistance;insight into deficits/self-awareness;judgment;problem-solving/reasoning;safety precaution awareness;safety precaution follow-through;sequencing abilities  -JESSICA           User Key  (r) = Recorded By, (t) = Taken By, (c) = Cosigned By    Initials Name Provider Type    Kaity Madrid OT Occupational Therapist                 Mobility/ADL's     Row Name 02/16/23 6372          Bed Mobility    Comment, (Bed Mobility) Received seated EOB  -JESSICA     Row Name 02/16/23 1352          Transfers    Transfers bed-chair transfer  -JESSICA     Row Name 02/16/23 1352          Bed-Chair Transfer    Bed-Chair [place]  (Transfers) minimum assist (75% patient effort);verbal cues;nonverbal cues (demo/gesture);2 person assist  -JESSICA     Assistive Device (Bed-Chair Transfers) other (see comments)  BUE support  -JESSICA     Comment, (Bed-Chair Transfer) increased time needed/cues for weight shifting during SPT  -JESSICA     Row Name 02/16/23 1352          Sit-Stand Transfer    Sit-Stand [place] (Transfers) minimum assist (75% patient effort);verbal cues;nonverbal cues (demo/gesture);2 person assist  -JESSICA     Comment, (Sit-Stand Transfer) BUE support  -JESSICA     Row Name 02/16/23 1352          Activities of Daily Living    BADL Assessment/Intervention grooming  -     Row Name 02/16/23 1352          Grooming Assessment/Training    [place] Level (Grooming) oral care regimen;wash face, hands;set up  -JESSICA     Position (Grooming) supported sitting  -JESSICA           User Key  (r) = Recorded By, (t) = Taken By, (c) = Cosigned By    Initials Name Provider Type    Kaity Madrid OT Occupational Therapist               Obj/Interventions     Row Name 02/16/23 1515          Shoulder (Therapeutic Exercise)    Shoulder AROM (Therapeutic Exercise) bilateral;flexion;scapular protraction;scapular retraction;sitting;10 repetitions  -     Row Name 02/16/23 1515          Motor Skills    Therapeutic Exercise shoulder;other (see comments)  ankle  -     Row Name 02/16/23 1515          Balance    Balance Interventions sitting;dynamic;weight shifting activity  -     Comment, Balance Pt able to perform reciprocal scooting to reposition back in chair.  -     Row Name 02/16/23 1515          Ankle (Therapeutic Exercise)    Ankle (Therapeutic Exercise) AROM (active range of motion)  -     Ankle AROM (Therapeutic Exercise) left;right;dorsiflexion;plantarflexion;sitting;10 repetitions  -           User Key  (r) = Recorded By, (t) = Taken By, (c) = Cosigned By    Initials Name Provider Type    Kaity Madrid OT Occupational Therapist               Goals/Plan     No documentation.                Clinical Impression     Row Name 02/16/23 1520          Pain Assessment    Pretreatment Pain Rating 0/10 - no pain  -JESSICA     Posttreatment Pain Rating 0/10 - no pain  -JESSICA     Row Name 02/16/23 1520          Plan of Care Review    Plan of Care Reviewed With patient;family;other (see comments)  nephew  -JESSICA     Progress improving  -JESSICA     Outcome Evaluation Pt demonstrated improved activity tolerance this date; transferred to chair with MinAx2/BUE support for stand pivot, cues/extra time needed for weight shifting. Pt completed face/hand washing and oral care with SCOTT, gave good effort for UE TE. Continue OT POC to support goal of return to PLOF.  -JESSICA     Row Name 02/16/23 1520          Therapy Plan Review/Discharge Plan (OT)    Anticipated Discharge Disposition (OT) skilled nursing facility  -JESSICA     Row Name 02/16/23 1520          Vital Signs    O2 Delivery Pre Treatment room air  -JESSICA     O2 Delivery Intra Treatment room air  -JESSICA     O2 Delivery Post Treatment room air  -JESSICA     Pre Patient Position Sitting  -JESSICA     Intra Patient Position Standing  -JESSICA     Post Patient Position Sitting  -JESSICA     Row Name 02/16/23 1520          Positioning and Restraints    Pre-Treatment Position in bed  -JESSICA     Post Treatment Position chair  -JESSICA     In Chair notified nsg;reclined;call light within reach;encouraged to call for assist;exit alarm on;with family/caregiver;waffle cushion;legs elevated  -JESSICA           User Key  (r) = Recorded By, (t) = Taken By, (c) = Cosigned By    Initials Name Provider Type    Kaity Madrid, REBECCA Occupational Therapist               Outcome Measures     Row Name 02/16/23 1522          How much help from another is currently needed...    Putting on and taking off regular lower body clothing? 2  -JESSICA     Bathing (including washing, rinsing, and drying) 2  -JESSICA     Toileting (which includes using toilet bed pan or urinal) 2  -JESSICA     Putting on and taking off regular upper body  clothing 3  -JESSICA     Taking care of personal grooming (such as brushing teeth) 3  -JESSICA     Eating meals 3  -JESSICA     AM-PAC 6 Clicks Score (OT) 15  -JESSICA     Row Name 02/16/23 1154          How much help from another person do you currently need...    Turning from your back to your side while in flat bed without using bedrails? 3  -ML     Moving from lying on back to sitting on the side of a flat bed without bedrails? 3  -ML     Moving to and from a bed to a chair (including a wheelchair)? 3  -ML     Standing up from a chair using your arms (e.g., wheelchair, bedside chair)? 3  -ML     Climbing 3-5 steps with a railing? 1  -ML     To walk in hospital room? 1  -ML     AM-PAC 6 Clicks Score (PT) 14  -ML     Highest level of mobility 4 --> Transferred to chair/commode  -ML     Row Name 02/16/23 1524 02/16/23 1154       Functional Assessment    Outcome Measure Options AM-PAC 6 Clicks Daily Activity (OT)  -JESSICA AM-PAC 6 Clicks Basic Mobility (PT)  -ML          User Key  (r) = Recorded By, (t) = Taken By, (c) = Cosigned By    Initials Name Provider Type    Kaity Madrid, REBECCA Occupational Therapist    Reanna Crow Physical Therapist                Occupational Therapy Education     Title: PT OT SLP Therapies (In Progress)     Topic: Occupational Therapy (Done)     Point: ADL training (Done)     Description:   Instruct learner(s) on proper safety adaptation and remediation techniques during self care or transfers.   Instruct in proper use of assistive devices.              Learning Progress Summary           Patient Acceptance, E,D, VU,DU by JESSICA at 2/16/2023 1525    Comment: OT POC; UE HEP    Eager, E,TB,D, VU,NR by AR at 2/13/2023 1152                   Point: Home exercise program (Done)     Description:   Instruct learner(s) on appropriate technique for monitoring, assisting and/or progressing therapeutic exercises/activities.              Learning Progress Summary           Patient Acceptance, E,D, VU,DU by JESSICA at 2/16/2023  1525    Comment: OT POC; UE HEP    Vale, DAVID,TB,D, VU,NR by AR at 2/13/2023 1152                   Point: Precautions (Done)     Description:   Instruct learner(s) on prescribed precautions during self-care and functional transfers.              Learning Progress Summary           Patient Vale, DAVID,TB,D, VU,NR by AR at 2/13/2023 1152                   Point: Body mechanics (Done)     Description:   Instruct learner(s) on proper positioning and spine alignment during self-care, functional mobility activities and/or exercises.              Learning Progress Summary           Patient Vale, DAVID,TB,D, VU,NR by AR at 2/13/2023 1152                               User Key     Initials Effective Dates Name Provider Type Discipline    AR 02/03/23 -  Stacia Woods OT Occupational Therapist OT    JESSICA 06/16/21 -  Kaity Mackey OT Occupational Therapist OT              OT Recommendation and Plan     Plan of Care Review  Plan of Care Reviewed With: patient, family, other (see comments) (nephew)  Progress: improving  Outcome Evaluation: Pt demonstrated improved activity tolerance this date; transferred to chair with MinAx2/BUE support for stand pivot, cues/extra time needed for weight shifting. Pt completed face/hand washing and oral care with SCOTT, gave good effort for UE TE. Continue OT POC to support goal of return to PLOF.     Time Calculation:    Time Calculation- OT     Row Name 02/16/23 1115             Time Calculation- OT    OT Start Time 1115  -JESSICA      OT Received On 02/16/23  -JESSICA         Timed Charges    73385 - OT Therapeutic Exercise Minutes 10  -JESSICA      27840 - OT Self Care/Mgmt Minutes 14  -JESSICA         Total Minutes    Timed Charges Total Minutes 24  -JESSICA       Total Minutes 24  -JESSICA            User Key  (r) = Recorded By, (t) = Taken By, (c) = Cosigned By    Initials Name Provider Type    Kaity Madrid OT Occupational Therapist              Therapy Charges for Today     Code Description Service Date Service  Provider Modifiers Qty    08093339862 HC OT THER PROC EA 15 MIN 2/16/2023 Kaity Mackey OT GO 1    44474160455 HC OT SELF CARE/MGMT/TRAIN EA 15 MIN 2/16/2023 Kaity Mackey OT GO 1               Kaity Mackey OT  2/16/2023

## 2023-02-16 NOTE — THERAPY TREATMENT NOTE
Patient Name: Mitzi Eric  : 1935    MRN: [id number]                              Today's Date: 2023       Admit Date: 2023    Visit Dx:     ICD-10-CM ICD-9-CM   1. Pneumonia of both upper lobes due to infectious organism  J18.9 486   2. Generalized weakness  R53.1 780.79   3. Cervical paraspinous muscle spasm  M62.838 728.85   4. Fall at nursing home, initial encounter  W19.XXXA E888.9    Y92.129 E849.7   5. Impaired functional mobility, balance, gait, and endurance  Z74.09 V49.89   6. Acute on chronic respiratory failure with hypoxemia (HCC)  J96.21 518.84   7. COPD with acute exacerbation (HCC)  J44.1 491.21   8. Thrombocytopenia ([facility])  D69.6 287.5   9. Acute congestive heart failure, unspecified heart failure type ([facility])  I50.9 428.0   10. COPD ex with volume overload  J96.01 518.81   11. Fall, initial encounter  W19.XXXA E888.9   12. Essential hypertension  I10 401.9     Patient Active Problem List   Diagnosis   • Fall   • Delirium, acute   • Essential hypertension   • Cognitive decline   • Trigeminal neuralgia   • Hypothyroidism (acquired)   • Lung mass   • Influenza A   • Severe malnutrition (CMS/HCC)   • Dementia without behavioral disturbance (HCC)   • History of craniotomy   • Impaired functional mobility, balance, gait, and endurance   • COPD ex with volume overload   • chronic CHF (congestive heart failure) (HCC)   • Thrombocytopenia (HCC)   • Elevated transaminase level   • atrial fibrillation   • COPD with acute exacerbation (HCC)   • Acute on chronic respiratory failure with hypoxemia (HCC)   • Chronic respiratory failure (HCC)   • Underweight   • Moderate malnutrition (CMS/HCC)   • Centrilobular emphysema (HCC)   • Essential tremor   • Pneumonia of both upper lobes due to infectious organism     Past Medical History:   Diagnosis Date   • A-fib ([facility])    • Ataxic gait    • Chronic kidney disease, stage 3 (HCC)    • COPD (chronic obstructive pulmonary disease) (HCC)    • Dementia  (HCC)    • Disease of thyroid gland    • Essential tremor 12/21/2022   • Heart failure (HCC)    • Hyperlipidemia    • Hypertension    • Shingles      Past Surgical History:   Procedure Laterality Date   • BRAIN TUMOR EXCISION      BENIGN   • FRACTURE SURGERY     • TRIGGER FINGER RELEASE        General Information     Row Name 02/16/23 1143          Physical Therapy Time and Intention    Document Type therapy note (daily note)  -     Mode of Treatment physical therapy  -     Row Name 02/16/23 1143          General Information    Patient Profile Reviewed yes  -     Existing Precautions/Restrictions fall  -     Barriers to Rehab medically complex;cognitive status;previous functional deficit  -     Row Name 02/16/23 1143          Cognition    Orientation Status (Cognition) oriented to;person;place;verbal cues/prompts needed for orientation;time;other (see comments)  able to identify newphew in room  -     Row Name 02/16/23 1143          Safety Issues, Functional Mobility    Safety Issues Affecting Function (Mobility) awareness of need for assistance;insight into deficits/self-awareness;safety precaution awareness;safety precautions follow-through/compliance;sequencing abilities  -     Impairments Affecting Function (Mobility) balance;cognition;strength;endurance/activity tolerance;coordination;pain;postural/trunk control  -     Cognitive Impairments, Mobility Safety/Performance awareness, need for assistance;insight into deficits/self-awareness;judgment;problem-solving/reasoning;safety precaution awareness;safety precaution follow-through;sequencing abilities  -           User Key  (r) = Recorded By, (t) = Taken By, (c) = Cosigned By    Initials Name Provider Type     Reanna Harman Physical Therapist               Mobility     Row Name 02/16/23 1144          Bed Mobility    Bed Mobility supine-sit  -ML     Sit-Supine [place] (Bed Mobility) verbal cues;nonverbal cues (demo/gesture);minimum assist  (75% patient effort);1 person assist  -ML     Assistive Device (Bed Mobility) bed rails;head of bed elevated  -ML     Row Name 02/16/23 1144          Bed-Chair Transfer    Bed-Chair [place] (Transfers) verbal cues;nonverbal cues (demo/gesture);minimum assist (75% patient effort);2 person assist  -ML     Assistive Device (Bed-Chair Transfers) other (see comments)  UE support  -ML     Row Name 02/16/23 1144          Sit-Stand Transfer    Sit-Stand [place] (Transfers) verbal cues;nonverbal cues (demo/gesture);minimum assist (75% patient effort);2 person assist  -ML     Assistive Device (Sit-Stand Transfers) other (see comments)  UE support  -ML     Row Name 02/16/23 1144          Gait/Stairs (Locomotion)    [place] Level (Gait) not tested  -ML     Comment, (Gait/Stairs) non-ambuatory at baseline  -ML           User Key  (r) = Recorded By, (t) = Taken By, (c) = Cosigned By    Initials Name Provider Type    Reanna Crow Physical Therapist               Obj/Interventions     Row Name 02/16/23 1151          Balance    Balance Assessment sitting static balance;sit to stand dynamic balance;standing static balance;standing dynamic balance  -ML     Static Sitting Balance supervision  -ML     Position, Sitting Balance unsupported;sitting edge of bed  -ML     Sit to Stand Dynamic Balance verbal cues;minimal assist;2-person assist  -ML     Static Standing Balance verbal cues;contact guard  -ML     Dynamic Standing Balance verbal cues;minimal assist;2-person assist  -ML     Position/Device Used, Standing Balance supported  -ML     Balance Interventions sitting;standing;sit to stand;supported;static;dynamic;weight shifting activity  -ML           User Key  (r) = Recorded By, (t) = Taken By, (c) = Cosigned By    Initials Name Provider Type    Reanna Crow Physical Therapist               Goals/Plan    No documentation.                Clinical Impression     Row Name 02/16/23 1152          Pain    Pretreatment  Pain Rating 0/10 - no pain  -ML     Posttreatment Pain Rating 0/10 - no pain  -ML     Row Name 02/16/23 1152          Plan of Care Review    Plan of Care Reviewed With patient;family;other (see comments)  nephew  -ML     Progress improving  -ML     Outcome Evaluation Physical therapy treatment complete. The patient transferred from supine to sit with minAx1. Patient able to complete stand pivot transfer from EOB to chair with minAx2. Patient continues to present below baseline for functional mobility. Patient would continue to benefit from skilled PT to address functional mobility deficits. Continue current PT POC.  -ML     Row Name 02/16/23 1152          Vital Signs    Pre Patient Position Supine  -ML     Intra Patient Position Standing  -ML     Post Patient Position Sitting  -ML     Row Name 02/16/23 1152          Positioning and Restraints    Pre-Treatment Position in bed  -ML     Post Treatment Position chair  -ML     In Chair notified nsg;reclined;call light within reach;encouraged to call for assist;exit alarm on;with family/caregiver;with OT;waffle cushion;legs elevated  -ML           User Key  (r) = Recorded By, (t) = Taken By, (c) = Cosigned By    Initials Name Provider Type    ML Reanna Harman Physical Therapist               Outcome Measures     Row Name 02/16/23 1156          How much help from another person do you currently need...    Turning from your back to your side while in flat bed without using bedrails? 3  -ML     Moving from lying on back to sitting on the side of a flat bed without bedrails? 3  -ML     Moving to and from a bed to a chair (including a wheelchair)? 3  -ML     Standing up from a chair using your arms (e.g., wheelchair, bedside chair)? 3  -ML     Climbing 3-5 steps with a railing? 1  -ML     To walk in hospital room? 1  -ML     AM-PAC 6 Clicks Score (PT) 14  -ML     Highest level of mobility 4 --> Transferred to chair/commode  -ML     Row Name 02/16/23 4116          Functional  Assessment    Outcome Measure Options AM-PAC 6 Clicks Basic Mobility (PT)  -           User Key  (r) = Recorded By, (t) = Taken By, (c) = Cosigned By    Initials Name Provider Type     Reanna Harman Physical Therapist                             Physical Therapy Education     Title: PT OT SLP Therapies (In Progress)     Topic: Physical Therapy (In Progress)     Point: Mobility training (Done)     Learning Progress Summary           Patient Acceptance, E, VU,NR by  at 2/16/2023 1154    Nonacceptance, E,D, NR by  at 2/14/2023 1352    Acceptance, E,D, NR by  at 2/13/2023 1104   Family Acceptance, E, VU,NR by  at 2/16/2023 1154                   Point: Home exercise program (In Progress)     Learning Progress Summary           Patient Nonacceptance, E,D, NR by  at 2/14/2023 1352    Acceptance, E,D, NR by  at 2/13/2023 1104                   Point: Body mechanics (Done)     Learning Progress Summary           Patient Acceptance, E, VU,NR by  at 2/16/2023 1154    Nonacceptance, E,D, NR by  at 2/14/2023 1352    Acceptance, E,D, NR by  at 2/13/2023 1104   Family Acceptance, E, VU,NR by  at 2/16/2023 1154                   Point: Precautions (Done)     Learning Progress Summary           Patient Acceptance, E, VU,NR by  at 2/16/2023 1154    Nonacceptance, E,D, NR by  at 2/14/2023 1352    Acceptance, E,D, NR by  at 2/13/2023 1104   Family Acceptance, E, VU,NR by  at 2/16/2023 1154                               User Key     Initials Effective Dates Name Provider Type [facility] 02/03/23 -  Yudelka Chung, PT Physical Therapist PT     04/22/21 -  Reanna Harman Physical Therapist PT              PT Recommendation and Plan     Plan of Care Reviewed With: patient, family, other (see comments) (nephew)  Progress: improving  Outcome Evaluation: Physical therapy treatment complete. The patient transferred from supine to sit with minAx1. Patient able to complete stand pivot transfer from EOB to  chair with minAx2. Patient continues to present below baseline for functional mobility. Patient would continue to benefit from skilled PT to address functional mobility deficits. Continue current PT POC.     Time Calculation:    PT Charges     Row Name 02/16/23 1155             Time Calculation    Start Time 1106  -ML      PT Received On 02/16/23  -ML         Timed Charges    63949 - PT Therapeutic Activity Minutes 15  -ML         Total Minutes    Timed Charges Total Minutes 15  -ML       Total Minutes 15  -ML            User Key  (r) = Recorded By, (t) = Taken By, (c) = Cosigned By    Initials Name Provider Type    Reanna Crow Physical Therapist              Therapy Charges for Today     Code Description Service Date Service Provider Modifiers Qty    89665671070 HC PT THERAPEUTIC ACT EA 15 MIN 2/16/2023 Reanna Harman GP 1          PT G-Codes  Outcome Measure Options: AM-PAC 6 Clicks Basic Mobility (PT)  AM-PAC 6 Clicks Score (PT): 14  AM-PAC 6 Clicks Score (OT): 14       Reanna Harman  2/16/2023

## 2023-02-16 NOTE — PROGRESS NOTES
[facility] Medicine Services  PROGRESS NOTE    Patient Name: Mitzi Eric  : 1935  MRN: [id number]    Date of Admission: 2023  Primary Care Physician: System, Provider Not In    Subjective   Subjective     CC:  PNA    HPI:  Resting in a chair in no acute distress and overall comfortable.  Does not have any specific complaint.    ROS:  Gen- No fevers, chills  CV- No chest pain, palpitations  Resp- No cough, dyspnea  GI- No N/V/D, abd pain         Objective   Objective     Vital Signs:   Temp:  [96.5 °F (35.8 °C)-99.1 °F (37.3 °C)] 96.5 °F (35.8 °C)  Heart Rate:  [] 76  Resp:  [16-18] 18  BP: (109-159)/(48-87) 109/48     Physical Exam:  Constitutional: No acute distress, looks comfortable  HENT: NCAT, mucous membranes moist  Respiratory: Harsh breath sounds on the left side, respiratory effort normal   Cardiovascular: RRR, no murmurs, rubs, or gallops  Gastrointestinal: Bowel sounds are present, soft, nontender, nondistended  Musculoskeletal: No edema of lower extremities.  Psychiatric: Appropriate affect, cooperative  Neurologic: Awake, alert, follows,, speech clear  Skin: No rashes      Results Reviewed:  LAB RESULTS:      Lab 23  0442 02/15/23  0409 23  0331 23  0430 23  1358   WBC 9.08 9.97 9.35 10.16 12.35*   HEMOGLOBIN 10.2* 10.3* 9.8* 11.3* 11.1*   HEMATOCRIT 30.8* 30.9* 29.7* 34.8 34.2   PLATELETS 213 204 184 194 223   NEUTROS ABS 6.71 7.71* 7.03* 7.93* 10.00*   IMMATURE GRANS (ABS) 0.08* 0.05 0.05 0.08* 0.11*   LYMPHS ABS 0.73 0.75 0.84 0.68* 0.69*   MONOS ABS 1.33* 1.26* 1.18* 1.28* 1.43*   EOS ABS 0.17 0.16 0.22 0.14 0.07   MCV 95.7 96.0 97.1* 100.0* 99.4*   PROCALCITONIN  --   --   --   --  0.07   LACTATE  --   --   --   --  1.4         Lab 23  1557 23  0442 02/15/23  0409 23  0331 23  1358   SODIUM  --  138 136 135* 138   POTASSIUM 4.1 3.3* 3.7 3.7 4.7   CHLORIDE  --  104 101 101 102   CO2  --  24.0 26.0 24.0  27.0   ANION GAP  --  10.0 9.0 10.0 9.0   BUN  --  12 11 13 23   CREATININE  --  0.69 0.63 0.67 0.95   EGFR  --  84.6 86.5 85.2 58.5*   GLUCOSE  --  95 92 95 104*   CALCIUM  --  8.2* 8.3* 8.3* 8.9   PHOSPHORUS  --  3.0 2.9 3.0  --    TSH  --   --   --   --  1.250         Lab 02/16/23  0442 02/15/23  0409 02/14/23  0331 02/12/23  1358   TOTAL PROTEIN  --   --   --  6.4   ALBUMIN 2.8* 3.2* 3.0* 3.6   GLOBULIN  --   --   --  2.8   ALT (SGPT)  --   --   --  8   AST (SGOT)  --   --   --  23   BILIRUBIN  --   --   --  0.4   ALK PHOS  --   --   --  138*                     Brief Urine Lab Results  (Last result in the past 365 days)      Color   Clarity   Blood   Leuk Est   Nitrite   Protein   CREAT   Urine HCG        03/29/22 1155 Yellow   Clear   Negative   Negative   Negative   Negative                 Microbiology Results Abnormal     Procedure Component Value - Date/Time    Blood Culture - Blood, Arm, Left [[id number]]  (Normal) Collected: 02/12/23 1400    Lab Status: Preliminary result Specimen: Blood from Arm, Left Updated: 02/16/23 1430     Blood Culture No growth at 4 days    Blood Culture - Blood, Arm, Right [[id number]]  (Normal) Collected: 02/12/23 1350    Lab Status: Preliminary result Specimen: Blood from Arm, Right Updated: 02/16/23 1430     Blood Culture No growth at 4 days    MRSA Screen, PCR (Inpatient) - Swab, Nares [[id number]]  (Normal) Collected: 02/13/23 1519    Lab Status: Final result Specimen: Swab from Nares Updated: 02/13/23 1721     MRSA PCR Negative    Narrative:      The negative predictive value of this diagnostic test is high and should only be used to consider de-escalating anti-MRSA therapy. A positive result may indicate colonization with MRSA and must be correlated clinically.  MRSA Negative    S. Pneumo Ag Urine or CSF - Urine, Urine, Clean Catch [[id number]]  (Normal) Collected: 02/13/23 0557    Lab Status: Final result Specimen: Urine, Clean Catch Updated: 02/13/23 0926     Strep Pneumo  Ag Negative    Legionella Antigen, Urine - Urine, Urine, Clean Catch [[id number]]  (Normal) Collected: 02/13/23 0557    Lab Status: Final result Specimen: Urine, Clean Catch Updated: 02/13/23 0926     LEGIONELLA ANTIGEN, URINE Negative    COVID PRE-OP / PRE-PROCEDURE SCREENING ORDER (NO ISOLATION) - Swab, Nasopharynx [[id number]]  (Normal) Collected: 02/12/23 1400    Lab Status: Final result Specimen: Swab from Nasopharynx Updated: 02/12/23 1528    Narrative:      The following orders were created for panel order COVID PRE-OP / PRE-PROCEDURE SCREENING ORDER (NO ISOLATION) - Swab, Nasopharynx.  Procedure                               Abnormality         Status                     ---------                               -----------         ------                     COVID-19, FLU A/B, RSV P...[[id number]]  Normal              Final result                 Please view results for these tests on the individual orders.    COVID-19, FLU A/B, RSV PCR - Swab, Nasopharynx [[id number]]  (Normal) Collected: 02/12/23 1400    Lab Status: Final result Specimen: Swab from Nasopharynx Updated: 02/12/23 1528     COVID19 Not Detected     Influenza A PCR Not Detected     Influenza B PCR Not Detected     RSV, PCR Not Detected    Narrative:      Fact sheet for providers: https://www.fda.gov/media/009791/download    Fact sheet for patients: https://www.fda.gov/media/416056/download    Test performed by PCR.          No radiology results from the last 24 hrs    Results for orders placed during the hospital encounter of 03/17/22    Adult Transthoracic Echo Complete W/ Cont if Necessary Per Protocol    Interpretation Summary  · Left ventricular ejection fraction appears to be 46 - 50%. Left ventricular systolic function is mildly decreased.  · Left ventricular diastolic function is consistent with (grade I) impaired relaxation.  · Mild to moderate mitral regurgitation.  · Mild to moderate tricuspid regurgitation, RVSP 54 mmHg.      I have  reviewed the medications:  Scheduled Meds:aspirin, 81 mg, Oral, Daily  budesonide-formoterol, 2 puff, Inhalation, BID - RT  donepezil, 5 mg, Oral, QAM  doxycycline, 100 mg, Oral, Q12H  ipratropium-albuterol, 3 mL, Nebulization, 4x Daily - RT  levothyroxine, 25 mcg, Oral, Q AM  lidocaine, 3 patch, Transdermal, Q24H  metoprolol succinate XL, 25 mg, Oral, Daily  OXcarbazepine, 600 mg, Oral, BID  pantoprazole, 40 mg, Oral, Daily  piperacillin-tazobactam, 3.375 g, Intravenous, Q8H  pregabalin, 25 mg, Oral, BID  saccharomyces boulardii, 250 mg, Oral, BID  sodium chloride, 10 mL, Intravenous, Q12H      Continuous Infusions:   PRN Meds:.•  [DISCONTINUED] senna-docusate sodium **AND** [DISCONTINUED] polyethylene glycol **AND** bisacodyl **AND** [DISCONTINUED] bisacodyl  •  Calcium Replacement - Initiate Nurse / BPA Driven Protocol  •  ketorolac  •  Magnesium Standard Dose Replacement - Initiate Nurse / BPA Driven Protocol  •  ondansetron  •  Phosphorus Replacement - Initiate Nurse / BPA Driven Protocol  •  Potassium Replacement - Initiate Nurse / BPA Driven Protocol  •  sodium chloride  •  sodium chloride  •  traMADol    Assessment & Plan   Assessment & Plan     Active Hospital Problems    Diagnosis  POA   • **Pneumonia of both upper lobes due to infectious organism [J18.9]  Yes   • Essential tremor [G25.0]  Yes   • chronic CHF (congestive heart failure) (HCC) [I50.9]  Yes   • Dementia without behavioral disturbance ([facility]) [F03.90]  Yes   • Severe malnutrition (CMS/HCC) [E43]  Yes   • Lung mass [R91.8]  Yes   • Hypothyroidism (acquired) [E03.9]  Yes   • Essential hypertension [I10]  Yes      Resolved Hospital Problems   No resolved problems to display.        Brief Hospital Course to date:  Mitzi Eric is a 87 y.o. female with history of CKD, COPD with known right upper lobe mass, paroxysmal A-fib, dementia who was admitted for left shoulder pain after fall.  CT chest concerning for new area of consolidation with possible  necrosis versus mass.  She already has known right upper lobe mass and family previously had not wanted work-up for diagnosis.  Pro-Abimael noted to be normal on admission     Neck and left shoulder pain s/p fall  -Left shoulder x-ray is unremarkable  -Continue tramadol     Chronic respiratory failure with hypoxia  Suspected bilateral upper lobe pneumonia  History of COPD  -Chest x-ray reveals bilateral upper lobe pneumonia, patient is without any cough, fevers or chills  -CT chest concerning for necrotizing pneumonia versus possible mass in left upper lobe  -Will need outpatient follow-up in 3 to 4 weeks with repeat chest CT for resolution       Combined systolic and diastolic heart failure  Moderate pulm hypertension  -Currently seems compensated  -Diuretics are currently on hold     Paroxysmal atrial fibrillation  -Rates are controlled, continue metoprolol       CKD stage III  -Renal function stable, monitor     Hypothyroidism  -Continue Synthroid     Trigeminal neuralgia  -Continue Trileptal and Lyrica     Hypertension  -Currently on metoprolol and blood pressure is reasonably controlled     Dementia  -Continue Aricept     Right upper lobe lung mass  -This has apparently been known since 2018, family aware and have previously declined any further work-up/treatment    Expected Discharge Location and Transportation: custodial pending PT progress  Expected Discharge   Expected Discharge Date and Time     Expected Discharge Date Expected Discharge Time    Feb 18, 2023            DVT prophylaxis:  Mechanical DVT prophylaxis orders are present.     AM-PAC 6 Clicks Score (PT): 14 (02/16/23 2324)    CODE STATUS:   Code Status and Medical Interventions:   Ordered at: 02/12/23 5290     Medical Intervention Limits:    NO intubation (DNI)     Level Of Support Discussed With:    Health Care Surrogate     Code Status (Patient has no pulse and is not breathing):    No CPR (Do Not Attempt to Resuscitate)     Medical Interventions  (Patient has pulse or is breathing):    Limited Support     Release to patient:    Routine Release       Jony Gilbert MD  02/16/23

## 2023-02-16 NOTE — PLAN OF CARE
Goal Outcome Evaluation:  Plan of Care Reviewed With: patient        Progress: improving  Outcome Evaluation: Patient alert to self and place. Very pleasant. VSS. RA. C/o chronic neck pain but required no prn medication this shift. Lidoderm patches in place. Patient up in chair for majority of shift. Eating at least half of her meals this shift. Voiding without diffuclty. Plan of care discussed with pt. Verbalized understanding.

## 2023-02-16 NOTE — PLAN OF CARE
Goal Outcome Evaluation:  Plan of Care Reviewed With: patient, family, other (see comments) (nephew)        Progress: improving  Outcome Evaluation: Pt demonstrated improved activity tolerance this date; transferred to chair with MinAx2/BUE support for stand pivot, cues/extra time needed for weight shifting. Pt completed face/hand washing and oral care with SCOTT, gave good effort for UE TE. Continue OT POC to support goal of return to PLOF.

## 2023-02-17 ENCOUNTER — APPOINTMENT (OUTPATIENT)
Dept: GENERAL RADIOLOGY | Facility: HOSPITAL | Age: 88
End: 2023-02-17
Payer: MEDICARE

## 2023-02-17 ENCOUNTER — APPOINTMENT (OUTPATIENT)
Dept: CT IMAGING | Facility: HOSPITAL | Age: 88
End: 2023-02-17
Payer: MEDICARE

## 2023-02-17 LAB
BACTERIA SPEC AEROBE CULT: NORMAL
BACTERIA SPEC AEROBE CULT: NORMAL

## 2023-02-17 PROCEDURE — 94664 DEMO&/EVAL PT USE INHALER: CPT

## 2023-02-17 PROCEDURE — 99232 SBSQ HOSP IP/OBS MODERATE 35: CPT | Performed by: INTERNAL MEDICINE

## 2023-02-17 PROCEDURE — 72125 CT NECK SPINE W/O DYE: CPT

## 2023-02-17 PROCEDURE — 71046 X-RAY EXAM CHEST 2 VIEWS: CPT

## 2023-02-17 PROCEDURE — 96375 TX/PRO/DX INJ NEW DRUG ADDON: CPT

## 2023-02-17 PROCEDURE — 94799 UNLISTED PULMONARY SVC/PX: CPT

## 2023-02-17 PROCEDURE — G0378 HOSPITAL OBSERVATION PER HR: HCPCS

## 2023-02-17 PROCEDURE — 25010000002 PIPERACILLIN SOD-TAZOBACTAM PER 1 G: Performed by: INTERNAL MEDICINE

## 2023-02-17 PROCEDURE — 96366 THER/PROPH/DIAG IV INF ADDON: CPT

## 2023-02-17 PROCEDURE — 25010000002 KETOROLAC TROMETHAMINE PER 15 MG: Performed by: INTERNAL MEDICINE

## 2023-02-17 RX ORDER — SPIRONOLACTONE 25 MG/1
25 TABLET ORAL DAILY
Status: DISCONTINUED | OUTPATIENT
Start: 2023-02-17 | End: 2023-02-18 | Stop reason: HOSPADM

## 2023-02-17 RX ADMIN — SPIRONOLACTONE 25 MG: 25 TABLET ORAL at 17:20

## 2023-02-17 RX ADMIN — IPRATROPIUM BROMIDE AND ALBUTEROL SULFATE 3 ML: 2.5; .5 SOLUTION RESPIRATORY (INHALATION) at 15:35

## 2023-02-17 RX ADMIN — DOXYCYCLINE 100 MG: 100 CAPSULE ORAL at 08:41

## 2023-02-17 RX ADMIN — TRAMADOL HYDROCHLORIDE 50 MG: 50 TABLET, COATED ORAL at 08:42

## 2023-02-17 RX ADMIN — BUDESONIDE AND FORMOTEROL FUMARATE DIHYDRATE 2 PUFF: 160; 4.5 AEROSOL RESPIRATORY (INHALATION) at 20:35

## 2023-02-17 RX ADMIN — Medication 10 ML: at 08:42

## 2023-02-17 RX ADMIN — ASPIRIN 81 MG: 81 TABLET, COATED ORAL at 08:42

## 2023-02-17 RX ADMIN — PANTOPRAZOLE SODIUM 40 MG: 40 TABLET, DELAYED RELEASE ORAL at 08:42

## 2023-02-17 RX ADMIN — KETOROLAC TROMETHAMINE 15 MG: 15 INJECTION, SOLUTION INTRAMUSCULAR; INTRAVENOUS at 13:30

## 2023-02-17 RX ADMIN — IPRATROPIUM BROMIDE AND ALBUTEROL SULFATE 3 ML: 2.5; .5 SOLUTION RESPIRATORY (INHALATION) at 07:44

## 2023-02-17 RX ADMIN — TAZOBACTAM SODIUM AND PIPERACILLIN SODIUM 3.38 G: 375; 3 INJECTION, SOLUTION INTRAVENOUS at 05:54

## 2023-02-17 RX ADMIN — PREGABALIN 25 MG: 25 CAPSULE ORAL at 20:10

## 2023-02-17 RX ADMIN — Medication 10 ML: at 20:10

## 2023-02-17 RX ADMIN — DOXYCYCLINE 100 MG: 100 CAPSULE ORAL at 20:10

## 2023-02-17 RX ADMIN — IPRATROPIUM BROMIDE AND ALBUTEROL SULFATE 3 ML: 2.5; .5 SOLUTION RESPIRATORY (INHALATION) at 20:35

## 2023-02-17 RX ADMIN — TAZOBACTAM SODIUM AND PIPERACILLIN SODIUM 3.38 G: 375; 3 INJECTION, SOLUTION INTRAVENOUS at 13:30

## 2023-02-17 RX ADMIN — OXCARBAZEPINE 600 MG: 300 TABLET, FILM COATED ORAL at 20:10

## 2023-02-17 RX ADMIN — Medication 250 MG: at 08:41

## 2023-02-17 RX ADMIN — LIDOCAINE 2 PATCH: 700 PATCH TOPICAL at 08:43

## 2023-02-17 RX ADMIN — PREGABALIN 25 MG: 25 CAPSULE ORAL at 08:41

## 2023-02-17 RX ADMIN — OXCARBAZEPINE 600 MG: 300 TABLET, FILM COATED ORAL at 08:42

## 2023-02-17 RX ADMIN — LEVOTHYROXINE SODIUM 25 MCG: 25 TABLET ORAL at 05:54

## 2023-02-17 RX ADMIN — BUDESONIDE AND FORMOTEROL FUMARATE DIHYDRATE 2 PUFF: 160; 4.5 AEROSOL RESPIRATORY (INHALATION) at 07:44

## 2023-02-17 RX ADMIN — METOPROLOL SUCCINATE 25 MG: 25 TABLET, EXTENDED RELEASE ORAL at 08:42

## 2023-02-17 RX ADMIN — TAZOBACTAM SODIUM AND PIPERACILLIN SODIUM 3.38 G: 375; 3 INJECTION, SOLUTION INTRAVENOUS at 20:10

## 2023-02-17 RX ADMIN — Medication 250 MG: at 20:10

## 2023-02-17 RX ADMIN — DONEPEZIL HYDROCHLORIDE 5 MG: 5 TABLET, FILM COATED ORAL at 06:33

## 2023-02-17 NOTE — PLAN OF CARE
Goal Outcome Evaluation:  Plan of Care Reviewed With: patient        Progress: no change  Outcome Evaluation: Patient more confused today. As day progressed got slighlty better. Not wanting to eat much of meals. VSS. RA. C/o more with the pain in neck. CT of neck/spine obtained. Prn medication and heating pad used. Voiding without difffuclty. IV abx continued per order. Plan of care discussed with pt and nephew. Verbalized understanding.

## 2023-02-17 NOTE — PROGRESS NOTES
[facility] Medicine Services  PROGRESS NOTE    Patient Name: Mitzi Eric  : 1935  MRN: [id number]    Date of Admission: 2023  Primary Care Physician: System, Provider Not In    Subjective   Subjective     CC:  PNA    HPI:  Resting in bed in no acute distress but today complains of neck pain.  Patient's nephew is also present at the bedside.  Denies any fever or chills.  No chest pain palpitation shortness of breath. patient also is weak.    ROS:  Gen- No fevers, chills  CV- No chest pain, palpitations  Resp- No cough, dyspnea  GI- No N/V/D, abd pain         Objective   Objective     Vital Signs:   Temp:  [98 °F (36.7 °C)-99.2 °F (37.3 °C)] 98 °F (36.7 °C)  Heart Rate:  [79-96] 91  Resp:  [16-18] 16  BP: (144-156)/(71-93) 154/72     Physical Exam:  Constitutional: No acute distress  HENT: NCAT, mucous membranes moist  Respiratory: Harsh breath sounds on the left side, respiratory effort normal   Cardiovascular: RRR, no murmurs, rubs, or gallops  Gastrointestinal: Bowel sounds are present, soft, nontender, nondistended  Musculoskeletal: No edema of lower extremities.  Very low muscle mass  Psychiatric: Appropriate affect, cooperative  Neurologic: Awake, alert, follows commands, speech clear  Skin: No rashes      Results Reviewed:  LAB RESULTS:      Lab 23  0442 02/15/23  0409 23  0331 23  0430 23  1358   WBC 9.08 9.97 9.35 10.16 12.35*   HEMOGLOBIN 10.2* 10.3* 9.8* 11.3* 11.1*   HEMATOCRIT 30.8* 30.9* 29.7* 34.8 34.2   PLATELETS 213 204 184 194 223   NEUTROS ABS 6.71 7.71* 7.03* 7.93* 10.00*   IMMATURE GRANS (ABS) 0.08* 0.05 0.05 0.08* 0.11*   LYMPHS ABS 0.73 0.75 0.84 0.68* 0.69*   MONOS ABS 1.33* 1.26* 1.18* 1.28* 1.43*   EOS ABS 0.17 0.16 0.22 0.14 0.07   MCV 95.7 96.0 97.1* 100.0* 99.4*   PROCALCITONIN  --   --   --   --  0.07   LACTATE  --   --   --   --  1.4         Lab 23  1557 23  0442 02/15/23  0409 23  0331 23  1358    SODIUM  --  138 136 135* 138   POTASSIUM 4.1 3.3* 3.7 3.7 4.7   CHLORIDE  --  104 101 101 102   CO2  --  24.0 26.0 24.0 27.0   ANION GAP  --  10.0 9.0 10.0 9.0   BUN  --  12 11 13 23   CREATININE  --  0.69 0.63 0.67 0.95   EGFR  --  84.6 86.5 85.2 58.5*   GLUCOSE  --  95 92 95 104*   CALCIUM  --  8.2* 8.3* 8.3* 8.9   PHOSPHORUS  --  3.0 2.9 3.0  --    TSH  --   --   --   --  1.250         Lab 02/16/23  0442 02/15/23  0409 02/14/23  0331 02/12/23  1358   TOTAL PROTEIN  --   --   --  6.4   ALBUMIN 2.8* 3.2* 3.0* 3.6   GLOBULIN  --   --   --  2.8   ALT (SGPT)  --   --   --  8   AST (SGOT)  --   --   --  23   BILIRUBIN  --   --   --  0.4   ALK PHOS  --   --   --  138*                     Brief Urine Lab Results  (Last result in the past 365 days)      Color   Clarity   Blood   Leuk Est   Nitrite   Protein   CREAT   Urine HCG        03/29/22 1155 Yellow   Clear   Negative   Negative   Negative   Negative                 Microbiology Results Abnormal     Procedure Component Value - Date/Time    Blood Culture - Blood, Arm, Left [[id number]]  (Normal) Collected: 02/12/23 1400    Lab Status: Preliminary result Specimen: Blood from Arm, Left Updated: 02/16/23 1430     Blood Culture No growth at 4 days    Blood Culture - Blood, Arm, Right [[id number]]  (Normal) Collected: 02/12/23 1350    Lab Status: Preliminary result Specimen: Blood from Arm, Right Updated: 02/16/23 1430     Blood Culture No growth at 4 days    MRSA Screen, PCR (Inpatient) - Swab, Nares [[id number]]  (Normal) Collected: 02/13/23 1519    Lab Status: Final result Specimen: Swab from Nares Updated: 02/13/23 1721     MRSA PCR Negative    Narrative:      The negative predictive value of this diagnostic test is high and should only be used to consider de-escalating anti-MRSA therapy. A positive result may indicate colonization with MRSA and must be correlated clinically.  MRSA Negative    S. Pneumo Ag Urine or CSF - Urine, Urine, Clean Catch [[id number]]  (Normal)  Collected: 02/13/23 0557    Lab Status: Final result Specimen: Urine, Clean Catch Updated: 02/13/23 0926     Strep Pneumo Ag Negative    Legionella Antigen, Urine - Urine, Urine, Clean Catch [[id number]]  (Normal) Collected: 02/13/23 0557    Lab Status: Final result Specimen: Urine, Clean Catch Updated: 02/13/23 0926     LEGIONELLA ANTIGEN, URINE Negative    COVID PRE-OP / PRE-PROCEDURE SCREENING ORDER (NO ISOLATION) - Swab, Nasopharynx [[id number]]  (Normal) Collected: 02/12/23 1400    Lab Status: Final result Specimen: Swab from Nasopharynx Updated: 02/12/23 1528    Narrative:      The following orders were created for panel order COVID PRE-OP / PRE-PROCEDURE SCREENING ORDER (NO ISOLATION) - Swab, Nasopharynx.  Procedure                               Abnormality         Status                     ---------                               -----------         ------                     COVID-19, FLU A/B, RSV P...[[id number]]  Normal              Final result                 Please view results for these tests on the individual orders.    COVID-19, FLU A/B, RSV PCR - Swab, Nasopharynx [[id number]]  (Normal) Collected: 02/12/23 1400    Lab Status: Final result Specimen: Swab from Nasopharynx Updated: 02/12/23 1528     COVID19 Not Detected     Influenza A PCR Not Detected     Influenza B PCR Not Detected     RSV, PCR Not Detected    Narrative:      Fact sheet for providers: https://www.fda.gov/media/103797/download    Fact sheet for patients: https://www.fda.gov/media/858243/download    Test performed by PCR.          XR Chest PA & Lateral    Result Date: 2/17/2023  XR CHEST PA AND LATERAL Date of Exam: 2/17/2023 12:21 PM EST Indication: pneumonia. Comparison: 2/12/2023 Findings: Cardiac size is stable. There continue to be infiltrates in both upper lobes. On the left, there is evidence of cavitation within the infiltrates. The right-sided pulmonary infiltrate has not changed. The lung bases are clear.     Impression:  Impression: Continued pulmonary infiltrates in both upper lobes with evidence of cavitation on the left. This is unchanged from the previous films. Electronically Signed: Tyshawn Ingram  2/17/2023 1:25 PM EST  Workstation ID: [id number]      Results for orders placed during the hospital encounter of 03/17/22    Adult Transthoracic Echo Complete W/ Cont if Necessary Per Protocol    Interpretation Summary  · Left ventricular ejection fraction appears to be 46 - 50%. Left ventricular systolic function is mildly decreased.  · Left ventricular diastolic function is consistent with (grade I) impaired relaxation.  · Mild to moderate mitral regurgitation.  · Mild to moderate tricuspid regurgitation, RVSP 54 mmHg.      I have reviewed the medications:  Scheduled Meds:aspirin, 81 mg, Oral, Daily  budesonide-formoterol, 2 puff, Inhalation, BID - RT  donepezil, 5 mg, Oral, QAM  doxycycline, 100 mg, Oral, Q12H  ipratropium-albuterol, 3 mL, Nebulization, 4x Daily - RT  levothyroxine, 25 mcg, Oral, Q AM  lidocaine, 3 patch, Transdermal, Q24H  metoprolol succinate XL, 25 mg, Oral, Daily  OXcarbazepine, 600 mg, Oral, BID  pantoprazole, 40 mg, Oral, Daily  piperacillin-tazobactam, 3.375 g, Intravenous, Q8H  pregabalin, 25 mg, Oral, BID  saccharomyces boulardii, 250 mg, Oral, BID  sodium chloride, 10 mL, Intravenous, Q12H      Continuous Infusions:   PRN Meds:.•  [DISCONTINUED] senna-docusate sodium **AND** [DISCONTINUED] polyethylene glycol **AND** bisacodyl **AND** [DISCONTINUED] bisacodyl  •  Calcium Replacement - Initiate Nurse / BPA Driven Protocol  •  ketorolac  •  Magnesium Standard Dose Replacement - Initiate Nurse / BPA Driven Protocol  •  ondansetron  •  Phosphorus Replacement - Initiate Nurse / BPA Driven Protocol  •  Potassium Replacement - Initiate Nurse / BPA Driven Protocol  •  sodium chloride  •  sodium chloride  •  traMADol    Assessment & Plan   Assessment & Plan     Active Hospital Problems    Diagnosis  POA   •  **Pneumonia of both upper lobes due to infectious organism [J18.9]  Yes   • Essential tremor [G25.0]  Yes   • chronic CHF (congestive heart failure) (HCC) [I50.9]  Yes   • Dementia without behavioral disturbance (HCC) [F03.90]  Yes   • Severe malnutrition (CMS/HCC) [E43]  Yes   • Lung mass [R91.8]  Yes   • Hypothyroidism (acquired) [E03.9]  Yes   • Essential hypertension [I10]  Yes      Resolved Hospital Problems   No resolved problems to display.        Brief Hospital Course to date:  Mitzi Eric is a 87 y.o. female with history of CKD, COPD with known right upper lobe mass, paroxysmal A-fib, dementia who was admitted for left shoulder pain after fall.  CT chest concerning for new area of consolidation with possible necrosis versus mass.  She already has known right upper lobe mass and family previously had not wanted work-up for diagnosis.  Pro-Abimael noted to be normal on admission     Neck and left shoulder pain s/p fall  -Left shoulder x-ray is unremarkable  -Continue tramadol       Chronic respiratory failure with hypoxia  Suspected bilateral upper lobe pneumonia  History of COPD  -Chest x-ray reveals bilateral upper lobe pneumonia, patient is without any cough, fevers or chills  -CT chest concerning for necrotizing pneumonia versus possible mass in left upper lobe  -Will need outpatient follow-up in 3 to 4 weeks with repeat chest CT for resolution  - as neck pain continues will get a CT of cervical spine       Combined systolic and diastolic heart failure  Moderate pulm hypertension  -Currently seems compensated    Paroxysmal atrial fibrillation  -Rates are controlled, continue metoprolol       CKD stage III  -Renal function stable, monitor     Hypothyroidism  -Continue Synthroid     Trigeminal neuralgia  -Continue Trileptal and Lyrica     Hypertension  -Currently on home dose metoprolol, reasonably controlled     Dementia  -Continue Aricept     Right upper lobe lung mass  -This has apparently been known  since 2018, family aware and have previously declined any further work-up/treatment    Expected Discharge Location and Transportation: [place] pending PT progress  Expected Discharge   Expected Discharge Date and Time     Expected Discharge Date Expected Discharge Time    Feb 18, 2023            DVT prophylaxis:  Mechanical DVT prophylaxis orders are present.     AM-PAC 6 Clicks Score (PT): 12 (02/17/23 0842)    CODE STATUS:   Code Status and Medical Interventions:   Ordered at: 02/12/23 9091     Medical Intervention Limits:    NO intubation (DNI)     Level Of Support Discussed With:    Health Care Surrogate     Code Status (Patient has no pulse and is not breathing):    No CPR (Do Not Attempt to Resuscitate)     Medical Interventions (Patient has pulse or is breathing):    Limited Support     Release to patient:    Routine Release       Jony Gilbert MD  02/17/23

## 2023-02-17 NOTE — CASE MANAGEMENT/SOCIAL WORK
Case Management Discharge Note      Final Note: Ms. Eric is likely to be discharged to her home at [facility] tomorrow.  I have confirmed with Marie at the facility that they will accept her back.  CM will fax the discharge summary when available to [phone].  Nurse to please call report to [phone].  Her Nephew, Lane prefers to provide transportation by private vehicle.. I have faxed the OP referral for PT/OT to [facility] @ [phone].  Please call ext [phone] for any additional discharge needs.         Selected Continued Care - Admitted Since 2/12/2023     Destination    No services have been selected for the patient.              Durable Medical Equipment    No services have been selected for the patient.              Dialysis/Infusion    No services have been selected for the patient.              Home Medical Care    No services have been selected for the patient.              Therapy    No services have been selected for the patient.              Community Resources    No services have been selected for the patient.              Community & DME    No services have been selected for the patient.                       Final Discharge Disposition Code: 01 - home or self-care

## 2023-02-18 ENCOUNTER — READMISSION MANAGEMENT (OUTPATIENT)
Dept: CALL CENTER | Facility: HOSPITAL | Age: 88
End: 2023-02-18
Payer: MEDICARE

## 2023-02-18 VITALS
SYSTOLIC BLOOD PRESSURE: 115 MMHG | WEIGHT: 105 LBS | HEART RATE: 86 BPM | RESPIRATION RATE: 18 BRPM | HEIGHT: 62 IN | OXYGEN SATURATION: 94 % | TEMPERATURE: 98.1 F | BODY MASS INDEX: 19.32 KG/M2 | DIASTOLIC BLOOD PRESSURE: 58 MMHG

## 2023-02-18 PROCEDURE — 99239 HOSP IP/OBS DSCHRG MGMT >30: CPT | Performed by: INTERNAL MEDICINE

## 2023-02-18 PROCEDURE — 94799 UNLISTED PULMONARY SVC/PX: CPT

## 2023-02-18 PROCEDURE — G0378 HOSPITAL OBSERVATION PER HR: HCPCS

## 2023-02-18 PROCEDURE — 25010000002 PIPERACILLIN SOD-TAZOBACTAM PER 1 G: Performed by: INTERNAL MEDICINE

## 2023-02-18 PROCEDURE — 94664 DEMO&/EVAL PT USE INHALER: CPT

## 2023-02-18 RX ADMIN — Medication 10 ML: at 08:34

## 2023-02-18 RX ADMIN — PANTOPRAZOLE SODIUM 40 MG: 40 TABLET, DELAYED RELEASE ORAL at 08:31

## 2023-02-18 RX ADMIN — DOXYCYCLINE 100 MG: 100 CAPSULE ORAL at 08:31

## 2023-02-18 RX ADMIN — METOPROLOL SUCCINATE 25 MG: 25 TABLET, EXTENDED RELEASE ORAL at 08:31

## 2023-02-18 RX ADMIN — SPIRONOLACTONE 25 MG: 25 TABLET ORAL at 08:30

## 2023-02-18 RX ADMIN — Medication 250 MG: at 08:31

## 2023-02-18 RX ADMIN — LIDOCAINE 2 PATCH: 700 PATCH TOPICAL at 08:31

## 2023-02-18 RX ADMIN — IPRATROPIUM BROMIDE AND ALBUTEROL SULFATE 3 ML: 2.5; .5 SOLUTION RESPIRATORY (INHALATION) at 08:50

## 2023-02-18 RX ADMIN — ASPIRIN 81 MG: 81 TABLET, COATED ORAL at 08:31

## 2023-02-18 RX ADMIN — IPRATROPIUM BROMIDE AND ALBUTEROL SULFATE 3 ML: 2.5; .5 SOLUTION RESPIRATORY (INHALATION) at 13:34

## 2023-02-18 RX ADMIN — TAZOBACTAM SODIUM AND PIPERACILLIN SODIUM 3.38 G: 375; 3 INJECTION, SOLUTION INTRAVENOUS at 05:13

## 2023-02-18 RX ADMIN — BUDESONIDE AND FORMOTEROL FUMARATE DIHYDRATE 2 PUFF: 160; 4.5 AEROSOL RESPIRATORY (INHALATION) at 08:52

## 2023-02-18 RX ADMIN — TRAMADOL HYDROCHLORIDE 50 MG: 50 TABLET, COATED ORAL at 13:05

## 2023-02-18 RX ADMIN — OXCARBAZEPINE 600 MG: 300 TABLET, FILM COATED ORAL at 08:31

## 2023-02-18 RX ADMIN — DONEPEZIL HYDROCHLORIDE 5 MG: 5 TABLET, FILM COATED ORAL at 08:31

## 2023-02-18 RX ADMIN — LEVOTHYROXINE SODIUM 25 MCG: 25 TABLET ORAL at 05:13

## 2023-02-18 RX ADMIN — PREGABALIN 25 MG: 25 CAPSULE ORAL at 08:31

## 2023-02-18 NOTE — DISCHARGE PLACEMENT REQUEST
"Kulwant Zapataine BARBARA (87 y.o. Female)     Date of Birth   1935    Social Security Number       Address   [address] [place] [id number]    Home Phone   [phone]    MRN   [id number]       [employer]   Worship    Marital Status   Single                            Admission Date   23    Admission Type   Emergency    Admitting Provider   Jony Gilbert MD    Attending Provider   Jony Gilbert MD    Department, Room/Bed   [facility] 5G, S560/1       Discharge Date       Discharge Disposition   Home or Self Care    Discharge Destination                               Attending Provider: Jony Gilbert MD    Allergies: Bee Pollen, Lorazepam    Isolation: None   Infection: None   Code Status: No CPR    Ht: 157.5 cm (62\")   Wt: 47.6 kg (105 lb)    Admission Cmt: None   Principal Problem: Pneumonia of both upper lobes due to infectious organism [J18.9]                 Active Insurance as of 2023     Primary Coverage     Payor Plan Insurance Group Employer/Plan Group    HUMANA MEDICARE REPLACEMENT HUMANA MEDICARE REPLACEMENT [id number]     Payor Plan Address Payor Plan Phone Number Payor Plan Fax Number Effective Dates    [address] [address] [phone]  2018 - None Entered    [facility] [id number]       Subscriber Name Subscriber Birth Date Member ID       MITZI ZAPATA 1935 [id number]                 Emergency Contacts      (Rel.) Home Phone Work Phone Mobile Phone    CHELE ZAPATA (Power of ) [phone] -- [phone]    ALICIA ZAPATA (Relative) [phone] -- [phone]               Discharge Summary      Jony Gilbert MD at 23 1147              [facility]  DISCHARGE SUMMARY    Patient Name: Mitzi Zapata  : 1935  MRN: [id number]    Date of Admission: 2023 12:20 PM  Date of Discharge:  23  Primary Care Physician: System, Provider Not In    Consults     No orders found " from 1/14/2023 to 2/13/2023.          Hospital Course     Presenting Problem:   Pneumonia of both upper lobes due to infectious organism [J18.9]    Active Hospital Problems    Diagnosis  POA   • **Pneumonia of both upper lobes due to infectious organism [J18.9]  Yes   • Essential tremor [G25.0]  Yes   • chronic CHF (congestive heart failure) (HCC) [I50.9]  Yes   • Dementia without behavioral disturbance (HCC) [F03.90]  Yes   • Severe malnutrition (CMS/HCC) [E43]  Yes   • Lung mass [R91.8]  Yes   • Hypothyroidism (acquired) [E03.9]  Yes   • Essential hypertension [I10]  Yes      Resolved Hospital Problems   No resolved problems to display.          Hospital Course:  Mitzi Eric is a 87 y.o. female  with history of CKD, COPD with known right upper lobe mass, paroxysmal A-fib, dementia who was admitted for left shoulder pain after fall.  CT chest concerning for new area of consolidation with possible necrosis versus mass.  She already has known right upper lobe mass and family previously had not wanted work-up for diagnosis.  Pro-Abimael noted to be normal on admission     Neck and left shoulder pain s/p fall  -Left shoulder x-ray is unremarkable  -treated with antiinflammatory pain medication. Much improved  -CT of cervical spine shows degenerative disease at all levels but these are chronic.  -I talked to patient's nephew in detail about this finding.     Chronic respiratory failure with hypoxia  Suspected bilateral upper lobe pneumonia  History of COPD  -Chest x-ray reveals bilateral upper lobe pneumonia, patient is without any cough, fevers or chills, or leukocytosis  -CT chest concerning for necrotizing pneumonia versus possible mass in left upper lobe.  Discussed about this with the nephew and they do not want any aggressive work-up.  -Patient received IV antibiotic treatment including Zosyn and doxycycline.  Recommend outpatient follow-up in 3 to 4 weeks with repeat chest CT for resolution     Combined systolic  and diastolic heart failure  Moderate pulm hypertension  -Currently seems compensated     Paroxysmal atrial fibrillation  -Rates are controlled, continue metoprolol       CKD stage III  -Renal function stable     Hypothyroidism  -Continue Synthroid     Trigeminal neuralgia  -Continue Trileptal and Lyrica     Hypertension  -Continue home medication     Dementia  -Continue Aricept      Discharge Follow Up Recommendations for outpatient labs/diagnostics:  We recommend follow-up CT scan of the chest in 3 to 4 weeks.  This is to ensure resolution of any infectious process in the lung.    Day of Discharge     HPI:   Resting in bed in no acute distress and tells me that she is comfortable.  Patient's nephew is also present at the bedside.  Patient does not have any specific complaint today and tells me that her neck feels fine.  No chest pain or shortness of breath.    Review of Systems  As above    Vital Signs:   Temp:  [97.5 °F (36.4 °C)-99.1 °F (37.3 °C)] 98.2 °F (36.8 °C)  Heart Rate:  [79-95] 93  Resp:  [16-18] 18  BP: (127-160)/(61-93) 157/85  Flow (L/min):  [1-2] 2      Physical Exam:  Constitutional: No acute distress, looks comfortable  HENT: NCAT, mucous membranes moist  Respiratory: Harsh breath sounds on the left side, respiratory effort normal   Cardiovascular: RRR, no murmurs, rubs, or gallops  Gastrointestinal: Bowel sounds are present, soft, nontender, nondistended  Musculoskeletal: No edema of lower extremities.  Very low muscle mass  Psychiatric: Appropriate affect, cooperative  Neurologic: Awake, alert, follows commands, speech clear  Skin: No rashes    Pertinent  and/or Most Recent Results     LAB RESULTS:      Lab 02/16/23  0442 02/15/23  0409 02/14/23  0331 02/13/23  0430 02/12/23  1358   WBC 9.08 9.97 9.35 10.16 12.35*   HEMOGLOBIN 10.2* 10.3* 9.8* 11.3* 11.1*   HEMATOCRIT 30.8* 30.9* 29.7* 34.8 34.2   PLATELETS 213 204 184 194 223   NEUTROS ABS 6.71 7.71* 7.03* 7.93* 10.00*   IMMATURE GRANS (ABS)  0.08* 0.05 0.05 0.08* 0.11*   LYMPHS ABS 0.73 0.75 0.84 0.68* 0.69*   MONOS ABS 1.33* 1.26* 1.18* 1.28* 1.43*   EOS ABS 0.17 0.16 0.22 0.14 0.07   MCV 95.7 96.0 97.1* 100.0* 99.4*   PROCALCITONIN  --   --   --   --  0.07   LACTATE  --   --   --   --  1.4         Lab 02/16/23  1557 02/16/23  0442 02/15/23  0409 02/14/23  0331 02/12/23  1358   SODIUM  --  138 136 135* 138   POTASSIUM 4.1 3.3* 3.7 3.7 4.7   CHLORIDE  --  104 101 101 102   CO2  --  24.0 26.0 24.0 27.0   ANION GAP  --  10.0 9.0 10.0 9.0   BUN  --  12 11 13 23   CREATININE  --  0.69 0.63 0.67 0.95   EGFR  --  84.6 86.5 85.2 58.5*   GLUCOSE  --  95 92 95 104*   CALCIUM  --  8.2* 8.3* 8.3* 8.9   PHOSPHORUS  --  3.0 2.9 3.0  --    TSH  --   --   --   --  1.250         Lab 02/16/23  0442 02/15/23  0409 02/14/23  0331 02/12/23  1358   TOTAL PROTEIN  --   --   --  6.4   ALBUMIN 2.8* 3.2* 3.0* 3.6   GLOBULIN  --   --   --  2.8   ALT (SGPT)  --   --   --  8   AST (SGOT)  --   --   --  23   BILIRUBIN  --   --   --  0.4   ALK PHOS  --   --   --  138*                     Brief Urine Lab Results  (Last result in the past 365 days)      Color   Clarity   Blood   Leuk Est   Nitrite   Protein   CREAT   Urine HCG        03/29/22 1155 Yellow   Clear   Negative   Negative   Negative   Negative               Microbiology Results (last 10 days)     Procedure Component Value - Date/Time    MRSA Screen, PCR (Inpatient) - Swab, Nares [[id number]]  (Normal) Collected: 02/13/23 5769    Lab Status: Final result Specimen: Swab from Nares Updated: 02/13/23 1721     MRSA PCR Negative    Narrative:      The negative predictive value of this diagnostic test is high and should only be used to consider de-escalating anti-MRSA therapy. A positive result may indicate colonization with MRSA and must be correlated clinically.  MRSA Negative    S. Pneumo Ag Urine or CSF - Urine, Urine, Clean Catch [[id number]]  (Normal) Collected: 02/13/23 0557    Lab Status: Final result Specimen: Urine, Clean  Catch Updated: 02/13/23 0926     Strep Pneumo Ag Negative    Legionella Antigen, Urine - Urine, Urine, Clean Catch [[id number]]  (Normal) Collected: 02/13/23 0557    Lab Status: Final result Specimen: Urine, Clean Catch Updated: 02/13/23 0926     LEGIONELLA ANTIGEN, URINE Negative    Blood Culture - Blood, Arm, Left [[id number]]  (Normal) Collected: 02/12/23 1400    Lab Status: Final result Specimen: Blood from Arm, Left Updated: 02/17/23 1430     Blood Culture No growth at 5 days    COVID PRE-OP / PRE-PROCEDURE SCREENING ORDER (NO ISOLATION) - Swab, Nasopharynx [[id number]]  (Normal) Collected: 02/12/23 1400    Lab Status: Final result Specimen: Swab from Nasopharynx Updated: 02/12/23 1528    Narrative:      The following orders were created for panel order COVID PRE-OP / PRE-PROCEDURE SCREENING ORDER (NO ISOLATION) - Swab, Nasopharynx.  Procedure                               Abnormality         Status                     ---------                               -----------         ------                     COVID-19, FLU A/B, RSV P...[[id number]]  Normal              Final result                 Please view results for these tests on the individual orders.    COVID-19, FLU A/B, RSV PCR - Swab, Nasopharynx [[id number]]  (Normal) Collected: 02/12/23 1400    Lab Status: Final result Specimen: Swab from Nasopharynx Updated: 02/12/23 1528     COVID19 Not Detected     Influenza A PCR Not Detected     Influenza B PCR Not Detected     RSV, PCR Not Detected    Narrative:      Fact sheet for providers: https://www.fda.gov/media/904656/download    Fact sheet for patients: https://www.fda.gov/media/203610/download    Test performed by PCR.    Blood Culture - Blood, Arm, Right [[id number]]  (Normal) Collected: 02/12/23 1350    Lab Status: Final result Specimen: Blood from Arm, Right Updated: 02/17/23 1430     Blood Culture No growth at 5 days          XR Shoulder 2+ View Left    Result Date: 2/12/2023  XR SHOULDER 2+ VW LEFT Date of  Exam: 2/12/2023 12:39 PM EST Indication: Trauma. Comparison: 10/21/2021 Findings: There is no acute fracture or dislocation. Acromioclavicular and glenohumeral joints appear intact. No erosions. Acromiohumeral and coracoclavicular distances are well-maintained. Mild to moderate acromioclavicular and glenohumeral osteoarthritic changes  are present. The adjacent lung and ribs appear intact.     Impression: No acute osseous abnormality. Mild to moderate acromial clavicular and glenohumeral osteoarthritic changes are present. Electronically Signed: Amy Kelly  2/12/2023 1:05 PM EST  Workstation ID: [id number]    CT Chest Without Contrast Diagnostic    Result Date: 2/12/2023  CT CHEST WO CONTRAST DIAGNOSTIC Date of Exam: 2/12/2023 3:57 PM EST Indication: PNA on cxr. Comparison: Radiograph 2/12/2023, CT 4/22/2022, 7/5/2018 Technique: Axial CT images were obtained of the chest without contrast administration.  Reconstructed coronal and sagittal images were also obtained. Automated exposure control and iterative construction methods were used. Findings: Hilum and Mediastinum: No pathologically enlarged lymph nodes.  Normal heart size.   No pericardial effusion.  Atherosclerotic calcifications are present including within the coronary arteries. Unremarkable thoracic aorta and pulmonary arteries. Lung Parenchyma and Pleura: Redilatation of significant consolidative changes present within the bilateral upper lobes. Lucency present within the areas of consolidation present on the left which may related to necrosis or related to emphysema. Area of involvement measures approximately 8.3 x 9.5 cm on the left. Masslike consolidation present within the right upper lobe measuring up to 4.7 x 4.2 cm (series 4 image 20). This previously measured up to 4.3 x 4.5 cm. No significant pleural effusion. No additional pulmonary nodule identified. Upper Abdomen: No acute process. Soft tissues: Unremarkable. Osseous structures: No aggressive  focal lytic or sclerotic osseous lesions.     Impression: 1. Significant consolidative changes present within the left upper lobe, new as compared to the previous study with areas of lucency which may be related to pulmonary necrosis versus significant emphysema surrounded by pneumonia. Underlying true nodule or mass cannot be excluded. Follow-up to resolution recommended. 2. Redemonstration of masslike consolidation present within the right upper lobe which appears similar as compared to CT from 4/22/2022 and remote study from 7/15/2018 consistent with a benign process. 3. Ancillary findings as described above. Electronically Signed: Amy Kelly  2/12/2023 4:12 PM EST  Workstation ID: [id number]    CT Cervical Spine Without Contrast    Result Date: 2/17/2023  CT CERVICAL SPINE WO CONTRAST Date of Exam: 2/17/2023 4:51 PM EST Indication: Neck pain. Comparison: CT of the cervical spine performed on 2/12/2023. Technique: Axial CT images were obtained of the cervical spine without contrast administration.  Reconstructed coronal and sagittal images were also obtained. Automated exposure control and iterative construction methods were used. Findings: There is a grade 1 anterior spondylolisthesis of C4 on C5 measuring 1 mm. There is grade 1 anterior spondylolisthesis of C5 on C6 measuring 2 mm. There is grade 1 anterior spondylolisthesis of C7 on T1 measuring 1-2 mm. The slight retrolisthesis of C3 on  C4 is less apparent on today's exam. There is disc space narrowing at C3-C4 and C6-C7 consistent with the degenerative disc disease. There is multilevel endplate spurring and endplate sclerosis, most prominent at C3-C4 and C6-C7 consistent with the degenerative spondylosis. There are bilateral facet arthritic changes. This appears to account for the abnormal subluxation. There is no acute cervical fracture. There are degenerative changes at the atlantoaxial articulation. There are varying degrees of neural foraminal narrowing  secondary to endplate spurring and facet arthritis. No significant spinal stenosis is felt to be present. There are atherosclerotic vascular calcifications at the carotid bifurcations bilaterally. The visualized upper lobes are abnormal. There is underlying emphysema. There is either a focal mass or area of pneumonia in the right upper lobe only partially included in the field-of-view. There is extensive pneumonia with some air-fluid levels in bulla located in the left upper lobe.     Impression: 1. Grade 1 anterior spondylolisthesis of C4 on C5, C5 on C6, and C7 on T1. 2. Extensive degenerative changes. 3. Varying degrees of neural foraminal narrowing secondary to endplate spurring and facet arthritis. 4. No acute cervical fracture. 5. Abnormal appearance of the visualized upper lobes as described above. Electronically Signed: Anthony Bowling  2/17/2023 8:41 PM EST  Workstation ID: [id number]    CT Cervical Spine Without Contrast    Result Date: 2/12/2023  CT CERVICAL SPINE WO CONTRAST Date of Exam: 2/12/2023 1:02 PM EST Indication: Fell 2 days ago, now upper neck pain. Comparison: CT 3/29/2022, 2/18/2018 Technique: Axial CT images were obtained of the cervical spine without contrast administration.  Reconstructed coronal and sagittal images were also obtained. Automated exposure control and iterative construction methods were used. Findings: No evidence of fracture or compression deformity. Craniocervical junction appears unremarkable. Prevertebral soft tissues appear unremarkable. No significant spondylolisthesis identified. Mild hypertrophic changes are seen about the dens. Nutrient foramen are present within the C2 vertebral body which appear unchanged as compared to the previous study. Moderate multilevel degenerative changes are present throughout the spine. There is trace retrolisthesis of C3 on C4 measuring approximately 1 to 2  mm, anterolisthesis of C4 and C5 measuring approximately 1 to 2 mm and anterolisthesis  of C5 on C6 measuring approximately 2 mm. Moderate multilevel degenerative changes are present throughout the spine with multilevel facet disease. There is no evidence of significant bony canal stenosis. Multilevel disc osteophyte complexes are present with varying degrees of mild to moderate neuroforaminal stenosis bilaterally. No evidence of severe stenosis at any level. Significant airspace disease present within the bilateral upper lobes which appears consolidative and necrotic, new as compared to the previous study.     Impression: 1. No acute osseous abnormality. 2. Moderate multilevel degenerative changes present throughout the spine, similar as compared to the previous study. 3. Significant airspace disease present within the bilateral upper lobes, new as compared to the previous study consistent with pneumonia. A necrotizing pneumonia particularly given the appearance on the left cannot be excluded. Follow-up to resolution recommended given the masslike appearance. Electronically Signed: Amy Kelly  2/12/2023 1:21 PM EST  Workstation ID: [id number]    XR Chest 1 View    Result Date: 2/12/2023  XR CHEST 1 VW Date of Exam: 2/12/2023 1:39 PM EST Indication: Pneumonia seen on CT. Comparison: 4/22/2022, 3/25/2022 Findings: Patchy airspace disease is seen within the bilateral upper lobes. Surrounding scarring is present. Remaining portions of the lungs are clear. No significant pleural effusion. The heart appears normal in size. No acute osseous abnormality identified.     Impression: Bilateral upper lobe pneumonia. Follow-up to resolution recommended given the nodular and masslike appearance. Electronically Signed: Amy Kelly  2/12/2023 2:10 PM EST  Workstation ID: [id number]    XR Chest PA & Lateral    Result Date: 2/17/2023  XR CHEST PA AND LATERAL Date of Exam: 2/17/2023 12:21 PM EST Indication: pneumonia. Comparison: 2/12/2023 Findings: Cardiac size is stable. There continue to be infiltrates in both upper  lobes. On the left, there is evidence of cavitation within the infiltrates. The right-sided pulmonary infiltrate has not changed. The lung bases are clear.     Impression: Continued pulmonary infiltrates in both upper lobes with evidence of cavitation on the left. This is unchanged from the previous films. Electronically Signed: Tyshawn Ingram  2/17/2023 1:25 PM EST  Workstation ID: [id number]              Results for orders placed during the hospital encounter of 03/17/22    Adult Transthoracic Echo Complete W/ Cont if Necessary Per Protocol    Interpretation Summary  · Left ventricular ejection fraction appears to be 46 - 50%. Left ventricular systolic function is mildly decreased.  · Left ventricular diastolic function is consistent with (grade I) impaired relaxation.  · Mild to moderate mitral regurgitation.  · Mild to moderate tricuspid regurgitation, RVSP 54 mmHg.          Discharge Details        Discharge Medications      Changes to Medications      Instructions Start Date   torsemide 20 MG tablet  Commonly known as: DEMADEX  What changed: Another medication with the same name was changed. Make sure you understand how and when to take each.   20 mg, Oral, Daily      torsemide 5 MG tablet  Commonly known as: DEMADEX  What changed:   · how much to take  · when to take this  · reasons to take this  · additional instructions   10 mg, Oral, Daily, Take additional tablet as needed for 3lb weight gain in 24 hours or 5lbs in a week or increased shortness of breath         Continue These Medications      Instructions Start Date   acetaminophen 325 MG tablet  Commonly known as: TYLENOL   650 mg, Oral, 3 Times Daily      albuterol sulfate  (90 Base) MCG/ACT inhaler  Commonly known as: PROVENTIL HFA;VENTOLIN HFA;PROAIR HFA   2 puffs, Inhalation, Every 4 Hours PRN      aspirin 81 MG EC tablet   81 mg, Oral, Daily      Biofreeze 4 % gel  Generic drug: Menthol (Topical Analgesic)   1 application, Apply externally, Every  Early Morning, Apply to lower back      budesonide-formoterol 80-4.5 MCG/ACT inhaler  Commonly known as: SYMBICORT   2 puffs, Inhalation, 2 Times Daily - RT      docusate sodium 100 MG capsule   100 mg, Oral, 2 Times Daily      donepezil 5 MG tablet  Commonly known as: Aricept   5 mg, Oral, Every Morning      ENSURE PO   240 mL, Oral, 2 Times Daily, MIX 8OZ OF ENSURE, 1/4 SCOOP PROTEIN POWDER AND 2OZ OF MILK FOR A MILKSHAKE       folic acid 400 MCG tablet  Commonly known as: FOLVITE   400 mcg, Oral, 2 Times Daily      levothyroxine 25 MCG tablet  Commonly known as: SYNTHROID, LEVOTHROID   25 mcg, Oral, Every Early Morning      Loratadine 10 MG capsule   Oral      meclizine 25 MG chewable tablet chewable tablet   25 mg, Oral, 3 Times Daily PRN      metoprolol succinate XL 25 MG 24 hr tablet  Commonly known as: TOPROL-XL   25 mg, Oral, Daily      O2  Commonly known as: OXYGEN   2 L, Inhalation, Nightly      OXcarbazepine 600 MG tablet  Commonly known as: TRILEPTAL   600 mg, Oral, 2 Times Daily      pantoprazole 20 MG EC tablet  Commonly known as: PROTONIX   20 mg, Oral, Daily      potassium chloride 10 MEQ CR tablet   10 mEq, Oral, Daily, Give once daily and extra with extra torsemide      pregabalin 25 MG capsule  Commonly known as: LYRICA   25 mg, Oral, 2 Times Daily      spironolactone 25 MG tablet  Commonly known as: ALDACTONE   25 mg, Oral, Daily      traMADol 50 MG tablet  Commonly known as: ULTRAM   50 mg, Oral, Every 6 Hours PRN         Stop These Medications    potassium chloride 10 MEQ CR tablet  Commonly known as: K-DUR,KLOR-CON     TRAZODONE HCL PO            Allergies   Allergen Reactions   • Bee Pollen Unknown - Low Severity   • Lorazepam Delirium         Discharge Disposition:  Home or Self Care    Diet:  Hospital:  Diet Order   Procedures   • Diet: Cardiac Diets, Diabetic Diets; Healthy Heart (2-3 Na+); Consistent Carbohydrate; Texture: Regular Texture (IDDSI 7); Fluid Consistency: Thin (IDDSI 0)        Activity:  Activity Instructions     Activity as Tolerated                   CODE STATUS:    Code Status and Medical Interventions:   Ordered at: 02/12/23 1837     Medical Intervention Limits:    NO intubation (DNI)     Level Of Support Discussed With:    Health Care Surrogate     Code Status (Patient has no pulse and is not breathing):    No CPR (Do Not Attempt to Resuscitate)     Medical Interventions (Patient has pulse or is breathing):    Limited Support     Release to patient:    Routine Release       Future Appointments   Date Time Provider Department Center   3/14/2023  1:00 PM Lauren Pina APRN [facility]   8/2/2023  8:00 AM Ann-Marie Goodwin APRN [facility]       Additional Instructions for the Follow-ups that You Need to Schedule     Ambulatory Referral to Physical Therapy Evaluate and treat   As directed      Specialty needed: Evaluate and treat    Follow-up needed: Yes         Discharge Follow-up with PCP   As directed       Currently Documented PCP:    System, Provider Not In    PCP Phone Number:    None     Follow Up Details: within one week                     Jony Gilbert MD  02/18/23      Time Spent on Discharge:  I spent  35  minutes on this discharge activity which included: face-to-face encounter with the patient, reviewing the data in the system, coordination of the care with the nursing staff as well as consultants, documentation, and entering orders.          Electronically signed by Jony Gilbert MD at 02/18/23 6936

## 2023-02-18 NOTE — DISCHARGE SUMMARY
[facility] Medicine Services  DISCHARGE SUMMARY    Patient Name: Mitzi Eric  : 1935  MRN: [id number]    Date of Admission: 2023 12:20 PM  Date of Discharge:  23  Primary Care Physician: System, Provider Not In    Consults     No orders found from 2023 to 2023.          Hospital Course     Presenting Problem:   Pneumonia of both upper lobes due to infectious organism [J18.9]    Active Hospital Problems    Diagnosis  POA   • **Pneumonia of both upper lobes due to infectious organism [J18.9]  Yes   • Essential tremor [G25.0]  Yes   • chronic CHF (congestive heart failure) (HCC) [I50.9]  Yes   • Dementia without behavioral disturbance (HCC) [F03.90]  Yes   • Severe malnutrition (CMS/HCC) [E43]  Yes   • Lung mass [R91.8]  Yes   • Hypothyroidism (acquired) [E03.9]  Yes   • Essential hypertension [I10]  Yes      Resolved Hospital Problems   No resolved problems to display.          Hospital Course:  Mitzi Eric is a 87 y.o. female  with history of CKD, COPD with known right upper lobe mass, paroxysmal A-fib, dementia who was admitted for left shoulder pain after fall.  CT chest concerning for new area of consolidation with possible necrosis versus mass.  She already has known right upper lobe mass and family previously had not wanted work-up for diagnosis.  Pro-Abimael noted to be normal on admission     Neck and left shoulder pain s/p fall  -Left shoulder x-ray is unremarkable  -treated with antiinflammatory pain medication. Much improved  -CT of cervical spine shows degenerative disease at all levels but these are chronic.  -I talked to patient's nephew in detail about this finding.     Chronic respiratory failure with hypoxia  Suspected bilateral upper lobe pneumonia  History of COPD  -Chest x-ray reveals bilateral upper lobe pneumonia, patient is without any cough, fevers or chills, or leukocytosis  -CT chest concerning for necrotizing pneumonia versus possible  mass in left upper lobe.  Discussed about this with the nephew and they do not want any aggressive work-up.  -Patient received IV antibiotic treatment including Zosyn and doxycycline.  Recommend outpatient follow-up in 3 to 4 weeks with repeat chest CT for resolution     Combined systolic and diastolic heart failure  Moderate pulm hypertension  -Currently seems compensated     Paroxysmal atrial fibrillation  -Rates are controlled, continue metoprolol       CKD stage III  -Renal function stable     Hypothyroidism  -Continue Synthroid     Trigeminal neuralgia  -Continue Trileptal and Lyrica     Hypertension  -Continue home medication     Dementia  -Continue Aricept      Discharge Follow Up Recommendations for outpatient labs/diagnostics:  We recommend follow-up CT scan of the chest in 3 to 4 weeks.  This is to ensure resolution of any infectious process in the lung.    Day of Discharge     HPI:   Resting in bed in no acute distress and tells me that she is comfortable.  Patient's nephew is also present at the bedside.  Patient does not have any specific complaint today and tells me that her neck feels fine.  No chest pain or shortness of breath.    Review of Systems  As above    Vital Signs:   Temp:  [97.5 °F (36.4 °C)-99.1 °F (37.3 °C)] 98.2 °F (36.8 °C)  Heart Rate:  [79-95] 93  Resp:  [16-18] 18  BP: (127-160)/(61-93) 157/85  Flow (L/min):  [1-2] 2      Physical Exam:  Constitutional: No acute distress, looks comfortable  HENT: NCAT, mucous membranes moist  Respiratory: Harsh breath sounds on the left side, respiratory effort normal   Cardiovascular: RRR, no murmurs, rubs, or gallops  Gastrointestinal: Bowel sounds are present, soft, nontender, nondistended  Musculoskeletal: No edema of lower extremities.  Very low muscle mass  Psychiatric: Appropriate affect, cooperative  Neurologic: Awake, alert, follows commands, speech clear  Skin: No rashes    Pertinent  and/or Most Recent Results     LAB RESULTS:      Lab  02/16/23  0442 02/15/23  0409 02/14/23  0331 02/13/23  0430 02/12/23  1358   WBC 9.08 9.97 9.35 10.16 12.35*   HEMOGLOBIN 10.2* 10.3* 9.8* 11.3* 11.1*   HEMATOCRIT 30.8* 30.9* 29.7* 34.8 34.2   PLATELETS 213 204 184 194 223   NEUTROS ABS 6.71 7.71* 7.03* 7.93* 10.00*   IMMATURE GRANS (ABS) 0.08* 0.05 0.05 0.08* 0.11*   LYMPHS ABS 0.73 0.75 0.84 0.68* 0.69*   MONOS ABS 1.33* 1.26* 1.18* 1.28* 1.43*   EOS ABS 0.17 0.16 0.22 0.14 0.07   MCV 95.7 96.0 97.1* 100.0* 99.4*   PROCALCITONIN  --   --   --   --  0.07   LACTATE  --   --   --   --  1.4         Lab 02/16/23  1557 02/16/23  0442 02/15/23  0409 02/14/23  0331 02/12/23  1358   SODIUM  --  138 136 135* 138   POTASSIUM 4.1 3.3* 3.7 3.7 4.7   CHLORIDE  --  104 101 101 102   CO2  --  24.0 26.0 24.0 27.0   ANION GAP  --  10.0 9.0 10.0 9.0   BUN  --  12 11 13 23   CREATININE  --  0.69 0.63 0.67 0.95   EGFR  --  84.6 86.5 85.2 58.5*   GLUCOSE  --  95 92 95 104*   CALCIUM  --  8.2* 8.3* 8.3* 8.9   PHOSPHORUS  --  3.0 2.9 3.0  --    TSH  --   --   --   --  1.250         Lab 02/16/23  0442 02/15/23  0409 02/14/23  0331 02/12/23  1358   TOTAL PROTEIN  --   --   --  6.4   ALBUMIN 2.8* 3.2* 3.0* 3.6   GLOBULIN  --   --   --  2.8   ALT (SGPT)  --   --   --  8   AST (SGOT)  --   --   --  23   BILIRUBIN  --   --   --  0.4   ALK PHOS  --   --   --  138*                     Brief Urine Lab Results  (Last result in the past 365 days)      Color   Clarity   Blood   Leuk Est   Nitrite   Protein   CREAT   Urine HCG        03/29/22 1155 Yellow   Clear   Negative   Negative   Negative   Negative               Microbiology Results (last 10 days)     Procedure Component Value - Date/Time    MRSA Screen, PCR (Inpatient) - Swab, Nares [[id number]]  (Normal) Collected: 02/13/23 1519    Lab Status: Final result Specimen: Swab from Nares Updated: 02/13/23 1721     MRSA PCR Negative    Narrative:      The negative predictive value of this diagnostic test is high and should only be used to consider  de-escalating anti-MRSA therapy. A positive result may indicate colonization with MRSA and must be correlated clinically.  MRSA Negative    S. Pneumo Ag Urine or CSF - Urine, Urine, Clean Catch [[id number]]  (Normal) Collected: 02/13/23 0557    Lab Status: Final result Specimen: Urine, Clean Catch Updated: 02/13/23 0926     Strep Pneumo Ag Negative    Legionella Antigen, Urine - Urine, Urine, Clean Catch [[id number]]  (Normal) Collected: 02/13/23 0557    Lab Status: Final result Specimen: Urine, Clean Catch Updated: 02/13/23 0926     LEGIONELLA ANTIGEN, URINE Negative    Blood Culture - Blood, Arm, Left [[id number]]  (Normal) Collected: 02/12/23 1400    Lab Status: Final result Specimen: Blood from Arm, Left Updated: 02/17/23 1430     Blood Culture No growth at 5 days    COVID PRE-OP / PRE-PROCEDURE SCREENING ORDER (NO ISOLATION) - Swab, Nasopharynx [[id number]]  (Normal) Collected: 02/12/23 1400    Lab Status: Final result Specimen: Swab from Nasopharynx Updated: 02/12/23 1528    Narrative:      The following orders were created for panel order COVID PRE-OP / PRE-PROCEDURE SCREENING ORDER (NO ISOLATION) - Swab, Nasopharynx.  Procedure                               Abnormality         Status                     ---------                               -----------         ------                     COVID-19, FLU A/B, RSV P...[[id number]]  Normal              Final result                 Please view results for these tests on the individual orders.    COVID-19, FLU A/B, RSV PCR - Swab, Nasopharynx [[id number]]  (Normal) Collected: 02/12/23 1400    Lab Status: Final result Specimen: Swab from Nasopharynx Updated: 02/12/23 1528     COVID19 Not Detected     Influenza A PCR Not Detected     Influenza B PCR Not Detected     RSV, PCR Not Detected    Narrative:      Fact sheet for providers: https://www.fda.gov/media/144371/download    Fact sheet for patients: https://www.fda.gov/media/948266/download    Test performed by PCR.     Blood Culture - Blood, Arm, Right [[id number]]  (Normal) Collected: 02/12/23 1350    Lab Status: Final result Specimen: Blood from Arm, Right Updated: 02/17/23 1430     Blood Culture No growth at 5 days          XR Shoulder 2+ View Left    Result Date: 2/12/2023  XR SHOULDER 2+ VW LEFT Date of Exam: 2/12/2023 12:39 PM EST Indication: Trauma. Comparison: 10/21/2021 Findings: There is no acute fracture or dislocation. Acromioclavicular and glenohumeral joints appear intact. No erosions. Acromiohumeral and coracoclavicular distances are well-maintained. Mild to moderate acromioclavicular and glenohumeral osteoarthritic changes  are present. The adjacent lung and ribs appear intact.     Impression: No acute osseous abnormality. Mild to moderate acromial clavicular and glenohumeral osteoarthritic changes are present. Electronically Signed: Amy Kelly  2/12/2023 1:05 PM EST  Workstation ID: [id number]    CT Chest Without Contrast Diagnostic    Result Date: 2/12/2023  CT CHEST WO CONTRAST DIAGNOSTIC Date of Exam: 2/12/2023 3:57 PM EST Indication: PNA on cxr. Comparison: Radiograph 2/12/2023, CT 4/22/2022, 7/5/2018 Technique: Axial CT images were obtained of the chest without contrast administration.  Reconstructed coronal and sagittal images were also obtained. Automated exposure control and iterative construction methods were used. Findings: Hilum and Mediastinum: No pathologically enlarged lymph nodes.  Normal heart size.   No pericardial effusion.  Atherosclerotic calcifications are present including within the coronary arteries. Unremarkable thoracic aorta and pulmonary arteries. Lung Parenchyma and Pleura: Redilatation of significant consolidative changes present within the bilateral upper lobes. Lucency present within the areas of consolidation present on the left which may related to necrosis or related to emphysema. Area of involvement measures approximately 8.3 x 9.5 cm on the left. Masslike consolidation present  within the right upper lobe measuring up to 4.7 x 4.2 cm (series 4 image 20). This previously measured up to 4.3 x 4.5 cm. No significant pleural effusion. No additional pulmonary nodule identified. Upper Abdomen: No acute process. Soft tissues: Unremarkable. Osseous structures: No aggressive focal lytic or sclerotic osseous lesions.     Impression: 1. Significant consolidative changes present within the left upper lobe, new as compared to the previous study with areas of lucency which may be related to pulmonary necrosis versus significant emphysema surrounded by pneumonia. Underlying true nodule or mass cannot be excluded. Follow-up to resolution recommended. 2. Redemonstration of masslike consolidation present within the right upper lobe which appears similar as compared to CT from 4/22/2022 and remote study from 7/15/2018 consistent with a benign process. 3. Ancillary findings as described above. Electronically Signed: Amy Kelly  2/12/2023 4:12 PM EST  Workstation ID: [id number]    CT Cervical Spine Without Contrast    Result Date: 2/17/2023  CT CERVICAL SPINE WO CONTRAST Date of Exam: 2/17/2023 4:51 PM EST Indication: Neck pain. Comparison: CT of the cervical spine performed on 2/12/2023. Technique: Axial CT images were obtained of the cervical spine without contrast administration.  Reconstructed coronal and sagittal images were also obtained. Automated exposure control and iterative construction methods were used. Findings: There is a grade 1 anterior spondylolisthesis of C4 on C5 measuring 1 mm. There is grade 1 anterior spondylolisthesis of C5 on C6 measuring 2 mm. There is grade 1 anterior spondylolisthesis of C7 on T1 measuring 1-2 mm. The slight retrolisthesis of C3 on  C4 is less apparent on today's exam. There is disc space narrowing at C3-C4 and C6-C7 consistent with the degenerative disc disease. There is multilevel endplate spurring and endplate sclerosis, most prominent at C3-C4 and C6-C7  consistent with the degenerative spondylosis. There are bilateral facet arthritic changes. This appears to account for the abnormal subluxation. There is no acute cervical fracture. There are degenerative changes at the atlantoaxial articulation. There are varying degrees of neural foraminal narrowing secondary to endplate spurring and facet arthritis. No significant spinal stenosis is felt to be present. There are atherosclerotic vascular calcifications at the carotid bifurcations bilaterally. The visualized upper lobes are abnormal. There is underlying emphysema. There is either a focal mass or area of pneumonia in the right upper lobe only partially included in the field-of-view. There is extensive pneumonia with some air-fluid levels in bulla located in the left upper lobe.     Impression: 1. Grade 1 anterior spondylolisthesis of C4 on C5, C5 on C6, and C7 on T1. 2. Extensive degenerative changes. 3. Varying degrees of neural foraminal narrowing secondary to endplate spurring and facet arthritis. 4. No acute cervical fracture. 5. Abnormal appearance of the visualized upper lobes as described above. Electronically Signed: Anthony Bowling  2/17/2023 8:41 PM EST  Workstation ID: [id number]    CT Cervical Spine Without Contrast    Result Date: 2/12/2023  CT CERVICAL SPINE WO CONTRAST Date of Exam: 2/12/2023 1:02 PM EST Indication: Fell 2 days ago, now upper neck pain. Comparison: CT 3/29/2022, 2/18/2018 Technique: Axial CT images were obtained of the cervical spine without contrast administration.  Reconstructed coronal and sagittal images were also obtained. Automated exposure control and iterative construction methods were used. Findings: No evidence of fracture or compression deformity. Craniocervical junction appears unremarkable. Prevertebral soft tissues appear unremarkable. No significant spondylolisthesis identified. Mild hypertrophic changes are seen about the dens. Nutrient foramen are present within the C2  vertebral body which appear unchanged as compared to the previous study. Moderate multilevel degenerative changes are present throughout the spine. There is trace retrolisthesis of C3 on C4 measuring approximately 1 to 2  mm, anterolisthesis of C4 and C5 measuring approximately 1 to 2 mm and anterolisthesis of C5 on C6 measuring approximately 2 mm. Moderate multilevel degenerative changes are present throughout the spine with multilevel facet disease. There is no evidence of significant bony canal stenosis. Multilevel disc osteophyte complexes are present with varying degrees of mild to moderate neuroforaminal stenosis bilaterally. No evidence of severe stenosis at any level. Significant airspace disease present within the bilateral upper lobes which appears consolidative and necrotic, new as compared to the previous study.     Impression: 1. No acute osseous abnormality. 2. Moderate multilevel degenerative changes present throughout the spine, similar as compared to the previous study. 3. Significant airspace disease present within the bilateral upper lobes, new as compared to the previous study consistent with pneumonia. A necrotizing pneumonia particularly given the appearance on the left cannot be excluded. Follow-up to resolution recommended given the masslike appearance. Electronically Signed: Amy Kelly  2/12/2023 1:21 PM EST  Workstation ID: [id number]    XR Chest 1 View    Result Date: 2/12/2023  XR CHEST 1 VW Date of Exam: 2/12/2023 1:39 PM EST Indication: Pneumonia seen on CT. Comparison: 4/22/2022, 3/25/2022 Findings: Patchy airspace disease is seen within the bilateral upper lobes. Surrounding scarring is present. Remaining portions of the lungs are clear. No significant pleural effusion. The heart appears normal in size. No acute osseous abnormality identified.     Impression: Bilateral upper lobe pneumonia. Follow-up to resolution recommended given the nodular and masslike appearance. Electronically  Signed: Amy Kelly  2/12/2023 2:10 PM EST  Workstation ID: [id number]    XR Chest PA & Lateral    Result Date: 2/17/2023  XR CHEST PA AND LATERAL Date of Exam: 2/17/2023 12:21 PM EST Indication: pneumonia. Comparison: 2/12/2023 Findings: Cardiac size is stable. There continue to be infiltrates in both upper lobes. On the left, there is evidence of cavitation within the infiltrates. The right-sided pulmonary infiltrate has not changed. The lung bases are clear.     Impression: Continued pulmonary infiltrates in both upper lobes with evidence of cavitation on the left. This is unchanged from the previous films. Electronically Signed: Tyshawn ALLEN Juan Jose  2/17/2023 1:25 PM EST  Workstation ID: [id number]              Results for orders placed during the hospital encounter of 03/17/22    Adult Transthoracic Echo Complete W/ Cont if Necessary Per Protocol    Interpretation Summary  · Left ventricular ejection fraction appears to be 46 - 50%. Left ventricular systolic function is mildly decreased.  · Left ventricular diastolic function is consistent with (grade I) impaired relaxation.  · Mild to moderate mitral regurgitation.  · Mild to moderate tricuspid regurgitation, RVSP 54 mmHg.          Discharge Details        Discharge Medications      Changes to Medications      Instructions Start Date   torsemide 20 MG tablet  Commonly known as: DEMADEX  What changed: Another medication with the same name was changed. Make sure you understand how and when to take each.   20 mg, Oral, Daily      torsemide 5 MG tablet  Commonly known as: DEMADEX  What changed:   · how much to take  · when to take this  · reasons to take this  · additional instructions   10 mg, Oral, Daily, Take additional tablet as needed for 3lb weight gain in 24 hours or 5lbs in a week or increased shortness of breath         Continue These Medications      Instructions Start Date   acetaminophen 325 MG tablet  Commonly known as: TYLENOL   650 mg, Oral, 3 Times Daily       albuterol sulfate  (90 Base) MCG/ACT inhaler  Commonly known as: PROVENTIL HFA;VENTOLIN HFA;PROAIR HFA   2 puffs, Inhalation, Every 4 Hours PRN      aspirin 81 MG EC tablet   81 mg, Oral, Daily      Biofreeze 4 % gel  Generic drug: Menthol (Topical Analgesic)   1 application, Apply externally, Every Early Morning, Apply to lower back      budesonide-formoterol 80-4.5 MCG/ACT inhaler  Commonly known as: SYMBICORT   2 puffs, Inhalation, 2 Times Daily - RT      docusate sodium 100 MG capsule   100 mg, Oral, 2 Times Daily      donepezil 5 MG tablet  Commonly known as: Aricept   5 mg, Oral, Every Morning      ENSURE PO   240 mL, Oral, 2 Times Daily, MIX 8OZ OF ENSURE, 1/4 SCOOP PROTEIN POWDER AND 2OZ OF MILK FOR A MILKSHAKE       folic acid 400 MCG tablet  Commonly known as: FOLVITE   400 mcg, Oral, 2 Times Daily      levothyroxine 25 MCG tablet  Commonly known as: SYNTHROID, LEVOTHROID   25 mcg, Oral, Every Early Morning      Loratadine 10 MG capsule   Oral      meclizine 25 MG chewable tablet chewable tablet   25 mg, Oral, 3 Times Daily PRN      metoprolol succinate XL 25 MG 24 hr tablet  Commonly known as: TOPROL-XL   25 mg, Oral, Daily      O2  Commonly known as: OXYGEN   2 L, Inhalation, Nightly      OXcarbazepine 600 MG tablet  Commonly known as: TRILEPTAL   600 mg, Oral, 2 Times Daily      pantoprazole 20 MG EC tablet  Commonly known as: PROTONIX   20 mg, Oral, Daily      potassium chloride 10 MEQ CR tablet   10 mEq, Oral, Daily, Give once daily and extra with extra torsemide      pregabalin 25 MG capsule  Commonly known as: LYRICA   25 mg, Oral, 2 Times Daily      spironolactone 25 MG tablet  Commonly known as: ALDACTONE   25 mg, Oral, Daily      traMADol 50 MG tablet  Commonly known as: ULTRAM   50 mg, Oral, Every 6 Hours PRN         Stop These Medications    potassium chloride 10 MEQ CR tablet  Commonly known as: K-DUR,KLOR-CON     TRAZODONE HCL PO            Allergies   Allergen Reactions   • Bee  Pollen Unknown - Low Severity   • Lorazepam Delirium         Discharge Disposition:  Home or Self Care    Diet:  Hospital:  Diet Order   Procedures   • Diet: Cardiac Diets, Diabetic Diets; Healthy Heart (2-3 Na+); Consistent Carbohydrate; Texture: Regular Texture (IDDSI 7); Fluid Consistency: Thin (IDDSI 0)       Activity:  Activity Instructions     Activity as Tolerated                   CODE STATUS:    Code Status and Medical Interventions:   Ordered at: 02/12/23 1837     Medical Intervention Limits:    NO intubation (DNI)     Level Of Support Discussed With:    Health Care Surrogate     Code Status (Patient has no pulse and is not breathing):    No CPR (Do Not Attempt to Resuscitate)     Medical Interventions (Patient has pulse or is breathing):    Limited Support     Release to patient:    Routine Release       Future Appointments   Date Time Provider Department Center   3/14/2023  1:00 PM Lauren Pina APRN [facility]   8/2/2023  8:00 AM Ann-Marie Goodwin APRN [facility]       Additional Instructions for the Follow-ups that You Need to Schedule     Ambulatory Referral to Physical Therapy Evaluate and treat   As directed      Specialty needed: Evaluate and treat    Follow-up needed: Yes         Discharge Follow-up with PCP   As directed       Currently Documented PCP:    System, Provider Not In    PCP Phone Number:    None     Follow Up Details: within one week                     Jony Gilbert MD  02/18/23      Time Spent on Discharge:  I spent  35  minutes on this discharge activity which included: face-to-face encounter with the patient, reviewing the data in the system, coordination of the care with the nursing staff as well as consultants, documentation, and entering orders.

## 2023-02-19 NOTE — OUTREACH NOTE
Prep Survey    Flowsheet Row Responses   [facility] facility patient discharged from? [place]   Is LACE score < 7 ? No   Eligibility Readm Mgmt   Discharge diagnosis PNA   Does the patient have one of the following disease processes/diagnoses(primary or secondary)? Pneumonia   Does the patient have Home health ordered? No   Is there a DME ordered? No   Prep survey completed? Yes          VERO LOVETT - Registered Nurse

## 2023-02-22 ENCOUNTER — READMISSION MANAGEMENT (OUTPATIENT)
Dept: CALL CENTER | Facility: HOSPITAL | Age: 88
End: 2023-02-22
Payer: MEDICARE

## 2023-02-22 NOTE — OUTREACH NOTE
COPD/PN Week 1 Survey    Flowsheet Row Responses   [facility] facility patient discharged from? [facility]   Does the patient have one of the following disease processes/diagnoses(primary or secondary)? Pneumonia   Week 1 attempt successful? No   Unsuccessful attempts Attempt 1          Elinor JIMENEZ - Registered Nurse

## 2023-03-17 RX ORDER — DILTIAZEM HYDROCHLORIDE 60 MG/1
TABLET, FILM COATED ORAL
Qty: 10.2 G | Refills: 3 | Status: SHIPPED | OUTPATIENT
Start: 2023-03-17

## 2023-04-14 ENCOUNTER — APPOINTMENT (OUTPATIENT)
Dept: GENERAL RADIOLOGY | Facility: HOSPITAL | Age: 88
End: 2023-04-14
Payer: MEDICARE

## 2023-04-14 ENCOUNTER — APPOINTMENT (OUTPATIENT)
Dept: CT IMAGING | Facility: HOSPITAL | Age: 88
End: 2023-04-14
Payer: MEDICARE

## 2023-04-14 ENCOUNTER — HOSPITAL ENCOUNTER (INPATIENT)
Facility: HOSPITAL | Age: 88
LOS: 3 days | Discharge: HOME | End: 2023-04-17
Attending: EMERGENCY MEDICINE | Admitting: HOSPITALIST
Payer: MEDICARE

## 2023-04-14 DIAGNOSIS — J43.2 CENTRILOBULAR EMPHYSEMA: ICD-10-CM

## 2023-04-14 DIAGNOSIS — D72.829 LEUKOCYTOSIS, UNSPECIFIED TYPE: ICD-10-CM

## 2023-04-14 DIAGNOSIS — N17.9 ACUTE KIDNEY INJURY: ICD-10-CM

## 2023-04-14 DIAGNOSIS — A41.9 SEPSIS SECONDARY TO UTI: Primary | ICD-10-CM

## 2023-04-14 DIAGNOSIS — E86.0 DEHYDRATION: ICD-10-CM

## 2023-04-14 DIAGNOSIS — R11.2 NAUSEA VOMITING AND DIARRHEA: ICD-10-CM

## 2023-04-14 DIAGNOSIS — N39.0 SEPSIS SECONDARY TO UTI: Primary | ICD-10-CM

## 2023-04-14 DIAGNOSIS — R19.7 NAUSEA VOMITING AND DIARRHEA: ICD-10-CM

## 2023-04-14 DIAGNOSIS — Z86.59 HISTORY OF DEMENTIA: ICD-10-CM

## 2023-04-14 PROBLEM — A49.9 UTI (URINARY TRACT INFECTION), BACTERIAL: Status: ACTIVE | Noted: 2021-11-29

## 2023-04-14 LAB
ADV 40+41 DNA STL QL NAA+NON-PROBE: NOT DETECTED
ALBUMIN SERPL-MCNC: 4.4 G/DL (ref 3.5–5.2)
ALBUMIN/GLOB SERPL: 1.8 G/DL
ALP SERPL-CCNC: 113 U/L (ref 39–117)
ALT SERPL W P-5'-P-CCNC: 14 U/L (ref 1–33)
ANION GAP SERPL CALCULATED.3IONS-SCNC: 16 MMOL/L (ref 5–15)
AST SERPL-CCNC: 37 U/L (ref 1–32)
ASTRO TYP 1-8 RNA STL QL NAA+NON-PROBE: NOT DETECTED
BACTERIA UR QL AUTO: ABNORMAL /HPF
BASOPHILS # BLD AUTO: 0.04 10*3/MM3 (ref 0–0.2)
BASOPHILS NFR BLD AUTO: 0.2 % (ref 0–1.5)
BILIRUB SERPL-MCNC: 0.5 MG/DL (ref 0–1.2)
BILIRUB UR QL STRIP: NEGATIVE
BUN SERPL-MCNC: 39 MG/DL (ref 8–23)
BUN/CREAT SERPL: 25.3 (ref 7–25)
C CAYETANENSIS DNA STL QL NAA+NON-PROBE: NOT DETECTED
C COLI+JEJ+UPSA DNA STL QL NAA+NON-PROBE: NOT DETECTED
C DIFF TOX GENS STL QL NAA+PROBE: NOT DETECTED
CALCIUM SPEC-SCNC: 8.6 MG/DL (ref 8.6–10.5)
CHLORIDE SERPL-SCNC: 104 MMOL/L (ref 98–107)
CLARITY UR: ABNORMAL
CO2 SERPL-SCNC: 19 MMOL/L (ref 22–29)
COLOR UR: YELLOW
CREAT BLDA-MCNC: 1.7 MG/DL (ref 0.6–1.3)
CREAT SERPL-MCNC: 1.54 MG/DL (ref 0.57–1)
CRYPTOSP DNA STL QL NAA+NON-PROBE: NOT DETECTED
D-LACTATE SERPL-SCNC: 1.4 MMOL/L (ref 0.5–2)
DEPRECATED RDW RBC AUTO: 48.2 FL (ref 37–54)
E HISTOLYT DNA STL QL NAA+NON-PROBE: NOT DETECTED
EAEC PAA PLAS AGGR+AATA ST NAA+NON-PRB: NOT DETECTED
EC STX1+STX2 GENES STL QL NAA+NON-PROBE: NOT DETECTED
EGFRCR SERPLBLD CKD-EPI 2021: 32.7 ML/MIN/1.73
EOSINOPHIL # BLD AUTO: 0 10*3/MM3 (ref 0–0.4)
EOSINOPHIL NFR BLD AUTO: 0 % (ref 0.3–6.2)
EPEC EAE GENE STL QL NAA+NON-PROBE: DETECTED
ERYTHROCYTE [DISTWIDTH] IN BLOOD BY AUTOMATED COUNT: 12.9 % (ref 12.3–15.4)
ETEC LTA+ST1A+ST1B TOX ST NAA+NON-PROBE: NOT DETECTED
G LAMBLIA DNA STL QL NAA+NON-PROBE: NOT DETECTED
GLOBULIN UR ELPH-MCNC: 2.4 GM/DL
GLUCOSE SERPL-MCNC: 100 MG/DL (ref 65–99)
GLUCOSE UR STRIP-MCNC: NEGATIVE MG/DL
HCT VFR BLD AUTO: 39 % (ref 34–46.6)
HGB BLD-MCNC: 12.3 G/DL (ref 12–15.9)
HGB UR QL STRIP.AUTO: ABNORMAL
HYALINE CASTS UR QL AUTO: ABNORMAL /LPF
IMM GRANULOCYTES # BLD AUTO: 0.14 10*3/MM3 (ref 0–0.05)
IMM GRANULOCYTES NFR BLD AUTO: 0.7 % (ref 0–0.5)
INR PPP: 1.05 (ref 0.84–1.13)
KETONES UR QL STRIP: NEGATIVE
LEUKOCYTE ESTERASE UR QL STRIP.AUTO: ABNORMAL
LIPASE SERPL-CCNC: 60 U/L (ref 13–60)
LYMPHOCYTES # BLD AUTO: 1.21 10*3/MM3 (ref 0.7–3.1)
LYMPHOCYTES NFR BLD AUTO: 6.5 % (ref 19.6–45.3)
MAGNESIUM SERPL-MCNC: 2.3 MG/DL (ref 1.6–2.4)
MCH RBC QN AUTO: 31.9 PG (ref 26.6–33)
MCHC RBC AUTO-ENTMCNC: 31.5 G/DL (ref 31.5–35.7)
MCV RBC AUTO: 101 FL (ref 79–97)
MONOCYTES # BLD AUTO: 1.7 10*3/MM3 (ref 0.1–0.9)
MONOCYTES NFR BLD AUTO: 9.1 % (ref 5–12)
NEUTROPHILS NFR BLD AUTO: 15.6 10*3/MM3 (ref 1.7–7)
NEUTROPHILS NFR BLD AUTO: 83.5 % (ref 42.7–76)
NITRITE UR QL STRIP: POSITIVE
NOROVIRUS GI+II RNA STL QL NAA+NON-PROBE: NOT DETECTED
NRBC BLD AUTO-RTO: 0 /100 WBC (ref 0–0.2)
P SHIGELLOIDES DNA STL QL NAA+NON-PROBE: NOT DETECTED
PH UR STRIP.AUTO: 5.5 [PH] (ref 5–8)
PLATELET # BLD AUTO: 142 10*3/MM3 (ref 140–450)
PMV BLD AUTO: 12.3 FL (ref 6–12)
POTASSIUM SERPL-SCNC: 3.8 MMOL/L (ref 3.5–5.2)
PROCALCITONIN SERPL-MCNC: 0.14 NG/ML (ref 0–0.25)
PROT SERPL-MCNC: 6.8 G/DL (ref 6–8.5)
PROT UR QL STRIP: ABNORMAL
PROTHROMBIN TIME: 13.6 SECONDS (ref 11.4–14.4)
RBC # BLD AUTO: 3.86 10*6/MM3 (ref 3.77–5.28)
RBC # UR STRIP: ABNORMAL /HPF
REF LAB TEST METHOD: ABNORMAL
RVA RNA STL QL NAA+NON-PROBE: DETECTED
S ENT+BONG DNA STL QL NAA+NON-PROBE: NOT DETECTED
SAPO I+II+IV+V RNA STL QL NAA+NON-PROBE: NOT DETECTED
SHIGELLA SP+EIEC IPAH ST NAA+NON-PROBE: NOT DETECTED
SODIUM SERPL-SCNC: 139 MMOL/L (ref 136–145)
SP GR UR STRIP: 1.02 (ref 1–1.03)
SQUAMOUS #/AREA URNS HPF: ABNORMAL /HPF
UROBILINOGEN UR QL STRIP: ABNORMAL
V CHOL+PARA+VUL DNA STL QL NAA+NON-PROBE: NOT DETECTED
V CHOLERAE DNA STL QL NAA+NON-PROBE: NOT DETECTED
WBC # UR STRIP: ABNORMAL /HPF
WBC NRBC COR # BLD: 18.69 10*3/MM3 (ref 3.4–10.8)
Y ENTEROCOL DNA STL QL NAA+NON-PROBE: NOT DETECTED

## 2023-04-14 PROCEDURE — 87086 URINE CULTURE/COLONY COUNT: CPT | Performed by: EMERGENCY MEDICINE

## 2023-04-14 PROCEDURE — 83605 ASSAY OF LACTIC ACID: CPT | Performed by: EMERGENCY MEDICINE

## 2023-04-14 PROCEDURE — 99223 1ST HOSP IP/OBS HIGH 75: CPT | Performed by: FAMILY MEDICINE

## 2023-04-14 PROCEDURE — 87186 SC STD MICRODIL/AGAR DIL: CPT | Performed by: EMERGENCY MEDICINE

## 2023-04-14 PROCEDURE — 87493 C DIFF AMPLIFIED PROBE: CPT | Performed by: EMERGENCY MEDICINE

## 2023-04-14 PROCEDURE — 71260 CT THORAX DX C+: CPT

## 2023-04-14 PROCEDURE — 83735 ASSAY OF MAGNESIUM: CPT | Performed by: EMERGENCY MEDICINE

## 2023-04-14 PROCEDURE — 82565 ASSAY OF CREATININE: CPT

## 2023-04-14 PROCEDURE — 25010000002 MEROPENEM PER 100 MG: Performed by: FAMILY MEDICINE

## 2023-04-14 PROCEDURE — 71045 X-RAY EXAM CHEST 1 VIEW: CPT

## 2023-04-14 PROCEDURE — 74177 CT ABD & PELVIS W/CONTRAST: CPT

## 2023-04-14 PROCEDURE — 87507 IADNA-DNA/RNA PROBE TQ 12-25: CPT | Performed by: FAMILY MEDICINE

## 2023-04-14 PROCEDURE — 25010000002 CEFTRIAXONE PER 250 MG: Performed by: EMERGENCY MEDICINE

## 2023-04-14 PROCEDURE — 80053 COMPREHEN METABOLIC PANEL: CPT | Performed by: EMERGENCY MEDICINE

## 2023-04-14 PROCEDURE — 99285 EMERGENCY DEPT VISIT HI MDM: CPT

## 2023-04-14 PROCEDURE — 83690 ASSAY OF LIPASE: CPT | Performed by: EMERGENCY MEDICINE

## 2023-04-14 PROCEDURE — 85025 COMPLETE CBC W/AUTO DIFF WBC: CPT | Performed by: EMERGENCY MEDICINE

## 2023-04-14 PROCEDURE — 84145 PROCALCITONIN (PCT): CPT | Performed by: EMERGENCY MEDICINE

## 2023-04-14 PROCEDURE — 87088 URINE BACTERIA CULTURE: CPT | Performed by: EMERGENCY MEDICINE

## 2023-04-14 PROCEDURE — 87040 BLOOD CULTURE FOR BACTERIA: CPT | Performed by: EMERGENCY MEDICINE

## 2023-04-14 PROCEDURE — 85610 PROTHROMBIN TIME: CPT | Performed by: EMERGENCY MEDICINE

## 2023-04-14 PROCEDURE — 25510000001 IOPAMIDOL 61 % SOLUTION: Performed by: FAMILY MEDICINE

## 2023-04-14 PROCEDURE — 81001 URINALYSIS AUTO W/SCOPE: CPT | Performed by: EMERGENCY MEDICINE

## 2023-04-14 PROCEDURE — 25010000002 ONDANSETRON PER 1 MG: Performed by: EMERGENCY MEDICINE

## 2023-04-14 PROCEDURE — P9612 CATHETERIZE FOR URINE SPEC: HCPCS

## 2023-04-14 RX ORDER — SODIUM CHLORIDE 9 MG/ML
40 INJECTION, SOLUTION INTRAVENOUS AS NEEDED
Status: DISCONTINUED | OUTPATIENT
Start: 2023-04-14 | End: 2023-04-17 | Stop reason: HOSPADM

## 2023-04-14 RX ORDER — SODIUM CHLORIDE 9 MG/ML
75 INJECTION, SOLUTION INTRAVENOUS CONTINUOUS
Status: ACTIVE | OUTPATIENT
Start: 2023-04-14 | End: 2023-04-15

## 2023-04-14 RX ORDER — OXCARBAZEPINE 150 MG/1
600 TABLET, FILM COATED ORAL 2 TIMES DAILY
Status: DISCONTINUED | OUTPATIENT
Start: 2023-04-14 | End: 2023-04-17 | Stop reason: HOSPADM

## 2023-04-14 RX ORDER — MIRTAZAPINE 7.5 MG/1
7.5 TABLET, FILM COATED ORAL NIGHTLY
Status: ON HOLD | COMMUNITY
End: 2023-05-10

## 2023-04-14 RX ORDER — SODIUM CHLORIDE 9 MG/ML
75 INJECTION, SOLUTION INTRAVENOUS CONTINUOUS
Status: DISCONTINUED | OUTPATIENT
Start: 2023-04-14 | End: 2023-04-17 | Stop reason: HOSPADM

## 2023-04-14 RX ORDER — TRAMADOL HYDROCHLORIDE 50 MG/1
50 TABLET ORAL 2 TIMES DAILY PRN
Status: DISCONTINUED | OUTPATIENT
Start: 2023-04-14 | End: 2023-04-17 | Stop reason: HOSPADM

## 2023-04-14 RX ORDER — ONDANSETRON 4 MG/1
4 TABLET, FILM COATED ORAL EVERY 6 HOURS PRN
Status: DISCONTINUED | OUTPATIENT
Start: 2023-04-14 | End: 2023-04-17 | Stop reason: HOSPADM

## 2023-04-14 RX ORDER — SODIUM CHLORIDE 0.9 % (FLUSH) 0.9 %
10 SYRINGE (ML) INJECTION AS NEEDED
Status: DISCONTINUED | OUTPATIENT
Start: 2023-04-14 | End: 2023-04-17 | Stop reason: HOSPADM

## 2023-04-14 RX ORDER — ALBUTEROL SULFATE 90 UG/1
4 AEROSOL, METERED RESPIRATORY (INHALATION) ONCE
Status: DISCONTINUED | OUTPATIENT
Start: 2023-04-14 | End: 2023-04-17 | Stop reason: HOSPADM

## 2023-04-14 RX ORDER — LANOLIN ALCOHOL/MO/W.PET/CERES
400 CREAM (GRAM) TOPICAL 2 TIMES DAILY
Status: DISCONTINUED | OUTPATIENT
Start: 2023-04-14 | End: 2023-04-17 | Stop reason: HOSPADM

## 2023-04-14 RX ORDER — ACETAMINOPHEN 650 MG/1
650 SUPPOSITORY RECTAL EVERY 4 HOURS PRN
Status: DISCONTINUED | OUTPATIENT
Start: 2023-04-14 | End: 2023-04-17 | Stop reason: HOSPADM

## 2023-04-14 RX ORDER — ONDANSETRON 2 MG/ML
4 INJECTION INTRAMUSCULAR; INTRAVENOUS EVERY 6 HOURS PRN
Status: DISCONTINUED | OUTPATIENT
Start: 2023-04-14 | End: 2023-04-17 | Stop reason: HOSPADM

## 2023-04-14 RX ORDER — PREGABALIN 25 MG/1
25 CAPSULE ORAL 2 TIMES DAILY
Status: DISCONTINUED | OUTPATIENT
Start: 2023-04-14 | End: 2023-04-17 | Stop reason: HOSPADM

## 2023-04-14 RX ORDER — LOPERAMIDE HYDROCHLORIDE 2 MG/1
2 CAPSULE ORAL
Status: ON HOLD | COMMUNITY
End: 2023-05-10

## 2023-04-14 RX ORDER — ACETAMINOPHEN 160 MG/5ML
650 SOLUTION ORAL EVERY 4 HOURS PRN
Status: DISCONTINUED | OUTPATIENT
Start: 2023-04-14 | End: 2023-04-17 | Stop reason: HOSPADM

## 2023-04-14 RX ORDER — ONDANSETRON 4 MG/1
4 TABLET, FILM COATED ORAL 3 TIMES DAILY PRN
Status: ON HOLD | COMMUNITY
End: 2023-05-10

## 2023-04-14 RX ORDER — MIRTAZAPINE 15 MG/1
7.5 TABLET, FILM COATED ORAL NIGHTLY
Status: DISCONTINUED | OUTPATIENT
Start: 2023-04-14 | End: 2023-04-17 | Stop reason: HOSPADM

## 2023-04-14 RX ORDER — LEVOTHYROXINE SODIUM 0.03 MG/1
25 TABLET ORAL
Status: DISCONTINUED | OUTPATIENT
Start: 2023-04-15 | End: 2023-04-17 | Stop reason: HOSPADM

## 2023-04-14 RX ORDER — SODIUM CHLORIDE 0.9 % (FLUSH) 0.9 %
10 SYRINGE (ML) INJECTION EVERY 12 HOURS SCHEDULED
Status: DISCONTINUED | OUTPATIENT
Start: 2023-04-14 | End: 2023-04-17 | Stop reason: HOSPADM

## 2023-04-14 RX ORDER — PANTOPRAZOLE SODIUM 40 MG/1
40 TABLET, DELAYED RELEASE ORAL DAILY
Status: DISCONTINUED | OUTPATIENT
Start: 2023-04-15 | End: 2023-04-17 | Stop reason: HOSPADM

## 2023-04-14 RX ORDER — ONDANSETRON 2 MG/ML
4 INJECTION INTRAMUSCULAR; INTRAVENOUS ONCE
Status: COMPLETED | OUTPATIENT
Start: 2023-04-14 | End: 2023-04-14

## 2023-04-14 RX ORDER — DONEPEZIL HYDROCHLORIDE 10 MG/1
5 TABLET, FILM COATED ORAL EVERY MORNING
Status: DISCONTINUED | OUTPATIENT
Start: 2023-04-15 | End: 2023-04-17 | Stop reason: HOSPADM

## 2023-04-14 RX ORDER — ACETAMINOPHEN 325 MG/1
650 TABLET ORAL EVERY 4 HOURS PRN
Status: DISCONTINUED | OUTPATIENT
Start: 2023-04-14 | End: 2023-04-17 | Stop reason: HOSPADM

## 2023-04-14 RX ADMIN — ONDANSETRON 4 MG: 2 INJECTION INTRAMUSCULAR; INTRAVENOUS at 13:06

## 2023-04-14 RX ADMIN — SODIUM CHLORIDE 1000 ML: 9 INJECTION, SOLUTION INTRAVENOUS at 15:37

## 2023-04-14 RX ADMIN — MEROPENEM 1 G: 1 INJECTION, POWDER, FOR SOLUTION INTRAVENOUS at 18:19

## 2023-04-14 RX ADMIN — SODIUM CHLORIDE 75 ML/HR: 9 INJECTION, SOLUTION INTRAVENOUS at 18:20

## 2023-04-14 RX ADMIN — SODIUM CHLORIDE 1000 ML: 9 INJECTION, SOLUTION INTRAVENOUS at 15:03

## 2023-04-14 RX ADMIN — SODIUM CHLORIDE 1 G: 900 INJECTION INTRAVENOUS at 15:01

## 2023-04-14 RX ADMIN — IOPAMIDOL 86 ML: 612 INJECTION, SOLUTION INTRAVENOUS at 14:42

## 2023-04-14 NOTE — H&P
[facility] Medicine Services  HISTORY AND PHYSICAL    Patient Name: Mitzi Eric  : 1935  MRN: [id number]  Primary Care Physician: System, Provider Not In  Date of admission: 2023      Subjective   Subjective     Chief Complaint:  Nausea/Vomiting/Diarrhea     HPI:  Mitzi Eric is a 87 y.o. female with PMHx with history of CKD, COPD (2L nightly) with known bilateral upper lobe masses, paroxysmal A-fib, dementia, HLD, HTN, hypothyroidism, Trigeminal neuralgia who presents to ED from her facility, [facility], due to profuse diarrhea. Per nephew at bedside, patient had some vomiting on Wednesday and then felt ok Thursday but didn't eat much. This morning she had profuse watery diarrhea that soaked through her mattress and onto the floor. Per nephew, there are several residents at the facility with GI issues currently. Pt afebrile here. WBC count 18K. Procal normal. Patient admitted in February for neck and shoulder pain after a fall. She was treated at that time for PNA with a course of Zosyn and Doxycyline. GI PCR and C diff pending. UA in ED concerning for UTI. Hospital medicine asked to admit.     Review of Systems   Constitutional: Negative for activity change, chills, fatigue and fever.   HENT: Negative for congestion and sore throat.    Eyes: Negative for visual disturbance.   Respiratory: Negative for cough, shortness of breath and wheezing.    Cardiovascular: Negative for chest pain, palpitations and leg swelling.   Gastrointestinal: Positive for diarrhea, nausea and vomiting. Negative for abdominal pain and constipation.   Genitourinary: Positive for frequency. Negative for dysuria.   Musculoskeletal: Negative for back pain.   Skin: Negative for rash.   Neurological: Negative for dizziness and weakness.   Psychiatric/Behavioral: Positive for confusion.        Personal History     Past Medical History:   Diagnosis Date   • A-fib    • Ataxic gait    • Chronic  kidney disease, stage 3    • COPD (chronic obstructive pulmonary disease)    • Dementia    • Disease of thyroid gland    • Essential tremor 12/21/2022   • Heart failure    • Hyperlipidemia    • Hypertension    • Shingles              Past Surgical History:   Procedure Laterality Date   • BRAIN TUMOR EXCISION      BENIGN   • FRACTURE SURGERY     • TRIGGER FINGER RELEASE         Family History: family history includes Alcohol abuse in her father; Stroke in her mother.     Social History:  reports that she quit smoking about 43 years ago. Her smoking use included cigarettes. She started smoking about 63 years ago. She has a 22.50 pack-year smoking history. She has never used smokeless tobacco. She reports that she does not drink alcohol and does not use drugs.  Social History     Social History Narrative   • Not on file       Medications:  Available home medication information reviewed.  (Not in a hospital admission)      Allergies   Allergen Reactions   • Bee Pollen Unknown - Low Severity   • Lorazepam Delirium       Objective   Objective     Vital Signs:   Temp:  [98 °F (36.7 °C)] 98 °F (36.7 °C)  Heart Rate:  [79-93] 79  Resp:  [18] 18  BP: ()/(44-57) 90/44  Flow (L/min):  [2] 2       Physical Exam   Constitutional: Awake, alert, chronically ill appearing   Eyes: PERRLA, sclerae anicteric, no conjunctival injection  HENT: NCAT, mucous membranes moist    Neck: Supple, no thyromegaly, no lymphadenopathy, trachea midline  Respiratory: Decreased in bases bilaterally, nonlabored respirations 2L NC  Cardiovascular: RRR, no murmurs, rubs, or gallops, palpable pedal pulses bilaterally  Gastrointestinal: Positive bowel sounds, soft, nontender, nondistended  Musculoskeletal: No bilateral ankle edema, no clubbing or cyanosis to extremities  Psychiatric: Appropriate affect, cooperative  Neurologic: No focal deficits, speech clear, JO, mentation at baseline   Skin: No rashes    Result Review:  I have personally reviewed  the results from the time of this admission to 4/14/2023 14:57 EDT and agree with these findings:  [x]  Laboratory list / accordion  [x]  Microbiology  [x]  Radiology  []  EKG/Telemetry   []  Cardiology/Vascular   []  Pathology  [x]  Old records  []  Other:      LAB RESULTS:      Lab 04/14/23  1356 04/14/23  1219   WBC  --  18.69*   HEMOGLOBIN  --  12.3   HEMATOCRIT  --  39.0   PLATELETS  --  142   NEUTROS ABS  --  15.60*   IMMATURE GRANS (ABS)  --  0.14*   LYMPHS ABS  --  1.21   MONOS ABS  --  1.70*   EOS ABS  --  0.00   MCV  --  101.0*   PROCALCITONIN  --  0.14   LACTATE 1.4  --    PROTIME  --  13.6   INR  --  1.05         Lab 04/14/23  1223 04/14/23  1219   SODIUM  --  139   POTASSIUM  --  3.8   CHLORIDE  --  104   CO2  --  19.0*   ANION GAP  --  16.0*   BUN  --  39*   CREATININE 1.70* 1.54*   EGFR  --  32.7*   GLUCOSE  --  100*   CALCIUM  --  8.6   MAGNESIUM  --  2.3         Lab 04/14/23  1219   TOTAL PROTEIN 6.8   ALBUMIN 4.4   GLOBULIN 2.4   ALT (SGPT) 14   AST (SGOT) 37*   BILIRUBIN 0.5   ALK PHOS 113   LIPASE 60                     UA        4/14/2023    12:30   Urinalysis   Squamous Epithelial Cells, UA 0-2     Specific Gravity, UA 1.018     Ketones, UA Negative     Blood, UA Trace     Leukocytes, UA Moderate (2+)     Nitrite, UA Positive     RBC, UA 3-6     WBC, UA Too Numerous to Count     Bacteria, UA 3+         Microbiology Results (last 10 days)     ** No results found for the last 240 hours. **          XR Chest 1 View    Result Date: 4/14/2023  XR CHEST 1 VW Date of Exam: 4/14/2023 12:46 PM EDT Indication: abd pain. Comparison: PA and lateral chest 2/17/2023, CT chest 2/12/2023 Findings: Lungs are hyperinflated and may be emphysematous. Masslike density is seen in the left upper lobe, and appears slightly increased since 2/17/2023. New left apical pleural parenchymal opacification or loculated pleural fluid is present compared to the prior chest x-ray. Irregular bandlike opacity in the right upper  lobe extending to the apex is unchanged. The lung bases remain clear. Heart size is normal. No pneumothorax. No acute osseous abnormality.     Impression: 1. Left apical masslike opacity appears larger with new apical pleural-parenchymal thickening or loculated fluid since 2/17/2023. Consider CT chest correlation. 2. Bandlike density in the right upper lobe apex is unchanged, favored to represent chronic scarring. Electronically Signed: Janet Pattersonian  4/14/2023 1:00 PM EDT  Workstation ID: [id number]      Results for orders placed during the hospital encounter of 03/17/22    Adult Transthoracic Echo Complete W/ Cont if Necessary Per Protocol    Interpretation Summary  · Left ventricular ejection fraction appears to be 46 - 50%. Left ventricular systolic function is mildly decreased.  · Left ventricular diastolic function is consistent with (grade I) impaired relaxation.  · Mild to moderate mitral regurgitation.  · Mild to moderate tricuspid regurgitation, RVSP 54 mmHg.      Assessment & Plan   Assessment & Plan     Active Hospital Problems    Diagnosis  POA   • **Sepsis [A41.9]  Yes   • Nausea vomiting and diarrhea [R11.2, R19.7]  Unknown   • chronic CHF (congestive heart failure) (HCC) [I50.9]  Yes   • Dementia without behavioral disturbance [F03.90]  Yes   • Severe malnutrition (CMS/HCC) [E43]  Yes   • Hypothyroidism (acquired) [E03.9]  Yes   • Lung mass [R91.8]  Yes   • UTI (urinary tract infection), bacterial [N39.0, A49.9]  Unknown   • Essential hypertension [I10]  Yes     Sepsis (HR>90 on arrival, Leukocytosis)  UTI  Nausea/Vomiting/Diarrhea   -No IVF given in ED. Give 1L bolus then start 75cc/hr   -Blood Cx x2 pending   -Urine Cx pending  -Recent ABX use for PNA. Check C diff, GI PCR   -Procal, lactate normal   -CT A/P pending  -Recently completed course of Zosyn and Doxycycline for presumed PNA. Start IV Merrem     JUAN F  -Cr 1.70 today  -baseline 0.7  -Secondary to dehydration from diarrhea  -IVF  -Hold  Nephrotoxic medications  -BMP in AM    Left Apical Lung mass with fluid-necrotic components/cavitary appearance   Right Upper lobe lung mass   Hx COPD  -Patient/POA (nephew) have been aware of this and not interested in further work-up  -On 2L NC currently, normally on RA during the day and 2L at night per Nephew   -Add Merrem (recently completed Zosyn/Doxy for presumed B/L PNA)     Combined systolic and diastolic heart failure  Moderate pulm hypertension  -Compensated, monitor with IVF   -Hold home Demedex/Spironolactone      Paroxysmal atrial fibrillation  -Hold BB as BP borderline      CKD stage III  -Renal function stable     Hypothyroidism  -Continue Synthroid     Trigeminal neuralgia  -Continue Trileptal and Lyrica     Hypertension  -Hold home BP meds as BP borderline      Dementia  -Continue Aricept    DVT prophylaxis:  Mechanical    CODE STATUS:  DNR/DNI  There are no questions and answers to display.       Expected Discharge   Expected Discharge Date and Time     Expected Discharge Date Expected Discharge Time    Apr 17, 2023            Radha Leo,   04/14/23

## 2023-04-14 NOTE — CASE MANAGEMENT/SOCIAL WORK
Discharge Planning Assessment  [facility]     Patient Name: Mitzi Eric  MRN: [id number]  Today's Date: 4/14/2023    Admit Date: 4/14/2023    Plan: IDP   Discharge Needs Assessment     Row Name 04/14/23 1402       Living Environment    People in Home facility resident    Name(s) of People in Home [facility]    Current Living Arrangements assisted living facility    Primary Care Provided by self;other (see comments)  [facility] Staff    Provides Primary Care For no one    Family Caregiver if Needed other relative(s)    Family Caregiver Names Nephew/CADE Sherman    Quality of Family Relationships helpful;involved;supportive    Able to Return to Prior Arrangements yes       Transition Planning    Patient/Family Anticipated Services at Transition     Transportation Anticipated health plan transportation       Discharge Needs Assessment    Readmission Within the Last 30 Days no previous admission in last 30 days    Equipment Currently Used at Home wheelchair;oxygen    Concerns to be Addressed discharge planning               Discharge Plan     Row Name 04/14/23 1403       Plan    Plan IDP    Plan Comments MSW spoke with Pt’s POA Lloyd ([phone]) to complete IDP. Pt is from [facility]. Pt has [employer] Medicare insurance coverage. Pt is moderate with ADL’s; Pt has WC and is able to transfer independently. Pt has O2 at night 2/3L. Pt’s pharmacy is [facility]. Pt’s plan is to return to [facility]. CM will continue to follow and assist with discharge planning.    Final Discharge Disposition Code 30 - still a patient              Continued Care and Services - Admitted Since 4/14/2023    Coordination has not been started for this encounter.          Demographic Summary     Row Name 04/14/23 1402       General Information    Arrived From home    Referral Source admission list;emergency department    Reason for Consult discharge planning    Preferred Language English               Functional  Status     Row Name 04/14/23 1402       Functional Status    Usual Activity Tolerance moderate    Current Activity Tolerance moderate       Functional Status, IADL    Medications assistive person    Meal Preparation assistive person    Housekeeping assistive person    Laundry assistive person    Shopping assistive person                         YURI Allen

## 2023-04-14 NOTE — ED PROVIDER NOTES
"Subjective   History of Present Illness  87-year-old female presents for evaluation of generalized abdominal pain, nausea, vomiting, and diarrhea for the past 3 days.  She has reportedly been experiencing nausea and vomiting over the past 3 days and had a large bowel movement consistent with diarrhea earlier today.  She has been complaining of generalized abdominal pain over the same span.  No fevers.  No known dietary triggers.  No known sick contacts.  No recent antibiotic use.  As a result of her symptoms, she was brought here from her nursing home to be evaluated today.  She has a history of dementia and is a somewhat limited historian.  She is unable to rate her pain at this time.        Review of Systems   Unable to perform ROS: Dementia   Gastrointestinal: Positive for abdominal pain, diarrhea, nausea and vomiting.       Past Medical History:   Diagnosis Date   • A-fib    • Ataxic gait    • Chronic kidney disease, stage 3    • COPD (chronic obstructive pulmonary disease)    • Dementia    • Disease of thyroid gland    • Essential tremor 2022   • Heart failure    • Hyperlipidemia    • Hypertension    • Shingles        Allergies   Allergen Reactions   • Bee Pollen Unknown - Low Severity   • Lorazepam Delirium       Past Surgical History:   Procedure Laterality Date   • BRAIN TUMOR EXCISION      BENIGN   • FRACTURE SURGERY     • TRIGGER FINGER RELEASE         Family History   Problem Relation Age of Onset   • Stroke Mother    • Alcohol abuse Father        Social History     Socioeconomic History   • Marital status: Single   Tobacco Use   • Smoking status: Former     Packs/day: 1.50     Years: 15.00     Pack years: 22.50     Types: Cigarettes     Start date:      Quit date:      Years since quittin.3   • Smokeless tobacco: Never   Vaping Use   • Vaping Use: Never used   Substance and Sexual Activity   • Alcohol use: Never     Comment: \"social drinker\"   • Drug use: Never   • Sexual activity: " Never           Objective   Physical Exam  Vitals and nursing note reviewed.   Constitutional:       General: She is not in acute distress.     Appearance: She is well-developed. She is not diaphoretic.      Comments: Nontoxic-appearing elderly female   HENT:      Head: Normocephalic and atraumatic.   Cardiovascular:      Rate and Rhythm: Normal rate and regular rhythm.      Heart sounds: Normal heart sounds. No murmur heard.    No friction rub. No gallop.   Pulmonary:      Effort: Pulmonary effort is normal. No respiratory distress.      Breath sounds: Normal breath sounds. No wheezing or rales.   Abdominal:      General: Bowel sounds are normal. There is no distension.      Palpations: Abdomen is soft. There is no mass.      Tenderness: There is abdominal tenderness. There is no guarding or rebound.      Comments: Generalized abdominal tenderness, no peritoneal signs, no pain out of proportion to exam   Musculoskeletal:         General: Normal range of motion.      Cervical back: Neck supple.   Skin:     General: Skin is warm and dry.      Findings: No erythema or rash.   Neurological:      Mental Status: She is alert.      Comments: Mental status at baseline per EMS personnel, following simple commands   Psychiatric:         Mood and Affect: Mood normal.         Procedures           ED Course  ED Course as of 04/14/23 1457   Fri Apr 14, 2023   1242 87-year-old female presents from her nursing home to be evaluated for abdominal pain, nausea, vomiting, and diarrhea for the past 3 days.  Of note, the patient has a history of dementia and is a very limited historian.  On exam, the patient has generalized abdominal tenderness.  No pain out of proportion to exam.  Broad differential diagnosis.  We will obtain labs and imaging, and we will reassess following initial interventions. [DD]   1256 Labs remarkable for white blood cell count of 18,000 as well as for acute kidney injury. [DD]   1313 Urinalysis is suggestive of  infection.  Rocephin given.  Blood and urine cultures obtained.  We will see admission to the hospital. [DD]   7988 I discussed the patient's case with Dr. Whipple, the patient will be admitted under his care for further evaluation and treatment.  The patient is aware/agreeable with the plan at this time.  Family is on board as well. [DD]      ED Course User Index  [DD] Jonas Wayne MD                                       Recent Results (from the past 24 hour(s))   Comprehensive Metabolic Panel    Collection Time: 04/14/23 12:19 PM    Specimen: Blood   Result Value Ref Range    Glucose 100 (H) 65 - 99 mg/dL    BUN 39 (H) 8 - 23 mg/dL    Creatinine 1.54 (H) 0.57 - 1.00 mg/dL    Sodium 139 136 - 145 mmol/L    Potassium 3.8 3.5 - 5.2 mmol/L    Chloride 104 98 - 107 mmol/L    CO2 19.0 (L) 22.0 - 29.0 mmol/L    Calcium 8.6 8.6 - 10.5 mg/dL    Total Protein 6.8 6.0 - 8.5 g/dL    Albumin 4.4 3.5 - 5.2 g/dL    ALT (SGPT) 14 1 - 33 U/L    AST (SGOT) 37 (H) 1 - 32 U/L    Alkaline Phosphatase 113 39 - 117 U/L    Total Bilirubin 0.5 0.0 - 1.2 mg/dL    Globulin 2.4 gm/dL    A/G Ratio 1.8 g/dL    BUN/Creatinine Ratio 25.3 (H) 7.0 - 25.0    Anion Gap 16.0 (H) 5.0 - 15.0 mmol/L    eGFR 32.7 (L) >60.0 mL/min/1.73   Protime-INR    Collection Time: 04/14/23 12:19 PM    Specimen: Blood   Result Value Ref Range    Protime 13.6 11.4 - 14.4 Seconds    INR 1.05 0.84 - 1.13   Lipase    Collection Time: 04/14/23 12:19 PM    Specimen: Blood   Result Value Ref Range    Lipase 60 13 - 60 U/L   Magnesium    Collection Time: 04/14/23 12:19 PM    Specimen: Blood   Result Value Ref Range    Magnesium 2.3 1.6 - 2.4 mg/dL   CBC Auto Differential    Collection Time: 04/14/23 12:19 PM    Specimen: Blood   Result Value Ref Range    WBC 18.69 (H) 3.40 - 10.80 10*3/mm3    RBC 3.86 3.77 - 5.28 10*6/mm3    Hemoglobin 12.3 12.0 - 15.9 g/dL    Hematocrit 39.0 34.0 - 46.6 %    .0 (H) 79.0 - 97.0 fL    MCH 31.9 26.6 - 33.0 pg    MCHC 31.5 31.5 -  35.7 g/dL    RDW 12.9 12.3 - 15.4 %    RDW-SD 48.2 37.0 - 54.0 fl    MPV 12.3 (H) 6.0 - 12.0 fL    Platelets 142 140 - 450 10*3/mm3    Neutrophil % 83.5 (H) 42.7 - 76.0 %    Lymphocyte % 6.5 (L) 19.6 - 45.3 %    Monocyte % 9.1 5.0 - 12.0 %    Eosinophil % 0.0 (L) 0.3 - 6.2 %    Basophil % 0.2 0.0 - 1.5 %    Immature Grans % 0.7 (H) 0.0 - 0.5 %    Neutrophils, Absolute 15.60 (H) 1.70 - 7.00 10*3/mm3    Lymphocytes, Absolute 1.21 0.70 - 3.10 10*3/mm3    Monocytes, Absolute 1.70 (H) 0.10 - 0.90 10*3/mm3    Eosinophils, Absolute 0.00 0.00 - 0.40 10*3/mm3    Basophils, Absolute 0.04 0.00 - 0.20 10*3/mm3    Immature Grans, Absolute 0.14 (H) 0.00 - 0.05 10*3/mm3    nRBC 0.0 0.0 - 0.2 /100 WBC   Procalcitonin    Collection Time: 04/14/23 12:19 PM    Specimen: Blood   Result Value Ref Range    Procalcitonin 0.14 0.00 - 0.25 ng/mL   POC Creatinine    Collection Time: 04/14/23 12:23 PM    Specimen: Blood   Result Value Ref Range    Creatinine 1.70 (H) 0.60 - 1.30 mg/dL   Urinalysis With Culture If Indicated - Urine, Catheter    Collection Time: 04/14/23 12:30 PM    Specimen: Urine, Catheter   Result Value Ref Range    Color, UA Yellow Yellow, Straw    Appearance, UA Cloudy (A) Clear    pH, UA 5.5 5.0 - 8.0    Specific Gravity, UA 1.018 1.001 - 1.030    Glucose, UA Negative Negative    Ketones, UA Negative Negative    Bilirubin, UA Negative Negative    Blood, UA Trace (A) Negative    Protein, UA 30 mg/dL (1+) (A) Negative    Leuk Esterase, UA Moderate (2+) (A) Negative    Nitrite, UA Positive (A) Negative    Urobilinogen, UA 0.2 E.U./dL 0.2 - 1.0 E.U./dL   Urinalysis, Microscopic Only - Urine, Catheter    Collection Time: 04/14/23 12:30 PM    Specimen: Urine, Catheter   Result Value Ref Range    RBC, UA 3-6 (A) None Seen, 0-2 /HPF    WBC, UA Too Numerous to Count (A) None Seen, 0-2 /HPF    Bacteria, UA 3+ (A) None Seen, Trace /HPF    Squamous Epithelial Cells, UA 0-2 None Seen, 0-2 /HPF    Hyaline Casts, UA 0-6 0 - 6 /LPF     Methodology Manual Light Microscopy    Lactic Acid, Plasma    Collection Time: 04/14/23  1:56 PM    Specimen: Blood   Result Value Ref Range    Lactate 1.4 0.5 - 2.0 mmol/L     Note: In addition to lab results from this visit, the labs listed above may include labs taken at another facility or during a different encounter within the last 24 hours. Please correlate lab times with ED admission and discharge times for further clarification of the services performed during this visit.    CT Abdomen Pelvis With Contrast   Final Result   1. Masslike consolidation with the volume loss in the left upper lobe with intervening fluid-necrotic components. This is developed or progressed since the 2/12/2023 study, where the lung parenchyma had a more cavitary appearance.   2. Masslike consolidation in the right upper lobe is unchanged since the more remote 4/22/2022 examination, favored to represent chronic scarring.   3. Severe emphysema.   4. Mild central pulmonary artery dilation. Correlate clinically for pulmonary hypertension.      Electronically Signed: Janet Lynch     4/14/2023 3:02 PM EDT     Workstation ID: [id number]      CT Chest With Contrast Diagnostic   Final Result   1. Masslike consolidation with the volume loss in the left upper lobe with intervening fluid-necrotic components. This is developed or progressed since the 2/12/2023 study, where the lung parenchyma had a more cavitary appearance.   2. Masslike consolidation in the right upper lobe is unchanged since the more remote 4/22/2022 examination, favored to represent chronic scarring.   3. Severe emphysema.   4. Mild central pulmonary artery dilation. Correlate clinically for pulmonary hypertension.      Electronically Signed: Janet Lynch     4/14/2023 3:02 PM EDT     Workstation ID: [id number]      XR Chest 1 View   Final Result   1. Left apical masslike opacity appears larger with new apical pleural-parenchymal thickening or loculated fluid since 2/17/2023.  Consider CT chest correlation.   2. Bandlike density in the right upper lobe apex is unchanged, favored to represent chronic scarring.      Electronically Signed: Janet Lynch     4/14/2023 1:00 PM EDT     Workstation ID: [id number]        Vitals:    04/14/23 1240 04/14/23 1300 04/14/23 1320 04/14/23 1340   BP: 102/46 115/47 102/51 90/44   BP Location:       Patient Position:       Pulse: 85 80 86 79   Resp:       Temp:       TempSrc:       SpO2:  91%  90%   Weight:       Height:         Medications   sodium chloride 0.9 % flush 10 mL (has no administration in time range)   cefTRIAXone (ROCEPHIN) 1 g/100 mL 0.9% NS (MBP) (1 g Intravenous New Bag 4/14/23 1501)   sodium chloride 0.9 % bolus 1,000 mL (1,000 mL Intravenous New Bag 4/14/23 1503)   ondansetron (ZOFRAN) injection 4 mg (4 mg Intravenous Given 4/14/23 1306)   iopamidol (ISOVUE-300) 61 % injection 86 mL (86 mL Intravenous Given 4/14/23 1442)     ECG/EMG Results (last 24 hours)     ** No results found for the last 24 hours. **        No orders to display              MDM    Final diagnoses:   Sepsis secondary to UTI   Leukocytosis, unspecified type   Acute kidney injury   Dehydration   Nausea vomiting and diarrhea   History of dementia       ED Disposition  ED Disposition     ED Disposition   Decision to Admit    Condition   --    Comment   Level of Care: Telemetry [5]   Diagnosis: Sepsis [[id number]]   Certification: I Certify That Inpatient Hospital Services Are Medically Necessary For Greater Than 2 Midnights               No follow-up provider specified.       Medication List      No changes were made to your prescriptions during this visit.          Jonas Wayne MD  04/14/23 0679

## 2023-04-15 LAB
ANION GAP SERPL CALCULATED.3IONS-SCNC: 11 MMOL/L (ref 5–15)
BASOPHILS # BLD AUTO: 0.02 10*3/MM3 (ref 0–0.2)
BASOPHILS NFR BLD AUTO: 0.3 % (ref 0–1.5)
BUN SERPL-MCNC: 33 MG/DL (ref 8–23)
BUN/CREAT SERPL: 30 (ref 7–25)
CALCIUM SPEC-SCNC: 7.9 MG/DL (ref 8.6–10.5)
CHLORIDE SERPL-SCNC: 111 MMOL/L (ref 98–107)
CO2 SERPL-SCNC: 20 MMOL/L (ref 22–29)
CREAT SERPL-MCNC: 1.1 MG/DL (ref 0.57–1)
DEPRECATED RDW RBC AUTO: 47.3 FL (ref 37–54)
EGFRCR SERPLBLD CKD-EPI 2021: 49 ML/MIN/1.73
EOSINOPHIL # BLD AUTO: 0.01 10*3/MM3 (ref 0–0.4)
EOSINOPHIL NFR BLD AUTO: 0.2 % (ref 0.3–6.2)
ERYTHROCYTE [DISTWIDTH] IN BLOOD BY AUTOMATED COUNT: 12.9 % (ref 12.3–15.4)
GLUCOSE SERPL-MCNC: 80 MG/DL (ref 65–99)
HCT VFR BLD AUTO: 33.3 % (ref 34–46.6)
HGB BLD-MCNC: 10.5 G/DL (ref 12–15.9)
IMM GRANULOCYTES # BLD AUTO: 0.03 10*3/MM3 (ref 0–0.05)
IMM GRANULOCYTES NFR BLD AUTO: 0.5 % (ref 0–0.5)
LYMPHOCYTES # BLD AUTO: 0.9 10*3/MM3 (ref 0.7–3.1)
LYMPHOCYTES NFR BLD AUTO: 15.5 % (ref 19.6–45.3)
MCH RBC QN AUTO: 31.3 PG (ref 26.6–33)
MCHC RBC AUTO-ENTMCNC: 31.5 G/DL (ref 31.5–35.7)
MCV RBC AUTO: 99.4 FL (ref 79–97)
MONOCYTES # BLD AUTO: 0.79 10*3/MM3 (ref 0.1–0.9)
MONOCYTES NFR BLD AUTO: 13.6 % (ref 5–12)
NEUTROPHILS NFR BLD AUTO: 4.04 10*3/MM3 (ref 1.7–7)
NEUTROPHILS NFR BLD AUTO: 69.9 % (ref 42.7–76)
NRBC BLD AUTO-RTO: 0 /100 WBC (ref 0–0.2)
PLATELET # BLD AUTO: 104 10*3/MM3 (ref 140–450)
PMV BLD AUTO: 11.9 FL (ref 6–12)
POTASSIUM SERPL-SCNC: 3.6 MMOL/L (ref 3.5–5.2)
RBC # BLD AUTO: 3.35 10*6/MM3 (ref 3.77–5.28)
SODIUM SERPL-SCNC: 142 MMOL/L (ref 136–145)
WBC NRBC COR # BLD: 5.79 10*3/MM3 (ref 3.4–10.8)

## 2023-04-15 PROCEDURE — 25010000002 MEROPENEM PER 100 MG: Performed by: FAMILY MEDICINE

## 2023-04-15 PROCEDURE — 99233 SBSQ HOSP IP/OBS HIGH 50: CPT | Performed by: HOSPITALIST

## 2023-04-15 PROCEDURE — 85025 COMPLETE CBC W/AUTO DIFF WBC: CPT | Performed by: FAMILY MEDICINE

## 2023-04-15 PROCEDURE — 80048 BASIC METABOLIC PNL TOTAL CA: CPT | Performed by: FAMILY MEDICINE

## 2023-04-15 PROCEDURE — 25010000002 CEFTRIAXONE PER 250 MG: Performed by: HOSPITALIST

## 2023-04-15 PROCEDURE — 97162 PT EVAL MOD COMPLEX 30 MIN: CPT

## 2023-04-15 RX ADMIN — PANTOPRAZOLE SODIUM 40 MG: 40 TABLET, DELAYED RELEASE ORAL at 09:41

## 2023-04-15 RX ADMIN — MEROPENEM 1 G: 1 INJECTION, POWDER, FOR SOLUTION INTRAVENOUS at 05:44

## 2023-04-15 RX ADMIN — FOLIC ACID TAB 400 MCG 400 MCG: 400 TAB at 09:40

## 2023-04-15 RX ADMIN — ASPIRIN 81 MG: 81 TABLET, COATED ORAL at 09:41

## 2023-04-15 RX ADMIN — Medication 10 ML: at 20:40

## 2023-04-15 RX ADMIN — SODIUM CHLORIDE 75 ML/HR: 9 INJECTION, SOLUTION INTRAVENOUS at 09:43

## 2023-04-15 RX ADMIN — MIRTAZAPINE 7.5 MG: 15 TABLET, FILM COATED ORAL at 20:40

## 2023-04-15 RX ADMIN — ACETAMINOPHEN 325MG 650 MG: 325 TABLET ORAL at 16:36

## 2023-04-15 RX ADMIN — PREGABALIN 25 MG: 25 CAPSULE ORAL at 20:40

## 2023-04-15 RX ADMIN — ACETAMINOPHEN 325MG 650 MG: 325 TABLET ORAL at 09:44

## 2023-04-15 RX ADMIN — SODIUM CHLORIDE 1 G: 900 INJECTION INTRAVENOUS at 16:37

## 2023-04-15 RX ADMIN — OXCARBAZEPINE 600 MG: 150 TABLET, FILM COATED ORAL at 09:41

## 2023-04-15 RX ADMIN — PREGABALIN 25 MG: 25 CAPSULE ORAL at 09:41

## 2023-04-15 RX ADMIN — FOLIC ACID TAB 400 MCG 400 MCG: 400 TAB at 20:40

## 2023-04-15 RX ADMIN — SODIUM CHLORIDE 75 ML/HR: 9 INJECTION, SOLUTION INTRAVENOUS at 22:26

## 2023-04-15 RX ADMIN — LEVOTHYROXINE SODIUM 25 MCG: 0.03 TABLET ORAL at 05:46

## 2023-04-15 RX ADMIN — DONEPEZIL HYDROCHLORIDE 5 MG: 10 TABLET, FILM COATED ORAL at 05:44

## 2023-04-15 RX ADMIN — OXCARBAZEPINE 600 MG: 150 TABLET, FILM COATED ORAL at 20:40

## 2023-04-15 RX ADMIN — Medication 10 ML: at 09:42

## 2023-04-15 NOTE — THERAPY EVALUATION
Patient Name: Mitzi Eric  : 1935    MRN: [id number]                              Today's Date: 4/15/2023       Admit Date: 2023    Visit Dx:     ICD-10-CM ICD-9-CM   1. Sepsis secondary to UTI  A41.9 038.9    N39.0 995.91     599.0   2. Leukocytosis, unspecified type  D72.829 288.60   3. Acute kidney injury  N17.9 584.9   4. Dehydration  E86.0 276.51   5. Nausea vomiting and diarrhea  R11.2 787.91    R19.7 787.01   6. History of dementia  Z86.59 V11.8   7. Centrilobular emphysema  J43.2 492.8     Patient Active Problem List   Diagnosis   • Fall   • Delirium, acute   • UTI (urinary tract infection), bacterial   • Essential hypertension   • Cognitive decline   • Trigeminal neuralgia   • Hypothyroidism (acquired)   • Lung mass   • Influenza A   • Severe malnutrition (CMS/HCC)   • Dementia without behavioral disturbance   • History of craniotomy   • Impaired functional mobility, balance, gait, and endurance   • COPD ex with volume overload   • chronic CHF (congestive heart failure) ([facility])   • Thrombocytopenia   • Elevated transaminase level   • atrial fibrillation   • COPD with acute exacerbation   • Acute on chronic respiratory failure with hypoxemia   • Chronic respiratory failure   • Underweight   • Moderate malnutrition (CMS/HCC)   • Centrilobular emphysema   • Essential tremor   • Pneumonia of both upper lobes due to infectious organism   • Sepsis   • Nausea vomiting and diarrhea     Past Medical History:   Diagnosis Date   • A-fib    • Ataxic gait    • Chronic kidney disease, stage 3    • COPD (chronic obstructive pulmonary disease)    • Dementia    • Disease of thyroid gland    • Essential tremor 2022   • Heart failure    • Hyperlipidemia    • Hypertension    • Shingles      Past Surgical History:   Procedure Laterality Date   • BRAIN TUMOR EXCISION      BENIGN   • FRACTURE SURGERY     • TRIGGER FINGER RELEASE        General Information     Row Name 04/15/23 5305          Physical Therapy  Time and Intention    Document Type evaluation  -BA     Mode of Treatment physical therapy  -     Row Name 04/15/23 1515          General Information    Patient Profile Reviewed yes  -BA     Prior Level of Function independent:;all household mobility;transfer;w/c or scooter;bed mobility;ADL's  Pt limited historian; pt's nephew assisted w/ PLOF. Reported pt nonambulatory at baseline. Able to I'ly perform squat-stand pivot transfers to w/c. Able to self-propel w/c I'ly.  Hx falls, w/ last fall ~2 wks ago. Staff typically present for bathing.  -BA     Existing Precautions/Restrictions fall;oxygen therapy device and L/min;other (see comments)  urinary incontinence; dementia  -BA     Barriers to Rehab medically complex;previous functional deficit;cognitive status  -     Row Name 04/15/23 1515          Living Environment    People in Home facility resident  Reported lives at [facility].  -     Row Name 04/15/23 1515          Home Main Entrance    Number of Stairs, Main Entrance none  -     Row Name 04/15/23 1515          Stairs Within Home, Primary    Number of Stairs, Within Home, Primary none  -     Row Name 04/15/23 1515          Cognition    Orientation Status (Cognition) oriented to;person;place;verbal cues/prompts needed for orientation;time;other (see comments)  Reported it was 1923, but corrected with cues.  Not oriented to month.  -     Row Name 04/15/23 1515          Safety Issues, Functional Mobility    Safety Issues Affecting Function (Mobility) awareness of need for assistance;insight into deficits/self-awareness;judgment;problem-solving;safety precaution awareness;safety precautions follow-through/compliance;sequencing abilities  -BA     Impairments Affecting Function (Mobility) balance;coordination;endurance/activity tolerance;motor planning;postural/trunk control;range of motion (ROM);shortness of breath;strength;cognition  -BA     Cognitive Impairments, Mobility Safety/Performance  awareness, need for assistance;insight into deficits/self-awareness;safety precaution awareness;safety precaution follow-through;sequencing abilities  -           User Key  (r) = Recorded By, (t) = Taken By, (c) = Cosigned By    Initials Name Provider Type    Blanca Chua, PT Physical Therapist               Mobility     Row Name 04/15/23 1522          Bed Mobility    Bed Mobility supine-sit;scooting/bridging  -     Scooting/Bridging [place] (Bed Mobility) minimum assist (75% patient effort);2 person assist;verbal cues;nonverbal cues (demo/gesture)  -     Supine-Sit [place] (Bed Mobility) moderate assist (50% patient effort);2 person assist;verbal cues;nonverbal cues (demo/gesture)  -     Assistive Device (Bed Mobility) bed rails;draw sheet;head of bed elevated  -     Comment, (Bed Mobility) Increased time and effort with VCs/TCs for sequencing and hand placement.  Reported no dizziness upon sitting EOB.  -     Row Name 04/15/23 1522          Bed-Chair Transfer    Bed-Chair [place] (Transfers) moderate assist (50% patient effort);2 person assist;verbal cues  -     Assistive Device (Bed-Chair Transfers) other (see comments)  BUE support  -     Comment, (Bed-Chair Transfer) Took a few shuffled steps laterally to L side to perform SPT from bed to chair.  Posterior lean throughout.  VCs/TCs for sequencing of steps and weight shifting to safely pivot hips over to chair.  -     Row Name 04/15/23 1522          Sit-Stand Transfer    Sit-Stand [place] (Transfers) moderate assist (50% patient effort);2 person assist;verbal cues;nonverbal cues (demo/gesture)  -     Assistive Device (Sit-Stand Transfers) other (see comments)  BUE support  -     Comment, (Sit-Stand Transfer) STS with B knee blocking.  VCs/TCs for optimal pre-positioning, sequencing, hand placement, and upright posture.  -     Row Name 04/15/23 1522          Gait/Stairs (Locomotion)    Comment, (Gait/Stairs)  Amb deferred d/t nonambulatory at baseline.  -           User Key  (r) = Recorded By, (t) = Taken By, (c) = Cosigned By    Initials Name Provider Type     Blanca Salamanca, CASEY Physical Therapist               Obj/Interventions     [facility] Name 04/15/23 1526          Range of Motion Comprehensive    General Range of Motion lower extremity range of motion deficits identified  -     Comment, General Range of Motion AROM B hip and knee WFL.  Minimal AROM noted in B ankle DF/PF.  AAROM/PROM L ankle DF to ~neutral and R ankle DF to lacking ~5 degrees from neutral.  -     Row Name 04/15/23 1526          Strength Comprehensive (MMT)    General Manual Muscle Testing (MMT) Assessment lower extremity strength deficits identified  -     Comment, General Manual Muscle Testing (MMT) Assessment BLE grossly 3+/5.  -[facility] Name 04/15/23 1526          Motor Skills    Motor Skills coordination;functional endurance  -     Coordination gross motor deficit;bilateral;lower extremity;minimal impairment  -     Functional Endurance Decreased functional endurance.  Easily fatigues with activity.  -     Row Name 04/15/23 1526          Balance    Balance Assessment sitting static balance;sitting dynamic balance;sit to stand dynamic balance;standing static balance;standing dynamic balance  -     Static Sitting Balance standby assist  -     Dynamic Sitting Balance contact guard  -     Position, Sitting Balance unsupported;sitting edge of bed;sitting in chair  -     Sit to Stand Dynamic Balance moderate assist;2-person assist;verbal cues;non-verbal cues (demo/gesture)  -     Static Standing Balance minimal assist;moderate assist;2-person assist;verbal cues  -     Dynamic Standing Balance moderate assist;2-person assist;verbal cues  -     Position/Device Used, Standing Balance supported;other (see comments)  BUE support  -     Comment, Balance Moderate instability with standing and transfers with BUE support.   Posterior lean noted throughout transfer.  -BA     Row Name 04/15/23 1526          Sensory Assessment (Somatosensory)    Sensory Assessment (Somatosensory) not tested  -BA           User Key  (r) = Recorded By, (t) = Taken By, (c) = Cosigned By    Initials Name Provider Type    Blanca Chua, PT Physical Therapist               Goals/Plan     Row Name 04/15/23 1532          Bed Mobility Goal 1 (PT)    Activity/Assistive Device (Bed Mobility Goal 1, PT) sit to supine/supine to sit  -BA     [place] Level/Cues Needed (Bed Mobility Goal 1, PT) standby assist  -BA     Time Frame (Bed Mobility Goal 1, PT) long term goal (LTG);10 days  -BA     Row Name 04/15/23 1532          Transfer Goal 1 (PT)    Activity/Assistive Device (Transfer Goal 1, PT) wheelchair transfer;bed-to-chair/chair-to-bed  -BA     [place] Level/Cues Needed (Transfer Goal 1, PT) contact guard required  -BA     Time Frame (Transfer Goal 1, PT) long term goal (LTG);10 days  -     Row Name 04/15/23 1532          Problem Specific Goal 1 (PT)    Problem Specific Goal 1 (PT) Pt will be able to self-propel w/c 100ft with SBA and good obstacle navigation.  -BA     Time Frame (Problem Specific Goal 1, PT) long-term goal (LTG);2 weeks  -BA     Row Name 04/15/23 1532          Patient Education Goal (PT)    Activity (Patient Education Goal, PT) HEP  -BA     [place]/Cues/Accuracy (Memory Goal 2, PT) demonstrates adequately  -BA     Time Frame (Patient Education Goal, PT) long term goal (LTG);10 days  -BA     Row Name 04/15/23 1532          Therapy Assessment/Plan (PT)    Planned Therapy Interventions (PT) balance training;bed mobility training;home exercise program;motor coordination training;neuromuscular re-education;patient/family education;postural re-education;ROM (range of motion);strengthening;transfer training;wheelchair management/propulsion training  -BA           User Key  (r) = Recorded By, (t) = Taken By, (c) = Cosigned By     Initials Name Provider Type     Blanca Salamanca, PT Physical Therapist               Clinical Impression     Row Name 04/15/23 1529          Pain    Pretreatment Pain Rating 9/10  -BA     Posttreatment Pain Rating 9/10  -     Pain Location generalized  -     Pain Location - neck  -     Pre/Posttreatment Pain Comment Tolerated activity; RN aware and managing.  -     Pain Intervention(s) Ambulation/increased activity;Repositioned  -     Row Name 04/15/23 1529          Plan of Care Review    Plan of Care Reviewed With patient;other (see comments)  nephew  -     Outcome Evaluation PT initial Eval completed.  Pt presents with generalized weakness, balance deficits, decreased functional endurance, and decreased indep/safety with functional mobility compared to baseline level of function.  STS and bed to chair transfer with modAx2 and BUE support.  Pt warrants skilled IP PT to improve indep with mobility and return to PLOF.  Rec SNF upon d/c.  -     Row Name 04/15/23 1529          Therapy Assessment/Plan (PT)    Rehab Potential (PT) good, to achieve stated therapy goals  -     Criteria for Skilled Interventions Met (PT) yes;meets criteria;skilled treatment is necessary  -     Therapy Frequency (PT) daily  -     Row Name 04/15/23 1529          Vital Signs    Pre Systolic BP Rehab 134  -BA     Pre Treatment Diastolic BP 72  -BA     Post Systolic BP Rehab 122  -BA     Post Treatment Diastolic BP 60  -BA     Pretreatment Heart Rate (beats/min) 76  -BA     Posttreatment Heart Rate (beats/min) 73  -BA     Pre SpO2 (%) 95  -BA     O2 Delivery Pre Treatment nasal cannula  2L  -BA     O2 Delivery Intra Treatment nasal cannula  2L  -BA     Post SpO2 (%) 97  -BA     O2 Delivery Post Treatment nasal cannula  2L  -BA     Pre Patient Position Supine  -BA     Intra Patient Position Standing  -BA     Post Patient Position Sitting  -     Row Name 04/15/23 1529          Positioning and Restraints    Pre-Treatment  Position in bed  -BA     Post Treatment Position chair  -BA     In Chair notified nsg;reclined;sitting;call light within reach;encouraged to call for assist;exit alarm on;with family/caregiver;waffle cushion;legs elevated;waffle boot/both  -           User Key  (r) = Recorded By, (t) = Taken By, (c) = Cosigned By    Initials Name Provider Type    Blanca Chua PT Physical Therapist               Outcome Measures     Row Name 04/15/23 1534          How much help from another person do you currently need...    Turning from your back to your side while in flat bed without using bedrails? 2  -BA     Moving from lying on back to sitting on the side of a flat bed without bedrails? 2  -BA     Moving to and from a bed to a chair (including a wheelchair)? 2  -BA     Standing up from a chair using your arms (e.g., wheelchair, bedside chair)? 2  -BA     Climbing 3-5 steps with a railing? 1  -BA     To walk in hospital room? 1  -BA     AM-PAC 6 Clicks Score (PT) 10  -BA     Highest level of mobility 4 --> Transferred to chair/commode  -     Row Name 04/15/23 1534          Functional Assessment    Outcome Measure Options AM-PAC 6 Clicks Basic Mobility (PT)  -           User Key  (r) = Recorded By, (t) = Taken By, (c) = Cosigned By    Initials Name Provider Type    Blanca Chua, CASEY Physical Therapist                             Physical Therapy Education     Title: PT OT SLP Therapies (In Progress)     Topic: Physical Therapy (In Progress)     Point: Mobility training (In Progress)     Learning Progress Summary           Patient Acceptance, E, NR by CARTER at 4/15/2023 1534                   Point: Home exercise program (Not Started)     Learner Progress:  Not documented in this visit.          Point: Body mechanics (In Progress)     Learning Progress Summary           Patient Acceptance, E, NR by CARTER at 4/15/2023 1534                   Point: Precautions (In Progress)     Learning Progress Summary            Patient Acceptance, E, NR by  at 4/15/2023 1534                               User Key     Initials Effective Dates Name Provider Type Discipline     09/21/21 -  Blanca Salamanca PT Physical Therapist PT              PT Recommendation and Plan  Planned Therapy Interventions (PT): balance training, bed mobility training, home exercise program, motor coordination training, neuromuscular re-education, patient/family education, postural re-education, ROM (range of motion), strengthening, transfer training, wheelchair management/propulsion training  Plan of Care Reviewed With: patient, other (see comments) (nephew)  Outcome Evaluation: PT initial Eval completed.  Pt presents with generalized weakness, balance deficits, decreased functional endurance, and decreased indep/safety with functional mobility compared to baseline level of function.  STS and bed to chair transfer with modAx2 and BUE support.  Pt warrants skilled IP PT to improve indep with mobility and return to PLOF.  Rec SNF upon d/c.     Time Calculation:    PT Charges     Row Name 04/15/23 1535             Time Calculation    Start Time 1310  -BA      PT Received On 04/15/23  -      PT Goal Re-Cert Due Date 04/25/23  -         Untimed Charges    PT Eval/Re-eval Minutes 65  -BA         Total Minutes    Untimed Charges Total Minutes 65  -BA       Total Minutes 65  -BA            User Key  (r) = Recorded By, (t) = Taken By, (c) = Cosigned By    Initials Name Provider Type     Blanca Salamanca PT Physical Therapist              Therapy Charges for Today     Code Description Service Date Service Provider Modifiers Qty    70342533851 HC PT EVAL MOD COMPLEXITY 4 4/15/2023 Blanca Salamanca, PT GP 1    46030556601 HC PT THER SUPP EA 15 MIN 4/15/2023 Blanca Salamanca PT GP 2          PT G-Codes  Outcome Measure Options: AM-PAC 6 Clicks Basic Mobility (PT)  AM-PAC 6 Clicks Score (PT): 10  PT Discharge Summary  Anticipated Discharge Disposition  (PT): skilled nursing facility    Blanca Salamanca, PT  4/15/2023

## 2023-04-15 NOTE — PROGRESS NOTES
[facility] Medicine Services  PROGRESS NOTE    Patient Name: Mitzi Eric  : 1935  MRN: [id number]    Date of Admission: 2023  Primary Care Physician: System, Provider Not In    Subjective   Subjective     CC:  Diarrhea/SOA    HPI:  Patient resting in bed with nephew at bedside. Patient states she is feeling ok, comfortable on 2L NC. Diarrhea is under better control thus far. Patient did not feel like eating much breakfast.    ROS:  Gen- No fevers, chills  CV- No chest pain, palpitations  Resp- No cough, +dyspnea  GI- No N/V/D, abd pain    Objective   Objective     Vital Signs:   Temp:  [97.5 °F (36.4 °C)-98.1 °F (36.7 °C)] 97.9 °F (36.6 °C)  Heart Rate:  [71-93] 79  Resp:  [16-18] 16  BP: ()/(44-77) 156/66  Flow (L/min):  [2] 2     Physical Exam:  Constitutional: No acute distress, awake, alert  HENT: NCAT, mucous membranes moist  Respiratory: decreased and course to auscultation bilaterally, respiratory effort normal on 2L NC  Cardiovascular: RRR, no murmurs, rubs, or gallops  Gastrointestinal: Positive bowel sounds, soft, nontender, nondistended  Musculoskeletal: No bilateral ankle edema  Psychiatric: Appropriate affect, cooperative  Neurologic: Oriented x 3, strength symmetric in all extremities, Cranial Nerves grossly intact to confrontation, speech clear  Skin: No rashes    Results Reviewed:  LAB RESULTS:      Lab 04/15/23  0635 23  1356 23  1219   WBC 5.79  --  18.69*   HEMOGLOBIN 10.5*  --  12.3   HEMATOCRIT 33.3*  --  39.0   PLATELETS 104*  --  142   NEUTROS ABS 4.04  --  15.60*   IMMATURE GRANS (ABS) 0.03  --  0.14*   LYMPHS ABS 0.90  --  1.21   MONOS ABS 0.79  --  1.70*   EOS ABS 0.01  --  0.00   MCV 99.4*  --  101.0*   PROCALCITONIN  --   --  0.14   LACTATE  --  1.4  --    PROTIME  --   --  13.6         Lab 04/15/23  0635 23  1223 23  1219   SODIUM 142  --  139   POTASSIUM 3.6  --  3.8   CHLORIDE 111*  --  104   CO2 20.0*  --  19.0*    ANION GAP 11.0  --  16.0*   BUN 33*  --  39*   CREATININE 1.10* 1.70* 1.54*   EGFR 49.0*  --  32.7*   GLUCOSE 80  --  100*   CALCIUM 7.9*  --  8.6   MAGNESIUM  --   --  2.3         Lab 04/14/23  1219   TOTAL PROTEIN 6.8   ALBUMIN 4.4   GLOBULIN 2.4   ALT (SGPT) 14   AST (SGOT) 37*   BILIRUBIN 0.5   ALK PHOS 113   LIPASE 60         Lab 04/14/23  1219   PROTIME 13.6   INR 1.05                 Brief Urine Lab Results  (Last result in the past 365 days)      Color   Clarity   Blood   Leuk Est   Nitrite   Protein   CREAT   Urine HCG        04/14/23 1230 Yellow   Cloudy   Trace   Moderate (2+)   Positive   30 mg/dL (1+)                 Microbiology Results Abnormal     Procedure Component Value - Date/Time    Clostridioides difficile Toxin - Stool, Per Rectum [[id number]]  (Normal) Collected: 04/14/23 1710    Lab Status: Final result Specimen: Stool from Per Rectum Updated: 04/14/23 1910    Narrative:      The following orders were created for panel order Clostridioides difficile Toxin - Stool, Per Rectum.  Procedure                               Abnormality         Status                     ---------                               -----------         ------                     Clostridioides difficile...[[id number]]  Normal              Final result                 Please view results for these tests on the individual orders.    Clostridioides difficile Toxin, PCR - Stool, Per Rectum [[id number]]  (Normal) Collected: 04/14/23 1710    Lab Status: Final result Specimen: Stool from Per Rectum Updated: 04/14/23 1910     C. Difficile Toxins by PCR Not Detected    Narrative:      The result indicates the absence of toxigenic C. difficile from stool specimen.           CT Chest With Contrast Diagnostic    Result Date: 4/14/2023  CT CHEST W CONTRAST DIAGNOSTIC, CT ABDOMEN PELVIS W CONTRAST Date of Exam: 4/14/2023 2:33 PM EDT Indication: lung mass. Comparison: AP portable chest 4/4/2023, AP and lateral chest 2/17/2023. CT chest  2/12/2023, 4/22/2022. CT abdomen 7/15/2018 Technique: Axial CT images were obtained of the chest after the uneventful intravenous administration of 86 cc Isovue-300.  Reconstructed coronal and sagittal images were also obtained. Automated exposure control and iterative construction methods were used. Findings: There is dense consolidative change within the left upper lobe, extending from the hilum, along the fissure margin, with apex. This has significantly increased or become more coalescent than on the prior study, where there is a somewhat cavitary appearance to the consolidation. There is associated generalized volume loss of the left upper lobe. Within this consolidation, intervening areas of fluid attenuation are present, which may represent underlying necrotic components. Crescentic masslike consolidation in the right upper lobe extending to the apex is unchanged. Severe emphysema is present. Heart size is normal. Coronary calcifications are present. Central pulmonary arteries appear mildly dilated. No pulmonary embolus is seen. Mild thoracic aortic calcific atherosclerosis is present. The low-density 8 mm nodule in the left thyroid lobe appears unchanged. Moderate aortic calcific atherosclerosis is present. The included portions of the upper abdominal organs are within normal limits. Probable cystic dilation of the nerve root sheaths are slightly worse are seen projecting from the lower thoracic spine, unchanged from prior. No suspicious osseous lesion is identified.     Impression: 1. Masslike consolidation with the volume loss in the left upper lobe with intervening fluid-necrotic components. This is developed or progressed since the 2/12/2023 study, where the lung parenchyma had a more cavitary appearance. 2. Masslike consolidation in the right upper lobe is unchanged since the more remote 4/22/2022 examination, favored to represent chronic scarring. 3. Severe emphysema. 4. Mild central pulmonary artery  dilation. Correlate clinically for pulmonary hypertension. Electronically Signed: Janet Lynch  4/14/2023 3:02 PM EDT  Workstation ID: [id number]    CT Abdomen Pelvis With Contrast    Result Date: 4/14/2023  CT CHEST W CONTRAST DIAGNOSTIC, CT ABDOMEN PELVIS W CONTRAST Date of Exam: 4/14/2023 2:33 PM EDT Indication: lung mass. Comparison: AP portable chest 4/4/2023, AP and lateral chest 2/17/2023. CT chest 2/12/2023, 4/22/2022. CT abdomen 7/15/2018 Technique: Axial CT images were obtained of the chest after the uneventful intravenous administration of 86 cc Isovue-300.  Reconstructed coronal and sagittal images were also obtained. Automated exposure control and iterative construction methods were used. Findings: There is dense consolidative change within the left upper lobe, extending from the hilum, along the fissure margin, with apex. This has significantly increased or become more coalescent than on the prior study, where there is a somewhat cavitary appearance to the consolidation. There is associated generalized volume loss of the left upper lobe. Within this consolidation, intervening areas of fluid attenuation are present, which may represent underlying necrotic components. Crescentic masslike consolidation in the right upper lobe extending to the apex is unchanged. Severe emphysema is present. Heart size is normal. Coronary calcifications are present. Central pulmonary arteries appear mildly dilated. No pulmonary embolus is seen. Mild thoracic aortic calcific atherosclerosis is present. The low-density 8 mm nodule in the left thyroid lobe appears unchanged. Moderate aortic calcific atherosclerosis is present. The included portions of the upper abdominal organs are within normal limits. Probable cystic dilation of the nerve root sheaths are slightly worse are seen projecting from the lower thoracic spine, unchanged from prior. No suspicious osseous lesion is identified.     Impression: 1. Masslike consolidation  with the volume loss in the left upper lobe with intervening fluid-necrotic components. This is developed or progressed since the 2/12/2023 study, where the lung parenchyma had a more cavitary appearance. 2. Masslike consolidation in the right upper lobe is unchanged since the more remote 4/22/2022 examination, favored to represent chronic scarring. 3. Severe emphysema. 4. Mild central pulmonary artery dilation. Correlate clinically for pulmonary hypertension. Electronically Signed: Janet Lynch  4/14/2023 3:02 PM EDT  Workstation ID: [id number]    XR Chest 1 View    Result Date: 4/14/2023  XR CHEST 1 VW Date of Exam: 4/14/2023 12:46 PM EDT Indication: abd pain. Comparison: PA and lateral chest 2/17/2023, CT chest 2/12/2023 Findings: Lungs are hyperinflated and may be emphysematous. Masslike density is seen in the left upper lobe, and appears slightly increased since 2/17/2023. New left apical pleural parenchymal opacification or loculated pleural fluid is present compared to the prior chest x-ray. Irregular bandlike opacity in the right upper lobe extending to the apex is unchanged. The lung bases remain clear. Heart size is normal. No pneumothorax. No acute osseous abnormality.     Impression: 1. Left apical masslike opacity appears larger with new apical pleural-parenchymal thickening or loculated fluid since 2/17/2023. Consider CT chest correlation. 2. Bandlike density in the right upper lobe apex is unchanged, favored to represent chronic scarring. Electronically Signed: Janet Lynch  4/14/2023 1:00 PM EDT  Workstation ID: [id number]      Results for orders placed during the hospital encounter of 03/17/22    Adult Transthoracic Echo Complete W/ Cont if Necessary Per Protocol    Interpretation Summary  · Left ventricular ejection fraction appears to be 46 - 50%. Left ventricular systolic function is mildly decreased.  · Left ventricular diastolic function is consistent with (grade I) impaired relaxation.  · Mild  to moderate mitral regurgitation.  · Mild to moderate tricuspid regurgitation, RVSP 54 mmHg.      Current medications:  Scheduled Meds:albuterol sulfate HFA, 4 puff, Inhalation, Once  aspirin, 81 mg, Oral, Daily  donepezil, 5 mg, Oral, QAM  folic acid, 400 mcg, Oral, BID  levothyroxine, 25 mcg, Oral, Q AM  meropenem, 1 g, Intravenous, Q12H  mirtazapine, 7.5 mg, Oral, Nightly  OXcarbazepine, 600 mg, Oral, BID  pantoprazole, 40 mg, Oral, Daily  pregabalin, 25 mg, Oral, BID  sodium chloride, 10 mL, Intravenous, Q12H      Continuous Infusions:sodium chloride, 75 mL/hr, Last Rate: 75 mL/hr (04/15/23 0943)      PRN Meds:.•  acetaminophen **OR** acetaminophen **OR** acetaminophen  •  ondansetron **OR** ondansetron  •  [COMPLETED] Insert Peripheral IV **AND** sodium chloride  •  sodium chloride  •  sodium chloride  •  traMADol    Assessment & Plan   Assessment & Plan     Active Hospital Problems    Diagnosis  POA   • **Sepsis [A41.9]  Yes   • Nausea vomiting and diarrhea [R11.2, R19.7]  Unknown   • chronic CHF (congestive heart failure) (HCC) [I50.9]  Yes   • Dementia without behavioral disturbance [F03.90]  Yes   • Severe malnutrition (CMS/HCC) [E43]  Yes   • Hypothyroidism (acquired) [E03.9]  Yes   • Lung mass [R91.8]  Yes   • UTI (urinary tract infection), bacterial [N39.0, A49.9]  Unknown   • Essential hypertension [I10]  Yes      Resolved Hospital Problems   No resolved problems to display.        Brief Hospital Course to date:  Mitzi Eric is a 87 y.o. female with PMHx with history of CKD, COPD (2L nightly) with known bilateral upper lobe masses, paroxysmal A-fib, dementia, HLD, HTN, hypothyroidism, Trigeminal neuralgia who presents to ED from her facility, [facility], due to profuse diarrhea. Patient found to have an acute UTI along with Rotavirus and was admitted to the hospitalist service for further evaluation and treatment.    This patient's problems and plans were partially entered by my partner and  updated as appropriate by me 04/15/23.    All problems are new to me today.    Sepsis (HR>90 on arrival, Leukocytosis)  UTI  Rotavirus +  EPEC +  Nausea/Vomiting/Diarrhea   -s/p fluids  -Blood Cx x2 pending   -Urine Cx pending  -Recent ABX use for PNA.  - GI PCR positive for Enteropathogenic E. coli  -Procal, lactate normal   -CT A/P stable  -Recently completed course of Zosyn and Doxycycline for presumed PNA.  - stop Merrem- continue rocephin only     JUAN F  -Cr 1.70 on admission  - improved to 1.1 today  -baseline 0.7  -Secondary to dehydration from diarrhea  -s/p IVF  -Hold Nephrotoxic medications     Left Apical Lung mass with fluid-necrotic components/cavitary appearance   Right Upper lobe lung mass   Hx COPD  -Patient/POA (nephew) have been aware of this and not interested in further work-up  -On 2L NC currently, normally on RA during the day and 2L at night per Nephew   - stop Merrem- continue rocephin only. Most oral anaerobes are covered by Rocephin. (recently completed Zosyn/Doxy for presumed B/L PNA)      Combined systolic and diastolic heart failure  Moderate pulm hypertension  -Compensated, monitor with IVF   -Hold home Demedex/Spironolactone      Paroxysmal atrial fibrillation  -Hold BB as BP borderline      CKD stage III  -Renal function stable     Hypothyroidism  -Continue Synthroid     Trigeminal neuralgia  -Continue Trileptal and Lyrica     Hypertension  -Hold home BP meds as BP borderline      Dementia  -Continue Aricept    Expected Discharge Location and Transportation: home  Expected Discharge   Expected Discharge Date and Time     Expected Discharge Date Expected Discharge Time    Apr 17, 2023            DVT prophylaxis:  Mechanical DVT prophylaxis orders are present.     AM-PAC 6 Clicks Score (PT): 12 (04/14/23 1600)    CODE STATUS:   Code Status and Medical Interventions:   Ordered at: 04/14/23 1607     Medical Intervention Limits:    NO intubation (DNI)     Code Status (Patient has no pulse  and is not breathing):    No CPR (Do Not Attempt to Resuscitate)     Medical Interventions (Patient has pulse or is breathing):    Limited Support       Alisia Dinh DO  04/15/23

## 2023-04-15 NOTE — PLAN OF CARE
Goal Outcome Evaluation:  Plan of Care Reviewed With: patient, other (see comments) (nephew)           Outcome Evaluation: PT initial Eval completed.  Pt presents with generalized weakness, balance deficits, decreased functional endurance, and decreased indep/safety with functional mobility compared to baseline level of function.  STS and bed to chair transfer with modAx2 and BUE support.  Pt warrants skilled IP PT to improve indep with mobility and return to PLOF.  Rec SNF upon d/c.

## 2023-04-15 NOTE — PLAN OF CARE
Goal Outcome Evaluation:               Vitals stable. Patient turned in bed. Patient up in chair. IV fluids running.

## 2023-04-16 LAB
ALBUMIN SERPL-MCNC: 3.4 G/DL (ref 3.5–5.2)
ALBUMIN/GLOB SERPL: 1.7 G/DL
ALP SERPL-CCNC: 100 U/L (ref 39–117)
ALT SERPL W P-5'-P-CCNC: 14 U/L (ref 1–33)
ANION GAP SERPL CALCULATED.3IONS-SCNC: 13 MMOL/L (ref 5–15)
AST SERPL-CCNC: 34 U/L (ref 1–32)
BACTERIA SPEC AEROBE CULT: ABNORMAL
BILIRUB SERPL-MCNC: 0.2 MG/DL (ref 0–1.2)
BUN SERPL-MCNC: 11 MG/DL (ref 8–23)
BUN/CREAT SERPL: 16.9 (ref 7–25)
CALCIUM SPEC-SCNC: 7.8 MG/DL (ref 8.6–10.5)
CHLORIDE SERPL-SCNC: 108 MMOL/L (ref 98–107)
CO2 SERPL-SCNC: 21 MMOL/L (ref 22–29)
CREAT SERPL-MCNC: 0.65 MG/DL (ref 0.57–1)
DEPRECATED RDW RBC AUTO: 44.5 FL (ref 37–54)
EGFRCR SERPLBLD CKD-EPI 2021: 85.9 ML/MIN/1.73
ERYTHROCYTE [DISTWIDTH] IN BLOOD BY AUTOMATED COUNT: 12.5 % (ref 12.3–15.4)
GLOBULIN UR ELPH-MCNC: 2 GM/DL
GLUCOSE SERPL-MCNC: 78 MG/DL (ref 65–99)
HCT VFR BLD AUTO: 34.1 % (ref 34–46.6)
HGB BLD-MCNC: 11 G/DL (ref 12–15.9)
MCH RBC QN AUTO: 31.6 PG (ref 26.6–33)
MCHC RBC AUTO-ENTMCNC: 32.3 G/DL (ref 31.5–35.7)
MCV RBC AUTO: 98 FL (ref 79–97)
PLATELET # BLD AUTO: 100 10*3/MM3 (ref 140–450)
PMV BLD AUTO: 12.6 FL (ref 6–12)
POTASSIUM SERPL-SCNC: 2.8 MMOL/L (ref 3.5–5.2)
PROT SERPL-MCNC: 5.4 G/DL (ref 6–8.5)
RBC # BLD AUTO: 3.48 10*6/MM3 (ref 3.77–5.28)
SODIUM SERPL-SCNC: 142 MMOL/L (ref 136–145)
WBC NRBC COR # BLD: 4.13 10*3/MM3 (ref 3.4–10.8)

## 2023-04-16 PROCEDURE — 97166 OT EVAL MOD COMPLEX 45 MIN: CPT

## 2023-04-16 PROCEDURE — 80053 COMPREHEN METABOLIC PANEL: CPT | Performed by: HOSPITALIST

## 2023-04-16 PROCEDURE — 99232 SBSQ HOSP IP/OBS MODERATE 35: CPT | Performed by: HOSPITALIST

## 2023-04-16 PROCEDURE — 85027 COMPLETE CBC AUTOMATED: CPT | Performed by: HOSPITALIST

## 2023-04-16 PROCEDURE — 25010000002 CEFTRIAXONE PER 250 MG: Performed by: HOSPITALIST

## 2023-04-16 PROCEDURE — 97530 THERAPEUTIC ACTIVITIES: CPT

## 2023-04-16 RX ORDER — CASTOR OIL AND BALSAM, PERU 788; 87 MG/G; MG/G
1 OINTMENT TOPICAL EVERY 12 HOURS SCHEDULED
Status: DISCONTINUED | OUTPATIENT
Start: 2023-04-16 | End: 2023-04-17 | Stop reason: HOSPADM

## 2023-04-16 RX ORDER — SPIRONOLACTONE 25 MG/1
25 TABLET ORAL DAILY
Status: DISCONTINUED | OUTPATIENT
Start: 2023-04-16 | End: 2023-04-17 | Stop reason: HOSPADM

## 2023-04-16 RX ORDER — METOPROLOL SUCCINATE 25 MG/1
25 TABLET, EXTENDED RELEASE ORAL DAILY
Status: DISCONTINUED | OUTPATIENT
Start: 2023-04-16 | End: 2023-04-17 | Stop reason: HOSPADM

## 2023-04-16 RX ORDER — TORSEMIDE 20 MG/1
20 TABLET ORAL DAILY
Status: DISCONTINUED | OUTPATIENT
Start: 2023-04-16 | End: 2023-04-17 | Stop reason: HOSPADM

## 2023-04-16 RX ADMIN — FOLIC ACID TAB 400 MCG 400 MCG: 400 TAB at 20:27

## 2023-04-16 RX ADMIN — CASTOR OIL AND BALSAM, PERU 1 APPLICATION: 788; 87 OINTMENT TOPICAL at 08:29

## 2023-04-16 RX ADMIN — PANTOPRAZOLE SODIUM 40 MG: 40 TABLET, DELAYED RELEASE ORAL at 08:16

## 2023-04-16 RX ADMIN — MIRTAZAPINE 7.5 MG: 15 TABLET, FILM COATED ORAL at 20:27

## 2023-04-16 RX ADMIN — PREGABALIN 25 MG: 25 CAPSULE ORAL at 20:27

## 2023-04-16 RX ADMIN — OXCARBAZEPINE 600 MG: 150 TABLET, FILM COATED ORAL at 08:16

## 2023-04-16 RX ADMIN — DONEPEZIL HYDROCHLORIDE 5 MG: 10 TABLET, FILM COATED ORAL at 06:20

## 2023-04-16 RX ADMIN — PREGABALIN 25 MG: 25 CAPSULE ORAL at 08:16

## 2023-04-16 RX ADMIN — OXCARBAZEPINE 600 MG: 150 TABLET, FILM COATED ORAL at 20:28

## 2023-04-16 RX ADMIN — SPIRONOLACTONE 25 MG: 25 TABLET ORAL at 12:31

## 2023-04-16 RX ADMIN — Medication 10 ML: at 08:19

## 2023-04-16 RX ADMIN — FOLIC ACID TAB 400 MCG 400 MCG: 400 TAB at 08:16

## 2023-04-16 RX ADMIN — ASPIRIN 81 MG: 81 TABLET, COATED ORAL at 08:16

## 2023-04-16 RX ADMIN — SODIUM CHLORIDE 1 G: 900 INJECTION INTRAVENOUS at 08:16

## 2023-04-16 RX ADMIN — CASTOR OIL AND BALSAM, PERU 1 APPLICATION: 788; 87 OINTMENT TOPICAL at 20:29

## 2023-04-16 RX ADMIN — METOPROLOL SUCCINATE 25 MG: 25 TABLET, EXTENDED RELEASE ORAL at 12:31

## 2023-04-16 RX ADMIN — TORSEMIDE 20 MG: 20 TABLET ORAL at 12:31

## 2023-04-16 RX ADMIN — LEVOTHYROXINE SODIUM 25 MCG: 0.03 TABLET ORAL at 06:20

## 2023-04-16 NOTE — THERAPY EVALUATION
Patient Name: Mitzi Eric  : 1935    MRN: [id number]                              Today's Date: 2023       Admit Date: 2023    Visit Dx:     ICD-10-CM ICD-9-CM   1. Sepsis secondary to UTI  A41.9 038.9    N39.0 995.91     599.0   2. Leukocytosis, unspecified type  D72.829 288.60   3. Acute kidney injury  N17.9 584.9   4. Dehydration  E86.0 276.51   5. Nausea vomiting and diarrhea  R11.2 787.91    R19.7 787.01   6. History of dementia  Z86.59 V11.8   7. Centrilobular emphysema  J43.2 492.8     Patient Active Problem List   Diagnosis   • Fall   • Delirium, acute   • UTI (urinary tract infection), bacterial   • Essential hypertension   • Cognitive decline   • Trigeminal neuralgia   • Hypothyroidism (acquired)   • Lung mass   • Influenza A   • Severe malnutrition (CMS/HCC)   • Dementia without behavioral disturbance   • History of craniotomy   • Impaired functional mobility, balance, gait, and endurance   • COPD ex with volume overload   • chronic CHF (congestive heart failure) ([facility])   • Thrombocytopenia   • Elevated transaminase level   • atrial fibrillation   • COPD with acute exacerbation   • Acute on chronic respiratory failure with hypoxemia   • Chronic respiratory failure   • Underweight   • Moderate malnutrition (CMS/HCC)   • Centrilobular emphysema   • Essential tremor   • Pneumonia of both upper lobes due to infectious organism   • Sepsis   • Nausea vomiting and diarrhea     Past Medical History:   Diagnosis Date   • A-fib    • Ataxic gait    • Chronic kidney disease, stage 3    • COPD (chronic obstructive pulmonary disease)    • Dementia    • Disease of thyroid gland    • Essential tremor 2022   • Heart failure    • Hyperlipidemia    • Hypertension    • Shingles      Past Surgical History:   Procedure Laterality Date   • BRAIN TUMOR EXCISION      BENIGN   • FRACTURE SURGERY     • TRIGGER FINGER RELEASE        General Information     Row Name 23 2648          OT Time and  Intention    Document Type evaluation  -KF     Mode of Treatment occupational therapy  -KF     Row Name 04/16/23 1507          General Information    Patient Profile Reviewed yes  -KF     Prior Level of Function independent:;bed mobility;ADL's;min assist:;transfer;w/c or scooter  SPT to wc at baseline per chart; Pt limited historian  -KF     Existing Precautions/Restrictions fall;oxygen therapy device and L/min;other (see comments)  urinary incont, h/o dementia  -KF     Barriers to Rehab medically complex;previous functional deficit;cognitive status  -KF     Row Name 04/16/23 1507          Occupational Profile    Environmental Supports and Barriers (Occupational Profile) unclear prior level of function continue to assess  -KF     Row Name 04/16/23 1507          Living Environment    People in Home facility resident  -KF     Row Name 04/16/23 1507          Home Main Entrance    Number of Stairs, Main Entrance none  -KF     Row Name 04/16/23 1507          Stairs Within Home, Primary    Number of Stairs, Within Home, Primary none  -KF     Row Name 04/16/23 1507          Cognition    Orientation Status (Cognition) oriented to;person;verbal cues/prompts needed for orientation;other (see comments);disoriented to;time;situation;place  -KF     Row Name 04/16/23 1507          Safety Issues, Functional Mobility    Safety Issues Affecting Function (Mobility) insight into deficits/self-awareness;safety precaution awareness;awareness of need for assistance;positioning of assistive device  -KF     Impairments Affecting Function (Mobility) balance;coordination;endurance/activity tolerance;motor planning;postural/trunk control;range of motion (ROM);shortness of breath;strength;cognition  -KF     Cognitive Impairments, Mobility Safety/Performance awareness, need for assistance;insight into deficits/self-awareness;problem-solving/reasoning  -KF           User Key  (r) = Recorded By, (t) = Taken By, (c) = Cosigned By    Initials Name  Provider Type    KF Marialuisa Flores OT Occupational Therapist                 Mobility/ADL's     Row Name 04/16/23 1510          Bed Mobility    Bed Mobility supine-sit;scooting/bridging;rolling left;rolling right  -KF     Rolling Left [place] (Bed Mobility) minimum assist (75% patient effort);verbal cues  -KF     Rolling Right [place] (Bed Mobility) minimum assist (75% patient effort);verbal cues  -KF     Scooting/Bridging [place] (Bed Mobility) contact guard;verbal cues  -KF     Supine-Sit [place] (Bed Mobility) minimum assist (75% patient effort);verbal cues  -KF     Bed Mobility, Safety Issues decreased use of legs for bridging/pushing;decreased use of arms for pushing/pulling  -KF     Assistive Device (Bed Mobility) bed rails;draw sheet;head of bed elevated  -KF     Comment, (Bed Mobility) cues for sequencing and hand placement with mild dizziness which improved with time  -KF     Row Name 04/16/23 1510          Transfers    Transfers sit-stand transfer;stand-sit transfer;bed-chair transfer  -KF     Comment, (Transfers) cues for sequencing, STS x2 reps  -KF     Row Name 04/16/23 1510          Bed-Chair Transfer    Bed-Chair [place] (Transfers) minimum assist (75% patient effort)  -     Assistive Device (Bed-Chair Transfers) walker, front-wheeled  -KF     Row Name 04/16/23 1510          Sit-Stand Transfer    Sit-Stand [place] (Transfers) minimum assist (75% patient effort);1 person assist;verbal cues  -KF     Assistive Device (Sit-Stand Transfers) walker, front-wheeled  -KF     Row Name 04/16/23 1510          Stand-Sit Transfer    Stand-Sit [place] (Transfers) minimum assist (75% patient effort);verbal cues;1 person assist  -     Assistive Device (Stand-Sit Transfers) walker, front-wheeled  -KF     Row Name 04/16/23 1510          Activities of Daily Living    BADL Assessment/Intervention lower body dressing;grooming  -     Row Name 04/16/23 1510          Lower Body  Dressing Assessment/Training    [place] Level (Lower Body Dressing) don;maximum assist (25% patient effort)  -KF     Position (Lower Body Dressing) supine  -KF     Comment, (Lower Body Dressing) brief  -KF     Row Name 04/16/23 1510          Grooming Assessment/Training    [place] Level (Grooming) oral care regimen;wash face, hands;supervision;verbal cues  -KF     Position (Grooming) supported sitting  -KF           User Key  (r) = Recorded By, (t) = Taken By, (c) = Cosigned By    Initials Name Provider Type    KF Marialuisa Flores, OT Occupational Therapist               Obj/Interventions     Row Name 04/16/23 1512          Sensory Assessment (Somatosensory)    Sensory Assessment (Somatosensory) UE sensation intact  -KF     Row Name 04/16/23 1512          Vision Assessment/Intervention    Visual Impairment/Limitations corrective lenses full-time  -KF     Row Name 04/16/23 1512          Range of Motion Comprehensive    General Range of Motion bilateral upper extremity ROM WFL  -     Row Name 04/16/23 1512          Strength Comprehensive (MMT)    General Manual Muscle Testing (MMT) Assessment upper extremity strength deficits identified  -KF     Comment, General Manual Muscle Testing (MMT) Assessment BUE grossly 4-/5  -KF     Row Name 04/16/23 1512          Balance    Balance Assessment sitting static balance;sitting dynamic balance;standing static balance;standing dynamic balance  -KF     Static Sitting Balance standby assist  -KF     Dynamic Sitting Balance contact guard  -KF     Position, Sitting Balance unsupported;sitting edge of bed  -KF     Static Standing Balance minimal assist  -KF     Dynamic Standing Balance minimal assist  -KF     Position/Device Used, Standing Balance walker, front-wheeled  -KF     Balance Interventions standing;sit to stand;supported;minimal challenge;weight shifting activity  -KF     Comment, Balance slightly unsteady however improved with time and good use of FWW for SPT  to chair with no posterior lean demonstrated  -KF           User Key  (r) = Recorded By, (t) = Taken By, (c) = Cosigned By    Initials Name Provider Type    KF Marialuisa Flores, OT Occupational Therapist               Goals/Plan     Row Name 04/16/23 1518          Bed Mobility Goal 1 (OT)    Activity/Assistive Device (Bed Mobility Goal 1, OT) sidelying to sit/sit to sidelying  -KF     Ward Level/Cues Needed (Bed Mobility Goal 1, OT) contact guard required;verbal cues required  -KF     Time Frame (Bed Mobility Goal 1, OT) long term goal (LTG);10 days  -KF     Progress/Outcomes (Bed Mobility Goal 1, OT) goal ongoing;new goal  -KF     Row Name 04/16/23 1518          Transfer Goal 1 (OT)    Activity/Assistive Device (Transfer Goal 1, OT) commode, bedside without drop arms;walker, rolling;bed-to-chair/chair-to-bed  -KF     [place]ard Level/Cues Needed (Transfer Goal 1, OT) contact guard required;verbal cues required  -KF     Time Frame (Transfer Goal 1, OT) long term goal (LTG);10 days  -KF     Progress/Outcome (Transfer Goal 1, OT) new goal;goal ongoing  -KF     Row Name 04/16/23 1518          Self-Feeding Goal 1 (OT)    Activity/Device (Self-Feeding Goal 1, OT) self-feeding skills, all;adapted cup  AE PRN  -KF     [place]ard Level/Cues Needed (Self-Feeding Goal 1, OT) set-up required  -KF     Time Frame (Self-Feeding Goal 1, OT) long term goal (LTG);10 days  -KF     Progress/Outcomes (Self-Feeding Goal 1, OT) goal ongoing;new goal  -KF     Row Name 04/16/23 1518          Therapy Assessment/Plan (OT)    Planned Therapy Interventions (OT) activity tolerance training;adaptive equipment training;BADL retraining;cognitive/visual perception retraining;occupation/activity based interventions;ROM/therapeutic exercise;strengthening exercise;functional balance retraining;transfer/mobility retraining;patient/caregiver education/training  -KF           User Key  (r) = Recorded By, (t) = Taken By, (c) = Cosigned By     Initials Name Provider Type    KF Marialuisa Flores, OT Occupational Therapist               Clinical Impression     Row Name 04/16/23 1513          Pain Assessment    Pretreatment Pain Rating 0/10 - no pain  -KF     Posttreatment Pain Rating 0/10 - no pain  -KF     Pre/Posttreatment Pain Comment tolerated  -KF     Pain Intervention(s) Repositioned;Ambulation/increased activity  -KF     Row Name 04/16/23 1513          Plan of Care Review    Plan of Care Reviewed With patient  -KF     Progress improving  -KF     Outcome Evaluation Pt demonstrates deficits in strength, balance, and functional activity tolerance compared to baseline for ADL and functional transfer completion, completed bed to chair tf with FWW Katerina, overall Katerina bed mob, SUP/setup grooming supported sitting, recom IPOT DC SNF rehab  -KF     Row Name 04/16/23 1513          Therapy Assessment/Plan (OT)    Rehab Potential (OT) good, to achieve stated therapy goals  -KF     Criteria for Skilled Therapeutic Interventions Met (OT) yes;meets criteria;skilled treatment is necessary  -KF     Therapy Frequency (OT) daily  -KF     Row Name 04/16/23 1513          Therapy Plan Review/Discharge Plan (OT)    Equipment Needs Upon Discharge (OT) --  TBD  -KF     Anticipated Discharge Disposition (OT) skilled nursing facility  -KF     Row Name 04/16/23 1513          Vital Signs    Pre Systolic BP Rehab --  RN cleared VSS  -KF     Pre SpO2 (%) 98  -KF     O2 Delivery Pre Treatment supplemental O2  -KF     Intra SpO2 (%) 95  -KF     O2 Delivery Intra Treatment supplemental O2  -KF     Post SpO2 (%) 98  -KF     O2 Delivery Post Treatment supplemental O2  -KF     Pre Patient Position Supine  -KF     Intra Patient Position Standing  -KF     Post Patient Position Sitting  -KF     Row Name 04/16/23 1513          Positioning and Restraints    Pre-Treatment Position in bed  -KF     Post Treatment Position chair  -KF     In Chair notified nsg;reclined;sitting;call light within  reach;encouraged to call for assist;exit alarm on;waffle cushion;legs elevated;heels elevated;RUE elevated  -           User Key  (r) = Recorded By, (t) = Taken By, (c) = Cosigned By    Initials Name Provider Type    Marialuisa Saleem OT Occupational Therapist               Outcome Measures     Row Name 04/16/23 1519          How much help from another is currently needed...    Putting on and taking off regular lower body clothing? 3  -KF     Bathing (including washing, rinsing, and drying) 2  -KF     Toileting (which includes using toilet bed pan or urinal) 2  -KF     Putting on and taking off regular upper body clothing 3  -KF     Taking care of personal grooming (such as brushing teeth) 3  -KF     Eating meals 3  -KF     AM-PAC 6 Clicks Score (OT) 16  -KF     Row Name 04/16/23 0816          How much help from another person do you currently need...    Turning from your back to your side while in flat bed without using bedrails? 2  -CJ     Moving from lying on back to sitting on the side of a flat bed without bedrails? 2  -CJ     Moving to and from a bed to a chair (including a wheelchair)? 2  -CJ     Standing up from a chair using your arms (e.g., wheelchair, bedside chair)? 1  -CJ     Climbing 3-5 steps with a railing? 1  -CJ     To walk in hospital room? 1  -CJ     AM-PAC 6 Clicks Score (PT) 9  -CJ     Highest level of mobility 3 --> Sat at edge of bed  -     Row Name 04/16/23 1519          Functional Assessment    Outcome Measure Options AM-PAC 6 Clicks Daily Activity (OT)  -           User Key  (r) = Recorded By, (t) = Taken By, (c) = Cosigned By    Initials Name Provider Type    Marialuisa Saleem OT Occupational Therapist    Kristina Orona RN Registered Nurse                Occupational Therapy Education     Title: PT OT SLP Therapies (In Progress)     Topic: Occupational Therapy (In Progress)     Point: ADL training (Done)     Description:   Instruct learner(s) on proper safety  adaptation and remediation techniques during self care or transfers.   Instruct in proper use of assistive devices.              Learning Progress Summary           Patient Acceptance, E,TB,D, VU,DU by  at 4/16/2023 1520                   Point: Home exercise program (Not Started)     Description:   Instruct learner(s) on appropriate technique for monitoring, assisting and/or progressing therapeutic exercises/activities.              Learner Progress:  Not documented in this visit.          Point: Precautions (Done)     Description:   Instruct learner(s) on prescribed precautions during self-care and functional transfers.              Learning Progress Summary           Patient Acceptance, E,TB,D, VU,DU by KF at 4/16/2023 1520                   Point: Body mechanics (Done)     Description:   Instruct learner(s) on proper positioning and spine alignment during self-care, functional mobility activities and/or exercises.              Learning Progress Summary           Patient Acceptance, E,TB,D, VU,DU by  at 4/16/2023 1520                               User Key     Initials Effective Dates Name Provider Type Discipline     06/16/21 -  Marialuisa Flores, OT Occupational Therapist OT              OT Recommendation and Plan  Planned Therapy Interventions (OT): activity tolerance training, adaptive equipment training, BADL retraining, cognitive/visual perception retraining, occupation/activity based interventions, ROM/therapeutic exercise, strengthening exercise, functional balance retraining, transfer/mobility retraining, patient/caregiver education/training  Therapy Frequency (OT): daily  Plan of Care Review  Plan of Care Reviewed With: patient  Progress: improving  Outcome Evaluation: Pt demonstrates deficits in strength, balance, and functional activity tolerance compared to baseline for ADL and functional transfer completion, completed bed to chair tf with FWW Katerina, overall Katerina bed mob, SUP/setup grooming  supported sitting, recom IPOT DC SNF rehab     Time Calculation:    Time Calculation- OT     Row Name 04/16/23 1420             Time Calculation- OT    OT Start Time 1420  -KF      OT Received On 04/16/23  -KF      OT Goal Re-Cert Due Date 04/26/23  -KF         Timed Charges    07629 - OT Therapeutic Activity Minutes 5  -KF      91228 - OT Self Care/Mgmt Minutes 5  -KF         Untimed Charges    OT Eval/Re-eval Minutes 38  -KF         Total Minutes    Timed Charges Total Minutes 10  -KF      Untimed Charges Total Minutes 38  -KF       Total Minutes 48  -KF            User Key  (r) = Recorded By, (t) = Taken By, (c) = Cosigned By    Initials Name Provider Type    KF Marialuisa Flores OT Occupational Therapist              Therapy Charges for Today     Code Description Service Date Service Provider Modifiers Qty    90152341127 HC OT THERAPEUTIC ACT EA 15 MIN 4/16/2023 Marialuisa Flores OT GO 1    01785153479 HC OT EVAL MOD COMPLEXITY 3 4/16/2023 Marialuisa Flores OT GO 1               Marialuisa Flores OT  4/16/2023

## 2023-04-16 NOTE — PROGRESS NOTES
[facility] Medicine Services  PROGRESS NOTE    Patient Name: Mitzi Eric  : 1935  MRN: [id number]    Date of Admission: 2023  Primary Care Physician: System, Provider Not In    Subjective   Subjective     CC:  Diarrhea/SOA    HPI:  Patient resting in bed with nephew at bedside. Patient states she is feeling fine. She had a little bit of diarrhea yesterday but it is significantly improved.     ROS:  Gen- No fevers, chills  CV- No chest pain, palpitations  Resp- No cough, dyspnea  GI- No N/V/D, abd pain    Objective   Objective     Vital Signs:   Temp:  [97.4 °F (36.3 °C)-97.9 °F (36.6 °C)] 97.8 °F (36.6 °C)  Heart Rate:  [64-83] 67  Resp:  [18] 18  BP: (122-159)/(60-99) 129/99  Flow (L/min):  [2] 2     Physical Exam:  Constitutional: No acute distress, awake, alert  HENT: NCAT, mucous membranes moist  Respiratory: decreased and course to auscultation bilaterally, respiratory effort normal on 2L NC  Cardiovascular: RRR, no murmurs, rubs, or gallops  Gastrointestinal: Positive bowel sounds, soft, nontender, nondistended  Musculoskeletal: No bilateral ankle edema  Psychiatric: Appropriate affect, cooperative  Neurologic: Oriented x 3, strength symmetric in all extremities, Cranial Nerves grossly intact to confrontation, speech clear  Skin: No rashes    Results Reviewed:  LAB RESULTS:      Lab 23  0756 04/15/23  0635 23  1356 23  1219   WBC 4.13 5.79  --  18.69*   HEMOGLOBIN 11.0* 10.5*  --  12.3   HEMATOCRIT 34.1 33.3*  --  39.0   PLATELETS 100* 104*  --  142   NEUTROS ABS  --  4.04  --  15.60*   IMMATURE GRANS (ABS)  --  0.03  --  0.14*   LYMPHS ABS  --  0.90  --  1.21   MONOS ABS  --  0.79  --  1.70*   EOS ABS  --  0.01  --  0.00   MCV 98.0* 99.4*  --  101.0*   PROCALCITONIN  --   --   --  0.14   LACTATE  --   --  1.4  --    PROTIME  --   --   --  13.6         Lab 23  0756 04/15/23  0635 23  1223 23  1219   SODIUM 142 142  --  139    POTASSIUM 2.8* 3.6  --  3.8   CHLORIDE 108* 111*  --  104   CO2 21.0* 20.0*  --  19.0*   ANION GAP 13.0 11.0  --  16.0*   BUN 11 33*  --  39*   CREATININE 0.65 1.10* 1.70* 1.54*   EGFR 85.9 49.0*  --  32.7*   GLUCOSE 78 80  --  100*   CALCIUM 7.8* 7.9*  --  8.6   MAGNESIUM  --   --   --  2.3         Lab 04/16/23  0756 04/14/23  1219   TOTAL PROTEIN 5.4* 6.8   ALBUMIN 3.4* 4.4   GLOBULIN 2.0 2.4   ALT (SGPT) 14 14   AST (SGOT) 34* 37*   BILIRUBIN 0.2 0.5   ALK PHOS 100 113   LIPASE  --  60         Lab 04/14/23  1219   PROTIME 13.6   INR 1.05                 Brief Urine Lab Results  (Last result in the past 365 days)      Color   Clarity   Blood   Leuk Est   Nitrite   Protein   CREAT   Urine HCG        04/14/23 1230 Yellow   Cloudy   Trace   Moderate (2+)   Positive   30 mg/dL (1+)                 Microbiology Results Abnormal     Procedure Component Value - Date/Time    Blood Culture - Blood, Arm, Right [[id number]]  (Normal) Collected: 04/14/23 1356    Lab Status: Preliminary result Specimen: Blood from Arm, Right Updated: 04/15/23 1415     Blood Culture No growth at 24 hours    Blood Culture - Blood, Arm, Right [[id number]]  (Normal) Collected: 04/14/23 1356    Lab Status: Preliminary result Specimen: Blood from Arm, Right Updated: 04/15/23 1415     Blood Culture No growth at 24 hours    Clostridioides difficile Toxin - Stool, Per Rectum [[id number]]  (Normal) Collected: 04/14/23 1710    Lab Status: Final result Specimen: Stool from Per Rectum Updated: 04/14/23 1910    Narrative:      The following orders were created for panel order Clostridioides difficile Toxin - Stool, Per Rectum.  Procedure                               Abnormality         Status                     ---------                               -----------         ------                     Clostridioides difficile...[[id number]]  Normal              Final result                 Please view results for these tests on the individual orders.     Clostridioides difficile Toxin, PCR - Stool, Per Rectum [[id number]]  (Normal) Collected: 04/14/23 1710    Lab Status: Final result Specimen: Stool from Per Rectum Updated: 04/14/23 1910     C. Difficile Toxins by PCR Not Detected    Narrative:      The result indicates the absence of toxigenic C. difficile from stool specimen.           CT Chest With Contrast Diagnostic    Result Date: 4/14/2023  CT CHEST W CONTRAST DIAGNOSTIC, CT ABDOMEN PELVIS W CONTRAST Date of Exam: 4/14/2023 2:33 PM EDT Indication: lung mass. Comparison: AP portable chest 4/4/2023, AP and lateral chest 2/17/2023. CT chest 2/12/2023, 4/22/2022. CT abdomen 7/15/2018 Technique: Axial CT images were obtained of the chest after the uneventful intravenous administration of 86 cc Isovue-300.  Reconstructed coronal and sagittal images were also obtained. Automated exposure control and iterative construction methods were used. Findings: There is dense consolidative change within the left upper lobe, extending from the hilum, along the fissure margin, with apex. This has significantly increased or become more coalescent than on the prior study, where there is a somewhat cavitary appearance to the consolidation. There is associated generalized volume loss of the left upper lobe. Within this consolidation, intervening areas of fluid attenuation are present, which may represent underlying necrotic components. Crescentic masslike consolidation in the right upper lobe extending to the apex is unchanged. Severe emphysema is present. Heart size is normal. Coronary calcifications are present. Central pulmonary arteries appear mildly dilated. No pulmonary embolus is seen. Mild thoracic aortic calcific atherosclerosis is present. The low-density 8 mm nodule in the left thyroid lobe appears unchanged. Moderate aortic calcific atherosclerosis is present. The included portions of the upper abdominal organs are within normal limits. Probable cystic dilation of the  nerve root sheaths are slightly worse are seen projecting from the lower thoracic spine, unchanged from prior. No suspicious osseous lesion is identified.     Impression: 1. Masslike consolidation with the volume loss in the left upper lobe with intervening fluid-necrotic components. This is developed or progressed since the 2/12/2023 study, where the lung parenchyma had a more cavitary appearance. 2. Masslike consolidation in the right upper lobe is unchanged since the more remote 4/22/2022 examination, favored to represent chronic scarring. 3. Severe emphysema. 4. Mild central pulmonary artery dilation. Correlate clinically for pulmonary hypertension. Electronically Signed: Janet Lynch  4/14/2023 3:02 PM EDT  Workstation ID: [id number]    CT Abdomen Pelvis With Contrast    Result Date: 4/14/2023  CT CHEST W CONTRAST DIAGNOSTIC, CT ABDOMEN PELVIS W CONTRAST Date of Exam: 4/14/2023 2:33 PM EDT Indication: lung mass. Comparison: AP portable chest 4/4/2023, AP and lateral chest 2/17/2023. CT chest 2/12/2023, 4/22/2022. CT abdomen 7/15/2018 Technique: Axial CT images were obtained of the chest after the uneventful intravenous administration of 86 cc Isovue-300.  Reconstructed coronal and sagittal images were also obtained. Automated exposure control and iterative construction methods were used. Findings: There is dense consolidative change within the left upper lobe, extending from the hilum, along the fissure margin, with apex. This has significantly increased or become more coalescent than on the prior study, where there is a somewhat cavitary appearance to the consolidation. There is associated generalized volume loss of the left upper lobe. Within this consolidation, intervening areas of fluid attenuation are present, which may represent underlying necrotic components. Crescentic masslike consolidation in the right upper lobe extending to the apex is unchanged. Severe emphysema is present. Heart size is normal.  Coronary calcifications are present. Central pulmonary arteries appear mildly dilated. No pulmonary embolus is seen. Mild thoracic aortic calcific atherosclerosis is present. The low-density 8 mm nodule in the left thyroid lobe appears unchanged. Moderate aortic calcific atherosclerosis is present. The included portions of the upper abdominal organs are within normal limits. Probable cystic dilation of the nerve root sheaths are slightly worse are seen projecting from the lower thoracic spine, unchanged from prior. No suspicious osseous lesion is identified.     Impression: 1. Masslike consolidation with the volume loss in the left upper lobe with intervening fluid-necrotic components. This is developed or progressed since the 2/12/2023 study, where the lung parenchyma had a more cavitary appearance. 2. Masslike consolidation in the right upper lobe is unchanged since the more remote 4/22/2022 examination, favored to represent chronic scarring. 3. Severe emphysema. 4. Mild central pulmonary artery dilation. Correlate clinically for pulmonary hypertension. Electronically Signed: Janet Lynch  4/14/2023 3:02 PM EDT  Workstation ID: [id number]    XR Chest 1 View    Result Date: 4/14/2023  XR CHEST 1 VW Date of Exam: 4/14/2023 12:46 PM EDT Indication: abd pain. Comparison: PA and lateral chest 2/17/2023, CT chest 2/12/2023 Findings: Lungs are hyperinflated and may be emphysematous. Masslike density is seen in the left upper lobe, and appears slightly increased since 2/17/2023. New left apical pleural parenchymal opacification or loculated pleural fluid is present compared to the prior chest x-ray. Irregular bandlike opacity in the right upper lobe extending to the apex is unchanged. The lung bases remain clear. Heart size is normal. No pneumothorax. No acute osseous abnormality.     Impression: 1. Left apical masslike opacity appears larger with new apical pleural-parenchymal thickening or loculated fluid since  2/17/2023. Consider CT chest correlation. 2. Bandlike density in the right upper lobe apex is unchanged, favored to represent chronic scarring. Electronically Signed: Janet Pattersonian  4/14/2023 1:00 PM EDT  Workstation ID: [id number]      Results for orders placed during the hospital encounter of 03/17/22    Adult Transthoracic Echo Complete W/ Cont if Necessary Per Protocol    Interpretation Summary  · Left ventricular ejection fraction appears to be 46 - 50%. Left ventricular systolic function is mildly decreased.  · Left ventricular diastolic function is consistent with (grade I) impaired relaxation.  · Mild to moderate mitral regurgitation.  · Mild to moderate tricuspid regurgitation, RVSP 54 mmHg.      Current medications:  Scheduled Meds:albuterol sulfate HFA, 4 puff, Inhalation, Once  aspirin, 81 mg, Oral, Daily  castor oil-balsam peru, 1 application, Topical, Q12H  cefTRIAXone, 1 g, Intravenous, Daily  donepezil, 5 mg, Oral, QAM  folic acid, 400 mcg, Oral, BID  levothyroxine, 25 mcg, Oral, Q AM  mirtazapine, 7.5 mg, Oral, Nightly  OXcarbazepine, 600 mg, Oral, BID  pantoprazole, 40 mg, Oral, Daily  pregabalin, 25 mg, Oral, BID  sodium chloride, 10 mL, Intravenous, Q12H      Continuous Infusions:sodium chloride, 75 mL/hr, Last Rate: 75 mL/hr (04/15/23 2226)      PRN Meds:.•  acetaminophen **OR** acetaminophen **OR** acetaminophen  •  ondansetron **OR** ondansetron  •  [COMPLETED] Insert Peripheral IV **AND** sodium chloride  •  sodium chloride  •  sodium chloride  •  traMADol    Assessment & Plan   Assessment & Plan     Active Hospital Problems    Diagnosis  POA   • **Sepsis [A41.9]  Yes   • Nausea vomiting and diarrhea [R11.2, R19.7]  Unknown   • chronic CHF (congestive heart failure) (HCC) [I50.9]  Yes   • Dementia without behavioral disturbance [F03.90]  Yes   • Severe malnutrition (CMS/HCC) [E43]  Yes   • Hypothyroidism (acquired) [E03.9]  Yes   • Lung mass [R91.8]  Yes   • UTI (urinary tract infection),  bacterial [N39.0, A49.9]  Unknown   • Essential hypertension [I10]  Yes      Resolved Hospital Problems   No resolved problems to display.        Brief Hospital Course to date:  Mitzi Eric is a 87 y.o. female with PMHx with history of CKD, COPD (2L nightly) with known bilateral upper lobe masses, paroxysmal A-fib, dementia, HLD, HTN, hypothyroidism, Trigeminal neuralgia who presents to ED from her facility, [facility], due to profuse diarrhea. Patient found to have an acute UTI along with Rotavirus and was admitted to the hospitalist service for further evaluation and treatment.    This patient's problems and plans were partially entered by my partner and updated as appropriate by me 04/16/23.    Sepsis (HR>90 on arrival, Leukocytosis)  UTI  Rotavirus +  EPEC +  Nausea/Vomiting/Diarrhea   -s/p fluids  -Blood Cx x2 pending   -Urine Cx positive for E Coli  -Recent ABX use for PNA.  - GI PCR positive for Enteropathogenic E. coli  -Procal, lactate normal   -CT A/P stable  -Recently completed course of Zosyn and Doxycycline for presumed PNA.  - stop Merrem- continue rocephin only     JUAN F  -Cr 1.70 on admission  - improved to 0.65  -baseline 0.7  -Secondary to dehydration from diarrhea  -s/p IVF  -Hold Nephrotoxic medications     Left Apical Lung mass with fluid-necrotic components/cavitary appearance   Right Upper lobe lung mass   Hx COPD  -Patient/POA (nephew) have been aware of this and not interested in further work-up  -On 2L NC currently, normally on RA during the day and 2L at night per Nephew   - stop Merrem- continue rocephin only. Most oral anaerobes are covered by Rocephin. (recently completed Zosyn/Doxy for presumed B/L PNA)      Combined systolic and diastolic heart failure  Moderate pulm hypertension  -Compensated, monitor with IVF   -continue home Demedex/Spironolactone      Paroxysmal atrial fibrillation  -continue home Metoprolol     CKD stage III  -Renal function stable and improved with  treatment of acute UTI     Hypothyroidism  -Continue Synthroid     Trigeminal neuralgia  -Continue Trileptal and Lyrica     Hypertension  -Hold home BP meds as BP borderline      Dementia  -Continue Aricept    Expected Discharge Location and Transportation: home  Expected Discharge   Expected Discharge Date and Time     Expected Discharge Date Expected Discharge Time    Apr 17, 2023            DVT prophylaxis:  Mechanical DVT prophylaxis orders are present.     AM-PAC 6 Clicks Score (PT): 9 (04/16/23 0816)    CODE STATUS:   Code Status and Medical Interventions:   Ordered at: 04/14/23 1607     Medical Intervention Limits:    NO intubation (DNI)     Code Status (Patient has no pulse and is not breathing):    No CPR (Do Not Attempt to Resuscitate)     Medical Interventions (Patient has pulse or is breathing):    Limited Support       Alisia Dinh DO  04/16/23

## 2023-04-16 NOTE — PROGRESS NOTES
"                    Clinical Nutrition     Patient Name: Mitiz Eric  YOB: 1935  MRN: [id number]  Date of Encounter: 04/16/23 12:34 EDT  Admission date: 4/14/2023    Reason for Visit   Identified at risk by screening criteria, MST score 2+, \"Unsure\" unintentional weight loss, Reduced oral intake    EMR Reviewed: Yes    Diet Nutrition Related History:      WOCN following, pt has DTPI to L coccyx. Nephew reports pt UBW is in the 100's. Nephew estimates pt has lost wt d/t diarrhea. Will order wt to determine wt change per EMR d/t admit wt is estimated. Nephew states pt has always ate very little but has been consistent over the past couple of years. Nephew states no difficulty chewing/swallowing and NKFA. Nephew gave food preferences (likes fruit, crispy arreola, water, soups, Rice Krispies). Will send diet message to kitchen. Pt only drinks Ensure Chocolate and will not drink Boost+. Will send Ensure HP BID.    Anthropometrics   Height: Height: 157.5 cm (62.01\")  Admission Weight:   Flowsheet Rows    Flowsheet Row First Filed Value   Admission Height 157.5 cm (62.01\") Documented at 04/14/2023 1220   Admission Weight 47.6 kg (104 lb 15 oz) Documented at 04/14/2023 1220        Last Filed Weight:Weight: 47.6 kg (104 lb 15 oz) (04/14/23 1220)  Weight Method: Estimated  BMI: BMI (Calculated): 19.2  BMI classification: Normal: 18.5-24.9kg/m2   IBW: Ideal Body Weight (IBW) (kg): 50.45  EMR wts:   Weight      Weight (kg) Weight (lbs) Weight Method   3/18/2022 50.349 kg  111 lb  Bed scale    3/25/2022 45.36 kg  100 lb  Stated     48.988 kg  108 lb     3/29/2022 50.349 kg  111 lb  Stated    4/5/2022 41.912 kg  92 lb 6.4 oz     4/22/2022 48.535 kg  107 lb     4/24/2022 44.679 kg  98 lb 8 oz  Bed scale    5/3/2022 43.681 kg  96 lb 4.8 oz     5/17/2022 43.954 kg  96 lb 14.4 oz     6/14/2022 45.405 kg  100 lb 1.6 oz     6/22/2022 44.906 kg  99 lb     6/23/2022 45.36 kg  100 lb     9/13/2022 45.949 kg  101 lb 4.8 oz  "    2022 44.815 kg  98 lb 12.8 oz     2022 43.999 kg  97 lb     2023 47.628 kg  105 lb     2023 47.6 kg  104 lb 15 oz  Estimated    UBW: 100's per nephew     Current Nutrition Prescription     Diet: Regular/House Diet; Texture: Regular Texture (IDDSI 7); Fluid Consistency: Thin (IDDSI 0)    ONS: Will send Boost Breeze BID    Average Intake from Chartin% x 3 meals     Intake History:     Intake Category  N/A    Actions   Appropriateness for MSA:  Criteria for further malnutrition exam not met at this time. Follow per protocol.     Interventions:   Follow treatment progress, Care plan reviewed, Interview for preferences, Encourage intake, Supplement provided    Cassie Loyola,   Time Spent: 30 min

## 2023-04-16 NOTE — NURSING NOTE
"                             Wound, Ostomy and Continence (WOC) Note    Reason for WOC Consultation: \"Nonblanchable pressure injury to left coccyx\"     Skin/Wound Assessment:     Wound Type: Pressure Injury Deep Tissue Pressure Injury (DTPI)  Location: Left coccyx/gluteal  Measurements: 3.5 x 2 x 0.2 cm  Wound Bed: non-blanchable, dermis and maroon/purple  Wound Edges: Irregular and Open  Periwound Skin: intact, blanchable, non-blanchable and redness   Drainage Characteristics/Odor: none  Drainage Amount: none  Pain: No   Image:       Summary and Recommendation(s):     -Patient presents with an evolving deep tissue pressure injury to her left coccyx/gluteal.  Patient does turn herself fairly well independently.  -We will order Venelex twice daily.  -Implement pressure injury prevention protocol.  - Refer to wound care instructions in the \"Orders\" tab  - Specialty support surface in place: Foam Mattress with Waffle Overlay       Pressure Injury Prevention Measures (initiate for a Miguel A Scale Score of <18):     Most recent Miguel A Scale score:  Sensory Perception: 3-->slightly limited  Moisture: 2-->very moist  Activity: 2-->chairfast  Mobility: 2-->very limited  Nutrition: 2-->probably inadequate  Friction and Shear: 1-->problem  Miguel A Score: 12 (04/15/23 2000)     -Turn q 2 hr. using an offloading foam wedge, keep heels elevated and offloaded with offloading heel boots.    -Raise knee-gatch before elevating HOB to reduce shearing   -Follow C.A.R.E protocol if medical devices (Bipap, lewis, Ng tube, etc) are being used.  -Apply moisture barrier cream to bottom BID & PRN, if incontinent.  -If incontinent, consider applying an external catheter. External catheters are finicky and should be checked every few hours for placement.   -Clean skin gently with no-rinse PH-balanced foam cleanser and a soft, disposable cloth (barrier wipes-blue pack).   -Reduce layers under patient (one sheet as drawsheet and two incontinence " pads)    Thank you for consulting the WOC Nurse.  WOC Team will sign off.  Please re consult if the wound(s) worsens.     Lazaro Ely RN, BSN, CCRN, CWOCN  Wound, Ostomy and Continence (WOC) Department  [facility]

## 2023-04-16 NOTE — PLAN OF CARE
Goal Outcome Evaluation:              Outcome Evaluation: PT axo to self, 2L NC, plesent, offloaded weight, NSR, no complaints of pain, worked with PT to chair.

## 2023-04-16 NOTE — PLAN OF CARE
Goal Outcome Evaluation:  Plan of Care Reviewed With: patient        Progress: improving  Outcome Evaluation: Pt demonstrates deficits in strength, balance, and functional activity tolerance compared to baseline for ADL and functional transfer completion, completed bed to chair tf with FWW Katerina, overall Katerina bed mob, SUP/setup grooming supported sitting, recom IPOT DC SNF rehab

## 2023-04-17 ENCOUNTER — READMISSION MANAGEMENT (OUTPATIENT)
Dept: CALL CENTER | Facility: HOSPITAL | Age: 88
End: 2023-04-17
Payer: MEDICARE

## 2023-04-17 VITALS
RESPIRATION RATE: 16 BRPM | OXYGEN SATURATION: 95 % | DIASTOLIC BLOOD PRESSURE: 74 MMHG | TEMPERATURE: 97.7 F | HEART RATE: 80 BPM | BODY MASS INDEX: 19.31 KG/M2 | SYSTOLIC BLOOD PRESSURE: 152 MMHG | WEIGHT: 104.94 LBS | HEIGHT: 62 IN

## 2023-04-17 PROBLEM — R19.7 NAUSEA VOMITING AND DIARRHEA: Status: RESOLVED | Noted: 2023-04-14 | Resolved: 2023-04-17

## 2023-04-17 PROBLEM — A41.9 SEPSIS: Status: RESOLVED | Noted: 2023-04-14 | Resolved: 2023-04-17

## 2023-04-17 PROBLEM — R11.2 NAUSEA VOMITING AND DIARRHEA: Status: RESOLVED | Noted: 2023-04-14 | Resolved: 2023-04-17

## 2023-04-17 LAB
ALBUMIN SERPL-MCNC: 3.4 G/DL (ref 3.5–5.2)
ALBUMIN/GLOB SERPL: 2 G/DL
ALP SERPL-CCNC: 99 U/L (ref 39–117)
ALT SERPL W P-5'-P-CCNC: 16 U/L (ref 1–33)
ANION GAP SERPL CALCULATED.3IONS-SCNC: 10 MMOL/L (ref 5–15)
AST SERPL-CCNC: 31 U/L (ref 1–32)
BILIRUB SERPL-MCNC: 0.2 MG/DL (ref 0–1.2)
BUN SERPL-MCNC: 8 MG/DL (ref 8–23)
BUN/CREAT SERPL: 14.3 (ref 7–25)
CALCIUM SPEC-SCNC: 7.6 MG/DL (ref 8.6–10.5)
CHLORIDE SERPL-SCNC: 105 MMOL/L (ref 98–107)
CO2 SERPL-SCNC: 25 MMOL/L (ref 22–29)
CREAT SERPL-MCNC: 0.56 MG/DL (ref 0.57–1)
DEPRECATED RDW RBC AUTO: 41.4 FL (ref 37–54)
EGFRCR SERPLBLD CKD-EPI 2021: 89 ML/MIN/1.73
ERYTHROCYTE [DISTWIDTH] IN BLOOD BY AUTOMATED COUNT: 12.1 % (ref 12.3–15.4)
GLOBULIN UR ELPH-MCNC: 1.7 GM/DL
GLUCOSE SERPL-MCNC: 89 MG/DL (ref 65–99)
HCT VFR BLD AUTO: 32.3 % (ref 34–46.6)
HGB BLD-MCNC: 11 G/DL (ref 12–15.9)
MCH RBC QN AUTO: 31.8 PG (ref 26.6–33)
MCHC RBC AUTO-ENTMCNC: 34.1 G/DL (ref 31.5–35.7)
MCV RBC AUTO: 93.4 FL (ref 79–97)
PLATELET # BLD AUTO: 101 10*3/MM3 (ref 140–450)
PMV BLD AUTO: 12.5 FL (ref 6–12)
POTASSIUM SERPL-SCNC: 2.6 MMOL/L (ref 3.5–5.2)
PROT SERPL-MCNC: 5.1 G/DL (ref 6–8.5)
RBC # BLD AUTO: 3.46 10*6/MM3 (ref 3.77–5.28)
SODIUM SERPL-SCNC: 140 MMOL/L (ref 136–145)
WBC NRBC COR # BLD: 5.19 10*3/MM3 (ref 3.4–10.8)

## 2023-04-17 PROCEDURE — 99239 HOSP IP/OBS DSCHRG MGMT >30: CPT | Performed by: HOSPITALIST

## 2023-04-17 PROCEDURE — 25010000002 CEFTRIAXONE PER 250 MG: Performed by: HOSPITALIST

## 2023-04-17 PROCEDURE — 85027 COMPLETE CBC AUTOMATED: CPT | Performed by: HOSPITALIST

## 2023-04-17 PROCEDURE — 80053 COMPREHEN METABOLIC PANEL: CPT | Performed by: HOSPITALIST

## 2023-04-17 RX ORDER — CEFUROXIME AXETIL 500 MG/1
500 TABLET ORAL 2 TIMES DAILY
Qty: 14 TABLET | Refills: 0 | Status: SHIPPED | OUTPATIENT
Start: 2023-04-17 | End: 2023-05-10

## 2023-04-17 RX ORDER — POTASSIUM CHLORIDE 750 MG/1
40 CAPSULE, EXTENDED RELEASE ORAL EVERY 4 HOURS
Status: DISCONTINUED | OUTPATIENT
Start: 2023-04-17 | End: 2023-04-17 | Stop reason: HOSPADM

## 2023-04-17 RX ADMIN — PREGABALIN 25 MG: 25 CAPSULE ORAL at 08:16

## 2023-04-17 RX ADMIN — METOPROLOL SUCCINATE 25 MG: 25 TABLET, EXTENDED RELEASE ORAL at 08:16

## 2023-04-17 RX ADMIN — FOLIC ACID TAB 400 MCG 400 MCG: 400 TAB at 08:16

## 2023-04-17 RX ADMIN — TORSEMIDE 20 MG: 20 TABLET ORAL at 08:16

## 2023-04-17 RX ADMIN — DONEPEZIL HYDROCHLORIDE 5 MG: 10 TABLET, FILM COATED ORAL at 06:45

## 2023-04-17 RX ADMIN — LEVOTHYROXINE SODIUM 25 MCG: 0.03 TABLET ORAL at 05:45

## 2023-04-17 RX ADMIN — CASTOR OIL AND BALSAM, PERU 1 APPLICATION: 788; 87 OINTMENT TOPICAL at 08:22

## 2023-04-17 RX ADMIN — ASPIRIN 81 MG: 81 TABLET, COATED ORAL at 08:16

## 2023-04-17 RX ADMIN — POTASSIUM CHLORIDE 40 MEQ: 10 CAPSULE, COATED, EXTENDED RELEASE ORAL at 10:13

## 2023-04-17 RX ADMIN — PANTOPRAZOLE SODIUM 40 MG: 40 TABLET, DELAYED RELEASE ORAL at 08:16

## 2023-04-17 RX ADMIN — OXCARBAZEPINE 600 MG: 150 TABLET, FILM COATED ORAL at 08:16

## 2023-04-17 RX ADMIN — POTASSIUM CHLORIDE 40 MEQ: 10 CAPSULE, COATED, EXTENDED RELEASE ORAL at 12:12

## 2023-04-17 RX ADMIN — SPIRONOLACTONE 25 MG: 25 TABLET ORAL at 08:16

## 2023-04-17 RX ADMIN — SODIUM CHLORIDE 1 G: 900 INJECTION INTRAVENOUS at 08:16

## 2023-04-17 RX ADMIN — Medication 10 ML: at 08:21

## 2023-04-17 NOTE — PLAN OF CARE
Goal Outcome Evaluation:         A/O to self. Pleasantly confused. NSR - Sinus Johann, with intermittent A-fib. Systolic 140-165 throughout night. Will continue to monitor

## 2023-04-17 NOTE — DISCHARGE PLACEMENT REQUEST
"Kulwant Zapataine BARBARA (87 y.o. Female) From Jacinto Paul RN CM [id number]    Date of Birth   1935    Social Security Number       Address   [address]  [address] [facility] [address]    Home Phone   [phone]    MRN   [id number]       Taoism   Jehovah's witness    Marital Status   Single                            Admission Date   23    Admission Type   Emergency    Admitting Provider   Alisia Dinh DO    Attending Provider   Alisia Dinh DO    Department, Room/Bed   [facility] 4H, S486/1       Discharge Date       Discharge Disposition   Home or Self Care    Discharge Destination                               Attending Provider: Alisia Dinh DO    Allergies: Bee Pollen, Lorazepam    Isolation: Spore, Contact   Infection: Rotavirus (23), Other (23)   Code Status: No CPR    Ht: 157.5 cm (62.01\")   Wt: 47.6 kg (104 lb 15 oz)    Admission Cmt: None   Principal Problem: Sepsis [A41.9]                 Active Insurance as of 2023     Primary Coverage     Payor Plan Insurance Group Employer/Plan Group    HUMANA MEDICARE REPLACEMENT HUMANA MEDICARE REPLACEMENT [id number]     Payor Plan Address Payor Plan Phone Number Payor Plan Fax Number Effective Dates    [address] [address] [phone]  2018 - None Entered    [facility] [id number]       Subscriber Name Subscriber Birth Date Member ID       MITZI ZAPATA 1935 [id number]                 Emergency Contacts      (Rel.) Home Phone Work Phone Mobile Phone    CHELE ZAPATA (Power of ) [phone] -- [phone]    ALICIA ZAPATA (Relative) [phone] -- [phone]                 Discharge Summary      Alisia Dinh DO at 23 0953              [facility]  DISCHARGE SUMMARY    Patient Name: Mitzi Zapata  : 1935  MRN: [id number]    Date of Admission: 2023 12:08 PM  Date of Discharge:  2023  Primary Care Physician: System, " Provider Not In    Consults     No orders found from 3/16/2023 to 4/15/2023.          Hospital Course     Presenting Problem:   Sepsis [A41.9]    Active Hospital Problems    Diagnosis  POA   • chronic CHF (congestive heart failure) (HCC) [I50.9]  Yes   • Dementia without behavioral disturbance [F03.90]  Yes   • Severe malnutrition (CMS/HCC) [E43]  Yes   • Hypothyroidism (acquired) [E03.9]  Yes   • Lung mass [R91.8]  Yes   • UTI (urinary tract infection), bacterial [N39.0, A49.9]  Yes   • Essential hypertension [I10]  Yes      Resolved Hospital Problems    Diagnosis Date Resolved POA   • **Sepsis [A41.9] 04/17/2023 Yes   • Nausea vomiting and diarrhea [R11.2, R19.7] 04/17/2023 Yes          Hospital Course:  Mitzi Eric is a 87 y.o. female with PMHx with history of CKD, COPD (2L nightly) with known bilateral upper lobe masses, paroxysmal A-fib, dementia, HLD, HTN, hypothyroidism, Trigeminal neuralgia who presents to ED from her facility, [facility], due to profuse diarrhea. Patient found to have an acute UTI along with Rotavirus and was admitted to the hospitalist service for further evaluation and treatment.     This patient's problems and plans were partially entered by my partner and updated as appropriate by me 04/17/23.     Sepsis (HR>90 on arrival, Leukocytosis)  UTI  Rotavirus +  EPEC +  Nausea/Vomiting/Diarrhea   -s/p fluids  -Blood Cx x2 negative to date  -Urine Cx positive for E Coli- susceptible to all  -Recent ABX use for PNA.  - GI PCR positive for Enteropathogenic E. coli  -Procal, lactate normal   -CT A/P stable  -Recently completed course of Zosyn and Doxycycline for presumed PNA.  - s/p Rocephin IV     JUAN F  -Cr 1.70 on admission  - improved to 0.56  -baseline 0.7  -Secondary to dehydration from diarrhea  -s/p IVF  -Hold Nephrotoxic medications     Left Apical Lung mass with fluid-necrotic components/cavitary appearance   Right Upper lobe lung mass   Hx COPD  -Patient/POA (nephew) have been  aware of this and not interested in further work-up  -On 2L NC currently, normally on RA during the day and 2L at night per Nephew   - s/p Rocephin IV     Combined systolic and diastolic heart failure  Moderate pulm hypertension  -Compensated, monitor with IVF   -continue home Demedex/Spironolactone      Paroxysmal atrial fibrillation  -continue home Metoprolol     CKD stage III  -Renal function stable and improved with treatment of acute UTI     Hypothyroidism  -Continue Synthroid     Trigeminal neuralgia  -Continue Trileptal and Lyrica     Hypertension  -continue home medications     Dementia  -Continue Aricept    Discharge Follow Up Recommendations for outpatient labs/diagnostics:  - d/c on Ceftin 500mg PO twice daily x 7 days    Day of Discharge     HPI:   Patient resting in bed, nephew at bedside. Patient stable, ready to go back to facility.    Review of Systems  Gen- No fevers, chills  CV- No chest pain, palpitations  Resp- No cough, dyspnea  GI- No N/V/D, abd pain    Vital Signs:   Temp:  [97.4 °F (36.3 °C)-98.4 °F (36.9 °C)] 97.7 °F (36.5 °C)  Heart Rate:  [58-87] 74  Resp:  [16-20] 16  BP: (147-165)/() 152/74  Flow (L/min):  [2] 2      Physical Exam:  Constitutional: No acute distress, awake, alert  HENT: NCAT, mucous membranes moist  Respiratory: Clear to auscultation bilaterally, respiratory effort normal   Cardiovascular: RRR, no murmurs, rubs, or gallops  Gastrointestinal: Positive bowel sounds, soft, nontender, nondistended  Musculoskeletal: No bilateral ankle edema  Psychiatric: Appropriate affect, cooperative  Neurologic: Oriented x 3, strength symmetric in all extremities, Cranial Nerves grossly intact to confrontation, speech clear  Skin: No rashes    Pertinent  and/or Most Recent Results     LAB RESULTS:      Lab 04/17/23  0702 04/16/23  0756 04/15/23  0635 04/14/23  1356 04/14/23  1219   WBC 5.19 4.13 5.79  --  18.69*   HEMOGLOBIN 11.0* 11.0* 10.5*  --  12.3   HEMATOCRIT 32.3* 34.1 33.3*   --  39.0   PLATELETS 101* 100* 104*  --  142   NEUTROS ABS  --   --  4.04  --  15.60*   IMMATURE GRANS (ABS)  --   --  0.03  --  0.14*   LYMPHS ABS  --   --  0.90  --  1.21   MONOS ABS  --   --  0.79  --  1.70*   EOS ABS  --   --  0.01  --  0.00   MCV 93.4 98.0* 99.4*  --  101.0*   PROCALCITONIN  --   --   --   --  0.14   LACTATE  --   --   --  1.4  --    PROTIME  --   --   --   --  13.6         [facility] 04/17/23  0702 04/16/23  0756 04/15/23  0635 04/14/23  1223 04/14/23  1219   SODIUM 140 142 142  --  139   POTASSIUM 2.6* 2.8* 3.6  --  3.8   CHLORIDE 105 108* 111*  --  104   CO2 25.0 21.0* 20.0*  --  19.0*   ANION GAP 10.0 13.0 11.0  --  16.0*   BUN 8 11 33*  --  39*   CREATININE 0.56* 0.65 1.10* 1.70* 1.54*   EGFR 89.0 85.9 49.0*  --  32.7*   GLUCOSE 89 78 80  --  100*   CALCIUM 7.6* 7.8* 7.9*  --  8.6   MAGNESIUM  --   --   --   --  2.3         [facility] 04/17/23  0702 04/16/23  0756 04/14/23  1219   TOTAL PROTEIN 5.1* 5.4* 6.8   ALBUMIN 3.4* 3.4* 4.4   GLOBULIN 1.7 2.0 2.4   ALT (SGPT) 16 14 14   AST (SGOT) 31 34* 37*   BILIRUBIN 0.2 0.2 0.5   ALK PHOS 99 100 113   LIPASE  --   --  60         Lab 04/14/23  1219   PROTIME 13.6   INR 1.05                 Brief Urine Lab Results  (Last result in the past 365 days)      Color   Clarity   Blood   Leuk Est   Nitrite   Protein   CREAT   Urine HCG        04/14/23 1230 Yellow   Cloudy   Trace   Moderate (2+)   Positive   30 mg/dL (1+)               Microbiology Results (last 10 days)     Procedure Component Value - Date/Time    Clostridioides difficile Toxin - Stool, Per Rectum [[id number]]  (Normal) Collected: 04/14/23 1710    Lab Status: Final result Specimen: Stool from Per Rectum Updated: 04/14/23 1910    Narrative:      The following orders were created for panel order Clostridioides difficile Toxin - Stool, Per Rectum.  Procedure                               Abnormality         Status                     ---------                               -----------         ------                      Clostridioides difficile...[[id number]]  Normal              Final result                 Please view results for these tests on the individual orders.    Clostridioides difficile Toxin, PCR - Stool, Per Rectum [[id number]]  (Normal) Collected: 04/14/23 1710    Lab Status: Final result Specimen: Stool from Per Rectum Updated: 04/14/23 1910     C. Difficile Toxins by PCR Not Detected    Narrative:      The result indicates the absence of toxigenic C. difficile from stool specimen.     Gastrointestinal Panel, PCR - Stool, Per Rectum [[id number]]  (Abnormal) Collected: 04/14/23 1710    Lab Status: Final result Specimen: Stool from Per Rectum Updated: 04/14/23 2122     Campylobacter Not Detected     Plesiomonas shigelloides Not Detected     Salmonella Not Detected     Vibrio Not Detected     Vibrio cholerae Not Detected     Yersinia enterocolitica Not Detected     Enteroaggregative E. coli (EAEC) Not Detected     Enteropathogenic E. coli (EPEC) Detected     Enterotoxigenic E. coli (ETEC) lt/st Not Detected     Shiga-like toxin-producing E. coli (STEC) stx1/stx2 Not Detected     Shigella/Enteroinvasive E. coli (EIEC) Not Detected     Cryptosporidium Not Detected     Cyclospora cayetanensis Not Detected     Entamoeba histolytica Not Detected     Giardia lamblia Not Detected     Adenovirus F40/41 Not Detected     Astrovirus Not Detected     Norovirus GI/GII Not Detected     Rotavirus A Detected     Sapovirus (I, II, IV or V) Not Detected    Blood Culture - Blood, Arm, Right [[id number]]  (Normal) Collected: 04/14/23 1356    Lab Status: Preliminary result Specimen: Blood from Arm, Right Updated: 04/16/23 1415     Blood Culture No growth at 2 days    Blood Culture - Blood, Arm, Right [[id number]]  (Normal) Collected: 04/14/23 1356    Lab Status: Preliminary result Specimen: Blood from Arm, Right Updated: 04/16/23 1415     Blood Culture No growth at 2 days    Urine Culture - Urine, Urine, Catheter [[id number]]   (Abnormal)  (Susceptibility) Collected: 04/14/23 1230    Lab Status: Final result Specimen: Urine, Catheter Updated: 04/16/23 0958     Urine Culture >100,000 CFU/mL Escherichia coli    Narrative:      Colonization of the urinary tract without infection is common. Treatment is discouraged unless the patient is symptomatic, pregnant, or undergoing an invasive urologic procedure.    Susceptibility      Escherichia coli      JOAN      Ampicillin Susceptible      Ampicillin + Sulbactam Susceptible      Cefazolin Susceptible      Cefepime Susceptible      Ceftazidime Susceptible      Ceftriaxone Susceptible      Gentamicin Susceptible      Levofloxacin Susceptible      Nitrofurantoin Susceptible      Piperacillin + Tazobactam Susceptible      Trimethoprim + Sulfamethoxazole Susceptible                                 CT Chest With Contrast Diagnostic    Result Date: 4/14/2023  CT CHEST W CONTRAST DIAGNOSTIC, CT ABDOMEN PELVIS W CONTRAST Date of Exam: 4/14/2023 2:33 PM EDT Indication: lung mass. Comparison: AP portable chest 4/4/2023, AP and lateral chest 2/17/2023. CT chest 2/12/2023, 4/22/2022. CT abdomen 7/15/2018 Technique: Axial CT images were obtained of the chest after the uneventful intravenous administration of 86 cc Isovue-300.  Reconstructed coronal and sagittal images were also obtained. Automated exposure control and iterative construction methods were used. Findings: There is dense consolidative change within the left upper lobe, extending from the hilum, along the fissure margin, with apex. This has significantly increased or become more coalescent than on the prior study, where there is a somewhat cavitary appearance to the consolidation. There is associated generalized volume loss of the left upper lobe. Within this consolidation, intervening areas of fluid attenuation are present, which may represent underlying necrotic components. Crescentic masslike consolidation in the right upper lobe extending to the  apex is unchanged. Severe emphysema is present. Heart size is normal. Coronary calcifications are present. Central pulmonary arteries appear mildly dilated. No pulmonary embolus is seen. Mild thoracic aortic calcific atherosclerosis is present. The low-density 8 mm nodule in the left thyroid lobe appears unchanged. Moderate aortic calcific atherosclerosis is present. The included portions of the upper abdominal organs are within normal limits. Probable cystic dilation of the nerve root sheaths are slightly worse are seen projecting from the lower thoracic spine, unchanged from prior. No suspicious osseous lesion is identified.     1. Masslike consolidation with the volume loss in the left upper lobe with intervening fluid-necrotic components. This is developed or progressed since the 2/12/2023 study, where the lung parenchyma had a more cavitary appearance. 2. Masslike consolidation in the right upper lobe is unchanged since the more remote 4/22/2022 examination, favored to represent chronic scarring. 3. Severe emphysema. 4. Mild central pulmonary artery dilation. Correlate clinically for pulmonary hypertension. Electronically Signed: Janet Lynch  4/14/2023 3:02 PM EDT  Workstation ID: [id number]    CT Abdomen Pelvis With Contrast    Result Date: 4/14/2023  CT CHEST W CONTRAST DIAGNOSTIC, CT ABDOMEN PELVIS W CONTRAST Date of Exam: 4/14/2023 2:33 PM EDT Indication: lung mass. Comparison: AP portable chest 4/4/2023, AP and lateral chest 2/17/2023. CT chest 2/12/2023, 4/22/2022. CT abdomen 7/15/2018 Technique: Axial CT images were obtained of the chest after the uneventful intravenous administration of 86 cc Isovue-300.  Reconstructed coronal and sagittal images were also obtained. Automated exposure control and iterative construction methods were used. Findings: There is dense consolidative change within the left upper lobe, extending from the hilum, along the fissure margin, with apex. This has significantly  increased or become more coalescent than on the prior study, where there is a somewhat cavitary appearance to the consolidation. There is associated generalized volume loss of the left upper lobe. Within this consolidation, intervening areas of fluid attenuation are present, which may represent underlying necrotic components. Crescentic masslike consolidation in the right upper lobe extending to the apex is unchanged. Severe emphysema is present. Heart size is normal. Coronary calcifications are present. Central pulmonary arteries appear mildly dilated. No pulmonary embolus is seen. Mild thoracic aortic calcific atherosclerosis is present. The low-density 8 mm nodule in the left thyroid lobe appears unchanged. Moderate aortic calcific atherosclerosis is present. The included portions of the upper abdominal organs are within normal limits. Probable cystic dilation of the nerve root sheaths are slightly worse are seen projecting from the lower thoracic spine, unchanged from prior. No suspicious osseous lesion is identified.     1. Masslike consolidation with the volume loss in the left upper lobe with intervening fluid-necrotic components. This is developed or progressed since the 2/12/2023 study, where the lung parenchyma had a more cavitary appearance. 2. Masslike consolidation in the right upper lobe is unchanged since the more remote 4/22/2022 examination, favored to represent chronic scarring. 3. Severe emphysema. 4. Mild central pulmonary artery dilation. Correlate clinically for pulmonary hypertension. Electronically Signed: Janet Lynch  4/14/2023 3:02 PM EDT  Workstation ID: [id number]    XR Chest 1 View    Result Date: 4/14/2023  XR CHEST 1 VW Date of Exam: 4/14/2023 12:46 PM EDT Indication: abd pain. Comparison: PA and lateral chest 2/17/2023, CT chest 2/12/2023 Findings: Lungs are hyperinflated and may be emphysematous. Masslike density is seen in the left upper lobe, and appears slightly increased since  2/17/2023. New left apical pleural parenchymal opacification or loculated pleural fluid is present compared to the prior chest x-ray. Irregular bandlike opacity in the right upper lobe extending to the apex is unchanged. The lung bases remain clear. Heart size is normal. No pneumothorax. No acute osseous abnormality.     1. Left apical masslike opacity appears larger with new apical pleural-parenchymal thickening or loculated fluid since 2/17/2023. Consider CT chest correlation. 2. Bandlike density in the right upper lobe apex is unchanged, favored to represent chronic scarring. Electronically Signed: Janet Lynch  4/14/2023 1:00 PM EDT  Workstation ID: [id number]              Results for orders placed during the hospital encounter of 03/17/22    Adult Transthoracic Echo Complete W/ Cont if Necessary Per Protocol    Interpretation Summary  · Left ventricular ejection fraction appears to be 46 - 50%. Left ventricular systolic function is mildly decreased.  · Left ventricular diastolic function is consistent with (grade I) impaired relaxation.  · Mild to moderate mitral regurgitation.  · Mild to moderate tricuspid regurgitation, RVSP 54 mmHg.      Plan for Follow-up of Pending Labs/Results: none  Pending Labs     Order Current Status    Blood Culture - Blood, Arm, Right Preliminary result    Blood Culture - Blood, Arm, Right Preliminary result        Discharge Details        Discharge Medications      New Medications      Instructions Start Date   cefuroxime 500 MG tablet  Commonly known as: CEFTIN   500 mg, Oral, 2 Times Daily         Changes to Medications      Instructions Start Date   Symbicort 80-4.5 MCG/ACT inhaler  Generic drug: budesonide-formoterol  What changed: See the new instructions.   INHALE 2 PUFF(S) BY MOUTH TWO TIMES A DAY FOR 30 DAYS         Continue These Medications      Instructions Start Date   acetaminophen 325 MG tablet  Commonly known as: TYLENOL   650 mg, Oral, 3 Times Daily, OTC       albuterol sulfate  (90 Base) MCG/ACT inhaler  Commonly known as: PROVENTIL HFA;VENTOLIN HFA;PROAIR HFA   2 puffs, Inhalation, Every 4 Hours PRN      Biofreeze 4 % gel  Generic drug: Menthol (Topical Analgesic)   1 application, Apply externally, Every Early Morning, Apply to lower back daily prior to getting out of bed as needed      donepezil 5 MG tablet  Commonly known as: Aricept   5 mg, Oral, Every Morning      ENSURE PO   240 mL, Oral, 2 Times Daily, MIX 8OZ OF ENSURE, 1/4 SCOOP PROTEIN POWDER AND 2OZ OF MILK FOR A MILKSHAKE       folic acid 400 MCG tablet  Commonly known as: FOLVITE   400 mcg, Oral, 2 Times Daily, OTC      levothyroxine 25 MCG tablet  Commonly known as: SYNTHROID, LEVOTHROID   25 mcg, Oral, Every Early Morning      loperamide 2 MG capsule  Commonly known as: IMODIUM   2 mg, Oral, Take 2 tablets for 1 day ,then 1 tablet after each loose stool for 3 days. (Max of 4 tablets/24 hours). OTC      metoprolol succinate XL 25 MG 24 hr tablet  Commonly known as: TOPROL-XL   25 mg, Oral, Daily      mirtazapine 7.5 MG tablet  Commonly known as: REMERON   7.5 mg, Oral, Nightly      ondansetron 4 MG tablet  Commonly known as: ZOFRAN   4 mg, Oral, 3 Times Daily PRN      OXcarbazepine 600 MG tablet  Commonly known as: TRILEPTAL   600 mg, Oral, 2 Times Daily      pantoprazole 20 MG EC tablet  Commonly known as: PROTONIX   20 mg, Oral, Daily      potassium chloride 10 MEQ CR tablet   10 mEq, Oral, Daily, Give once daily and extra with extra torsemide      pregabalin 25 MG capsule  Commonly known as: LYRICA   25 mg, Oral, 2 Times Daily      spironolactone 25 MG tablet  Commonly known as: ALDACTONE   25 mg, Oral, Daily      torsemide 20 MG tablet  Commonly known as: DEMADEX   20 mg, Oral, Daily, Take 1 and 1/2 tablet daily      traMADol 50 MG tablet  Commonly known as: ULTRAM   50 mg, Oral, 2 Times Daily PRN         ASK your doctor about these medications      Instructions Start Date   aspirin 81 MG EC  tablet   81 mg, Oral, Daily      docusate sodium 100 MG capsule   100 mg, Oral, 2 Times Daily             Allergies   Allergen Reactions   • Bee Pollen Unknown - Low Severity   • Lorazepam Delirium         Discharge Disposition:  Home or Self Care    Diet:  Hospital:  Diet Order   Procedures   • Diet: Regular/House Diet; Texture: Regular Texture (IDDSI 7); Fluid Consistency: Thin (IDDSI 0)       Activity: as tolerated      Restrictions or Other Recommendations:  none       CODE STATUS:    Code Status and Medical Interventions:   Ordered at: 04/14/23 1607     Medical Intervention Limits:    NO intubation (DNI)     Code Status (Patient has no pulse and is not breathing):    No CPR (Do Not Attempt to Resuscitate)     Medical Interventions (Patient has pulse or is breathing):    Limited Support       Future Appointments   Date Time Provider Department Center   4/18/2023  1:30 PM Lauren Pina APRN [facility]   7/21/2023  3:00 PM RAD TECH [facility]   7/21/2023  3:30 PM Jennifer Ace MD [facility]   8/2/2023  8:00 AM Ann-Marie Goodwin APRN [facility]                 Alisia Dinh DO  04/17/23      Time Spent on Discharge:  I spent  35  minutes on this discharge activity which included: face-to-face encounter with the patient, reviewing the data in the system, coordination of the care with the nursing staff as well as consultants, documentation, and entering orders.            Electronically signed by Alisia Dinh DO at 04/17/23 0956

## 2023-04-17 NOTE — DISCHARGE SUMMARY
[facility] Medicine Services  DISCHARGE SUMMARY    Patient Name: Mitzi Eric  : 1935  MRN: [id number]    Date of Admission: 2023 12:08 PM  Date of Discharge:  2023  Primary Care Physician: System, Provider Not In    Consults     No orders found from 3/16/2023 to 4/15/2023.          Hospital Course     Presenting Problem:   Sepsis [A41.9]    Active Hospital Problems    Diagnosis  POA   • chronic CHF (congestive heart failure) (HCC) [I50.9]  Yes   • Dementia without behavioral disturbance [F03.90]  Yes   • Severe malnutrition (CMS/HCC) [E43]  Yes   • Hypothyroidism (acquired) [E03.9]  Yes   • Lung mass [R91.8]  Yes   • UTI (urinary tract infection), bacterial [N39.0, A49.9]  Yes   • Essential hypertension [I10]  Yes      Resolved Hospital Problems    Diagnosis Date Resolved POA   • **Sepsis [A41.9] 2023 Yes   • Nausea vomiting and diarrhea [R11.2, R19.7] 2023 Yes          Hospital Course:  Mitzi Eric is a 87 y.o. female with PMHx with history of CKD, COPD (2L nightly) with known bilateral upper lobe masses, paroxysmal A-fib, dementia, HLD, HTN, hypothyroidism, Trigeminal neuralgia who presents to ED from her facility, [facility], due to profuse diarrhea. Patient found to have an acute UTI along with Rotavirus and was admitted to the hospitalist service for further evaluation and treatment.     This patient's problems and plans were partially entered by my partner and updated as appropriate by me 23.     Sepsis (HR>90 on arrival, Leukocytosis)  UTI  Rotavirus +  EPEC +  Nausea/Vomiting/Diarrhea   -s/p fluids  -Blood Cx x2 negative to date  -Urine Cx positive for E Coli- susceptible to all  -Recent ABX use for PNA.  - GI PCR positive for Enteropathogenic E. coli  -Procal, lactate normal   -CT A/P stable  -Recently completed course of Zosyn and Doxycycline for presumed PNA.  - s/p Rocephin IV     JUAN F  -Cr 1.70 on admission  - improved to  0.56  -baseline 0.7  -Secondary to dehydration from diarrhea  -s/p IVF  -Hold Nephrotoxic medications     Left Apical Lung mass with fluid-necrotic components/cavitary appearance   Right Upper lobe lung mass   Hx COPD  -Patient/POA (nephew) have been aware of this and not interested in further work-up  -On 2L NC currently, normally on RA during the day and 2L at night per Nephew   - s/p Rocephin IV     Combined systolic and diastolic heart failure  Moderate pulm hypertension  -Compensated, monitor with IVF   -continue home Demedex/Spironolactone      Paroxysmal atrial fibrillation  -continue home Metoprolol     CKD stage III  -Renal function stable and improved with treatment of acute UTI     Hypothyroidism  -Continue Synthroid     Trigeminal neuralgia  -Continue Trileptal and Lyrica     Hypertension  -continue home medications     Dementia  -Continue Aricept    Discharge Follow Up Recommendations for outpatient labs/diagnostics:  - d/c on Ceftin 500mg PO twice daily x 7 days    Day of Discharge     HPI:   Patient resting in bed, nephew at bedside. Patient stable, ready to go back to facility.    Review of Systems  Gen- No fevers, chills  CV- No chest pain, palpitations  Resp- No cough, dyspnea  GI- No N/V/D, abd pain    Vital Signs:   Temp:  [97.4 °F (36.3 °C)-98.4 °F (36.9 °C)] 97.7 °F (36.5 °C)  Heart Rate:  [58-87] 74  Resp:  [16-20] 16  BP: (147-165)/() 152/74  Flow (L/min):  [2] 2      Physical Exam:  Constitutional: No acute distress, awake, alert  HENT: NCAT, mucous membranes moist  Respiratory: Clear to auscultation bilaterally, respiratory effort normal   Cardiovascular: RRR, no murmurs, rubs, or gallops  Gastrointestinal: Positive bowel sounds, soft, nontender, nondistended  Musculoskeletal: No bilateral ankle edema  Psychiatric: Appropriate affect, cooperative  Neurologic: Oriented x 3, strength symmetric in all extremities, Cranial Nerves grossly intact to confrontation, speech clear  Skin: No  john    Pertinent  and/or Most Recent Results     LAB RESULTS:      Lab 04/17/23  0702 04/16/23  0756 04/15/23  0635 04/14/23  1356 04/14/23  1219   WBC 5.19 4.13 5.79  --  18.69*   HEMOGLOBIN 11.0* 11.0* 10.5*  --  12.3   HEMATOCRIT 32.3* 34.1 33.3*  --  39.0   PLATELETS 101* 100* 104*  --  142   NEUTROS ABS  --   --  4.04  --  15.60*   IMMATURE GRANS (ABS)  --   --  0.03  --  0.14*   LYMPHS ABS  --   --  0.90  --  1.21   MONOS ABS  --   --  0.79  --  1.70*   EOS ABS  --   --  0.01  --  0.00   MCV 93.4 98.0* 99.4*  --  101.0*   PROCALCITONIN  --   --   --   --  0.14   LACTATE  --   --   --  1.4  --    PROTIME  --   --   --   --  13.6         Lab 04/17/23  0702 04/16/23  0756 04/15/23  0635 04/14/23  1223 04/14/23  1219   SODIUM 140 142 142  --  139   POTASSIUM 2.6* 2.8* 3.6  --  3.8   CHLORIDE 105 108* 111*  --  104   CO2 25.0 21.0* 20.0*  --  19.0*   ANION GAP 10.0 13.0 11.0  --  16.0*   BUN 8 11 33*  --  39*   CREATININE 0.56* 0.65 1.10* 1.70* 1.54*   EGFR 89.0 85.9 49.0*  --  32.7*   GLUCOSE 89 78 80  --  100*   CALCIUM 7.6* 7.8* 7.9*  --  8.6   MAGNESIUM  --   --   --   --  2.3         Lab 04/17/23  0702 04/16/23  0756 04/14/23  1219   TOTAL PROTEIN 5.1* 5.4* 6.8   ALBUMIN 3.4* 3.4* 4.4   GLOBULIN 1.7 2.0 2.4   ALT (SGPT) 16 14 14   AST (SGOT) 31 34* 37*   BILIRUBIN 0.2 0.2 0.5   ALK PHOS 99 100 113   LIPASE  --   --  60         Lab 04/14/23  1219   PROTIME 13.6   INR 1.05                 Brief Urine Lab Results  (Last result in the past 365 days)      Color   Clarity   Blood   Leuk Est   Nitrite   Protein   CREAT   Urine HCG        04/14/23 1230 Yellow   Cloudy   Trace   Moderate (2+)   Positive   30 mg/dL (1+)               Microbiology Results (last 10 days)     Procedure Component Value - Date/Time    Clostridioides difficile Toxin - Stool, Per Rectum [[id number]]  (Normal) Collected: 04/14/23 1710    Lab Status: Final result Specimen: Stool from Per Rectum Updated: 04/14/23 1910    Narrative:      The  following orders were created for panel order Clostridioides difficile Toxin - Stool, Per Rectum.  Procedure                               Abnormality         Status                     ---------                               -----------         ------                     Clostridioides difficile...[[id number]]  Normal              Final result                 Please view results for these tests on the individual orders.    Clostridioides difficile Toxin, PCR - Stool, Per Rectum [[id number]]  (Normal) Collected: 04/14/23 1710    Lab Status: Final result Specimen: Stool from Per Rectum Updated: 04/14/23 1910     C. Difficile Toxins by PCR Not Detected    Narrative:      The result indicates the absence of toxigenic C. difficile from stool specimen.     Gastrointestinal Panel, PCR - Stool, Per Rectum [[id number]]  (Abnormal) Collected: 04/14/23 1710    Lab Status: Final result Specimen: Stool from Per Rectum Updated: 04/14/23 2122     Campylobacter Not Detected     Plesiomonas shigelloides Not Detected     Salmonella Not Detected     Vibrio Not Detected     Vibrio cholerae Not Detected     Yersinia enterocolitica Not Detected     Enteroaggregative E. coli (EAEC) Not Detected     Enteropathogenic E. coli (EPEC) Detected     Enterotoxigenic E. coli (ETEC) lt/st Not Detected     Shiga-like toxin-producing E. coli (STEC) stx1/stx2 Not Detected     Shigella/Enteroinvasive E. coli (EIEC) Not Detected     Cryptosporidium Not Detected     Cyclospora cayetanensis Not Detected     Entamoeba histolytica Not Detected     Giardia lamblia Not Detected     Adenovirus F40/41 Not Detected     Astrovirus Not Detected     Norovirus GI/GII Not Detected     Rotavirus A Detected     Sapovirus (I, II, IV or V) Not Detected    Blood Culture - Blood, Arm, Right [[id number]]  (Normal) Collected: 04/14/23 1356    Lab Status: Preliminary result Specimen: Blood from Arm, Right Updated: 04/16/23 1415     Blood Culture No growth at 2 days    Blood  Culture - Blood, Arm, Right [[id number]]  (Normal) Collected: 04/14/23 1356    Lab Status: Preliminary result Specimen: Blood from Arm, Right Updated: 04/16/23 1415     Blood Culture No growth at 2 days    Urine Culture - Urine, Urine, Catheter [[id number]]  (Abnormal)  (Susceptibility) Collected: 04/14/23 1230    Lab Status: Final result Specimen: Urine, Catheter Updated: 04/16/23 0958     Urine Culture >100,000 CFU/mL Escherichia coli    Narrative:      Colonization of the urinary tract without infection is common. Treatment is discouraged unless the patient is symptomatic, pregnant, or undergoing an invasive urologic procedure.    Susceptibility      Escherichia coli      JOAN      Ampicillin Susceptible      Ampicillin + Sulbactam Susceptible      Cefazolin Susceptible      Cefepime Susceptible      Ceftazidime Susceptible      Ceftriaxone Susceptible      Gentamicin Susceptible      Levofloxacin Susceptible      Nitrofurantoin Susceptible      Piperacillin + Tazobactam Susceptible      Trimethoprim + Sulfamethoxazole Susceptible                                 CT Chest With Contrast Diagnostic    Result Date: 4/14/2023  CT CHEST W CONTRAST DIAGNOSTIC, CT ABDOMEN PELVIS W CONTRAST Date of Exam: 4/14/2023 2:33 PM EDT Indication: lung mass. Comparison: AP portable chest 4/4/2023, AP and lateral chest 2/17/2023. CT chest 2/12/2023, 4/22/2022. CT abdomen 7/15/2018 Technique: Axial CT images were obtained of the chest after the uneventful intravenous administration of 86 cc Isovue-300.  Reconstructed coronal and sagittal images were also obtained. Automated exposure control and iterative construction methods were used. Findings: There is dense consolidative change within the left upper lobe, extending from the hilum, along the fissure margin, with apex. This has significantly increased or become more coalescent than on the prior study, where there is a somewhat cavitary appearance to the consolidation. There is  associated generalized volume loss of the left upper lobe. Within this consolidation, intervening areas of fluid attenuation are present, which may represent underlying necrotic components. Crescentic masslike consolidation in the right upper lobe extending to the apex is unchanged. Severe emphysema is present. Heart size is normal. Coronary calcifications are present. Central pulmonary arteries appear mildly dilated. No pulmonary embolus is seen. Mild thoracic aortic calcific atherosclerosis is present. The low-density 8 mm nodule in the left thyroid lobe appears unchanged. Moderate aortic calcific atherosclerosis is present. The included portions of the upper abdominal organs are within normal limits. Probable cystic dilation of the nerve root sheaths are slightly worse are seen projecting from the lower thoracic spine, unchanged from prior. No suspicious osseous lesion is identified.     1. Masslike consolidation with the volume loss in the left upper lobe with intervening fluid-necrotic components. This is developed or progressed since the 2/12/2023 study, where the lung parenchyma had a more cavitary appearance. 2. Masslike consolidation in the right upper lobe is unchanged since the more remote 4/22/2022 examination, favored to represent chronic scarring. 3. Severe emphysema. 4. Mild central pulmonary artery dilation. Correlate clinically for pulmonary hypertension. Electronically Signed: Janet Lynch  4/14/2023 3:02 PM EDT  Workstation ID: [id number]    CT Abdomen Pelvis With Contrast    Result Date: 4/14/2023  CT CHEST W CONTRAST DIAGNOSTIC, CT ABDOMEN PELVIS W CONTRAST Date of Exam: 4/14/2023 2:33 PM EDT Indication: lung mass. Comparison: AP portable chest 4/4/2023, AP and lateral chest 2/17/2023. CT chest 2/12/2023, 4/22/2022. CT abdomen 7/15/2018 Technique: Axial CT images were obtained of the chest after the uneventful intravenous administration of 86 cc Isovue-300.  Reconstructed coronal and sagittal  images were also obtained. Automated exposure control and iterative construction methods were used. Findings: There is dense consolidative change within the left upper lobe, extending from the hilum, along the fissure margin, with apex. This has significantly increased or become more coalescent than on the prior study, where there is a somewhat cavitary appearance to the consolidation. There is associated generalized volume loss of the left upper lobe. Within this consolidation, intervening areas of fluid attenuation are present, which may represent underlying necrotic components. Crescentic masslike consolidation in the right upper lobe extending to the apex is unchanged. Severe emphysema is present. Heart size is normal. Coronary calcifications are present. Central pulmonary arteries appear mildly dilated. No pulmonary embolus is seen. Mild thoracic aortic calcific atherosclerosis is present. The low-density 8 mm nodule in the left thyroid lobe appears unchanged. Moderate aortic calcific atherosclerosis is present. The included portions of the upper abdominal organs are within normal limits. Probable cystic dilation of the nerve root sheaths are slightly worse are seen projecting from the lower thoracic spine, unchanged from prior. No suspicious osseous lesion is identified.     1. Masslike consolidation with the volume loss in the left upper lobe with intervening fluid-necrotic components. This is developed or progressed since the 2/12/2023 study, where the lung parenchyma had a more cavitary appearance. 2. Masslike consolidation in the right upper lobe is unchanged since the more remote 4/22/2022 examination, favored to represent chronic scarring. 3. Severe emphysema. 4. Mild central pulmonary artery dilation. Correlate clinically for pulmonary hypertension. Electronically Signed: Janet Lynch  4/14/2023 3:02 PM EDT  Workstation ID: [id number]    XR Chest 1 View    Result Date: 4/14/2023  XR CHEST 1 VW Date of  Exam: 4/14/2023 12:46 PM EDT Indication: abd pain. Comparison: PA and lateral chest 2/17/2023, CT chest 2/12/2023 Findings: Lungs are hyperinflated and may be emphysematous. Masslike density is seen in the left upper lobe, and appears slightly increased since 2/17/2023. New left apical pleural parenchymal opacification or loculated pleural fluid is present compared to the prior chest x-ray. Irregular bandlike opacity in the right upper lobe extending to the apex is unchanged. The lung bases remain clear. Heart size is normal. No pneumothorax. No acute osseous abnormality.     1. Left apical masslike opacity appears larger with new apical pleural-parenchymal thickening or loculated fluid since 2/17/2023. Consider CT chest correlation. 2. Bandlike density in the right upper lobe apex is unchanged, favored to represent chronic scarring. Electronically Signed: Janet Lynch  4/14/2023 1:00 PM EDT  Workstation ID: [id number]              Results for orders placed during the hospital encounter of 03/17/22    Adult Transthoracic Echo Complete W/ Cont if Necessary Per Protocol    Interpretation Summary  · Left ventricular ejection fraction appears to be 46 - 50%. Left ventricular systolic function is mildly decreased.  · Left ventricular diastolic function is consistent with (grade I) impaired relaxation.  · Mild to moderate mitral regurgitation.  · Mild to moderate tricuspid regurgitation, RVSP 54 mmHg.      Plan for Follow-up of Pending Labs/Results: none  Pending Labs     Order Current Status    Blood Culture - Blood, Arm, Right Preliminary result    Blood Culture - Blood, Arm, Right Preliminary result        Discharge Details        Discharge Medications      New Medications      Instructions Start Date   cefuroxime 500 MG tablet  Commonly known as: CEFTIN   500 mg, Oral, 2 Times Daily         Changes to Medications      Instructions Start Date   Symbicort 80-4.5 MCG/ACT inhaler  Generic drug: budesonide-formoterol  What  changed: See the new instructions.   INHALE 2 PUFF(S) BY MOUTH TWO TIMES A DAY FOR 30 DAYS         Continue These Medications      Instructions Start Date   acetaminophen 325 MG tablet  Commonly known as: TYLENOL   650 mg, Oral, 3 Times Daily, OTC      albuterol sulfate  (90 Base) MCG/ACT inhaler  Commonly known as: PROVENTIL HFA;VENTOLIN HFA;PROAIR HFA   2 puffs, Inhalation, Every 4 Hours PRN      Biofreeze 4 % gel  Generic drug: Menthol (Topical Analgesic)   1 application, Apply externally, Every Early Morning, Apply to lower back daily prior to getting out of bed as needed      donepezil 5 MG tablet  Commonly known as: Aricept   5 mg, Oral, Every Morning      ENSURE PO   240 mL, Oral, 2 Times Daily, MIX 8OZ OF ENSURE, 1/4 SCOOP PROTEIN POWDER AND 2OZ OF MILK FOR A MILKSHAKE       folic acid 400 MCG tablet  Commonly known as: FOLVITE   400 mcg, Oral, 2 Times Daily, OTC      levothyroxine 25 MCG tablet  Commonly known as: SYNTHROID, LEVOTHROID   25 mcg, Oral, Every Early Morning      loperamide 2 MG capsule  Commonly known as: IMODIUM   2 mg, Oral, Take 2 tablets for 1 day ,then 1 tablet after each loose stool for 3 days. (Max of 4 tablets/24 hours). OTC      metoprolol succinate XL 25 MG 24 hr tablet  Commonly known as: TOPROL-XL   25 mg, Oral, Daily      mirtazapine 7.5 MG tablet  Commonly known as: REMERON   7.5 mg, Oral, Nightly      ondansetron 4 MG tablet  Commonly known as: ZOFRAN   4 mg, Oral, 3 Times Daily PRN      OXcarbazepine 600 MG tablet  Commonly known as: TRILEPTAL   600 mg, Oral, 2 Times Daily      pantoprazole 20 MG EC tablet  Commonly known as: PROTONIX   20 mg, Oral, Daily      potassium chloride 10 MEQ CR tablet   10 mEq, Oral, Daily, Give once daily and extra with extra torsemide      pregabalin 25 MG capsule  Commonly known as: LYRICA   25 mg, Oral, 2 Times Daily      spironolactone 25 MG tablet  Commonly known as: ALDACTONE   25 mg, Oral, Daily      torsemide 20 MG tablet  Commonly  known as: DEMADEX   20 mg, Oral, Daily, Take 1 and 1/2 tablet daily      traMADol 50 MG tablet  Commonly known as: ULTRAM   50 mg, Oral, 2 Times Daily PRN         ASK your doctor about these medications      Instructions Start Date   aspirin 81 MG EC tablet   81 mg, Oral, Daily      docusate sodium 100 MG capsule   100 mg, Oral, 2 Times Daily             Allergies   Allergen Reactions   • Bee Pollen Unknown - Low Severity   • Lorazepam Delirium         Discharge Disposition:  Home or Self Care    Diet:  Hospital:  Diet Order   Procedures   • Diet: Regular/House Diet; Texture: Regular Texture (IDDSI 7); Fluid Consistency: Thin (IDDSI 0)       Activity: as tolerated      Restrictions or Other Recommendations:  none       CODE STATUS:    Code Status and Medical Interventions:   Ordered at: 04/14/23 1607     Medical Intervention Limits:    NO intubation (DNI)     Code Status (Patient has no pulse and is not breathing):    No CPR (Do Not Attempt to Resuscitate)     Medical Interventions (Patient has pulse or is breathing):    Limited Support       Future Appointments   Date Time Provider Department Center   4/18/2023  1:30 PM Lauren Pina APRN [facility]   7/21/2023  3:00 PM RAD TECH [facility]   7/21/2023  3:30 PM Jennifer Ace MD [facility]   8/2/2023  8:00 AM Ann-Marie Goodwin APRN [facility]                 Alisia Dinh DO  04/17/23      Time Spent on Discharge:  I spent  35  minutes on this discharge activity which included: face-to-face encounter with the patient, reviewing the data in the system, coordination of the care with the nursing staff as well as consultants, documentation, and entering orders.

## 2023-04-17 NOTE — PROGRESS NOTES
"[facility]  [facility]      Mode of Visit: Video  Location of patient: other: [facility]  You have chosen to receive care through a telehealth visit.  Does the patient consent to use a video/audio connection for your medical care today? Yes  The visit included audio and video interaction. No technical issues occurred during this visit.     Chief Complaint  Follow-up and Congestive Heart Failure    History of Present Illness    Mitzi Eric is a 87 y.o. female with atrial fibrillation (no AC secondary to fall risk), heart failure with borderline EF (46-50%) with readmissions for acute pulmonary edema/COPD exacerbations, pulmonary hypertension, COPD, dementia, hypothyroidism, CKD and frequent falls who presents today as a telemedicine follow-up for heart failure.      Patient is mostly wheelchair-bound and resides at [facility].  Her nephew is present.     Patient unfortunately was admitted 4/14-4/17 with sepsis secondary to an acute UTI as well as Rotavirus. She did have JUAN F but improved to baseline. She did have left apical lung mass with fluid necrotic components/cavitary appearance and right upper lobe lung mass, further work up deferred. Potassium was significantly low and was replaced but repeat potassium not drawn prior to discharge. She does have bed sores and has wound care.     She denies shortness of breath, edema, dizziness, lightheadedness. Has poor appetite and nephew reports that she doesn't eat or drink much    Objective     Vital Signs:   Vitals:    04/18/23 1338   BP: 134/60   Pulse: 84   SpO2: 93%   Weight: 42.2 kg (93 lb)   Height: 157.5 cm (62\")     Body mass index is 17.01 kg/m².    Virtual Visit Physical Exam  Physical Exam  Constitutional:       Appearance: Normal appearance.   HENT:      Head: Normocephalic.   Pulmonary:      Effort: Pulmonary effort is normal. No respiratory distress.   Neurological:      Mental Status: She is " alert. Mental status is at baseline.   Psychiatric:         Mood and Affect: Mood normal.         Behavior: Behavior normal.         Thought Content: Thought content normal.              Result Review  Data Reviewed:{ Labs  Result Review  Imaging  Med Tab  Media :23}   Adult Transthoracic Echo Complete W/ Cont if Necessary Per Protocol (03/17/2022 17:14)  Comprehensive Metabolic Panel (08/08/2022 15:22)  BNP (08/08/2022 15:22)  CBC & Differential (08/08/2022 15:22)  Troponin (08/08/2022 15:22)           Assessment and Plan {CC Problem List  Visit Diagnosis  ROS  Review (Popup)  InStaff Maintenance  Quality  BestPractice  Medications  SmartSets  SnapShot Encounters  Media :23}   1. Heart failure with preserved ejection fraction, borderline, class III (HCC)  LVEF 46-50%  Stable symptoms   Continue torsemide and spironolactone as well as supplemental potassium. Spoke with nurse and verified that patient was discharged on 30mg of torsemide. She does not recall why this was increased but thinks it was increased in Feb. Considering recent dehydration and poor PO intake would recommend that she go back to 20mg daily and repeat BMP in one week  Recommend to repeat labs this week due to hypokalemia and poor intake  Monitor labs closely for dehydration and monitor for s/s of fluid overload.   Continue daily weights      2. Essential hypertension  Well controlled  Continue to monitor  Continue metoprolol succinate and spironolactone  Avoid aggressive BP lowering due to risk for falls    3. Longstanding persistent atrial fibrillation (HCC)  Rate controlled.   No AC secondary to frequent falls  Continue ASA for now    4. CKD IIIa  Recommend repeat labs this week due to hypokalemia      Will continue to follow up via telemedicine. Monitored closely at her facility. Her nephew will call us with any worsening symptoms. If any new symptoms arise, will change to office visit      Follow Up {Instructions Charge Capture   Follow-up Communications :23}   Return in about 6 months (around 10/18/2023) for HF, Video Visit.    Patient was given instructions and counseling regarding her condition or for health maintenance advice. Please see specific information pulled into the AVS if appropriate.  Advised to call the Heart and Valve Center with any questions, concerns, or worsening symptoms.    Dictated Utilizing Dragon Dictation

## 2023-04-17 NOTE — CASE MANAGEMENT/SOCIAL WORK
Case Management Discharge Note      Final Note: Discussed in MDR, plan is to d/c today back to [facility]. CM spoke w/her nurse would like report to be called to [phone], CM will fax d/c summary to [phone]. CM spoke w/pt and nephew, Lane in room.Lane will be transporting her back to [facility], he states that she is independent on wheels @ [facility]. Only wears oxygen @ night, does not need portable for transport. No other d/c needs verbalized @ this time.         Selected Continued Care - Admitted Since 4/14/2023     Destination    No services have been selected for the patient.              Durable Medical Equipment    No services have been selected for the patient.              Dialysis/Infusion    No services have been selected for the patient.              Home Medical Care    No services have been selected for the patient.              Therapy    No services have been selected for the patient.              Community Resources    No services have been selected for the patient.              Community & DME    No services have been selected for the patient.                       Final Discharge Disposition Code: 01 - home or self-care

## 2023-04-18 ENCOUNTER — TELEMEDICINE (OUTPATIENT)
Dept: CARDIOLOGY | Facility: HOSPITAL | Age: 88
End: 2023-04-18
Payer: MEDICARE

## 2023-04-18 VITALS
HEART RATE: 84 BPM | DIASTOLIC BLOOD PRESSURE: 60 MMHG | SYSTOLIC BLOOD PRESSURE: 134 MMHG | WEIGHT: 93 LBS | BODY MASS INDEX: 17.11 KG/M2 | HEIGHT: 62 IN | OXYGEN SATURATION: 93 %

## 2023-04-18 NOTE — PROGRESS NOTES
"Enter Query Response Below      Query Response:     JUAN F related to sepsis     Electronically signed by Alisia Dinh DO, 23, 2:13 PM EDT.               If applicable, please update the problem list.     Patient: Mitzi Eric        : 1935  Account: [id number]           Admit Date:         How to Respond to this query:       a. Click New Note     b. Answer query within the yellow box.                c. Update the Problem List, if applicable.      If you have any questions about this query contact me at: [phone]    Dr. Dinh:     87-year-old female admitted with sepsis, and a urinary tract infection.  The H&P, Hospitalist progress notes, and discharge summary include the diagnosis of \"JUAN F\".  Medications during his hospital stay include IV Rocephin, IV Merrem, IV normal saline infusions, IV normal saline fluid boluses.      After study, can the patient's condition be further clarified as:     JUAN F related to sepsis   JUAN F not related to sepsis   Other-specify_______________________  Unable to determine     By submitting this query, we are merely seeking further clarification of documentation to accurately reflect all conditions that you are monitoring, evaluating, treating or that extend the hospitalization or utilize additional resources of care. Please utilize your independent clinical judgment when addressing the question(s) above.     This query and your response, once completed, will be entered into the legal medical record.    Sincerely,  Mindy Cade RN,BSN  [email or web address]  Clinical Documentation Integrity Program   "

## 2023-04-18 NOTE — PROGRESS NOTES
Enter Query Response Below      Query Response:     Left coccyx/gluteal deep tissue pressure injury present on admission     Electronically signed by Alisia Dinh DO, 23, 2:13 PM EDT.           If applicable, please update the problem list.     Patient: Mitzi Eric        : 1935  Account: [id number]           Admit Date:         How to Respond to this query:       a. Click New Note     b. Answer query within the yellow box.                c. Update the Problem List, if applicable.      If you have any questions about this query contact me at: [phone]    Dr. Dinh:     87-year-old female admitted on 23 with sepsis, and a urinary tract infection.  On 23 the Adult PCS flowsheet includes left coccyx wounds 1 blanchable, and one non blanchable present on admission.  On 23 the wound care progress note includes the diagnosis of deep tissue pressure injury to left coccyx/gluteal.  Orders to clean skin gently with no-rinse PH-balanced foam cleanser, apply moisture barrier cream to bottom twice daily and as needed, reduce layers under patient, turn ever 2 hours using offloading foam wedge, keep heels elevated with offloading heel boots.      After study, can the patient's condition be further clarified as:     Left coccyx/gluteal deep tissue pressure injury present on admission   No pressure injury  Other-specify______________________________  Unable to determine     By submitting this query, we are merely seeking further clarification of documentation to accurately reflect all conditions that you are monitoring, evaluating, treating or that extend the hospitalization or utilize additional resources of care. Please utilize your independent clinical judgment when addressing the question(s) above.     This query and your response, once completed, will be entered into the legal medical record.    Sincerely,  Mindy Cade RN,BSN  [email or web address]  Clinical Documentation  · Continue home [facility] Integrity Program

## 2023-04-18 NOTE — PROGRESS NOTES
"Enter Query Response Below      Query Response:   No    Electronically signed by Alisia Dinh DO, 23, 2:12 PM EDT.               If applicable, please update the problem list.     Patient: Mitzi Eric        : 1935  Account: [id number]           Admit Date:         How to Respond to this query:       a. Click New Note     b. Answer query within the yellow box.                c. Update the Problem List, if applicable.      If you have any questions about this query contact me at: [phone]    Dr. Dinh:     The H&P, Hospitalist progress notes, and discharge summary include the diagnosis of severe malnutrition.  The Registered Dietician's progress note on 23 includes a weight of 47.6 kg, height 157.5 cm, and BMI 19.2.  The note states, \"Criteria for further malnutrition exam not met at this time.\".  Plan for Boost Breeze nutrition supplement twice daily.  The degree or specified severity of the patient's condition has not been documented (the amount of energy intake over a specified time period, severity of muscle wasting, etc.).     After study, was severe malnutrition clinically supported during this admission?    Yes, severe malnutrition, please include additional clinical indicators:____________  No  Other- specify________  Unable to determine     By submitting this query, we are merely seeking further clarification of documentation to accurately reflect all conditions that you are monitoring, evaluating, treating or that extend the hospitalization or utilize additional resources of care. Please utilize your independent clinical judgment when addressing the question(s) above.     This query and your response, once completed, will be entered into the legal medical record.    Sincerely,  Mindy Cade RN,BSN  [email or web address]  Clinical Documentation Integrity Program   "

## 2023-04-18 NOTE — OUTREACH NOTE
Prep Survey    Flowsheet Row Responses   [facility] patient discharged from? [facility]   Is LACE score < 7 ? No   Eligibility Not Eligible   What are the reasons patient is not eligible? Other  [resides at LTC facility]   Does the patient have one of the following disease processes/diagnoses(primary or secondary)? Sepsis   Prep survey completed? Yes          Ladi Dutton Registered Nurse

## 2023-04-19 LAB
BACTERIA SPEC AEROBE CULT: NORMAL
BACTERIA SPEC AEROBE CULT: NORMAL

## 2023-05-08 ENCOUNTER — APPOINTMENT (OUTPATIENT)
Dept: CT IMAGING | Facility: HOSPITAL | Age: 88
End: 2023-05-08
Payer: MEDICARE

## 2023-05-08 ENCOUNTER — HOSPITAL ENCOUNTER (EMERGENCY)
Facility: HOSPITAL | Age: 88
Discharge: NURSING, REHAB OR LONG-TERM CARE | End: 2023-05-08
Attending: EMERGENCY MEDICINE | Admitting: EMERGENCY MEDICINE
Payer: MEDICARE

## 2023-05-08 ENCOUNTER — APPOINTMENT (OUTPATIENT)
Dept: GENERAL RADIOLOGY | Facility: HOSPITAL | Age: 88
End: 2023-05-08
Payer: MEDICARE

## 2023-05-08 VITALS
OXYGEN SATURATION: 94 % | SYSTOLIC BLOOD PRESSURE: 112 MMHG | WEIGHT: 93 LBS | DIASTOLIC BLOOD PRESSURE: 77 MMHG | HEIGHT: 62 IN | TEMPERATURE: 98 F | HEART RATE: 87 BPM | RESPIRATION RATE: 20 BRPM | BODY MASS INDEX: 17.11 KG/M2
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
## 2023-05-08 DIAGNOSIS — S80.811A ABRASION, RIGHT LOWER LEG, INITIAL ENCOUNTER: ICD-10-CM

## 2023-05-08 DIAGNOSIS — Z86.59 HISTORY OF DEMENTIA: ICD-10-CM

## 2023-05-08 PROCEDURE — 99284 EMERGENCY DEPT VISIT MOD MDM: CPT

## 2023-05-08 PROCEDURE — 72131 CT LUMBAR SPINE W/O DYE: CPT

## 2023-05-08 PROCEDURE — 73560 X-RAY EXAM OF KNEE 1 OR 2: CPT

## 2023-05-08 PROCEDURE — 90715 TDAP VACCINE 7 YRS/> IM: CPT | Performed by: EMERGENCY MEDICINE

## 2023-05-08 PROCEDURE — 72125 CT NECK SPINE W/O DYE: CPT

## 2023-05-08 PROCEDURE — 70450 CT HEAD/BRAIN W/O DYE: CPT

## 2023-05-08 PROCEDURE — 25010000002 TETANUS-DIPHTH-ACELL PERTUSSIS 5-2.5-18.5 LF-MCG/0.5 SUSPENSION PREFILLED SYRINGE: Performed by: EMERGENCY MEDICINE

## 2023-05-08 PROCEDURE — 72192 CT PELVIS W/O DYE: CPT

## 2023-05-08 PROCEDURE — 90471 IMMUNIZATION ADMIN: CPT | Performed by: EMERGENCY MEDICINE

## 2023-05-08 PROCEDURE — 73590 X-RAY EXAM OF LOWER LEG: CPT

## 2023-05-08 RX ADMIN — TETANUS TOXOID, REDUCED DIPHTHERIA TOXOID AND ACELLULAR PERTUSSIS VACCINE, ADSORBED 0.5 ML: 5; 2.5; 8; 8; 2.5 SUSPENSION INTRAMUSCULAR at 12:52

## 2023-05-08 NOTE — ED PROVIDER NOTES
"Subjective   History of Present Illness  87-year-old female with a history of dementia presents from her nursing home via EMS after a fall this morning.  The patient reportedly \"slipped\" off the side of her bed and fell to the ground.  She reportedly did not hit her head.  She was complaining of pain to her neck, low back, and right hip following the fall so EMS was called and she was brought to our facility to be evaluated.  She is not anticoagulated.  She is pleasantly demented.  She is a somewhat limited historian.  Her nephew was at bedside who serves as her POA and provides the majority of her history.  Unknown tetanus status.  She currently rates her pain at 7 out of 10 in severity.        Review of Systems   Musculoskeletal: Positive for back pain and neck pain.        Right hip pain   Skin:        Abrasion to lateral aspect of right lower leg   All other systems reviewed and are negative.      Past Medical History:   Diagnosis Date   • A-fib    • Ataxic gait    • Chronic kidney disease, stage 3    • COPD (chronic obstructive pulmonary disease)    • Dementia    • Disease of thyroid gland    • Essential tremor 2022   • Heart failure    • Hyperlipidemia    • Hypertension    • Shingles        Allergies   Allergen Reactions   • Bee Pollen Unknown - Low Severity   • Lorazepam Delirium       Past Surgical History:   Procedure Laterality Date   • BRAIN TUMOR EXCISION      BENIGN   • FRACTURE SURGERY     • TRIGGER FINGER RELEASE         Family History   Problem Relation Age of Onset   • Stroke Mother    • Alcohol abuse Father        Social History     Socioeconomic History   • Marital status: Single   Tobacco Use   • Smoking status: Former     Packs/day: 1.50     Years: 15.00     Pack years: 22.50     Types: Cigarettes     Start date:      Quit date:      Years since quittin.3   • Smokeless tobacco: Never   Vaping Use   • Vaping Use: Never used   Substance and Sexual Activity   • Alcohol use: Never " "    Comment: \"social drinker\"   • Drug use: Never   • Sexual activity: Never           Objective   Physical Exam  Vitals and nursing note reviewed.   Constitutional:       General: She is not in acute distress.     Appearance: She is well-developed. She is not diaphoretic.      Comments: Chronically ill-appearing elderly female, pleasantly demented   HENT:      Head: Normocephalic and atraumatic.   Eyes:      Pupils: Pupils are equal, round, and reactive to light.   Neck:      Comments: C-collar in place  Cardiovascular:      Rate and Rhythm: Normal rate and regular rhythm.      Heart sounds: Normal heart sounds. No murmur heard.    No friction rub. No gallop.   Pulmonary:      Effort: Pulmonary effort is normal. No respiratory distress.      Breath sounds: Normal breath sounds. No wheezing or rales.   Abdominal:      General: Bowel sounds are normal. There is no distension.      Palpations: Abdomen is soft. There is no mass.      Tenderness: There is no abdominal tenderness. There is no guarding or rebound.   Musculoskeletal:      Comments: No shortening or external rotation noted to right lower extremity when compared to the left on visual inspection, range of motion of right hip is minimally painful   Skin:     General: Skin is warm and dry.      Findings: No erythema or rash.      Comments: Superficial abrasion noted to lateral aspect of right lower leg overlying fibular head   Neurological:      Mental Status: She is alert.      Comments: Mental status at baseline per nephew at bedside, neurovascularly intact distally in all fours with bounding distal pulses and normal sensation, following simple commands   Psychiatric:         Mood and Affect: Mood normal.         Procedures           ED Course  ED Course as of 05/08/23 1825   Mon May 08, 2023   0211 87-year-old female presents for evaluation from her nursing home via EMS after a fall this morning.  The patient reportedly \"slipped\" off the side of her bed and " fell to the ground.  She reportedly did not hit her head.  She was complaining of pain to her neck, low back, and right hip following the fall so EMS was called and she was brought to our facility to be evaluated.  On arrival, the patient is pleasantly demented and chronically ill-appearing.  C-collar placed.  On exam, she has a superficial abrasion noted to the lateral aspect of her right lower leg.  No focal neurological deficits noted.  No other traumatic exam findings present. [DD]   1506 Tetanus updated. [DD]   1506 I personally and independently viewed the patient's x-ray images myself, and I am in agreement with the radiologist's reading for final interpretation.   [DD]   1506 Advanced imaging negative.  C-collar cleared. [DD]   1506 Reassured. [DD]   1506   I feel that she is stable to go back to her nursing home at this point.  No indication for further work-up or imaging needed on an emergent basis.  She will follow-up with her primary care physician within the next week.  Agreeable with plan and given appropriate strict return precautions. [DD]      ED Course User Index  [DD] Tone, Jonas Sherman MD                                       No results found for this or any previous visit (from the past 24 hour(s)).  Note: In addition to lab results from this visit, the labs listed above may include labs taken at another facility or during a different encounter within the last 24 hours. Please correlate lab times with ED admission and discharge times for further clarification of the services performed during this visit.    CT Head Without Contrast   Final Result   Impression:   Stable left frontal encephalomalacia with overlying changes from craniotomy. There is atrophy. There is no acute intracranial process.            Electronically Signed: Jaxson Murray     5/8/2023 1:30 PM EDT     Workstation ID: [id number]      CT Cervical Spine Without Contrast   Final Result   Impression:   Multilevel degenerative disease  of the cervical spine. No acute cervical spine abnormality. Bilateral upper lobe consolidations please see recent CT chest.            Electronically Signed: Jaxson Murray     5/8/2023 1:48 PM EDT     Workstation ID: [id number]      CT Pelvis Without Contrast   Final Result   Impression:   No acute fracture or traumatic malalignment in the lumbar spine or pelvis. Unremarkable appearance of partially imaged right hip cephalomedullary construct.            Electronically Signed: Carlos Roachezraalpa     5/8/2023 2:03 PM EDT     Workstation ID: [id number]      CT Lumbar Spine Without Contrast   Final Result   Impression:   No acute fracture or traumatic malalignment in the lumbar spine or pelvis. Unremarkable appearance of partially imaged right hip cephalomedullary construct.            Electronically Signed: Carlos Cazaresalpa     5/8/2023 2:03 PM EDT     Workstation ID: [id number]      XR Tibia Fibula 2 View Right   Final Result   1.No radiographic findings of acute osseous tibia or fibula abnormality.   2.Osseous demineralization and calcific atherosclerosis.      Electronically Signed: Alejandro Moreno     5/8/2023 12:28 PM EDT     Workstation ID: [id number]      XR Knee 1 or 2 View Right   Final Result   Impression:   No acute abnormality         Electronically Signed: Jaxson Murray     5/8/2023 12:25 PM EDT     Workstation ID: [id number]        Vitals:    05/08/23 1130 05/08/23 1200 05/08/23 1230 05/08/23 1300   BP: 114/65 115/66 111/66 112/77   Pulse: 90 85 80 87   Resp:       Temp:       TempSrc:       SpO2: 94% 95% 95% 94%   Weight:       Height:         Medications   Tetanus-Diphth-Acell Pertussis (BOOSTRIX) injection 0.5 mL (0.5 mL Intramuscular Given 5/8/23 1252)     ECG/EMG Results (last 24 hours)     ** No results found for the last 24 hours. **        No orders to display              MDM    Final diagnoses:   Fall, initial encounter   History of dementia   Abrasion, right lower leg, initial encounter       ED Disposition  ED  Disposition     ED Disposition   Discharge    Condition   Stable    Comment   --             PATIENT CONNECTION - [facility] [id number]  [phone]  In 1 week           Medication List      Changed    aspirin 81 MG EC tablet  Take 1 tablet by mouth Daily.  What changed: additional instructions     docusate sodium 100 MG capsule  Take 100 mg by mouth 2 (Two) Times a Day.  What changed: additional instructions     Symbicort 80-4.5 MCG/ACT inhaler  Generic drug: budesonide-formoterol  INHALE 2 PUFF(S) BY MOUTH TWO TIMES A DAY FOR 30 DAYS  What changed: See the new instructions.             Jonas Wayne MD  05/08/23 9271

## 2023-05-10 ENCOUNTER — HOSPITAL ENCOUNTER (OUTPATIENT)
Facility: HOSPITAL | Age: 88
Setting detail: OBSERVATION
LOS: 1 days | Discharge: NURSING, REHAB OR LONG-TERM CARE | End: 2023-05-19
Attending: EMERGENCY MEDICINE | Admitting: INTERNAL MEDICINE
Payer: MEDICARE

## 2023-05-10 ENCOUNTER — APPOINTMENT (OUTPATIENT)
Dept: CT IMAGING | Facility: HOSPITAL | Age: 88
End: 2023-05-10
Payer: MEDICARE

## 2023-05-10 DIAGNOSIS — Z74.09 IMPAIRED FUNCTIONAL MOBILITY, BALANCE, GAIT, AND ENDURANCE: ICD-10-CM

## 2023-05-10 DIAGNOSIS — F03.C0 SEVERE DEMENTIA, UNSPECIFIED DEMENTIA TYPE, UNSPECIFIED WHETHER BEHAVIORAL, PSYCHOTIC, OR MOOD DISTURBANCE OR ANXIETY: ICD-10-CM

## 2023-05-10 DIAGNOSIS — J43.2 CENTRILOBULAR EMPHYSEMA: ICD-10-CM

## 2023-05-10 DIAGNOSIS — R29.6 FREQUENT FALLS: ICD-10-CM

## 2023-05-10 DIAGNOSIS — R53.81 DECLINING FUNCTIONAL STATUS: ICD-10-CM

## 2023-05-10 DIAGNOSIS — N17.9 AKI (ACUTE KIDNEY INJURY): Primary | ICD-10-CM

## 2023-05-10 LAB
ALBUMIN SERPL-MCNC: 4 G/DL (ref 3.5–5.2)
ALBUMIN/GLOB SERPL: 1.2 G/DL
ALP SERPL-CCNC: 129 U/L (ref 39–117)
ALT SERPL W P-5'-P-CCNC: 31 U/L (ref 1–33)
ANION GAP SERPL CALCULATED.3IONS-SCNC: 14 MMOL/L (ref 5–15)
AST SERPL-CCNC: 39 U/L (ref 1–32)
BASOPHILS # BLD AUTO: 0.08 10*3/MM3 (ref 0–0.2)
BASOPHILS NFR BLD AUTO: 0.7 % (ref 0–1.5)
BILIRUB SERPL-MCNC: 0.2 MG/DL (ref 0–1.2)
BILIRUB UR QL STRIP: NEGATIVE
BUN SERPL-MCNC: 40 MG/DL (ref 8–23)
BUN/CREAT SERPL: 24.7 (ref 7–25)
CALCIUM SPEC-SCNC: 9 MG/DL (ref 8.6–10.5)
CHLORIDE SERPL-SCNC: 109 MMOL/L (ref 98–107)
CLARITY UR: CLEAR
CO2 SERPL-SCNC: 19 MMOL/L (ref 22–29)
COLOR UR: YELLOW
CREAT SERPL-MCNC: 1.62 MG/DL (ref 0.57–1)
DEPRECATED RDW RBC AUTO: 47.3 FL (ref 37–54)
EGFRCR SERPLBLD CKD-EPI 2021: 30.8 ML/MIN/1.73
EOSINOPHIL # BLD AUTO: 0.7 10*3/MM3 (ref 0–0.4)
EOSINOPHIL NFR BLD AUTO: 6 % (ref 0.3–6.2)
ERYTHROCYTE [DISTWIDTH] IN BLOOD BY AUTOMATED COUNT: 13.2 % (ref 12.3–15.4)
GLOBULIN UR ELPH-MCNC: 3.3 GM/DL
GLUCOSE SERPL-MCNC: 92 MG/DL (ref 65–99)
GLUCOSE UR STRIP-MCNC: NEGATIVE MG/DL
HCT VFR BLD AUTO: 36 % (ref 34–46.6)
HGB BLD-MCNC: 11.6 G/DL (ref 12–15.9)
HGB UR QL STRIP.AUTO: NEGATIVE
IMM GRANULOCYTES # BLD AUTO: 0.12 10*3/MM3 (ref 0–0.05)
IMM GRANULOCYTES NFR BLD AUTO: 1 % (ref 0–0.5)
KETONES UR QL STRIP: NEGATIVE
LEUKOCYTE ESTERASE UR QL STRIP.AUTO: NEGATIVE
LYMPHOCYTES # BLD AUTO: 0.67 10*3/MM3 (ref 0.7–3.1)
LYMPHOCYTES NFR BLD AUTO: 5.8 % (ref 19.6–45.3)
MCH RBC QN AUTO: 31.2 PG (ref 26.6–33)
MCHC RBC AUTO-ENTMCNC: 32.2 G/DL (ref 31.5–35.7)
MCV RBC AUTO: 96.8 FL (ref 79–97)
MONOCYTES # BLD AUTO: 1.21 10*3/MM3 (ref 0.1–0.9)
MONOCYTES NFR BLD AUTO: 10.4 % (ref 5–12)
NEUTROPHILS NFR BLD AUTO: 76.1 % (ref 42.7–76)
NEUTROPHILS NFR BLD AUTO: 8.83 10*3/MM3 (ref 1.7–7)
NITRITE UR QL STRIP: NEGATIVE
NRBC BLD AUTO-RTO: 0 /100 WBC (ref 0–0.2)
PH UR STRIP.AUTO: <=5 [PH] (ref 5–8)
PLATELET # BLD AUTO: 203 10*3/MM3 (ref 140–450)
PMV BLD AUTO: 11.8 FL (ref 6–12)
POTASSIUM SERPL-SCNC: 4.5 MMOL/L (ref 3.5–5.2)
PROT SERPL-MCNC: 7.3 G/DL (ref 6–8.5)
PROT UR QL STRIP: NEGATIVE
RBC # BLD AUTO: 3.72 10*6/MM3 (ref 3.77–5.28)
SODIUM SERPL-SCNC: 142 MMOL/L (ref 136–145)
SP GR UR STRIP: 1.01 (ref 1–1.03)
UROBILINOGEN UR QL STRIP: NORMAL
WBC NRBC COR # BLD: 11.61 10*3/MM3 (ref 3.4–10.8)

## 2023-05-10 PROCEDURE — 99284 EMERGENCY DEPT VISIT MOD MDM: CPT

## 2023-05-10 PROCEDURE — G0378 HOSPITAL OBSERVATION PER HR: HCPCS

## 2023-05-10 PROCEDURE — P9612 CATHETERIZE FOR URINE SPEC: HCPCS

## 2023-05-10 PROCEDURE — 96361 HYDRATE IV INFUSION ADD-ON: CPT

## 2023-05-10 PROCEDURE — 80053 COMPREHEN METABOLIC PANEL: CPT | Performed by: EMERGENCY MEDICINE

## 2023-05-10 PROCEDURE — 70450 CT HEAD/BRAIN W/O DYE: CPT

## 2023-05-10 PROCEDURE — 99223 1ST HOSP IP/OBS HIGH 75: CPT | Performed by: HOSPITALIST

## 2023-05-10 PROCEDURE — 85025 COMPLETE CBC W/AUTO DIFF WBC: CPT | Performed by: EMERGENCY MEDICINE

## 2023-05-10 PROCEDURE — 96360 HYDRATION IV INFUSION INIT: CPT

## 2023-05-10 PROCEDURE — 94640 AIRWAY INHALATION TREATMENT: CPT

## 2023-05-10 PROCEDURE — 81003 URINALYSIS AUTO W/O SCOPE: CPT | Performed by: EMERGENCY MEDICINE

## 2023-05-10 RX ORDER — SPIRONOLACTONE 25 MG/1
25 TABLET ORAL DAILY
Status: DISCONTINUED | OUTPATIENT
Start: 2023-05-11 | End: 2023-05-19 | Stop reason: HOSPADM

## 2023-05-10 RX ORDER — CETIRIZINE HYDROCHLORIDE 10 MG/1
10 TABLET ORAL DAILY
COMMUNITY

## 2023-05-10 RX ORDER — ACETAMINOPHEN 325 MG/1
650 TABLET ORAL 3 TIMES DAILY
Status: DISCONTINUED | OUTPATIENT
Start: 2023-05-10 | End: 2023-05-19 | Stop reason: HOSPADM

## 2023-05-10 RX ORDER — ALBUTEROL SULFATE 2.5 MG/3ML
2.5 SOLUTION RESPIRATORY (INHALATION) EVERY 4 HOURS PRN
Status: DISCONTINUED | OUTPATIENT
Start: 2023-05-10 | End: 2023-05-19 | Stop reason: HOSPADM

## 2023-05-10 RX ORDER — SODIUM CHLORIDE 9 MG/ML
40 INJECTION, SOLUTION INTRAVENOUS AS NEEDED
Status: DISCONTINUED | OUTPATIENT
Start: 2023-05-10 | End: 2023-05-19 | Stop reason: HOSPADM

## 2023-05-10 RX ORDER — LEVOTHYROXINE SODIUM 0.03 MG/1
25 TABLET ORAL
Status: DISCONTINUED | OUTPATIENT
Start: 2023-05-11 | End: 2023-05-19 | Stop reason: HOSPADM

## 2023-05-10 RX ORDER — SODIUM CHLORIDE 9 MG/ML
100 INJECTION, SOLUTION INTRAVENOUS CONTINUOUS
Status: DISCONTINUED | OUTPATIENT
Start: 2023-05-10 | End: 2023-05-12

## 2023-05-10 RX ORDER — TRAZODONE HYDROCHLORIDE 150 MG/1
150 TABLET ORAL NIGHTLY PRN
COMMUNITY

## 2023-05-10 RX ORDER — SODIUM CHLORIDE 0.9 % (FLUSH) 0.9 %
10 SYRINGE (ML) INJECTION EVERY 12 HOURS SCHEDULED
Status: DISCONTINUED | OUTPATIENT
Start: 2023-05-10 | End: 2023-05-19 | Stop reason: HOSPADM

## 2023-05-10 RX ORDER — QUETIAPINE FUMARATE 25 MG/1
TABLET, FILM COATED ORAL NIGHTLY PRN
COMMUNITY
End: 2023-05-19 | Stop reason: HOSPADM

## 2023-05-10 RX ORDER — BUDESONIDE AND FORMOTEROL FUMARATE DIHYDRATE 160; 4.5 UG/1; UG/1
2 AEROSOL RESPIRATORY (INHALATION)
Status: DISCONTINUED | OUTPATIENT
Start: 2023-05-10 | End: 2023-05-19 | Stop reason: HOSPADM

## 2023-05-10 RX ORDER — METOPROLOL SUCCINATE 25 MG/1
25 TABLET, EXTENDED RELEASE ORAL DAILY
Status: DISCONTINUED | OUTPATIENT
Start: 2023-05-11 | End: 2023-05-19 | Stop reason: HOSPADM

## 2023-05-10 RX ORDER — MIRTAZAPINE 15 MG/1
7.5 TABLET, FILM COATED ORAL NIGHTLY
Status: DISCONTINUED | OUTPATIENT
Start: 2023-05-10 | End: 2023-05-19 | Stop reason: HOSPADM

## 2023-05-10 RX ORDER — SODIUM CHLORIDE 0.9 % (FLUSH) 0.9 %
10 SYRINGE (ML) INJECTION AS NEEDED
Status: DISCONTINUED | OUTPATIENT
Start: 2023-05-10 | End: 2023-05-19 | Stop reason: HOSPADM

## 2023-05-10 RX ORDER — DOCUSATE SODIUM 100 MG/1
100 CAPSULE, LIQUID FILLED ORAL 2 TIMES DAILY
Status: DISCONTINUED | OUTPATIENT
Start: 2023-05-10 | End: 2023-05-19 | Stop reason: HOSPADM

## 2023-05-10 RX ORDER — PREGABALIN 25 MG/1
25 CAPSULE ORAL 2 TIMES DAILY
Status: DISCONTINUED | OUTPATIENT
Start: 2023-05-10 | End: 2023-05-19 | Stop reason: HOSPADM

## 2023-05-10 RX ORDER — LANOLIN ALCOHOL/MO/W.PET/CERES
400 CREAM (GRAM) TOPICAL 2 TIMES DAILY
Status: DISCONTINUED | OUTPATIENT
Start: 2023-05-10 | End: 2023-05-19 | Stop reason: HOSPADM

## 2023-05-10 RX ORDER — POTASSIUM CHLORIDE 750 MG/1
10 CAPSULE, EXTENDED RELEASE ORAL DAILY
Status: DISCONTINUED | OUTPATIENT
Start: 2023-05-10 | End: 2023-05-10

## 2023-05-10 RX ORDER — TRAMADOL HYDROCHLORIDE 50 MG/1
50 TABLET ORAL 2 TIMES DAILY PRN
Status: DISCONTINUED | OUTPATIENT
Start: 2023-05-10 | End: 2023-05-19 | Stop reason: HOSPADM

## 2023-05-10 RX ORDER — OXCARBAZEPINE 150 MG/1
600 TABLET, FILM COATED ORAL 2 TIMES DAILY
Status: DISCONTINUED | OUTPATIENT
Start: 2023-05-10 | End: 2023-05-19 | Stop reason: HOSPADM

## 2023-05-10 RX ORDER — DONEPEZIL HYDROCHLORIDE 5 MG/1
5 TABLET, FILM COATED ORAL DAILY
Status: DISCONTINUED | OUTPATIENT
Start: 2023-05-11 | End: 2023-05-19 | Stop reason: HOSPADM

## 2023-05-10 RX ORDER — PANTOPRAZOLE SODIUM 40 MG/1
40 TABLET, DELAYED RELEASE ORAL DAILY
Status: DISCONTINUED | OUTPATIENT
Start: 2023-05-11 | End: 2023-05-19 | Stop reason: HOSPADM

## 2023-05-10 RX ORDER — ONDANSETRON 4 MG/1
4 TABLET, FILM COATED ORAL 3 TIMES DAILY PRN
Status: DISCONTINUED | OUTPATIENT
Start: 2023-05-10 | End: 2023-05-19 | Stop reason: HOSPADM

## 2023-05-10 RX ADMIN — ACETAMINOPHEN 325MG 650 MG: 325 TABLET ORAL at 16:57

## 2023-05-10 RX ADMIN — OXCARBAZEPINE 600 MG: 150 TABLET, FILM COATED ORAL at 21:18

## 2023-05-10 RX ADMIN — ACETAMINOPHEN 325MG 650 MG: 325 TABLET ORAL at 21:18

## 2023-05-10 RX ADMIN — SODIUM CHLORIDE 500 ML: 9 INJECTION, SOLUTION INTRAVENOUS at 13:11

## 2023-05-10 RX ADMIN — PREGABALIN 25 MG: 25 CAPSULE ORAL at 21:18

## 2023-05-10 RX ADMIN — ASPIRIN 81 MG: 81 TABLET, COATED ORAL at 16:57

## 2023-05-10 RX ADMIN — TORSEMIDE 30 MG: 20 TABLET ORAL at 16:57

## 2023-05-10 RX ADMIN — DOCUSATE SODIUM 100 MG: 100 CAPSULE, LIQUID FILLED ORAL at 21:18

## 2023-05-10 RX ADMIN — BUDESONIDE AND FORMOTEROL FUMARATE DIHYDRATE 2 PUFF: 160; 4.5 AEROSOL RESPIRATORY (INHALATION) at 20:29

## 2023-05-10 RX ADMIN — Medication 10 ML: at 21:20

## 2023-05-10 RX ADMIN — MIRTAZAPINE 7.5 MG: 15 TABLET, FILM COATED ORAL at 21:18

## 2023-05-10 RX ADMIN — SODIUM CHLORIDE 100 ML/HR: 0.9 INJECTION, SOLUTION INTRAVENOUS at 18:30

## 2023-05-10 RX ADMIN — FOLIC ACID TAB 400 MCG 400 MCG: 400 TAB at 21:18

## 2023-05-10 NOTE — H&P
[facility] Medicine Services  HISTORY AND PHYSICAL    Patient Name: Mitzi Eric  : 1935  MRN: [id number]  Primary Care Physician: Louisa, No Known  Date of admission: 5/10/2023      Subjective   Subjective     Chief Complaint:  Fall    HPI:  Mitzi Eric is a 87 y.o. female with PMHx with history of CKD, COPD (2L nightly) with known bilateral upper lobe masses, paroxysmal A-fib, dementia, HLD, HTN, hypothyroidism, Trigeminal neuralgia who presents to ED from her assisted living facility due to a second fall in 2 days with associated acute renal failure. The patient had an unwitnessed fall at her assisted living facility this morning. She was evaluated in our ED 2 days ago for the same complaint. Per the assisted living, the patient also fell yesterday. She is having increased frequencies of falls with dementia and staff having to regularly check on the patient. The patient's only complaint on presentation to the ED was headache. CT head w/o is negative. The patient was found to have a slightly elevated creatinine of 1.6 in the Ed. She was recently discharged from our facility on 23 for UTI and acute renal failure. The patient likely needs more care than her assisted living is providing and will be admitted to the hospitalist service for further evaluation and treatment.    Review of Systems   Limited due to dementia    Personal History     Past Medical History:   Diagnosis Date   • A-fib    • Ataxic gait    • Chronic kidney disease, stage 3    • COPD (chronic obstructive pulmonary disease)    • Dementia    • Disease of thyroid gland    • Essential tremor 2022   • Heart failure    • Hyperlipidemia    • Hypertension    • Shingles        Past Surgical History:   Procedure Laterality Date   • BRAIN TUMOR EXCISION      BENIGN   • FRACTURE SURGERY     • TRIGGER FINGER RELEASE         Family History: her family history includes Alcohol abuse in her father; Stroke in her  mother.     Social History:  reports that she quit smoking about 43 years ago. Her smoking use included cigarettes. She started smoking about 63 years ago. She has a 22.50 pack-year smoking history. She has never used smokeless tobacco. She reports that she does not drink alcohol and does not use drugs.  Social History     Social History Narrative   • Not on file       Medications:  Available home medication information reviewed.  (Not in a hospital admission)      Allergies   Allergen Reactions   • Bee Pollen Unknown - Low Severity   • Lorazepam Delirium       Objective   Objective     Vital Signs:   Temp:  [97.7 °F (36.5 °C)] 97.7 °F (36.5 °C)  Heart Rate:  [88] 88  Resp:  [16] 16  BP: (99)/(58) 99/58  Flow (L/min):  [2] 2       Physical Exam   Constitutional: Awake, alert  Eyes: PERRLA, sclerae anicteric, no conjunctival injection  HENT: NCAT, mucous membranes moist  Neck: Supple, no thyromegaly, no lymphadenopathy, trachea midline  Respiratory: Clear to auscultation bilaterally, nonlabored respirations on 2L NC   Cardiovascular: RRR, no murmurs, rubs, or gallops, palpable pedal pulses bilaterally  Gastrointestinal: Positive bowel sounds, soft, nontender, nondistended  Musculoskeletal: No bilateral ankle edema, no clubbing or cyanosis to extremities  Psychiatric: Appropriate affect, cooperative  Neurologic: confused, strength symmetric in all extremities, Cranial Nerves grossly intact to confrontation, speech clear  Skin: No rashes    Result Review:  I have personally reviewed the results from the time of this admission to 5/10/2023 14:52 EDT and agree with these findings:  [x]  Laboratory list / accordion  []  Microbiology  [x]  Radiology  []  EKG/Telemetry   []  Cardiology/Vascular   []  Pathology  [x]  Old records  []  Other:    LAB RESULTS:      Lab 05/10/23  1250 05/09/23  1533   WBC 11.61* 7.80   HEMOGLOBIN 11.6* 11.9*   HEMATOCRIT 36.0 37.1   PLATELETS 203 234   NEUTROS ABS 8.83* 5.64   IMMATURE GRANS  (ABS) 0.12* 0.06*   LYMPHS ABS 0.67* 0.92   MONOS ABS 1.21* 0.70   EOS ABS 0.70* 0.41*   MCV 96.8 99.2*         Lab 05/10/23  1250 05/09/23  1533   SODIUM 142 141   POTASSIUM 4.5 4.0   CHLORIDE 109* 106   CO2 19.0* 17.0*   ANION GAP 14.0 18.0*   BUN 40* 39*   CREATININE 1.62* 1.75*   EGFR 30.8* 28.1*   GLUCOSE 92 129*   CALCIUM 9.0 9.1         Lab 05/10/23  1250 05/09/23  1533   TOTAL PROTEIN 7.3 7.4   ALBUMIN 4.0 3.9   GLOBULIN 3.3 3.5   ALT (SGPT) 31 25   AST (SGOT) 39* 31   BILIRUBIN 0.2 0.2   ALK PHOS 129* 127*                     UA        4/14/2023    12:30 5/9/2023    15:33 5/10/2023    13:04   Urinalysis   Squamous Epithelial Cells, UA 0-2   7-12      Specific Center, UA 1.018   1.026   1.015     Ketones, UA Negative   Negative   Negative     Blood, UA Trace   Negative   Negative     Leukocytes, UA Moderate (2+)   Negative   Negative     Nitrite, UA Positive   Negative   Negative     RBC, UA 3-6   0-2      WBC, UA Too Numerous to Count   3-5      Bacteria, UA 3+   2+          Microbiology Results (last 10 days)     ** No results found for the last 240 hours. **          CT Head Without Contrast    Result Date: 5/10/2023  CT HEAD WO CONTRAST Date of Exam: 5/10/2023 1:52 PM EDT Indication: multiple falls with headache and dementia. Comparison: May 8, 2023 Technique: Axial CT images were obtained of the head without contrast administration.  Reconstructed coronal and sagittal images were also obtained. Automated exposure control and iterative construction methods were used. Findings: There are changes from left frontal craniotomy. No acute intracranial hemorrhage or extra-axial collection is identified. The ventricles are stable in caliber, with no evidence of mass effect or midline shift. The basal cisterns appear patent. The gray-white differentiation appears preserved. Degenerative changes appear stable. No acute calvarial fracture is identified. The paranasal sinuses and mastoid air cells are well-aerated.      Impression: Impression: No acute intracranial process or calvarial fracture identified. Electronically Signed: Jonas Dixon  5/10/2023 2:10 PM EDT  Workstation ID: [id number]      Results for orders placed during the hospital encounter of 03/17/22    Adult Transthoracic Echo Complete W/ Cont if Necessary Per Protocol    Interpretation Summary  · Left ventricular ejection fraction appears to be 46 - 50%. Left ventricular systolic function is mildly decreased.  · Left ventricular diastolic function is consistent with (grade I) impaired relaxation.  · Mild to moderate mitral regurgitation.  · Mild to moderate tricuspid regurgitation, RVSP 54 mmHg.      Assessment & Plan   Assessment & Plan     Active Hospital Problems    Diagnosis  POA   • Chronic respiratory failure [J96.10]  Yes   • atrial fibrillation [I48.91]  Yes   • chronic CHF (congestive heart failure) (HCC) [I50.9]  Yes   • Dementia without behavioral disturbance [F03.90]  Yes   • Impaired functional mobility, balance, gait, and endurance [Z74.09]  Yes   • Cognitive decline [R41.89]  Yes   • Hypothyroidism (acquired) [E03.9]  Yes   • Trigeminal neuralgia [G50.0]  Yes   • Delirium, acute [R41.0]  Yes   • Essential hypertension [I10]  Yes   • JUAN F (acute kidney injury) [N17.9]  Yes   • Fall [W19.XXXA]  Yes     Mitzi Eric is a 87 y.o. female with PMHx with history of CKD, COPD (2L nightly) with known bilateral upper lobe masses, paroxysmal A-fib, dementia, HLD, HTN, hypothyroidism, Trigeminal neuralgia who presents to ED from her assisted living facility due to a second fall in 2 days with associated acute renal failure.     Fall  - CT Head w/o negative   - PT/OT  - CM- patient likely needs SNF with more care than she is receiving at her assisted living facility     Acute on Chronic Renal Failure Stage 3  -Cr 1.62 on admission  -Secondary to dehydration- IVF  -Hold Nephrotoxic medications    Slight Leukocytosis  - UA negative  - on RA  - monitor with am  labs  - leukemoid reaction     Left Apical Lung mass with fluid-necrotic components/cavitary appearance   Right Upper lobe lung mass   Hx COPD  -Patient/POA (nephew) have been aware of this and not interested in further work-up  -On 2L NC currently, normally on RA during the day and 2L at night per Nephew   - s/p Ceftin PO prescribed on d/c 4/14.     Combined systolic and diastolic heart failure  Moderate pulm hypertension  -Compensated, monitor with IVF   -continue home Demedex/Spironolactone      Paroxysmal atrial fibrillation  -continue home Metoprolol     Hypothyroidism  -Continue Synthroid     Trigeminal neuralgia  -Continue Trileptal and Lyrica     Hypertension  -continue home medications     Dementia  -Continue Aricept    DVT prophylaxis:  SCDs/ Aspirin      CODE STATUS:  DNR/DNI  Code Status and Medical Interventions:   Ordered at: 05/10/23 1451     Code Status (Patient has no pulse and is not breathing):    No CPR (Do Not Attempt to Resuscitate)     Medical Interventions (Patient has pulse or is breathing):    Full Support       Expected Discharge   Expected Discharge Date: 5/15/2023; Expected Discharge Time:      Alisia Dinh DO  05/10/23

## 2023-05-10 NOTE — CASE MANAGEMENT/SOCIAL WORK
Discharge Planning Assessment  [facility]     Patient Name: Mitzi Eric  MRN: [id number]  Today's Date: 5/10/2023    Admit Date: 5/10/2023    Plan: To be determined   Discharge Needs Assessment     Row Name 05/10/23 1446       Living Environment    People in Home facility resident    Current Living Arrangements assisted living facility    Primary Care Provided by self    Provides Primary Care For no one, unable/limited ability to care for self    Family Caregiver if Needed other relative(s)    Family Caregiver Names Lane Eric, nephew/POA    Quality of Family Relationships helpful;involved;supportive    Able to Return to Prior Arrangements yes       Transition Planning    Patient/Family Anticipates Transition to long-term care facility    Patient/Family Anticipated Services at Transition        Discharge Needs Assessment    Equipment Currently Used at Home wheelchair    Concerns to be Addressed denies needs/concerns at this time    Discharge Coordination/Progress Ms Eric is a resident of [facility] [place], and normally gets around in a wheelchair.  Currently wears 2L O2 at night.  Has had home health for sores on her bottom, but Lane did not remember the name of the company.  Lane Eric is POA.               Discharge Plan     Row Name 05/10/23 1449       Plan    Plan To be determined    Patient/Family in Agreement with Plan yes    Plan Comments I received a phone call from patient's primary RN Tabby.  Tabby states that Ms Eric is an assisted living facility resident and family believes that she needs more care than the assisted living facility can provide.  I spoke with Ms Eric and her POA/nephew Lane at bedside.  Ms Eric is a current resident of [facility] [place], [place], and is currently getting around via wheelchair. She also uses oxygen (2L) at night. She has had home health in the past for sores on her bottom, but Lane did not remember the name of the company.   Her PCP is a provider through [facility], and [facility] provides her medications.  [facility] in [place]. At this time, Lane states that Ms Eric was able to walk with a walker prior to the pandemic, however has progressively gotten worse and now only gets around by wheelchair. She has fallen two times this week. I gave him some information regarding [facility]. CM will continue to follow.    Final Discharge Disposition Code 30 - still a patient              Continued Care and Services - Admitted Since 5/10/2023    Coordination has not been started for this encounter.       Expected Discharge Date and Time     Expected Discharge Date Expected Discharge Time    May 15, 2023          Demographic Summary    No documentation.                Functional Status     Row Name 05/10/23 1445       Functional Status    Usual Activity Tolerance fair    Current Activity Tolerance fair       Functional Status, IADL    Medications assistive equipment and person    Meal Preparation assistive equipment and person    Housekeeping assistive equipment and person    Laundry assistive equipment and person    Shopping assistive equipment and person       Mental Status    General Appearance WDL WDL               Psychosocial    No documentation.                Abuse/Neglect    No documentation.                Legal    No documentation.                Substance Abuse    No documentation.                Patient Forms    No documentation.                   Blanca Colby, RN

## 2023-05-10 NOTE — Clinical Note
Level of Care: Telemetry [5]   Diagnosis: JUAN F (acute kidney injury) [816116]   Admitting Physician: STEFANIA CORDERO [[id number]]   Attending Physician: STEFANIA CORDERO [[id number]]   Certification: I Certify That Inpatient Hospital Services Are Medically Necessary For Greater Than 2 Midnights

## 2023-05-10 NOTE — NURSING NOTE
Pt admitted from ED around 1530. Pt family accompanies them from the ED. Pt is alert to person and situation. Due to cognition admit questions were answered by family and via paperwork from [facility]. Gown and tele monitor applied. Wound noted to left coccyx, dry, dark, and healing.

## 2023-05-10 NOTE — ED PROVIDER NOTES
"Subjective   History of Present Illness  Patient is an 87-year-old female presenting to the emergency department via EMS secondary to an unwitnessed fall.  Patient has had frequent and increasing episodes of falls.  She is currently in assisted living with history of dementia.  Patient evaluated 2 days ago for a fall with associated injury.  Patient did fall again yesterday with no evaluation at that time.  Patient fell again today.  Patient is having increased frequencies with staff requiring hourly checks.  Patient is complaining of forehead discomfort.    History provided by:  Patient and EMS personnel  History limited by:  Dementia      Review of Systems    Past Medical History:   Diagnosis Date   • A-fib    • Ataxic gait    • Chronic kidney disease, stage 3    • COPD (chronic obstructive pulmonary disease)    • Dementia    • Disease of thyroid gland    • Essential tremor 2022   • Heart failure    • Hyperlipidemia    • Hypertension    • Shingles        Allergies   Allergen Reactions   • Bee Pollen Unknown - Low Severity   • Lorazepam Delirium       Past Surgical History:   Procedure Laterality Date   • BRAIN TUMOR EXCISION      BENIGN   • FRACTURE SURGERY     • TRIGGER FINGER RELEASE         Family History   Problem Relation Age of Onset   • Stroke Mother    • Alcohol abuse Father        Social History     Socioeconomic History   • Marital status: Single   Tobacco Use   • Smoking status: Former     Packs/day: 1.50     Years: 15.00     Pack years: 22.50     Types: Cigarettes     Start date:      Quit date:      Years since quittin.3   • Smokeless tobacco: Never   Vaping Use   • Vaping Use: Never used   Substance and Sexual Activity   • Alcohol use: Never     Comment: \"social drinker\"   • Drug use: Never   • Sexual activity: Never           Objective   Physical Exam  Vitals and nursing note reviewed.   Constitutional:       General: She is not in acute distress.     Appearance: Normal " appearance. She is not toxic-appearing.   Cardiovascular:      Rate and Rhythm: Normal rate and regular rhythm.      Pulses: Normal pulses.   Pulmonary:      Effort: Pulmonary effort is normal. No respiratory distress.      Breath sounds: Normal breath sounds.   Abdominal:      Palpations: Abdomen is soft.      Tenderness: There is no abdominal tenderness.   Musculoskeletal:      Comments: Pelvis stable.  No midline C, T, L-spine tenderness palpation   Neurological:      Mental Status: She is alert. Mental status is at baseline. She is confused.      GCS: GCS eye subscore is 4. GCS verbal subscore is 4. GCS motor subscore is 6.   Psychiatric:         Mood and Affect: Mood normal.         Behavior: Behavior normal.         Procedures           ED Course  ED Course as of 05/10/23 2023   Wed May 10, 2023   1337 Creatinine(!): 1.62  Evidence of acute kidney injury which is similar to 2 days ago but significantly elevated compared to prior. [RS]   1431 CT Head Without Contrast  Personally reviewed CT scan of the head demonstrating no acute intracranial hemorrhage or mass effect. [RS]   1431 Patient with acute kidney injury noted on arrival.  We will plan admission for further evaluation and management with consultation with case management for consideration of next level care as indicated.  Hospitalist messaged for admission. [RS]      ED Course User Index  [RS] Carlos Espinal MD                                           Medical Decision Making  JUAN F (acute kidney injury): acute illness or injury  Declining functional status: acute illness or injury  Frequent falls: acute illness or injury  Severe dementia, unspecified dementia type, unspecified whether behavioral, psychotic, or mood disturbance or anxiety: acute illness or injury  Amount and/or Complexity of Data Reviewed  Independent Historian: EMS  External Data Reviewed: labs.  Labs: ordered. Decision-making details documented in ED Course.  Radiology: ordered.  Decision-making details documented in ED Course.      Risk  Prescription drug management.  Decision regarding hospitalization.          Final diagnoses:   JUAN F (acute kidney injury)   Declining functional status   Frequent falls   Severe dementia, unspecified dementia type, unspecified whether behavioral, psychotic, or mood disturbance or anxiety       ED Disposition  ED Disposition     ED Disposition   Decision to Admit    Condition   --    Comment   Level of Care: Telemetry [5]   Diagnosis: JUAN F (acute kidney injury) [426649]   Admitting Physician: STEFANIA CORDERO [[id number]]   Attending Physician: STEFANIA CORDERO [[id number]]               No follow-up provider specified.       Medication List      No changes were made to your prescriptions during this visit.          Carlos Espinal MD  05/10/23 2023

## 2023-05-11 LAB
ALBUMIN SERPL-MCNC: 3.5 G/DL (ref 3.5–5.2)
ALBUMIN/GLOB SERPL: 1.5 G/DL
ALP SERPL-CCNC: 114 U/L (ref 39–117)
ALT SERPL W P-5'-P-CCNC: 24 U/L (ref 1–33)
ANION GAP SERPL CALCULATED.3IONS-SCNC: 11 MMOL/L (ref 5–15)
AST SERPL-CCNC: 30 U/L (ref 1–32)
BILIRUB SERPL-MCNC: 0.3 MG/DL (ref 0–1.2)
BUN SERPL-MCNC: 23 MG/DL (ref 8–23)
BUN/CREAT SERPL: 25.8 (ref 7–25)
CALCIUM SPEC-SCNC: 8.2 MG/DL (ref 8.6–10.5)
CHLORIDE SERPL-SCNC: 111 MMOL/L (ref 98–107)
CO2 SERPL-SCNC: 19 MMOL/L (ref 22–29)
CREAT SERPL-MCNC: 0.89 MG/DL (ref 0.57–1)
DEPRECATED RDW RBC AUTO: 46.9 FL (ref 37–54)
EGFRCR SERPLBLD CKD-EPI 2021: 63.2 ML/MIN/1.73
ERYTHROCYTE [DISTWIDTH] IN BLOOD BY AUTOMATED COUNT: 13.2 % (ref 12.3–15.4)
GLOBULIN UR ELPH-MCNC: 2.3 GM/DL
GLUCOSE SERPL-MCNC: 87 MG/DL (ref 65–99)
HCT VFR BLD AUTO: 32.5 % (ref 34–46.6)
HGB BLD-MCNC: 10.3 G/DL (ref 12–15.9)
MCH RBC QN AUTO: 30.9 PG (ref 26.6–33)
MCHC RBC AUTO-ENTMCNC: 31.7 G/DL (ref 31.5–35.7)
MCV RBC AUTO: 97.6 FL (ref 79–97)
PLATELET # BLD AUTO: 172 10*3/MM3 (ref 140–450)
PMV BLD AUTO: 12.4 FL (ref 6–12)
POTASSIUM SERPL-SCNC: 4.6 MMOL/L (ref 3.5–5.2)
PROT SERPL-MCNC: 5.8 G/DL (ref 6–8.5)
RBC # BLD AUTO: 3.33 10*6/MM3 (ref 3.77–5.28)
SODIUM SERPL-SCNC: 141 MMOL/L (ref 136–145)
WBC NRBC COR # BLD: 8.35 10*3/MM3 (ref 3.4–10.8)

## 2023-05-11 PROCEDURE — 94761 N-INVAS EAR/PLS OXIMETRY MLT: CPT

## 2023-05-11 PROCEDURE — G0378 HOSPITAL OBSERVATION PER HR: HCPCS

## 2023-05-11 PROCEDURE — 97162 PT EVAL MOD COMPLEX 30 MIN: CPT

## 2023-05-11 PROCEDURE — 80053 COMPREHEN METABOLIC PANEL: CPT | Performed by: HOSPITALIST

## 2023-05-11 PROCEDURE — 94799 UNLISTED PULMONARY SVC/PX: CPT

## 2023-05-11 PROCEDURE — 96361 HYDRATE IV INFUSION ADD-ON: CPT

## 2023-05-11 PROCEDURE — 85027 COMPLETE CBC AUTOMATED: CPT | Performed by: HOSPITALIST

## 2023-05-11 PROCEDURE — 99232 SBSQ HOSP IP/OBS MODERATE 35: CPT | Performed by: NURSE PRACTITIONER

## 2023-05-11 RX ORDER — ALBUTEROL SULFATE 90 UG/1
2 AEROSOL, METERED RESPIRATORY (INHALATION) EVERY 4 HOURS PRN
Status: ON HOLD | COMMUNITY
End: 2023-05-11

## 2023-05-11 RX ADMIN — PANTOPRAZOLE SODIUM 40 MG: 40 TABLET, DELAYED RELEASE ORAL at 09:49

## 2023-05-11 RX ADMIN — MIRTAZAPINE 7.5 MG: 15 TABLET, FILM COATED ORAL at 21:51

## 2023-05-11 RX ADMIN — SPIRONOLACTONE 25 MG: 25 TABLET ORAL at 09:49

## 2023-05-11 RX ADMIN — SODIUM CHLORIDE 100 ML/HR: 0.9 INJECTION, SOLUTION INTRAVENOUS at 13:45

## 2023-05-11 RX ADMIN — PREGABALIN 25 MG: 25 CAPSULE ORAL at 09:49

## 2023-05-11 RX ADMIN — OXCARBAZEPINE 600 MG: 150 TABLET, FILM COATED ORAL at 12:08

## 2023-05-11 RX ADMIN — Medication 10 ML: at 21:51

## 2023-05-11 RX ADMIN — BUDESONIDE AND FORMOTEROL FUMARATE DIHYDRATE 2 PUFF: 160; 4.5 AEROSOL RESPIRATORY (INHALATION) at 19:43

## 2023-05-11 RX ADMIN — ACETAMINOPHEN 325MG 650 MG: 325 TABLET ORAL at 21:50

## 2023-05-11 RX ADMIN — LEVOTHYROXINE SODIUM 25 MCG: 0.03 TABLET ORAL at 05:42

## 2023-05-11 RX ADMIN — OXCARBAZEPINE 600 MG: 150 TABLET, FILM COATED ORAL at 21:51

## 2023-05-11 RX ADMIN — FOLIC ACID TAB 400 MCG 400 MCG: 400 TAB at 21:51

## 2023-05-11 RX ADMIN — DONEPEZIL HYDROCHLORIDE 5 MG: 5 TABLET, FILM COATED ORAL at 09:49

## 2023-05-11 RX ADMIN — DOCUSATE SODIUM 100 MG: 100 CAPSULE, LIQUID FILLED ORAL at 09:49

## 2023-05-11 RX ADMIN — FOLIC ACID TAB 400 MCG 400 MCG: 400 TAB at 09:49

## 2023-05-11 RX ADMIN — ACETAMINOPHEN 325MG 650 MG: 325 TABLET ORAL at 09:49

## 2023-05-11 RX ADMIN — TORSEMIDE 30 MG: 20 TABLET ORAL at 09:49

## 2023-05-11 RX ADMIN — ASPIRIN 81 MG: 81 TABLET, COATED ORAL at 09:49

## 2023-05-11 RX ADMIN — SODIUM CHLORIDE 100 ML/HR: 0.9 INJECTION, SOLUTION INTRAVENOUS at 04:39

## 2023-05-11 RX ADMIN — BUDESONIDE AND FORMOTEROL FUMARATE DIHYDRATE 2 PUFF: 160; 4.5 AEROSOL RESPIRATORY (INHALATION) at 09:07

## 2023-05-11 RX ADMIN — DOCUSATE SODIUM 100 MG: 100 CAPSULE, LIQUID FILLED ORAL at 21:51

## 2023-05-11 RX ADMIN — PREGABALIN 25 MG: 25 CAPSULE ORAL at 21:51

## 2023-05-11 RX ADMIN — METOPROLOL SUCCINATE 25 MG: 25 TABLET, EXTENDED RELEASE ORAL at 09:49

## 2023-05-11 RX ADMIN — ACETAMINOPHEN 325MG 650 MG: 325 TABLET ORAL at 17:12

## 2023-05-11 NOTE — PLAN OF CARE
Problem: Adult Inpatient Plan of Care  Goal: Plan of Care Review  Outcome: Ongoing, Progressing  Goal: Patient-Specific Goal (Individualized)  Outcome: Ongoing, Progressing  Goal: Absence of Hospital-Acquired Illness or Injury  Outcome: Ongoing, Progressing  Intervention: Identify and Manage Fall Risk  Intervention: Prevent Skin Injury  Intervention: Prevent and Manage VTE (Venous Thromboembolism) Risk  Intervention: Prevent Infection  Goal: Optimal Comfort and Wellbeing  Outcome: Ongoing, Progressing  Goal: Readiness for Transition of Care  Outcome: Ongoing, Progressing   Goal Outcome Evaluation:   Patient had no acute events during shift  - 130 /87   NS @ 125  Afebrile  Presents dehydrated

## 2023-05-11 NOTE — CASE MANAGEMENT/SOCIAL WORK
Continued Stay Note  [facility]     Patient Name: Mitzi Eric  MRM: [id number]  Today's Date: 5/11/2023    Admit Date: 5/10/2023    Plan: To be determined   Discharge Plan     Row Name 05/11/23 1036       Plan    Plan Comments Case management is waiting for physical and occupational therapy consults to give recommendations for Mrs. Eric for inpatient rehab placement. Case management provided a list of skilled nursing homes to review.               Discharge Codes    No documentation.               Expected Discharge Date and Time     Expected Discharge Date Expected Discharge Time    May 15, 2023             YURI Joseph

## 2023-05-11 NOTE — THERAPY EVALUATION
Patient Name: Mitzi Eric  : 1935    MRN: [id number]                              Today's Date: 2023       Admit Date: 5/10/2023    Visit Dx:     ICD-10-CM ICD-9-CM   1. JUAN F (acute kidney injury)  N17.9 584.9   2. Declining functional status  R53.81 799.3   3. Frequent falls  R29.6 V15.88   4. Severe dementia, unspecified dementia type, unspecified whether behavioral, psychotic, or mood disturbance or anxiety  F03.C0 294.20   5. Centrilobular emphysema  J43.2 492.8   6. Impaired functional mobility, balance, gait, and endurance  Z74.09 V49.89     Patient Active Problem List   Diagnosis   • Fall   • Delirium, acute   • UTI (urinary tract infection), bacterial   • Essential hypertension   • JUAN F (acute kidney injury)   • Cognitive decline   • Trigeminal neuralgia   • Hypothyroidism (acquired)   • Lung mass   • Influenza A   • Severe malnutrition (CMS/HCC)   • Dementia without behavioral disturbance   • History of craniotomy   • Impaired functional mobility, balance, gait, and endurance   • COPD ex with volume overload   • chronic CHF (congestive heart failure) ([facility])   • Thrombocytopenia   • Elevated transaminase level   • atrial fibrillation   • COPD with acute exacerbation   • Acute on chronic respiratory failure with hypoxemia   • Chronic respiratory failure   • Underweight   • Moderate malnutrition (CMS/HCC)   • Centrilobular emphysema   • Essential tremor   • Pneumonia of both upper lobes due to infectious organism     Past Medical History:   Diagnosis Date   • A-fib    • Ataxic gait    • Chronic kidney disease, stage 3    • COPD (chronic obstructive pulmonary disease)    • Dementia    • Disease of thyroid gland    • Essential tremor 2022   • Heart failure    • Hyperlipidemia    • Hypertension    • Shingles      Past Surgical History:   Procedure Laterality Date   • BRAIN TUMOR EXCISION      BENIGN   • FRACTURE SURGERY     • TRIGGER FINGER RELEASE        General Information     Row Name  05/11/23 0851          Physical Therapy Time and Intention    Document Type evaluation  -SD     Mode of Treatment individual therapy;physical therapy  -SD     Row Name 05/11/23 0851          General Information    Patient Profile Reviewed yes  -SD     Prior Level of Function transfer;min assist:;bed mobility;dependent:;gait  baseline t/f's to WC with assist; several recent falls  -SD     Existing Precautions/Restrictions fall;oxygen therapy device and L/min  -SD     Barriers to Rehab medically complex;previous functional deficit;cognitive status  -SD     Row Name 05/11/23 0851          Living Environment    People in Home facility resident  -SD     Row Name 05/11/23 0851          Cognition    Orientation Status (Cognition) oriented to;person;disoriented to;place;situation;time  -SD     Row Name 05/11/23 0851          Safety Issues, Functional Mobility    Safety Issues Affecting Function (Mobility) awareness of need for assistance;friction/shear risk;impulsivity;insight into deficits/self-awareness;judgment;sequencing abilities;safety precautions follow-through/compliance;safety precaution awareness;problem-solving  -SD     Impairments Affecting Function (Mobility) cognition;balance;endurance/activity tolerance;strength  -SD           User Key  (r) = Recorded By, (t) = Taken By, (c) = Cosigned By    Initials Name Provider Type    SD Yudelka Colindres, CASEY Physical Therapist               Mobility     Row Name 05/11/23 1027          Bed Mobility    Bed Mobility supine-sit  -SD     Supine-Sit [place] (Bed Mobility) maximum assist (25% patient effort)  -SD     Assistive Device (Bed Mobility) bed rails;draw sheet;head of bed elevated  -SD     Comment, (Bed Mobility) max cues for sequencing  -SD     Row Name 05/11/23 1027          Transfers    Comment, (Transfers) modA x2 for stand step t/f to recliner with UE support and gait belt. (Per chart, her nephew prefers for her to not use a RW as he states this has  contributed to falls in the past.)  -[facility] Name 05/11/23 1027          Bed-Chair Transfer    Bed-Chair [place] (Transfers) moderate assist (50% patient effort);2 person assist;nonverbal cues (demo/gesture);verbal cues  -SD     Row Name 05/11/23 1027          Sit-Stand Transfer    Sit-Stand [place] (Transfers) moderate assist (50% patient effort);2 person assist;verbal cues  -SD     Row Name 05/11/23 1027          Gait/Stairs (Locomotion)    [place] Level (Gait) not tested  -SD           User Key  (r) = Recorded By, (t) = Taken By, (c) = Cosigned By    Initials Name Provider Type    SD Yudelka Colindres PT Physical Therapist               Obj/Interventions     Row Name 05/11/23 1029          Range of Motion Comprehensive    General Range of Motion bilateral lower extremity ROM WFL  -SD     Row Name 05/11/23 1029          Strength Comprehensive (MMT)    General Manual Muscle Testing (MMT) Assessment lower extremity strength deficits identified  -SD     Row Name 05/11/23 1029          Motor Skills    Therapeutic Exercise hip;knee;ankle  -SD     Row Name 05/11/23 1029          Hip (Therapeutic Exercise)    Hip (Therapeutic Exercise) AAROM (active assistive range of motion)  -SD     Hip AAROM (Therapeutic Exercise) bilateral;flexion;aBduction;aDduction;external rotation;internal rotation;supine;10 repetitions  -SD     Row Name 05/11/23 1029          Knee (Therapeutic Exercise)    Knee (Therapeutic Exercise) AAROM (active assistive range of motion)  -SD     Knee AAROM (Therapeutic Exercise) bilateral;flexion;extension;supine;10 repetitions  -SD     Row Name 05/11/23 1029          Ankle (Therapeutic Exercise)    Ankle (Therapeutic Exercise) AAROM (active assistive range of motion)  -SD     Ankle AAROM (Therapeutic Exercise) bilateral;dorsiflexion;plantarflexion;supine;10 repetitions  -SD     Row Name 05/11/23 1029          Balance    Balance Assessment sitting static balance;standing static balance   -SD     Static Sitting Balance standby assist  -SD     Position, Sitting Balance unsupported;sitting edge of bed  -SD     Static Standing Balance minimal assist;2-person assist  -SD     Position/Device Used, Standing Balance supported  -SD           User Key  (r) = Recorded By, (t) = Taken By, (c) = Cosigned By    Initials Name Provider Type    Yudelka Adkins, PT Physical Therapist               Goals/Plan     Row Name 05/11/23 1035          Bed Mobility Goal 1 (PT)    Activity/Assistive Device (Bed Mobility Goal 1, PT) rolling to left;rolling to right;sit to supine;supine to sit  -SD     Time Frame (Bed Mobility Goal 1, PT) long term goal (LTG);2 weeks  -SD     Row Name 05/11/23 1035          Transfer Goal 1 (PT)    Activity/Assistive Device (Transfer Goal 1, PT) bed-to-chair/chair-to-bed;sit-to-stand/stand-to-sit  -SD     [place] Level/Cues Needed (Transfer Goal 1, PT) moderate assist (50-74% patient effort)  -SD     Time Frame (Transfer Goal 1, PT) long term goal (LTG);2 weeks  -SD     Row Name 05/11/23 1035          Gait Training Goal 1 (PT)    Activity/Assistive Device (Gait Training Goal 1, PT) gait (walking locomotion);forward stepping  -SD     [place] Level (Gait Training Goal 1, PT) maximum assist (25-49% patient effort)  -SD     Distance (Gait Training Goal 1, PT) 10ft  -SD     Time Frame (Gait Training Goal 1, PT) long term goal (LTG);2 weeks  -SD     Row Name 05/11/23 1035          Therapy Assessment/Plan (PT)    Planned Therapy Interventions (PT) balance training;bed mobility training;gait training;home exercise program;patient/family education;transfer training;neuromuscular re-education;strengthening;stretching;ROM (range of motion)  -SD           User Key  (r) = Recorded By, (t) = Taken By, (c) = Cosigned By    Initials Name Provider Type    Yudelka Adkins PT Physical Therapist               Clinical Impression     Row Name 05/11/23 1030          Pain    Pretreatment Pain  Rating 0/10 - no pain  -SD     Posttreatment Pain Rating 0/10 - no pain  -SD     Row Name 05/11/23 1030          Plan of Care Review    Plan of Care Reviewed With patient;family;other (see comments)  nephew  -SD     Outcome Evaluation Pt presents with weakness, debility, and decreased functional mobility independence per PLOF and has hx of multiple recent falls. Pt req maxA for bed mob and t/f to chair with modA x2. Pt is below baseline level of function and will benefit from IPPT services to address limitations. PT rec d/c SNF.  -SD     Row Name 05/11/23 1030          Therapy Assessment/Plan (PT)    Patient/Family Therapy Goals Statement (PT) placement at SNF  -SD     Rehab Potential (PT) fair, will monitor progress closely  -SD     Criteria for Skilled Interventions Met (PT) yes;skilled treatment is necessary  -SD     Therapy Frequency (PT) daily  -SD     Predicted Duration of Therapy Intervention (PT) 2wks  -SD     Row Name 05/11/23 1030          Vital Signs    Pre Systolic BP Rehab 116  -SD     Pre Treatment Diastolic BP 88  -SD     Pretreatment Heart Rate (beats/min) 103  -SD     Posttreatment Heart Rate (beats/min) 83  -SD     Pre SpO2 (%) 94  -SD     O2 Delivery Pre Treatment nasal cannula  -SD     Post SpO2 (%) 88  -SD     O2 Delivery Post Treatment nasal cannula  -SD     Pre Patient Position Supine  -SD     Row Name 05/11/23 1030          Positioning and Restraints    Pre-Treatment Position in bed  -SD     Post Treatment Position chair  -SD     In Chair notified nsg;reclined;call light within reach;encouraged to call for assist;exit alarm on;with family/caregiver;waffle cushion;heels elevated;on mechanical lift sling  -SD           User Key  (r) = Recorded By, (t) = Taken By, (c) = Cosigned By    Initials Name Provider Type    Yudelka Adkins, PT Physical Therapist               Outcome Measures     Row Name 05/11/23 1036          How much help from another person do you currently need...    Turning  from your back to your side while in flat bed without using bedrails? 2  -SD     Moving from lying on back to sitting on the side of a flat bed without bedrails? 2  -SD     Moving to and from a bed to a chair (including a wheelchair)? 2  -SD     Standing up from a chair using your arms (e.g., wheelchair, bedside chair)? 2  -SD     Climbing 3-5 steps with a railing? 1  -SD     To walk in hospital room? 2  -SD     AM-PAC 6 Clicks Score (PT) 11  -SD     Highest level of mobility 4 --> Transferred to chair/commode  -SD     Row Name 05/11/23 1036          Functional Assessment    Outcome Measure Options AM-PAC 6 Clicks Basic Mobility (PT)  -SD           User Key  (r) = Recorded By, (t) = Taken By, (c) = Cosigned By    Initials Name Provider Type    SD Yudelka Colindres PT Physical Therapist                             Physical Therapy Education     Title: PT OT SLP Therapies (In Progress)     Topic: Physical Therapy (Done)     Point: Mobility training (Done)     Learning Progress Summary           Patient Acceptance, E, VU,NR by SD at 5/11/2023 1036   Family Acceptance, E, VU,NR by SD at 5/11/2023 1036                   Point: Home exercise program (Done)     Learning Progress Summary           Patient Acceptance, E, VU,NR by SD at 5/11/2023 1036   Family Acceptance, E, VU,NR by SD at 5/11/2023 1036                   Point: Body mechanics (Done)     Learning Progress Summary           Patient Acceptance, E, VU,NR by SD at 5/11/2023 1036   Family Acceptance, E, VU,NR by SD at 5/11/2023 1036                   Point: Precautions (Done)     Learning Progress Summary           Patient Acceptance, E, VU,NR by SD at 5/11/2023 1036   Family Acceptance, E, VU,NR by SD at 5/11/2023 1036                               User Key     Initials Effective Dates Name Provider Type Discipline    SD 03/13/23 -  Yudelka Colindres PT Physical Therapist PT              PT Recommendation and Plan  Planned Therapy Interventions (PT):  balance training, bed mobility training, gait training, home exercise program, patient/family education, transfer training, neuromuscular re-education, strengthening, stretching, ROM (range of motion)  Plan of Care Reviewed With: patient, family, other (see comments) (nephew)  Outcome Evaluation: Pt presents with weakness, debility, and decreased functional mobility independence per PLOF and has hx of multiple recent falls. Pt req maxA for bed mob and t/f to chair with modA x2. Pt is below baseline level of function and will benefit from IPPT services to address limitations. PT rec d/c SNF.     Time Calculation:    PT Charges     Row Name 05/11/23 1037             Time Calculation    Start Time 0851  -SD      PT Non-Billable Time (min) 46 min  -SD      PT Received On 05/11/23  -SD      PT Goal Re-Cert Due Date 05/21/23  -SD            User Key  (r) = Recorded By, (t) = Taken By, (c) = Cosigned By    Initials Name Provider Type    SD Yudelka Colindres, PT Physical Therapist              Therapy Charges for Today     Code Description Service Date Service Provider Modifiers Qty    06422003880 HC PT EVAL MOD COMPLEXITY 4 5/11/2023 Yudelka Colindres, PT GP 1    38086399675 HC PT THER SUPP EA 15 MIN 5/11/2023 Yudelka Colindres, PT GP 2          PT G-Codes  Outcome Measure Options: AM-PAC 6 Clicks Basic Mobility (PT)  AM-PAC 6 Clicks Score (PT): 11  PT Discharge Summary  Anticipated Discharge Disposition (PT): skilled nursing facility    Yudelka Colindres PT  5/11/2023

## 2023-05-11 NOTE — PROGRESS NOTES
[facility] Medicine Services  PROGRESS NOTE    Patient Name: Mitzi Eric  : 1935  MRN: [id number]    Date of Admission: 5/10/2023  Primary Care Physician: Provider, No Known    Subjective   Subjective     CC:  Follow up dementia/dehydration    HPI:  No new issues overnight  Nephew at bedside    ROS:  Gen- No fevers, chills  CV- No chest pain, palpitations  Resp- No cough, dyspnea  GI- No N/V/D, abd pain      Objective   Objective     Vital Signs:   Temp:  [97.7 °F (36.5 °C)-99.9 °F (37.7 °C)] 98.1 °F (36.7 °C)  Heart Rate:  [86-97] 88  Resp:  [16-24] 18  BP: ()/() 115/72  Flow (L/min):  [2-3] 2     Physical Exam:  Constitutional: No acute distress, awake, alert  HENT: NCAT, mucous membranes moist  Respiratory: Clear to auscultation bilaterally, respiratory effort normal   Cardiovascular: irregular, no murmurs, rubs, or gallops  Gastrointestinal: Positive bowel sounds, soft, nontender, nondistended  Musculoskeletal: No bilateral ankle edema  Psychiatric: Appropriate affect, cooperative, pleasantly confused  Neurologic: Oriented x 1, JO, speech clear  Skin: No rashes noted    Results Reviewed:  LAB RESULTS:      Lab 23  0543 05/10/23  1250 23  1533   WBC 8.35 11.61* 7.80   HEMOGLOBIN 10.3* 11.6* 11.9*   HEMATOCRIT 32.5* 36.0 37.1   PLATELETS 172 203 234   NEUTROS ABS  --  8.83* 5.64   IMMATURE GRANS (ABS)  --  0.12* 0.06*   LYMPHS ABS  --  0.67* 0.92   MONOS ABS  --  1.21* 0.70   EOS ABS  --  0.70* 0.41*   MCV 97.6* 96.8 99.2*         Lab 23  0543 05/10/23  1250 23  1533   SODIUM 141 142 141   POTASSIUM 4.6 4.5 4.0   CHLORIDE 111* 109* 106   CO2 19.0* 19.0* 17.0*   ANION GAP 11.0 14.0 18.0*   BUN 23 40* 39*   CREATININE 0.89 1.62* 1.75*   EGFR 63.2 30.8* 28.1*   GLUCOSE 87 92 129*   CALCIUM 8.2* 9.0 9.1         Lab 23  0543 05/10/23  1250 23  1533   TOTAL PROTEIN 5.8* 7.3 7.4   ALBUMIN 3.5 4.0 3.9   GLOBULIN 2.3 3.3 3.5   ALT (SGPT)  24 31 25   AST (SGOT) 30 39* 31   BILIRUBIN 0.3 0.2 0.2   ALK PHOS 114 129* 127*       Brief Urine Lab Results  (Last result in the past 365 days)      Color   Clarity   Blood   Leuk Est   Nitrite   Protein   CREAT   Urine HCG        05/10/23 1304 Yellow   Clear   Negative   Negative   Negative   Negative                 Microbiology Results Abnormal     None          CT Head Without Contrast    Result Date: 5/10/2023  CT HEAD WO CONTRAST Date of Exam: 5/10/2023 1:52 PM EDT Indication: multiple falls with headache and dementia. Comparison: May 8, 2023 Technique: Axial CT images were obtained of the head without contrast administration.  Reconstructed coronal and sagittal images were also obtained. Automated exposure control and iterative construction methods were used. Findings: There are changes from left frontal craniotomy. No acute intracranial hemorrhage or extra-axial collection is identified. The ventricles are stable in caliber, with no evidence of mass effect or midline shift. The basal cisterns appear patent. The gray-white differentiation appears preserved. Degenerative changes appear stable. No acute calvarial fracture is identified. The paranasal sinuses and mastoid air cells are well-aerated.     Impression: Impression: No acute intracranial process or calvarial fracture identified. Electronically Signed: Jonas Dixon  5/10/2023 2:10 PM EDT  Workstation ID: [id number]      Results for orders placed during the hospital encounter of 03/17/22    Adult Transthoracic Echo Complete W/ Cont if Necessary Per Protocol    Interpretation Summary  · Left ventricular ejection fraction appears to be 46 - 50%. Left ventricular systolic function is mildly decreased.  · Left ventricular diastolic function is consistent with (grade I) impaired relaxation.  · Mild to moderate mitral regurgitation.  · Mild to moderate tricuspid regurgitation, RVSP 54 mmHg.      Current medications:  Scheduled Meds:acetaminophen, 650 mg,  Oral, TID  aspirin, 81 mg, Oral, Daily  budesonide-formoterol, 2 puff, Inhalation, BID - RT  docusate sodium, 100 mg, Oral, BID  donepezil, 5 mg, Oral, Daily  folic acid, 400 mcg, Oral, BID  levothyroxine, 25 mcg, Oral, Q AM  metoprolol succinate XL, 25 mg, Oral, Daily  mirtazapine, 7.5 mg, Oral, Nightly  OXcarbazepine, 600 mg, Oral, BID  pantoprazole, 40 mg, Oral, Daily  pregabalin, 25 mg, Oral, BID  sodium chloride, 10 mL, Intravenous, Q12H  spironolactone, 25 mg, Oral, Daily  torsemide, 30 mg, Oral, Daily      Continuous Infusions:sodium chloride, 100 mL/hr, Last Rate: 100 mL/hr (05/11/23 1001)      PRN Meds:.•  albuterol  •  ondansetron  •  [COMPLETED] Insert Peripheral IV **AND** sodium chloride  •  sodium chloride  •  sodium chloride  •  traMADol    Assessment & Plan   Assessment & Plan     Active Hospital Problems    Diagnosis  POA   • **JUAN F (acute kidney injury) [N17.9]  Yes   • Chronic respiratory failure [J96.10]  Yes   • atrial fibrillation [I48.91]  Yes   • chronic CHF (congestive heart failure) (HCC) [I50.9]  Yes   • Dementia without behavioral disturbance [F03.90]  Yes   • Impaired functional mobility, balance, gait, and endurance [Z74.09]  Yes   • Cognitive decline [R41.89]  Yes   • Hypothyroidism (acquired) [E03.9]  Yes   • Trigeminal neuralgia [G50.0]  Yes   • Delirium, acute [R41.0]  Yes   • Essential hypertension [I10]  Yes   • Fall [W19.XXXA]  Yes      Resolved Hospital Problems   No resolved problems to display.        Brief Hospital Course to date:  Mitzi Eric is a 87 y.o. female with PMHx with history of CKD, COPD (2L nightly) with known bilateral upper lobe masses, paroxysmal A-fib, dementia, HLD, HTN, hypothyroidism, Trigeminal neuralgia who presents to ED from her assisted living facility due to a second fall in 2 days with associated acute renal failure.     Fall  - CT Head w/o negative   - PT/OT  - patient likely needs SNF with more care than she is receiving at her assisted living  facility   - nephew visiting different facilities  - PT/OT notes pending     Acute on Chronic Renal Failure Stage 3  -Cr 1.62 on admission, now 0.89  -Secondary to dehydration, IVF  -Hold Nephrotoxic medications     Slight Leukocytosis, resolved  - UA negative  - currently on 2L, prior to admission only worse 2L at night     Left Apical Lung mass with fluid-necrotic components/cavitary appearance   Right Upper lobe lung mass   Hx COPD  -Patient/POA (nephew) have been aware of this and not interested in further work-up  -On 2L NC currently, normally on RA during the day and 2L at night per Nephew   - s/p Ceftin PO prescribed on d/c 4/14.     Combined systolic and diastolic heart failure  Moderate pulm hypertension  -Compensated, monitor with IVF   -continue home Demadex/Spironolactone      Paroxysmal atrial fibrillation  -continue home Metoprolol     Hypothyroidism  -Continue Synthroid     Trigeminal neuralgia  -Continue Trileptal and Lyrica     Hypertension  -continue home medications     Dementia  -Continue Aricept      Expected Discharge Location and Transportation: [facility]  Expected Discharge   Expected Discharge Date: 5/15/2023; Expected Discharge Time:      DVT prophylaxis:  Mechanical DVT prophylaxis orders are present.     AM-PAC 6 Clicks Score (PT): 11 (05/11/23 1036)    CODE STATUS:   Code Status and Medical Interventions:   Ordered at: 05/10/23 5731     Code Status (Patient has no pulse and is not breathing):    No CPR (Do Not Attempt to Resuscitate)     Medical Interventions (Patient has pulse or is breathing):    Full Support       Zoie Price, APRN  05/11/23

## 2023-05-11 NOTE — PLAN OF CARE
Goal Outcome Evaluation:  Plan of Care Reviewed With: patient, family, other (see comments) (nephew)           Outcome Evaluation: Pt presents with weakness, debility, and decreased functional mobility independence per PLOF and has hx of multiple recent falls. Pt req maxA for bed mob and t/f to chair with modA x2. Pt is below baseline level of function and will benefit from IPPT services to address limitations. PT rec d/c SNF.

## 2023-05-11 NOTE — CONSULTS
Malnutrition Severity Assessment    Patient Name:  Mitzi Eric  YOB: 1935  MRN: [id number]  Admit Date:  5/10/2023    Patient meets criteria for : Severe Malnutrition    Comments: Pt meets criteria for severe malnutrition in the context of chronic illness indicated by po intake <75% x >/=1 month, severe muscle wasting and subcutaneous fat loss.     Malnutrition Severity Assessment  Malnutrition Type: Chronic Disease - Related Malnutrition  Malnutrition Type (last 8 hours)     Malnutrition Severity Assessment     Row Name 05/11/23 1140       Malnutrition Severity Assessment    Malnutrition Type Chronic Disease - Related Malnutrition    Row Name 05/11/23 1140       Insufficient Energy Intake     Insufficient Energy Intake Findings Severe    Insufficient Energy Intake  <75% of est. energy requirement for > or equal to 1 month    Row Name 05/11/23 1140       Muscle Loss    Loss of Muscle Mass Findings Severe    Spiritism Region Severe - deep hollowing/scooping, lack of muscle to touch, facial bones well defined    Clavicle Bone Region Severe - protruding prominent bone    Acromion Bone Region Severe - squared shoulders, bones, and acromion process protrusion prominent    Scapular Bone Region Severe - prominent bones, depressions easily visible between ribs, scapula, spine, shoulders    Dorsal Hand Region Severe - prominent depression    Patellar Region Severe - prominent bone, square looking, very little muscle definition    Anterior Thigh Region Severe - line/depression along thigh, obviously thin    Posterior Calf Region Severe - thin with very little definition/firmness    Row Name 05/11/23 1140       Fat Loss    Subcutaneous Fat Loss Findings Severe    Orbital Region  Severe - pronounced hollowness/depression, dark circles, loose saggy skin    Upper Arm Region Severe - mostly skin, very little space between folds, fingers touch    Row Name 05/11/23 1140       Criteria Met (Must meet criteria for  severity in at least 2 of these categories: M Wasting, Fat Loss, Fluid, Secondary Signs, Wt. Status, Intake)    Patient meets criteria for  Severe Malnutrition                Electronically signed by:  Maryan Nguyễn RD  05/11/23 11:41 EDT

## 2023-05-11 NOTE — PLAN OF CARE
Problem: Adult Inpatient Plan of Care  Goal: Plan of Care Review  5/11/2023 0230 by Arian Pak RN  Outcome: Ongoing, Progressing  5/11/2023 0230 by Arian Pak RN  Outcome: Ongoing, Progressing  5/11/2023 0227 by Arian Pak RN  Outcome: Ongoing, Progressing  Goal: Patient-Specific Goal (Individualized)  5/11/2023 0230 by Arian Pak RN  Outcome: Ongoing, Progressing  5/11/2023 0230 by Arian Pak RN  Outcome: Ongoing, Progressing  5/11/2023 0227 by Arian Pak RN  Outcome: Ongoing, Progressing  Goal: Absence of Hospital-Acquired Illness or Injury  5/11/2023 0230 by Arian Pak RN  Outcome: Ongoing, Progressing  5/11/2023 0230 by Arian Pak RN  Outcome: Ongoing, Progressing  5/11/2023 0227 by Arian Pak RN  Outcome: Ongoing, Progressing  Intervention: Identify and Manage Fall Risk  Intervention: Prevent Skin Injury  Intervention: Prevent and Manage VTE (Venous Thromboembolism) Risk  Intervention: Prevent Infection  Goal: Optimal Comfort and Wellbeing  5/11/2023 0230 by Arian Pak RN  Outcome: Ongoing, Progressing  5/11/2023 0230 by Arian Pak RN  Outcome: Ongoing, Progressing  5/11/2023 0227 by Arian Pak RN  Outcome: Ongoing, Progressing  Goal: Readiness for Transition of Care  5/11/2023 0230 by Arian Pak RN  Outcome: Ongoing, Progressing  5/11/2023 0230 by Arian Pak RN  Outcome: Ongoing, Progressing  5/11/2023 0227 by Arian Pak RN  Outcome: Ongoing, Progressing   Goal Outcome Evaluation:   Patient had no acute events  Confused to situation, place, time  Pulled out IV, off sao2 monitor, off leads due to confusion

## 2023-05-11 NOTE — CONSULTS
"                    Clinical Nutrition     Patient Name: Mitzi Eric  YOB: 1935  MRN: [id number]  Date of Encounter: 05/11/23 11:28 EDT  Admission date: 5/10/2023    Dietitian Comments:  Pt meets criteria for severe malnutrition in the context of chronic illness indicated by po intake <75% x >/=1 month, severe muscle wasting and subcutaneous fat loss.     Recommend daily MVI.    Reason for Visit   Physician consult/per nsg admit screen    EMR Reviewed     EMR Reviewed: yes     Admission Diagnosis:  JUAN F (acute kidney injury) [N17.9]  JUAN F (acute kidney injury) [N17.9]  JUAN F (acute kidney injury) [N17.9]    Problem List:    JUAN F (acute kidney injury)    Fall    Delirium, acute    Essential hypertension    Cognitive decline    Trigeminal neuralgia    Hypothyroidism (acquired)    Dementia without behavioral disturbance    Impaired functional mobility, balance, gait, and endurance    chronic CHF (congestive heart failure) (HCC)    atrial fibrillation    Chronic respiratory failure      Anthropometric      Flowsheet Rows    Flowsheet Row First Filed Value   Admission Height 160 cm (63\") Documented at 05/10/2023 1231   Admission Weight 42.2 kg (93 lb) Documented at 05/10/2023 1231            Height: 160 cm (63\")  Last Filed Weight: Weight: 50.4 kg (111 lb 3.2 oz) (05/10/23 1530)  Weight Method: Bed scale  BMI: BMI (Calculated): 19.7  BMI classification: Normal: 18.5-24.9kg/m2     UBW: 100-102lbs per POA  Weight change: RAJ, need measured wt. Would suspect wt of 111lbs was a non-zeroed bed scale wt   Weight       Weight (kg) Weight (lbs) Weight Method Visit Report   5/3/2022 43.681 kg  96 lb 4.8 oz      5/17/2022 43.954 kg  96 lb 14.4 oz      6/14/2022 45.405 kg  100 lb 1.6 oz      6/22/2022 44.906 kg  99 lb   --    6/23/2022 45.36 kg  100 lb   --    9/13/2022 45.949 kg  101 lb 4.8 oz      12/13/2022 44.815 kg  98 lb 12.8 oz      12/21/2022 43.999 kg  97 lb   --    2/12/2023 47.628 kg  105 lb      4/14/2023 " 47.6 kg  104 lb 15 oz  Estimated     2023 42.185 kg  93 lb      2023 42.185 kg  93 lb  Stated     5/10/2023 50.44 kg  111 lb 3.2 oz  Bed scale      42.185 kg  93 lb  Stated        Reported/Observed/Food/Nutrition Related - Comments     RD spoke w/pt at bedside, pt's POA present and provides a majority of nutrition hx. At baseline pt does not eat very much, in the last 6 weeks po intake has significantly declined, ~50% of usual intake. POA suspects likely to functional decline and dementia. Pt usually eats scrambled eggs, arreola, fruit for breakfast, soup and salad for lunch (would like same thing ordered for dinner while here). Pt states she drinks 2 Ensure daily, likes chocolate-we do not have specific Ensure available, POA states he will bring some from home. Pt not agreeable to alternative supplement. Pt is very picky and does not eat: yogurt, cottage cheese, meatloaf, pot roast, pasta, any drinks besides water. NKFA. Of note, pt has scheduled remeron.     Nutrition Focused Physical Exam     Date:     Patient meets criteria for malnutrition diagnosis, see MSA note.     Current Nutrition Prescription     Diet: Regular/House Diet; Texture: Regular Texture (IDDSI 7); Fluid Consistency: Thin (IDDSI 0)  Orders Placed This Encounter      DIET MESSAGE Please send scrambled eggs, arreola, fruit cup. Water to drink per pt preference.      DIET MESSAGE Please send soup, salad, fruit cup and ice cream. Only water to drink per pt preference.      Average Intake from [place]% breakfast this am per RD observation    Nutrition Diagnosis     Problem Malnutrition severe chronic   Etiology Inadequate po intake 2/2 dementia, functional decline   Signs/Symptoms Po intake <75% x >/=1 month, severe muscle wasting and subcutaneous fat loss   Status:    Actions     Follow treatment progress, Care plan reviewed, Interview for preferences, Encourage intake, Supplement offered/refused    Pt preferences:  Scrambled eggs,  arreola, fruit, water for breakfast  Soup and salad with ice cream and water for lunch and dinner  Chocolate Ensure     Pt dislikes:  Pasta, meatloaf, pot roast, yogurt, cottage cheese, beverages other than water    Monitor Per Protocol      Maryan Nguyễn RD,   Time Spent: 30

## 2023-05-12 PROCEDURE — 99232 SBSQ HOSP IP/OBS MODERATE 35: CPT | Performed by: NURSE PRACTITIONER

## 2023-05-12 PROCEDURE — 97166 OT EVAL MOD COMPLEX 45 MIN: CPT

## 2023-05-12 PROCEDURE — 94799 UNLISTED PULMONARY SVC/PX: CPT

## 2023-05-12 PROCEDURE — G0378 HOSPITAL OBSERVATION PER HR: HCPCS

## 2023-05-12 PROCEDURE — 94664 DEMO&/EVAL PT USE INHALER: CPT

## 2023-05-12 PROCEDURE — 94761 N-INVAS EAR/PLS OXIMETRY MLT: CPT

## 2023-05-12 RX ADMIN — DONEPEZIL HYDROCHLORIDE 5 MG: 5 TABLET, FILM COATED ORAL at 09:12

## 2023-05-12 RX ADMIN — DOCUSATE SODIUM 100 MG: 100 CAPSULE, LIQUID FILLED ORAL at 09:12

## 2023-05-12 RX ADMIN — BUDESONIDE AND FORMOTEROL FUMARATE DIHYDRATE 2 PUFF: 160; 4.5 AEROSOL RESPIRATORY (INHALATION) at 20:26

## 2023-05-12 RX ADMIN — Medication 10 ML: at 21:26

## 2023-05-12 RX ADMIN — SPIRONOLACTONE 25 MG: 25 TABLET ORAL at 09:12

## 2023-05-12 RX ADMIN — Medication 10 ML: at 09:13

## 2023-05-12 RX ADMIN — LEVOTHYROXINE SODIUM 25 MCG: 0.03 TABLET ORAL at 06:39

## 2023-05-12 RX ADMIN — ACETAMINOPHEN 325MG 650 MG: 325 TABLET ORAL at 09:12

## 2023-05-12 RX ADMIN — SODIUM CHLORIDE 100 ML/HR: 0.9 INJECTION, SOLUTION INTRAVENOUS at 09:13

## 2023-05-12 RX ADMIN — FOLIC ACID TAB 400 MCG 400 MCG: 400 TAB at 21:25

## 2023-05-12 RX ADMIN — PREGABALIN 25 MG: 25 CAPSULE ORAL at 21:25

## 2023-05-12 RX ADMIN — ACETAMINOPHEN 325MG 650 MG: 325 TABLET ORAL at 18:01

## 2023-05-12 RX ADMIN — PANTOPRAZOLE SODIUM 40 MG: 40 TABLET, DELAYED RELEASE ORAL at 09:12

## 2023-05-12 RX ADMIN — METOPROLOL SUCCINATE 25 MG: 25 TABLET, EXTENDED RELEASE ORAL at 09:12

## 2023-05-12 RX ADMIN — FOLIC ACID TAB 400 MCG 400 MCG: 400 TAB at 09:12

## 2023-05-12 RX ADMIN — OXCARBAZEPINE 600 MG: 150 TABLET, FILM COATED ORAL at 09:18

## 2023-05-12 RX ADMIN — BUDESONIDE AND FORMOTEROL FUMARATE DIHYDRATE 2 PUFF: 160; 4.5 AEROSOL RESPIRATORY (INHALATION) at 13:08

## 2023-05-12 RX ADMIN — OXCARBAZEPINE 600 MG: 150 TABLET, FILM COATED ORAL at 21:25

## 2023-05-12 RX ADMIN — ACETAMINOPHEN 325MG 650 MG: 325 TABLET ORAL at 21:25

## 2023-05-12 RX ADMIN — ASPIRIN 81 MG: 81 TABLET, COATED ORAL at 09:12

## 2023-05-12 RX ADMIN — PREGABALIN 25 MG: 25 CAPSULE ORAL at 09:12

## 2023-05-12 RX ADMIN — TORSEMIDE 30 MG: 20 TABLET ORAL at 09:12

## 2023-05-12 RX ADMIN — MIRTAZAPINE 7.5 MG: 15 TABLET, FILM COATED ORAL at 21:25

## 2023-05-12 NOTE — CASE MANAGEMENT/SOCIAL WORK
Continued Stay Note  [facility]     Patient Name: Mitzi Eric  MRN: [id number]  Today's Date: 5/12/2023    Admit Date: 5/10/2023    Plan: To be determined   Discharge Plan     Row Name 05/12/23 1523       Plan    Plan Comments Case management provided a list of skilled nursing facilities to Mr. Freddy Eric at [phone] on Thursday and physical therapy and occupational therapy are evaluating so that referrals can be made to skilled nursing rehab facilities early next week.               Discharge Codes    No documentation.               Expected Discharge Date and Time     Expected Discharge Date Expected Discharge Time    May 15, 2023             YURI Joseph

## 2023-05-12 NOTE — THERAPY EVALUATION
Patient Name: Mitzi Eric  : 1935    MRN: [id number]                              Today's Date: 2023       Admit Date: 5/10/2023    Visit Dx:     ICD-10-CM ICD-9-CM   1. JUAN F (acute kidney injury)  N17.9 584.9   2. Declining functional status  R53.81 799.3   3. Frequent falls  R29.6 V15.88   4. Severe dementia, unspecified dementia type, unspecified whether behavioral, psychotic, or mood disturbance or anxiety  F03.C0 294.20   5. Centrilobular emphysema  J43.2 492.8   6. Impaired functional mobility, balance, gait, and endurance  Z74.09 V49.89     Patient Active Problem List   Diagnosis   • Fall   • Delirium, acute   • UTI (urinary tract infection), bacterial   • Essential hypertension   • JUAN F (acute kidney injury)   • Cognitive decline   • Trigeminal neuralgia   • Hypothyroidism (acquired)   • Lung mass   • Influenza A   • Severe malnutrition (CMS/HCC)   • Dementia without behavioral disturbance   • History of craniotomy   • Impaired functional mobility, balance, gait, and endurance   • COPD ex with volume overload   • chronic CHF (congestive heart failure) ([facility])   • Thrombocytopenia   • Elevated transaminase level   • atrial fibrillation   • COPD with acute exacerbation   • Acute on chronic respiratory failure with hypoxemia   • Chronic respiratory failure   • Underweight   • Moderate malnutrition (CMS/HCC)   • Centrilobular emphysema   • Essential tremor   • Pneumonia of both upper lobes due to infectious organism     Past Medical History:   Diagnosis Date   • A-fib    • Ataxic gait    • Chronic kidney disease, stage 3    • COPD (chronic obstructive pulmonary disease)    • Dementia    • Disease of thyroid gland    • Essential tremor 2022   • Heart failure    • Hyperlipidemia    • Hypertension    • Shingles      Past Surgical History:   Procedure Laterality Date   • BRAIN TUMOR EXCISION      BENIGN   • FRACTURE SURGERY     • TRIGGER FINGER RELEASE        General Information     Row Name  05/12/23 1523          OT Time and Intention    Document Type evaluation  -AN     Mode of Treatment occupational therapy  -AN     Row Name 05/12/23 1523          General Information    Patient Profile Reviewed yes  -AN     Prior Level of Function min assist:;transfer;ADL's;independent:;w/c or scooter  recent falls; history provided by nephew; pt with recent functional decline  -AN     Existing Precautions/Restrictions fall;oxygen therapy device and L/min;other (see comments)  dementia  -AN     Barriers to Rehab medically complex;previous functional deficit;cognitive status  -AN     Row Name 05/12/23 1523          Living Environment    People in Home facility resident;other (see comments)  [place]  -AN     Row Name 05/12/23 1523          Home Main Entrance    Number of Stairs, Main Entrance none  -AN     Row Name 05/12/23 1523          Stairs Within Home, Primary    Number of Stairs, Within Home, Primary none  -AN     Row Name 05/12/23 1523          Cognition    Orientation Status (Cognition) oriented to;person;place;other (see comments)  unable state name of the hospital, but new she was in the hospital  -AN     Row Name 05/12/23 1523          Safety Issues, Functional Mobility    Safety Issues Affecting Function (Mobility) safety precautions follow-through/compliance;safety precaution awareness;problem-solving;judgment;sequencing abilities;awareness of need for assistance;insight into deficits/self-awareness  -AN     Impairments Affecting Function (Mobility) cognition;balance;endurance/activity tolerance;strength  -AN     Cognitive Impairments, Mobility Safety/Performance safety precaution awareness;safety precaution follow-through;problem-solving/reasoning;insight into deficits/self-awareness;awareness, need for assistance;sequencing abilities  -AN           User Key  (r) = Recorded By, (t) = Taken By, (c) = Cosigned By    Initials Name Provider Type    Jackelin Arzola OT Occupational Therapist                  Mobility/ADL's     Row Name 05/12/23 1526          Bed Mobility    Bed Mobility supine-sit  -AN     Supine-Sit [place] (Bed Mobility) moderate assist (50% patient effort);2 person assist;verbal cues  -AN     Bed Mobility, Safety Issues decreased use of arms for pushing/pulling;decreased use of legs for bridging/pushing;impaired trunk control for bed mobility  -AN     Assistive Device (Bed Mobility) head of bed elevated;draw sheet  -AN     Comment, (Bed Mobility) cues for sequencing of steps, assist provided at BLE and trunk  -AN     [facility] Name 05/12/23 1526          Transfers    Transfers sit-stand transfer;stand-sit transfer  -AN     Comment, (Transfers) cues for hand placement and transfer technique; STS and SPT performed to chair with cues for sequencing of steps  -AN     Row Name 05/12/23 1526          Bed-Chair Transfer    Bed-Chair [place] (Transfers) verbal cues;nonverbal cues (demo/gesture);minimum assist (75% patient effort);2 person assist;other (see comments)  UE support  -AN     Row Name 05/12/23 1526          Sit-Stand Transfer    Sit-Stand [place] (Transfers) verbal cues;nonverbal cues (demo/gesture);minimum assist (75% patient effort);2 person assist  -AN     Assistive Device (Sit-Stand Transfers) other (see comments)  BUE support  -AN     Row Name 05/12/23 1526          Stand-Sit Transfer    Stand-Sit [place] (Transfers) verbal cues;nonverbal cues (demo/gesture);minimum assist (75% patient effort);2 person assist  -AN     Assistive Device (Stand-Sit Transfers) other (see comments)  BUE support  -AN     Row Name 05/12/23 1526          Functional Mobility    Functional Mobility- Ind. Level not tested  -AN     Row Name 05/12/23 1526          Activities of Daily Living    BADL Assessment/Intervention lower body dressing;toileting  -AN     Row Name 05/12/23 1526          Lower Body Dressing Assessment/Training    [place] Level (Lower Body Dressing) don;socks;dependent  (less than 25% patient effort)  -AN     Position (Lower Body Dressing) sitting up in bed  -AN     Row Name 05/12/23 1526          Toileting Assessment/Training    [place] Level (Toileting) change pad/brief;perform perineal hygiene;maximum assist (25% patient effort)  -AN     Position (Toileting) supported sitting  -AN           User Key  (r) = Recorded By, (t) = Taken By, (c) = Cosigned By    Initials Name Provider Type    AN Jackelin Hurtado OT Occupational Therapist               Obj/Interventions     Row Name 05/12/23 1542          Sensory Assessment (Somatosensory)    Sensory Assessment (Somatosensory) UE sensation intact  -AN     Row Name 05/12/23 1542          Vision Assessment/Intervention    Visual Impairment/Limitations WFL  -AN     Row Name 05/12/23 1542          Range of Motion Comprehensive    General Range of Motion bilateral upper extremity ROM WFL  -AN     Row Name 05/12/23 1542          Strength Comprehensive (MMT)    General Manual Muscle Testing (MMT) Assessment upper extremity strength deficits identified  -AN     Comment, General Manual Muscle Testing (MMT) Assessment BUE grossly 4/5, BLE weakness observed with transfer  -AN     Row Name 05/12/23 1542          Motor Skills    Motor Skills functional endurance  -AN     Functional Endurance decreased functional endurance  -AN     Row Name 05/12/23 1542          Balance    Balance Assessment sitting static balance;sitting dynamic balance;sit to stand dynamic balance;standing static balance;standing dynamic balance  -AN     Static Sitting Balance standby assist  -AN     Dynamic Sitting Balance contact guard  -AN     Position, Sitting Balance sitting edge of bed  -AN     Sit to Stand Dynamic Balance verbal cues;minimal assist;2-person assist  -AN     Static Standing Balance minimal assist;2-person assist;verbal cues  -AN     Dynamic Standing Balance verbal cues;minimal assist;2-person assist  -AN     Position/Device Used, Standing Balance  supported  -AN     Balance Interventions standing;sit to stand;supported;static;dynamic;minimal challenge;moderate challenge;occupation based/functional task  -AN           User Key  (r) = Recorded By, (t) = Taken By, (c) = Cosigned By    Initials Name Provider Type    Jackelin Arzola OT Occupational Therapist               Goals/Plan     Row Name 05/12/23 1558          Bed Mobility Goal 1 (OT)    Activity/Assistive Device (Bed Mobility Goal 1, OT) sit to supine/supine to sit  -AN     [place] Level/Cues Needed (Bed Mobility Goal 1, OT) contact guard required  -AN     Time Frame (Bed Mobility Goal 1, OT) long term goal (LTG);10 days  -AN     Row Name 05/12/23 1558          Transfer Goal 1 (OT)    Activity/Assistive Device (Transfer Goal 1, OT) sit-to-stand/stand-to-sit;commode, bedside without drop arms;bed-to-chair/chair-to-bed  -AN     [place] Level/Cues Needed (Transfer Goal 1, OT) minimum assist (75% or more patient effort)  -AN     Time Frame (Transfer Goal 1, OT) long term goal (LTG);10 days  -AN     Row Name 05/12/23 1558          Toileting Goal 1 (OT)    Activity/Device (Toileting Goal 1, OT) adjust/manage clothing;perform perineal hygiene;commode, bedside without drop arms  -AN     [place] Level/Cues Needed (Toileting Goal 1, OT) minimum assist (75% or more patient effort)  -AN     Time Frame (Toileting Goal 1, OT) long term goal (LTG);10 days  -AN     Row Name 05/12/23 1558          Therapy Assessment/Plan (OT)    Planned Therapy Interventions (OT) activity tolerance training;adaptive equipment training;BADL retraining;patient/caregiver education/training;transfer/mobility retraining;IADL retraining;functional balance retraining;occupation/activity based interventions;strengthening exercise  -AN           User Key  (r) = Recorded By, (t) = Taken By, (c) = Cosigned By    Initials Name Provider Type    Jackelin Arzola OT Occupational Therapist               Clinical Impression      Row Name 05/12/23 1544          Pain Assessment    Pretreatment Pain Rating 0/10 - no pain  -AN     Posttreatment Pain Rating 0/10 - no pain  -AN     Pre/Posttreatment Pain Comment asymptomatic  -AN     Pain Intervention(s) Repositioned;Ambulation/increased activity  -AN     [facility] Name 05/12/23 1544          Plan of Care Review    Plan of Care Reviewed With patient  -AN     Progress no change  -AN     Outcome Evaluation Pt presents below her functional baseline with deficits including generalized weakness, impaired balance, impaired cognition, and decreased functional endurance warranting skilled OT services. Pt required assist x 2 to perform STS and SPT.  -AN     [facility] Name 05/12/23 1544          Therapy Assessment/Plan (OT)    Patient/Family Therapy Goal Statement (OT) Return to PLOF  -AN     Rehab Potential (OT) good, to achieve stated therapy goals  -AN     Criteria for Skilled Therapeutic Interventions Met (OT) yes;skilled treatment is necessary  -AN     Therapy Frequency (OT) daily  -AN     Row Name 05/12/23 1544          Therapy Plan Review/Discharge Plan (OT)    Anticipated Discharge Disposition (OT) skilled nursing facility  -AN     Row Name 05/12/23 1544          Vital Signs    Pre Systolic BP Rehab --  VSS  -AN     O2 Delivery Pre Treatment nasal cannula  -AN     O2 Delivery Intra Treatment nasal cannula  -AN     O2 Delivery Post Treatment nasal cannula  -AN     Pre Patient Position Supine  -AN     Intra Patient Position Standing  -AN     Post Patient Position Sitting  -AN     Row Name 05/12/23 1544          Positioning and Restraints    Pre-Treatment Position in bed  -AN     Post Treatment Position chair  -AN     In Chair notified nsg;reclined;call light within reach;encouraged to call for assist;exit alarm on;waffle cushion;on mechanical lift sling;legs elevated  -AN           User Key  (r) = Recorded By, (t) = Taken By, (c) = Cosigned By    Initials Name Provider Type    AN Jackelin Hurtado OT  Occupational Therapist               Outcome Measures     Row Name 05/12/23 6459          How much help from another is currently needed...    Putting on and taking off regular lower body clothing? 2  -AN     Bathing (including washing, rinsing, and drying) 2  -AN     Toileting (which includes using toilet bed pan or urinal) 2  -AN     Putting on and taking off regular upper body clothing 3  -AN     Taking care of personal grooming (such as brushing teeth) 3  -AN     Eating meals 4  -AN     AM-PAC 6 Clicks Score (OT) 16  -AN     Row Name 05/12/23 0800          How much help from another person do you currently need...    Turning from your back to your side while in flat bed without using bedrails? 2  -JH     Moving from lying on back to sitting on the side of a flat bed without bedrails? 2  -JH     Moving to and from a bed to a chair (including a wheelchair)? 2  -JH     Standing up from a chair using your arms (e.g., wheelchair, bedside chair)? 2  -JH     Climbing 3-5 steps with a railing? 1  -JH     To walk in hospital room? 1  -JH     AM-PAC 6 Clicks Score (PT) 10  -JH     Highest level of mobility 4 --> Transferred to chair/commode  -     Row Name 05/12/23 3819          Functional Assessment    Outcome Measure Options AM-PAC 6 Clicks Daily Activity (OT)  -AN           User Key  (r) = Recorded By, (t) = Taken By, (c) = Cosigned By    Initials Name Provider Type    Jackelin Arzola OT Occupational Therapist    Anai Whitten, RN Registered Nurse                Occupational Therapy Education     Title: PT OT SLP Therapies (In Progress)     Topic: Occupational Therapy (In Progress)     Point: ADL training (Done)     Description:   Instruct learner(s) on proper safety adaptation and remediation techniques during self care or transfers.   Instruct in proper use of assistive devices.              Learning Progress Summary           Patient Acceptance, E, VU,NR by CATHY at 5/12/2023 6665                   Point: Home  exercise program (Not Started)     Description:   Instruct learner(s) on appropriate technique for monitoring, assisting and/or progressing therapeutic exercises/activities.              Learner Progress:  Not documented in this visit.          Point: Precautions (Done)     Description:   Instruct learner(s) on prescribed precautions during self-care and functional transfers.              Learning Progress Summary           Patient Acceptance, E, VU,NR by AN at 5/12/2023 1558                   Point: Body mechanics (Done)     Description:   Instruct learner(s) on proper positioning and spine alignment during self-care, functional mobility activities and/or exercises.              Learning Progress Summary           Patient Acceptance, E, VU,NR by AN at 5/12/2023 1559                               User Key     Initials Effective Dates Name Provider Type Discipline    AN 09/21/21 -  Jackelin Hurtado OT Occupational Therapist OT              OT Recommendation and Plan  Planned Therapy Interventions (OT): activity tolerance training, adaptive equipment training, BADL retraining, patient/caregiver education/training, transfer/mobility retraining, IADL retraining, functional balance retraining, occupation/activity based interventions, strengthening exercise  Therapy Frequency (OT): daily  Plan of Care Review  Plan of Care Reviewed With: patient  Progress: no change  Outcome Evaluation: Pt presents below her functional baseline with deficits including generalized weakness, impaired balance, impaired cognition, and decreased functional endurance warranting skilled OT services. Pt required assist x 2 to perform STS and SPT.     Time Calculation:    Time Calculation- OT     Row Name 05/12/23 1600             Time Calculation- OT    OT Start Time 1335  -AN      OT Received On 05/12/23  -AN      OT Goal Re-Cert Due Date 05/22/23  -AN         Untimed Charges    OT Eval/Re-eval Minutes 46  -AN         Total Minutes    Untimed  Charges Total Minutes 46  -AN       Total Minutes 46  -AN            User Key  (r) = Recorded By, (t) = Taken By, (c) = Cosigned By    Initials Name Provider Type    Jackelin Arzola OT Occupational Therapist              Therapy Charges for Today     Code Description Service Date Service Provider Modifiers Qty    [id number]  OT EVAL MOD COMPLEXITY 4 5/12/2023 Jackelin Hurtado OT GO 1    [id number]  OT THER SUPP EA 15 MIN 5/12/2023 Jackelin Hurtado OT GO 2               Jackelin Hurtado OT  5/12/2023

## 2023-05-12 NOTE — PROGRESS NOTES
[facility] Medicine Services  PROGRESS NOTE    Patient Name: Mitzi Eric  : 1935  MRN: [id number]    Date of Admission: 5/10/2023  Primary Care Physician: Provider, No Known    Subjective   Subjective     CC:  Follow dementia, dehydration     HPI:  Patient seen resting in bed no apparent distress.  No family at bedside.  Patient remains pleasantly confused.  We discussed tentative rehab placement and she seemed agreeable.  No new complaints at this time.    ROS:  Unable to reliably obtain due to mental status.       Objective   Objective     Vital Signs:   Temp:  [97.5 °F (36.4 °C)-98.6 °F (37 °C)] 98.3 °F (36.8 °C)  Heart Rate:  [80-92] 92  Resp:  [18-24] 24  BP: (124-158)/(60-84) 158/84  Flow (L/min):  [2] 2     Physical Exam:  Constitutional: No acute distress, awake, alert, chronically ill appearing   HENT: NCAT, mucous membranes moist   Respiratory: diminished in bases , respiratory effort normal   Cardiovascular: RRR, no murmurs, palpable pedal pulses bilaterally, cap refill brisk   Gastrointestinal: Positive bowel sounds, soft, nontender, nondistended  Musculoskeletal: No BLE edema   Psychiatric: flat affect, cooperative  Neurologic: alert, pleasantly confused, moves all extremities, speech clear  Skin: warm, dry, no visible rash      Results Reviewed:  LAB RESULTS:      Lab 23  0543 05/10/23  1250 23  1533   WBC 8.35 11.61* 7.80   HEMOGLOBIN 10.3* 11.6* 11.9*   HEMATOCRIT 32.5* 36.0 37.1   PLATELETS 172 203 234   NEUTROS ABS  --  8.83* 5.64   IMMATURE GRANS (ABS)  --  0.12* 0.06*   LYMPHS ABS  --  0.67* 0.92   MONOS ABS  --  1.21* 0.70   EOS ABS  --  0.70* 0.41*   MCV 97.6* 96.8 99.2*         Lab 23  0543 05/10/23  1250 23  1533   SODIUM 141 142 141   POTASSIUM 4.6 4.5 4.0   CHLORIDE 111* 109* 106   CO2 19.0* 19.0* 17.0*   ANION GAP 11.0 14.0 18.0*   BUN 23 40* 39*   CREATININE 0.89 1.62* 1.75*   EGFR 63.2 30.8* 28.1*   GLUCOSE 87 92 129*    CALCIUM 8.2* 9.0 9.1         Lab 05/11/23  0543 05/10/23  1250 05/09/23  1533   TOTAL PROTEIN 5.8* 7.3 7.4   ALBUMIN 3.5 4.0 3.9   GLOBULIN 2.3 3.3 3.5   ALT (SGPT) 24 31 25   AST (SGOT) 30 39* 31   BILIRUBIN 0.3 0.2 0.2   ALK PHOS 114 129* 127*                     Brief Urine Lab Results  (Last result in the past 365 days)      Color   Clarity   Blood   Leuk Est   Nitrite   Protein   CREAT   Urine HCG        05/10/23 1304 Yellow   Clear   Negative   Negative   Negative   Negative                 Microbiology Results Abnormal     None          CT Head Without Contrast    Result Date: 5/10/2023  CT HEAD WO CONTRAST Date of Exam: 5/10/2023 1:52 PM EDT Indication: multiple falls with headache and dementia. Comparison: May 8, 2023 Technique: Axial CT images were obtained of the head without contrast administration.  Reconstructed coronal and sagittal images were also obtained. Automated exposure control and iterative construction methods were used. Findings: There are changes from left frontal craniotomy. No acute intracranial hemorrhage or extra-axial collection is identified. The ventricles are stable in caliber, with no evidence of mass effect or midline shift. The basal cisterns appear patent. The gray-white differentiation appears preserved. Degenerative changes appear stable. No acute calvarial fracture is identified. The paranasal sinuses and mastoid air cells are well-aerated.     Impression: Impression: No acute intracranial process or calvarial fracture identified. Electronically Signed: Jonas Dixon  5/10/2023 2:10 PM EDT  Workstation ID: [id number]      Results for orders placed during the hospital encounter of 03/17/22    Adult Transthoracic Echo Complete W/ Cont if Necessary Per Protocol    Interpretation Summary  · Left ventricular ejection fraction appears to be 46 - 50%. Left ventricular systolic function is mildly decreased.  · Left ventricular diastolic function is consistent with (grade I)  impaired relaxation.  · Mild to moderate mitral regurgitation.  · Mild to moderate tricuspid regurgitation, RVSP 54 mmHg.      Current medications:  Scheduled Meds:acetaminophen, 650 mg, Oral, TID  aspirin, 81 mg, Oral, Daily  budesonide-formoterol, 2 puff, Inhalation, BID - RT  docusate sodium, 100 mg, Oral, BID  donepezil, 5 mg, Oral, Daily  folic acid, 400 mcg, Oral, BID  levothyroxine, 25 mcg, Oral, Q AM  metoprolol succinate XL, 25 mg, Oral, Daily  mirtazapine, 7.5 mg, Oral, Nightly  OXcarbazepine, 600 mg, Oral, BID  pantoprazole, 40 mg, Oral, Daily  pregabalin, 25 mg, Oral, BID  sodium chloride, 10 mL, Intravenous, Q12H  spironolactone, 25 mg, Oral, Daily  torsemide, 30 mg, Oral, Daily      Continuous Infusions:sodium chloride, 100 mL/hr, Last Rate: 100 mL/hr (05/12/23 0913)      PRN Meds:.•  albuterol  •  ondansetron  •  [COMPLETED] Insert Peripheral IV **AND** sodium chloride  •  sodium chloride  •  sodium chloride  •  traMADol    Assessment & Plan   Assessment & Plan     Active Hospital Problems    Diagnosis  POA   • **JUAN F (acute kidney injury) [N17.9]  Yes   • Chronic respiratory failure [J96.10]  Yes   • atrial fibrillation [I48.91]  Yes   • chronic CHF (congestive heart failure) (HCC) [I50.9]  Yes   • Dementia without behavioral disturbance [F03.90]  Yes   • Impaired functional mobility, balance, gait, and endurance [Z74.09]  Yes   • Severe malnutrition (CMS/HCC) [E43]  Yes   • Cognitive decline [R41.89]  Yes   • Hypothyroidism (acquired) [E03.9]  Yes   • Trigeminal neuralgia [G50.0]  Yes   • Delirium, acute [R41.0]  Yes   • Essential hypertension [I10]  Yes   • Fall [W19.XXXA]  Yes      Resolved Hospital Problems   No resolved problems to display.        Brief Hospital Course to date:  Mitzi Eric is a 87 y.o. female  with PMHx with history of CKD, COPD (2L nightly) with known bilateral upper lobe masses, paroxysmal A-fib, dementia, HLD, HTN, hypothyroidism, Trigeminal neuralgia who presented to ED  on 5/10/2023  from her assisted living facility due to fall with associated acute renal failure. CT head w/o was negative. PT/OT evaluated and recommended SNF. CM is following for placement.      This patient's problems and plans were partially entered by my partner and updated as appropriate by me 05/12/23.    Fall  - CT Head w/o negative   - PT/OT recommended SNF  -CM following  - nephew visiting different facilities    Acute on Chronic Renal Failure Stage 3  -Cr 1.62 on admission, Cr 0.89 at last check   -Secondary to dehydration, IVF  -Hold Nephrotoxic medications  -Stop IVF   -BMP in AM      Slight Leukocytosis, resolved  - UA negative  - currently on 2L, prior to admission only worse 2L at night     Left Apical Lung mass with fluid-necrotic components/cavitary appearance   Right Upper lobe lung mass   Hx COPD  -Patient/POA (nephew) have been aware of this and not interested in further work-up  -On 2L NC currently, normally on RA during the day and 2L at night per Nephew   - s/p Ceftin PO prescribed on d/c 4/14.     Combined systolic and diastolic heart failure  Moderate pulm hypertension  -Compensated, monitor with IVF   -continue home Demadex/Spironolactone      Paroxysmal atrial fibrillation  -continue home Metoprolol     Hypothyroidism  -Continue Synthroid     Trigeminal neuralgia  -Continue Trileptal and Lyrica     Hypertension  -continue home medications     Dementia  -Continue Aricept     Expected Discharge Location and Transportation: SNF  Expected Discharge   Expected Discharge Date: 5/15/2023; Expected Discharge Time:      DVT prophylaxis:  Mechanical DVT prophylaxis orders are present.     AM-PAC 6 Clicks Score (PT): 10 (05/11/23 2000)    CODE STATUS:   Code Status and Medical Interventions:   Ordered at: 05/10/23 1459     Code Status (Patient has no pulse and is not breathing):    No CPR (Do Not Attempt to Resuscitate)     Medical Interventions (Patient has pulse or is breathing):    Full Support        Lemuel Giraldo, APRN  05/12/23

## 2023-05-12 NOTE — PLAN OF CARE
Problem: Adult Inpatient Plan of Care  Goal: Plan of Care Review  Outcome: Ongoing, Progressing  Flowsheets (Taken 5/12/2023 0229)  Progress: improving  Plan of Care Reviewed With: patient  Goal: Patient-Specific Goal (Individualized)  Outcome: Ongoing, Progressing  Goal: Absence of Hospital-Acquired Illness or Injury  Outcome: Ongoing, Progressing  Intervention: Identify and Manage Fall Risk  Recent Flowsheet Documentation  Taken 5/12/2023 0200 by Imani Ng RN  Safety Promotion/Fall Prevention:   safety round/check completed   activity supervised  Taken 5/12/2023 0000 by Imani Ng RN  Safety Promotion/Fall Prevention: safety round/check completed  Taken 5/11/2023 2200 by Imani Ng RN  Safety Promotion/Fall Prevention: safety round/check completed  Taken 5/11/2023 2000 by Imani Ng RN  Safety Promotion/Fall Prevention:   safety round/check completed   room organization consistent  Intervention: Prevent Skin Injury  Recent Flowsheet Documentation  Taken 5/12/2023 0200 by Imani Ng RN  Body Position:   turned   weight shifting  Taken 5/12/2023 0000 by Imani Ng RN  Body Position:   turned   left   legs elevated  Taken 5/11/2023 2200 by Imani Ng RN  Body Position:   turned   right   legs elevated  Taken 5/11/2023 2000 by Imani Ng RN  Body Position:   turned   weight shifting  Skin Protection: adhesive use limited  Intervention: Prevent and Manage VTE (Venous Thromboembolism) Risk  Recent Flowsheet Documentation  Taken 5/11/2023 2000 by Imani Ng RN  Activity Management: activity encouraged  VTE Prevention/Management: sequential compression devices on  Range of Motion: active ROM (range of motion) encouraged  Intervention: Prevent Infection  Recent Flowsheet Documentation  Taken 5/12/2023 0000 by Imani Ng RN  Infection Prevention: environmental surveillance performed  Taken 5/11/2023 2200 by  Imani Ng RN  Infection Prevention: environmental surveillance performed  Taken 5/11/2023 2000 by Imani Ng RN  Infection Prevention: environmental surveillance performed  Goal: Optimal Comfort and Wellbeing  Outcome: Ongoing, Progressing  Intervention: Provide Person-Centered Care  Recent Flowsheet Documentation  Taken 5/11/2023 2000 by Imani Ng RN  Trust Relationship/Rapport: emotional support provided  Goal: Readiness for Transition of Care  Outcome: Ongoing, Progressing     Problem: Fall Injury Risk  Goal: Absence of Fall and Fall-Related Injury  Outcome: Ongoing, Progressing  Intervention: Identify and Manage Contributors  Recent Flowsheet Documentation  Taken 5/11/2023 2000 by Imani Ng RN  Medication Review/Management: medications reviewed  Intervention: Promote Injury-Free Environment  Recent Flowsheet Documentation  Taken 5/12/2023 0200 by Imani Ng RN  Safety Promotion/Fall Prevention:   safety round/check completed   activity supervised  Taken 5/12/2023 0000 by Imani Ng RN  Safety Promotion/Fall Prevention: safety round/check completed  Taken 5/11/2023 2200 by Imani Ng RN  Safety Promotion/Fall Prevention: safety round/check completed  Taken 5/11/2023 2000 by Imani Ng RN  Safety Promotion/Fall Prevention:   safety round/check completed   room organization consistent     Problem: Skin Injury Risk Increased  Goal: Skin Health and Integrity  Outcome: Ongoing, Progressing  Intervention: Optimize Skin Protection  Recent Flowsheet Documentation  Taken 5/12/2023 0200 by Imani Ng RN  Head of Bed (HOB) Positioning: HOB elevated  Taken 5/12/2023 0000 by Imani Ng RN  Head of Bed (HOB) Positioning: HOB elevated  Taken 5/11/2023 2200 by Imani Ng RN  Head of Bed (HOB) Positioning: HOB elevated  Taken 5/11/2023 2000 by Imani Ng RN  Pressure Reduction Techniques:   weight  shift assistance provided   heels elevated off bed   frequent weight shift encouraged  Head of Bed (HOB) Positioning: HOB elevated  Pressure Reduction Devices: pressure-redistributing mattress utilized  Skin Protection: adhesive use limited   Goal Outcome Evaluation:  Plan of Care Reviewed With: patient        Progress: improving     Patient will eat 50% of all meals

## 2023-05-12 NOTE — PLAN OF CARE
Goal Outcome Evaluation:  Plan of Care Reviewed With: patient        Progress: no change  Outcome Evaluation: Pt presents below her functional baseline with deficits including generalized weakness, impaired balance, impaired cognition, and decreased functional endurance warranting skilled OT services. Pt required assist x 2 to perform STS and SPT.

## 2023-05-13 LAB
ANION GAP SERPL CALCULATED.3IONS-SCNC: 10 MMOL/L (ref 5–15)
BUN SERPL-MCNC: 17 MG/DL (ref 8–23)
BUN/CREAT SERPL: 20 (ref 7–25)
CALCIUM SPEC-SCNC: 8.2 MG/DL (ref 8.6–10.5)
CHLORIDE SERPL-SCNC: 108 MMOL/L (ref 98–107)
CO2 SERPL-SCNC: 23 MMOL/L (ref 22–29)
CREAT SERPL-MCNC: 0.85 MG/DL (ref 0.57–1)
DEPRECATED RDW RBC AUTO: 46.2 FL (ref 37–54)
EGFRCR SERPLBLD CKD-EPI 2021: 66.8 ML/MIN/1.73
ERYTHROCYTE [DISTWIDTH] IN BLOOD BY AUTOMATED COUNT: 13.2 % (ref 12.3–15.4)
GLUCOSE SERPL-MCNC: 111 MG/DL (ref 65–99)
HCT VFR BLD AUTO: 33.1 % (ref 34–46.6)
HGB BLD-MCNC: 10.9 G/DL (ref 12–15.9)
MCH RBC QN AUTO: 31.7 PG (ref 26.6–33)
MCHC RBC AUTO-ENTMCNC: 32.9 G/DL (ref 31.5–35.7)
MCV RBC AUTO: 96.2 FL (ref 79–97)
PLATELET # BLD AUTO: 192 10*3/MM3 (ref 140–450)
PMV BLD AUTO: 12.4 FL (ref 6–12)
POTASSIUM SERPL-SCNC: 3.5 MMOL/L (ref 3.5–5.2)
RBC # BLD AUTO: 3.44 10*6/MM3 (ref 3.77–5.28)
SODIUM SERPL-SCNC: 141 MMOL/L (ref 136–145)
WBC NRBC COR # BLD: 8.4 10*3/MM3 (ref 3.4–10.8)

## 2023-05-13 PROCEDURE — G0378 HOSPITAL OBSERVATION PER HR: HCPCS

## 2023-05-13 PROCEDURE — 99232 SBSQ HOSP IP/OBS MODERATE 35: CPT | Performed by: NURSE PRACTITIONER

## 2023-05-13 PROCEDURE — 94761 N-INVAS EAR/PLS OXIMETRY MLT: CPT

## 2023-05-13 PROCEDURE — 85027 COMPLETE CBC AUTOMATED: CPT | Performed by: NURSE PRACTITIONER

## 2023-05-13 PROCEDURE — 80048 BASIC METABOLIC PNL TOTAL CA: CPT | Performed by: NURSE PRACTITIONER

## 2023-05-13 PROCEDURE — 94664 DEMO&/EVAL PT USE INHALER: CPT

## 2023-05-13 PROCEDURE — 94799 UNLISTED PULMONARY SVC/PX: CPT

## 2023-05-13 RX ADMIN — ACETAMINOPHEN 325MG 650 MG: 325 TABLET ORAL at 09:27

## 2023-05-13 RX ADMIN — DONEPEZIL HYDROCHLORIDE 5 MG: 5 TABLET, FILM COATED ORAL at 09:27

## 2023-05-13 RX ADMIN — MIRTAZAPINE 7.5 MG: 15 TABLET, FILM COATED ORAL at 21:21

## 2023-05-13 RX ADMIN — Medication 10 ML: at 12:36

## 2023-05-13 RX ADMIN — DOCUSATE SODIUM 100 MG: 100 CAPSULE, LIQUID FILLED ORAL at 21:21

## 2023-05-13 RX ADMIN — ACETAMINOPHEN 325MG 650 MG: 325 TABLET ORAL at 16:05

## 2023-05-13 RX ADMIN — DOCUSATE SODIUM 100 MG: 100 CAPSULE, LIQUID FILLED ORAL at 09:27

## 2023-05-13 RX ADMIN — FOLIC ACID TAB 400 MCG 400 MCG: 400 TAB at 09:27

## 2023-05-13 RX ADMIN — BUDESONIDE AND FORMOTEROL FUMARATE DIHYDRATE 2 PUFF: 160; 4.5 AEROSOL RESPIRATORY (INHALATION) at 21:20

## 2023-05-13 RX ADMIN — ASPIRIN 81 MG: 81 TABLET, COATED ORAL at 09:26

## 2023-05-13 RX ADMIN — METOPROLOL SUCCINATE 25 MG: 25 TABLET, EXTENDED RELEASE ORAL at 09:26

## 2023-05-13 RX ADMIN — LEVOTHYROXINE SODIUM 25 MCG: 0.03 TABLET ORAL at 06:29

## 2023-05-13 RX ADMIN — Medication 10 ML: at 21:21

## 2023-05-13 RX ADMIN — PREGABALIN 25 MG: 25 CAPSULE ORAL at 21:21

## 2023-05-13 RX ADMIN — FOLIC ACID TAB 400 MCG 400 MCG: 400 TAB at 21:21

## 2023-05-13 RX ADMIN — PANTOPRAZOLE SODIUM 40 MG: 40 TABLET, DELAYED RELEASE ORAL at 09:27

## 2023-05-13 RX ADMIN — SPIRONOLACTONE 25 MG: 25 TABLET ORAL at 09:26

## 2023-05-13 RX ADMIN — TORSEMIDE 30 MG: 20 TABLET ORAL at 09:27

## 2023-05-13 RX ADMIN — PREGABALIN 25 MG: 25 CAPSULE ORAL at 09:26

## 2023-05-13 RX ADMIN — OXCARBAZEPINE 600 MG: 150 TABLET, FILM COATED ORAL at 21:20

## 2023-05-13 RX ADMIN — OXCARBAZEPINE 600 MG: 150 TABLET, FILM COATED ORAL at 12:36

## 2023-05-13 RX ADMIN — ACETAMINOPHEN 325MG 650 MG: 325 TABLET ORAL at 21:21

## 2023-05-13 RX ADMIN — BUDESONIDE AND FORMOTEROL FUMARATE DIHYDRATE 2 PUFF: 160; 4.5 AEROSOL RESPIRATORY (INHALATION) at 08:37

## 2023-05-13 NOTE — PLAN OF CARE
Problem: Adult Inpatient Plan of Care  Goal: Plan of Care Review  Outcome: Ongoing, Progressing  Flowsheets (Taken 5/12/2023 1544 by Jackelin Hurtado OT)  Progress: no change  Plan of Care Reviewed With: patient  Outcome Evaluation: Pt presents below her functional baseline with deficits including generalized weakness, impaired balance, impaired cognition, and decreased functional endurance warranting skilled OT services. Pt required assist x 2 to perform STS and SPT.  Goal: Patient-Specific Goal (Individualized)  Outcome: Ongoing, Progressing  Goal: Absence of Hospital-Acquired Illness or Injury  Outcome: Ongoing, Progressing  Intervention: Identify and Manage Fall Risk  Recent Flowsheet Documentation  Taken 5/13/2023 0000 by Imani Ng RN  Safety Promotion/Fall Prevention: safety round/check completed  Taken 5/12/2023 2200 by Imani Ng RN  Safety Promotion/Fall Prevention:   safety round/check completed   room organization consistent  Taken 5/12/2023 2000 by Imani Ng RN  Safety Promotion/Fall Prevention:   safety round/check completed   room organization consistent   clutter free environment maintained  Intervention: Prevent Skin Injury  Recent Flowsheet Documentation  Taken 5/13/2023 0000 by Imani Ng RN  Body Position: turned  Taken 5/12/2023 2200 by Imani Ng RN  Body Position: turned  Skin Protection: adhesive use limited  Taken 5/12/2023 2000 by Imani Ng RN  Body Position:   turned   weight shifting  Skin Protection:   adhesive use limited   skin sealant/moisture barrier applied  Intervention: Prevent and Manage VTE (Venous Thromboembolism) Risk  Recent Flowsheet Documentation  Taken 5/12/2023 2200 by Imani Ng RN  Activity Management: (sleeping) --  Taken 5/12/2023 2000 by Imani Ng RN  Activity Management:   activity encouraged   patient refuses activity  Intervention: Prevent Infection  Recent Flowsheet  Documentation  Taken 5/12/2023 2000 by Imani Ng RN  Infection Prevention: rest/sleep promoted  Goal: Optimal Comfort and Wellbeing  Outcome: Ongoing, Progressing  Goal: Readiness for Transition of Care  Outcome: Ongoing, Progressing     Problem: Fall Injury Risk  Goal: Absence of Fall and Fall-Related Injury  Outcome: Ongoing, Progressing  Intervention: Identify and Manage Contributors  Recent Flowsheet Documentation  Taken 5/12/2023 2000 by Imani Ng RN  Self-Care Promotion:   meal set-up provided   safe use of adaptive equipment encouraged  Intervention: Promote Injury-Free Environment  Recent Flowsheet Documentation  Taken 5/13/2023 0000 by Imani Ng RN  Safety Promotion/Fall Prevention: safety round/check completed  Taken 5/12/2023 2200 by Imani Ng RN  Safety Promotion/Fall Prevention:   safety round/check completed   room organization consistent  Taken 5/12/2023 2000 by Imani Ng RN  Safety Promotion/Fall Prevention:   safety round/check completed   room organization consistent   clutter free environment maintained     Problem: Skin Injury Risk Increased  Goal: Skin Health and Integrity  Outcome: Ongoing, Progressing  Intervention: Optimize Skin Protection  Recent Flowsheet Documentation  Taken 5/13/2023 0000 by Imani Ng RN  Head of Bed (HOB) Positioning: HOB elevated  Taken 5/12/2023 2200 by Imani Ng RN  Pressure Reduction Techniques: heels elevated off bed  Head of Bed (HOB) Positioning: HOB elevated  Pressure Reduction Devices: pressure-redistributing mattress utilized  Skin Protection: adhesive use limited  Taken 5/12/2023 2000 by Imani Ng RN  Pressure Reduction Techniques:   frequent weight shift encouraged   heels elevated off bed   positioned off wounds  Head of Bed (HOB) Positioning: HOB at 15 degrees  Pressure Reduction Devices:   pressure-redistributing mattress utilized   positioning supports  utilized  Skin Protection:   adhesive use limited   skin sealant/moisture barrier applied   Goal Outcome Evaluation:  Plan of Care Reviewed With: patient        Progress: improving

## 2023-05-13 NOTE — PROGRESS NOTES
[facility] Medicine Services  PROGRESS NOTE    Patient Name: Mitzi Eric  : 1935  MRN: [id number]    Date of Admission: 5/10/2023  Primary Care Physician: Provider, No Known    Subjective   Subjective     CC:  Follow-up dementia, dehydration    HPI:  Patient seen resting in bed with eyes closed in no apparent distress.  Arouses to stimulation but appears to be very drowsy.  No new complaints at this time.    ROS:  Unable to obtain at this time.    Objective   Objective     Vital Signs:   Temp:  [97.8 °F (36.6 °C)-98.7 °F (37.1 °C)] 98.7 °F (37.1 °C)  Heart Rate:  [74-87] 87  Resp:  [18-22] 18  BP: (100-146)/(49-83) 146/76  Flow (L/min):  [2] 2     Physical Exam:  Constitutional: No acute distress, drowsy, resting with eyes closed, frail, chronically ill-appearing  HENT: NCAT, mucous membranes moist  Respiratory: Clear to auscultation bilaterally, respiratory effort normal on 2 L  Cardiovascular: RRR, no murmurs,, cap refill brisk   Gastrointestinal: Positive bowel sounds, soft, nondistended  Musculoskeletal: No BLE edema   Psychiatric: flat affect, cooperative  Neurologic: Drowsy but arouses, moves all extremities, speech clear  Skin: warm, dry, no visible rash     Results Reviewed:  LAB RESULTS:      Lab 23  0452 23  0543 05/10/23  1250 23  1533   WBC 8.40 8.35 11.61* 7.80   HEMOGLOBIN 10.9* 10.3* 11.6* 11.9*   HEMATOCRIT 33.1* 32.5* 36.0 37.1   PLATELETS 192 172 203 234   NEUTROS ABS  --   --  8.83* 5.64   IMMATURE GRANS (ABS)  --   --  0.12* 0.06*   LYMPHS ABS  --   --  0.67* 0.92   MONOS ABS  --   --  1.21* 0.70   EOS ABS  --   --  0.70* 0.41*   MCV 96.2 97.6* 96.8 99.2*         Lab 23  0430 23  0543 05/10/23  1250 23  1533   SODIUM 141 141 142 141   POTASSIUM 3.5 4.6 4.5 4.0   CHLORIDE 108* 111* 109* 106   CO2 23.0 19.0* 19.0* 17.0*   ANION GAP 10.0 11.0 14.0 18.0*   BUN 17 23 40* 39*   CREATININE 0.85 0.89 1.62* 1.75*   EGFR 66.8 63.2  30.8* 28.1*   GLUCOSE 111* 87 92 129*   CALCIUM 8.2* 8.2* 9.0 9.1         Lab 05/11/23  0543 05/10/23  1250 05/09/23  1533   TOTAL PROTEIN 5.8* 7.3 7.4   ALBUMIN 3.5 4.0 3.9   GLOBULIN 2.3 3.3 3.5   ALT (SGPT) 24 31 25   AST (SGOT) 30 39* 31   BILIRUBIN 0.3 0.2 0.2   ALK PHOS 114 129* 127*                     Brief Urine Lab Results  (Last result in the past 365 days)      Color   Clarity   Blood   Leuk Est   Nitrite   Protein   CREAT   Urine HCG        05/10/23 1304 Yellow   Clear   Negative   Negative   Negative   Negative                 Microbiology Results Abnormal     None          No radiology results from the last 24 hrs    Results for orders placed during the hospital encounter of 03/17/22    Adult Transthoracic Echo Complete W/ Cont if Necessary Per Protocol    Interpretation Summary  · Left ventricular ejection fraction appears to be 46 - 50%. Left ventricular systolic function is mildly decreased.  · Left ventricular diastolic function is consistent with (grade I) impaired relaxation.  · Mild to moderate mitral regurgitation.  · Mild to moderate tricuspid regurgitation, RVSP 54 mmHg.      Current medications:  Scheduled Meds:acetaminophen, 650 mg, Oral, TID  aspirin, 81 mg, Oral, Daily  budesonide-formoterol, 2 puff, Inhalation, BID - RT  docusate sodium, 100 mg, Oral, BID  donepezil, 5 mg, Oral, Daily  folic acid, 400 mcg, Oral, BID  levothyroxine, 25 mcg, Oral, Q AM  metoprolol succinate XL, 25 mg, Oral, Daily  mirtazapine, 7.5 mg, Oral, Nightly  OXcarbazepine, 600 mg, Oral, BID  pantoprazole, 40 mg, Oral, Daily  pregabalin, 25 mg, Oral, BID  sodium chloride, 10 mL, Intravenous, Q12H  spironolactone, 25 mg, Oral, Daily  torsemide, 30 mg, Oral, Daily      Continuous Infusions:   PRN Meds:.•  albuterol  •  ondansetron  •  [COMPLETED] Insert Peripheral IV **AND** sodium chloride  •  sodium chloride  •  sodium chloride  •  traMADol    Assessment & Plan   Assessment & Plan     Active Hospital Problems     Diagnosis  POA   • **JUAN F (acute kidney injury) [N17.9]  Yes   • Chronic respiratory failure [J96.10]  Yes   • atrial fibrillation [I48.91]  Yes   • chronic CHF (congestive heart failure) (HCC) [I50.9]  Yes   • Dementia without behavioral disturbance [F03.90]  Yes   • Impaired functional mobility, balance, gait, and endurance [Z74.09]  Yes   • Severe malnutrition (CMS/HCC) [E43]  Yes   • Cognitive decline [R41.89]  Yes   • Hypothyroidism (acquired) [E03.9]  Yes   • Trigeminal neuralgia [G50.0]  Yes   • Delirium, acute [R41.0]  Yes   • Essential hypertension [I10]  Yes   • Fall [W19.XXXA]  Yes      Resolved Hospital Problems   No resolved problems to display.        Brief Hospital Course to date:  Mitzi Eric is a 87 y.o. female with history of CKD, COPD (2L nightly) with known bilateral upper lobe masses, paroxysmal A-fib, dementia, HLD, HTN, hypothyroidism, Trigeminal neuralgia who presented to ED on 5/10/2023  from her assisted living facility due to fall with associated acute renal failure. CT head w/o was negative. PT/OT evaluated and recommended SNF. CM is following for placement.      This patient's problems and plans were partially entered by my partner and updated as appropriate by me 05/13/23.     Fall  - CT Head w/o negative   - PT/OT recommended SNF  -CM following  - nephew visiting different facilities     Acute on Chronic Renal Failure Stage 3  -Cr 1.62 on admission, Cr 0.89 at last check   -Secondary to dehydration, IVF  -Hold Nephrotoxic medications  -Stop IVF      Slight Leukocytosis, resolved  - UA negative  - currently on 2L, prior to admission only worse 2L at night     Left Apical Lung mass with fluid-necrotic components/cavitary appearance   Right Upper lobe lung mass   Hx COPD  -Patient/POA (nephew) have been aware of this and not interested in further work-up  -On 2L NC currently, normally on RA during the day and 2L at night per Nephew   - s/p Ceftin PO prescribed on d/c 4/14.     Combined  systolic and diastolic heart failure  Moderate pulm hypertension  -Compensated, monitor with IVF   -continue home Demadex/Spironolactone      Paroxysmal atrial fibrillation  -continue home Metoprolol     Hypothyroidism  -Continue Synthroid     Trigeminal neuralgia  -Continue Trileptal and Lyrica     Hypertension  -continue home medications     Dementia  -Continue Aricept     Expected Discharge Location and Transportation: SNF  Expected Discharge   Expected Discharge Date: 5/15/2023; Expected Discharge Time:       DVT prophylaxis:  Mechanical DVT prophylaxis orders are present.     AM-PAC 6 Clicks Score (PT): 10 (05/12/23 2000)    CODE STATUS:   Code Status and Medical Interventions:   Ordered at: 05/10/23 1453     Code Status (Patient has no pulse and is not breathing):    No CPR (Do Not Attempt to Resuscitate)     Medical Interventions (Patient has pulse or is breathing):    Full Support       Lemuel Giraldo, APRN  05/13/23

## 2023-05-14 PROCEDURE — 97530 THERAPEUTIC ACTIVITIES: CPT

## 2023-05-14 PROCEDURE — 97110 THERAPEUTIC EXERCISES: CPT

## 2023-05-14 PROCEDURE — 94799 UNLISTED PULMONARY SVC/PX: CPT

## 2023-05-14 PROCEDURE — G0378 HOSPITAL OBSERVATION PER HR: HCPCS

## 2023-05-14 PROCEDURE — 94761 N-INVAS EAR/PLS OXIMETRY MLT: CPT

## 2023-05-14 PROCEDURE — 99232 SBSQ HOSP IP/OBS MODERATE 35: CPT | Performed by: NURSE PRACTITIONER

## 2023-05-14 RX ADMIN — ASPIRIN 81 MG: 81 TABLET, COATED ORAL at 08:29

## 2023-05-14 RX ADMIN — OXCARBAZEPINE 600 MG: 150 TABLET, FILM COATED ORAL at 08:28

## 2023-05-14 RX ADMIN — SPIRONOLACTONE 25 MG: 25 TABLET ORAL at 08:29

## 2023-05-14 RX ADMIN — PREGABALIN 25 MG: 25 CAPSULE ORAL at 21:45

## 2023-05-14 RX ADMIN — DONEPEZIL HYDROCHLORIDE 5 MG: 5 TABLET, FILM COATED ORAL at 08:28

## 2023-05-14 RX ADMIN — DOCUSATE SODIUM 100 MG: 100 CAPSULE, LIQUID FILLED ORAL at 08:29

## 2023-05-14 RX ADMIN — Medication 10 ML: at 08:29

## 2023-05-14 RX ADMIN — PREGABALIN 25 MG: 25 CAPSULE ORAL at 08:29

## 2023-05-14 RX ADMIN — BUDESONIDE AND FORMOTEROL FUMARATE DIHYDRATE 2 PUFF: 160; 4.5 AEROSOL RESPIRATORY (INHALATION) at 09:20

## 2023-05-14 RX ADMIN — PANTOPRAZOLE SODIUM 40 MG: 40 TABLET, DELAYED RELEASE ORAL at 08:29

## 2023-05-14 RX ADMIN — ACETAMINOPHEN 325MG 650 MG: 325 TABLET ORAL at 08:29

## 2023-05-14 RX ADMIN — MIRTAZAPINE 7.5 MG: 15 TABLET, FILM COATED ORAL at 21:45

## 2023-05-14 RX ADMIN — TORSEMIDE 30 MG: 20 TABLET ORAL at 08:29

## 2023-05-14 RX ADMIN — FOLIC ACID TAB 400 MCG 400 MCG: 400 TAB at 08:29

## 2023-05-14 RX ADMIN — ACETAMINOPHEN 325MG 650 MG: 325 TABLET ORAL at 21:45

## 2023-05-14 RX ADMIN — BUDESONIDE AND FORMOTEROL FUMARATE DIHYDRATE 2 PUFF: 160; 4.5 AEROSOL RESPIRATORY (INHALATION) at 19:36

## 2023-05-14 RX ADMIN — LEVOTHYROXINE SODIUM 25 MCG: 0.03 TABLET ORAL at 05:51

## 2023-05-14 RX ADMIN — OXCARBAZEPINE 600 MG: 150 TABLET, FILM COATED ORAL at 21:45

## 2023-05-14 RX ADMIN — FOLIC ACID TAB 400 MCG 400 MCG: 400 TAB at 21:45

## 2023-05-14 RX ADMIN — Medication 10 ML: at 21:45

## 2023-05-14 RX ADMIN — METOPROLOL SUCCINATE 25 MG: 25 TABLET, EXTENDED RELEASE ORAL at 08:29

## 2023-05-14 RX ADMIN — DOCUSATE SODIUM 100 MG: 100 CAPSULE, LIQUID FILLED ORAL at 21:45

## 2023-05-14 NOTE — THERAPY TREATMENT NOTE
Patient Name: Mitzi Eric  : 1935    MRN: [id number]                              Today's Date: 2023       Admit Date: 5/10/2023    Visit Dx:     ICD-10-CM ICD-9-CM   1. JUAN F (acute kidney injury)  N17.9 584.9   2. Declining functional status  R53.81 799.3   3. Frequent falls  R29.6 V15.88   4. Severe dementia, unspecified dementia type, unspecified whether behavioral, psychotic, or mood disturbance or anxiety  F03.C0 294.20   5. Centrilobular emphysema  J43.2 492.8   6. Impaired functional mobility, balance, gait, and endurance  Z74.09 V49.89     Patient Active Problem List   Diagnosis   • Fall   • Delirium, acute   • UTI (urinary tract infection), bacterial   • Essential hypertension   • JUAN F (acute kidney injury)   • Cognitive decline   • Trigeminal neuralgia   • Hypothyroidism (acquired)   • Lung mass   • Influenza A   • Severe malnutrition (CMS/HCC)   • Dementia without behavioral disturbance   • History of craniotomy   • Impaired functional mobility, balance, gait, and endurance   • COPD ex with volume overload   • chronic CHF (congestive heart failure) ([facility])   • Thrombocytopenia   • Elevated transaminase level   • atrial fibrillation   • COPD with acute exacerbation   • Acute on chronic respiratory failure with hypoxemia   • Chronic respiratory failure   • Underweight   • Moderate malnutrition (CMS/HCC)   • Centrilobular emphysema   • Essential tremor   • Pneumonia of both upper lobes due to infectious organism     Past Medical History:   Diagnosis Date   • A-fib    • Ataxic gait    • Chronic kidney disease, stage 3    • COPD (chronic obstructive pulmonary disease)    • Dementia    • Disease of thyroid gland    • Essential tremor 2022   • Heart failure    • Hyperlipidemia    • Hypertension    • Shingles      Past Surgical History:   Procedure Laterality Date   • BRAIN TUMOR EXCISION      BENIGN   • FRACTURE SURGERY     • TRIGGER FINGER RELEASE        General Information     Row Name  05/14/23 1424          Physical Therapy Time and Intention    Document Type therapy note (daily note)  -ML     Mode of Treatment physical therapy  -     Row Name 05/14/23 1424          General Information    Patient Profile Reviewed yes  -ML     Existing Precautions/Restrictions fall;oxygen therapy device and L/min;other (see comments)  dementia  -ML     Barriers to Rehab medically complex;previous functional deficit;cognitive status  -     Row Name 05/14/23 1424          Cognition    Orientation Status (Cognition) oriented to;person;disoriented to;place;situation;time  -     Row Name 05/14/23 1424          Safety Issues, Functional Mobility    Safety Issues Affecting Function (Mobility) awareness of need for assistance;insight into deficits/self-awareness;safety precaution awareness;safety precautions follow-through/compliance;sequencing abilities;problem-solving  -     Impairments Affecting Function (Mobility) cognition;balance;endurance/activity tolerance;strength  -ML     Cognitive Impairments, Mobility Safety/Performance awareness, need for assistance;insight into deficits/self-awareness;problem-solving/reasoning;safety precaution awareness;safety precaution follow-through;sequencing abilities  -           User Key  (r) = Recorded By, (t) = Taken By, (c) = Cosigned By    Initials Name Provider Type    ML Reanna Harman Physical Therapist               Mobility     Row Name 05/14/23 1425          Bed Mobility    Bed Mobility supine-sit  -ML     Supine-Sit [place] (Bed Mobility) supervision;verbal cues  -     Assistive Device (Bed Mobility) bed rails;head of bed elevated  -ML     Comment, (Bed Mobility) patient required additional time to complete supine to sit transfer  -     Row Name 05/14/23 1429          Bed-Chair Transfer    Bed-Chair [place] (Transfers) verbal cues;nonverbal cues (demo/gesture);minimum assist (75% patient effort);1 person assist  -ML     Assistive Device (Bed-Chair  Transfers) other (see comments)  UE support  -ML     Row Name 05/14/23 1425          Sit-Stand Transfer    Sit-Stand [place] (Transfers) verbal cues;nonverbal cues (demo/gesture);minimum assist (75% patient effort);1 person assist  -     Assistive Device (Sit-Stand Transfers) other (see comments)  UE support  -ML     Row Name 05/14/23 1425          Gait/Stairs (Locomotion)    [place] Level (Gait) not tested  -           User Key  (r) = Recorded By, (t) = Taken By, (c) = Cosigned By    Initials Name Provider Type     Reanna Harman Physical Therapist               Obj/Interventions     Row Name 05/14/23 1428          Motor Skills    Therapeutic Exercise shoulder;hip;knee;ankle  -[facility] Name 05/14/23 1428          Shoulder (Therapeutic Exercise)    Shoulder (Therapeutic Exercise) AROM (active range of motion)  -     Shoulder AROM (Therapeutic Exercise) bilateral;scapular retraction;10 repetitions;3 second hold  -[facility] Name 05/14/23 1428          Hip (Therapeutic Exercise)    Hip (Therapeutic Exercise) strengthening exercise;isometric exercises;AROM (active range of motion)  -     Hip AROM (Therapeutic Exercise) bilateral;aBduction;aDduction;10 repetitions  -ML     Hip Isometrics (Therapeutic Exercise) gluteal sets;10 repetitions;5 second hold  -ML     Hip Strengthening (Therapeutic Exercise) bilateral;marching while seated;10 repetitions  -ML     Row Name 05/14/23 1428          Knee (Therapeutic Exercise)    Knee (Therapeutic Exercise) strengthening exercise  -     Knee Strengthening (Therapeutic Exercise) bilateral;LAQ (long arc quad);SLR (straight leg raise);10 repetitions  -ML     Row Name 05/14/23 1428          Ankle (Therapeutic Exercise)    Ankle (Therapeutic Exercise) AROM (active range of motion)  -ML     Ankle AROM (Therapeutic Exercise) bilateral;dorsiflexion;plantarflexion;10 repetitions  -ML     Row Name 05/14/23 1428          Balance    Balance Assessment sitting static  balance;sit to stand dynamic balance;standing static balance;standing dynamic balance;sitting dynamic balance  -ML     Static Sitting Balance standby assist  -ML     Dynamic Sitting Balance supervision  -ML     Position, Sitting Balance unsupported;sitting edge of bed;supported;sitting in chair  -ML     Sit to Stand Dynamic Balance verbal cues;minimal assist;1-person assist  -ML     Static Standing Balance verbal cues;contact guard  -ML     Dynamic Standing Balance verbal cues;minimal assist;1-person assist  -ML     Position/Device Used, Standing Balance supported  -ML     Balance Interventions sitting;standing;sit to stand;supported;static;weight shifting activity  -ML           User Key  (r) = Recorded By, (t) = Taken By, (c) = Cosigned By    Initials Name Provider Type    Reanna Crow Physical Therapist               Goals/Plan     Row Name 05/14/23 [facility]          Bed Mobility Goal 1 (PT)    [place] Level/Cues Needed (Bed Mobility Goal 1, PT) standby assist  -ML     Progress/Outcomes (Bed Mobility Goal 1, PT) goal ongoing  -ML     Row Name 05/14/23 [facility]          Transfer Goal 1 (PT)    Progress/Outcome (Transfer Goal 1, PT) goal met  -ML     Row Name 05/14/23 [facility]          Gait Training Goal 1 (PT)    Progress/Outcome (Gait Training Goal 1, PT) goal ongoing  -ML           User Key  (r) = Recorded By, (t) = Taken By, (c) = Cosigned By    Initials Name Provider Type    Reanna Crow Physical Therapist               Clinical Impression     Row Name 05/14/23 1430          Pain    Pretreatment Pain Rating 0/10 - no pain  -ML     Posttreatment Pain Rating 0/10 - no pain  -ML     Row Name 05/14/23 1430          Plan of Care Review    Plan of Care Reviewed With patient  -ML     Progress improving  -ML     Outcome Evaluation Physical therapy treatment complete. The patient required less assistance to complete bed mobility and transfers. The patient participated in seated therex. The patient continues to present  below baseline for mobility. Continue to recommend SNF at discharge.  -ML     Row Name 05/14/23 1430          Vital Signs    Pre Patient Position Supine  -ML     Intra Patient Position Standing  -ML     Post Patient Position Sitting  -ML     Row Name 05/14/23 1430          Positioning and Restraints    Pre-Treatment Position in bed  -ML     Post Treatment Position chair  -ML     In Chair notified nsg;reclined;call light within reach;encouraged to call for assist;exit alarm on;waffle cushion;legs elevated  -ML           User Key  (r) = Recorded By, (t) = Taken By, (c) = Cosigned By    Initials Name Provider Type    Reanna Crow Physical Therapist               Outcome Measures     Row Name 05/14/23 1433 05/14/23 0800       How much help from another person do you currently need...    Turning from your back to your side while in flat bed without using bedrails? 3  -ML 2  -SM    Moving from lying on back to sitting on the side of a flat bed without bedrails? 3  -ML 2  -SM    Moving to and from a bed to a chair (including a wheelchair)? 3  -ML 2  -SM    Standing up from a chair using your arms (e.g., wheelchair, bedside chair)? 3  -ML 2  -SM    Climbing 3-5 steps with a railing? 1  -ML 1  -SM    To walk in hospital room? 2  -ML 1  -SM    AM-PAC 6 Clicks Score (PT) 15  -ML 10  -SM    Highest level of mobility 4 --> Transferred to chair/commode  -ML 4 --> Transferred to chair/commode  -SM    Row Name 05/14/23 1433          Functional Assessment    Outcome Measure Options AM-PAC 6 Clicks Basic Mobility (PT)  -ML           User Key  (r) = Recorded By, (t) = Taken By, (c) = Cosigned By    Initials Name Provider Type    Joanie Francis RN Registered Nurse    Reanna Crow Physical Therapist                             Physical Therapy Education     Title: PT OT SLP Therapies (In Progress)     Topic: Physical Therapy (Done)     Point: Mobility training (Done)     Learning Progress Summary           Patient Acceptance,  E, VU,NR by ML at 5/14/2023 1436    Acceptance, E, VU,NR by SD at 5/11/2023 1036   Family Acceptance, E, VU,NR by SD at 5/11/2023 1036                   Point: Home exercise program (Done)     Learning Progress Summary           Patient Acceptance, E, VU,NR by ML at 5/14/2023 1436    Acceptance, E, VU,NR by SD at 5/11/2023 1036   Family Acceptance, E, VU,NR by SD at 5/11/2023 1036                   Point: Body mechanics (Done)     Learning Progress Summary           Patient Acceptance, E, VU,NR by SD at 5/11/2023 1036   Family Acceptance, E, VU,NR by SD at 5/11/2023 1036                   Point: Precautions (Done)     Learning Progress Summary           Patient Acceptance, E, VU,NR by ML at 5/14/2023 1436    Acceptance, E, VU,NR by SD at 5/11/2023 1036   Family Acceptance, E, VU,NR by SD at 5/11/2023 1036                               User Key     Initials Effective Dates Name Provider Type Discipline    SD 03/13/23 -  Yudelka Colindres, PT Physical Therapist PT     04/22/21 -  Reanna Harman Physical Therapist PT              PT Recommendation and Plan     Plan of Care Reviewed With: patient  Progress: improving  Outcome Evaluation: Physical therapy treatment complete. The patient required less assistance to complete bed mobility and transfers. The patient participated in seated therex. The patient continues to present below baseline for mobility. Continue to recommend SNF at discharge.     Time Calculation:    PT Charges     Row Name 05/14/23 1436             Time Calculation    Start Time 1345  -ML      Stop Time 1416  -ML      Time Calculation (min) 31 min  -ML      PT Received On 05/14/23  -ML         Timed Charges    36826 - PT Therapeutic Exercise Minutes 11  -ML      97637 - PT Therapeutic Activity Minutes 20  -ML         Total Minutes    Timed Charges Total Minutes 31  -ML       Total Minutes 31  -ML            User Key  (r) = Recorded By, (t) = Taken By, (c) = Cosigned By    Initials Name Provider Type     Reanna Crow Physical Therapist              Therapy Charges for Today     Code Description Service Date Service Provider Modifiers Qty    36937154944 HC PT THER PROC EA 15 MIN 5/14/2023 Reanna Harman GP 1    66231509209 HC PT THERAPEUTIC ACT EA 15 MIN 5/14/2023 Reanna Harman GP 1          PT G-Codes  Outcome Measure Options: AM-PAC 6 Clicks Basic Mobility (PT)  AM-PAC 6 Clicks Score (PT): 15  AM-PAC 6 Clicks Score (OT): 16       Reanna Harman  5/14/2023

## 2023-05-14 NOTE — PROGRESS NOTES
[facility] Medicine Services  PROGRESS NOTE    Patient Name: Mitzi Eric  : 1935  MRN: [id number]    Date of Admission: 5/10/2023  Primary Care Physician: Provider, No Known    Subjective   Subjective     CC:  Follow-up dementia, dehydration    HPI:  Patient seen resting in bed no apparent distress with eyes closed.  She is drowsy but awakens to stimulation.   at bedside.  Patient remains pleasantly confused.  Continues to await rehab placement.  No new complaints at this time.    ROS:  Unable to obtain at this time.    Objective   Objective     Vital Signs:   Temp:  [97.7 °F (36.5 °C)-98.9 °F (37.2 °C)] 97.9 °F (36.6 °C)  Heart Rate:  [79-95] 92  Resp:  [18] 18  BP: (113-147)/(57-90) 131/86  Flow (L/min):  [2] 2     Physical Exam:  Constitutional: No acute distress, drowsy, frail, chronically ill-appearing  HENT: NCAT, mucous membranes moist  Respiratory: Diminished in bases, respiratory effort normal on 2 L  Cardiovascular: RRR, no murmurs, palpable pedal pulses bilaterally, cap refill brisk   Gastrointestinal: Positive bowel sounds, soft, nontender, nondistended  Musculoskeletal: No BLE edema   Psychiatric: Flat affect, cooperative  Neurologic: Drowsy, moves all extremities, speech clear  Skin: warm, dry, no visible rash    Results Reviewed:  LAB RESULTS:      Lab 23  0452 23  0543 05/10/23  1250 23  1533   WBC 8.40 8.35 11.61* 7.80   HEMOGLOBIN 10.9* 10.3* 11.6* 11.9*   HEMATOCRIT 33.1* 32.5* 36.0 37.1   PLATELETS 192 172 203 234   NEUTROS ABS  --   --  8.83* 5.64   IMMATURE GRANS (ABS)  --   --  0.12* 0.06*   LYMPHS ABS  --   --  0.67* 0.92   MONOS ABS  --   --  1.21* 0.70   EOS ABS  --   --  0.70* 0.41*   MCV 96.2 97.6* 96.8 99.2*         Lab 23  0430 23  0543 05/10/23  1250 23  1533   SODIUM 141 141 142 141   POTASSIUM 3.5 4.6 4.5 4.0   CHLORIDE 108* 111* 109* 106   CO2 23.0 19.0* 19.0* 17.0*   ANION GAP 10.0 11.0 14.0 18.0*    BUN 17 23 40* 39*   CREATININE 0.85 0.89 1.62* 1.75*   EGFR 66.8 63.2 30.8* 28.1*   GLUCOSE 111* 87 92 129*   CALCIUM 8.2* 8.2* 9.0 9.1         Lab 05/11/23  0543 05/10/23  1250 05/09/23  1533   TOTAL PROTEIN 5.8* 7.3 7.4   ALBUMIN 3.5 4.0 3.9   GLOBULIN 2.3 3.3 3.5   ALT (SGPT) 24 31 25   AST (SGOT) 30 39* 31   BILIRUBIN 0.3 0.2 0.2   ALK PHOS 114 129* 127*                     Brief Urine Lab Results  (Last result in the past 365 days)      Color   Clarity   Blood   Leuk Est   Nitrite   Protein   CREAT   Urine HCG        05/10/23 1304 Yellow   Clear   Negative   Negative   Negative   Negative                 Microbiology Results Abnormal     None          No radiology results from the last 24 hrs    Results for orders placed during the hospital encounter of 03/17/22    Adult Transthoracic Echo Complete W/ Cont if Necessary Per Protocol    Interpretation Summary  · Left ventricular ejection fraction appears to be 46 - 50%. Left ventricular systolic function is mildly decreased.  · Left ventricular diastolic function is consistent with (grade I) impaired relaxation.  · Mild to moderate mitral regurgitation.  · Mild to moderate tricuspid regurgitation, RVSP 54 mmHg.      Current medications:  Scheduled Meds:acetaminophen, 650 mg, Oral, TID  aspirin, 81 mg, Oral, Daily  budesonide-formoterol, 2 puff, Inhalation, BID - RT  docusate sodium, 100 mg, Oral, BID  donepezil, 5 mg, Oral, Daily  folic acid, 400 mcg, Oral, BID  levothyroxine, 25 mcg, Oral, Q AM  metoprolol succinate XL, 25 mg, Oral, Daily  mirtazapine, 7.5 mg, Oral, Nightly  OXcarbazepine, 600 mg, Oral, BID  pantoprazole, 40 mg, Oral, Daily  pregabalin, 25 mg, Oral, BID  sodium chloride, 10 mL, Intravenous, Q12H  spironolactone, 25 mg, Oral, Daily  torsemide, 30 mg, Oral, Daily      Continuous Infusions:   PRN Meds:.•  albuterol  •  ondansetron  •  [COMPLETED] Insert Peripheral IV **AND** sodium chloride  •  sodium chloride  •  sodium chloride  •   traMADol    Assessment & Plan   Assessment & Plan     Active Hospital Problems    Diagnosis  POA   • **JUAN F (acute kidney injury) [N17.9]  Yes   • Chronic respiratory failure [J96.10]  Yes   • atrial fibrillation [I48.91]  Yes   • chronic CHF (congestive heart failure) (HCC) [I50.9]  Yes   • Dementia without behavioral disturbance [F03.90]  Yes   • Impaired functional mobility, balance, gait, and endurance [Z74.09]  Yes   • Severe malnutrition (CMS/HCC) [E43]  Yes   • Cognitive decline [R41.89]  Yes   • Hypothyroidism (acquired) [E03.9]  Yes   • Trigeminal neuralgia [G50.0]  Yes   • Delirium, acute [R41.0]  Yes   • Essential hypertension [I10]  Yes   • Fall [W19.XXXA]  Yes      Resolved Hospital Problems   No resolved problems to display.        Brief Hospital Course to date:  Mitzi Eric is a 87 y.o. female with history of CKD, COPD (2L nightly) with known bilateral upper lobe masses, paroxysmal A-fib, dementia, HLD, HTN, hypothyroidism, Trigeminal neuralgia who presented to ED on 5/10/2023  from her assisted living facility due to fall with associated acute renal failure. CT head w/o was negative. PT/OT evaluated and recommended SNF. CM is following for placement.      This patient's problems and plans were partially entered by my partner and updated as appropriate by me 05/14/23.     Fall  - CT Head w/o negative   - PT/OT recommended SNF  -CM following  - nephew visiting different facilities     Acute on Chronic Renal Failure Stage 3  -Cr 1.62 on admission, Cr 0.85 at last check   -Secondary to dehydration, IVF  -Hold Nephrotoxic medications  -Stop IVF   -AM labs      Slight Leukocytosis, resolved  - UA negative  - currently on 2L, prior to admission only worse 2L at night     Left Apical Lung mass with fluid-necrotic components/cavitary appearance   Right Upper lobe lung mass   Hx COPD  -Patient/POA (nephew) have been aware of this and not interested in further work-up  -On 2L NC currently, normally on RA  during the day and 2L at night per Nephew   - s/p course of Ceftin      Combined systolic and diastolic heart failure  Moderate pulm hypertension  -Compensated, monitor with IVF   -continue home Demadex/Spironolactone      Paroxysmal atrial fibrillation  -continue home Metoprolol     Hypothyroidism  -Continue Synthroid     Trigeminal neuralgia  -Continue Trileptal and Lyrica     Hypertension  -continue home medications     Dementia  -Continue Aricept     Expected Discharge Location and Transportation: [facility]  Expected Discharge   Expected Discharge Date: 5/15/2023; Expected Discharge Time:      DVT prophylaxis:  Mechanical DVT prophylaxis orders are present.     AM-PAC 6 Clicks Score (PT): 10 (05/12/23 2000)    CODE STATUS:   Code Status and Medical Interventions:   Ordered at: 05/10/23 1451     Code Status (Patient has no pulse and is not breathing):    No CPR (Do Not Attempt to Resuscitate)     Medical Interventions (Patient has pulse or is breathing):    Full Support       Lemuel Giraldo, APRN  05/14/23

## 2023-05-14 NOTE — PLAN OF CARE
Goal Outcome Evaluation:  Plan of Care Reviewed With: patient        Progress: improving  Outcome Evaluation: Physical therapy treatment complete. The patient required less assistance to complete bed mobility and transfers. The patient participated in seated therex. The patient continues to present below baseline for mobility. Continue to recommend SNF at discharge.

## 2023-05-15 LAB
ANION GAP SERPL CALCULATED.3IONS-SCNC: 13 MMOL/L (ref 5–15)
BUN SERPL-MCNC: 23 MG/DL (ref 8–23)
BUN/CREAT SERPL: 23.7 (ref 7–25)
CALCIUM SPEC-SCNC: 9.2 MG/DL (ref 8.6–10.5)
CHLORIDE SERPL-SCNC: 101 MMOL/L (ref 98–107)
CO2 SERPL-SCNC: 24 MMOL/L (ref 22–29)
CREAT SERPL-MCNC: 0.97 MG/DL (ref 0.57–1)
DEPRECATED RDW RBC AUTO: 47.4 FL (ref 37–54)
EGFRCR SERPLBLD CKD-EPI 2021: 57 ML/MIN/1.73
ERYTHROCYTE [DISTWIDTH] IN BLOOD BY AUTOMATED COUNT: 13.3 % (ref 12.3–15.4)
GLUCOSE SERPL-MCNC: 81 MG/DL (ref 65–99)
HCT VFR BLD AUTO: 38.8 % (ref 34–46.6)
HGB BLD-MCNC: 12.3 G/DL (ref 12–15.9)
MCH RBC QN AUTO: 30.7 PG (ref 26.6–33)
MCHC RBC AUTO-ENTMCNC: 31.7 G/DL (ref 31.5–35.7)
MCV RBC AUTO: 96.8 FL (ref 79–97)
PLATELET # BLD AUTO: 229 10*3/MM3 (ref 140–450)
PMV BLD AUTO: 12.7 FL (ref 6–12)
POTASSIUM SERPL-SCNC: 4.5 MMOL/L (ref 3.5–5.2)
RBC # BLD AUTO: 4.01 10*6/MM3 (ref 3.77–5.28)
SODIUM SERPL-SCNC: 138 MMOL/L (ref 136–145)
WBC NRBC COR # BLD: 7.59 10*3/MM3 (ref 3.4–10.8)

## 2023-05-15 PROCEDURE — G0378 HOSPITAL OBSERVATION PER HR: HCPCS

## 2023-05-15 PROCEDURE — 94799 UNLISTED PULMONARY SVC/PX: CPT

## 2023-05-15 PROCEDURE — 85027 COMPLETE CBC AUTOMATED: CPT | Performed by: NURSE PRACTITIONER

## 2023-05-15 PROCEDURE — 94664 DEMO&/EVAL PT USE INHALER: CPT

## 2023-05-15 PROCEDURE — 99232 SBSQ HOSP IP/OBS MODERATE 35: CPT | Performed by: STUDENT IN AN ORGANIZED HEALTH CARE EDUCATION/TRAINING PROGRAM

## 2023-05-15 PROCEDURE — 94761 N-INVAS EAR/PLS OXIMETRY MLT: CPT

## 2023-05-15 PROCEDURE — 80048 BASIC METABOLIC PNL TOTAL CA: CPT | Performed by: NURSE PRACTITIONER

## 2023-05-15 RX ADMIN — PANTOPRAZOLE SODIUM 40 MG: 40 TABLET, DELAYED RELEASE ORAL at 09:14

## 2023-05-15 RX ADMIN — Medication 10 ML: at 22:02

## 2023-05-15 RX ADMIN — OXCARBAZEPINE 600 MG: 150 TABLET, FILM COATED ORAL at 22:01

## 2023-05-15 RX ADMIN — METOPROLOL SUCCINATE 25 MG: 25 TABLET, EXTENDED RELEASE ORAL at 09:19

## 2023-05-15 RX ADMIN — OXCARBAZEPINE 600 MG: 150 TABLET, FILM COATED ORAL at 09:13

## 2023-05-15 RX ADMIN — PREGABALIN 25 MG: 25 CAPSULE ORAL at 22:01

## 2023-05-15 RX ADMIN — DOCUSATE SODIUM 100 MG: 100 CAPSULE, LIQUID FILLED ORAL at 22:01

## 2023-05-15 RX ADMIN — PREGABALIN 25 MG: 25 CAPSULE ORAL at 09:14

## 2023-05-15 RX ADMIN — ACETAMINOPHEN 325MG 650 MG: 325 TABLET ORAL at 15:58

## 2023-05-15 RX ADMIN — ACETAMINOPHEN 325MG 650 MG: 325 TABLET ORAL at 09:13

## 2023-05-15 RX ADMIN — BUDESONIDE AND FORMOTEROL FUMARATE DIHYDRATE 2 PUFF: 160; 4.5 AEROSOL RESPIRATORY (INHALATION) at 22:26

## 2023-05-15 RX ADMIN — LEVOTHYROXINE SODIUM 25 MCG: 0.03 TABLET ORAL at 05:58

## 2023-05-15 RX ADMIN — FOLIC ACID TAB 400 MCG 400 MCG: 400 TAB at 22:01

## 2023-05-15 RX ADMIN — TORSEMIDE 30 MG: 20 TABLET ORAL at 09:14

## 2023-05-15 RX ADMIN — Medication 10 ML: at 09:18

## 2023-05-15 RX ADMIN — ACETAMINOPHEN 325MG 650 MG: 325 TABLET ORAL at 22:01

## 2023-05-15 RX ADMIN — FOLIC ACID TAB 400 MCG 400 MCG: 400 TAB at 09:13

## 2023-05-15 RX ADMIN — DOCUSATE SODIUM 100 MG: 100 CAPSULE, LIQUID FILLED ORAL at 09:14

## 2023-05-15 RX ADMIN — SPIRONOLACTONE 25 MG: 25 TABLET ORAL at 09:14

## 2023-05-15 RX ADMIN — ASPIRIN 81 MG: 81 TABLET, COATED ORAL at 09:13

## 2023-05-15 RX ADMIN — DONEPEZIL HYDROCHLORIDE 5 MG: 5 TABLET, FILM COATED ORAL at 09:14

## 2023-05-15 RX ADMIN — MIRTAZAPINE 7.5 MG: 15 TABLET, FILM COATED ORAL at 22:01

## 2023-05-15 RX ADMIN — BUDESONIDE AND FORMOTEROL FUMARATE DIHYDRATE 2 PUFF: 160; 4.5 AEROSOL RESPIRATORY (INHALATION) at 08:44

## 2023-05-15 NOTE — PROGRESS NOTES
[facility] Medicine Services  PROGRESS NOTE    Patient Name: Mitzi Eric  : 1935  MRN: [id number]    Date of Admission: 5/10/2023  Primary Care Physician: Provider, No Known    Subjective   Subjective     CC:  Follow-up dementia, dehydration    HPI:  She states she is comfortable, not very talkative. Denies any complaints, no overnight issues    ROS:  Gen- No fevers, chills  CV- No chest pain, palpitations  Resp- No cough, dyspnea  GI- No N/V/D, abd pain        Objective   Objective     Vital Signs:   Temp:  [96.7 °F (35.9 °C)-98.6 °F (37 °C)] 97.7 °F (36.5 °C)  Heart Rate:  [80-94] 92  Resp:  [16-18] 16  BP: ()/(46-83) 91/65  Flow (L/min):  [2-2.5] 2     Physical Exam:  Constitutional: No acute distress, drowsy, frail, chronically ill-appearing  HENT: NCAT, mucous membranes moist  Respiratory: Diminished in bases, respiratory effort normal on 2 L  Cardiovascular: RRR, no murmurs, palpable pedal pulses bilaterally, cap refill brisk   Gastrointestinal: Positive bowel sounds, soft, nontender, nondistended  Musculoskeletal: No BLE edema   Psychiatric: Flat affect, cooperative  Neurologic: Drowsy, moves all extremities, speech clear  Skin: warm, dry, no visible rash    Results Reviewed:  LAB RESULTS:      Lab 05/15/23  0543 23  0452 23  0543 05/10/23  1250 23  1533   WBC 7.59 8.40 8.35 11.61* 7.80   HEMOGLOBIN 12.3 10.9* 10.3* 11.6* 11.9*   HEMATOCRIT 38.8 33.1* 32.5* 36.0 37.1   PLATELETS 229 192 172 203 234   NEUTROS ABS  --   --   --  8.83* 5.64   IMMATURE GRANS (ABS)  --   --   --  0.12* 0.06*   LYMPHS ABS  --   --   --  0.67* 0.92   MONOS ABS  --   --   --  1.21* 0.70   EOS ABS  --   --   --  0.70* 0.41*   MCV 96.8 96.2 97.6* 96.8 99.2*         Lab 05/15/23  0543 23  0430 23  0543 05/10/23  1250 23  1533   SODIUM 138 141 141 142 141   POTASSIUM 4.5 3.5 4.6 4.5 4.0   CHLORIDE 101 108* 111* 109* 106   CO2 24.0 23.0 19.0* 19.0* 17.0*    ANION GAP 13.0 10.0 11.0 14.0 18.0*   BUN 23 17 23 40* 39*   CREATININE 0.97 0.85 0.89 1.62* 1.75*   EGFR 57.0* 66.8 63.2 30.8* 28.1*   GLUCOSE 81 111* 87 92 129*   CALCIUM 9.2 8.2* 8.2* 9.0 9.1         Lab 05/11/23  0543 05/10/23  1250 05/09/23  1533   TOTAL PROTEIN 5.8* 7.3 7.4   ALBUMIN 3.5 4.0 3.9   GLOBULIN 2.3 3.3 3.5   ALT (SGPT) 24 31 25   AST (SGOT) 30 39* 31   BILIRUBIN 0.3 0.2 0.2   ALK PHOS 114 129* 127*                     Brief Urine Lab Results  (Last result in the past 365 days)      Color   Clarity   Blood   Leuk Est   Nitrite   Protein   CREAT   Urine HCG        05/10/23 1304 Yellow   Clear   Negative   Negative   Negative   Negative                 Microbiology Results Abnormal     None          No radiology results from the last 24 hrs    Results for orders placed during the hospital encounter of 03/17/22    Adult Transthoracic Echo Complete W/ Cont if Necessary Per Protocol    Interpretation Summary  · Left ventricular ejection fraction appears to be 46 - 50%. Left ventricular systolic function is mildly decreased.  · Left ventricular diastolic function is consistent with (grade I) impaired relaxation.  · Mild to moderate mitral regurgitation.  · Mild to moderate tricuspid regurgitation, RVSP 54 mmHg.      Current medications:  Scheduled Meds:acetaminophen, 650 mg, Oral, TID  aspirin, 81 mg, Oral, Daily  budesonide-formoterol, 2 puff, Inhalation, BID - RT  docusate sodium, 100 mg, Oral, BID  donepezil, 5 mg, Oral, Daily  folic acid, 400 mcg, Oral, BID  levothyroxine, 25 mcg, Oral, Q AM  metoprolol succinate XL, 25 mg, Oral, Daily  mirtazapine, 7.5 mg, Oral, Nightly  OXcarbazepine, 600 mg, Oral, BID  pantoprazole, 40 mg, Oral, Daily  pregabalin, 25 mg, Oral, BID  sodium chloride, 10 mL, Intravenous, Q12H  spironolactone, 25 mg, Oral, Daily  torsemide, 30 mg, Oral, Daily      Continuous Infusions:   PRN Meds:.•  albuterol  •  ondansetron  •  [COMPLETED] Insert Peripheral IV **AND** sodium  chloride  •  sodium chloride  •  sodium chloride  •  traMADol    Assessment & Plan   Assessment & Plan     Active Hospital Problems    Diagnosis  POA   • **JUAN F (acute kidney injury) [N17.9]  Yes   • Chronic respiratory failure [J96.10]  Yes   • atrial fibrillation [I48.91]  Yes   • chronic CHF (congestive heart failure) (HCC) [I50.9]  Yes   • Dementia without behavioral disturbance [F03.90]  Yes   • Impaired functional mobility, balance, gait, and endurance [Z74.09]  Yes   • Severe malnutrition (CMS/HCC) [E43]  Yes   • Cognitive decline [R41.89]  Yes   • Hypothyroidism (acquired) [E03.9]  Yes   • Trigeminal neuralgia [G50.0]  Yes   • Delirium, acute [R41.0]  Yes   • Essential hypertension [I10]  Yes   • Fall [W19.XXXA]  Yes      Resolved Hospital Problems   No resolved problems to display.        Brief Hospital Course to date:  Mitzi Eric is a 87 y.o. female with history of CKD, COPD (2L nightly) with known bilateral upper lobe masses, paroxysmal A-fib, dementia, HLD, HTN, hypothyroidism, Trigeminal neuralgia who presented to ED on 5/10/2023  from her assisted living facility due to fall with associated acute renal failure. CT head w/o was negative. PT/OT evaluated and recommended SNF. CM is following for placement.      This patient's problems and plans were partially entered by my partner and updated as appropriate by me 05/15/23.     Fall  - CT Head w/o negative   - PT/OT recommended SNF  -CM following  - nephew visiting different facilities     Acute on Chronic Renal Failure Stage 3--resolved  -Cr 1.62 on admission, Cr 0.85 at last check   -Secondary to dehydration, s/p IVF  -Hold Nephrotoxic medications  -AM labs      Slight Leukocytosis, resolved  - UA negative  - currently on 2L, prior to admission only worse 2L at night     Left Apical Lung mass with fluid-necrotic components/cavitary appearance   Right Upper lobe lung mass   Hx COPD  -Patient/POA (nephew) have been aware of this and not interested in  further work-up  -On 2L NC currently, normally on RA during the day and 2L at night per Nephew   - s/p course of Ceftin      Combined systolic and diastolic heart failure  Moderate pulm hypertension  -Compensated, monitored with IVF   -continue home Demadex/Spironolactone      Paroxysmal atrial fibrillation  -continue home Metoprolol     Hypothyroidism  -Continue Synthroid     Trigeminal neuralgia  -Continue Trileptal and Lyrica     Hypertension  -continue home medications     Dementia  -Continue Aricept     Expected Discharge Location and Transportation: SNF  Expected Discharge   Expected Discharge Date: 5/15/2023; Expected Discharge Time:      DVT prophylaxis:  Mechanical DVT prophylaxis orders are present.     AM-PAC 6 Clicks Score (PT): 13 (05/15/23 0800)    CODE STATUS:   Code Status and Medical Interventions:   Ordered at: 05/10/23 8061     Code Status (Patient has no pulse and is not breathing):    No CPR (Do Not Attempt to Resuscitate)     Medical Interventions (Patient has pulse or is breathing):    Full Support       Ria Howard MD  05/15/23

## 2023-05-15 NOTE — CASE MANAGEMENT/SOCIAL WORK
Continued Stay Note  [facility]     Patient Name: Mitzi Eric  MRN: [id number]  Today's Date: 5/15/2023    Admit Date: 5/10/2023    Plan: To be determined   Discharge Plan     Row Name 05/15/23 1021       Plan    Plan Comments Met with Mrs. Eric and her power of  Mr. Eric. Discussed that he willbe having a meeting with [facility] on Tuesday. He would prefer referrals be made to short term rehab and he will know more about if he will be planning to move Mrs. Eric to another facility after his metting on Tuesday. He said that the facility would need to be able to provide a purewick system. He is prefers a referral to [place] first and then [facility], [facility] [place] as secondy options.               Discharge Codes    No documentation.               Expected Discharge Date and Time     Expected Discharge Date Expected Discharge Time    May 15, 2023             YURI Joseph

## 2023-05-16 PROCEDURE — 94799 UNLISTED PULMONARY SVC/PX: CPT

## 2023-05-16 PROCEDURE — G0378 HOSPITAL OBSERVATION PER HR: HCPCS

## 2023-05-16 PROCEDURE — 97535 SELF CARE MNGMENT TRAINING: CPT

## 2023-05-16 PROCEDURE — 97110 THERAPEUTIC EXERCISES: CPT

## 2023-05-16 PROCEDURE — 99232 SBSQ HOSP IP/OBS MODERATE 35: CPT | Performed by: STUDENT IN AN ORGANIZED HEALTH CARE EDUCATION/TRAINING PROGRAM

## 2023-05-16 PROCEDURE — 97530 THERAPEUTIC ACTIVITIES: CPT

## 2023-05-16 PROCEDURE — 94761 N-INVAS EAR/PLS OXIMETRY MLT: CPT

## 2023-05-16 RX ADMIN — LEVOTHYROXINE SODIUM 25 MCG: 0.03 TABLET ORAL at 06:01

## 2023-05-16 RX ADMIN — PREGABALIN 25 MG: 25 CAPSULE ORAL at 20:47

## 2023-05-16 RX ADMIN — SPIRONOLACTONE 25 MG: 25 TABLET ORAL at 08:47

## 2023-05-16 RX ADMIN — Medication 10 ML: at 08:48

## 2023-05-16 RX ADMIN — PANTOPRAZOLE SODIUM 40 MG: 40 TABLET, DELAYED RELEASE ORAL at 08:47

## 2023-05-16 RX ADMIN — PREGABALIN 25 MG: 25 CAPSULE ORAL at 08:47

## 2023-05-16 RX ADMIN — OXCARBAZEPINE 600 MG: 150 TABLET, FILM COATED ORAL at 20:46

## 2023-05-16 RX ADMIN — Medication 10 ML: at 20:47

## 2023-05-16 RX ADMIN — OXCARBAZEPINE 600 MG: 150 TABLET, FILM COATED ORAL at 08:49

## 2023-05-16 RX ADMIN — DONEPEZIL HYDROCHLORIDE 5 MG: 5 TABLET, FILM COATED ORAL at 08:47

## 2023-05-16 RX ADMIN — FOLIC ACID TAB 400 MCG 400 MCG: 400 TAB at 08:46

## 2023-05-16 RX ADMIN — BUDESONIDE AND FORMOTEROL FUMARATE DIHYDRATE 2 PUFF: 160; 4.5 AEROSOL RESPIRATORY (INHALATION) at 20:04

## 2023-05-16 RX ADMIN — FOLIC ACID TAB 400 MCG 400 MCG: 400 TAB at 20:47

## 2023-05-16 RX ADMIN — ACETAMINOPHEN 325MG 650 MG: 325 TABLET ORAL at 08:47

## 2023-05-16 RX ADMIN — DOCUSATE SODIUM 100 MG: 100 CAPSULE, LIQUID FILLED ORAL at 08:47

## 2023-05-16 RX ADMIN — METOPROLOL SUCCINATE 25 MG: 25 TABLET, EXTENDED RELEASE ORAL at 08:47

## 2023-05-16 RX ADMIN — ACETAMINOPHEN 325MG 650 MG: 325 TABLET ORAL at 19:12

## 2023-05-16 RX ADMIN — DOCUSATE SODIUM 100 MG: 100 CAPSULE, LIQUID FILLED ORAL at 20:47

## 2023-05-16 RX ADMIN — TORSEMIDE 30 MG: 20 TABLET ORAL at 08:47

## 2023-05-16 RX ADMIN — BUDESONIDE AND FORMOTEROL FUMARATE DIHYDRATE 2 PUFF: 160; 4.5 AEROSOL RESPIRATORY (INHALATION) at 08:59

## 2023-05-16 RX ADMIN — MIRTAZAPINE 7.5 MG: 15 TABLET, FILM COATED ORAL at 20:47

## 2023-05-16 RX ADMIN — ASPIRIN 81 MG: 81 TABLET, COATED ORAL at 08:47

## 2023-05-16 NOTE — PLAN OF CARE
Goal Outcome Evaluation:  Plan of Care Reviewed With: patient        Progress: improving  Outcome Evaluation: Pt improved to Min A x 2 with functional transfers using the RW, however unable to progress with mobility due to lethargy and dizziness. VSS. Cont POC.

## 2023-05-16 NOTE — THERAPY TREATMENT NOTE
Patient Name: Mitzi Eric  : 1935    MRN: [id number]                              Today's Date: 2023       Admit Date: 5/10/2023    Visit Dx:     ICD-10-CM ICD-9-CM   1. JUAN F (acute kidney injury)  N17.9 584.9   2. Declining functional status  R53.81 799.3   3. Frequent falls  R29.6 V15.88   4. Severe dementia, unspecified dementia type, unspecified whether behavioral, psychotic, or mood disturbance or anxiety  F03.C0 294.20   5. Centrilobular emphysema  J43.2 492.8   6. Impaired functional mobility, balance, gait, and endurance  Z74.09 V49.89     Patient Active Problem List   Diagnosis   • Fall   • Delirium, acute   • UTI (urinary tract infection), bacterial   • Essential hypertension   • JUAN F (acute kidney injury)   • Cognitive decline   • Trigeminal neuralgia   • Hypothyroidism (acquired)   • Lung mass   • Influenza A   • Severe malnutrition (CMS/HCC)   • Dementia without behavioral disturbance   • History of craniotomy   • Impaired functional mobility, balance, gait, and endurance   • COPD ex with volume overload   • chronic CHF (congestive heart failure) ([facility])   • Thrombocytopenia   • Elevated transaminase level   • atrial fibrillation   • COPD with acute exacerbation   • Acute on chronic respiratory failure with hypoxemia   • Chronic respiratory failure   • Underweight   • Moderate malnutrition (CMS/HCC)   • Centrilobular emphysema   • Essential tremor   • Pneumonia of both upper lobes due to infectious organism     Past Medical History:   Diagnosis Date   • A-fib    • Ataxic gait    • Chronic kidney disease, stage 3    • COPD (chronic obstructive pulmonary disease)    • Dementia    • Disease of thyroid gland    • Essential tremor 2022   • Heart failure    • Hyperlipidemia    • Hypertension    • Shingles      Past Surgical History:   Procedure Laterality Date   • BRAIN TUMOR EXCISION      BENIGN   • FRACTURE SURGERY     • TRIGGER FINGER RELEASE        General Information     Row Name  Pt calls stating that he had bilateral endarterectomy 4-14 with Dr Brito. He was discharged to [facility] for recovery and then released from there on 4-22. Since yesterday, he has had bilateral leg swelling from thighs to feet along with tightness. The tightness makes it difficult to walk. Denies discoloration. He was instructed to elevate his legs. Will discuss with Dr brito and get back to the pt.   05/16/23 1601          OT Time and Intention    Document Type therapy note (daily note)  -AN     Mode of Treatment occupational therapy  -AN     Row Name 05/16/23 1601          General Information    Patient Profile Reviewed yes  -AN     Existing Precautions/Restrictions fall;oxygen therapy device and L/min;other (see comments)  dementia  -AN     Barriers to Rehab medically complex;previous functional deficit;cognitive status  -AN     Row Name 05/16/23 1601          Cognition    Orientation Status (Cognition) oriented to;person;disoriented to;place;situation  -AN     Row Name 05/16/23 1601          Safety Issues, Functional Mobility    Safety Issues Affecting Function (Mobility) safety precautions follow-through/compliance;safety precaution awareness;sequencing abilities;judgment;insight into deficits/self-awareness;problem-solving;awareness of need for assistance  -AN     Impairments Affecting Function (Mobility) cognition;balance;endurance/activity tolerance;strength  -AN     Cognitive Impairments, Mobility Safety/Performance awareness, need for assistance;safety precaution awareness;safety precaution follow-through;insight into deficits/self-awareness;sequencing abilities;judgment;problem-solving/reasoning  -AN     Comment, Safety Issues/Impairments (Mobility) cues for sequencing of steps with increased time required  -AN           User Key  (r) = Recorded By, (t) = Taken By, (c) = Cosigned By    Initials Name Provider Type    AN Jackelin Hurtado OT Occupational Therapist                 Mobility/ADL's     Row Name 05/16/23 1600          Bed Mobility    Bed Mobility supine-sit;sit-supine  -AN     Supine-Sit [place] (Bed Mobility) minimum assist (75% patient effort);1 person assist;verbal cues  -AN     Sit-Supine [place] (Bed Mobility) maximum assist (25% patient effort);1 person assist;verbal cues  -AN     Bed Mobility, Safety Issues decreased use of arms for pushing/pulling;decreased use of legs for  bridging/pushing;impaired trunk control for bed mobility  -AN     Assistive Device (Bed Mobility) head of bed elevated  -AN     Comment, (Bed Mobility) cues for sequencing of steps, increased time required for sup>sit; increased assistance to return to supine due to dizziness  -AN     Row Name 05/16/23 1603          Transfers    Transfers sit-stand transfer;stand-sit transfer  -AN     Comment, (Transfers) cues for hand placement and transfer technique using RW  -AN     Row Name 05/16/23 1603          Sit-Stand Transfer    Sit-Stand [place] (Transfers) verbal cues;minimum assist (75% patient effort);2 person assist  -AN     Assistive Device (Sit-Stand Transfers) walker, front-wheeled  -AN     Row Name 05/16/23 1603          Stand-Sit Transfer    Stand-Sit [place] (Transfers) verbal cues;minimum assist (75% patient effort);2 person assist  -AN     Assistive Device (Stand-Sit Transfers) walker, front-wheeled  -AN     Row Name 05/16/23 1603          Functional Mobility    Functional Mobility- Ind. Level minimum assist (75% patient effort);2 person assist required;verbal cues required  -AN     Functional Mobility- Device walker, front-wheeled  -AN     Functional Mobility-Distance (Feet) 3  -AN     Functional Mobility- Comment pt ambulated short distance using the RW with Min A x 2 and max cues for sequencing of steps; pt limited by dizziness  -AN     Row Name 05/16/23 1603          Activities of Daily Living    BADL Assessment/Intervention upper body dressing;lower body dressing  -AN     Row Name 05/16/23 1603          Lower Body Dressing Assessment/Training    [place] Level (Lower Body Dressing) don;socks;dependent (less than 25% patient effort)  -AN     Position (Lower Body Dressing) edge of bed sitting  -AN     Row Name 05/16/23 1603          Upper Body Dressing Assessment/Training    [place] Level (Upper Body Dressing) don;minimum assist (75% patient effort)  gown  -AN     Position (Upper Body  Dressing) edge of bed sitting  -AN           User Key  (r) = Recorded By, (t) = Taken By, (c) = Cosigned By    Initials Name Provider Type    Jackelin Arzola OT Occupational Therapist               Obj/Interventions     Row Name 05/16/23 1607          Motor Skills    Motor Skills functional endurance  -AN     Functional Endurance decreased functional endurance; limited by fatigue  -AN     Row Name 05/16/23 1607          Balance    Balance Assessment sitting static balance;sitting dynamic balance;sit to stand dynamic balance;standing static balance;standing dynamic balance  -AN     Static Sitting Balance standby assist  -AN     Dynamic Sitting Balance standby assist  -AN     Position, Sitting Balance sitting edge of bed  -AN     Sit to Stand Dynamic Balance verbal cues;minimal assist;1-person assist  -AN     Static Standing Balance minimal assist;1-person assist;verbal cues  -AN     Dynamic Standing Balance verbal cues;minimal assist;2-person assist  -AN     Position/Device Used, Standing Balance supported;walker, rolling  -AN     Balance Interventions standing;sit to stand;supported;dynamic;moderate challenge;static;minimal challenge;occupation based/functional task  -AN           User Key  (r) = Recorded By, (t) = Taken By, (c) = Cosigned By    Initials Name Provider Type    Jackelin Arzola OT Occupational Therapist               Goals/Plan    No documentation.                Clinical Impression     Row Name 05/16/23 1603          Pain Assessment    Pretreatment Pain Rating 0/10 - no pain  -AN     Posttreatment Pain Rating 0/10 - no pain  -AN     Pre/Posttreatment Pain Comment tolerated  -AN     Pain Intervention(s) Repositioned;Ambulation/increased activity  -AN     Row Name 05/16/23 1604          Plan of Care Review    Plan of Care Reviewed With patient  -AN     Progress improving  -AN     Outcome Evaluation Pt improved to Min A x 2 with functional transfers using the RW, however unable to progress  with mobility due to lethargy and dizziness. VSS. Cont POC.  -AN     Row Name 05/16/23 1608          Therapy Plan Review/Discharge Plan (OT)    Anticipated Discharge Disposition (OT) skilled nursing facility  -AN     Row Name 05/16/23 1608          Vital Signs    Pre Systolic BP Rehab --  VSS  -AN     O2 Delivery Pre Treatment nasal cannula  -AN     O2 Delivery Intra Treatment nasal cannula  -AN     O2 Delivery Post Treatment nasal cannula  -AN     Pre Patient Position Supine  -AN     Intra Patient Position Standing  -AN     Post Patient Position Supine  -AN     [facility] Name 05/16/23 1608          Positioning and Restraints    Pre-Treatment Position in bed  -AN     Post Treatment Position bed  -AN     In Bed notified nsg;supine;exit alarm on;encouraged to call for assist;call light within reach;side rails up x2  -AN           User Key  (r) = Recorded By, (t) = Taken By, (c) = Cosigned By    Initials Name Provider Type    Jackelin Arzola, REBECCA Occupational Therapist               Outcome Measures     Row Name 05/16/23 1610          How much help from another is currently needed...    Putting on and taking off regular lower body clothing? 1  -AN     Bathing (including washing, rinsing, and drying) 2  -AN     Toileting (which includes using toilet bed pan or urinal) 2  -AN     Putting on and taking off regular upper body clothing 3  -AN     Taking care of personal grooming (such as brushing teeth) 3  -AN     Eating meals 4  -AN     AM-PAC 6 Clicks Score (OT) 15  -AN     Row Name 05/16/23 0940 05/16/23 0800       How much help from another person do you currently need...    Turning from your back to your side while in flat bed without using bedrails? 2  -AS 2  -MC    Moving from lying on back to sitting on the side of a flat bed without bedrails? 2  -AS 2  -MC    Moving to and from a bed to a chair (including a wheelchair)? 2  -AS 2  -MC    Standing up from a chair using your arms (e.g., wheelchair, bedside chair)? 2   -AS 2  -MC    Climbing 3-5 steps with a railing? 1  -AS 1  -MC    To walk in hospital room? 2  -AS 2  -MC    AM-PAC 6 Clicks Score (PT) 11  -AS 11  -MC    Highest level of mobility 4 --> Transferred to chair/commode  -AS 4 --> Transferred to chair/commode  -MC    Row Name 05/16/23 1610 05/16/23 0940       Functional Assessment    Outcome Measure Options AM-PAC 6 Clicks Daily Activity (OT)  -AN AM-PAC 6 Clicks Basic Mobility (PT)  -AS          User Key  (r) = Recorded By, (t) = Taken By, (c) = Cosigned By    Initials Name Provider Type    AS Ann-Marie Gr, JADE Physical Therapist Assistant    Jackelin Arzola OT Occupational Therapist    MC Carrel, Miriam, RN Registered Nurse                Occupational Therapy Education     Title: PT OT SLP Therapies (In Progress)     Topic: Occupational Therapy (In Progress)     Point: ADL training (Done)     Description:   Instruct learner(s) on proper safety adaptation and remediation techniques during self care or transfers.   Instruct in proper use of assistive devices.              Learning Progress Summary           Patient Acceptance, E, VU by AN at 5/16/2023 1610    Acceptance, E, VU,NR by AN at 5/12/2023 4569                   Point: Home exercise program (Not Started)     Description:   Instruct learner(s) on appropriate technique for monitoring, assisting and/or progressing therapeutic exercises/activities.              Learner Progress:  Not documented in this visit.          Point: Precautions (Done)     Description:   Instruct learner(s) on prescribed precautions during self-care and functional transfers.              Learning Progress Summary           Patient Acceptance, E, VU by AN at 5/16/2023 1610    Acceptance, E, VU,NR by AN at 5/12/2023 1556                   Point: Body mechanics (Done)     Description:   Instruct learner(s) on proper positioning and spine alignment during self-care, functional mobility activities and/or exercises.               Learning Progress Summary           Patient Acceptance, E, VU by AN at 5/16/2023 1610    Acceptance, E, VU,NR by AN at 5/12/2023 1559                               User Key     Initials Effective Dates Name Provider Type Discipline    AN 09/21/21 -  Jackelin Hurtado OT Occupational Therapist OT              OT Recommendation and Plan  Planned Therapy Interventions (OT): activity tolerance training, adaptive equipment training, BADL retraining, patient/caregiver education/training, transfer/mobility retraining, IADL retraining, functional balance retraining, occupation/activity based interventions, strengthening exercise  Therapy Frequency (OT): daily  Plan of Care Review  Plan of Care Reviewed With: patient  Progress: improving  Outcome Evaluation: Pt improved to Min A x 2 with functional transfers using the RW, however unable to progress with mobility due to lethargy and dizziness. VSS. Cont POC.     Time Calculation:    Time Calculation- OT     Row Name 05/16/23 1611             Time Calculation- OT    OT Start Time 1530  -AN      OT Received On 05/16/23  -AN         Timed Charges    47135 - OT Self Care/Mgmt Minutes 15  -AN         Total Minutes    Timed Charges Total Minutes 15  -AN       Total Minutes 15  -AN            User Key  (r) = Recorded By, (t) = Taken By, (c) = Cosigned By    Initials Name Provider Type    AN Jackelin Hurtado OT Occupational Therapist              Therapy Charges for Today     Code Description Service Date Service Provider Modifiers Qty    35314289977 HC OT SELF CARE/MGMT/TRAIN EA 15 MIN 5/16/2023 Jackelin Hurtado, OT GO 1    47325221473 HC OT THER SUPP EA 15 MIN 5/16/2023 Jackelin Hurtado, OT GO 1               Jackelin Bryant OT  5/16/2023

## 2023-05-16 NOTE — THERAPY TREATMENT NOTE
Patient Name: Mitzi Eric  : 1935    MRN: [id number]                              Today's Date: 2023       Admit Date: 5/10/2023    Visit Dx:     ICD-10-CM ICD-9-CM   1. JUAN F (acute kidney injury)  N17.9 584.9   2. Declining functional status  R53.81 799.3   3. Frequent falls  R29.6 V15.88   4. Severe dementia, unspecified dementia type, unspecified whether behavioral, psychotic, or mood disturbance or anxiety  F03.C0 294.20   5. Centrilobular emphysema  J43.2 492.8   6. Impaired functional mobility, balance, gait, and endurance  Z74.09 V49.89     Patient Active Problem List   Diagnosis   • Fall   • Delirium, acute   • UTI (urinary tract infection), bacterial   • Essential hypertension   • JUAN F (acute kidney injury)   • Cognitive decline   • Trigeminal neuralgia   • Hypothyroidism (acquired)   • Lung mass   • Influenza A   • Severe malnutrition (CMS/HCC)   • Dementia without behavioral disturbance   • History of craniotomy   • Impaired functional mobility, balance, gait, and endurance   • COPD ex with volume overload   • chronic CHF (congestive heart failure) ([facility])   • Thrombocytopenia   • Elevated transaminase level   • atrial fibrillation   • COPD with acute exacerbation   • Acute on chronic respiratory failure with hypoxemia   • Chronic respiratory failure   • Underweight   • Moderate malnutrition (CMS/HCC)   • Centrilobular emphysema   • Essential tremor   • Pneumonia of both upper lobes due to infectious organism     Past Medical History:   Diagnosis Date   • A-fib    • Ataxic gait    • Chronic kidney disease, stage 3    • COPD (chronic obstructive pulmonary disease)    • Dementia    • Disease of thyroid gland    • Essential tremor 2022   • Heart failure    • Hyperlipidemia    • Hypertension    • Shingles      Past Surgical History:   Procedure Laterality Date   • BRAIN TUMOR EXCISION      BENIGN   • FRACTURE SURGERY     • TRIGGER FINGER RELEASE        General Information     Row Name  "05/16/23 0930          Physical Therapy Time and Intention    Document Type therapy note (daily note)  -AS     Mode of Treatment physical therapy  -AS     Row Name 05/16/23 0930          General Information    Patient Profile Reviewed yes  -AS     Existing Precautions/Restrictions fall;oxygen therapy device and L/min;other (see comments)  dementia  -AS     Barriers to Rehab medically complex;previous functional deficit;cognitive status  -AS     Row Name 05/16/23 0930          Cognition    Orientation Status (Cognition) oriented to;person;disoriented to;place;situation;time  -AS     Row Name 05/16/23 0930          Safety Issues, Functional Mobility    Safety Issues Affecting Function (Mobility) awareness of need for assistance;insight into deficits/self-awareness;safety precaution awareness;safety precautions follow-through/compliance;ability to follow commands;sequencing abilities;judgment  -AS     Impairments Affecting Function (Mobility) cognition;balance;endurance/activity tolerance;strength  -AS     Cognitive Impairments, Mobility Safety/Performance awareness, need for assistance;insight into deficits/self-awareness;problem-solving/reasoning;safety precaution awareness;safety precaution follow-through;sequencing abilities  -AS     Comment, Safety Issues/Impairments (Mobility) alert to self only, patient with increased sleepiness this session but once in chair was able to wake up and eat breakfast  -AS           User Key  (r) = Recorded By, (t) = Taken By, (c) = Cosigned By    Initials Name Provider Type    AS Ann-Marie Gr PTA Physical Therapist Assistant               Mobility     Row Name 05/16/23 0932          Bed Mobility    Supine-Sit [place] (Bed Mobility) supervision;minimum assist (75% patient effort);1 person assist  -AS     Assistive Device (Bed Mobility) head of bed elevated;bed rails  -AS     Comment, (Bed Mobility) cues for sequencing, patient moaning throughout transfer and reported \"I " "hurt all over\" patientt with B LE stiffness  -AS     Row Name 05/16/23 0932          Transfers    Comment, (Transfers) bed>recliner with Mod assist x2 and B UE support, once standing patient having difficulty step sequencing and required increased assist for transfer  -AS     Row Name 05/16/23 0932          Bed-Chair Transfer    Bed-Chair [place] (Transfers) verbal cues;moderate assist (50% patient effort);2 person assist  -AS     Comment, (Bed-Chair Transfer) B UE support  -AS     [facility] Name 05/16/23 0932          Sit-Stand Transfer    Sit-Stand [place] (Transfers) verbal cues;minimum assist (75% patient effort);2 person assist  -AS     Comment, (Sit-Stand Transfer) B UE support  -AS     [facility] Name 05/16/23 0932          Gait/Stairs (Locomotion)    [place] Level (Gait) not tested  -AS     Comment, (Gait/Stairs) transfer to reclienr only due to weakness, fatigue and cognitive deficits  -AS           User Key  (r) = Recorded By, (t) = Taken By, (c) = Cosigned By    Initials Name Provider Type    AS Ann-Marie Gr PTA Physical Therapist Assistant               Obj/Interventions     Row Name 05/16/23 0934          Motor Skills    Therapeutic Exercise hip;knee;ankle  -AS     Row Name 05/16/23 0934          Hip (Therapeutic Exercise)    Hip (Therapeutic Exercise) AAROM (active assistive range of motion)  -AS     Hip AAROM (Therapeutic Exercise) bilateral;aBduction;aDduction;external rotation;internal rotation;supine;10 repetitions  -AS     [facility] Name 05/16/23 0934          Knee (Therapeutic Exercise)    Knee (Therapeutic Exercise) AAROM (active assistive range of motion)  -AS     Knee AAROM (Therapeutic Exercise) bilateral;flexion;extension;supine;10 repetitions  -AS     Knee Strengthening (Therapeutic Exercise) bilateral;LAQ (long arc quad);sitting;10 repetitions;heel slides;supine  -AS     Row Name 05/16/23 0934          Ankle (Therapeutic Exercise)    Ankle (Therapeutic Exercise) AROM (active range of " "motion)  -AS     Ankle AROM (Therapeutic Exercise) bilateral;dorsiflexion;plantarflexion;supine;10 repetitions  -AS     [facility] Name 05/16/23 0934          Balance    Dynamic Standing Balance verbal cues;moderate assist;2-person assist  -AS     Position/Device Used, Standing Balance supported;other (see comments)  B UE support  -AS           User Key  (r) = Recorded By, (t) = Taken By, (c) = Cosigned By    Initials Name Provider Type    AS Ann-Marie Gr PTA Physical Therapist Assistant               Goals/Plan    No documentation.                Clinical Impression     Row Name 05/16/23 0935          Pain    Pain Location generalized  -AS     Additional Documentation Pain Scale: FACES Pre/Post-Treatment (Group)  -AS     Row Name 05/16/23 0935          Pain Scale: FACES Pre/Post-Treatment    Pain: FACES Scale, Pretreatment 0-->no hurt  -AS     Posttreatment Pain Rating 2-->hurts little bit  -AS     Row Name 05/16/23 0935          Plan of Care Review    Plan of Care Reviewed With patient  -AS     Progress no change  -AS     Outcome Evaluation Patient completed supine>sit with Min assist x1, c/o B LE weakness and generalized pain  \"all over\". Patient completed bed>recliner with Mod assist x2 and B UE support. Patient with difficulty advancing feet and required increased assist to safely transfer to recliner. Patient continues to be below baseline leevel of function due to weakness, fatigue, pain. balance deficits and decreased safety wareness with all activity. Recommend SNF at D/C.  -AS     [facility] Name 05/16/23 0935          Positioning and Restraints    Pre-Treatment Position in bed  -AS     Post Treatment Position chair  -AS     In Chair reclined;notified nsg;call light within reach;encouraged to call for assist;exit alarm on;waffle cushion;legs elevated;on mechanical lift sling  -AS           User Key  (r) = Recorded By, (t) = Taken By, (c) = Cosigned By    Initials Name Provider Type    AS Ann-Marie Gr PTA " Physical Therapist Assistant               Outcome Measures     Row Name 05/16/23 0940          How much help from another person do you currently need...    Turning from your back to your side while in flat bed without using bedrails? 2  -AS     Moving from lying on back to sitting on the side of a flat bed without bedrails? 2  -AS     Moving to and from a bed to a chair (including a wheelchair)? 2  -AS     Standing up from a chair using your arms (e.g., wheelchair, bedside chair)? 2  -AS     Climbing 3-5 steps with a railing? 1  -AS     To walk in hospital room? 2  -AS     AM-PAC 6 Clicks Score (PT) 11  -AS     Highest level of mobility 4 --> Transferred to chair/commode  -AS     Row Name 05/16/23 0940          Functional Assessment    Outcome Measure Options AM-PAC 6 Clicks Basic Mobility (PT)  -AS           User Key  (r) = Recorded By, (t) = Taken By, (c) = Cosigned By    Initials Name Provider Type    AS Ann-Marie Gr PTA Physical Therapist Assistant                             Physical Therapy Education     Title: PT OT SLP Therapies (In Progress)     Topic: Physical Therapy (In Progress)     Point: Mobility training (In Progress)     Learning Progress Summary           Patient Acceptance, E, NR by AS at 5/16/2023 0940    Acceptance, E, VU,NR by ML at 5/14/2023 1436    Acceptance, E, VU,NR by SD at 5/11/2023 1036   Family Acceptance, E, VU,NR by SD at 5/11/2023 1036                   Point: Home exercise program (In Progress)     Learning Progress Summary           Patient Acceptance, E, NR by AS at 5/16/2023 0940    Acceptance, E, VU,NR by ML at 5/14/2023 1436    Acceptance, E, VU,NR by SD at 5/11/2023 1036   Family Acceptance, E, VU,NR by SD at 5/11/2023 1036                   Point: Body mechanics (In Progress)     Learning Progress Summary           Patient Acceptance, E, NR by AS at 5/16/2023 0940    Acceptance, E, VU,NR by SD at 5/11/2023 1036   Family Acceptance, E, VU,NR by SD at 5/11/2023 1036  "                  Point: Precautions (In Progress)     Learning Progress Summary           Patient Acceptance, E, NR by AS at 5/16/2023 0940    Acceptance, E, VU,NR by ML at 5/14/2023 1436    Acceptance, E, VU,NR by SD at 5/11/2023 1036   Family Acceptance, E, VU,NR by SD at 5/11/2023 1036                               User Key     Initials Effective Dates Name Provider Type Discipline    SD 03/13/23 -  Yudelka Colindres, PT Physical Therapist PT    AS 04/28/23 -  Ann-Marie Gr PTA Physical Therapist Assistant PT    ML 04/22/21 -  Reanna Harman Physical Therapist PT              PT Recommendation and Plan     Plan of Care Reviewed With: patient  Progress: no change  Outcome Evaluation: Patient completed supine>sit with Min assist x1, c/o B LE weakness and generalized pain  \"all over\". Patient completed bed>recliner with Mod assist x2 and B UE support. Patient with difficulty advancing feet and required increased assist to safely transfer to recliner. Patient continues to be below baseline leevel of function due to weakness, fatigue, pain. balance deficits and decreased safety wareness with all activity. Recommend SNF at D/C.     Time Calculation:    PT Charges     Row Name 05/16/23 0940             Time Calculation    Start Time 0835  -AS      PT Received On 05/16/23  -AS      PT Goal Re-Cert Due Date 05/21/23  -AS         Timed Charges    85779 - PT Therapeutic Exercise Minutes 15  -AS      45763 - PT Therapeutic Activity Minutes 8  -AS         Total Minutes    Timed Charges Total Minutes 23  -AS       Total Minutes 23  -AS            User Key  (r) = Recorded By, (t) = Taken By, (c) = Cosigned By    Initials Name Provider Type    AS Ann-Marie Gr PTA Physical Therapist Assistant              Therapy Charges for Today     Code Description Service Date Service Provider Modifiers Qty    98622697192 HC PT THER PROC EA 15 MIN 5/16/2023 Ann-Marie Gr PTA GP 1    90973763106 HC PT THERAPEUTIC ACT EA " 15 MIN 5/16/2023 Ann-Marie Gr, JADE GP 1    41356855686 HC PT THER SUPP EA 15 MIN 5/16/2023 Ann-Marie Gr PTA GP 2          PT G-Codes  Outcome Measure Options: AM-PAC 6 Clicks Basic Mobility (PT)  AM-PAC 6 Clicks Score (PT): 11  AM-PAC 6 Clicks Score (OT): 16       Ann-Marie Gr PTA  5/16/2023

## 2023-05-16 NOTE — PROGRESS NOTES
[facility] Medicine Services  PROGRESS NOTE    Patient Name: Mitzi Eric  : 1935  MRN: [id number]    Date of Admission: 5/10/2023  Primary Care Physician: Provider, No Known    Subjective   Subjective     CC:  Follow-up dementia, dehydration    HPI:  No acute events, is comfortable w/o complaints    ROS:  Gen- No fevers, chills  CV- No chest pain, palpitations  Resp- No cough, dyspnea  GI- No N/V/D, abd pain        Objective   Objective     Vital Signs:   Temp:  [97.2 °F (36.2 °C)-98.5 °F (36.9 °C)] 98.1 °F (36.7 °C)  Heart Rate:  [69-86] 84  Resp:  [16-18] 18  BP: (100-150)/(53-78) 142/75  Flow (L/min):  [2] 2     Physical Exam:  Constitutional: No acute distress, drowsy, frail, chronically ill-appearing  HENT: NCAT, mucous membranes moist  Respiratory: Diminished in bases, respiratory effort normal on 2 L  Cardiovascular: RRR, no murmurs, palpable pedal pulses bilaterally, cap refill brisk   Gastrointestinal: Positive bowel sounds, soft, nontender, nondistended  Musculoskeletal: No BLE edema   Psychiatric: Flat affect, cooperative  Neurologic: Drowsy, moves all extremities, speech clear  Skin: warm, dry, no visible rash    Results Reviewed:  LAB RESULTS:      Lab 05/15/23  0543 23  0452 23  0543 05/10/23  1250   WBC 7.59 8.40 8.35 11.61*   HEMOGLOBIN 12.3 10.9* 10.3* 11.6*   HEMATOCRIT 38.8 33.1* 32.5* 36.0   PLATELETS 229 192 172 203   NEUTROS ABS  --   --   --  8.83*   IMMATURE GRANS (ABS)  --   --   --  0.12*   LYMPHS ABS  --   --   --  0.67*   MONOS ABS  --   --   --  1.21*   EOS ABS  --   --   --  0.70*   MCV 96.8 96.2 97.6* 96.8         Lab 05/15/23  0543 23  0430 23  0543 05/10/23  1250   SODIUM 138 141 141 142   POTASSIUM 4.5 3.5 4.6 4.5   CHLORIDE 101 108* 111* 109*   CO2 24.0 23.0 19.0* 19.0*   ANION GAP 13.0 10.0 11.0 14.0   BUN 23 17 23 40*   CREATININE 0.97 0.85 0.89 1.62*   EGFR 57.0* 66.8 63.2 30.8*   GLUCOSE 81 111* 87 92   CALCIUM 9.2  8.2* 8.2* 9.0         Lab 05/11/23  0543 05/10/23  1250   TOTAL PROTEIN 5.8* 7.3   ALBUMIN 3.5 4.0   GLOBULIN 2.3 3.3   ALT (SGPT) 24 31   AST (SGOT) 30 39*   BILIRUBIN 0.3 0.2   ALK PHOS 114 129*                     Brief Urine Lab Results  (Last result in the past 365 days)      Color   Clarity   Blood   Leuk Est   Nitrite   Protein   CREAT   Urine HCG        05/10/23 1304 Yellow   Clear   Negative   Negative   Negative   Negative                 Microbiology Results Abnormal     None          No radiology results from the last 24 hrs    Results for orders placed during the hospital encounter of 03/17/22    Adult Transthoracic Echo Complete W/ Cont if Necessary Per Protocol    Interpretation Summary  · Left ventricular ejection fraction appears to be 46 - 50%. Left ventricular systolic function is mildly decreased.  · Left ventricular diastolic function is consistent with (grade I) impaired relaxation.  · Mild to moderate mitral regurgitation.  · Mild to moderate tricuspid regurgitation, RVSP 54 mmHg.      Current medications:  Scheduled Meds:acetaminophen, 650 mg, Oral, TID  aspirin, 81 mg, Oral, Daily  budesonide-formoterol, 2 puff, Inhalation, BID - RT  docusate sodium, 100 mg, Oral, BID  donepezil, 5 mg, Oral, Daily  folic acid, 400 mcg, Oral, BID  levothyroxine, 25 mcg, Oral, Q AM  metoprolol succinate XL, 25 mg, Oral, Daily  mirtazapine, 7.5 mg, Oral, Nightly  OXcarbazepine, 600 mg, Oral, BID  pantoprazole, 40 mg, Oral, Daily  pregabalin, 25 mg, Oral, BID  sodium chloride, 10 mL, Intravenous, Q12H  spironolactone, 25 mg, Oral, Daily  torsemide, 30 mg, Oral, Daily      Continuous Infusions:   PRN Meds:.•  albuterol  •  ondansetron  •  [COMPLETED] Insert Peripheral IV **AND** sodium chloride  •  sodium chloride  •  sodium chloride  •  traMADol    Assessment & Plan   Assessment & Plan     Active Hospital Problems    Diagnosis  POA   • **JUAN F (acute kidney injury) [N17.9]  Yes   • Chronic respiratory failure  [J96.10]  Yes   • atrial fibrillation [I48.91]  Yes   • chronic CHF (congestive heart failure) (HCC) [I50.9]  Yes   • Dementia without behavioral disturbance [F03.90]  Yes   • Impaired functional mobility, balance, gait, and endurance [Z74.09]  Yes   • Severe malnutrition (CMS/HCC) [E43]  Yes   • Cognitive decline [R41.89]  Yes   • Hypothyroidism (acquired) [E03.9]  Yes   • Trigeminal neuralgia [G50.0]  Yes   • Delirium, acute [R41.0]  Yes   • Essential hypertension [I10]  Yes   • Fall [W19.XXXA]  Yes      Resolved Hospital Problems   No resolved problems to display.        Brief Hospital Course to date:  Mitzi Eric is a 87 y.o. female with history of CKD, COPD (2L nightly) with known bilateral upper lobe masses, paroxysmal A-fib, dementia, HLD, HTN, hypothyroidism, Trigeminal neuralgia who presented to ED on 5/10/2023  from her assisted living facility due to fall with associated acute renal failure. CT head w/o was negative. PT/OT evaluated and recommended SNF. CM is following for placement.      This patient's problems and plans were partially entered by my partner and updated as appropriate by me 05/16/23.     Fall  - CT Head w/o negative   - PT/OT recommended SNF  -CM following  - nephew visiting different facilities     Acute on Chronic Renal Failure Stage 3--resolved  -Cr 1.62 on admission, Cr 0.85 at last check   -Secondary to dehydration, s/p IVF  -Hold Nephrotoxic medications  -AM labs      Slight Leukocytosis, resolved  - UA negative  - currently on 2L, prior to admission only worse 2L at night     Left Apical Lung mass with fluid-necrotic components/cavitary appearance   Right Upper lobe lung mass   Hx COPD  -Patient/POA (nephew) have been aware of this and not interested in further work-up  -On 2L NC currently, normally on RA during the day and 2L at night per Nephew   - s/p course of Ceftin      Combined systolic and diastolic heart failure  Moderate pulm hypertension  -Compensated, monitored with  IVF   -continue home Demadex/Spironolactone      Paroxysmal atrial fibrillation  -continue home Metoprolol     Hypothyroidism  -Continue Synthroid     Trigeminal neuralgia  -Continue Trileptal and Lyrica     Hypertension  -continue home medications     Dementia  -Continue Aricept     Expected Discharge Location and Transportation: SNF  Expected Discharge   Expected Discharge Date: 5/17/2023; Expected Discharge Time:      DVT prophylaxis:  Mechanical DVT prophylaxis orders are present.     AM-PAC 6 Clicks Score (PT): 11 (05/16/23 3705)    CODE STATUS:   Code Status and Medical Interventions:   Ordered at: 05/10/23 5115     Code Status (Patient has no pulse and is not breathing):    No CPR (Do Not Attempt to Resuscitate)     Medical Interventions (Patient has pulse or is breathing):    Full Support       Ria Howard MD  05/16/23

## 2023-05-16 NOTE — PLAN OF CARE
Goal Outcome Evaluation:         Pt. Resting comfortably in bed. Oxygenation drops when pt. Stands up but returns to 90s when sitting/lying. Pt. Oriented to self. Waiting on placement. VSS on 3l O2. Will continue to monitor.

## 2023-05-16 NOTE — CASE MANAGEMENT/SOCIAL WORK
Continued Stay Note  [facility]     Patient Name: Mitzi Eric  MRN: [id number]  Today's Date: 5/16/2023    Admit Date: 5/10/2023    Plan: To be determined   Discharge Plan     Row Name 05/16/23 1440       Plan    Plan Comments Called [facility] and provided a case management contact number for them because they will be coming over to the hospital to evaluate Mrs. Eric possibly later today or tommorow.               Discharge Codes    No documentation.               Expected Discharge Date and Time     Expected Discharge Date Expected Discharge Time    May 17, 2023             YURI Joseph

## 2023-05-16 NOTE — PLAN OF CARE
Problem: Adult Inpatient Plan of Care  Goal: Plan of Care Review  Outcome: Ongoing, Progressing  Goal: Patient-Specific Goal (Individualized)  Outcome: Ongoing, Progressing  Goal: Absence of Hospital-Acquired Illness or Injury  Outcome: Ongoing, Progressing  Intervention: Identify and Manage Fall Risk  Recent Flowsheet Documentation  Taken 5/15/2023 2000 by Radha Goyal RN  Safety Promotion/Fall Prevention:   activity supervised   assistive device/personal items within reach   safety round/check completed  Intervention: Prevent Skin Injury  Recent Flowsheet Documentation  Taken 5/15/2023 2000 by Radha Goyal RN  Body Position: weight shifting  Skin Protection: transparent dressing maintained  Intervention: Prevent and Manage VTE (Venous Thromboembolism) Risk  Recent Flowsheet Documentation  Taken 5/15/2023 2000 by Radha Goyal RN  Activity Management: activity encouraged  Range of Motion: active ROM (range of motion) encouraged  Intervention: Prevent Infection  Recent Flowsheet Documentation  Taken 5/15/2023 2000 by Radha Goyal RN  Infection Prevention: environmental surveillance performed  Goal: Optimal Comfort and Wellbeing  Outcome: Ongoing, Progressing  Goal: Readiness for Transition of Care  Outcome: Ongoing, Progressing     Problem: Fall Injury Risk  Goal: Absence of Fall and Fall-Related Injury  Outcome: Ongoing, Progressing  Intervention: Identify and Manage Contributors  Recent Flowsheet Documentation  Taken 5/15/2023 2000 by Radha Goyal RN  Medication Review/Management: medications reviewed  Intervention: Promote Injury-Free Environment  Recent Flowsheet Documentation  Taken 5/15/2023 2000 by Radha Goyal RN  Safety Promotion/Fall Prevention:   activity supervised   assistive device/personal items within reach   safety round/check completed     Problem: Skin Injury Risk Increased  Goal: Skin Health and Integrity  Outcome: Ongoing, Progressing  Intervention: Optimize Skin  Protection  Recent Flowsheet Documentation  Taken 5/15/2023 2000 by Radha Goyal, RN  Pressure Reduction Techniques:   frequent weight shift encouraged   weight shift assistance provided  Head of Bed (HOB) Positioning: HOB elevated  Pressure Reduction Devices: pressure-redistributing mattress utilized  Skin Protection: transparent dressing maintained   Goal Outcome Evaluation:

## 2023-05-16 NOTE — PLAN OF CARE
"Goal Outcome Evaluation:  Plan of Care Reviewed With: patient        Progress: no change  Outcome Evaluation: Patient completed supine>sit with Min assist x1, c/o B LE weakness and generalized pain  \"all over\". Patient completed bed>recliner with Mod assist x2 and B UE support. Patient with difficulty advancing feet and required increased assist to safely transfer to recliner. Patient continues to be below baseline leevel of function due to weakness, fatigue, pain. balance deficits and decreased safety wareness with all activity. Recommend SNF at D/C.         "

## 2023-05-17 PROBLEM — R41.0 DELIRIUM, ACUTE: Status: RESOLVED | Noted: 2021-11-29 | Resolved: 2023-05-17
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
## 2023-05-17 PROBLEM — N17.9 AKI (ACUTE KIDNEY INJURY): Status: RESOLVED | Noted: 2021-11-29 | Resolved: 2023-05-17

## 2023-05-17 LAB
ANION GAP SERPL CALCULATED.3IONS-SCNC: 10 MMOL/L (ref 5–15)
BUN SERPL-MCNC: 24 MG/DL (ref 8–23)
BUN/CREAT SERPL: 26.7 (ref 7–25)
CALCIUM SPEC-SCNC: 8.7 MG/DL (ref 8.6–10.5)
CHLORIDE SERPL-SCNC: 105 MMOL/L (ref 98–107)
CO2 SERPL-SCNC: 26 MMOL/L (ref 22–29)
CREAT SERPL-MCNC: 0.9 MG/DL (ref 0.57–1)
EGFRCR SERPLBLD CKD-EPI 2021: 62.4 ML/MIN/1.73
GLUCOSE SERPL-MCNC: 103 MG/DL (ref 65–99)
POTASSIUM SERPL-SCNC: 4.3 MMOL/L (ref 3.5–5.2)
SODIUM SERPL-SCNC: 141 MMOL/L (ref 136–145)

## 2023-05-17 PROCEDURE — 94799 UNLISTED PULMONARY SVC/PX: CPT

## 2023-05-17 PROCEDURE — G0378 HOSPITAL OBSERVATION PER HR: HCPCS

## 2023-05-17 PROCEDURE — 80048 BASIC METABOLIC PNL TOTAL CA: CPT | Performed by: STUDENT IN AN ORGANIZED HEALTH CARE EDUCATION/TRAINING PROGRAM

## 2023-05-17 PROCEDURE — 99232 SBSQ HOSP IP/OBS MODERATE 35: CPT | Performed by: STUDENT IN AN ORGANIZED HEALTH CARE EDUCATION/TRAINING PROGRAM

## 2023-05-17 RX ORDER — MEGESTROL ACETATE 20 MG/1
20 TABLET ORAL DAILY
Status: DISCONTINUED | OUTPATIENT
Start: 2023-05-17 | End: 2023-05-19 | Stop reason: HOSPADM

## 2023-05-17 RX ORDER — TORSEMIDE 10 MG/1
30 TABLET ORAL DAILY
Qty: 30 TABLET | Refills: 0 | Status: SHIPPED | OUTPATIENT
Start: 2023-05-18

## 2023-05-17 RX ADMIN — PANTOPRAZOLE SODIUM 40 MG: 40 TABLET, DELAYED RELEASE ORAL at 08:42

## 2023-05-17 RX ADMIN — BUDESONIDE AND FORMOTEROL FUMARATE DIHYDRATE 2 PUFF: 160; 4.5 AEROSOL RESPIRATORY (INHALATION) at 20:59

## 2023-05-17 RX ADMIN — PREGABALIN 25 MG: 25 CAPSULE ORAL at 08:41

## 2023-05-17 RX ADMIN — METOPROLOL SUCCINATE 25 MG: 25 TABLET, EXTENDED RELEASE ORAL at 08:42

## 2023-05-17 RX ADMIN — Medication 10 ML: at 08:43

## 2023-05-17 RX ADMIN — DONEPEZIL HYDROCHLORIDE 5 MG: 5 TABLET, FILM COATED ORAL at 08:42

## 2023-05-17 RX ADMIN — LEVOTHYROXINE SODIUM 25 MCG: 0.03 TABLET ORAL at 06:27

## 2023-05-17 RX ADMIN — OXCARBAZEPINE 600 MG: 150 TABLET, FILM COATED ORAL at 08:43

## 2023-05-17 RX ADMIN — Medication 10 ML: at 21:34

## 2023-05-17 RX ADMIN — ACETAMINOPHEN 325MG 650 MG: 325 TABLET ORAL at 08:41

## 2023-05-17 RX ADMIN — DOCUSATE SODIUM 100 MG: 100 CAPSULE, LIQUID FILLED ORAL at 21:34

## 2023-05-17 RX ADMIN — ASPIRIN 81 MG: 81 TABLET, COATED ORAL at 08:42

## 2023-05-17 RX ADMIN — FOLIC ACID TAB 400 MCG 400 MCG: 400 TAB at 08:46

## 2023-05-17 RX ADMIN — ACETAMINOPHEN 325MG 650 MG: 325 TABLET ORAL at 18:07

## 2023-05-17 RX ADMIN — MIRTAZAPINE 7.5 MG: 15 TABLET, FILM COATED ORAL at 21:34

## 2023-05-17 RX ADMIN — TORSEMIDE 30 MG: 20 TABLET ORAL at 08:41

## 2023-05-17 RX ADMIN — SPIRONOLACTONE 25 MG: 25 TABLET ORAL at 08:42

## 2023-05-17 RX ADMIN — FOLIC ACID TAB 400 MCG 400 MCG: 400 TAB at 21:34

## 2023-05-17 RX ADMIN — ACETAMINOPHEN 325MG 650 MG: 325 TABLET ORAL at 21:34

## 2023-05-17 RX ADMIN — MEGESTROL ACETATE 20 MG: 20 TABLET ORAL at 18:07

## 2023-05-17 RX ADMIN — DOCUSATE SODIUM 100 MG: 100 CAPSULE, LIQUID FILLED ORAL at 08:42

## 2023-05-17 RX ADMIN — PREGABALIN 25 MG: 25 CAPSULE ORAL at 21:34

## 2023-05-17 RX ADMIN — OXCARBAZEPINE 600 MG: 150 TABLET, FILM COATED ORAL at 21:39

## 2023-05-17 NOTE — DISCHARGE SUMMARY
Discharge postponed, awaiting insurance approval. Added megace for appetite stimulant            [facility] Medicine Services  DISCHARGE SUMMARY    Patient Name: Mitzi Eric  : 1935  MRN: [id number]    Date of Admission: 5/10/2023 12:26 PM  Date of Discharge:  2023  Primary Care Physician: Provider, No Known    Consults     No orders found from 2023 to 2023.          Hospital Course     Presenting Problem:   JUAN F (acute kidney injury) [N17.9]  JUAN F (acute kidney injury) [N17.9]  JUAN F (acute kidney injury) [N17.9]    Active Hospital Problems    Diagnosis  POA   • Chronic respiratory failure [J96.10]  Yes   • atrial fibrillation [I48.91]  Yes   • chronic CHF (congestive heart failure) (HCC) [I50.9]  Yes   • Dementia without behavioral disturbance [F03.90]  Yes   • Impaired functional mobility, balance, gait, and endurance [Z74.09]  Yes   • Severe malnutrition (CMS/HCC) [E43]  Yes   • Cognitive decline [R41.89]  Yes   • Hypothyroidism (acquired) [E03.9]  Yes   • Trigeminal neuralgia [G50.0]  Yes   • Essential hypertension [I10]  Yes      Resolved Hospital Problems    Diagnosis Date Resolved POA   • **JUAN F (acute kidney injury) [N17.9] 2023 Yes   • Delirium, acute [R41.0] 2023 Yes   • Fall [W19.XXXA] 2023 Yes          Hospital Course:  Mitzi Eric is a 87 y.o. female with history of CKD, COPD (2L nightly) with known bilateral upper lobe masses, paroxysmal A-fib, dementia, HLD, HTN, hypothyroidism, Trigeminal neuralgia who presented to ED on 5/10/2023  from her assisted living facility due to fall with associated acute renal failure. CT head w/o was negative. PT/OT evaluated and recommended SNF. CM is following for placement.        Fall  - CT Head w/o negative   - PT/OT recommended SNF       Acute on Chronic Renal Failure Stage 3--resolved  -Cr 1.62 on admission, Cr 0.85 at last check   -Secondary to dehydration, s/p IVF  -Hold Nephrotoxic  medications  -AM labs      Slight Leukocytosis, resolved  - UA negative  - currently on 2L, prior to admission only worse 2L at night     Left Apical Lung mass with fluid-necrotic components/cavitary appearance   Right Upper lobe lung mass   Hx COPD  -Patient/POA (nephew) have been aware of this and not interested in further work-up  -On 2L NC currently, normally on RA during the day and 2L at night per Nephew   - s/p course of Ceftin      Combined systolic and diastolic heart failure  Moderate pulm hypertension  -Compensated, monitored with IVF   -continue home Demadex/Spironolactone      Paroxysmal atrial fibrillation  -continue home Metoprolol     Hypothyroidism  -Continue Synthroid     Trigeminal neuralgia  -Continue Trileptal and Lyrica     Hypertension  -continue home medications     Dementia  -Continue Aricept      Discharge Follow Up Recommendations for outpatient labs/diagnostics:  PCP    Day of Discharge     HPI:   No acute overnight events, resting comfortably    Review of Systems  .[email or web address]      Vital Signs:   Temp:  [98.1 °F (36.7 °C)-99.5 °F (37.5 °C)] 98.3 °F (36.8 °C)  Heart Rate:  [] 86  Resp:  [16-18] 18  BP: (106-145)/(59-88) 110/59  Flow (L/min):  [2-3] 3      Physical Exam:  Constitutional: No acute distress, drowsy, frail, chronically ill-appearing  HENT: NCAT, mucous membranes moist  Respiratory: Diminished in bases, respiratory effort normal on 2 L  Cardiovascular: RRR, no murmurs, palpable pedal pulses bilaterally, cap refill brisk   Gastrointestinal: Positive bowel sounds, soft, nontender, nondistended  Musculoskeletal: No BLE edema   Psychiatric: Flat affect, cooperative  Neurologic: Drowsy, moves all extremities, speech clear  Skin: warm, dry, no visible rash    Pertinent  and/or Most Recent Results     LAB RESULTS:      Lab 05/15/23  0543 05/13/23  0452 05/11/23  0543 05/10/23  1250   WBC 7.59 8.40 8.35 11.61*   HEMOGLOBIN 12.3 10.9* 10.3* 11.6*   HEMATOCRIT 38.8 33.1* 32.5* 36.0    PLATELETS 229 192 172 203   NEUTROS ABS  --   --   --  8.83*   IMMATURE GRANS (ABS)  --   --   --  0.12*   LYMPHS ABS  --   --   --  0.67*   MONOS ABS  --   --   --  1.21*   EOS ABS  --   --   --  0.70*   MCV 96.8 96.2 97.6* 96.8         Lab 05/17/23  0710 05/15/23  0543 05/13/23  0430 05/11/23  0543 05/10/23  1250   SODIUM 141 138 141 141 142   POTASSIUM 4.3 4.5 3.5 4.6 4.5   CHLORIDE 105 101 108* 111* 109*   CO2 26.0 24.0 23.0 19.0* 19.0*   ANION GAP 10.0 13.0 10.0 11.0 14.0   BUN 24* 23 17 23 40*   CREATININE 0.90 0.97 0.85 0.89 1.62*   EGFR 62.4 57.0* 66.8 63.2 30.8*   GLUCOSE 103* 81 111* 87 92   CALCIUM 8.7 9.2 8.2* 8.2* 9.0         Lab 05/11/23  0543 05/10/23  1250   TOTAL PROTEIN 5.8* 7.3   ALBUMIN 3.5 4.0   GLOBULIN 2.3 3.3   ALT (SGPT) 24 31   AST (SGOT) 30 39*   BILIRUBIN 0.3 0.2   ALK PHOS 114 129*                     Brief Urine Lab Results  (Last result in the past 365 days)      Color   Clarity   Blood   Leuk Est   Nitrite   Protein   CREAT   Urine HCG        05/10/23 1304 Yellow   Clear   Negative   Negative   Negative   Negative               Microbiology Results (last 10 days)     ** No results found for the last 240 hours. **          XR Knee 1 or 2 View Right    Result Date: 5/8/2023  XR KNEE 1 OR 2 VW RIGHT Date of Exam: 5/8/2023 12:02 PM EDT Indication: fall/pain Comparison: None available. Findings: No evidence of fracture. No evidence of dislocation. No joint effusion identified. No focal soft tissue abnormality is identified. Mild osteoarthritic changes are present, most pronounced within the medial compartment.     Impression: No acute abnormality Electronically Signed: Jaxson Murray  5/8/2023 12:25 PM EDT  Workstation ID: [id number]    XR Tibia Fibula 2 View Right    Result Date: 5/8/2023  XR TIBIA FIBULA 2 VW RIGHT Date of Exam: 5/8/2023 12:03 PM EDT Indication: fall/pain Comparison: None available. FINDINGS:  No fracture is identified. Osseous structures appear demineralized. Alignment  appears within normal limits. There is calcific atherosclerosis.     1.No radiographic findings of acute osseous tibia or fibula abnormality. 2.Osseous demineralization and calcific atherosclerosis. Electronically Signed: Alejandro Hollisvic  5/8/2023 12:28 PM EDT  Workstation ID: [id number]    CT Head Without Contrast    Result Date: 5/10/2023  CT HEAD WO CONTRAST Date of Exam: 5/10/2023 1:52 PM EDT Indication: multiple falls with headache and dementia. Comparison: May 8, 2023 Technique: Axial CT images were obtained of the head without contrast administration.  Reconstructed coronal and sagittal images were also obtained. Automated exposure control and iterative construction methods were used. Findings: There are changes from left frontal craniotomy. No acute intracranial hemorrhage or extra-axial collection is identified. The ventricles are stable in caliber, with no evidence of mass effect or midline shift. The basal cisterns appear patent. The gray-white differentiation appears preserved. Degenerative changes appear stable. No acute calvarial fracture is identified. The paranasal sinuses and mastoid air cells are well-aerated.     Impression: No acute intracranial process or calvarial fracture identified. Electronically Signed: Jonas Dixon  5/10/2023 2:10 PM EDT  Workstation ID: [id number]    CT Head Without Contrast    Result Date: 5/8/2023  CT HEAD WO CONTRAST Date of Exam: 5/8/2023 1:14 PM EDT Indication: fall. Comparison: CT scan of the head from March 29, 2022 Technique: Axial CT images were obtained of the head without contrast administration.  Reconstructed coronal and sagittal images were also obtained. Automated exposure control and iterative construction methods were used. Findings: There is left frontal encephalomalacia beneath a left frontal craniotomy defect. There is a stable right anterior falcine calcified meningioma of about 1.1 cm. There is no intraparenchymal mass, mass effect or midline shift.  There are no areas of acute hemorrhage. There are no abnormal extra-axial fluid collections. The paranasal sinuses and mastoid air cells are clear.     Impression: Stable left frontal encephalomalacia with overlying changes from craniotomy. There is atrophy. There is no acute intracranial process. Electronically Signed: Jaxson Murray  5/8/2023 1:30 PM EDT  Workstation ID: [id number]    CT Cervical Spine Without Contrast    Result Date: 5/8/2023  CT CERVICAL SPINE WO CONTRAST Date of Exam: 5/8/2023 1:14 PM EDT Indication: fall. Comparison: CT scan of the neck dated February 17, 2023 Technique: Axial CT images were obtained of the cervical spine without contrast administration.  Reconstructed coronal and sagittal images were also obtained. Automated exposure control and iterative construction methods were used. Findings: There is a grade 1 anterolisthesis of C4 on C5 measuring 1 mm. There is grade 1 anterolisthesis of C5 on C6 measuring 2 mm. There is grade 1 anterolisthesis of C7 on T1 measuring 1-2 mm. The slight retrolisthesis of C3 on C4 is less apparent on today's exam. There is disc space narrowing at C3-C4 and C6-C7 consistent with the degenerative disc disease. There is multilevel endplate spurring and endplate sclerosis, most prominent at C3-C4 and C6-C7 consistent with the degenerative disc disease. There is multilevel bilateral facet arthritic change.  There is no acute cervical fracture. There are degenerative changes at the atlantoaxial articulation. There are varying degrees of neural foraminal narrowing secondary to endplate spurring and facet arthritis. No significant spinal stenosis is felt to be  present. There are atherosclerotic vascular calcifications at the carotid bifurcations bilaterally. There is an abnormal appearance of the bilateral upper lobes. There is an incompletely visualized left upper lobe consolidation. There is a spiculated irregular area in the medial right upper lobe. Please refer to  previous CT scan of the chest from April 14, 2023.     Impression: Multilevel degenerative disease of the cervical spine. No acute cervical spine abnormality. Bilateral upper lobe consolidations please see recent CT chest. Electronically Signed: Jaxson Murray  5/8/2023 1:48 PM EDT  Workstation ID: [id number]    CT Lumbar Spine Without Contrast    Result Date: 5/8/2023  CT LUMBAR SPINE WO CONTRAST, CT PELVIS WO CONTRAST Date of Exam: 5/8/2023 1:14 PM EDT Indication: fall. Comparison: None available. Technique: Axial CT images were obtained of the lumbar spine and pelvis without contrast administration.  Reconstructed coronal and sagittal images were also obtained. Automated exposure control and iterative construction methods were used. Findings: Lumbar spine: There is mild smooth leftward curvature of the lumbar spine. There is 2 mm degenerative anterolisthesis of L4 on L5. No traumatic subluxation. There is transitional lumbosacral anatomy with left-sided sacralization of L5. The bones are demineralized. No acute fracture. The vertebral body heights are preserved. There is moderate to severe degenerative disc disease at L3-L4 with intervertebral disc height loss, vacuum disc base phenomenon, and degenerative endplate changes. There is more mild appearing multilevel degenerative disc disease at the remaining levels. There is mild to moderate facet arthropathy in the lower lumbar spine. There is likely moderate spinal canal stenosis at L4-L5. The paravertebral soft tissues are unremarkable. No acute findings in the partially imaged portions of the posterior abdomen. Pelvis: Unremarkable appearance of a partially imaged right hip cephalomedullary construct, without hardware fracture or perihardware lucency or periprosthetic fracture. No evidence of acute fracture or traumatic malalignment. The hip joints are congruent with mild degenerative change. The pubic symphysis and sacroiliac joints are congruent with mild  degenerative change. No evidence of conspicuous soft tissue contusion or pelvic hematoma. No acute findings in the lower abdomen or within the pelvis. There is severe colonic diverticulosis without evidence of diverticulitis. Dystrophic uterine calcifications are compatible with calcified uterine fibroids.     Impression: No acute fracture or traumatic malalignment in the lumbar spine or pelvis. Unremarkable appearance of partially imaged right hip cephalomedullary construct. Electronically Signed: Carlos Dickerson  5/8/2023 2:03 PM EDT  Workstation ID: [id number]    CT Pelvis Without Contrast    Result Date: 5/8/2023  CT LUMBAR SPINE WO CONTRAST, CT PELVIS WO CONTRAST Date of Exam: 5/8/2023 1:14 PM EDT Indication: fall. Comparison: None available. Technique: Axial CT images were obtained of the lumbar spine and pelvis without contrast administration.  Reconstructed coronal and sagittal images were also obtained. Automated exposure control and iterative construction methods were used. Findings: Lumbar spine: There is mild smooth leftward curvature of the lumbar spine. There is 2 mm degenerative anterolisthesis of L4 on L5. No traumatic subluxation. There is transitional lumbosacral anatomy with left-sided sacralization of L5. The bones are demineralized. No acute fracture. The vertebral body heights are preserved. There is moderate to severe degenerative disc disease at L3-L4 with intervertebral disc height loss, vacuum disc base phenomenon, and degenerative endplate changes. There is more mild appearing multilevel degenerative disc disease at the remaining levels. There is mild to moderate facet arthropathy in the lower lumbar spine. There is likely moderate spinal canal stenosis at L4-L5. The paravertebral soft tissues are unremarkable. No acute findings in the partially imaged portions of the posterior abdomen. Pelvis: Unremarkable appearance of a partially imaged right hip cephalomedullary construct, without  hardware fracture or perihardware lucency or periprosthetic fracture. No evidence of acute fracture or traumatic malalignment. The hip joints are congruent with mild degenerative change. The pubic symphysis and sacroiliac joints are congruent with mild degenerative change. No evidence of conspicuous soft tissue contusion or pelvic hematoma. No acute findings in the lower abdomen or within the pelvis. There is severe colonic diverticulosis without evidence of diverticulitis. Dystrophic uterine calcifications are compatible with calcified uterine fibroids.     Impression: No acute fracture or traumatic malalignment in the lumbar spine or pelvis. Unremarkable appearance of partially imaged right hip cephalomedullary construct. Electronically Signed: Carlos Dickerson  5/8/2023 2:03 PM EDT  Workstation ID: [id number]              Results for orders placed during the hospital encounter of 03/17/22    Adult Transthoracic Echo Complete W/ Cont if Necessary Per Protocol    Interpretation Summary  · Left ventricular ejection fraction appears to be 46 - 50%. Left ventricular systolic function is mildly decreased.  · Left ventricular diastolic function is consistent with (grade I) impaired relaxation.  · Mild to moderate mitral regurgitation.  · Mild to moderate tricuspid regurgitation, RVSP 54 mmHg.      Plan for Follow-up of Pending Labs/Results: f/u w PCP    Discharge Details        Discharge Medications      Changes to Medications      Instructions Start Date   aspirin 81 MG EC tablet  What changed: additional instructions   81 mg, Oral, Daily      docusate sodium 100 MG capsule  What changed: additional instructions   100 mg, Oral, 2 Times Daily      Symbicort 80-4.5 MCG/ACT inhaler  Generic drug: budesonide-formoterol  What changed: See the new instructions.   INHALE 2 PUFF(S) BY MOUTH TWO TIMES A DAY FOR 30 DAYS      torsemide 10 MG tablet  Commonly known as: DEMADEX  What changed:   · medication strength  · how much to  take   30 mg, Oral, Daily   Start Date: May 18, 2023        Continue These Medications      Instructions Start Date   acetaminophen 325 MG tablet  Commonly known as: TYLENOL   650 mg, Oral, 3 Times Daily, OTC      albuterol sulfate  (90 Base) MCG/ACT inhaler  Commonly known as: PROVENTIL HFA;VENTOLIN HFA;PROAIR HFA   2 puffs, Inhalation, Every 4 Hours PRN      cetirizine 10 MG tablet  Commonly known as: zyrTEC   10 mg, Oral, Daily, OTC      donepezil 5 MG tablet  Commonly known as: Aricept   5 mg, Oral, Every Morning      folic acid 400 MCG tablet  Commonly known as: FOLVITE   400 mcg, Oral, 2 Times Daily, OTC      levothyroxine 25 MCG tablet  Commonly known as: SYNTHROID, LEVOTHROID   25 mcg, Oral, Every Early Morning      metoprolol succinate XL 25 MG 24 hr tablet  Commonly known as: TOPROL-XL   25 mg, Oral, Daily      OXcarbazepine 600 MG tablet  Commonly known as: TRILEPTAL   600 mg, Oral, 2 Times Daily      pantoprazole 20 MG EC tablet  Commonly known as: PROTONIX   20 mg, Oral, Daily      pregabalin 25 MG capsule  Commonly known as: LYRICA   25 mg, Oral, 2 Times Daily      spironolactone 25 MG tablet  Commonly known as: ALDACTONE   25 mg, Oral, Daily      traMADol 50 MG tablet  Commonly known as: ULTRAM   50 mg, Oral, 2 Times Daily PRN      traZODone 150 MG tablet  Commonly known as: DESYREL   150 mg, Oral, Nightly PRN         Stop These Medications    Biofreeze 4 % gel  Generic drug: Menthol (Topical Analgesic)     ENSURE PO     potassium chloride 10 MEQ CR tablet     QUEtiapine 25 MG tablet  Commonly known as: SEROquel            Allergies   Allergen Reactions   • Bee Pollen Unknown - Low Severity   • Lorazepam Delirium         Discharge Disposition:  Skilled Nursing Facility (DC - External)SNF    Diet:  Hospital:  Diet Order   Procedures   • Diet: Regular/House Diet; Texture: Regular Texture (IDDSI 7); Fluid Consistency: Thin (IDDSI 0)       Activity:  As tolerated    Restrictions or Other  Recommendations:  none       CODE STATUS:    Code Status and Medical Interventions:   Ordered at: 05/10/23 1451     Code Status (Patient has no pulse and is not breathing):    No CPR (Do Not Attempt to Resuscitate)     Medical Interventions (Patient has pulse or is breathing):    Full Support       Future Appointments   Date Time Provider Department Center   7/21/2023  3:00 PM RAD TECH [facility]   7/21/2023  3:30 PM Jennifer Ace MD [facility]   8/2/2023  8:00 AM Ann-Marie Goodwin APRN [facility]   10/17/2023  1:00 PM Lauren Pina APRN [facility]                 Ria Howard MD  05/17/23      Time Spent on Discharge:  I spent  45  minutes on this discharge activity which included: face-to-face encounter with the patient, reviewing the data in the system, coordination of the care with the nursing staff as well as consultants, documentation, and entering orders.

## 2023-05-17 NOTE — PLAN OF CARE
Goal Outcome Evaluation:      Pt. Resting in bed. She got up to chair for a few hours by request. Pt. Is more alert and oriented to self. Intermittent confusion with orientation to situation and place. Disoriented to time. No c/o pain at this time. VSS on 3L O2. Will continue to monitor for shift.

## 2023-05-17 NOTE — CASE MANAGEMENT/SOCIAL WORK
Continued Stay Note  [facility]     Patient Name: Mitzi Eric  MRN: [id number]  Today's Date: 5/17/2023    Admit Date: 5/10/2023    Plan: To be determined   Discharge Plan     Row Name 05/17/23 1434       Plan    Plan Comments [place] is working on insurance approval and when the insurance is approved an ambulance can transport to [place] possibly on Thursday.    Row Name 05/17/23 5802       Plan    Plan Comments Case management spoke with [place] and they have said that they are able to accept the patient and the patients nephew is agreeable to this option and when precert is approved the patient will be able to go to [place] by ambulance.               Discharge Codes    No documentation.               Expected Discharge Date and Time     Expected Discharge Date Expected Discharge Time    May 17, 2023             YURI Joseph

## 2023-05-17 NOTE — CASE MANAGEMENT/SOCIAL WORK
Continued Stay Note  [facility]     Patient Name: Mitzi Eric  MRN: [id number]  Today's Date: 5/17/2023    Admit Date: 5/10/2023    Plan: To be determined   Discharge Plan     Row Name 05/17/23 1218       Plan    Plan Comments Case management spoke with [place] and they have said that they are able to accept the patient and the patients nephew is agreeable to this option and when precert is approved the patient will be able to go to [place] by ambulance.               Discharge Codes    No documentation.               Expected Discharge Date and Time     Expected Discharge Date Expected Discharge Time    May 17, 2023             YURI Joseph

## 2023-05-18 LAB
FLUAV RNA RESP QL NAA+PROBE: NOT DETECTED
FLUBV RNA RESP QL NAA+PROBE: NOT DETECTED
SARS-COV-2 RNA RESP QL NAA+PROBE: NOT DETECTED

## 2023-05-18 PROCEDURE — 99232 SBSQ HOSP IP/OBS MODERATE 35: CPT | Performed by: NURSE PRACTITIONER

## 2023-05-18 PROCEDURE — G0378 HOSPITAL OBSERVATION PER HR: HCPCS

## 2023-05-18 PROCEDURE — 97535 SELF CARE MNGMENT TRAINING: CPT

## 2023-05-18 PROCEDURE — 94799 UNLISTED PULMONARY SVC/PX: CPT

## 2023-05-18 PROCEDURE — 87636 SARSCOV2 & INF A&B AMP PRB: CPT | Performed by: NURSE PRACTITIONER

## 2023-05-18 PROCEDURE — 97530 THERAPEUTIC ACTIVITIES: CPT

## 2023-05-18 RX ADMIN — ACETAMINOPHEN 325MG 650 MG: 325 TABLET ORAL at 17:31

## 2023-05-18 RX ADMIN — METOPROLOL SUCCINATE 25 MG: 25 TABLET, EXTENDED RELEASE ORAL at 08:20

## 2023-05-18 RX ADMIN — Medication 10 ML: at 08:20

## 2023-05-18 RX ADMIN — DOCUSATE SODIUM 100 MG: 100 CAPSULE, LIQUID FILLED ORAL at 22:15

## 2023-05-18 RX ADMIN — ACETAMINOPHEN 325MG 650 MG: 325 TABLET ORAL at 08:20

## 2023-05-18 RX ADMIN — ACETAMINOPHEN 325MG 650 MG: 325 TABLET ORAL at 22:16

## 2023-05-18 RX ADMIN — OXCARBAZEPINE 600 MG: 150 TABLET, FILM COATED ORAL at 22:16

## 2023-05-18 RX ADMIN — MEGESTROL ACETATE 20 MG: 20 TABLET ORAL at 08:20

## 2023-05-18 RX ADMIN — FOLIC ACID TAB 400 MCG 400 MCG: 400 TAB at 08:20

## 2023-05-18 RX ADMIN — ASPIRIN 81 MG: 81 TABLET, COATED ORAL at 08:20

## 2023-05-18 RX ADMIN — BUDESONIDE AND FORMOTEROL FUMARATE DIHYDRATE 2 PUFF: 160; 4.5 AEROSOL RESPIRATORY (INHALATION) at 10:00

## 2023-05-18 RX ADMIN — BUDESONIDE AND FORMOTEROL FUMARATE DIHYDRATE 2 PUFF: 160; 4.5 AEROSOL RESPIRATORY (INHALATION) at 19:18

## 2023-05-18 RX ADMIN — SPIRONOLACTONE 25 MG: 25 TABLET ORAL at 08:20

## 2023-05-18 RX ADMIN — PREGABALIN 25 MG: 25 CAPSULE ORAL at 08:20

## 2023-05-18 RX ADMIN — MIRTAZAPINE 7.5 MG: 15 TABLET, FILM COATED ORAL at 22:16

## 2023-05-18 RX ADMIN — FOLIC ACID TAB 400 MCG 400 MCG: 400 TAB at 22:16

## 2023-05-18 RX ADMIN — DONEPEZIL HYDROCHLORIDE 5 MG: 5 TABLET, FILM COATED ORAL at 08:20

## 2023-05-18 RX ADMIN — PANTOPRAZOLE SODIUM 40 MG: 40 TABLET, DELAYED RELEASE ORAL at 08:20

## 2023-05-18 RX ADMIN — LEVOTHYROXINE SODIUM 25 MCG: 0.03 TABLET ORAL at 06:54

## 2023-05-18 RX ADMIN — TORSEMIDE 30 MG: 20 TABLET ORAL at 08:19

## 2023-05-18 RX ADMIN — PREGABALIN 25 MG: 25 CAPSULE ORAL at 22:16

## 2023-05-18 RX ADMIN — Medication 10 ML: at 22:16

## 2023-05-18 RX ADMIN — OXCARBAZEPINE 600 MG: 150 TABLET, FILM COATED ORAL at 08:20

## 2023-05-18 RX ADMIN — DOCUSATE SODIUM 100 MG: 100 CAPSULE, LIQUID FILLED ORAL at 08:20

## 2023-05-18 NOTE — PROGRESS NOTES
[facility] Medicine Services  PROGRESS NOTE    Patient Name: Mitzi Eric  : 1935  MRN: [id number]    Date of Admission: 5/10/2023  Primary Care Physician: Provider, No Known    Subjective   Subjective     CC:  Follow-up dementia, dehydration    HPI:  No issues overnight  No complaints    ROS:  Gen- No fevers, chills  CV- No chest pain, palpitations  Resp- No cough, dyspnea  GI- No N/V/D, abd pain      Objective   Objective     Vital Signs:   Temp:  [97.6 °F (36.4 °C)-98.3 °F (36.8 °C)] 98.3 °F (36.8 °C)  Heart Rate:  [75-90] 83  Resp:  [18] 18  BP: ()/() 125/64  Flow (L/min):  [2-4] 2     Physical Exam:  Constitutional: No acute distress, awake, alert, up in chair  HENT: NCAT, mucous membranes moist  Respiratory: Clear to auscultation bilaterally, respiratory effort normal   Cardiovascular: RRR, no murmurs, rubs, or gallops  Gastrointestinal: Positive bowel sounds, soft, nontender, nondistended  Musculoskeletal: No bilateral ankle edema  Psychiatric: Appropriate affect, cooperative, more alert than when I saw last week  Neurologic: Oriented x 2, OJ, speech clear  Skin: No rashes noted      Results Reviewed:  LAB RESULTS:      Lab 05/15/23  0543 23  0452   WBC 7.59 8.40   HEMOGLOBIN 12.3 10.9*   HEMATOCRIT 38.8 33.1*   PLATELETS 229 192   MCV 96.8 96.2         Lab 23  0710 05/15/23  0543 23  0430   SODIUM 141 138 141   POTASSIUM 4.3 4.5 3.5   CHLORIDE 105 101 108*   CO2 26.0 24.0 23.0   ANION GAP 10.0 13.0 10.0   BUN 24* 23 17   CREATININE 0.90 0.97 0.85   EGFR 62.4 57.0* 66.8   GLUCOSE 103* 81 111*   CALCIUM 8.7 9.2 8.2*                         Brief Urine Lab Results  (Last result in the past 365 days)      Color   Clarity   Blood   Leuk Est   Nitrite   Protein   CREAT   Urine HCG        05/10/23 1304 Yellow   Clear   Negative   Negative   Negative   Negative                 Microbiology Results Abnormal     None          No radiology results from  the last 24 hrs    Results for orders placed during the hospital encounter of 03/17/22    Adult Transthoracic Echo Complete W/ Cont if Necessary Per Protocol    Interpretation Summary  · Left ventricular ejection fraction appears to be 46 - 50%. Left ventricular systolic function is mildly decreased.  · Left ventricular diastolic function is consistent with (grade I) impaired relaxation.  · Mild to moderate mitral regurgitation.  · Mild to moderate tricuspid regurgitation, RVSP 54 mmHg.      Current medications:  Scheduled Meds:acetaminophen, 650 mg, Oral, TID  aspirin, 81 mg, Oral, Daily  budesonide-formoterol, 2 puff, Inhalation, BID - RT  docusate sodium, 100 mg, Oral, BID  donepezil, 5 mg, Oral, Daily  folic acid, 400 mcg, Oral, BID  levothyroxine, 25 mcg, Oral, Q AM  megestrol, 20 mg, Oral, Daily  metoprolol succinate XL, 25 mg, Oral, Daily  mirtazapine, 7.5 mg, Oral, Nightly  OXcarbazepine, 600 mg, Oral, BID  pantoprazole, 40 mg, Oral, Daily  pregabalin, 25 mg, Oral, BID  sodium chloride, 10 mL, Intravenous, Q12H  spironolactone, 25 mg, Oral, Daily  torsemide, 30 mg, Oral, Daily      Continuous Infusions:   PRN Meds:.•  albuterol  •  ondansetron  •  [COMPLETED] Insert Peripheral IV **AND** sodium chloride  •  sodium chloride  •  sodium chloride  •  traMADol    Assessment & Plan   Assessment & Plan     Active Hospital Problems    Diagnosis  POA   • Chronic respiratory failure [J96.10]  Yes   • atrial fibrillation [I48.91]  Yes   • chronic CHF (congestive heart failure) (HCC) [I50.9]  Yes   • Dementia without behavioral disturbance [F03.90]  Yes   • Impaired functional mobility, balance, gait, and endurance [Z74.09]  Yes   • Severe malnutrition (CMS/HCC) [E43]  Yes   • Cognitive decline [R41.89]  Yes   • Hypothyroidism (acquired) [E03.9]  Yes   • Trigeminal neuralgia [G50.0]  Yes   • Essential hypertension [I10]  Yes      Resolved Hospital Problems    Diagnosis Date Resolved POA   • **JUAN F (acute kidney injury)  [N17.9] 05/17/2023 Yes   • Delirium, acute [R41.0] 05/17/2023 Yes   • Fall [W19.XXXA] 05/17/2023 Yes        Brief Hospital Course to date:  Mitzi Eric is a 87 y.o. female with history of CKD, COPD (2L nightly) with known bilateral upper lobe masses, paroxysmal A-fib, dementia, HLD, HTN, hypothyroidism, Trigeminal neuralgia who presented to ED on 5/10/2023  from her assisted living facility due to fall with associated acute renal failure. CT head w/o was negative. PT/OT evaluated and recommended SNF. CM is following for placement.         Fall  - CT Head w/o negative   - PT/OT recommended SNF  -CM following  - nephew visiting different facilities     Acute on Chronic Renal Failure Stage 3--resolved  -Cr 1.62 on admission, Cr 0.9 at last check   -Secondary to dehydration, s/p IVF  -Hold Nephrotoxic medications     Slight Leukocytosis, resolved  - UA negative  - currently on 2L, prior to admission only worse 2L at night     Left Apical Lung mass with fluid-necrotic components/cavitary appearance   Right Upper lobe lung mass   Hx COPD  -Patient/POA (nephew) have been aware of this and not interested in further work-up  -On 2L NC currently, normally on RA during the day and 2L at night per Nephew.  Will attempt to wean, discussed with nurse  - s/p course of Ceftin      Combined systolic and diastolic heart failure  Moderate pulm hypertension  -Compensated, monitored with IVF   -continue home Demadex/Spironolactone      Paroxysmal atrial fibrillation  -continue home Metoprolol     Hypothyroidism  -Continue Synthroid     Trigeminal neuralgia  -Continue Trileptal and Lyrica     Hypertension  -continue home medications     Dementia  -Continue Aricept     Expected Discharge Location and Transportation: SNF pending insurance approval  Expected Discharge   Expected Discharge Date: 5/19/2023; Expected Discharge Time:  1:00 PM       DVT prophylaxis:  Mechanical DVT prophylaxis orders are present.     AM-PAC 6 Clicks Score  (PT): 11 (05/17/23 0800)    CODE STATUS:   Code Status and Medical Interventions:   Ordered at: 05/10/23 5011     Code Status (Patient has no pulse and is not breathing):    No CPR (Do Not Attempt to Resuscitate)     Medical Interventions (Patient has pulse or is breathing):    Full Support       Zoie Price, APRN  05/18/23

## 2023-05-18 NOTE — PLAN OF CARE
Goal Outcome Evaluation:  Plan of Care Reviewed With: patient        Progress: improving  Outcome Evaluation: OT promoted oob activity with pt c/o neck pain.  OT provided massage and ROM to neck and shooulders to alleviate discomfort with pt reporting improvement.  She completed bed mob with min assist, sts with min assist, dep for incontinent bowel care.  Pt completed multiple sts and able to side step with multiple cues to head of bed.

## 2023-05-18 NOTE — THERAPY TREATMENT NOTE
Patient Name: Mitzi Eric  : 1935    MRN: [id number]                              Today's Date: 2023       Admit Date: 5/10/2023    Visit Dx:     ICD-10-CM ICD-9-CM   1. JUAN F (acute kidney injury)  N17.9 584.9   2. Declining functional status  R53.81 799.3   3. Frequent falls  R29.6 V15.88   4. Severe dementia, unspecified dementia type, unspecified whether behavioral, psychotic, or mood disturbance or anxiety  F03.C0 294.20   5. Centrilobular emphysema  J43.2 492.8   6. Impaired functional mobility, balance, gait, and endurance  Z74.09 V49.89     Patient Active Problem List   Diagnosis   • UTI (urinary tract infection), bacterial   • Essential hypertension   • Cognitive decline   • Trigeminal neuralgia   • Hypothyroidism (acquired)   • Lung mass   • Influenza A   • Severe malnutrition (CMS/HCC)   • Dementia without behavioral disturbance   • History of craniotomy   • Impaired functional mobility, balance, gait, and endurance   • COPD ex with volume overload   • chronic CHF (congestive heart failure) (HCC)   • Thrombocytopenia   • Elevated transaminase level   • atrial fibrillation   • COPD with acute exacerbation   • Acute on chronic respiratory failure with hypoxemia   • Chronic respiratory failure   • Underweight   • Moderate malnutrition (CMS/HCC)   • Centrilobular emphysema   • Essential tremor   • Pneumonia of both upper lobes due to infectious organism     Past Medical History:   Diagnosis Date   • A-fib    • Ataxic gait    • Chronic kidney disease, stage 3    • COPD (chronic obstructive pulmonary disease)    • Dementia    • Disease of thyroid gland    • Essential tremor 2022   • Heart failure    • Hyperlipidemia    • Hypertension    • Shingles      Past Surgical History:   Procedure Laterality Date   • BRAIN TUMOR EXCISION      BENIGN   • FRACTURE SURGERY     • TRIGGER FINGER RELEASE        General Information     Row Name 23 0945          OT Time and Intention    Document Type  therapy note (daily note)  -     Mode of Treatment occupational therapy  -     Row Name 05/18/23 0945          General Information    Patient Profile Reviewed yes  -     Existing Precautions/Restrictions fall;oxygen therapy device and L/min;other (see comments)  dementia  -     Row Name 05/18/23 0945          Cognition    Orientation Status (Cognition) oriented to;person;disoriented to;place;situation  -     Row Name 05/18/23 0945          Safety Issues, Functional Mobility    Impairments Affecting Function (Mobility) cognition;balance;endurance/activity tolerance;strength  -           User Key  (r) = Recorded By, (t) = Taken By, (c) = Cosigned By    Initials Name Provider Type     Jody Townsend OT Occupational Therapist                 Mobility/ADL's     Row Name 05/18/23 0945          Bed Mobility    Bed Mobility rolling right;rolling left;scooting/bridging;supine-sit;sit-supine  -     Rolling Left [place] (Bed Mobility) minimum assist (75% patient effort);nonverbal cues (demo/gesture);verbal cues  -     Rolling Right [place] (Bed Mobility) verbal cues;nonverbal cues (demo/gesture);minimum assist (75% patient effort)  -     Scooting/Bridging [place] (Bed Mobility) verbal cues;nonverbal cues (demo/gesture);minimum assist (75% patient effort)  -     Supine-Sit [place] (Bed Mobility) verbal cues;nonverbal cues (demo/gesture);minimum assist (75% patient effort)  -     Sit-Supine [place] (Bed Mobility) verbal cues;nonverbal cues (demo/gesture);minimum assist (75% patient effort)  -     Assistive Device (Bed Mobility) bed rails;head of bed elevated  -     Row Name 05/18/23 0945          Transfers    Transfers sit-stand transfer  -     Row Name 05/18/23 0945          Sit-Stand Transfer    Sit-Stand [place] (Transfers) verbal cues;minimum assist (75% patient effort)  -     Row Name 05/18/23 0945          Functional Mobility    Functional Mobility- Ind. Level  minimum assist (75% patient effort);verbal cues required  -SW     Functional Mobility- Comment side stepped to head of bed  -     Row Name 05/18/23 0945          Activities of Daily Living    BADL Assessment/Intervention toileting;lower body dressing  -     Row Name 05/18/23 0945          Lower Body Dressing Assessment/Training    [place] Level (Lower Body Dressing) don;socks;dependent (less than 25% patient effort)  -     Position (Lower Body Dressing) edge of bed sitting  -     Row Name 05/18/23 0945          Toileting Assessment/Training    [place] Level (Toileting) toileting skills;adjust/manage clothing;change pad/brief;perform perineal hygiene;dependent (less than 25% patient effort)  -SW     Position (Toileting) supine  -           User Key  (r) = Recorded By, (t) = Taken By, (c) = Cosigned By    Initials Name Provider Type    Jody Garza OT Occupational Therapist               Obj/Interventions     Row Name 05/18/23 0945          Balance    Balance Assessment sitting static balance;sitting dynamic balance;sit to stand dynamic balance;standing static balance  -SW     Static Sitting Balance standby assist  -SW     Dynamic Sitting Balance minimal assist  -SW     Position, Sitting Balance supported;unsupported;sitting edge of bed  -SW     Sit to Stand Dynamic Balance verbal cues;non-verbal cues (demo/gesture);minimal assist  -SW     Static Standing Balance minimal assist;non-verbal cues (demo/gesture);verbal cues  -SW     Position/Device Used, Standing Balance supported  -SW     Balance Interventions sitting;standing;sit to stand;supported;static;dynamic  -           User Key  (r) = Recorded By, (t) = Taken By, (c) = Cosigned By    Initials Name Provider Type    Jody Garza OT Occupational Therapist               Goals/Plan    No documentation.                Clinical Impression     Row Name 05/18/23 0945          Pain Assessment    Pretreatment Pain Rating 10/10  -      Posttreatment Pain Rating 8/10  -SW     Pain Location posterior  -SW     Pain Location - neck  -SW     Pain Intervention(s) Back rub;Repositioned;Massage  -SW     Row Name 05/18/23 0945          Plan of Care Review    Plan of Care Reviewed With patient  -SW     Progress improving  -SW     Outcome Evaluation OT promoted oob activity with pt c/o neck pain.  OT provided massage and ROM to neck and shooulders to alleviate discomfort with pt reporting improvement.  She completed bed mob with min assist, sts with min assist, dep for incontinent bowel care.  Pt completed multiple sts and able to side step with multiple cues to head of bed.  -SW     Row Name 05/18/23 0945          Vital Signs    Pre Systolic BP Rehab 136  -SW     Pre Treatment Diastolic BP 65  -SW     Pretreatment Heart Rate (beats/min) 81  -SW     Pre SpO2 (%) 100  -SW     O2 Delivery Pre Treatment supplemental O2  -SW     O2 Delivery Intra Treatment supplemental O2  -SW     O2 Delivery Post Treatment supplemental O2  -SW     Pre Patient Position Supine  -SW     Intra Patient Position Sitting  -SW     Post Patient Position Supine  -SW     Row Name 05/18/23 0945          Positioning and Restraints    Pre-Treatment Position in bed  -SW     Post Treatment Position bed  -SW     In Bed notified nsg;side lying right;call light within reach;encouraged to call for assist;exit alarm on;side rails up x3;pillow between legs  -SW           User Key  (r) = Recorded By, (t) = Taken By, (c) = Cosigned By    Initials Name Provider Type    Jody Garza OT Occupational Therapist               Outcome Measures     Row Name 05/18/23 1349          How much help from another is currently needed...    Putting on and taking off regular lower body clothing? 1  -SW     Bathing (including washing, rinsing, and drying) 2  -SW     Toileting (which includes using toilet bed pan or urinal) 1  -SW     Putting on and taking off regular upper body clothing 3  -SW     Taking care of  personal grooming (such as brushing teeth) 3  -     Eating meals 4  -     AM-PAC 6 Clicks Score (OT) 14  -     Row Name 05/18/23 1029          Functional Assessment    Outcome Measure Options AM-PAC 6 Clicks Daily Activity (OT)  -           User Key  (r) = Recorded By, (t) = Taken By, (c) = Cosigned By    Initials Name Provider Type    Jody Garza OT Occupational Therapist                Occupational Therapy Education     Title: PT OT SLP Therapies (In Progress)     Topic: Occupational Therapy (In Progress)     Point: ADL training (In Progress)     Description:   Instruct learner(s) on proper safety adaptation and remediation techniques during self care or transfers.   Instruct in proper use of assistive devices.              Learning Progress Summary           Patient Acceptance, E, NR by DIANA at 5/18/2023 1029    Acceptance, E, VU by AN at 5/16/2023 1610    Acceptance, E, VU,NR by AN at 5/12/2023 1552                   Point: Home exercise program (Not Started)     Description:   Instruct learner(s) on appropriate technique for monitoring, assisting and/or progressing therapeutic exercises/activities.              Learner Progress:  Not documented in this visit.          Point: Precautions (In Progress)     Description:   Instruct learner(s) on prescribed precautions during self-care and functional transfers.              Learning Progress Summary           Patient Acceptance, E, NR by DIANA at 5/18/2023 1029    Acceptance, E, VU by AN at 5/16/2023 1610    Acceptance, E, VU,NR by AN at 5/12/2023 1555                   Point: Body mechanics (Done)     Description:   Instruct learner(s) on proper positioning and spine alignment during self-care, functional mobility activities and/or exercises.              Learning Progress Summary           Patient Acceptance, E, VU by AN at 5/16/2023 1610    Acceptance, E, VU,NR by AN at 5/12/2023 1559                               User Key     Initials Effective Dates Name  Provider Type Discipline     06/16/21 -  Jody Townsend OT Occupational Therapist OT    AN 09/21/21 -  Jackelin Hurtado OT Occupational Therapist OT              OT Recommendation and Plan     Plan of Care Review  Plan of Care Reviewed With: patient  Progress: improving  Outcome Evaluation: OT promoted oob activity with pt c/o neck pain.  OT provided massage and ROM to neck and shooulders to alleviate discomfort with pt reporting improvement.  She completed bed mob with min assist, sts with min assist, dep for incontinent bowel care.  Pt completed multiple sts and able to side step with multiple cues to head of bed.     Time Calculation:    Time Calculation- OT     Row Name 05/18/23 0945             Time Calculation- OT    OT Start Time 0945  -SW      OT Received On 05/18/23  -SW         Timed Charges    44856 - OT Therapeutic Activity Minutes 13  -SW      30149 - OT Self Care/Mgmt Minutes 25  -SW         Total Minutes    Timed Charges Total Minutes 38  -SW       Total Minutes 38  -SW            User Key  (r) = Recorded By, (t) = Taken By, (c) = Cosigned By    Initials Name Provider Type     Jody Townsend OT Occupational Therapist              Therapy Charges for Today     Code Description Service Date Service Provider Modifiers Qty    [id number]  OT THERAPEUTIC ACT EA 15 MIN 5/18/2023 Jody Townsend OT GO 1    [id number]  OT SELF CARE/MGMT/TRAIN EA 15 MIN 5/18/2023 Jody Townsend OT GO 2               Jody Townsend OT  5/18/2023

## 2023-05-18 NOTE — PLAN OF CARE
Problem: Adult Inpatient Plan of Care  Goal: Plan of Care Review  Outcome: Ongoing, Progressing  Flowsheets (Taken 5/18/2023 0647)  Progress: improving  Plan of Care Reviewed With: patient  Outcome Evaluation: Patient did well during the night with mild confusion when awoke from sleeping. vss. no complaints during shift.  Goal: Patient-Specific Goal (Individualized)  Outcome: Ongoing, Progressing  Goal: Absence of Hospital-Acquired Illness or Injury  Outcome: Ongoing, Progressing  Intervention: Identify and Manage Fall Risk  Recent Flowsheet Documentation  Taken 5/18/2023 0600 by Marie Singh, RN  Safety Promotion/Fall Prevention:   toileting scheduled   safety round/check completed   room organization consistent   nonskid shoes/slippers when out of bed   lighting adjusted   mobility aid in reach   fall prevention program maintained   clutter free environment maintained   assistive device/personal items within reach   activity supervised  Taken 5/18/2023 0400 by Marie Singh, RN  Safety Promotion/Fall Prevention:   toileting scheduled   safety round/check completed   room organization consistent  Taken 5/18/2023 0200 by Marie Singh, RN  Safety Promotion/Fall Prevention:   toileting scheduled   safety round/check completed   room organization consistent   nonskid shoes/slippers when out of bed   mobility aid in reach   lighting adjusted   fall prevention program maintained   clutter free environment maintained   assistive device/personal items within reach   activity supervised  Taken 5/18/2023 0000 by Marie Singh, RN  Safety Promotion/Fall Prevention:   toileting scheduled   safety round/check completed   room organization consistent   nonskid shoes/slippers when out of bed   mobility aid in reach   lighting adjusted   fall prevention program maintained   clutter free environment maintained   assistive device/personal items within reach   activity supervised  Taken 5/17/2023 2200 by Samantha  Marie MORALEZ RN  Safety Promotion/Fall Prevention:   toileting scheduled   safety round/check completed   room organization consistent   nonskid shoes/slippers when out of bed   mobility aid in reach   lighting adjusted   fall prevention program maintained   clutter free environment maintained   assistive device/personal items within reach   activity supervised  Taken 5/17/2023 2000 by Marie Singh RN  Safety Promotion/Fall Prevention:   toileting scheduled   safety round/check completed   room organization consistent   nonskid shoes/slippers when out of bed   mobility aid in reach   lighting adjusted   fall prevention program maintained   clutter free environment maintained   assistive device/personal items within reach   activity supervised  Intervention: Prevent Skin Injury  Recent Flowsheet Documentation  Taken 5/18/2023 0600 by Marie Singh RN  Body Position: position changed independently  Taken 5/18/2023 0400 by Marie Singh RN  Body Position: position changed independently  Taken 5/18/2023 0200 by Marie Singh RN  Body Position: position changed independently  Taken 5/18/2023 0000 by Marie Singh RN  Body Position: weight shifting  Taken 5/17/2023 2200 by Marie Singh RN  Body Position: weight shifting  Taken 5/17/2023 2000 by Marie Singh RN  Body Position: position changed independently  Skin Protection:   adhesive use limited   tubing/devices free from skin contact   incontinence pads utilized  Intervention: Prevent and Manage VTE (Venous Thromboembolism) Risk  Recent Flowsheet Documentation  Taken 5/18/2023 0600 by Marie Singh, RN  Activity Management: activity encouraged  Taken 5/18/2023 0400 by Marie Singh RN  Activity Management: activity encouraged  Taken 5/18/2023 0200 by Marie Singh RN  Activity Management: activity encouraged  Taken 5/18/2023 0000 by Marie Singh, RN  Activity Management: activity encouraged  Taken 5/17/2023 2200 by  Marie Singh, RN  Activity Management: activity encouraged  Taken 5/17/2023 2000 by Marie Singh RN  Activity Management: activity encouraged  VTE Prevention/Management: (see mar) --  Range of Motion: active ROM (range of motion) encouraged  Intervention: Prevent Infection  Recent Flowsheet Documentation  Taken 5/18/2023 0600 by Marie Singh RN  Infection Prevention:   single patient room provided   rest/sleep promoted   environmental surveillance performed  Goal: Optimal Comfort and Wellbeing  Outcome: Ongoing, Progressing  Intervention: Provide Person-Centered Care  Recent Flowsheet Documentation  Taken 5/17/2023 2000 by Marie Singh RN  Trust Relationship/Rapport:   care explained   questions encouraged   questions answered   reassurance provided  Goal: Readiness for Transition of Care  Outcome: Ongoing, Progressing     Problem: Fall Injury Risk  Goal: Absence of Fall and Fall-Related Injury  Outcome: Ongoing, Progressing  Intervention: Identify and Manage Contributors  Recent Flowsheet Documentation  Taken 5/18/2023 0600 by Marie Singh RN  Medication Review/Management: medications reviewed  Self-Care Promotion: independence encouraged  Taken 5/18/2023 0400 by Marie Singh RN  Medication Review/Management: medications reviewed  Self-Care Promotion: independence encouraged  Taken 5/18/2023 0200 by Marie Singh RN  Medication Review/Management: medications reviewed  Self-Care Promotion: independence encouraged  Taken 5/18/2023 0000 by Marie Singh RN  Medication Review/Management: medications reviewed  Self-Care Promotion: independence encouraged  Taken 5/17/2023 2200 by Marie Singh RN  Medication Review/Management: medications reviewed  Self-Care Promotion: independence encouraged  Taken 5/17/2023 2000 by Marie Singh RN  Medication Review/Management: medications reviewed  Self-Care Promotion: independence encouraged  Intervention: Promote Injury-Free  Environment  Recent Flowsheet Documentation  Taken 5/18/2023 0600 by Marie Singh, RN  Safety Promotion/Fall Prevention:   toileting scheduled   safety round/check completed   room organization consistent   nonskid shoes/slippers when out of bed   lighting adjusted   mobility aid in reach   fall prevention program maintained   clutter free environment maintained   assistive device/personal items within reach   activity supervised  Taken 5/18/2023 0400 by Marie Singh, RN  Safety Promotion/Fall Prevention:   toileting scheduled   safety round/check completed   room organization consistent  Taken 5/18/2023 0200 by Marie Singh, RN  Safety Promotion/Fall Prevention:   toileting scheduled   safety round/check completed   room organization consistent   nonskid shoes/slippers when out of bed   mobility aid in reach   lighting adjusted   fall prevention program maintained   clutter free environment maintained   assistive device/personal items within reach   activity supervised  Taken 5/18/2023 0000 by Marie Singh, RN  Safety Promotion/Fall Prevention:   toileting scheduled   safety round/check completed   room organization consistent   nonskid shoes/slippers when out of bed   mobility aid in reach   lighting adjusted   fall prevention program maintained   clutter free environment maintained   assistive device/personal items within reach   activity supervised  Taken 5/17/2023 2200 by Marie Singh, RN  Safety Promotion/Fall Prevention:   toileting scheduled   safety round/check completed   room organization consistent   nonskid shoes/slippers when out of bed   mobility aid in reach   lighting adjusted   fall prevention program maintained   clutter free environment maintained   assistive device/personal items within reach   activity supervised  Taken 5/17/2023 2000 by Marie Singh, RN  Safety Promotion/Fall Prevention:   toileting scheduled   safety round/check completed   room organization  consistent   nonskid shoes/slippers when out of bed   mobility aid in reach   lighting adjusted   fall prevention program maintained   clutter free environment maintained   assistive device/personal items within reach   activity supervised     Problem: Skin Injury Risk Increased  Goal: Skin Health and Integrity  Outcome: Ongoing, Progressing  Intervention: Optimize Skin Protection  Recent Flowsheet Documentation  Taken 5/18/2023 0600 by Marie Singh RN  Head of Bed (HOB) Positioning: HOB elevated  Taken 5/18/2023 0400 by Marie Singh RN  Head of Bed (HOB) Positioning: HOB elevated  Taken 5/18/2023 0200 by Marie Singh RN  Head of Bed (HOB) Positioning: HOB elevated  Taken 5/18/2023 0000 by Marie Singh RN  Head of Bed (HOB) Positioning: HOB elevated  Taken 5/17/2023 2200 by Marie Singh RN  Head of Bed (HOB) Positioning: HOB elevated  Taken 5/17/2023 2000 by Marie Singh, MICHAEL  Pressure Reduction Techniques: frequent weight shift encouraged  Head of Bed (HOB) Positioning: HOB elevated  Skin Protection:   adhesive use limited   tubing/devices free from skin contact   incontinence pads utilized   Goal Outcome Evaluation:  Plan of Care Reviewed With: patient        Progress: improving  Outcome Evaluation: Patient did well during the night with mild confusion when awoke from sleeping. vss. no complaints during shift.

## 2023-05-18 NOTE — CASE MANAGEMENT/SOCIAL WORK
Continued Stay Note  [facility]     Patient Name: Mitzi Eric  MRN: [id number]  Today's Date: 5/18/2023    Admit Date: 5/10/2023    Plan: To be determined   Discharge Plan     Row Name 05/18/23 0929       Plan    Plan Comments [facility] is woring on insurance approval and there is an ambulance scheduled for 1 pm on Friday if insurance is approved.               Discharge Codes    No documentation.               Expected Discharge Date and Time     Expected Discharge Date Expected Discharge Time    May 17, 2023             YURI Joseph

## 2023-05-19 VITALS
TEMPERATURE: 97.9 F | DIASTOLIC BLOOD PRESSURE: 62 MMHG | HEART RATE: 88 BPM | RESPIRATION RATE: 18 BRPM | BODY MASS INDEX: 19.7 KG/M2 | HEIGHT: 63 IN | WEIGHT: 111.2 LBS | SYSTOLIC BLOOD PRESSURE: 100 MMHG | OXYGEN SATURATION: 92 %

## 2023-05-19 PROCEDURE — 94799 UNLISTED PULMONARY SVC/PX: CPT

## 2023-05-19 PROCEDURE — G0378 HOSPITAL OBSERVATION PER HR: HCPCS

## 2023-05-19 PROCEDURE — 99239 HOSP IP/OBS DSCHRG MGMT >30: CPT | Performed by: NURSE PRACTITIONER

## 2023-05-19 PROCEDURE — 94664 DEMO&/EVAL PT USE INHALER: CPT

## 2023-05-19 RX ORDER — MEGESTROL ACETATE 20 MG/1
20 TABLET ORAL DAILY
Qty: 30 TABLET | Refills: 0 | Status: SHIPPED | OUTPATIENT
Start: 2023-05-20

## 2023-05-19 RX ADMIN — Medication 10 ML: at 08:15

## 2023-05-19 RX ADMIN — TORSEMIDE 30 MG: 20 TABLET ORAL at 08:14

## 2023-05-19 RX ADMIN — BUDESONIDE AND FORMOTEROL FUMARATE DIHYDRATE 2 PUFF: 160; 4.5 AEROSOL RESPIRATORY (INHALATION) at 08:42

## 2023-05-19 RX ADMIN — PANTOPRAZOLE SODIUM 40 MG: 40 TABLET, DELAYED RELEASE ORAL at 08:14

## 2023-05-19 RX ADMIN — DOCUSATE SODIUM 100 MG: 100 CAPSULE, LIQUID FILLED ORAL at 08:14

## 2023-05-19 RX ADMIN — FOLIC ACID TAB 400 MCG 400 MCG: 400 TAB at 08:14

## 2023-05-19 RX ADMIN — MEGESTROL ACETATE 20 MG: 20 TABLET ORAL at 08:15

## 2023-05-19 RX ADMIN — SPIRONOLACTONE 25 MG: 25 TABLET ORAL at 08:14

## 2023-05-19 RX ADMIN — METOPROLOL SUCCINATE 25 MG: 25 TABLET, EXTENDED RELEASE ORAL at 08:14

## 2023-05-19 RX ADMIN — ACETAMINOPHEN 325MG 650 MG: 325 TABLET ORAL at 08:14

## 2023-05-19 RX ADMIN — DONEPEZIL HYDROCHLORIDE 5 MG: 5 TABLET, FILM COATED ORAL at 08:14

## 2023-05-19 RX ADMIN — PREGABALIN 25 MG: 25 CAPSULE ORAL at 08:15

## 2023-05-19 RX ADMIN — ASPIRIN 81 MG: 81 TABLET, COATED ORAL at 08:14

## 2023-05-19 RX ADMIN — LEVOTHYROXINE SODIUM 25 MCG: 0.03 TABLET ORAL at 06:19

## 2023-05-19 RX ADMIN — OXCARBAZEPINE 600 MG: 150 TABLET, FILM COATED ORAL at 08:15

## 2023-05-19 NOTE — CASE MANAGEMENT/SOCIAL WORK
Case Management Discharge Note      Final Note: [facility] has insurance approval and there is an ambulance for 1 pm. The nurse report number for [place] is [phone].         Selected Continued Care - Admitted Since 5/10/2023     Destination    No services have been selected for the patient.              Durable Medical Equipment    No services have been selected for the patient.              Dialysis/Infusion    No services have been selected for the patient.              Home Medical Care    No services have been selected for the patient.              Therapy    No services have been selected for the patient.              Community Resources    No services have been selected for the patient.              Community & DME    No services have been selected for the patient.                       Final Discharge Disposition Code: 03 - skilled nursing facility (SNF)

## 2023-05-19 NOTE — DISCHARGE SUMMARY
[facility]  DISCHARGE SUMMARY    Patient Name: Mitzi Eric  : 1935  MRN: [id number]    Date of Admission: 5/10/2023 12:26 PM  Date of Discharge:  23    Primary Care Physician: Provider, No Known    Consults     No orders found from 2023 to 2023.          Hospital Course     Presenting Problem:   JUAN F (acute kidney injury) [N17.9]  JUAN F (acute kidney injury) [N17.9]  JUAN F (acute kidney injury) [N17.9]    Active Hospital Problems    Diagnosis  POA   • Chronic respiratory failure [J96.10]  Yes   • atrial fibrillation [I48.91]  Yes   • chronic CHF (congestive heart failure) (HCC) [I50.9]  Yes   • Dementia without behavioral disturbance [F03.90]  Yes   • Impaired functional mobility, balance, gait, and endurance [Z74.09]  Yes   • Severe malnutrition (CMS/HCC) [E43]  Yes   • Cognitive decline [R41.89]  Yes   • Hypothyroidism (acquired) [E03.9]  Yes   • Trigeminal neuralgia [G50.0]  Yes   • Essential hypertension [I10]  Yes      Resolved Hospital Problems    Diagnosis Date Resolved POA   • **JUAN F (acute kidney injury) [N17.9] 2023 Yes   • Delirium, acute [R41.0] 2023 Yes   • Fall [W19.XXXA] 2023 Yes          Hospital Course:  Mitzi Eric is a 87 y.o. female with history of CKD, COPD (2L nightly) with known bilateral upper lobe masses, paroxysmal A-fib, dementia, HLD, HTN, hypothyroidism, Trigeminal neuralgia who presented to ED on 5/10/2023  from her assisted living facility due to fall with associated acute renal failure. CT head w/o was negative. PT/OT evaluated and recommended SNF. CM arranged inpatient rehab at [facility].  She will be discharged today in stable condition.    Fall  - CT Head w/o negative   - PT/OT recommended SNF        Acute on Chronic Renal Failure Stage 3--resolved  -Cr 1.62 on admission, Cr 0.85 at last check   -Secondary to dehydration, s/p IVF     Slight Leukocytosis, resolved  - UA negative  - currently on  2L, prior to admission only worse 2L at night     Left Apical Lung mass with fluid-necrotic components/cavitary appearance   Right Upper lobe lung mass   Hx COPD  -Patient/POA (nephew) have been aware of this and not interested in further work-up  -On 2L NC currently, normally on RA during the day and 2L at night per Nephew   - s/p course of Ceftin      Combined systolic and diastolic heart failure  Moderate pulm hypertension  -Compensated, monitored with IVF   -continue home Demadex/Spironolactone      Paroxysmal atrial fibrillation  -continue home Metoprolol     Hypothyroidism  -Continue Synthroid     Trigeminal neuralgia  -Continue Trileptal and Lyrica     Hypertension  -continue home medications     Dementia  -Continue Aricept      Discharge Follow Up Recommendations for outpatient labs/diagnostics:   Follow up with PCP in 1 week.     Day of Discharge     HPI:   Patient seen resting in bed no apparent distress.  No acute events overnight per nursing.  She is aware of plan to discharge to [facility] today and is agreeable.  She reports she is feeling stronger today and denies any new complaints at this time.  We discussed the importance of taking all medication as prescribed and keeping all recommended follow-up appointments.  She expressed no additional concerns this time.    Review of Systems  Gen- No fevers, chills  CV- No chest pain, palpitations  Resp- No cough, dyspnea  GI- No N/V/D, abd pain    Vital Signs:   Temp:  [97.8 °F (36.6 °C)-98.3 °F (36.8 °C)] 97.8 °F (36.6 °C)  Heart Rate:  [] 96  Resp:  [16-18] 18  BP: ()/(58-91) 115/71  Flow (L/min):  [3-4] 3      Physical Exam:  Constitutional: No acute distress, awake, alert  HENT: NCAT, mucous membranes moist  Respiratory: Clear to auscultation bilaterally, respiratory effort normal   Cardiovascular: RRR, no murmurs, cap refill brisk   Gastrointestinal: Positive bowel sounds, soft, nontender, nondistended  Musculoskeletal: No BLE edema    Psychiatric: Appropriate affect, cooperative  Neurologic: Oriented x2, moves all extremities, speech clear  Skin: warm, dry, no visible rash     Pertinent  and/or Most Recent Results     LAB RESULTS:      Lab 05/15/23  0543 05/13/23  0452   WBC 7.59 8.40   HEMOGLOBIN 12.3 10.9*   HEMATOCRIT 38.8 33.1*   PLATELETS 229 192   MCV 96.8 96.2         Lab 05/17/23  0710 05/15/23  0543 05/13/23  0430   SODIUM 141 138 141   POTASSIUM 4.3 4.5 3.5   CHLORIDE 105 101 108*   CO2 26.0 24.0 23.0   ANION GAP 10.0 13.0 10.0   BUN 24* 23 17   CREATININE 0.90 0.97 0.85   EGFR 62.4 57.0* 66.8   GLUCOSE 103* 81 111*   CALCIUM 8.7 9.2 8.2*                         Brief Urine Lab Results  (Last result in the past 365 days)      Color   Clarity   Blood   Leuk Est   Nitrite   Protein   CREAT   Urine HCG        05/10/23 1304 Yellow   Clear   Negative   Negative   Negative   Negative               Microbiology Results (last 10 days)     Procedure Component Value - Date/Time    COVID PRE-OP / PRE-PROCEDURE SCREENING ORDER (NO ISOLATION) - Swab, Nasopharynx [[id number]]  (Normal) Collected: 05/18/23 1731    Lab Status: Final result Specimen: Swab from Nasopharynx Updated: 05/18/23 1807    Narrative:      The following orders were created for panel order COVID PRE-OP / PRE-PROCEDURE SCREENING ORDER (NO ISOLATION) - Swab, Nasopharynx.  Procedure                               Abnormality         Status                     ---------                               -----------         ------                     COVID-19 and FLU A/B PCR...[[id number]]  Normal              Final result                 Please view results for these tests on the individual orders.    COVID-19 and FLU A/B PCR - Swab, Nasopharynx [[id number]]  (Normal) Collected: 05/18/23 1731    Lab Status: Final result Specimen: Swab from Nasopharynx Updated: 05/18/23 1807     COVID19 Not Detected     Influenza A PCR Not Detected     Influenza B PCR Not Detected    Narrative:      Fact  sheet for providers: https://www.fda.gov/media/956148/download    Fact sheet for patients: https://www.fda.gov/media/906813/download    Test performed by PCR.          XR Knee 1 or 2 View Right    Result Date: 5/8/2023  XR KNEE 1 OR 2 VW RIGHT Date of Exam: 5/8/2023 12:02 PM EDT Indication: fall/pain Comparison: None available. Findings: No evidence of fracture. No evidence of dislocation. No joint effusion identified. No focal soft tissue abnormality is identified. Mild osteoarthritic changes are present, most pronounced within the medial compartment.     Impression: No acute abnormality Electronically Signed: Jaxson Murray  5/8/2023 12:25 PM EDT  Workstation ID: [id number]    XR Tibia Fibula 2 View Right    Result Date: 5/8/2023  XR TIBIA FIBULA 2 VW RIGHT Date of Exam: 5/8/2023 12:03 PM EDT Indication: fall/pain Comparison: None available. FINDINGS:  No fracture is identified. Osseous structures appear demineralized. Alignment appears within normal limits. There is calcific atherosclerosis.     1.No radiographic findings of acute osseous tibia or fibula abnormality. 2.Osseous demineralization and calcific atherosclerosis. Electronically Signed: Alejandro Josh  5/8/2023 12:28 PM EDT  Workstation ID: [id number]    CT Head Without Contrast    Result Date: 5/10/2023  CT HEAD WO CONTRAST Date of Exam: 5/10/2023 1:52 PM EDT Indication: multiple falls with headache and dementia. Comparison: May 8, 2023 Technique: Axial CT images were obtained of the head without contrast administration.  Reconstructed coronal and sagittal images were also obtained. Automated exposure control and iterative construction methods were used. Findings: There are changes from left frontal craniotomy. No acute intracranial hemorrhage or extra-axial collection is identified. The ventricles are stable in caliber, with no evidence of mass effect or midline shift. The basal cisterns appear patent. The gray-white differentiation appears preserved.  Degenerative changes appear stable. No acute calvarial fracture is identified. The paranasal sinuses and mastoid air cells are well-aerated.     Impression: No acute intracranial process or calvarial fracture identified. Electronically Signed: Jonas Dixon  5/10/2023 2:10 PM EDT  Workstation ID: [id number]    CT Head Without Contrast    Result Date: 5/8/2023  CT HEAD WO CONTRAST Date of Exam: 5/8/2023 1:14 PM EDT Indication: fall. Comparison: CT scan of the head from March 29, 2022 Technique: Axial CT images were obtained of the head without contrast administration.  Reconstructed coronal and sagittal images were also obtained. Automated exposure control and iterative construction methods were used. Findings: There is left frontal encephalomalacia beneath a left frontal craniotomy defect. There is a stable right anterior falcine calcified meningioma of about 1.1 cm. There is no intraparenchymal mass, mass effect or midline shift. There are no areas of acute hemorrhage. There are no abnormal extra-axial fluid collections. The paranasal sinuses and mastoid air cells are clear.     Impression: Stable left frontal encephalomalacia with overlying changes from craniotomy. There is atrophy. There is no acute intracranial process. Electronically Signed: Jaxson Murray  5/8/2023 1:30 PM EDT  Workstation ID: [id number]    CT Cervical Spine Without Contrast    Result Date: 5/8/2023  CT CERVICAL SPINE WO CONTRAST Date of Exam: 5/8/2023 1:14 PM EDT Indication: fall. Comparison: CT scan of the neck dated February 17, 2023 Technique: Axial CT images were obtained of the cervical spine without contrast administration.  Reconstructed coronal and sagittal images were also obtained. Automated exposure control and iterative construction methods were used. Findings: There is a grade 1 anterolisthesis of C4 on C5 measuring 1 mm. There is grade 1 anterolisthesis of C5 on C6 measuring 2 mm. There is grade 1 anterolisthesis of C7 on T1  measuring 1-2 mm. The slight retrolisthesis of C3 on C4 is less apparent on today's exam. There is disc space narrowing at C3-C4 and C6-C7 consistent with the degenerative disc disease. There is multilevel endplate spurring and endplate sclerosis, most prominent at C3-C4 and C6-C7 consistent with the degenerative disc disease. There is multilevel bilateral facet arthritic change.  There is no acute cervical fracture. There are degenerative changes at the atlantoaxial articulation. There are varying degrees of neural foraminal narrowing secondary to endplate spurring and facet arthritis. No significant spinal stenosis is felt to be  present. There are atherosclerotic vascular calcifications at the carotid bifurcations bilaterally. There is an abnormal appearance of the bilateral upper lobes. There is an incompletely visualized left upper lobe consolidation. There is a spiculated irregular area in the medial right upper lobe. Please refer to previous CT scan of the chest from April 14, 2023.     Impression: Multilevel degenerative disease of the cervical spine. No acute cervical spine abnormality. Bilateral upper lobe consolidations please see recent CT chest. Electronically Signed: Jaxson Murray  5/8/2023 1:48 PM EDT  Workstation ID: [id number]    CT Lumbar Spine Without Contrast    Result Date: 5/8/2023  CT LUMBAR SPINE WO CONTRAST, CT PELVIS WO CONTRAST Date of Exam: 5/8/2023 1:14 PM EDT Indication: fall. Comparison: None available. Technique: Axial CT images were obtained of the lumbar spine and pelvis without contrast administration.  Reconstructed coronal and sagittal images were also obtained. Automated exposure control and iterative construction methods were used. Findings: Lumbar spine: There is mild smooth leftward curvature of the lumbar spine. There is 2 mm degenerative anterolisthesis of L4 on L5. No traumatic subluxation. There is transitional lumbosacral anatomy with left-sided sacralization of L5. The  bones are demineralized. No acute fracture. The vertebral body heights are preserved. There is moderate to severe degenerative disc disease at L3-L4 with intervertebral disc height loss, vacuum disc base phenomenon, and degenerative endplate changes. There is more mild appearing multilevel degenerative disc disease at the remaining levels. There is mild to moderate facet arthropathy in the lower lumbar spine. There is likely moderate spinal canal stenosis at L4-L5. The paravertebral soft tissues are unremarkable. No acute findings in the partially imaged portions of the posterior abdomen. Pelvis: Unremarkable appearance of a partially imaged right hip cephalomedullary construct, without hardware fracture or perihardware lucency or periprosthetic fracture. No evidence of acute fracture or traumatic malalignment. The hip joints are congruent with mild degenerative change. The pubic symphysis and sacroiliac joints are congruent with mild degenerative change. No evidence of conspicuous soft tissue contusion or pelvic hematoma. No acute findings in the lower abdomen or within the pelvis. There is severe colonic diverticulosis without evidence of diverticulitis. Dystrophic uterine calcifications are compatible with calcified uterine fibroids.     Impression: No acute fracture or traumatic malalignment in the lumbar spine or pelvis. Unremarkable appearance of partially imaged right hip cephalomedullary construct. Electronically Signed: Carlos Dickerson  5/8/2023 2:03 PM EDT  Workstation ID: [id number]    CT Pelvis Without Contrast    Result Date: 5/8/2023  CT LUMBAR SPINE WO CONTRAST, CT PELVIS WO CONTRAST Date of Exam: 5/8/2023 1:14 PM EDT Indication: fall. Comparison: None available. Technique: Axial CT images were obtained of the lumbar spine and pelvis without contrast administration.  Reconstructed coronal and sagittal images were also obtained. Automated exposure control and iterative construction methods were used.  Findings: Lumbar spine: There is mild smooth leftward curvature of the lumbar spine. There is 2 mm degenerative anterolisthesis of L4 on L5. No traumatic subluxation. There is transitional lumbosacral anatomy with left-sided sacralization of L5. The bones are demineralized. No acute fracture. The vertebral body heights are preserved. There is moderate to severe degenerative disc disease at L3-L4 with intervertebral disc height loss, vacuum disc base phenomenon, and degenerative endplate changes. There is more mild appearing multilevel degenerative disc disease at the remaining levels. There is mild to moderate facet arthropathy in the lower lumbar spine. There is likely moderate spinal canal stenosis at L4-L5. The paravertebral soft tissues are unremarkable. No acute findings in the partially imaged portions of the posterior abdomen. Pelvis: Unremarkable appearance of a partially imaged right hip cephalomedullary construct, without hardware fracture or perihardware lucency or periprosthetic fracture. No evidence of acute fracture or traumatic malalignment. The hip joints are congruent with mild degenerative change. The pubic symphysis and sacroiliac joints are congruent with mild degenerative change. No evidence of conspicuous soft tissue contusion or pelvic hematoma. No acute findings in the lower abdomen or within the pelvis. There is severe colonic diverticulosis without evidence of diverticulitis. Dystrophic uterine calcifications are compatible with calcified uterine fibroids.     Impression: No acute fracture or traumatic malalignment in the lumbar spine or pelvis. Unremarkable appearance of partially imaged right hip cephalomedullary construct. Electronically Signed: Carlos Dickerson  5/8/2023 2:03 PM EDT  Workstation ID: [id number]              Results for orders placed during the hospital encounter of 03/17/22    Adult Transthoracic Echo Complete W/ Cont if Necessary Per Protocol    Interpretation Summary  ·  Left ventricular ejection fraction appears to be 46 - 50%. Left ventricular systolic function is mildly decreased.  · Left ventricular diastolic function is consistent with (grade I) impaired relaxation.  · Mild to moderate mitral regurgitation.  · Mild to moderate tricuspid regurgitation, RVSP 54 mmHg.      Plan for Follow-up of Pending Labs/Results: Follow-up as directed.    Discharge Details        Discharge Medications      New Medications      Instructions Start Date   megestrol 20 MG tablet  Commonly known as: MEGACE   20 mg, Oral, Daily   Start Date: May 20, 2023        Changes to Medications      Instructions Start Date   aspirin 81 MG EC tablet  What changed: additional instructions   81 mg, Oral, Daily      docusate sodium 100 MG capsule  What changed: additional instructions   100 mg, Oral, 2 Times Daily      Symbicort 80-4.5 MCG/ACT inhaler  Generic drug: budesonide-formoterol  What changed: See the new instructions.   INHALE 2 PUFF(S) BY MOUTH TWO TIMES A DAY FOR 30 DAYS      torsemide 10 MG tablet  Commonly known as: DEMADEX  What changed:   · medication strength  · how much to take   30 mg, Oral, Daily         Continue These Medications      Instructions Start Date   acetaminophen 325 MG tablet  Commonly known as: TYLENOL   650 mg, Oral, 3 Times Daily, OTC      albuterol sulfate  (90 Base) MCG/ACT inhaler  Commonly known as: PROVENTIL HFA;VENTOLIN HFA;PROAIR HFA   2 puffs, Inhalation, Every 4 Hours PRN      cetirizine 10 MG tablet  Commonly known as: zyrTEC   10 mg, Oral, Daily, OTC      donepezil 5 MG tablet  Commonly known as: Aricept   5 mg, Oral, Every Morning      folic acid 400 MCG tablet  Commonly known as: FOLVITE   400 mcg, Oral, 2 Times Daily, OTC      levothyroxine 25 MCG tablet  Commonly known as: SYNTHROID, LEVOTHROID   25 mcg, Oral, Every Early Morning      metoprolol succinate XL 25 MG 24 hr tablet  Commonly known as: TOPROL-XL   25 mg, Oral, Daily      OXcarbazepine 600 MG  tablet  Commonly known as: TRILEPTAL   600 mg, Oral, 2 Times Daily      pantoprazole 20 MG EC tablet  Commonly known as: PROTONIX   20 mg, Oral, Daily      pregabalin 25 MG capsule  Commonly known as: LYRICA   25 mg, Oral, 2 Times Daily      spironolactone 25 MG tablet  Commonly known as: ALDACTONE   25 mg, Oral, Daily      traMADol 50 MG tablet  Commonly known as: ULTRAM   50 mg, Oral, 2 Times Daily PRN      traZODone 150 MG tablet  Commonly known as: DESYREL   150 mg, Oral, Nightly PRN         Stop These Medications    Biofreeze 4 % gel  Generic drug: Menthol (Topical Analgesic)     ENSURE PO     potassium chloride 10 MEQ CR tablet     QUEtiapine 25 MG tablet  Commonly known as: SEROquel            Allergies   Allergen Reactions   • Bee Pollen Unknown - Low Severity   • Lorazepam Delirium         Discharge Disposition:  Skilled Nursing Facility (SD - External)    Diet:  Hospital:  Diet Order   Procedures   • Diet: Regular/House Diet; Texture: Regular Texture (IDDSI 7); Fluid Consistency: Thin (IDDSI 0)       Activity:  Activity Instructions     Activity as Tolerated      Activity as Tolerated                 CODE STATUS:    Code Status and Medical Interventions:   Ordered at: 05/10/23 1451     Code Status (Patient has no pulse and is not breathing):    No CPR (Do Not Attempt to Resuscitate)     Medical Interventions (Patient has pulse or is breathing):    Full Support       Future Appointments   Date Time Provider Department Center   5/19/2023  1:00 PM EMS 2 [facility]   5/23/2023  1:45 PM Carol Skelton APRN [facility]   7/21/2023  3:00 PM RAD TECH P[facility]MO [facility]   7/21/2023  3:30 PM Jennifer Ace MD [facility]   8/2/2023  8:00 AM Ann-Marie Goodwin APRN [facility] LX2 [facility]   10/17/2023  1:00 PM Lauren Pina APRN [facility]       Additional Instructions for the Follow-ups that You Need to Schedule     Discharge Follow-up with PCP   As directed        Currently Documented PCP:    Provider, No Known    PCP Phone Number:    None     Follow Up Details: Follow up with PCP in 1 week.         Discharge Follow-up with PCP   As directed       Currently Documented PCP:    Provider, No Known    PCP Phone Number:    None     Follow Up Details: 1 week                     Lemuel Giraldo, APRN  05/19/23      Time Spent on Discharge:  I spent  41  minutes on this discharge activity which included: face-to-face encounter with the patient, reviewing the data in the system, coordination of the care with the nursing staff as well as consultants, documentation, and entering orders.

## 2023-05-25 ENCOUNTER — HOSPITAL ENCOUNTER (OUTPATIENT)
Facility: HOSPITAL | Age: 88
Setting detail: OBSERVATION
Discharge: NURSING, REHAB OR LONG-TERM CARE | End: 2023-05-30
Attending: EMERGENCY MEDICINE | Admitting: INTERNAL MEDICINE
Payer: MEDICARE

## 2023-05-25 DIAGNOSIS — Z87.448 HISTORY OF CHRONIC KIDNEY DISEASE: ICD-10-CM
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
## 2023-05-25 DIAGNOSIS — Z87.09 HISTORY OF COPD: ICD-10-CM

## 2023-05-25 DIAGNOSIS — Z86.59 HISTORY OF DEMENTIA: ICD-10-CM

## 2023-05-25 DIAGNOSIS — N17.9 ACUTE KIDNEY INJURY: ICD-10-CM

## 2023-05-25 DIAGNOSIS — Z86.79 HISTORY OF ATRIAL FIBRILLATION: ICD-10-CM

## 2023-05-25 DIAGNOSIS — Z86.39 HISTORY OF HYPOTHYROIDISM: ICD-10-CM

## 2023-05-25 DIAGNOSIS — Z86.79 HISTORY OF CHF (CONGESTIVE HEART FAILURE): ICD-10-CM

## 2023-05-25 DIAGNOSIS — Z86.39 HISTORY OF HYPERLIPIDEMIA: ICD-10-CM

## 2023-05-25 DIAGNOSIS — Z86.79 HISTORY OF HYPERTENSION: ICD-10-CM

## 2023-05-25 DIAGNOSIS — N39.0 ACUTE UTI: Primary | ICD-10-CM

## 2023-05-25 PROCEDURE — 99285 EMERGENCY DEPT VISIT HI MDM: CPT

## 2023-05-25 PROCEDURE — 93005 ELECTROCARDIOGRAM TRACING: CPT | Performed by: PHYSICIAN ASSISTANT

## 2023-05-25 RX ORDER — ACETAMINOPHEN 500 MG
1000 TABLET ORAL ONCE
Status: COMPLETED | OUTPATIENT
Start: 2023-05-26 | End: 2023-05-26

## 2023-05-25 NOTE — LETTER
EMS Transport Request  For use at [facility], [place], [place], and [place] only   Patient Name: Mitzi Eric : 1935   Weight:49.7 kg (109 lb 8 oz) Pick-up Location: [facility] BLS/ALS: BLS/ALS: BLS   Insurance: [employer] MEDICARE REPLACEMENT Auth End Date:   Pre-Cert #: D/C Summary complete:    Destination: Other [place]   Contact Precautions: None   Equipment (O2, Fluids, etc.): O2, settings 2L   Arrive By Date/Time: 23 Stretcher/WC: Stretcher   CM Requesting: Leah Chicas RN Ext: [phone]   Notes/Medical Necessity: impaired gait, extreme weakness, and impaired safety awareness       ______________________________________________________________________    *Only 2 patient bags OR 1 carry-on size bag are permitted.  Wheelchairs and walkers CANNOT transported with the patient. Acknowledge: Yes

## 2023-05-25 NOTE — Clinical Note
Level of Care: Telemetry [5]   Diagnosis: Acute UTI [[id number]]   Admitting Physician: YANETH PAYNE [[id number]]   Attending Physician: YANETH PAYNE [[id number]]

## 2023-05-26 ENCOUNTER — APPOINTMENT (OUTPATIENT)
Dept: CT IMAGING | Facility: HOSPITAL | Age: 88
End: 2023-05-26
Payer: MEDICARE

## 2023-05-26 PROBLEM — N17.9 AKI (ACUTE KIDNEY INJURY): Status: ACTIVE | Noted: 2023-05-26

## 2023-05-26 PROBLEM — N18.30 CKD (CHRONIC KIDNEY DISEASE), STAGE III: Status: ACTIVE | Noted: 2023-05-26

## 2023-05-26 PROBLEM — N39.0 ACUTE UTI: Status: ACTIVE | Noted: 2023-05-26

## 2023-05-26 LAB
ALBUMIN SERPL-MCNC: 3.6 G/DL (ref 3.5–5.2)
ALBUMIN/GLOB SERPL: 1.2 G/DL
ALP SERPL-CCNC: 136 U/L (ref 39–117)
ALT SERPL W P-5'-P-CCNC: 20 U/L (ref 1–33)
ANION GAP SERPL CALCULATED.3IONS-SCNC: 10 MMOL/L (ref 5–15)
ANION GAP SERPL CALCULATED.3IONS-SCNC: 12 MMOL/L (ref 5–15)
AST SERPL-CCNC: 31 U/L (ref 1–32)
BACTERIA UR QL AUTO: ABNORMAL /HPF
BASOPHILS # BLD AUTO: 0.04 10*3/MM3 (ref 0–0.2)
BASOPHILS NFR BLD AUTO: 0.3 % (ref 0–1.5)
BILIRUB SERPL-MCNC: 0.3 MG/DL (ref 0–1.2)
BILIRUB UR QL STRIP: NEGATIVE
BUN SERPL-MCNC: 38 MG/DL (ref 8–23)
BUN SERPL-MCNC: 43 MG/DL (ref 8–23)
BUN/CREAT SERPL: 35.5 (ref 7–25)
BUN/CREAT SERPL: 36.1 (ref 7–25)
CALCIUM SPEC-SCNC: 8.5 MG/DL (ref 8.6–10.5)
CALCIUM SPEC-SCNC: 8.8 MG/DL (ref 8.6–10.5)
CHLORIDE SERPL-SCNC: 104 MMOL/L (ref 98–107)
CHLORIDE SERPL-SCNC: 107 MMOL/L (ref 98–107)
CLARITY UR: ABNORMAL
CO2 SERPL-SCNC: 23 MMOL/L (ref 22–29)
CO2 SERPL-SCNC: 24 MMOL/L (ref 22–29)
COLOR UR: YELLOW
CREAT SERPL-MCNC: 1.07 MG/DL (ref 0.57–1)
CREAT SERPL-MCNC: 1.19 MG/DL (ref 0.57–1)
D-LACTATE SERPL-SCNC: 0.8 MMOL/L (ref 0.5–2)
DEPRECATED RDW RBC AUTO: 44.4 FL (ref 37–54)
DEPRECATED RDW RBC AUTO: 45.1 FL (ref 37–54)
EGFRCR SERPLBLD CKD-EPI 2021: 44.6 ML/MIN/1.73
EGFRCR SERPLBLD CKD-EPI 2021: 50.7 ML/MIN/1.73
EOSINOPHIL # BLD AUTO: 0.46 10*3/MM3 (ref 0–0.4)
EOSINOPHIL NFR BLD AUTO: 3.6 % (ref 0.3–6.2)
ERYTHROCYTE [DISTWIDTH] IN BLOOD BY AUTOMATED COUNT: 12.5 % (ref 12.3–15.4)
ERYTHROCYTE [DISTWIDTH] IN BLOOD BY AUTOMATED COUNT: 12.6 % (ref 12.3–15.4)
GLOBULIN UR ELPH-MCNC: 3 GM/DL
GLUCOSE SERPL-MCNC: 100 MG/DL (ref 65–99)
GLUCOSE SERPL-MCNC: 102 MG/DL (ref 65–99)
GLUCOSE UR STRIP-MCNC: NEGATIVE MG/DL
HCT VFR BLD AUTO: 31.5 % (ref 34–46.6)
HCT VFR BLD AUTO: 32 % (ref 34–46.6)
HGB BLD-MCNC: 10.3 G/DL (ref 12–15.9)
HGB BLD-MCNC: 9.8 G/DL (ref 12–15.9)
HGB UR QL STRIP.AUTO: ABNORMAL
HYALINE CASTS UR QL AUTO: ABNORMAL /LPF
IMM GRANULOCYTES # BLD AUTO: 0.13 10*3/MM3 (ref 0–0.05)
IMM GRANULOCYTES NFR BLD AUTO: 1 % (ref 0–0.5)
KETONES UR QL STRIP: NEGATIVE
LEUKOCYTE ESTERASE UR QL STRIP.AUTO: ABNORMAL
LYMPHOCYTES # BLD AUTO: 0.72 10*3/MM3 (ref 0.7–3.1)
LYMPHOCYTES NFR BLD AUTO: 5.6 % (ref 19.6–45.3)
MAGNESIUM SERPL-MCNC: 2.5 MG/DL (ref 1.6–2.4)
MCH RBC QN AUTO: 30.8 PG (ref 26.6–33)
MCH RBC QN AUTO: 30.9 PG (ref 26.6–33)
MCHC RBC AUTO-ENTMCNC: 31.1 G/DL (ref 31.5–35.7)
MCHC RBC AUTO-ENTMCNC: 32.2 G/DL (ref 31.5–35.7)
MCV RBC AUTO: 96.1 FL (ref 79–97)
MCV RBC AUTO: 99.1 FL (ref 79–97)
MONOCYTES # BLD AUTO: 1.15 10*3/MM3 (ref 0.1–0.9)
MONOCYTES NFR BLD AUTO: 8.9 % (ref 5–12)
NEUTROPHILS NFR BLD AUTO: 10.37 10*3/MM3 (ref 1.7–7)
NEUTROPHILS NFR BLD AUTO: 80.6 % (ref 42.7–76)
NITRITE UR QL STRIP: POSITIVE
NRBC BLD AUTO-RTO: 0 /100 WBC (ref 0–0.2)
PH UR STRIP.AUTO: 5.5 [PH] (ref 5–8)
PLATELET # BLD AUTO: 183 10*3/MM3 (ref 140–450)
PLATELET # BLD AUTO: 213 10*3/MM3 (ref 140–450)
PMV BLD AUTO: 11.5 FL (ref 6–12)
PMV BLD AUTO: 11.6 FL (ref 6–12)
POTASSIUM SERPL-SCNC: 4.3 MMOL/L (ref 3.5–5.2)
POTASSIUM SERPL-SCNC: 4.6 MMOL/L (ref 3.5–5.2)
PROCALCITONIN SERPL-MCNC: 0.06 NG/ML (ref 0–0.25)
PROT SERPL-MCNC: 6.6 G/DL (ref 6–8.5)
PROT UR QL STRIP: ABNORMAL
QT INTERVAL: 442 MS
QTC INTERVAL: 629 MS
RBC # BLD AUTO: 3.18 10*6/MM3 (ref 3.77–5.28)
RBC # BLD AUTO: 3.33 10*6/MM3 (ref 3.77–5.28)
RBC # UR STRIP: ABNORMAL /HPF
REF LAB TEST METHOD: ABNORMAL
SODIUM SERPL-SCNC: 140 MMOL/L (ref 136–145)
SODIUM SERPL-SCNC: 140 MMOL/L (ref 136–145)
SP GR UR STRIP: 1.02 (ref 1–1.03)
SQUAMOUS #/AREA URNS HPF: ABNORMAL /HPF
TROPONIN T SERPL HS-MCNC: 21 NG/L
UROBILINOGEN UR QL STRIP: ABNORMAL
WBC # UR STRIP: ABNORMAL /HPF
WBC NRBC COR # BLD: 12.87 10*3/MM3 (ref 3.4–10.8)
WBC NRBC COR # BLD: 8.45 10*3/MM3 (ref 3.4–10.8)

## 2023-05-26 PROCEDURE — 99223 1ST HOSP IP/OBS HIGH 75: CPT | Performed by: PHYSICIAN ASSISTANT

## 2023-05-26 PROCEDURE — 96365 THER/PROPH/DIAG IV INF INIT: CPT

## 2023-05-26 PROCEDURE — 85025 COMPLETE CBC W/AUTO DIFF WBC: CPT | Performed by: PHYSICIAN ASSISTANT

## 2023-05-26 PROCEDURE — 94799 UNLISTED PULMONARY SVC/PX: CPT

## 2023-05-26 PROCEDURE — 80053 COMPREHEN METABOLIC PANEL: CPT | Performed by: PHYSICIAN ASSISTANT

## 2023-05-26 PROCEDURE — 84145 PROCALCITONIN (PCT): CPT | Performed by: PHYSICIAN ASSISTANT

## 2023-05-26 PROCEDURE — 97161 PT EVAL LOW COMPLEX 20 MIN: CPT

## 2023-05-26 PROCEDURE — 87186 SC STD MICRODIL/AGAR DIL: CPT | Performed by: PHYSICIAN ASSISTANT

## 2023-05-26 PROCEDURE — 25010000002 CEFTRIAXONE PER 250 MG: Performed by: PHYSICIAN ASSISTANT

## 2023-05-26 PROCEDURE — 36415 COLL VENOUS BLD VENIPUNCTURE: CPT

## 2023-05-26 PROCEDURE — 87040 BLOOD CULTURE FOR BACTERIA: CPT | Performed by: PHYSICIAN ASSISTANT

## 2023-05-26 PROCEDURE — 87086 URINE CULTURE/COLONY COUNT: CPT | Performed by: PHYSICIAN ASSISTANT

## 2023-05-26 PROCEDURE — 83605 ASSAY OF LACTIC ACID: CPT | Performed by: PHYSICIAN ASSISTANT

## 2023-05-26 PROCEDURE — 85027 COMPLETE CBC AUTOMATED: CPT | Performed by: PHYSICIAN ASSISTANT

## 2023-05-26 PROCEDURE — 81001 URINALYSIS AUTO W/SCOPE: CPT | Performed by: PHYSICIAN ASSISTANT

## 2023-05-26 PROCEDURE — 72125 CT NECK SPINE W/O DYE: CPT

## 2023-05-26 PROCEDURE — G0378 HOSPITAL OBSERVATION PER HR: HCPCS

## 2023-05-26 PROCEDURE — 99232 SBSQ HOSP IP/OBS MODERATE 35: CPT | Performed by: INTERNAL MEDICINE

## 2023-05-26 PROCEDURE — 97166 OT EVAL MOD COMPLEX 45 MIN: CPT

## 2023-05-26 PROCEDURE — 84484 ASSAY OF TROPONIN QUANT: CPT | Performed by: PHYSICIAN ASSISTANT

## 2023-05-26 PROCEDURE — 70450 CT HEAD/BRAIN W/O DYE: CPT

## 2023-05-26 PROCEDURE — 87088 URINE BACTERIA CULTURE: CPT | Performed by: PHYSICIAN ASSISTANT

## 2023-05-26 PROCEDURE — 83735 ASSAY OF MAGNESIUM: CPT | Performed by: PHYSICIAN ASSISTANT

## 2023-05-26 RX ORDER — SODIUM CHLORIDE, SODIUM LACTATE, POTASSIUM CHLORIDE, CALCIUM CHLORIDE 600; 310; 30; 20 MG/100ML; MG/100ML; MG/100ML; MG/100ML
75 INJECTION, SOLUTION INTRAVENOUS CONTINUOUS
Status: ACTIVE | OUTPATIENT
Start: 2023-05-26 | End: 2023-05-26

## 2023-05-26 RX ORDER — POLYETHYLENE GLYCOL 3350 17 G/17G
17 POWDER, FOR SOLUTION ORAL DAILY PRN
Status: DISCONTINUED | OUTPATIENT
Start: 2023-05-26 | End: 2023-05-30 | Stop reason: HOSPADM

## 2023-05-26 RX ORDER — CETIRIZINE HYDROCHLORIDE 10 MG/1
10 TABLET ORAL DAILY
Status: DISCONTINUED | OUTPATIENT
Start: 2023-05-26 | End: 2023-05-30 | Stop reason: HOSPADM

## 2023-05-26 RX ORDER — BISACODYL 5 MG/1
5 TABLET, DELAYED RELEASE ORAL DAILY PRN
Status: DISCONTINUED | OUTPATIENT
Start: 2023-05-26 | End: 2023-05-30 | Stop reason: HOSPADM

## 2023-05-26 RX ORDER — SODIUM CHLORIDE 0.9 % (FLUSH) 0.9 %
10 SYRINGE (ML) INJECTION AS NEEDED
Status: DISCONTINUED | OUTPATIENT
Start: 2023-05-26 | End: 2023-05-30 | Stop reason: HOSPADM

## 2023-05-26 RX ORDER — ACETAMINOPHEN 325 MG/1
650 TABLET ORAL EVERY 4 HOURS PRN
Status: DISCONTINUED | OUTPATIENT
Start: 2023-05-26 | End: 2023-05-30 | Stop reason: HOSPADM

## 2023-05-26 RX ORDER — METOPROLOL SUCCINATE 25 MG/1
25 TABLET, EXTENDED RELEASE ORAL DAILY
Status: DISCONTINUED | OUTPATIENT
Start: 2023-05-26 | End: 2023-05-30 | Stop reason: HOSPADM

## 2023-05-26 RX ORDER — TORSEMIDE 20 MG/1
30 TABLET ORAL DAILY
Status: DISCONTINUED | OUTPATIENT
Start: 2023-05-26 | End: 2023-05-30 | Stop reason: HOSPADM

## 2023-05-26 RX ORDER — BISACODYL 10 MG
10 SUPPOSITORY, RECTAL RECTAL DAILY PRN
Status: DISCONTINUED | OUTPATIENT
Start: 2023-05-26 | End: 2023-05-30 | Stop reason: HOSPADM

## 2023-05-26 RX ORDER — DONEPEZIL HYDROCHLORIDE 5 MG/1
5 TABLET, FILM COATED ORAL DAILY
Status: DISCONTINUED | OUTPATIENT
Start: 2023-05-26 | End: 2023-05-30 | Stop reason: HOSPADM

## 2023-05-26 RX ORDER — PANTOPRAZOLE SODIUM 40 MG/1
40 TABLET, DELAYED RELEASE ORAL DAILY
Status: DISCONTINUED | OUTPATIENT
Start: 2023-05-26 | End: 2023-05-30 | Stop reason: HOSPADM

## 2023-05-26 RX ORDER — OXCARBAZEPINE 150 MG/1
600 TABLET, FILM COATED ORAL 2 TIMES DAILY
Status: DISCONTINUED | OUTPATIENT
Start: 2023-05-26 | End: 2023-05-30 | Stop reason: HOSPADM

## 2023-05-26 RX ORDER — SODIUM CHLORIDE 0.9 % (FLUSH) 0.9 %
10 SYRINGE (ML) INJECTION EVERY 12 HOURS SCHEDULED
Status: DISCONTINUED | OUTPATIENT
Start: 2023-05-26 | End: 2023-05-30 | Stop reason: HOSPADM

## 2023-05-26 RX ORDER — ALBUTEROL SULFATE 2.5 MG/3ML
2.5 SOLUTION RESPIRATORY (INHALATION) EVERY 4 HOURS PRN
Status: DISCONTINUED | OUTPATIENT
Start: 2023-05-26 | End: 2023-05-30 | Stop reason: HOSPADM

## 2023-05-26 RX ORDER — MEGESTROL ACETATE 20 MG/1
20 TABLET ORAL DAILY
Status: DISCONTINUED | OUTPATIENT
Start: 2023-05-26 | End: 2023-05-30 | Stop reason: HOSPADM

## 2023-05-26 RX ORDER — AMOXICILLIN 250 MG
2 CAPSULE ORAL 2 TIMES DAILY
Status: DISCONTINUED | OUTPATIENT
Start: 2023-05-26 | End: 2023-05-30 | Stop reason: HOSPADM

## 2023-05-26 RX ORDER — BUDESONIDE AND FORMOTEROL FUMARATE DIHYDRATE 160; 4.5 UG/1; UG/1
2 AEROSOL RESPIRATORY (INHALATION)
Status: DISCONTINUED | OUTPATIENT
Start: 2023-05-26 | End: 2023-05-30 | Stop reason: HOSPADM

## 2023-05-26 RX ORDER — PREGABALIN 25 MG/1
25 CAPSULE ORAL 2 TIMES DAILY
Status: DISCONTINUED | OUTPATIENT
Start: 2023-05-26 | End: 2023-05-30 | Stop reason: HOSPADM

## 2023-05-26 RX ORDER — SODIUM CHLORIDE 9 MG/ML
40 INJECTION, SOLUTION INTRAVENOUS AS NEEDED
Status: DISCONTINUED | OUTPATIENT
Start: 2023-05-26 | End: 2023-05-30 | Stop reason: HOSPADM

## 2023-05-26 RX ORDER — SPIRONOLACTONE 25 MG/1
25 TABLET ORAL DAILY
Status: DISCONTINUED | OUTPATIENT
Start: 2023-05-26 | End: 2023-05-30 | Stop reason: HOSPADM

## 2023-05-26 RX ORDER — LEVOTHYROXINE SODIUM 0.03 MG/1
25 TABLET ORAL
Status: DISCONTINUED | OUTPATIENT
Start: 2023-05-26 | End: 2023-05-30 | Stop reason: HOSPADM

## 2023-05-26 RX ADMIN — SODIUM CHLORIDE 500 ML: 9 INJECTION, SOLUTION INTRAVENOUS at 02:43

## 2023-05-26 RX ADMIN — Medication 10 ML: at 08:10

## 2023-05-26 RX ADMIN — TORSEMIDE 30 MG: 20 TABLET ORAL at 08:07

## 2023-05-26 RX ADMIN — OXCARBAZEPINE 600 MG: 150 TABLET, FILM COATED ORAL at 20:36

## 2023-05-26 RX ADMIN — PREGABALIN 25 MG: 25 CAPSULE ORAL at 20:35

## 2023-05-26 RX ADMIN — METOPROLOL SUCCINATE 25 MG: 25 TABLET, EXTENDED RELEASE ORAL at 08:07

## 2023-05-26 RX ADMIN — SODIUM CHLORIDE 500 ML: 9 INJECTION, SOLUTION INTRAVENOUS at 04:34

## 2023-05-26 RX ADMIN — DONEPEZIL HYDROCHLORIDE 5 MG: 5 TABLET, FILM COATED ORAL at 08:07

## 2023-05-26 RX ADMIN — ACETAMINOPHEN 1000 MG: 500 TABLET ORAL at 02:35

## 2023-05-26 RX ADMIN — SPIRONOLACTONE 25 MG: 25 TABLET ORAL at 08:07

## 2023-05-26 RX ADMIN — SODIUM CHLORIDE, POTASSIUM CHLORIDE, SODIUM LACTATE AND CALCIUM CHLORIDE 75 ML/HR: 600; 310; 30; 20 INJECTION, SOLUTION INTRAVENOUS at 05:46

## 2023-05-26 RX ADMIN — SENNOSIDES AND DOCUSATE SODIUM 2 TABLET: 50; 8.6 TABLET ORAL at 08:06

## 2023-05-26 RX ADMIN — SODIUM CHLORIDE 1 G: 900 INJECTION INTRAVENOUS at 03:39

## 2023-05-26 RX ADMIN — LEVOTHYROXINE SODIUM 25 MCG: 25 TABLET ORAL at 05:46

## 2023-05-26 RX ADMIN — Medication 10 ML: at 20:36

## 2023-05-26 RX ADMIN — CETIRIZINE HYDROCHLORIDE 10 MG: 10 TABLET, FILM COATED ORAL at 08:06

## 2023-05-26 RX ADMIN — OXCARBAZEPINE 600 MG: 150 TABLET, FILM COATED ORAL at 08:09

## 2023-05-26 RX ADMIN — BUDESONIDE AND FORMOTEROL FUMARATE DIHYDRATE 2 PUFF: 160; 4.5 AEROSOL RESPIRATORY (INHALATION) at 19:33

## 2023-05-26 RX ADMIN — PREGABALIN 25 MG: 25 CAPSULE ORAL at 08:07

## 2023-05-26 RX ADMIN — PANTOPRAZOLE SODIUM 40 MG: 40 TABLET, DELAYED RELEASE ORAL at 08:07

## 2023-05-26 RX ADMIN — SENNOSIDES AND DOCUSATE SODIUM 2 TABLET: 50; 8.6 TABLET ORAL at 20:35

## 2023-05-26 RX ADMIN — MEGESTROL ACETATE 20 MG: 20 TABLET ORAL at 08:06

## 2023-05-26 NOTE — PLAN OF CARE
Goal Outcome Evaluation:  Plan of Care Reviewed With: patient, family        Progress: improving  Outcome Evaluation: Miss Major had a productive day. Awake, alert to self only. Per nephew, this has been baseline mentation. Denied pain. Followed commands appropiately. Voiding without diffculty per purewick. Worked with therapy and was up in chair most of shift. Nephew brought in some foods that Miss major likes and she ate most of what was brought. Skin care completed to documented areas of concern. Fall precautions maintained. Plan of care discussed with nephew. Verbalized understanding.

## 2023-05-26 NOTE — PROGRESS NOTES
"                    Clinical Nutrition       Patient Name: Mitzi Eric  YOB: 1935  MRN: [id number]  Date of Encounter: 05/26/23 14:27 EDT  Admission date: 5/25/2023    Dietitian Comments:  Pt meets criteria for severe malnutrition in the context of chronic illness indicated by po intake <75% x >/=1 month, severe muscle wasting and subcutaneous fat loss. Of note, pt met criteria on 5/11/23.    Ordered Ensure BID.    Reason for Visit   MST score 2+, \"Unsure\" unintentional weight loss, Reduced oral intake    EMR Reviewed     EMR Reviewed: yes   Meds Reviewed: Megace    Admission Diagnosis:  Acute UTI [N39.0]    Problem List:    Acute UTI    Essential hypertension    Hypothyroidism (acquired)    Severe malnutrition (CMS/HCC)    Dementia without behavioral disturbance    chronic CHF (congestive heart failure) (HCC)    atrial fibrillation    Chronic respiratory failure    Moderate malnutrition (CMS/HCC)    Centrilobular emphysema    CKD (chronic kidney disease), stage III    JUAN F (acute kidney injury)      Anthropometric      Flowsheet Rows    Flowsheet Row First Filed Value   Admission Height 160 cm (62.99\") Documented at 05/25/2023 2324   Admission Weight 50.3 kg (111 lb) Documented at 05/25/2023 2324            Height: 160 cm (62.99\")  Last Filed Weight: Weight: 49.7 kg (109 lb 8 oz) (05/26/23 0545)  Weight Method: Bed scale  BMI: BMI (Calculated): 19.4  BMI classification: Normal: 18.5-24.9kg/m2 underweight for age    UBW: ~100lbs per EMR  Weight change: requested wt as able, pt is wheelchair bound. Would suspect bed wt is inaccurate given continued decline in po intake   Weight       Weight (kg) Weight (lbs) Weight Method Visit Report   6/14/2022 45.405 kg  100 lb 1.6 oz      6/22/2022 44.906 kg  99 lb   --    6/23/2022 45.36 kg  100 lb   --    9/13/2022 45.949 kg  101 lb 4.8 oz      12/13/2022 44.815 kg  98 lb 12.8 oz      12/21/2022 43.999 kg  97 lb   --    2/12/2023 47.628 kg  105 lb    "   4/14/2023 47.6 kg  104 lb 15 oz  Estimated     4/18/2023 42.185 kg  93 lb      5/8/2023 42.185 kg  93 lb  Stated     5/10/2023 50.44 kg  111 lb 3.2 oz  Bed scale      42.185 kg  93 lb  Stated     5/25/2023 50.349 kg  111 lb  Estimated     5/26/2023 49.669 kg  109 lb 8 oz  Bed scale            Reported/Observed/Food/Nutrition Related - Comments     Pt known to RD from previous admit, spoke w/pt's POA at bedside to confirm nutrition hx. POA agreeable to all previous preferences as discussed at last admit. POA states pts nutrition/po intake continues to decline-eating mainly 2 Ensure daily and picking at food. Observed pt ate a bite of banana pudding and 10 pieces of fruit off of fruit plate for lunch. Preferences updated and Ensure ordered. NKFA.     Nutrition Focused Physical Exam     Date: 5/26    Patient meets criteria for malnutrition diagnosis, see MSA note.     Current Nutrition Prescription     Diet: Regular/House Diet; Texture: Regular Texture (IDDSI 7); Fluid Consistency: Thin (IDDSI 0)  Orders Placed This Encounter      Dietary Nutrition Supplements Other (see comment); Ensure chocolate      DIET MESSAGE Per pt preference: no yogurt, cottage cheese, meatloaf, pot roast, pasta      DIET MESSAGE Please send small portions and fruit w/all meals      Average Intake from Charting: Insufficient data     Nutrition Diagnosis     Problem Malnutrition chronic severe   Etiology Functional decline   Signs/Symptoms Po intake <75% x >/=1 month, severe muscle wasting and subcutaneous fat loss   Status:    Actions     Follow treatment progress, Care plan reviewed, Interview for preferences, Encourage intake, Supplement provided    Chocolate Ensure BID  Small portions    Pt preferences:  Scrambled eggs, arreola, fruit, water for breakfast  Soup and salad with ice cream and water for lunch and dinner     Pt dislikes:  Pasta, meatloaf, pot roast, yogurt, cottage cheese, beverages other than water    Monitor Per  Protocol      Maryan Nguyễn RD,   Time Spent: 30

## 2023-05-26 NOTE — PLAN OF CARE
Goal Outcome Evaluation:  Plan of Care Reviewed With: patient, durable power of            Outcome Evaluation: Pt presents with strength, balance, and endurance below baseline, as well as impaired command following contributing to impairments in bed mobility, transfers, ambulation, and overall fxnl mobility. Pt will benefit from PT to address aforementioned deficits and return to PLOF. PT rec SNF upon dc.

## 2023-05-26 NOTE — H&P
Psychiatric Medicine Services  HISTORY AND PHYSICAL    Patient Name: Mitzi Eric  : 1935  MRN: [id number]  Primary Care Physician: Provider, No Known  Date of admission: 2023    Subjective   Subjective     Chief Complaint:  fall    HPI:  Mitzi Eric is an 87 y.o. female with a past medical history significant for CKD III, COPD (2L nightly) with known bilateral upper lobe masses, paroxysmal A-fib, dementia, HLD, HTN, heart failure, hypothyroidism, Trigeminal neuralgia, and dementia. She is resident at [facility]. Presents today after unwitnessed mechanical fall today wherein she sustained a laceration under left eye. There was no LOC. She is not anticoagulated. Facility was concerned about weakness and possible UTI. Was subsequently sent to ED for further evaluation and treatment. Patient is currently at baseline mentation. Is a poor historian and normally altered per dementia. Is ambulatory with a walker.  Currently there are no reports of fever, cough, congestion, SOB, or chest pain. No abdominal pain or N/V/D. No headache or focal weakness/parathesias. No back pain, dysuria or flank pain. Will admit to inpatient.      Review of Systems   Unable to perform ROS: Dementia            Personal History     Past Medical History:   Diagnosis Date   • A-fib    • Ataxic gait    • Chronic kidney disease, stage 3    • COPD (chronic obstructive pulmonary disease)    • Dementia    • Disease of thyroid gland    • Essential tremor 2022   • Heart failure    • Hyperlipidemia    • Hypertension    • Shingles              Past Surgical History:   Procedure Laterality Date   • BRAIN TUMOR EXCISION      BENIGN   • FRACTURE SURGERY     • TRIGGER FINGER RELEASE         Family History: family history includes Alcohol abuse in her father; Stroke in her mother.     Social History:  reports that she quit smoking about 43 years ago. Her smoking use included cigarettes. She  started smoking about 63 years ago. She has a 22.50 pack-year smoking history. She has never used smokeless tobacco. She reports that she does not drink alcohol and does not use drugs.  Social History     Social History Narrative   • Not on file       Medications:  OXcarbazepine, acetaminophen, albuterol sulfate HFA, aspirin, budesonide-formoterol, cetirizine, docusate sodium, donepezil, folic acid, levothyroxine, megestrol, metoprolol succinate XL, pantoprazole, pregabalin, spironolactone, torsemide, traMADol, and traZODone    Allergies   Allergen Reactions   • Bee Pollen Unknown - Low Severity   • Lorazepam Delirium       Objective   Objective     Vital Signs:   Temp:  [98 °F (36.7 °C)] 98 °F (36.7 °C)  Heart Rate:  [] 99  Resp:  [20] 20  BP: (124-130)/(47-85) 126/47  Flow (L/min):  [3] 3    Physical Exam   Constitutional: Awake, alert, underweight  Eyes: PERRLA, sclerae anicteric, no conjunctival injection  HENT: NCAT, mucous membranes moist  Neck: Supple, no thyromegaly, no lymphadenopathy, trachea midline  Respiratory: Clear to auscultation bilaterally, nonlabored respirations   Cardiovascular: RRR, no murmurs, rubs, or gallops, palpable pedal pulses bilaterally  Gastrointestinal: Positive bowel sounds, soft, nontender, nondistended  Musculoskeletal: No bilateral ankle edema, no clubbing or cyanosis to extremities  Psychiatric: Appropriate affect, cooperative  Neurologic: Oriented x 3, strength symmetric in all extremities, Cranial Nerves grossly intact to confrontation, speech clear  Skin: laceration under left eye      Result Review:  I have personally reviewed the results from the time of this admission to 5/26/2023 04:15 EDT and agree with these findings:  [x]  Laboratory list / accordion  []  Microbiology  []  Radiology  []  EKG/Telemetry   []  Cardiology/Vascular   []  Pathology  [x]  Old records  []  Other:  Most notable findings include: vitals currently stable. Creatinine 1.19. BUN 43. Alk phos  136. WBC 12.87. H/H 10.3 and 32 respectively. UA positive for acute infection. QTC prolonged at 629. CT head WNL    LAB RESULTS:      Lab 05/26/23  0052   WBC 12.87*   HEMOGLOBIN 10.3*   HEMATOCRIT 32.0*   PLATELETS 213   NEUTROS ABS 10.37*   IMMATURE GRANS (ABS) 0.13*   LYMPHS ABS 0.72   MONOS ABS 1.15*   EOS ABS 0.46*   MCV 96.1   PROCALCITONIN 0.06         Lab 05/26/23  0052   SODIUM 140   POTASSIUM 4.3   CHLORIDE 104   CO2 24.0   ANION GAP 12.0   BUN 43*   CREATININE 1.19*   EGFR 44.6*   GLUCOSE 102*   CALCIUM 8.8   MAGNESIUM 2.5*         Lab 05/26/23  0052   TOTAL PROTEIN 6.6   ALBUMIN 3.6   GLOBULIN 3.0   ALT (SGPT) 20   AST (SGOT) 31   BILIRUBIN 0.3   ALK PHOS 136*         Lab 05/26/23  0052   HSTROP T 21*                 Brief Urine Lab Results  (Last result in the past 365 days)      Color   Clarity   Blood   Leuk Est   Nitrite   Protein   CREAT   Urine HCG        05/26/23 0239 Yellow   Cloudy   Large (3+)   Moderate (2+)   Positive   30 mg/dL (1+)               Microbiology Results (last 10 days)     Procedure Component Value - Date/Time    COVID PRE-OP / PRE-PROCEDURE SCREENING ORDER (NO ISOLATION) - Swab, Nasopharynx [[id number]]  (Normal) Collected: 05/18/23 1731    Lab Status: Final result Specimen: Swab from Nasopharynx Updated: 05/18/23 1807    Narrative:      The following orders were created for panel order COVID PRE-OP / PRE-PROCEDURE SCREENING ORDER (NO ISOLATION) - Swab, Nasopharynx.  Procedure                               Abnormality         Status                     ---------                               -----------         ------                     COVID-19 and FLU A/B PCR...[[id number]]  Normal              Final result                 Please view results for these tests on the individual orders.    COVID-19 and FLU A/B PCR - Swab, Nasopharynx [[id number]]  (Normal) Collected: 05/18/23 1731    Lab Status: Final result Specimen: Swab from Nasopharynx Updated: 05/18/23 1807     COVID19 Not  Detected     Influenza A PCR Not Detected     Influenza B PCR Not Detected    Narrative:      Fact sheet for providers: https://www.fda.gov/media/336050/download    Fact sheet for patients: https://www.fda.gov/media/858291/download    Test performed by PCR.          CT Head Without Contrast    Result Date: 5/26/2023  EXAMINATION: CT HEAD WO CONTRAST DATE: 5/26/2023 12:33 AM  INDICATION: Fall.  COMPARISON: CT head, 5/10/2023  TECHNIQUE: Thin section noncontrast axial images were obtained through the head. Coronal reformatted images were created.  CT dose lowering techniques were used, to include: automated exposure control, adjustment for patient size, and or use of iterative  reconstruction. FINDINGS: Intracranial contents: Unchanged left frontal encephalomalacia and right parafalcine meningioma. Generalized parenchymal volume loss without lobar predominance. No hydrocephalus.   Patchy regions of hypoattenuation within periventricular and peripheral white matter most commonly represent chronic microangiopathy.  There is no midline shift or mass effect. No hemorrhage, mass lesion, or evidence of evolving large territorial infarct. Atherosclerosis. Bones and extracranial soft tissues: Right frontal craniotomy.  Orbits unremarkable. The visualized paranasal sinuses are clear. No mastoid or middle ear effusions.     Impression: 1.  No acute intracranial abnormality. 2.  Unchanged chronic findings as described. Electronically signed by:  Dieudonne Seaman M.D.  5/25/2023 11:57 PM Mountain Time    CT Cervical Spine Without Contrast    Result Date: 5/26/2023  CT CERVICAL SPINE WO CONTRAST Date of Examination: 5/26/2023 12:33 AM History:  Fall, neck pain Technique: Noncontrast cervical spine CT with coronal and sagittal reformats.  CT dose lowering techniques were used, to include: automated exposure control, adjustment for patient size, and or use of iterative reconstruction. Comparison:  Cervical spine CT from 5/8/2023  Findings: No cervical spine fracture. 10% retrolisthesis of C3 on C4, 10% anterolisthesis of C4 on C5, 20% anterolisthesis of C5 on C6, and 20% anterolisthesis of C7 on T1, unchanged from prior. Diffuse severe bilateral facet arthrosis again noted. Bilateral carotid atherosclerosis. Extensive consolidation in the bilateral lung apices unchanged from the recent prior, this is only partially visualized and incompletely evaluated.     Impression: 1. No cervical spine fracture. 2. Extensive degenerative changes unchanged from recent prior. 3. Consolidation in the bilateral lung apices unchanged from the recent prior, only partially visualized and incompletely evaluated. This examination was interpreted by Jonas Brooks M.D. Electronically signed by:  Jonas Brooks M.D.  5/26/2023 12:17 AM Mountain Time      Results for orders placed during the hospital encounter of 03/17/22    Adult Transthoracic Echo Complete W/ Cont if Necessary Per Protocol    Interpretation Summary  · Left ventricular ejection fraction appears to be 46 - 50%. Left ventricular systolic function is mildly decreased.  · Left ventricular diastolic function is consistent with (grade I) impaired relaxation.  · Mild to moderate mitral regurgitation.  · Mild to moderate tricuspid regurgitation, RVSP 54 mmHg.      Assessment & Plan   Assessment & Plan       Acute UTI    JUAN F (acute kidney injury)    Essential hypertension    chronic CHF (congestive heart failure) (HCC)    atrial fibrillation    Chronic respiratory failure    Centrilobular emphysema    CKD (chronic kidney disease), stage III    Hypothyroidism (acquired)    Severe malnutrition (CMS/HCC)    Dementia without behavioral disturbance    Moderate malnutrition (CMS/HCC)      Fall  - CT Head w/o negative   - CT cervical   - PT/OT  - nutrition  - wound care as needed    Acute UTI  - started on rocephin  - blood and urine culture pending  - lactic acid pending  - previous cultures isolated E.  coli     Acute on Chronic Renal Failure Stage 3-resolved  - Cr 1.19 on admission, baseline 0.9  - Secondary to dehydration, s/p IVF     Slight Leukocytosis, resolved  - UA negative  - currently on 2L, prior to admission only worse 2L at night     Left Apical Lung mass with fluid-necrotic components/cavitary appearance   Right Upper lobe lung mass   Hx COPD  - Patient/POA have been aware of this and not interested in further work-up  - On 2L NC currently, normally on RA during the day and 2L at night per Nephew      Combined systolic and diastolic heart failure  Moderate pulm hypertension  - Compensated, monitored with IVF   - continue home Demadex/Spironolactone      Paroxysmal atrial fibrillation  - continue home Metoprolol  - not anticoagulated secondary to fall risk     Hypothyroidism  - Continue Synthroid     Trigeminal neuralgia  -Continue Trileptal and Lyrica     Hypertension  -continue home medications     Dementia  -Continue Aricept    Anemia  - H/h stable and at baseline    DVT prophylaxis: mechanical    CODE STATUS: DNR  Medical Intervention Limits: NO intubation (DNI)  Code Status (Patient has no pulse and is not breathing): No CPR (Do Not Attempt to Resuscitate)  Medical Interventions (Patient has pulse or is breathing): Limited Support      Expected DischargeTBD  Expected discharge date/ time has not been documented.      This note has been completed as part of a split-shared workflow.     Electronically signed by Clinton Allen PA-C, 05/26/23, 4:46 AM EDT.    Total time spent: 90  Time spent includes time reviewing chart, face-to-face time, counseling patient/family/caregiver, ordering medications/tests/procedures, communicating with other health care professionals, documenting clinical information in the electronic health record, and coordination of care.

## 2023-05-26 NOTE — THERAPY EVALUATION
Patient Name: iMtzi Eric  : 1935    MRN: [id number]                              Today's Date: 2023       Admit Date: 2023    Visit Dx:     ICD-10-CM ICD-9-CM   1. Acute UTI  N39.0 599.0   2. Fall, initial encounter  W19.XXXA E888.9   3. Acute kidney injury  N17.9 584.9   4. History of hypertension  Z86.79 V12.59   5. History of hypothyroidism  Z86.39 V12.29   6. History of COPD  Z87.09 V12.69   7. History of chronic kidney disease  Z87.448 V13.09   8. History of dementia  Z86.59 V11.8   9. History of atrial fibrillation  Z86.79 V12.59   10. History of CHF (congestive heart failure)  Z86.79 V12.59   11. History of hyperlipidemia  Z86.39 V12.29     Patient Active Problem List   Diagnosis   • UTI (urinary tract infection), bacterial   • Essential hypertension   • Cognitive decline   • Trigeminal neuralgia   • Hypothyroidism (acquired)   • Lung mass   • Influenza A   • Severe malnutrition (CMS/HCC)   • Dementia without behavioral disturbance   • History of craniotomy   • Impaired functional mobility, balance, gait, and endurance   • COPD ex with volume overload   • chronic CHF (congestive heart failure) ([facility])   • Thrombocytopenia   • Elevated transaminase level   • atrial fibrillation   • COPD with acute exacerbation   • Acute on chronic respiratory failure with hypoxemia   • Chronic respiratory failure   • Underweight   • Moderate malnutrition (CMS/HCC)   • Centrilobular emphysema   • Essential tremor   • Pneumonia of both upper lobes due to infectious organism   • CKD (chronic kidney disease), stage III   • JUAN F (acute kidney injury)   • Acute UTI     Past Medical History:   Diagnosis Date   • A-fib    • Ataxic gait    • Chronic kidney disease, stage 3    • COPD (chronic obstructive pulmonary disease)    • Dementia    • Disease of thyroid gland    • Essential tremor 2022   • Heart failure    • Hyperlipidemia    • Hypertension    • Shingles      Past Surgical History:   Procedure Laterality  Date   • BRAIN TUMOR EXCISION      BENIGN   • FRACTURE SURGERY     • TRIGGER FINGER RELEASE        General Information     Row Name 05/26/23 1308          Physical Therapy Time and Intention    Document Type evaluation  -KR     Mode of Treatment physical therapy  -KR     Row Name 05/26/23 1308          General Information    Patient Profile Reviewed yes  -KR     Prior Level of Function --   Nephew in room providing hx: pt lived at [facility] where she was I with bed <> wc transfer, I with wc mobility. Recent falls at , thus transfer to SNF where she required 1-person assist with mobility. Pt has been non-ambulatory since 2020.  -KR     Existing Precautions/Restrictions fall;oxygen therapy device and L/min;other (see comments)  dementia  -KR     Barriers to Rehab medically complex;previous functional deficit;cognitive status  -KR     Row Name 05/26/23 1308          Living Environment    People in Home facility resident  -KR     Row Name 05/26/23 1308          Home Main Entrance    Number of Stairs, Main Entrance none  -KR     Row Name 05/26/23 1308          Stairs Within Home, Primary    Number of Stairs, Within Home, Primary none  -KR     Row Name 05/26/23 1308          Cognition    Orientation Status (Cognition) oriented to;person;disoriented to;time;place  -KR     Row Name 05/26/23 1308          Safety Issues, Functional Mobility    Safety Issues Affecting Function (Mobility) ability to follow commands;awareness of need for assistance;friction/shear risk;insight into deficits/self-awareness;judgment;problem-solving;safety precaution awareness;sequencing abilities;safety precautions follow-through/compliance  -KR     Impairments Affecting Function (Mobility) balance;cognition;endurance/activity tolerance;strength  -KR     Cognitive Impairments, Mobility Safety/Performance attention;awareness, need for assistance;insight into deficits/self-awareness;judgment;problem-solving/reasoning;sequencing abilities;safety  precaution follow-through;safety precaution awareness  -KR           User Key  (r) = Recorded By, (t) = Taken By, (c) = Cosigned By    Initials Name Provider Type    Marybeth Castaneda PT Physical Therapist               Mobility     Row Name 05/26/23 1412          Bed Mobility    Bed Mobility supine-sit  -KR     Supine-Sit [place] (Bed Mobility) moderate assist (50% patient effort);2 person assist  -KR     Assistive Device (Bed Mobility) bed rails;head of bed elevated;draw sheet  -KR     Row Name 05/26/23 1412          Bed-Chair Transfer    Bed-Chair [place] (Transfers) moderate assist (50% patient effort);2 person assist  -KR     Assistive Device (Bed-Chair Transfers) other (see comments)  BUE support  -KR     Comment, (Bed-Chair Transfer) lateral steps to chair. VCs for sequencing and safety awareness.  -KR     Row Name 05/26/23 1412          Sit-Stand Transfer    Sit-Stand [place] (Transfers) moderate assist (50% patient effort);2 person assist  -KR     Assistive Device (Sit-Stand Transfers) other (see comments)  BUE support  -KR     Comment, (Sit-Stand Transfer) 1x from bed, 1x from chair.  -KR     Row Name 05/26/23 1412          Gait/Stairs (Locomotion)    [place] Level (Gait) minimum assist (75% patient effort);2 person assist  -KR     Assistive Device (Gait) other (see comments)  HHA x 2  -KR     Distance in Feet (Gait) 6  -KR     Deviations/Abnormal Patterns (Gait) mik decreased;gait speed decreased;stride length decreased;weight shifting decreased;base of support, narrow;bilateral deviations  -KR     Bilateral Gait Deviations forward flexed posture;heel strike decreased  -KR     Comment, (Gait/Stairs) VCs for upright posture and sequencing.  -KR           User Key  (r) = Recorded By, (t) = Taken By, (c) = Cosigned By    Initials Name Provider Type    Marybeth Castaneda PT Physical Therapist               Obj/Interventions     Row Name 05/26/23 1413          Range of Motion  Comprehensive    General Range of Motion bilateral lower extremity ROM WNL  -KR     Row Name 05/26/23 1413          Strength Comprehensive (MMT)    General Manual Muscle Testing (MMT) Assessment lower extremity strength deficits identified  -KR     Comment, General Manual Muscle Testing (MMT) Assessment unable to perform formal MMT d/t impaired command following, however BLEs appear grossly at least 3+/5  -KR     Row Name 05/26/23 1413          Balance    Balance Assessment sitting static balance;sitting dynamic balance;standing static balance;standing dynamic balance  -KR     Static Sitting Balance supervision  -KR     Dynamic Sitting Balance contact guard  -KR     Position, Sitting Balance unsupported;sitting in chair;sitting edge of bed  -KR     Static Standing Balance contact guard  -KR     Dynamic Standing Balance 2-person assist;minimal assist  -KR     Position/Device Used, Standing Balance supported;other (see comments)  HHA x 2  -KR     Row Name 05/26/23 1413          Sensory Assessment (Somatosensory)    Sensory Assessment (Somatosensory) unable/difficult to assess;other (see comments)  unable to formally assess d/t cognition, however light touch sensation appears intact  -KR           User Key  (r) = Recorded By, (t) = Taken By, (c) = Cosigned By    Initials Name Provider Type    KR Marybeth Robertson, PT Physical Therapist               Goals/Plan     Row Name 05/26/23 1416          Bed Mobility Goal 1 (PT)    Activity/Assistive Device (Bed Mobility Goal 1, PT) bed mobility activities, all  -KR     [place] Level/Cues Needed (Bed Mobility Goal 1, PT) supervision required  -KR     Time Frame (Bed Mobility Goal 1, PT) long term goal (LTG);2 weeks  -KR     Row Name 05/26/23 1416          Transfer Goal 1 (PT)    Activity/Assistive Device (Transfer Goal 1, PT) sit-to-stand/stand-to-sit;bed-to-chair/chair-to-bed  -KR     [place] Level/Cues Needed (Transfer Goal 1, PT) contact guard required  -KR     Time  Frame (Transfer Goal 1, PT) long term goal (LTG);2 weeks  -KR     Row Name 05/26/23 1416          Gait Training Goal 1 (PT)    Activity/Assistive Device (Gait Training Goal 1, PT) gait (walking locomotion);improve balance and speed;increase endurance/gait distance;assistive device use;walker, rolling  -KR     [place] Level (Gait Training Goal 1, PT) contact guard required  -KR     Distance (Gait Training Goal 1, PT) 25  -KR     Time Frame (Gait Training Goal 1, PT) long term goal (LTG);2 weeks  -KR     Row Name 05/26/23 1416          Therapy Assessment/Plan (PT)    Planned Therapy Interventions (PT) balance training;bed mobility training;gait training;home exercise program;manual therapy techniques;postural re-education;patient/family education;neuromuscular re-education;motor coordination training;ROM (range of motion);strengthening;stretching;transfer training;wheelchair management/propulsion training  -KR           User Key  (r) = Recorded By, (t) = Taken By, (c) = Cosigned By    Initials Name Provider Type    KR Marybeth Robertson, PT Physical Therapist               Clinical Impression     Row Name 05/26/23 1414          Pain    Pretreatment Pain Rating 0/10 - no pain  -KR     Posttreatment Pain Rating 0/10 - no pain  -KR     Row Name 05/26/23 1414          Plan of Care Review    Plan of Care Reviewed With patient;durable power of   -KR     Outcome Evaluation Pt presents with strength, balance, and endurance below baseline, as well as impaired command following contributing to impairments in bed mobility, transfers, ambulation, and overall fxnl mobility. Pt will benefit from PT to address aforementioned deficits and return to PLOF. PT rec SNF upon dc.  -KR     Row Name 05/26/23 1414          Therapy Assessment/Plan (PT)    Rehab Potential (PT) fair, will monitor progress closely  -KR     Criteria for Skilled Interventions Met (PT) yes;skilled treatment is necessary  -KR     Therapy Frequency (PT)  daily  -KR     Predicted Duration of Therapy Intervention (PT) 2 weeks  -KR     Row Name 05/26/23 1414          Vital Signs    Pre Systolic BP Rehab 122  -KR     Pre Treatment Diastolic BP 66  -KR     Pretreatment Heart Rate (beats/min) 81  -KR     Pre SpO2 (%) 96  -KR     O2 Delivery Pre Treatment nasal cannula  -KR     Intra SpO2 (%) 91  -KR     O2 Delivery Intra Treatment nasal cannula  -KR     O2 Delivery Post Treatment nasal cannula  -KR     Pre Patient Position Supine  -KR     Intra Patient Position Standing  -KR     Post Patient Position Sitting  -KR     Row Name 05/26/23 1414          Positioning and Restraints    Pre-Treatment Position in bed  -KR     Post Treatment Position chair  -KR     In Chair notified nsg;call light within reach;encouraged to call for assist;exit alarm on;with family/caregiver;waffle cushion;with OT;reclined;waffle boot/both;legs elevated;heels elevated  -KR           User Key  (r) = Recorded By, (t) = Taken By, (c) = Cosigned By    Initials Name Provider Type    Mayrbeth Castaneda, PT Physical Therapist               Outcome Measures     Row Name 05/26/23 1417 05/26/23 0807       How much help from another person do you currently need...    Turning from your back to your side while in flat bed without using bedrails? 3  -KR 3  -JS    Moving from lying on back to sitting on the side of a flat bed without bedrails? 2  -KR 2  -JS    Moving to and from a bed to a chair (including a wheelchair)? 2  -KR 1  -JS    Standing up from a chair using your arms (e.g., wheelchair, bedside chair)? 3  -KR 1  -JS    Climbing 3-5 steps with a railing? 1  -KR 1  -JS    To walk in hospital room? 3  -KR 1  -JS    AM-PAC 6 Clicks Score (PT) 14  -KR 9  -JS    Highest level of mobility 4 --> Transferred to chair/commode  -KR 3 --> Sat at edge of bed  -JS          User Key  (r) = Recorded By, (t) = Taken By, (c) = Cosigned By    Initials Name Provider Type    Rowdy Swan, RN Registered Nurse    ARLENE  Marybeth Robertson, PT Physical Therapist                             Physical Therapy Education     Title: PT OT SLP Therapies (Done)     Topic: Physical Therapy (Done)     Point: Mobility training (Done)     Learning Progress Summary           Patient Acceptance, E, NR,VU by KR at 5/26/2023 1417   Family Acceptance, E, NR,VU by KR at 5/26/2023 1417                   Point: Home exercise program (Done)     Learning Progress Summary           Patient Acceptance, E, NR,VU by KR at 5/26/2023 1417   Family Acceptance, E, NR,VU by KR at 5/26/2023 1417                   Point: Body mechanics (Done)     Learning Progress Summary           Patient Acceptance, E, NR,VU by KR at 5/26/2023 1417   Family Acceptance, E, NR,VU by KR at 5/26/2023 1417                   Point: Precautions (Done)     Learning Progress Summary           Patient Acceptance, E, NR,VU by KR at 5/26/2023 1417   Family Acceptance, E, NR,VU by KR at 5/26/2023 1417                               User Key     Initials Effective Dates Name Provider Type Discipline     12/30/22 -  Marybeth Robertson, PT Physical Therapist PT              PT Recommendation and Plan  Planned Therapy Interventions (PT): balance training, bed mobility training, gait training, home exercise program, manual therapy techniques, postural re-education, patient/family education, neuromuscular re-education, motor coordination training, ROM (range of motion), strengthening, stretching, transfer training, wheelchair management/propulsion training  Plan of Care Reviewed With: patient, durable power of   Outcome Evaluation: Pt presents with strength, balance, and endurance below baseline, as well as impaired command following contributing to impairments in bed mobility, transfers, ambulation, and overall fxnl mobility. Pt will benefit from PT to address aforementioned deficits and return to PLOF. PT rec SNF upon dc.     Time Calculation:    PT Charges     Row Name 05/26/23 8264              Time Calculation    Start Time 1308  -KR      PT Received On 05/26/23  -KR      PT Goal Re-Cert Due Date 06/05/23  -KR         Untimed Charges    PT Eval/Re-eval Minutes 50  -KR         Total Minutes    Untimed Charges Total Minutes 50  -KR       Total Minutes 50  -KR            User Key  (r) = Recorded By, (t) = Taken By, (c) = Cosigned By    Initials Name Provider Type    Marybeth Castaneda, PT Physical Therapist              Therapy Charges for Today     Code Description Service Date Service Provider Modifiers Qty    [id number] HC PT EVAL LOW COMPLEXITY 4 5/26/2023 Marybeth Robertson, PT GP 1          PT G-Codes  AM-PAC 6 Clicks Score (PT): 14  PT Discharge Summary  Anticipated Discharge Disposition (PT): skilled nursing facility    Marybeth Robertson PT  5/26/2023

## 2023-05-26 NOTE — PROGRESS NOTES
"    [facility] Medicine Services  PROGRESS NOTE    Patient Name: Mitzi Eric  : 1935  MRN: [id number]    Date of Admission: 2023  Primary Care Physician: Provider, No Known    Subjective   Subjective     CC:   Fall at SNF    HPI:  Sitting up, cheerful, \"Well it is very nice to meet you!\"  Has trouble telling me who is at the bedside (her POA nephew whom she knows well).      ROS:  Denies headache.      Objective   Objective     Vital Signs:   Temp:  [97.8 °F (36.6 °C)-98.3 °F (36.8 °C)] 97.8 °F (36.6 °C)  Heart Rate:  [] 86  Resp:  [16-20] 18  BP: (121-133)/(47-97) 125/65  Flow (L/min):  [2-3] 2     Physical Exam:  Gen:  Thin elderly woman sitting up in chair.  POA present.  She is NAD and steadily eating a bowl of tuna, feeding herself without difficulty   Neuro: alert , not oriented to place.  Speech is clear   HEENT:  NC; small lac L face  Neck:  Supple, no LAD  Heart RRR  Abd:  Soft, nontender, no rebound or guarding, pos BS  Extrem:  No c/c/e      Results Reviewed:  LAB RESULTS:      Lab 23   WBC 8.45  --  12.87*   HEMOGLOBIN 9.8*  --  10.3*   HEMATOCRIT 31.5*  --  32.0*   PLATELETS 183  --  213   NEUTROS ABS  --   --  10.37*   IMMATURE GRANS (ABS)  --   --  0.13*   LYMPHS ABS  --   --  0.72   MONOS ABS  --   --  1.15*   EOS ABS  --   --  0.46*   MCV 99.1*  --  96.1   PROCALCITONIN  --   --  0.06   LACTATE  --  0.8  --          Lab 23  005   SODIUM 140 140   POTASSIUM 4.6 4.3   CHLORIDE 107 104   CO2 23.0 24.0   ANION GAP 10.0 12.0   BUN 38* 43*   CREATININE 1.07* 1.19*   EGFR 50.7* 44.6*   GLUCOSE 100* 102*   CALCIUM 8.5* 8.8   MAGNESIUM  --  2.5*         Lab 23   TOTAL PROTEIN 6.6   ALBUMIN 3.6   GLOBULIN 3.0   ALT (SGPT) 20   AST (SGOT) 31   BILIRUBIN 0.3   ALK PHOS 136*         Lab 23   HSTROP T 21*                 Brief Urine Lab Results  (Last result in the past 365 days)    "   Color   Clarity   Blood   Leuk Est   Nitrite   Protein   CREAT   Urine HCG        05/26/23 0239 Yellow   Cloudy   Large (3+)   Moderate (2+)   Positive   30 mg/dL (1+)                 Microbiology Results Abnormal     None          CT Head Without Contrast    Result Date: 5/26/2023  EXAMINATION: CT HEAD WO CONTRAST DATE: 5/26/2023 12:33 AM  INDICATION: Fall.  COMPARISON: CT head, 5/10/2023  TECHNIQUE: Thin section noncontrast axial images were obtained through the head. Coronal reformatted images were created.  CT dose lowering techniques were used, to include: automated exposure control, adjustment for patient size, and or use of iterative  reconstruction. FINDINGS: Intracranial contents: Unchanged left frontal encephalomalacia and right parafalcine meningioma. Generalized parenchymal volume loss without lobar predominance. No hydrocephalus.   Patchy regions of hypoattenuation within periventricular and peripheral white matter most commonly represent chronic microangiopathy.  There is no midline shift or mass effect. No hemorrhage, mass lesion, or evidence of evolving large territorial infarct. Atherosclerosis. Bones and extracranial soft tissues: Right frontal craniotomy.  Orbits unremarkable. The visualized paranasal sinuses are clear. No mastoid or middle ear effusions.     Impression: 1.  No acute intracranial abnormality. 2.  Unchanged chronic findings as described. Electronically signed by:  Dieudonne Seaman M.D.  5/25/2023 11:57 PM Mountain Time    CT Cervical Spine Without Contrast    Result Date: 5/26/2023  CT CERVICAL SPINE WO CONTRAST Date of Examination: 5/26/2023 12:33 AM History:  Fall, neck pain Technique: Noncontrast cervical spine CT with coronal and sagittal reformats.  CT dose lowering techniques were used, to include: automated exposure control, adjustment for patient size, and or use of iterative reconstruction. Comparison:  Cervical spine CT from 5/8/2023 Findings: No cervical spine  fracture. 10% retrolisthesis of C3 on C4, 10% anterolisthesis of C4 on C5, 20% anterolisthesis of C5 on C6, and 20% anterolisthesis of C7 on T1, unchanged from prior. Diffuse severe bilateral facet arthrosis again noted. Bilateral carotid atherosclerosis. Extensive consolidation in the bilateral lung apices unchanged from the recent prior, this is only partially visualized and incompletely evaluated.     Impression: 1. No cervical spine fracture. 2. Extensive degenerative changes unchanged from recent prior. 3. Consolidation in the bilateral lung apices unchanged from the recent prior, only partially visualized and incompletely evaluated. This examination was interpreted by Jonas Brooks M.D. Electronically signed by:  Jonas Brooks M.D.  5/26/2023 12:17 AM Mountain Time      Results for orders placed during the hospital encounter of 03/17/22    Adult Transthoracic Echo Complete W/ Cont if Necessary Per Protocol    Interpretation Summary  · Left ventricular ejection fraction appears to be 46 - 50%. Left ventricular systolic function is mildly decreased.  · Left ventricular diastolic function is consistent with (grade I) impaired relaxation.  · Mild to moderate mitral regurgitation.  · Mild to moderate tricuspid regurgitation, RVSP 54 mmHg.      Current medications:  Scheduled Meds:budesonide-formoterol, 2 puff, Inhalation, BID - RT  [START ON 5/27/2023] cefTRIAXone, 1 g, Intravenous, Q24H  cetirizine, 10 mg, Oral, Daily  donepezil, 5 mg, Oral, Daily  levothyroxine, 25 mcg, Oral, Q AM  megestrol, 20 mg, Oral, Daily  metoprolol succinate XL, 25 mg, Oral, Daily  OXcarbazepine, 600 mg, Oral, BID  pantoprazole, 40 mg, Oral, Daily  pregabalin, 25 mg, Oral, BID  senna-docusate sodium, 2 tablet, Oral, BID  sodium chloride, 10 mL, Intravenous, Q12H  spironolactone, 25 mg, Oral, Daily  torsemide, 30 mg, Oral, Daily      Continuous Infusions:lactated ringers, 75 mL/hr, Last Rate: 75 mL/hr (05/26/23 0546)      PRN  Meds:.•  acetaminophen  •  albuterol  •  senna-docusate sodium **AND** polyethylene glycol **AND** bisacodyl **AND** bisacodyl  •  sodium chloride  •  sodium chloride  •  traZODone    Assessment & Plan   Assessment & Plan     Active Hospital Problems    Diagnosis  POA   • **Acute UTI [N39.0]  Yes   • CKD (chronic kidney disease), stage III [N18.30]  Yes   • JUAN F (acute kidney injury) [N17.9]  Yes   • Centrilobular emphysema [J43.2]  Yes   • Moderate malnutrition (CMS/HCC) [E44.0]  Yes   • Chronic respiratory failure [J96.10]  Yes   • chronic CHF (congestive heart failure) (HCC) [I50.9]  Yes   • atrial fibrillation [I48.91]  Yes   • Dementia without behavioral disturbance [F03.90]  Yes   • Severe malnutrition (CMS/HCC) [E43]  Yes   • Hypothyroidism (acquired) [E03.9]  Yes   • Essential hypertension [I10]  Yes      Resolved Hospital Problems   No resolved problems to display.        Brief Hospital Course to date:  Mitzi Eric is a 87 y.o. female with a past medical history significant for CKD III, COPD (2L nightly) with known bilateral upper lobe masses, paroxysmal A-fib, HLD, HTN, heart failure, hypothyroidism, Trigeminal neuralgia, and dementia. She is resident at [facility]. Presented to ED after unwitnessed mechanical fall today wherein she sustained a laceration under left eye.     Notably, she was just here 5/10 to 5/19 for a fall. Wa discharged to [facility].      Fall  - lac under left eye   - CT Head and CSpine negative      Acute UTI  - rocephin day 2  - blood and urine culture pending     Acute on Chronic Renal Failure Stage 3-resolved  - Cr 1.19 on admission, improved w fluids     Left Apical Lung mass with fluid-necrotic components/cavitary appearance   Right Upper lobe lung mass   Hx COPD  - Patient/POA have been aware of this and not interested in further work-up  - On 2L NC currently, normally on RA during the day and 2L at night per Nephew      Combined systolic and diastolic heart  failure  Moderate pulm hypertension  - Compensated, monitored with IVF   - continue home Demadex/Spironolactone      Paroxysmal atrial fibrillation  - continue home Metoprolol  - not anticoagulated secondary to fall risk     Hypothyroidism  - Continue Synthroid     Trigeminal neuralgia  -Continue Trileptal and Lyrica     Hypertension  -continue home medications     Dementia  -Continue Aricept     Anemia  - H/h stable and at baseline     Expected Discharge Location and Transportation: back to [facility]  Expected Discharge Saturday   Expected Discharge Date: 5/27/2023; Expected Discharge Time:      DVT prophylaxis:  No DVT prophylaxis order currently exists.     AM-PAC 6 Clicks Score (PT): 14 (05/26/23 1724)    CODE STATUS:   Code Status and Medical Interventions:   Ordered at: 05/26/23 6250     Medical Intervention Limits:    NO intubation (DNI)     Code Status (Patient has no pulse and is not breathing):    No CPR (Do Not Attempt to Resuscitate)     Medical Interventions (Patient has pulse or is breathing):    Limited Support       Ame Cuevas MD  05/26/23

## 2023-05-26 NOTE — PROGRESS NOTES
Malnutrition Severity Assessment    Patient Name:  Mitzi Eric  YOB: 1935  MRN: [id number]  Admit Date:  5/25/2023    Patient meets criteria for : Severe Malnutrition    Comments:  Pt meets criteria for severe malnutrition in the context of chronic illness indicated by po intake <75% x >/=1 month, severe muscle wasting and subcutaneous fat loss. Of note, pt met criteria on 5/11/23.    Malnutrition Severity Assessment  Malnutrition Type: Chronic Disease - Related Malnutrition  Malnutrition Type (last 8 hours)     Malnutrition Severity Assessment     Row Name 05/26/23 [facility]       Malnutrition Severity Assessment    Malnutrition Type Chronic Disease - Related Malnutrition    Row Name 05/26/23 1435       Insufficient Energy Intake     Insufficient Energy Intake Findings Severe    Insufficient Energy Intake  <75% of est. energy requirement for > or equal to 1 month    Row Name 05/26/23 1435       Muscle Loss    Loss of Muscle Mass Findings Severe    Gnosticist Region Severe - deep hollowing/scooping, lack of muscle to touch, facial bones well defined    Clavicle Bone Region Severe - protruding prominent bone    Acromion Bone Region Severe - squared shoulders, bones, and acromion process protrusion prominent    Scapular Bone Region Severe - prominent bones, depressions easily visible between ribs, scapula, spine, shoulders    Dorsal Hand Region Severe - prominent depression    Patellar Region Severe - prominent bone, square looking, very little muscle definition    Anterior Thigh Region Severe - line/depression along thigh, obviously thin    Posterior Calf Region Severe - thin with very little definition/firmness    Row Name 05/26/23 1435       Fat Loss    Subcutaneous Fat Loss Findings Severe    Orbital Region  Severe - pronounced hollowness/depression, dark circles, loose saggy skin    Upper Arm Region Severe - mostly skin, very little space between folds, fingers touch    Row Name 05/26/23 1434        Criteria Met (Must meet criteria for severity in at least 2 of these categories: M Wasting, Fat Loss, Fluid, Secondary Signs, Wt. Status, Intake)    Patient meets criteria for  Severe Malnutrition                Electronically signed by:  Maryan Nguyễn RD  05/26/23 14:35 EDT

## 2023-05-26 NOTE — CASE MANAGEMENT/SOCIAL WORK
Discharge Planning Assessment  [facility]     Patient Name: Mitzi Eric  MRN: [id number]  Today's Date: 5/26/2023    Admit Date: 5/25/2023    Plan: Needs new precert to return back to [facility], here thru [facility]   Discharge Needs Assessment    No documentation.                Discharge Plan     Row Name 05/26/23 1506       Plan    Plan Needs new precert to return back to [facility], here thru [facility]    Plan Comments I finally got update from [facility] at 1507 and they need a new insurance approval for patient to return. Insurance submitted this afternoon. So, patient is here thru [facility] and Monday. CM will f/u with Toyah on Tuesday about insurance approval and patient will need a Covid test and stretcher transport. CM working.    Final Discharge Disposition Code 03 - skilled nursing facility (SNF)              Continued Care and Services - Admitted Since 5/25/2023     Destination     Service Provider Request Status Selected Services Address Phone Fax Patient Preferred    [facility] Pending - No Request Sent N/A [address] [address] [phone] --            Selected Continued Care - Prior Encounters Includes continued care and service providers with selected services from prior encounters from 2/24/2023 to 5/26/2023    Discharged on 5/19/2023 Admission date: 5/10/2023 - Discharge disposition: Skilled Nursing Facility (DC - External)    Destination     Service Provider Selected Services Address Phone Fax Patient Preferred    [facility] [address] [address] [phone] --                       Demographic Summary    No documentation.                Functional Status    No documentation.                Psychosocial    No documentation.                Abuse/Neglect    No documentation.                Legal    No documentation.                Substance Abuse    No documentation.                Patient Forms    No  documentation.                   Ling Hale RN

## 2023-05-26 NOTE — ED PROVIDER NOTES
" EMERGENCY DEPARTMENT ENCOUNTER    Pt Name: Mitzi Eric  MRN: [id number]  Pt :   1935  Room Number:    Date of encounter:  2023  PCP: Provider, No Known  ED Provider: OZIEL Peterson    Historian: Patient, EMS, nursing home staff    HPI:  Chief Complaint: Fall    Context: Mitzi Eric is a 87 y.o. female who presents to the ED for evaluation following a fall. Per reports from EMS and nursing home facility patient experienced an unwitnessed fall this evening. She complains of a headache. Patient is not on blood thinners.  Per report from staff patient is at her normal baseline mental status.  Patient was not a reliable historian secondary to history of dementia.   HPI     REVIEW OF SYSTEMS  A chief complaint appropriate review of systems was completed and is negative except as noted in the HPI.     PAST MEDICAL HISTORY  Past Medical History:   Diagnosis Date   • A-fib    • Ataxic gait    • Chronic kidney disease, stage 3    • COPD (chronic obstructive pulmonary disease)    • Dementia    • Disease of thyroid gland    • Essential tremor 2022   • Heart failure    • Hyperlipidemia    • Hypertension    • Shingles        PAST SURGICAL HISTORY  Past Surgical History:   Procedure Laterality Date   • BRAIN TUMOR EXCISION      BENIGN   • FRACTURE SURGERY     • TRIGGER FINGER RELEASE         FAMILY HISTORY  Family History   Problem Relation Age of Onset   • Stroke Mother    • Alcohol abuse Father        SOCIAL HISTORY  Social History     Socioeconomic History   • Marital status: Single   Tobacco Use   • Smoking status: Former     Packs/day: 1.50     Years: 15.00     Pack years: 22.50     Types: Cigarettes     Start date:      Quit date: 1980     Years since quittin.4   • Smokeless tobacco: Never   Vaping Use   • Vaping Use: Never used   Substance and Sexual Activity   • Alcohol use: Never     Comment: \"social drinker\"   • Drug use: Never   • Sexual activity: Never       ALLERGIES  Bee " pollen and Lorazepam    PHYSICAL EXAM  Physical Exam  Vitals and nursing note reviewed.   Constitutional:       General: She is not in acute distress.     Appearance: Normal appearance. She is not ill-appearing.   HENT:      Head: Abrasion present.        Comments: Superficial abrasion below the left eye, bleeding controlled     Nose: Nose normal.      Mouth/Throat:      Mouth: Mucous membranes are dry.   Eyes:      Extraocular Movements: Extraocular movements intact.   Cardiovascular:      Rate and Rhythm: Tachycardia present.   Pulmonary:      Effort: Pulmonary effort is normal.   Abdominal:      General: There is no distension.   Musculoskeletal:         General: Normal range of motion.      Cervical back: Normal range of motion and neck supple.   Skin:     General: Skin is warm and dry.   Neurological:      General: No focal deficit present.      Mental Status: She is alert. Mental status is at baseline.   Psychiatric:         Mood and Affect: Mood normal.         Behavior: Behavior normal.       LAB RESULTS  Results for orders placed or performed during the hospital encounter of 05/25/23   Comprehensive Metabolic Panel    Specimen: Arm, Right; Blood   Result Value Ref Range    Glucose 102 (H) 65 - 99 mg/dL    BUN 43 (H) 8 - 23 mg/dL    Creatinine 1.19 (H) 0.57 - 1.00 mg/dL    Sodium 140 136 - 145 mmol/L    Potassium 4.3 3.5 - 5.2 mmol/L    Chloride 104 98 - 107 mmol/L    CO2 24.0 22.0 - 29.0 mmol/L    Calcium 8.8 8.6 - 10.5 mg/dL    Total Protein 6.6 6.0 - 8.5 g/dL    Albumin 3.6 3.5 - 5.2 g/dL    ALT (SGPT) 20 1 - 33 U/L    AST (SGOT) 31 1 - 32 U/L    Alkaline Phosphatase 136 (H) 39 - 117 U/L    Total Bilirubin 0.3 0.0 - 1.2 mg/dL    Globulin 3.0 gm/dL    A/G Ratio 1.2 g/dL    BUN/Creatinine Ratio 36.1 (H) 7.0 - 25.0    Anion Gap 12.0 5.0 - 15.0 mmol/L    eGFR 44.6 (L) >60.0 mL/min/1.73   CBC Auto Differential    Specimen: Arm, Right; Blood   Result Value Ref Range    WBC 12.87 (H) 3.40 - 10.80 10*3/mm3    RBC  3.33 (L) 3.77 - 5.28 10*6/mm3    Hemoglobin 10.3 (L) 12.0 - 15.9 g/dL    Hematocrit 32.0 (L) 34.0 - 46.6 %    MCV 96.1 79.0 - 97.0 fL    MCH 30.9 26.6 - 33.0 pg    MCHC 32.2 31.5 - 35.7 g/dL    RDW 12.6 12.3 - 15.4 %    RDW-SD 44.4 37.0 - 54.0 fl    MPV 11.6 6.0 - 12.0 fL    Platelets 213 140 - 450 10*3/mm3    Neutrophil % 80.6 (H) 42.7 - 76.0 %    Lymphocyte % 5.6 (L) 19.6 - 45.3 %    Monocyte % 8.9 5.0 - 12.0 %    Eosinophil % 3.6 0.3 - 6.2 %    Basophil % 0.3 0.0 - 1.5 %    Immature Grans % 1.0 (H) 0.0 - 0.5 %    Neutrophils, Absolute 10.37 (H) 1.70 - 7.00 10*3/mm3    Lymphocytes, Absolute 0.72 0.70 - 3.10 10*3/mm3    Monocytes, Absolute 1.15 (H) 0.10 - 0.90 10*3/mm3    Eosinophils, Absolute 0.46 (H) 0.00 - 0.40 10*3/mm3    Basophils, Absolute 0.04 0.00 - 0.20 10*3/mm3    Immature Grans, Absolute 0.13 (H) 0.00 - 0.05 10*3/mm3    nRBC 0.0 0.0 - 0.2 /100 WBC   Urinalysis With Microscopic If Indicated (No Culture) - Urine, Clean Catch    Specimen: Urine, Clean Catch   Result Value Ref Range    Color, UA Yellow Yellow, Straw    Appearance, UA Cloudy (A) Clear    pH, UA 5.5 5.0 - 8.0    Specific Gravity, UA 1.020 1.001 - 1.030    Glucose, UA Negative Negative    Ketones, UA Negative Negative    Bilirubin, UA Negative Negative    Blood, UA Large (3+) (A) Negative    Protein, UA 30 mg/dL (1+) (A) Negative    Leuk Esterase, UA Moderate (2+) (A) Negative    Nitrite, UA Positive (A) Negative    Urobilinogen, UA 0.2 E.U./dL 0.2 - 1.0 E.U./dL   Urinalysis, Microscopic Only - Urine, Clean Catch    Specimen: Urine, Clean Catch   Result Value Ref Range    RBC, UA 21-30 (A) None Seen, 0-2 /HPF    WBC, UA Too Numerous to Count (A) None Seen, 0-2 /HPF    Bacteria, UA 4+ (A) None Seen, Trace /HPF    Squamous Epithelial Cells, UA None Seen None Seen, 0-2 /HPF    Hyaline Casts, UA 0-6 0 - 6 /LPF    Methodology Automated Microscopy    Procalcitonin    Specimen: Arm, Right; Blood   Result Value Ref Range    Procalcitonin 0.06 0.00 -  0.25 ng/mL   Magnesium    Specimen: Arm, Right; Blood   Result Value Ref Range    Magnesium 2.5 (H) 1.6 - 2.4 mg/dL   ECG 12 Lead Other; Fall   Result Value Ref Range    QT Interval 442 ms    QTC Interval 629 ms       If labs were ordered, I independently reviewed the results and considered them in treating the patient.    RADIOLOGY  CT Head Without Contrast   Final Result      1.  No acute intracranial abnormality.      2.  Unchanged chronic findings as described.      Electronically signed by:  Dieudonne Seaman M.D.     5/25/2023 11:57 PM Mountain Time      CT Cervical Spine Without Contrast   Final Result      1. No cervical spine fracture.   2. Extensive degenerative changes unchanged from recent prior.   3. Consolidation in the bilateral lung apices unchanged from the recent prior, only partially visualized and incompletely evaluated.         This examination was interpreted by Jonas Brooks M.D.      Electronically signed by:  Jonas Brooks M.D.     5/26/2023 12:17 AM Mountain Time        [x] Radiologist's Report Reviewed:  I ordered and independently reviewed the above noted radiographic studies.  See radiologist's dictation for official interpretation.      PROCEDURES    Procedures    ECG 12 Lead Other; Fall   Preliminary Result   Test Reason : Other~   Blood Pressure :   */*   mmHG   Vent. Rate : 122 BPM     Atrial Rate : 122 BPM      P-R Int : 178 ms          QRS Dur :  88 ms       QT Int : 442 ms       P-R-T Axes : -18 -18  78 degrees      QTc Int : 629 ms      Sinus tachycardia   Nonspecific ST and T wave abnormality   Abnormal ECG   When compared with ECG of 12-FEB-2023 14:11,   Previous ECG has undetermined rhythm, needs review   Nonspecific T wave abnormality no longer evident in Anterior leads   Nonspecific T wave abnormality, worse in Lateral leads      Referred By: EDMD           Confirmed By:           MEDICATIONS GIVEN IN ER    Medications   cefTRIAXone (ROCEPHIN) 1 g/100 mL 0.9% NS (MBP)  (1 g Intravenous New Bag 5/26/23 0339)   sodium chloride 0.9 % bolus 500 mL (has no administration in time range)   acetaminophen (TYLENOL) tablet 1,000 mg (1,000 mg Oral Given 5/26/23 0235)   sodium chloride 0.9 % bolus 500 mL (500 mL Intravenous New Bag 5/26/23 0243)       MEDICAL DECISION MAKING, PROGRESS, and CONSULTS   Medical Decision Making  Acute kidney injury: complicated acute illness or injury  Acute UTI: complicated acute illness or injury  Fall, initial encounter: complicated acute illness or injury  History of atrial fibrillation: chronic illness or injury  History of CHF (congestive heart failure): chronic illness or injury  History of chronic kidney disease: chronic illness or injury  History of COPD: chronic illness or injury  History of dementia: chronic illness or injury  History of hypertension: chronic illness or injury  History of hypothyroidism: chronic illness or injury  Amount and/or Complexity of Data Reviewed  Labs: ordered.  Radiology: ordered.  ECG/medicine tests: ordered.      Risk  OTC drugs.  Decision regarding hospitalization.        All labs have been independently reviewed by me.  All radiology studies have been reviewed by me and the radiologist dictating the report.  All EKG's have been independently viewed by me.    [] Discussed with radiology regarding test interpretation:    Discussion below represents my analysis of pertinent findings related to patient's condition, differential diagnosis, treatment plan and final disposition.    Differential diagnosis:  The differential diagnosis associated with the patient's presentation includes: Pres-syncopal/syncopal episode, arrhythmia, ACS, CVA, TIA, sensory impairment (vision, vestibular, neuropathy, proprioception), vertigo,  weakness, electrolyte abnormality, UTI, pneumonia, decreased balance, gait abnormality, arthritis, polypharmacy or drug effect, environment (rugs, stairs, uneven surface, lighting).     Additional  sources  Discussed/ obtained information from independent historians:   [] Spouse  [] Parent  [] Family member  [] Friend  [x] EMS   [x] Other: Nursing home facility  External (non-ED) record review:   [x] Inpatient record: Recent inpatient record from May 10, 2023 reviewed where patient was admitted for acute kidney injury, declining functional status and impaired functional mobility; patient also admitted to hospital inpatient on April 14, 2023 septic from UTI   [] Office record:   [] Outpatient record:   [] Prior Outpatient labs:   [] Prior Outpatient radiology:   [] Primary Care record:   [] Outside ED record:   [] Other:   Patient's care impacted by:   [] Diabetes  [x] Hypertension  [x] Hyperlipidemia  [x] Hypothyroidism   [] Coronary Artery Disease   [x] COPD   [] Cancer   [] Obesity  [] GERD   [] Tobacco Abuse   [] Substance Abuse    [] Anxiety   [] Depression   [x] Other: Chronic kidney disease, history of dementia, CHF, atrial fibrillation  Care significantly affected by Social Determinants of Health (housing and economic circumstances, unemployment)    [] Yes     [x] No   If yes, Patient's care significantly limited by  Social Determinants of Health including:   [] Inadequate housing   [] Low income   [] Alcoholism and drug addiction in family   [] Problems related to primary support group   [] Unemployment   [] Problems related to employment   [] Other Social Determinants of Health:     Orders placed during this visit:  Orders Placed This Encounter   Procedures   • Blood Culture - Blood,   • Blood Culture - Blood,   • CT Head Without Contrast   • CT Cervical Spine Without Contrast   • Comprehensive Metabolic Panel   • CBC Auto Differential   • Urinalysis With Microscopic If Indicated (No Culture) - Urine, Clean Catch   • Urinalysis, Microscopic Only - Urine, Clean Catch   • Lactic Acid, Plasma   • Urinalysis With Culture If Indicated -   • Procalcitonin   • Magnesium   • Single High Sensitivity Troponin T    • Code Status and Medical Interventions:   • ECG 12 Lead Other; Fall   • Initiate Observation Status   • ED Bed Request   • CBC & Differential     ED Course:    ED Course as of 05/26/23 0357   Fri May 26, 2023   0313 Hospitalist messaged for admission [JG]   8891 In summary this is an 87-year-old female who presents to the ER for evaluation following a fall.  Fall was unwitnessed at nursing home per staff and EMS.  Minor superficial abrasions below left eye from fall.  CT head and CT cervical spine completed without acute findings.  Leukocytosis with WBC of 12.87.  Elevated serum creatinine at 1.19.  UA findings consistent with urinary tract infection.  Blood and urine cultures obtained.  Initial dose of Rocephin administered in ED.  Hospitalist consulted for admission agreeable to accept patient.  Patient admitted to hospitalist service for further evaluation and treatment of her urinary tract infection. [JG]      ED Course User Index  [JG] Dm Cardozo PA            DIAGNOSIS  Final diagnoses:   Acute UTI   Fall, initial encounter   Acute kidney injury   History of hypertension   History of hypothyroidism   History of COPD   History of chronic kidney disease   History of dementia   History of atrial fibrillation   History of CHF (congestive heart failure)   History of hyperlipidemia       DISPOSITION    ED Disposition     ED Disposition   Decision to Admit    Condition   --    Comment   Level of Care: Telemetry [5]   Diagnosis: Acute UTI [232812]   Admitting Physician: YANETH PAYNE [[id number]]   Attending Physician: YANETH PAYNE [[id number]]               Please note that portions of this document were completed with voice recognition software.      Dm Cardozo PA  05/26/23 0357

## 2023-05-26 NOTE — CASE MANAGEMENT/SOCIAL WORK
Discharge Planning Assessment  [facility]     Patient Name: Mitzi Eric  MRN: [id number]  Today's Date: 5/26/2023    Admit Date: 5/25/2023    Plan: Return to [facility] to finish out her skilled day   Discharge Needs Assessment     Row Name 05/26/23 1156       Living Environment    People in Home facility resident    Name(s) of People in Home in a skilled bed at [facility] but lives in an AL apt at [facility].    Current Living Arrangements assisted living facility    Potentially Unsafe Housing Conditions none    Primary Care Provided by other (see comments)    Provides Primary Care For no one, unable/limited ability to care for self    Family Caregiver if Needed other relative(s)    Family Caregiver Names radha Sherman [phone]    Quality of Family Relationships helpful;involved;supportive    Living Arrangement Comments Plan to return to skilled bed at [facility].       Resource/Environmental Concerns    Transportation Concerns none       Transition Planning    Patient/Family Anticipated Services at Transition     Transportation Anticipated health plan transportation       Discharge Needs Assessment    Current Outpatient/Agency/Support Group skilled nursing facility    Equipment Currently Used at Home oxygen;wheelchair    Concerns to be Addressed discharge planning    Anticipated Changes Related to Illness inability to care for self    Outpatient/Agency/Support Group Needs skilled nursing facility    Discharge Coordination/Progress Plan to return to [facility] skilled bed               Discharge Plan     Row Name 05/26/23 1159       Plan    Plan Return to [facility] to finish out her skilled day    Patient/Family in Agreement with Plan yes    Plan Comments I talked with patient's POA/Radha Sherman at . Patient slept during our conversation. Patient is known to me and has been in and out of hospital several times in May. Here for fall and UTI. Patient approved for 14 days of  skilled rehab at  and there 9 of those days. I called [facility] to see if they can take patient back or if she will need new auth from [facility]. [facility] is checking on this. PT and OT ordered. If patient can go back, will need a Covid and stretcher transportation set up. CM working.    Final Discharge Disposition Code 03 - skilled nursing facility (SNF)              Continued Care and Services - Admitted Since 5/25/2023    Coordination has not been started for this encounter.     Selected Continued Care - Prior Encounters Includes continued care and service providers with selected services from prior encounters from 2/24/2023 to 5/26/2023    Discharged on 5/19/2023 Admission date: 5/10/2023 - Discharge disposition: Skilled Nursing Facility (DC - External)    Destination     Service Provider Selected Services Address Phone Fax Patient Preferred    [facility] [address] [id number] [phone] --                       Demographic Summary     Row Name 05/26/23 1155       General Information    Referral Source admission list    Reason for Consult discharge planning    Preferred Language English       Contact Information    Permission Granted to Share Info With     Contact Information Obtained for                Functional Status     Row Name 05/26/23 1155       Functional Status    Usual Activity Tolerance fair    Current Activity Tolerance fair       Functional Status, IADL    Medications completely dependent    Meal Preparation completely dependent    Housekeeping completely dependent    Laundry completely dependent    Shopping completely dependent    IADL Comments Has coverage for meds       Employment/    Employment Status retired               Psychosocial    No documentation.                Abuse/Neglect    No documentation.                Legal    No documentation.                Substance Abuse    No documentation.                 Patient Forms    No documentation.                   Ling Hale RN

## 2023-05-26 NOTE — PLAN OF CARE
Goal Outcome Evaluation:  Plan of Care Reviewed With: (P) patient, durable power of         Progress: (P)  (inital eval)  Outcome Evaluation: (P) Pt. presesnts below baseline for BADLs and fxal txfs d/t decreased: strength, coordination, balance, and cognitive status. Pt. would benefit from skilled OT services to remediate these deficits and promote return to PLOF. Continue IPOT per POC. OT recommends SNF upon d/c.

## 2023-05-26 NOTE — THERAPY EVALUATION
Patient Name: Mitzi Eric  : 1935    MRN: [id number]                              Today's Date: 2023       Admit Date: 2023    Visit Dx:     ICD-10-CM ICD-9-CM   1. Acute UTI  N39.0 599.0   2. Fall, initial encounter  W19.XXXA E888.9   3. Acute kidney injury  N17.9 584.9   4. History of hypertension  Z86.79 V12.59   5. History of hypothyroidism  Z86.39 V12.29   6. History of COPD  Z87.09 V12.69   7. History of chronic kidney disease  Z87.448 V13.09   8. History of dementia  Z86.59 V11.8   9. History of atrial fibrillation  Z86.79 V12.59   10. History of CHF (congestive heart failure)  Z86.79 V12.59   11. History of hyperlipidemia  Z86.39 V12.29     Patient Active Problem List   Diagnosis   • UTI (urinary tract infection), bacterial   • Essential hypertension   • Cognitive decline   • Trigeminal neuralgia   • Hypothyroidism (acquired)   • Lung mass   • Influenza A   • Severe malnutrition (CMS/HCC)   • Dementia without behavioral disturbance   • History of craniotomy   • Impaired functional mobility, balance, gait, and endurance   • COPD ex with volume overload   • chronic CHF (congestive heart failure) ([facility])   • Thrombocytopenia   • Elevated transaminase level   • atrial fibrillation   • COPD with acute exacerbation   • Acute on chronic respiratory failure with hypoxemia   • Chronic respiratory failure   • Underweight   • Moderate malnutrition (CMS/HCC)   • Centrilobular emphysema   • Essential tremor   • Pneumonia of both upper lobes due to infectious organism   • CKD (chronic kidney disease), stage III   • JUAN F (acute kidney injury)   • Acute UTI     Past Medical History:   Diagnosis Date   • A-fib    • Ataxic gait    • Chronic kidney disease, stage 3    • COPD (chronic obstructive pulmonary disease)    • Dementia    • Disease of thyroid gland    • Essential tremor 2022   • Heart failure    • Hyperlipidemia    • Hypertension    • Shingles      Past Surgical History:   Procedure Laterality  Date   • BRAIN TUMOR EXCISION      BENIGN   • FRACTURE SURGERY     • TRIGGER FINGER RELEASE        General Information     Row Name 05/26/23 1408          OT Time and Intention    Document Type evaluation  -AC (r) CH (t) AC (c)     Mode of Treatment occupational therapy  -AC (r) CH (t) AC (c)     Row Name 05/26/23 1408          General Information    Patient Profile Reviewed yes  -AC (r) CH (t) AC (c)     Prior Level of Function independent:;feeding;min assist:;w/c or scooter;bathing;dressing  -AC (r) CH (t) AC (c)     Existing Precautions/Restrictions fall;oxygen therapy device and L/min  dementia  -AC (r) CH (t) AC (c)     Barriers to Rehab medically complex  -AC (r) CH (t) AC (c)     Row Name 05/26/23 1408          Occupational Profile    Reason for Services/Referral (Occupational Profile) occupational decline  -AC (r) CH (t) AC (c)     Environmental Supports and Barriers (Occupational Profile) Facillity resident at [facility]. Pt. has walker, and w/c.  -AC (r) CH (t) AC (c)     Row Name 05/26/23 1408          Living Environment    People in Home facility resident  -AC (r) CH (t) AC (c)     Row Name 05/26/23 1408          Home Main Entrance    Number of Stairs, Main Entrance none  -AC (r) CH (t) AC (c)     Row Name 05/26/23 1408          Stairs Within Home, Primary    Number of Stairs, Within Home, Primary none  -AC (r) CH (t) AC (c)     Row Name 05/26/23 1408          Cognition    Orientation Status (Cognition) oriented to;person;disoriented to;time;place;situation  -AC (r) CH (t) AC (c)     Row Name 05/26/23 1408          Safety Issues, Functional Mobility    Safety Issues Affecting Function (Mobility) ability to follow commands;awareness of need for assistance;insight into deficits/self-awareness;judgment;positioning of assistive device;problem-solving;safety precaution awareness;safety precautions follow-through/compliance;sequencing abilities  -AC (r) CH (t) AC (c)     Impairments Affecting Function  (Mobility) balance;cognition;coordination;endurance/activity tolerance;range of motion (ROM);shortness of breath;strength  -AC (r) CH (t) AC (c)     Cognitive Impairments, Mobility Safety/Performance attention;awareness, need for assistance;insight into deficits/self-awareness;judgment;problem-solving/reasoning;safety precaution awareness;safety precaution follow-through;sequencing abilities  -AC (r) CH (t) AC (c)           User Key  (r) = Recorded By, (t) = Taken By, (c) = Cosigned By    Initials Name Provider Type    Evon Bosch, OT Occupational Therapist    Mikayla Lee, OT Student OT Student                 Mobility/ADL's     Row Name 05/26/23 1414          Bed Mobility    Bed Mobility supine-sit  -AC (r) CH (t) AC (c)     Supine-Sit [place] (Bed Mobility) moderate assist (50% patient effort);2 person assist  -AC (r) CH (t) AC (c)     Assistive Device (Bed Mobility) bed rails;head of bed elevated;draw sheet  -AC (r) CH (t) AC (c)     Row Name 05/26/23 1414          Transfers    Transfers bed-chair transfer;sit-stand transfer;stand-sit transfer  -AC (r) CH (t) AC (c)     Row Name 05/26/23 [facility]4          Bed-Chair Transfer    Bed-Chair [place] (Transfers) moderate assist (50% patient effort);2 person assist  -AC (r) CH (t) AC (c)     Assistive Device (Bed-Chair Transfers) other (see comments)  BUE support  -AC (r) CH (t) AC (c)     Row Name 05/26/23 1414          Sit-Stand Transfer    Sit-Stand [place] (Transfers) moderate assist (50% patient effort);2 person assist  -AC (r) CH (t) AC (c)     Assistive Device (Sit-Stand Transfers) other (see comments)  BUE support  -AC (r) CH (t) AC (c)     Row Name 05/26/23 1414          Stand-Sit Transfer    Stand-Sit [place] (Transfers) minimum assist (75% patient effort);2 person assist  -AC (r) CH (t) AC (c)     Assistive Device (Stand-Sit Transfers) other (see comments)  BUE support  -AC (r) CH (t) AC (c)     Row Name 05/26/23 1413           Functional Mobility    Functional Mobility- Ind. Level minimum assist (75% patient effort);2 person assist required  -AC (r) CH (t) AC (c)     Functional Mobility- Device other (see comments)  BUE support  -AC (r) CH (t) AC (c)     Functional Mobility-Distance (Feet) --  Defer to PT  -AC (r) CH (t) AC (c)     Functional Mobility- Safety Issues supplemental O2;loses balance backward;sequencing ability decreased;step length decreased  -AC (r) CH (t) AC (c)     Row Name 05/26/23 1414          Activities of Daily Living    BADL Assessment/Intervention upper body dressing;grooming  -AC (r) CH (t) AC (c)     Row Name 05/26/23 1414          Upper Body Dressing Assessment/Training    [place] Level (Upper Body Dressing) maximum assist (25% patient effort);verbal cues;nonverbal cues (demo/gesture);don;front opening garment  -AC (r) CH (t) AC (c)     Position (Upper Body Dressing) unsupported sitting;edge of bed sitting  -AC (r) CH (t) AC (c)     Row Name 05/26/23 1414          Grooming Assessment/Training    [place] Level (Grooming) supervision;verbal cues;wash face, hands  -AC (r) CH (t) AC (c)     Position (Grooming) supported sitting;other (see comments)  UIC  -AC (r) CH (t) AC (c)           User Key  (r) = Recorded By, (t) = Taken By, (c) = Cosigned By    Initials Name Provider Type    AC Evon Hernandez, OT Occupational Therapist    Mikayla Lee, OT Student OT Student               Obj/Interventions     Row Name 05/26/23 1419          Sensory Assessment (Somatosensory)    Sensory Assessment (Somatosensory) unable/difficult to assess  -AC (r) CH (t) AC (c)     Sensory Assessment unable to fully asses d/t cognitive deficits  -AC (r) CH (t) AC (c)     Row Name 05/26/23 1419          Vision Assessment/Intervention    Visual Impairment/Limitations corrective lenses full-time  -AC (r) CH (t) AC (c)     Row Name 05/26/23 1419          Range of Motion Comprehensive    General Range of Motion upper extremity range  of motion deficits identified  -AC (r) CH (t) AC (c)     Comment, General Range of Motion UE flexion ~0-90  -AC (r) CH (t) AC (c)     Row Name 05/26/23 1419          Strength Comprehensive (MMT)    General Manual Muscle Testing (MMT) Assessment upper extremity strength deficits identified  -AC (r) CH (t) AC (c)     Comment, General Manual Muscle Testing (MMT) Assessment shoulder flexion grossly 2+/5, elbow flexion/extension grossly 3+/5,  strength grossly 3+/5  -AC (r) CH (t) AC (c)     Row Name 05/26/23 1419          Balance    Balance Assessment sitting static balance;sitting dynamic balance;sit to stand dynamic balance;standing static balance;standing dynamic balance  -AC (r) CH (t) AC (c)     Static Sitting Balance supervision  -AC (r) CH (t) AC (c)     Dynamic Sitting Balance contact guard  -AC (r) CH (t) AC (c)     Position, Sitting Balance unsupported;sitting edge of bed;supported;sitting in chair  -AC (r) CH (t) AC (c)     Sit to Stand Dynamic Balance moderate assist;2-person assist  -AC (r) CH (t) AC (c)     Static Standing Balance contact guard;2-person assist  -AC (r) CH (t) AC (c)     Dynamic Standing Balance minimal assist;2-person assist  -AC (r) CH (t) AC (c)     Position/Device Used, Standing Balance supported  BUE support  -AC (r) CH (t) AC (c)     Balance Interventions sitting;standing;sit to stand;supported;static;dynamic;occupation based/functional task  -AC (r) CH (t) AC (c)           User Key  (r) = Recorded By, (t) = Taken By, (c) = Cosigned By    Initials Name Provider Type    AC Evon Hernandez, OT Occupational Therapist    Mikayla Lee, OT Student OT Student               Goals/Plan     Row Name 05/26/23 1421          Transfer Goal 1 (OT)    Activity/Assistive Device (Transfer Goal 1, OT) bed-to-chair/chair-to-bed;toilet  -AC (r) CH (t) AC (c)     [place] Level/Cues Needed (Transfer Goal 1, OT) minimum assist (75% or more patient effort)  -AC (r) CH (t) AC (c)     Time Frame  (Transfer Goal 1, OT) by discharge  -AC (r) CH (t) AC (c)     Strategies/Barriers (Transfers Goal 1, OT) Min Ax1  -AC (r) CH (t) AC (c)     Row Name 05/26/23 1429          Dressing Goal 1 (OT)    Activity/Device (Dressing Goal 1, OT) lower body dressing  -AC (r) CH (t) AC (c)     [place]/Cues Needed (Dressing Goal 1, OT) moderate assist (50-74% patient effort)  -AC (r) CH (t) AC (c)     Time Frame (Dressing Goal 1, OT) by discharge  -AC (r) CH (t) AC (c)     Strategies/Barriers (Dressing Goal 1, OT) don/doff socks  -AC (r) CH (t) AC (c)     Row Name 05/26/23 1429          Toileting Goal 1 (OT)    Activity/Device (Toileting Goal 1, OT) adjust/manage clothing;perform perineal hygiene  -AC (r) CH (t) AC (c)     [place] Level/Cues Needed (Toileting Goal 1, OT) moderate assist (50-74% patient effort)  -AC (r) CH (t) AC (c)     Time Frame (Toileting Goal 1, OT) by discharge  -AC (r) CH (t) AC (c)           User Key  (r) = Recorded By, (t) = Taken By, (c) = Cosigned By    Initials Name Provider Type    AC Evon Hernandez, OT Occupational Therapist    Mikayla Lee, OT Student OT Student               Clinical Impression     Row Name 05/26/23 142          Pain Assessment    Pretreatment Pain Rating 0/10 - no pain  -AC (r) CH (t) AC (c)     Posttreatment Pain Rating 0/10 - no pain  -AC (r) CH (t) AC (c)     Row Name 05/26/23 1429          Plan of Care Review    Plan of Care Reviewed With patient;durable power of   -AC (r) CH (t) AC (c)     Progress --  inital eval  -AC (r) CH (t) AC (c)     Outcome Evaluation Pt. presesnts below baseline for BADLs and fxal txfs d/t decreased: strength, coordination, balance, and cognitive status. Pt. would benefit from skilled OT services to remediate these deficits and promote return to PLOF. Continue IPOT per POC. OT recommends SNF upon d/c.  -AC (r) CH (t) AC (c)     Row Name 05/26/23 1429          Therapy Assessment/Plan (OT)    Patient/Family Therapy Goal Statement  (OT) return to PLOF  -AC (r) CH (t) AC (c)     Rehab Potential (OT) good, to achieve stated therapy goals  -AC (r) CH (t) AC (c)     Criteria for Skilled Therapeutic Interventions Met (OT) meets criteria  -AC (r) CH (t) AC (c)     Therapy Frequency (OT) daily  -AC (r) CH (t) AC (c)     Row Name 05/26/23 1424          Therapy Plan Review/Discharge Plan (OT)    Anticipated Discharge Disposition (OT) skilled nursing facility  -AC (r) CH (t) AC (c)     Row Name 05/26/23 1424          Vital Signs    Pre Systolic BP Rehab 122  -AC (r) CH (t) AC (c)     Pre Treatment Diastolic BP 66  -AC (r) CH (t) AC (c)     Pretreatment Heart Rate (beats/min) 81  -AC (r) CH (t) AC (c)     Posttreatment Heart Rate (beats/min) 82  -AC (r) CH (t) AC (c)     Pre SpO2 (%) 96  -AC (r) CH (t) AC (c)     O2 Delivery Pre Treatment nasal cannula  -AC (r) CH (t) AC (c)     Intra SpO2 (%) 91  -AC (r) CH (t) AC (c)     O2 Delivery Intra Treatment nasal cannula  -AC (r) CH (t) AC (c)     Post SpO2 (%) 95  -AC (r) CH (t) AC (c)     O2 Delivery Post Treatment nasal cannula  -AC (r) CH (t) AC (c)     Pre Patient Position Supine  -AC (r) CH (t) AC (c)     Intra Patient Position Standing  -AC (r) CH (t) AC (c)     Post Patient Position Sitting  -AC (r) CH (t) AC (c)     Row Name 05/26/23 1424          Positioning and Restraints    Pre-Treatment Position in bed  -AC (r) CH (t) AC (c)     Post Treatment Position chair  -AC (r) CH (t) AC (c)     In Chair notified nsg;reclined;call light within reach;encouraged to call for assist;exit alarm on;with family/caregiver;waffle cushion;legs elevated;R waffle boot;L waffle boot  -AC (r) CH (t) AC (c)           User Key  (r) = Recorded By, (t) = Taken By, (c) = Cosigned By    Initials Name Provider Type    AC Evon Hernandez, OT Occupational Therapist    Mikayla Lee, OT Student OT Student               Outcome Measures     Row Name 05/26/23 3061          How much help from another is currently needed...     Putting on and taking off regular lower body clothing? 2  -AC (r) CH (t) AC (c)     Bathing (including washing, rinsing, and drying) 2  -AC (r) CH (t) AC (c)     Toileting (which includes using toilet bed pan or urinal) 2  -AC (r) CH (t) AC (c)     Putting on and taking off regular upper body clothing 2  -AC (r) CH (t) AC (c)     Taking care of personal grooming (such as brushing teeth) 3  -AC (r) CH (t) AC (c)     Eating meals 3  -AC (r) CH (t) AC (c)     AM-PAC 6 Clicks Score (OT) 14  -AC (r) CH (t)     Row Name 05/26/23 1417 05/26/23 0807       How much help from another person do you currently need...    Turning from your back to your side while in flat bed without using bedrails? 3  -KR 3  -JS    Moving from lying on back to sitting on the side of a flat bed without bedrails? 2  -KR 2  -JS    Moving to and from a bed to a chair (including a wheelchair)? 2  -KR 1  -JS    Standing up from a chair using your arms (e.g., wheelchair, bedside chair)? 3  -KR 1  -JS    Climbing 3-5 steps with a railing? 1  -KR 1  -JS    To walk in hospital room? 3  -KR 1  -JS    AM-PAC 6 Clicks Score (PT) 14  -KR 9  -JS    Highest level of mobility 4 --> Transferred to chair/commode  -KR 3 --> Sat at edge of bed  -JS    Row Name 05/26/23 1430          Functional Assessment    Outcome Measure Options AM-PAC 6 Clicks Daily Activity (OT)  -AC (r) CH (t) AC (c)           User Key  (r) = Recorded By, (t) = Taken By, (c) = Cosigned By    Initials Name Provider Type    Evon Bosch, OT Occupational Therapist    Rowdy Swan, RN Registered Nurse    Marybeth Castaneda, PT Physical Therapist    Mikayla Lee, OT Student OT Student                Occupational Therapy Education     Title: PT OT SLP Therapies (In Progress)     Topic: Occupational Therapy (In Progress)     Point: ADL training (In Progress)     Description:   Instruct learner(s) on proper safety adaptation and remediation techniques during self care or transfers.    Instruct in proper use of assistive devices.              Learning Progress Summary           Patient Acceptance, E, NR by  at 5/26/2023 1430   Family Acceptance, E, NR by  at 5/26/2023 1430                   Point: Home exercise program (Not Started)     Description:   Instruct learner(s) on appropriate technique for monitoring, assisting and/or progressing therapeutic exercises/activities.              Learner Progress:  Not documented in this visit.          Point: Precautions (In Progress)     Description:   Instruct learner(s) on prescribed precautions during self-care and functional transfers.              Learning Progress Summary           Patient Acceptance, E, NR by  at 5/26/2023 1430   Family Acceptance, E, NR by  at 5/26/2023 1430                   Point: Body mechanics (In Progress)     Description:   Instruct learner(s) on proper positioning and spine alignment during self-care, functional mobility activities and/or exercises.              Learning Progress Summary           Patient Acceptance, E, NR by  at 5/26/2023 1430   Family Acceptance, E, NR by  at 5/26/2023 1430                               User Key     Initials Effective Dates Name Provider Type Discipline     03/10/23 -  Mikayla Jimenez, OT Student OT Student OT              OT Recommendation and Plan  Therapy Frequency (OT): daily  Plan of Care Review  Plan of Care Reviewed With: patient, durable power of   Progress:  (inital eval)  Outcome Evaluation: Pt. presesnts below baseline for BADLs and fxal txfs d/t decreased: strength, coordination, balance, and cognitive status. Pt. would benefit from skilled OT services to remediate these deficits and promote return to PLOF. Continue IPOT per POC. OT recommends SNF upon d/c.     Time Calculation:    Time Calculation- OT     Row Name 05/26/23 1431             Time Calculation- OT    OT Start Time 1309  -AC (r) CH (t) AC (c)      OT Received On 05/26/23  -AC (r) CH (t) AC (c)       OT Goal Re-Cert Due Date 06/05/23  -AC (r) CH (t) AC (c)         Untimed Charges    OT Eval/Re-eval Minutes 50  -AC (r) CH (t) AC (c)         Total Minutes    Untimed Charges Total Minutes 50  -AC (r) CH (t)       Total Minutes 50  -AC (r) CH (t)            User Key  (r) = Recorded By, (t) = Taken By, (c) = Cosigned By    Initials Name Provider Type    AC Evon Hernandez, OT Occupational Therapist    CH Mikayla Jimenez, OT Student OT Student              Therapy Charges for Today     Code Description Service Date Service Provider Modifiers Qty    [id number] HC OT EVAL MOD COMPLEXITY 4 5/26/2023 Mikayla Jimenez, OT Student GO 1               REBECCA Cruz  5/26/2023

## 2023-05-27 PROCEDURE — 94799 UNLISTED PULMONARY SVC/PX: CPT

## 2023-05-27 PROCEDURE — 99232 SBSQ HOSP IP/OBS MODERATE 35: CPT | Performed by: INTERNAL MEDICINE

## 2023-05-27 PROCEDURE — G0378 HOSPITAL OBSERVATION PER HR: HCPCS

## 2023-05-27 PROCEDURE — 96366 THER/PROPH/DIAG IV INF ADDON: CPT

## 2023-05-27 PROCEDURE — 25010000002 CEFTRIAXONE PER 250 MG

## 2023-05-27 RX ADMIN — BUDESONIDE AND FORMOTEROL FUMARATE DIHYDRATE 2 PUFF: 160; 4.5 AEROSOL RESPIRATORY (INHALATION) at 10:55

## 2023-05-27 RX ADMIN — PREGABALIN 25 MG: 25 CAPSULE ORAL at 08:37

## 2023-05-27 RX ADMIN — PANTOPRAZOLE SODIUM 40 MG: 40 TABLET, DELAYED RELEASE ORAL at 08:37

## 2023-05-27 RX ADMIN — SODIUM CHLORIDE 1 G: 900 INJECTION INTRAVENOUS at 05:42

## 2023-05-27 RX ADMIN — Medication 10 ML: at 19:56

## 2023-05-27 RX ADMIN — CETIRIZINE HYDROCHLORIDE 10 MG: 10 TABLET, FILM COATED ORAL at 08:37

## 2023-05-27 RX ADMIN — METOPROLOL SUCCINATE 25 MG: 25 TABLET, EXTENDED RELEASE ORAL at 08:37

## 2023-05-27 RX ADMIN — SPIRONOLACTONE 25 MG: 25 TABLET ORAL at 08:37

## 2023-05-27 RX ADMIN — LEVOTHYROXINE SODIUM 25 MCG: 25 TABLET ORAL at 05:43

## 2023-05-27 RX ADMIN — OXCARBAZEPINE 600 MG: 150 TABLET, FILM COATED ORAL at 08:36

## 2023-05-27 RX ADMIN — TORSEMIDE 30 MG: 20 TABLET ORAL at 08:37

## 2023-05-27 RX ADMIN — PREGABALIN 25 MG: 25 CAPSULE ORAL at 19:56

## 2023-05-27 RX ADMIN — SENNOSIDES AND DOCUSATE SODIUM 2 TABLET: 50; 8.6 TABLET ORAL at 08:37

## 2023-05-27 RX ADMIN — SENNOSIDES AND DOCUSATE SODIUM 2 TABLET: 50; 8.6 TABLET ORAL at 19:56

## 2023-05-27 RX ADMIN — MEGESTROL ACETATE 20 MG: 20 TABLET ORAL at 08:37

## 2023-05-27 RX ADMIN — Medication 10 ML: at 08:37

## 2023-05-27 RX ADMIN — OXCARBAZEPINE 600 MG: 150 TABLET, FILM COATED ORAL at 19:56

## 2023-05-27 RX ADMIN — BUDESONIDE AND FORMOTEROL FUMARATE DIHYDRATE 2 PUFF: 160; 4.5 AEROSOL RESPIRATORY (INHALATION) at 19:55

## 2023-05-27 RX ADMIN — DONEPEZIL HYDROCHLORIDE 5 MG: 5 TABLET, FILM COATED ORAL at 08:37

## 2023-05-27 NOTE — PROGRESS NOTES
[facility] Medicine Services  PROGRESS NOTE    Patient Name: Mitzi Eric  : 1935  MRN: [id number]    Date of Admission: 2023  Primary Care Physician: Provider, No Known    Subjective   Subjective     CC:   Fall at SNF    HPI:  Sitting up, cheerful, tells me she likes her peaches    ROS:  Denies needing anything     Objective   Objective     Vital Signs:   Temp:  [97.8 °F (36.6 °C)-98.7 °F (37.1 °C)] 97.9 °F (36.6 °C)  Heart Rate:  [] 89  Resp:  [18] 18  BP: (110-153)/(54-81) 110/66  Flow (L/min):  [2] 2     Physical Exam:  Gen:  Thin elderly woman sitting up in bed, smiling. No visitors   Neuro: alert , not oriented to place.  Speech is clear   HEENT:  NC; small lac L face  Neck:  Supple, no LAD  Heart RRR  Abd:  Soft, nontender, no rebound or guarding, pos BS  Extrem:  No c/c/e      Results Reviewed:  LAB RESULTS:      Lab 23  04423  0339 23   WBC 8.45  --  12.87*   HEMOGLOBIN 9.8*  --  10.3*   HEMATOCRIT 31.5*  --  32.0*   PLATELETS 183  --  213   NEUTROS ABS  --   --  10.37*   IMMATURE GRANS (ABS)  --   --  0.13*   LYMPHS ABS  --   --  0.72   MONOS ABS  --   --  1.15*   EOS ABS  --   --  0.46*   MCV 99.1*  --  96.1   PROCALCITONIN  --   --  0.06   LACTATE  --  0.8  --          Lab 23  0442 23  005   SODIUM 140 140   POTASSIUM 4.6 4.3   CHLORIDE 107 104   CO2 23.0 24.0   ANION GAP 10.0 12.0   BUN 38* 43*   CREATININE 1.07* 1.19*   EGFR 50.7* 44.6*   GLUCOSE 100* 102*   CALCIUM 8.5* 8.8   MAGNESIUM  --  2.5*         Lab 23  005   TOTAL PROTEIN 6.6   ALBUMIN 3.6   GLOBULIN 3.0   ALT (SGPT) 20   AST (SGOT) 31   BILIRUBIN 0.3   ALK PHOS 136*         Lab 23  0052   HSTROP T 21*                 Brief Urine Lab Results  (Last result in the past 365 days)      Color   Clarity   Blood   Leuk Est   Nitrite   Protein   CREAT   Urine HCG        23 0239 Yellow   Cloudy   Large (3+)   Moderate (2+)   Positive   30 mg/dL  (1+)                 Microbiology Results Abnormal     Procedure Component Value - Date/Time    Blood Culture - Blood, Hand, Left [[id number]]  (Normal) Collected: 05/26/23 0328    Lab Status: Preliminary result Specimen: Blood from Hand, Left Updated: 05/27/23 0416     Blood Culture No growth at 24 hours    Blood Culture - Blood, Arm, Right [[id number]]  (Normal) Collected: 05/26/23 0320    Lab Status: Preliminary result Specimen: Blood from Arm, Right Updated: 05/27/23 0416     Blood Culture No growth at 24 hours          CT Head Without Contrast    Result Date: 5/26/2023  EXAMINATION: CT HEAD WO CONTRAST DATE: 5/26/2023 12:33 AM  INDICATION: Fall.  COMPARISON: CT head, 5/10/2023  TECHNIQUE: Thin section noncontrast axial images were obtained through the head. Coronal reformatted images were created.  CT dose lowering techniques were used, to include: automated exposure control, adjustment for patient size, and or use of iterative  reconstruction. FINDINGS: Intracranial contents: Unchanged left frontal encephalomalacia and right parafalcine meningioma. Generalized parenchymal volume loss without lobar predominance. No hydrocephalus.   Patchy regions of hypoattenuation within periventricular and peripheral white matter most commonly represent chronic microangiopathy.  There is no midline shift or mass effect. No hemorrhage, mass lesion, or evidence of evolving large territorial infarct. Atherosclerosis. Bones and extracranial soft tissues: Right frontal craniotomy.  Orbits unremarkable. The visualized paranasal sinuses are clear. No mastoid or middle ear effusions.     Impression: 1.  No acute intracranial abnormality. 2.  Unchanged chronic findings as described. Electronically signed by:  Dieudonne Seaman M.D.  5/25/2023 11:57 PM Mountain Time    CT Cervical Spine Without Contrast    Result Date: 5/26/2023  CT CERVICAL SPINE WO CONTRAST Date of Examination: 5/26/2023 12:33 AM History:  Fall, neck pain Technique:  Noncontrast cervical spine CT with coronal and sagittal reformats.  CT dose lowering techniques were used, to include: automated exposure control, adjustment for patient size, and or use of iterative reconstruction. Comparison:  Cervical spine CT from 5/8/2023 Findings: No cervical spine fracture. 10% retrolisthesis of C3 on C4, 10% anterolisthesis of C4 on C5, 20% anterolisthesis of C5 on C6, and 20% anterolisthesis of C7 on T1, unchanged from prior. Diffuse severe bilateral facet arthrosis again noted. Bilateral carotid atherosclerosis. Extensive consolidation in the bilateral lung apices unchanged from the recent prior, this is only partially visualized and incompletely evaluated.     Impression: 1. No cervical spine fracture. 2. Extensive degenerative changes unchanged from recent prior. 3. Consolidation in the bilateral lung apices unchanged from the recent prior, only partially visualized and incompletely evaluated. This examination was interpreted by Jonas Brooks M.D. Electronically signed by:  Jonas Brooks M.D.  5/26/2023 12:17 AM Mountain Time      Results for orders placed during the hospital encounter of 03/17/22    Adult Transthoracic Echo Complete W/ Cont if Necessary Per Protocol    Interpretation Summary  · Left ventricular ejection fraction appears to be 46 - 50%. Left ventricular systolic function is mildly decreased.  · Left ventricular diastolic function is consistent with (grade I) impaired relaxation.  · Mild to moderate mitral regurgitation.  · Mild to moderate tricuspid regurgitation, RVSP 54 mmHg.      Current medications:  Scheduled Meds:budesonide-formoterol, 2 puff, Inhalation, BID - RT  cefTRIAXone, 1 g, Intravenous, Q24H  cetirizine, 10 mg, Oral, Daily  donepezil, 5 mg, Oral, Daily  levothyroxine, 25 mcg, Oral, Q AM  megestrol, 20 mg, Oral, Daily  metoprolol succinate XL, 25 mg, Oral, Daily  OXcarbazepine, 600 mg, Oral, BID  pantoprazole, 40 mg, Oral, Daily  pregabalin, 25 mg,  Oral, BID  senna-docusate sodium, 2 tablet, Oral, BID  sodium chloride, 10 mL, Intravenous, Q12H  spironolactone, 25 mg, Oral, Daily  torsemide, 30 mg, Oral, Daily      Continuous Infusions:   PRN Meds:.•  acetaminophen  •  albuterol  •  senna-docusate sodium **AND** polyethylene glycol **AND** bisacodyl **AND** bisacodyl  •  sodium chloride  •  sodium chloride  •  traZODone    Assessment & Plan   Assessment & Plan     Active Hospital Problems    Diagnosis  POA   • **Acute UTI [N39.0]  Yes   • CKD (chronic kidney disease), stage III [N18.30]  Yes   • JUAN F (acute kidney injury) [N17.9]  Yes   • Centrilobular emphysema [J43.2]  Yes   • Moderate malnutrition (CMS/HCC) [E44.0]  Yes   • Chronic respiratory failure [J96.10]  Yes   • chronic CHF (congestive heart failure) (HCC) [I50.9]  Yes   • atrial fibrillation [I48.91]  Yes   • Dementia without behavioral disturbance [F03.90]  Yes   • Severe malnutrition (CMS/HCC) [E43]  Yes   • Hypothyroidism (acquired) [E03.9]  Yes   • Essential hypertension [I10]  Yes      Resolved Hospital Problems   No resolved problems to display.        Brief Hospital Course to date:  Mitzi Eric is a 87 y.o. female with a past medical history significant for CKD III, COPD (2L nightly) with known bilateral upper lobe masses, paroxysmal A-fib, HLD, HTN, heart failure, hypothyroidism, Trigeminal neuralgia, and dementia. She is resident at [place] [facility]. Presented to ED after unwitnessed mechanical fall today wherein she sustained a laceration under left eye.     Notably, she was just here 5/10 to 5/19 for a fall. Wa discharged to [place].      Fall  - lac under left eye   - CT Head and CSpine negative      Acute UTI  - rocephin day 2  - blood and urine culture pending still      Acute on Chronic Renal Failure Stage 3-resolved  - Cr 1.19 on admission, improved w fluids     Left Apical Lung mass with fluid-necrotic components/cavitary appearance   Right Upper lobe lung mass   Hx  COPD  - Patient/POA have been aware of this and not interested in further work-up  - On 2L NC currently, normally on RA during the day and 2L at night per Nephew      Combined systolic and diastolic heart failure  Moderate pulm hypertension  - Compensated, monitored with IVF   - continue home Demadex/Spironolactone      Paroxysmal atrial fibrillation  - continue home Metoprolol  - not anticoagulated secondary to fall risk     Hypothyroidism  - Continue Synthroid     Trigeminal neuralgia  -Continue Trileptal and Lyrica     Hypertension  -continue home medications     Dementia  -Continue Aricept     Anemia  - H/h has fallen recently but this may all be dilutional      Expected Discharge Location and Transportation: back to [facility]  Expected Discharge Sunday   Expected Discharge Date: 5/28/2023; Expected Discharge Time:      DVT prophylaxis:  No DVT prophylaxis order currently exists.     AM-PAC 6 Clicks Score (PT): 12 (05/27/23 0851)    CODE STATUS:   Code Status and Medical Interventions:   Ordered at: 05/26/23 0357     Medical Intervention Limits:    NO intubation (DNI)     Code Status (Patient has no pulse and is not breathing):    No CPR (Do Not Attempt to Resuscitate)     Medical Interventions (Patient has pulse or is breathing):    Limited Support       Ame Cuevas MD  05/27/23

## 2023-05-27 NOTE — PLAN OF CARE
Goal Outcome Evaluation:  Plan of Care Reviewed With: patient        Progress: improving  Outcome Evaluation: Patient alert in bed. Mentation at baseline. Alert to self only. VSS. 2L NC which is baseline. Patient eating more today, mostly the food that the nephew brings. Skin care completed to documented areas of concern. Fall precautions maintained. Possibly discharge back to [place] tomorrow. Plan of care discussed with nephew. Verbalized understanding.

## 2023-05-28 LAB
BACTERIA SPEC AEROBE CULT: ABNORMAL
DEPRECATED RDW RBC AUTO: 44.2 FL (ref 37–54)
ERYTHROCYTE [DISTWIDTH] IN BLOOD BY AUTOMATED COUNT: 12.6 % (ref 12.3–15.4)
HCT VFR BLD AUTO: 31.7 % (ref 34–46.6)
HGB BLD-MCNC: 10.2 G/DL (ref 12–15.9)
MCH RBC QN AUTO: 30.8 PG (ref 26.6–33)
MCHC RBC AUTO-ENTMCNC: 32.2 G/DL (ref 31.5–35.7)
MCV RBC AUTO: 95.8 FL (ref 79–97)
PLATELET # BLD AUTO: 167 10*3/MM3 (ref 140–450)
PMV BLD AUTO: 12.1 FL (ref 6–12)
RBC # BLD AUTO: 3.31 10*6/MM3 (ref 3.77–5.28)
WBC NRBC COR # BLD: 6.98 10*3/MM3 (ref 3.4–10.8)

## 2023-05-28 PROCEDURE — 94799 UNLISTED PULMONARY SVC/PX: CPT

## 2023-05-28 PROCEDURE — 85027 COMPLETE CBC AUTOMATED: CPT | Performed by: INTERNAL MEDICINE

## 2023-05-28 PROCEDURE — 99232 SBSQ HOSP IP/OBS MODERATE 35: CPT | Performed by: NURSE PRACTITIONER

## 2023-05-28 PROCEDURE — G0378 HOSPITAL OBSERVATION PER HR: HCPCS

## 2023-05-28 PROCEDURE — 25010000002 CEFTRIAXONE PER 250 MG

## 2023-05-28 RX ADMIN — LEVOTHYROXINE SODIUM 25 MCG: 25 TABLET ORAL at 05:39

## 2023-05-28 RX ADMIN — SODIUM CHLORIDE 1 G: 900 INJECTION INTRAVENOUS at 05:39

## 2023-05-28 RX ADMIN — Medication 10 ML: at 11:44

## 2023-05-28 RX ADMIN — Medication 10 ML: at 20:41

## 2023-05-28 RX ADMIN — SENNOSIDES AND DOCUSATE SODIUM 2 TABLET: 50; 8.6 TABLET ORAL at 11:42

## 2023-05-28 RX ADMIN — MEGESTROL ACETATE 20 MG: 20 TABLET ORAL at 11:43

## 2023-05-28 RX ADMIN — PREGABALIN 25 MG: 25 CAPSULE ORAL at 20:40

## 2023-05-28 RX ADMIN — CETIRIZINE HYDROCHLORIDE 10 MG: 10 TABLET, FILM COATED ORAL at 11:42

## 2023-05-28 RX ADMIN — SPIRONOLACTONE 25 MG: 25 TABLET ORAL at 11:42

## 2023-05-28 RX ADMIN — DONEPEZIL HYDROCHLORIDE 5 MG: 5 TABLET, FILM COATED ORAL at 11:41

## 2023-05-28 RX ADMIN — OXCARBAZEPINE 600 MG: 150 TABLET, FILM COATED ORAL at 11:43

## 2023-05-28 RX ADMIN — BUDESONIDE AND FORMOTEROL FUMARATE DIHYDRATE 2 PUFF: 160; 4.5 AEROSOL RESPIRATORY (INHALATION) at 19:14

## 2023-05-28 RX ADMIN — METOPROLOL SUCCINATE 25 MG: 25 TABLET, EXTENDED RELEASE ORAL at 11:42

## 2023-05-28 RX ADMIN — SENNOSIDES AND DOCUSATE SODIUM 2 TABLET: 50; 8.6 TABLET ORAL at 20:40

## 2023-05-28 RX ADMIN — PANTOPRAZOLE SODIUM 40 MG: 40 TABLET, DELAYED RELEASE ORAL at 11:41

## 2023-05-28 RX ADMIN — OXCARBAZEPINE 600 MG: 150 TABLET, FILM COATED ORAL at 20:39

## 2023-05-28 RX ADMIN — PREGABALIN 25 MG: 25 CAPSULE ORAL at 11:42

## 2023-05-28 RX ADMIN — TORSEMIDE 30 MG: 20 TABLET ORAL at 11:41

## 2023-05-28 NOTE — PROGRESS NOTES
[facility] Medicine Services  PROGRESS NOTE    Patient Name: Mitzi Eric  : 1935  MRN: [id number]    Date of Admission: 2023  Primary Care Physician: Provider, No Known    Subjective   Subjective     CC:   Fall at SNF    HPI:  No new issues overnight.  Nephew at bedside, frustrated with readmission and pt not getting out of bed/little therapy    ROS:  UTO due to dementia, but denies pain or chest pain/SOB      Objective   Objective     Vital Signs:   Temp:  [98.2 °F (36.8 °C)-99.5 °F (37.5 °C)] 98.9 °F (37.2 °C)  Heart Rate:  [86-96] 96  Resp:  [16-20] 16  BP: (109-138)/(64-80) 130/72  Flow (L/min):  [2] 2     Physical Exam:  Constitutional: No acute distress, drowsy, but awakens easily  HENT: NCAT, mucous membranes moist  Respiratory: Clear to auscultation bilaterally, respiratory effort normal   Cardiovascular: RRR, no murmurs, rubs, or gallops  Gastrointestinal: Positive bowel sounds, soft, nontender, nondistended  Musculoskeletal: No bilateral ankle edema  Psychiatric: Appropriate affect, cooperative  Neurologic: Oriented x 1, JO, speech clear  Skin: No rashes noted      Results Reviewed:  LAB RESULTS:      Lab 23   WBC 6.98 8.45  --  12.87*   HEMOGLOBIN 10.2* 9.8*  --  10.3*   HEMATOCRIT 31.7* 31.5*  --  32.0*   PLATELETS 167 183  --  213   NEUTROS ABS  --   --   --  10.37*   IMMATURE GRANS (ABS)  --   --   --  0.13*   LYMPHS ABS  --   --   --  0.72   MONOS ABS  --   --   --  1.15*   EOS ABS  --   --   --  0.46*   MCV 95.8 99.1*  --  96.1   PROCALCITONIN  --   --   --  0.06   LACTATE  --   --  0.8  --          Lab 23   SODIUM 140 140   POTASSIUM 4.6 4.3   CHLORIDE 107 104   CO2 23.0 24.0   ANION GAP 10.0 12.0   BUN 38* 43*   CREATININE 1.07* 1.19*   EGFR 50.7* 44.6*   GLUCOSE 100* 102*   CALCIUM 8.5* 8.8   MAGNESIUM  --  2.5*         Lab 23  0052   TOTAL PROTEIN 6.6   ALBUMIN 3.6    GLOBULIN 3.0   ALT (SGPT) 20   AST (SGOT) 31   BILIRUBIN 0.3   ALK PHOS 136*         Lab 05/26/23  0052   HSTROP T 21*                 Brief Urine Lab Results  (Last result in the past 365 days)      Color   Clarity   Blood   Leuk Est   Nitrite   Protein   CREAT   Urine HCG        05/26/23 0239 Yellow   Cloudy   Large (3+)   Moderate (2+)   Positive   30 mg/dL (1+)                 Microbiology Results Abnormal     Procedure Component Value - Date/Time    Blood Culture - Blood, Hand, Left [[id number]]  (Normal) Collected: 05/26/23 0328    Lab Status: Preliminary result Specimen: Blood from Hand, Left Updated: 05/28/23 0416     Blood Culture No growth at 2 days    Blood Culture - Blood, Arm, Right [[id number]]  (Normal) Collected: 05/26/23 0320    Lab Status: Preliminary result Specimen: Blood from Arm, Right Updated: 05/28/23 0416     Blood Culture No growth at 2 days          No radiology results from the last 24 hrs    Results for orders placed during the hospital encounter of 03/17/22    Adult Transthoracic Echo Complete W/ Cont if Necessary Per Protocol    Interpretation Summary  · Left ventricular ejection fraction appears to be 46 - 50%. Left ventricular systolic function is mildly decreased.  · Left ventricular diastolic function is consistent with (grade I) impaired relaxation.  · Mild to moderate mitral regurgitation.  · Mild to moderate tricuspid regurgitation, RVSP 54 mmHg.      Current medications:  Scheduled Meds:budesonide-formoterol, 2 puff, Inhalation, BID - RT  cetirizine, 10 mg, Oral, Daily  donepezil, 5 mg, Oral, Daily  levothyroxine, 25 mcg, Oral, Q AM  megestrol, 20 mg, Oral, Daily  metoprolol succinate XL, 25 mg, Oral, Daily  OXcarbazepine, 600 mg, Oral, BID  pantoprazole, 40 mg, Oral, Daily  pregabalin, 25 mg, Oral, BID  senna-docusate sodium, 2 tablet, Oral, BID  sodium chloride, 10 mL, Intravenous, Q12H  spironolactone, 25 mg, Oral, Daily  torsemide, 30 mg, Oral, Daily      Continuous  Infusions:   PRN Meds:.•  acetaminophen  •  albuterol  •  senna-docusate sodium **AND** polyethylene glycol **AND** bisacodyl **AND** bisacodyl  •  sodium chloride  •  sodium chloride  •  traZODone    Assessment & Plan   Assessment & Plan     Active Hospital Problems    Diagnosis  POA   • **Acute UTI [N39.0]  Yes   • CKD (chronic kidney disease), stage III [N18.30]  Yes   • JUAN F (acute kidney injury) [N17.9]  Yes   • Centrilobular emphysema [J43.2]  Yes   • Moderate malnutrition (CMS/HCC) [E44.0]  Yes   • Chronic respiratory failure [J96.10]  Yes   • chronic CHF (congestive heart failure) (HCC) [I50.9]  Yes   • atrial fibrillation [I48.91]  Yes   • Dementia without behavioral disturbance [F03.90]  Yes   • Severe malnutrition (CMS/HCC) [E43]  Yes   • Hypothyroidism (acquired) [E03.9]  Yes   • Essential hypertension [I10]  Yes      Resolved Hospital Problems   No resolved problems to display.        Brief Hospital Course to date:  Mitzi Eric is a 87 y.o. female with a past medical history significant for CKD III, COPD (2L nightly) with known bilateral upper lobe masses, paroxysmal A-fib, HLD, HTN, heart failure, hypothyroidism, Trigeminal neuralgia, and dementia. She is resident at [place] [facility]. Presented to ED after unwitnessed mechanical fall today wherein she sustained a laceration under left eye.     Notably, she was just here 5/10 to 5/19 for a fall. Was discharged to [place].      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   Right Upper lobe lung mass   Hx COPD  - Patient/POA have been aware of this and not interested in further work-up  - On 2L NC currently, normally on RA during the day and 2L at night per Nephew      Combined systolic  and diastolic heart failure  Moderate pulm hypertension  - Compensated, off IVF  - continue home Demadex/Spironolactone      Paroxysmal atrial fibrillation  - continue home Metoprolol  - not anticoagulated secondary to fall risk     Hypothyroidism  - Continue Synthroid     Trigeminal neuralgia  -Continue Trileptal and Lyrica     Hypertension  -continue home medications as above     Dementia  -Continue Aricept     Anemia  - H/h has fallen recently but this may all be dilutional   - currently stable but continue to monitor     Expected Discharge Location and Transportation: back to [place]  Expected Discharge   Expected Discharge Date: 5/30/2023; Expected Discharge Time:      DVT prophylaxis:  No DVT prophylaxis order currently exists.     AM-PAC 6 Clicks Score (PT): 12 (05/27/23 0837)    CODE STATUS:   Code Status and Medical Interventions:   Ordered at: 05/26/23 0357     Medical Intervention Limits:    NO intubation (DNI)     Code Status (Patient has no pulse and is not breathing):    No CPR (Do Not Attempt to Resuscitate)     Medical Interventions (Patient has pulse or is breathing):    Limited Support       Zoie Price, APRN  05/28/23

## 2023-05-28 NOTE — PLAN OF CARE
Problem: Fall Injury Risk  Goal: Absence of Fall and Fall-Related Injury  Outcome: Ongoing, Progressing  Intervention: Identify and Manage Contributors  Recent Flowsheet Documentation  Taken 5/28/2023 1400 by Roni Hernandez RN  Medication Review/Management:   medications reviewed   high-risk medications identified  Self-Care Promotion:   BADL personal objects within reach   BADL personal routines maintained   safe use of adaptive equipment encouraged  Taken 5/28/2023 1200 by Roni Hernandez RN  Medication Review/Management:   medications reviewed   high-risk medications identified  Self-Care Promotion:   BADL personal objects within reach   BADL personal routines maintained   safe use of adaptive equipment encouraged  Taken 5/28/2023 1000 by Roni Hernandez RN  Medication Review/Management:   medications reviewed   high-risk medications identified  Self-Care Promotion:   BADL personal objects within reach   BADL personal routines maintained   safe use of adaptive equipment encouraged  Taken 5/28/2023 0800 by Roni Hernandez RN  Medication Review/Management:   medications reviewed   high-risk medications identified  Self-Care Promotion:   BADL personal objects within reach   BADL personal routines maintained   safe use of adaptive equipment encouraged  Intervention: Promote Injury-Free Environment  Recent Flowsheet Documentation  Taken 5/28/2023 1400 by Roni Hernandez RN  Safety Promotion/Fall Prevention:   activity supervised   assistive device/personal items within reach   clutter free environment maintained   fall prevention program maintained   lighting adjusted   nonskid shoes/slippers when out of bed   room organization consistent   safety round/check completed  Taken 5/28/2023 1200 by Roni Hernandez RN  Safety Promotion/Fall Prevention:   activity supervised   clutter free environment maintained   assistive device/personal items within reach   fall prevention program maintained   nonskid shoes/slippers  when out of bed   room organization consistent   safety round/check completed  Taken 5/28/2023 1000 by Roni Hernandez RN  Safety Promotion/Fall Prevention:   activity supervised   assistive device/personal items within reach   clutter free environment maintained   fall prevention program maintained   mobility aid in reach   nonskid shoes/slippers when out of bed   room organization consistent   safety round/check completed  Taken 5/28/2023 0800 by Roni Hernandez RN  Safety Promotion/Fall Prevention:   activity supervised   assistive device/personal items within reach   clutter free environment maintained   fall prevention program maintained   lighting adjusted   nonskid shoes/slippers when out of bed   room organization consistent   safety round/check completed     Problem: Heart Failure Comorbidity  Goal: Maintenance of Heart Failure Symptom Control  Outcome: Ongoing, Progressing  Intervention: Maintain Heart Failure-Management  Recent Flowsheet Documentation  Taken 5/28/2023 1400 by Roni Hernandez RN  Medication Review/Management:   medications reviewed   high-risk medications identified  Taken 5/28/2023 1200 by Roni Hernandez RN  Medication Review/Management:   medications reviewed   high-risk medications identified  Taken 5/28/2023 1000 by Roni Hernandez RN  Medication Review/Management:   medications reviewed   high-risk medications identified  Taken 5/28/2023 0800 by Roni Hernandez RN  Medication Review/Management:   medications reviewed   high-risk medications identified     Problem: Hypertension Comorbidity  Goal: Blood Pressure in Desired Range  Outcome: Ongoing, Progressing  Intervention: Maintain Blood Pressure Management  Recent Flowsheet Documentation  Taken 5/28/2023 1400 by Roni Hernandez RN  Syncope Management:   legs elevated   position changed slowly  Medication Review/Management:   medications reviewed   high-risk medications identified  Taken 5/28/2023 1200 by Roni Hernandez RN  Syncope  Management:   legs elevated   position changed slowly  Medication Review/Management:   medications reviewed   high-risk medications identified  Taken 5/28/2023 1000 by Roni Hernandez RN  Syncope Management:   legs elevated   position changed slowly  Medication Review/Management:   medications reviewed   high-risk medications identified  Taken 5/28/2023 0800 by Roni Hernandez RN  Syncope Management:   legs elevated   position changed slowly  Medication Review/Management:   medications reviewed   high-risk medications identified     Problem: Skin Injury Risk Increased  Goal: Skin Health and Integrity  Outcome: Ongoing, Progressing  Intervention: Promote and Optimize Oral Intake  Recent Flowsheet Documentation  Taken 5/28/2023 1400 by Roni Hernandez RN  Oral Nutrition Promotion:   safe use of adaptive equipment encouraged   rest periods promoted  Taken 5/28/2023 1200 by Roni Hernandez RN  Oral Nutrition Promotion:   safe use of adaptive equipment encouraged   rest periods promoted  Taken 5/28/2023 1000 by Roni Hernandez RN  Oral Nutrition Promotion:   safe use of adaptive equipment encouraged   rest periods promoted  Taken 5/28/2023 0800 by Roni Hernandez RN  Oral Nutrition Promotion:   safe use of adaptive equipment encouraged   rest periods promoted  Intervention: Optimize Skin Protection  Recent Flowsheet Documentation  Taken 5/28/2023 1400 by Roni Hernandez RN  Pressure Reduction Techniques:   frequent weight shift encouraged   heels elevated off bed   pressure points protected   rest period provided between sit times  Head of Bed (HOB) Positioning: HOB at 30-45 degrees  Pressure Reduction Devices:   pressure-redistributing mattress utilized   positioning supports utilized   heel offloading device utilized  Skin Protection:   adhesive use limited   incontinence pads utilized   skin sealant/moisture barrier applied   skin-to-device areas padded   skin-to-skin areas padded   tubing/devices free from skin  contact  Taken 5/28/2023 1200 by Roni Hernandez RN  Pressure Reduction Techniques:   frequent weight shift encouraged   heels elevated off bed   pressure points protected   rest period provided between sit times   weight shift assistance provided  Head of Bed (HOB) Positioning: HOB at 30-45 degrees  Pressure Reduction Devices:   pressure-redistributing mattress utilized   positioning supports utilized   heel offloading device utilized  Skin Protection:   adhesive use limited   incontinence pads utilized   skin sealant/moisture barrier applied   skin-to-device areas padded   skin-to-skin areas padded   tubing/devices free from skin contact  Taken 5/28/2023 1000 by Roni Hernandez RN  Pressure Reduction Techniques:   frequent weight shift encouraged   heels elevated off bed   rest period provided between sit times   pressure points protected   weight shift assistance provided  Head of Bed (HOB) Positioning: HOB at 30-45 degrees  Pressure Reduction Devices:   pressure-redistributing mattress utilized   heel offloading device utilized   positioning supports utilized  Skin Protection:   adhesive use limited   incontinence pads utilized   transparent dressing maintained   skin-to-skin areas padded   skin-to-device areas padded   skin sealant/moisture barrier applied  Taken 5/28/2023 0800 by Roni Hernandez RN  Pressure Reduction Techniques:   frequent weight shift encouraged   heels elevated off bed   pressure points protected   rest period provided between sit times  Head of Bed (HOB) Positioning: HOB at 30-45 degrees  Pressure Reduction Devices:   pressure-redistributing mattress utilized   positioning supports utilized  Skin Protection:   adhesive use limited   incontinence pads utilized   skin sealant/moisture barrier applied   skin-to-device areas padded   skin-to-skin areas padded   tubing/devices free from skin contact     Problem: Adult Inpatient Plan of Care  Goal: Plan of Care Review  Outcome: Ongoing,  Progressing  Flowsheets (Taken 5/28/2023 1456)  Plan of Care Reviewed With:   patient   guardian  Goal: Patient-Specific Goal (Individualized)  Outcome: Ongoing, Progressing  Goal: Absence of Hospital-Acquired Illness or Injury  Outcome: Ongoing, Progressing  Intervention: Identify and Manage Fall Risk  Recent Flowsheet Documentation  Taken 5/28/2023 1400 by Roni Hernandez RN  Safety Promotion/Fall Prevention:   activity supervised   assistive device/personal items within reach   clutter free environment maintained   fall prevention program maintained   lighting adjusted   nonskid shoes/slippers when out of bed   room organization consistent   safety round/check completed  Taken 5/28/2023 1200 by Roni Hernandez RN  Safety Promotion/Fall Prevention:   activity supervised   clutter free environment maintained   assistive device/personal items within reach   fall prevention program maintained   nonskid shoes/slippers when out of bed   room organization consistent   safety round/check completed  Taken 5/28/2023 1000 by Roni Hernandez RN  Safety Promotion/Fall Prevention:   activity supervised   assistive device/personal items within reach   clutter free environment maintained   fall prevention program maintained   mobility aid in reach   nonskid shoes/slippers when out of bed   room organization consistent   safety round/check completed  Taken 5/28/2023 0800 by Roni Hernandez, RN  Safety Promotion/Fall Prevention:   activity supervised   assistive device/personal items within reach   clutter free environment maintained   fall prevention program maintained   lighting adjusted   nonskid shoes/slippers when out of bed   room organization consistent   safety round/check completed  Intervention: Prevent Skin Injury  Recent Flowsheet Documentation  Taken 5/28/2023 1400 by Roni Hernandez, RN  Body Position: weight shifting  Skin Protection:   adhesive use limited   incontinence pads utilized   skin sealant/moisture barrier  applied   skin-to-device areas padded   skin-to-skin areas padded   tubing/devices free from skin contact  Taken 5/28/2023 1200 by Roni Hernandez RN  Body Position:   turned   weight shifting  Skin Protection:   adhesive use limited   incontinence pads utilized   skin sealant/moisture barrier applied   skin-to-device areas padded   skin-to-skin areas padded   tubing/devices free from skin contact  Taken 5/28/2023 1000 by Roni Hernandez RN  Body Position:   turned   weight shifting   right  Skin Protection:   adhesive use limited   incontinence pads utilized   transparent dressing maintained   skin-to-skin areas padded   skin-to-device areas padded   skin sealant/moisture barrier applied  Taken 5/28/2023 0800 by Roni Hernandez RN  Body Position:   turned   supine  Skin Protection:   adhesive use limited   incontinence pads utilized   skin sealant/moisture barrier applied   skin-to-device areas padded   skin-to-skin areas padded   tubing/devices free from skin contact  Intervention: Prevent and Manage VTE (Venous Thromboembolism) Risk  Recent Flowsheet Documentation  Taken 5/28/2023 1400 by Roni Hernandez RN  Activity Management: activity encouraged  Taken 5/28/2023 1200 by Roni Hernandez RN  Activity Management: activity encouraged  Taken 5/28/2023 1000 by Roni Hernandez RN  Activity Management: activity encouraged  Taken 5/28/2023 0800 by Roni Hernandez RN  Activity Management: activity encouraged  Range of Motion: ROM (range of motion) performed  Intervention: Prevent Infection  Recent Flowsheet Documentation  Taken 5/28/2023 1400 by Roni Hernandez RN  Infection Prevention:   equipment surfaces disinfected   environmental surveillance performed   hand hygiene promoted   personal protective equipment utilized   rest/sleep promoted   single patient room provided  Taken 5/28/2023 1200 by Roni Hernandez RN  Infection Prevention:   environmental surveillance performed   equipment surfaces disinfected   hand  hygiene promoted   personal protective equipment utilized   rest/sleep promoted   single patient room provided  Taken 5/28/2023 1000 by Roni Hernandez, RN  Infection Prevention:   environmental surveillance performed   equipment surfaces disinfected   rest/sleep promoted   personal protective equipment utilized   hand hygiene promoted   single patient room provided  Taken 5/28/2023 0800 by Roni Hernandez, RN  Infection Prevention:   environmental surveillance performed   equipment surfaces disinfected   hand hygiene promoted   personal protective equipment utilized   rest/sleep promoted   single patient room provided  Goal: Optimal Comfort and Wellbeing  Outcome: Ongoing, Progressing  Intervention: Provide Person-Centered Care  Recent Flowsheet Documentation  Taken 5/28/2023 0800 by Roni Hernandez, RN  Trust Relationship/Rapport:   care explained   emotional support provided   questions answered   thoughts/feelings acknowledged   reassurance provided  Goal: Readiness for Transition of Care  Outcome: Ongoing, Progressing   Goal Outcome Evaluation:  Plan of Care Reviewed With: patient, guardian

## 2023-05-29 LAB — GLUCOSE BLDC GLUCOMTR-MCNC: 93 MG/DL (ref 70–130)

## 2023-05-29 PROCEDURE — 99231 SBSQ HOSP IP/OBS SF/LOW 25: CPT | Performed by: NURSE PRACTITIONER

## 2023-05-29 PROCEDURE — G0378 HOSPITAL OBSERVATION PER HR: HCPCS

## 2023-05-29 PROCEDURE — 82948 REAGENT STRIP/BLOOD GLUCOSE: CPT

## 2023-05-29 PROCEDURE — 97530 THERAPEUTIC ACTIVITIES: CPT

## 2023-05-29 PROCEDURE — 94799 UNLISTED PULMONARY SVC/PX: CPT

## 2023-05-29 RX ADMIN — PANTOPRAZOLE SODIUM 40 MG: 40 TABLET, DELAYED RELEASE ORAL at 10:54

## 2023-05-29 RX ADMIN — SPIRONOLACTONE 25 MG: 25 TABLET ORAL at 10:54

## 2023-05-29 RX ADMIN — Medication 10 ML: at 10:54

## 2023-05-29 RX ADMIN — SENNOSIDES AND DOCUSATE SODIUM 2 TABLET: 50; 8.6 TABLET ORAL at 20:26

## 2023-05-29 RX ADMIN — OXCARBAZEPINE 600 MG: 150 TABLET, FILM COATED ORAL at 20:25

## 2023-05-29 RX ADMIN — LEVOTHYROXINE SODIUM 25 MCG: 25 TABLET ORAL at 05:29

## 2023-05-29 RX ADMIN — CETIRIZINE HYDROCHLORIDE 10 MG: 10 TABLET, FILM COATED ORAL at 10:54

## 2023-05-29 RX ADMIN — ACETAMINOPHEN 650 MG: 325 TABLET ORAL at 11:55

## 2023-05-29 RX ADMIN — PREGABALIN 25 MG: 25 CAPSULE ORAL at 20:26

## 2023-05-29 RX ADMIN — MEGESTROL ACETATE 20 MG: 20 TABLET ORAL at 10:53

## 2023-05-29 RX ADMIN — PREGABALIN 25 MG: 25 CAPSULE ORAL at 10:54

## 2023-05-29 RX ADMIN — TORSEMIDE 30 MG: 20 TABLET ORAL at 10:54

## 2023-05-29 RX ADMIN — SENNOSIDES AND DOCUSATE SODIUM 2 TABLET: 50; 8.6 TABLET ORAL at 10:54

## 2023-05-29 RX ADMIN — Medication 10 ML: at 20:26

## 2023-05-29 RX ADMIN — DONEPEZIL HYDROCHLORIDE 5 MG: 5 TABLET, FILM COATED ORAL at 10:54

## 2023-05-29 RX ADMIN — BUDESONIDE AND FORMOTEROL FUMARATE DIHYDRATE 2 PUFF: 160; 4.5 AEROSOL RESPIRATORY (INHALATION) at 19:33

## 2023-05-29 RX ADMIN — OXCARBAZEPINE 600 MG: 150 TABLET, FILM COATED ORAL at 10:53

## 2023-05-29 RX ADMIN — METOPROLOL SUCCINATE 25 MG: 25 TABLET, EXTENDED RELEASE ORAL at 10:54

## 2023-05-29 NOTE — THERAPY TREATMENT NOTE
Patient Name: Mitzi Eric  : 1935    MRN: [id number]                              Today's Date: 2023       Admit Date: 2023    Visit Dx:     ICD-10-CM ICD-9-CM   1. Acute UTI  N39.0 599.0   2. Fall, initial encounter  W19.XXXA E888.9   3. Acute kidney injury  N17.9 584.9   4. History of hypertension  Z86.79 V12.59   5. History of hypothyroidism  Z86.39 V12.29   6. History of COPD  Z87.09 V12.69   7. History of chronic kidney disease  Z87.448 V13.09   8. History of dementia  Z86.59 V11.8   9. History of atrial fibrillation  Z86.79 V12.59   10. History of CHF (congestive heart failure)  Z86.79 V12.59   11. History of hyperlipidemia  Z86.39 V12.29     Patient Active Problem List   Diagnosis   • UTI (urinary tract infection), bacterial   • Essential hypertension   • Cognitive decline   • Trigeminal neuralgia   • Hypothyroidism (acquired)   • Lung mass   • Influenza A   • Severe malnutrition (CMS/HCC)   • Dementia without behavioral disturbance   • History of craniotomy   • Impaired functional mobility, balance, gait, and endurance   • COPD ex with volume overload   • chronic CHF (congestive heart failure) ([facility])   • Thrombocytopenia   • Elevated transaminase level   • atrial fibrillation   • COPD with acute exacerbation   • Acute on chronic respiratory failure with hypoxemia   • Chronic respiratory failure   • Underweight   • Moderate malnutrition (CMS/HCC)   • Centrilobular emphysema   • Essential tremor   • Pneumonia of both upper lobes due to infectious organism   • CKD (chronic kidney disease), stage III   • JUAN F (acute kidney injury)   • Acute UTI     Past Medical History:   Diagnosis Date   • A-fib    • Ataxic gait    • Chronic kidney disease, stage 3    • COPD (chronic obstructive pulmonary disease)    • Dementia    • Disease of thyroid gland    • Essential tremor 2022   • Heart failure    • Hyperlipidemia    • Hypertension    • Shingles      Past Surgical History:   Procedure Laterality  Date   • BRAIN TUMOR EXCISION      BENIGN   • FRACTURE SURGERY     • TRIGGER FINGER RELEASE        General Information     Row Name 05/29/23 [facility]          Physical Therapy Time and Intention    Document Type therapy note (daily note)  -KW     Mode of Treatment physical therapy;individual therapy  -[facility] Name 05/29/23 [facility]          General Information    Patient Profile Reviewed yes  -KW     Existing Precautions/Restrictions fall;oxygen therapy device and L/min  dementia  -           User Key  (r) = Recorded By, (t) = Taken By, (c) = Cosigned By    Initials Name Provider Type    Betzaida Stearns PT Physical Therapist               Mobility     Row Name 05/29/23 [facility]          Bed Mobility    Bed Mobility supine-sit  -KW     Supine-Sit [place] (Bed Mobility) maximum assist (25% patient effort);1 person assist  -KW     Assistive Device (Bed Mobility) bed rails;head of bed elevated;draw sheet  -[facility] Name 05/29/23 [facility]          Bed-Chair Transfer    Bed-Chair [place] (Transfers) moderate assist (50% patient effort);1 person assist;verbal cues  -     Comment, (Bed-Chair Transfer) lateral steps to chair. VCs for sequencing and safety awareness  -[facility] Name 05/29/23 [facility]          Sit-Stand Transfer    Sit-Stand [place] (Transfers) minimum assist (75% patient effort);1 person assist;verbal cues  -           User Key  (r) = Recorded By, (t) = Taken By, (c) = Cosigned By    Initials Name Provider Type    Betzaida Stearns PT Physical Therapist               Obj/Interventions    No documentation.                Goals/Plan    No documentation.                Clinical Impression     Row Name 05/29/23 1115          Pain    Pre/Posttreatment Pain Comment patient having neck pain but did not give numerical rating  -[facility] Name 05/29/23 [facility]          Plan of Care Review    Plan of Care Reviewed With patient  -KW     Progress improving  -     Outcome Evaluation Patient agreeable  to get to chair and able to follow commands to initate rolling and transfer to EOB however continues to require significant assistance. patient able to sit at EOB with supervision. patient with complaints that her neck was very stiff. Patient completed gentle ROM of neck rotation and lateral flexion. patient completed 5 sit to stands with 2 rest breaks and completed SPT to chair. Patient with difficulty taking steps and requires heavy cues and assistance. patient would continue to benefit from skilled pt services  -KW     Row Name 05/29/23 1115          Positioning and Restraints    Pre-Treatment Position in bed  -KW     Post Treatment Position chair  -KW     In Chair reclined;call light within reach;encouraged to call for assist;exit alarm on  -KW           User Key  (r) = Recorded By, (t) = Taken By, (c) = Cosigned By    Initials Name Provider Type    Betazida Stearns PT Physical Therapist               Outcome Measures     Row Name 05/29/23 1115 05/29/23 0800       How much help from another person do you currently need...    Turning from your back to your side while in flat bed without using bedrails? 2  -KW 1  -DB    Moving from lying on back to sitting on the side of a flat bed without bedrails? 2  -KW 1  -DB    Moving to and from a bed to a chair (including a wheelchair)? 2  -KW 1  -DB    Standing up from a chair using your arms (e.g., wheelchair, bedside chair)? 2  -KW 2  -DB    Climbing 3-5 steps with a railing? 1  -KW 1  -DB    To walk in hospital room? 1  -KW 1  -DB    AM-PAC 6 Clicks Score (PT) 10  -KW 7  -DB    Highest level of mobility 4 --> Transferred to chair/commode  -KW 2 --> Bed activities/dependent transfer  -DB    Row Name 05/29/23 1115          Functional Assessment    Outcome Measure Options AM-PAC 6 Clicks Basic Mobility (PT)  -KW           User Key  (r) = Recorded By, (t) = Taken By, (c) = Cosigned By    Initials Name Provider Type    Betzaida Stearns PT Physical Therapist     Roni Ferreira, RN Registered Nurse                             Physical Therapy Education     Title: PT OT SLP Therapies (In Progress)     Topic: Physical Therapy (Done)     Point: Mobility training (Done)     Learning Progress Summary           Patient Acceptance, E, NR,VU by KR at 5/26/2023 1417   Family Acceptance, E, NR,VU by KR at 5/26/2023 1417                   Point: Home exercise program (Done)     Learning Progress Summary           Patient Acceptance, E, NR,VU by KR at 5/26/2023 1417   Family Acceptance, E, NR,VU by KR at 5/26/2023 1417                   Point: Body mechanics (Done)     Learning Progress Summary           Patient Acceptance, E, NR,VU by KR at 5/26/2023 1417   Family Acceptance, E, NR,VU by KR at 5/26/2023 1417                   Point: Precautions (Done)     Learning Progress Summary           Patient Acceptance, E, NR,VU by KR at 5/26/2023 1417   Family Acceptance, E, NR,VU by KR at 5/26/2023 1417                               User Key     Initials Effective Dates Name Provider Type Discipline     12/30/22 -  Marybeth Robertson, CASYE Physical Therapist PT              PT Recommendation and Plan     Plan of Care Reviewed With: patient  Progress: improving  Outcome Evaluation: Patient agreeable to get to chair and able to follow commands to initate rolling and transfer to EOB however continues to require significant assistance. patient able to sit at EOB with supervision. patient with complaints that her neck was very stiff. Patient completed gentle ROM of neck rotation and lateral flexion. patient completed 5 sit to stands with 2 rest breaks and completed SPT to chair. Patient with difficulty taking steps and requires heavy cues and assistance. patient would continue to benefit from skilled pt services     Time Calculation:    PT Charges     Row Name 05/29/23 1115             Time Calculation    Start Time 1115  -KW      PT Received On 05/29/23  -KW      PT Goal Re-Cert Due Date  06/05/23  -KW         Timed Charges    94488 - PT Therapeutic Activity Minutes 24  -KW         Total Minutes    Timed Charges Total Minutes 24  -KW       Total Minutes 24  -KW            User Key  (r) = Recorded By, (t) = Taken By, (c) = Cosigned By    Initials Name Provider Type    Betzaida Stearns PT Physical Therapist              Therapy Charges for Today     Code Description Service Date Service Provider Modifiers Qty    [id number] HC PT THERAPEUTIC ACT EA 15 MIN 5/29/2023 Betzaida Brooks PT GP 2          PT G-Codes  Outcome Measure Options: AM-PAC 6 Clicks Basic Mobility (PT)  AM-PAC 6 Clicks Score (PT): 10  AM-PAC 6 Clicks Score (OT): 14  PT Discharge Summary  Anticipated Discharge Disposition (PT): skilled nursing facility    Betzaida Brooks PT  5/29/2023

## 2023-05-29 NOTE — PROGRESS NOTES
[facility] Medicine Services  PROGRESS NOTE    Patient Name: Mitzi Eric  : 1935  MRN: [id number]    Date of Admission: 2023  Primary Care Physician: Provider, No Known    Subjective   Subjective     CC:   Fall at SNF    HPI:    No new issues overnight  Complains of neck pain.  Nephew states this is chronic due to how she sleeps    ROS:  UTO due to dementia, but denies pain or chest pain/SOB      Objective   Objective     Vital Signs:   Temp:  [98.2 °F (36.8 °C)-99.6 °F (37.6 °C)] 98.2 °F (36.8 °C)  Heart Rate:  [] 84  Resp:  [16-18] 16  BP: (106-126)/(53-79) 110/54  Flow (L/min):  [2] 2     Physical Exam:  Constitutional: No acute distress, drowsy, but awakens easily, up in chair  HENT: NCAT, mucous membranes moist  Respiratory: Clear to auscultation bilaterally, respiratory effort normal   Cardiovascular: RRR, no murmurs, rubs, or gallops  Gastrointestinal: Positive bowel sounds, soft, nontender, nondistended  Musculoskeletal: No bilateral ankle edema  Psychiatric: Appropriate affect, cooperative  Neurologic: Oriented x 1, JO, speech clear  Skin: No rashes noted    No change in exam from 23      Results Reviewed:  LAB RESULTS:      Lab 23   WBC 6.98 8.45  --  12.87*   HEMOGLOBIN 10.2* 9.8*  --  10.3*   HEMATOCRIT 31.7* 31.5*  --  32.0*   PLATELETS 167 183  --  213   NEUTROS ABS  --   --   --  10.37*   IMMATURE GRANS (ABS)  --   --   --  0.13*   LYMPHS ABS  --   --   --  0.72   MONOS ABS  --   --   --  1.15*   EOS ABS  --   --   --  0.46*   MCV 95.8 99.1*  --  96.1   PROCALCITONIN  --   --   --  0.06   LACTATE  --   --  0.8  --          Lab 23  005   SODIUM 140 140   POTASSIUM 4.6 4.3   CHLORIDE 107 104   CO2 23.0 24.0   ANION GAP 10.0 12.0   BUN 38* 43*   CREATININE 1.07* 1.19*   EGFR 50.7* 44.6*   GLUCOSE 100* 102*   CALCIUM 8.5* 8.8   MAGNESIUM  --  2.5*         Lab 23  0052    TOTAL PROTEIN 6.6   ALBUMIN 3.6   GLOBULIN 3.0   ALT (SGPT) 20   AST (SGOT) 31   BILIRUBIN 0.3   ALK PHOS 136*         Lab 05/26/23  0052   HSTROP T 21*                 Brief Urine Lab Results  (Last result in the past 365 days)      Color   Clarity   Blood   Leuk Est   Nitrite   Protein   CREAT   Urine HCG        05/26/23 0239 Yellow   Cloudy   Large (3+)   Moderate (2+)   Positive   30 mg/dL (1+)                 Microbiology Results Abnormal     Procedure Component Value - Date/Time    Blood Culture - Blood, Hand, Left [[id number]]  (Normal) Collected: 05/26/23 0328    Lab Status: Preliminary result Specimen: Blood from Hand, Left Updated: 05/29/23 0416     Blood Culture No growth at 3 days    Blood Culture - Blood, Arm, Right [[id number]]  (Normal) Collected: 05/26/23 0320    Lab Status: Preliminary result Specimen: Blood from Arm, Right Updated: 05/29/23 0416     Blood Culture No growth at 3 days          No radiology results from the last 24 hrs    Results for orders placed during the hospital encounter of 03/17/22    Adult Transthoracic Echo Complete W/ Cont if Necessary Per Protocol    Interpretation Summary  · Left ventricular ejection fraction appears to be 46 - 50%. Left ventricular systolic function is mildly decreased.  · Left ventricular diastolic function is consistent with (grade I) impaired relaxation.  · Mild to moderate mitral regurgitation.  · Mild to moderate tricuspid regurgitation, RVSP 54 mmHg.      Current medications:  Scheduled Meds:budesonide-formoterol, 2 puff, Inhalation, BID - RT  cetirizine, 10 mg, Oral, Daily  donepezil, 5 mg, Oral, Daily  levothyroxine, 25 mcg, Oral, Q AM  megestrol, 20 mg, Oral, Daily  metoprolol succinate XL, 25 mg, Oral, Daily  OXcarbazepine, 600 mg, Oral, BID  pantoprazole, 40 mg, Oral, Daily  pregabalin, 25 mg, Oral, BID  senna-docusate sodium, 2 tablet, Oral, BID  sodium chloride, 10 mL, Intravenous, Q12H  spironolactone, 25 mg, Oral, Daily  torsemide, 30 mg,  Oral, Daily      Continuous Infusions:   PRN Meds:.•  acetaminophen  •  albuterol  •  senna-docusate sodium **AND** polyethylene glycol **AND** bisacodyl **AND** bisacodyl  •  sodium chloride  •  sodium chloride  •  traZODone    Assessment & Plan   Assessment & Plan     Active Hospital Problems    Diagnosis  POA   • **Acute UTI [N39.0]  Yes   • CKD (chronic kidney disease), stage III [N18.30]  Yes   • JUAN F (acute kidney injury) [N17.9]  Yes   • Centrilobular emphysema [J43.2]  Yes   • Moderate malnutrition (CMS/HCC) [E44.0]  Yes   • Chronic respiratory failure [J96.10]  Yes   • chronic CHF (congestive heart failure) (HCC) [I50.9]  Yes   • atrial fibrillation [I48.91]  Yes   • Dementia without behavioral disturbance [F03.90]  Yes   • Severe malnutrition (CMS/HCC) [E43]  Yes   • Hypothyroidism (acquired) [E03.9]  Yes   • Essential hypertension [I10]  Yes      Resolved Hospital Problems   No resolved problems to display.        Brief Hospital Course to date:  Mitzi Eric is a 87 y.o. female with a past medical history significant for CKD III, COPD (2L nightly) with known bilateral upper lobe masses, paroxysmal A-fib, HLD, HTN, heart failure, hypothyroidism, Trigeminal neuralgia, and dementia. She is resident at [facility]. Presented to ED after unwitnessed mechanical fall today wherein she sustained a laceration under left eye.     Notably, she was just here 5/10 to 5/19 for a fall. Was discharged to [facility].      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   Right Upper lobe lung mass   Hx COPD  - Patient/POA have been aware of this and not interested in further work-up  - On 2L NC currently, normally on RA during the day and 2L at night per  Nephew      Combined systolic and diastolic heart failure  Moderate pulm hypertension  - Compensated, off IVF  - continue home Demadex/Spironolactone      Paroxysmal atrial fibrillation  - continue home Metoprolol  - not anticoagulated secondary to fall risk     Hypothyroidism  - Continue Synthroid     Trigeminal neuralgia  -Continue Trileptal and Lyrica     Hypertension  -continue home medications as above     Dementia  -Continue Aricept     Anemia  - H/h has fallen recently but this may all be dilutional   - currently stable but continue to monitor     Expected Discharge Location and Transportation: back to [facility]  Expected Discharge   Expected Discharge Date: 5/30/2023; Expected Discharge Time:      DVT prophylaxis:  No DVT prophylaxis order currently exists.     AM-PAC 6 Clicks Score (PT): 10 (05/29/23 1115)    CODE STATUS:   Code Status and Medical Interventions:   Ordered at: 05/26/23 0357     Medical Intervention Limits:    NO intubation (DNI)     Code Status (Patient has no pulse and is not breathing):    No CPR (Do Not Attempt to Resuscitate)     Medical Interventions (Patient has pulse or is breathing):    Limited Support       RAFAEL Mata  05/29/23

## 2023-05-29 NOTE — PLAN OF CARE
Problem: Fall Injury Risk  Goal: Absence of Fall and Fall-Related Injury  Outcome: Ongoing, Progressing  Intervention: Identify and Manage Contributors  Recent Flowsheet Documentation  Taken 5/29/2023 1400 by Roni Hernandez RN  Medication Review/Management:   medications reviewed   high-risk medications identified  Self-Care Promotion:   BADL personal objects within reach   BADL personal routines maintained   safe use of adaptive equipment encouraged  Taken 5/29/2023 1200 by Roni Hernandez RN  Medication Review/Management:   medications reviewed   high-risk medications identified  Self-Care Promotion:   BADL personal objects within reach   BADL personal routines maintained  Taken 5/29/2023 1000 by Roni Hernandez RN  Self-Care Promotion:   BADL personal objects within reach   BADL personal routines maintained   safe use of adaptive equipment encouraged  Taken 5/29/2023 0800 by Roni Hernandez RN  Medication Review/Management:   medications reviewed   high-risk medications identified  Self-Care Promotion:   BADL personal objects within reach   BADL personal routines maintained   independence encouraged   meal set-up provided   safe use of adaptive equipment encouraged  Intervention: Promote Injury-Free Environment  Recent Flowsheet Documentation  Taken 5/29/2023 1400 by Roni Hernandez RN  Safety Promotion/Fall Prevention:   activity supervised   assistive device/personal items within reach   clutter free environment maintained   fall prevention program maintained   lighting adjusted   nonskid shoes/slippers when out of bed   room organization consistent   safety round/check completed  Taken 5/29/2023 1200 by Roni Hernandez RN  Safety Promotion/Fall Prevention:   activity supervised   assistive device/personal items within reach   clutter free environment maintained   fall prevention program maintained   lighting adjusted   nonskid shoes/slippers when out of bed   room organization consistent   safety  round/check completed  Taken 5/29/2023 1000 by Roni Hernandez RN  Safety Promotion/Fall Prevention:   activity supervised   assistive device/personal items within reach   clutter free environment maintained   fall prevention program maintained   nonskid shoes/slippers when out of bed   room organization consistent   safety round/check completed  Taken 5/29/2023 0800 by Roni Hernandez RN  Safety Promotion/Fall Prevention:   activity supervised   assistive device/personal items within reach   clutter free environment maintained   fall prevention program maintained   lighting adjusted   nonskid shoes/slippers when out of bed   room organization consistent   safety round/check completed     Problem: Heart Failure Comorbidity  Goal: Maintenance of Heart Failure Symptom Control  Outcome: Ongoing, Progressing  Intervention: Maintain Heart Failure-Management  Recent Flowsheet Documentation  Taken 5/29/2023 1400 by Roni Hernandez RN  Medication Review/Management:   medications reviewed   high-risk medications identified  Taken 5/29/2023 1200 by Roni Hernandez RN  Medication Review/Management:   medications reviewed   high-risk medications identified  Taken 5/29/2023 0800 by Roni Hernandez RN  Medication Review/Management:   medications reviewed   high-risk medications identified     Problem: Hypertension Comorbidity  Goal: Blood Pressure in Desired Range  Outcome: Ongoing, Progressing  Intervention: Maintain Blood Pressure Management  Recent Flowsheet Documentation  Taken 5/29/2023 1400 by Roni Hernandez RN  Syncope Management:   legs elevated   position changed slowly  Medication Review/Management:   medications reviewed   high-risk medications identified  Taken 5/29/2023 1200 by Roni Hernandez RN  Syncope Management:   legs elevated   position changed slowly  Medication Review/Management:   medications reviewed   high-risk medications identified  Taken 5/29/2023 1000 by Roni Hernandez RN  Syncope Management:   legs  elevated   position changed slowly  Taken 5/29/2023 0800 by Roni Hernandez RN  Syncope Management:   legs elevated   position changed slowly  Medication Review/Management:   medications reviewed   high-risk medications identified     Problem: Skin Injury Risk Increased  Goal: Skin Health and Integrity  Outcome: Ongoing, Progressing  Intervention: Promote and Optimize Oral Intake  Recent Flowsheet Documentation  Taken 5/29/2023 1400 by Roni Hernandez RN  Oral Nutrition Promotion:   safe use of adaptive equipment encouraged   rest periods promoted  Taken 5/29/2023 1200 by Roni Hernandez RN  Oral Nutrition Promotion:   safe use of adaptive equipment encouraged   rest periods promoted  Taken 5/29/2023 1000 by Roni Hernandez RN  Oral Nutrition Promotion:   safe use of adaptive equipment encouraged   rest periods promoted  Taken 5/29/2023 0800 by Roni Hernandez RN  Oral Nutrition Promotion:   safe use of adaptive equipment encouraged   rest periods promoted  Intervention: Optimize Skin Protection  Recent Flowsheet Documentation  Taken 5/29/2023 1400 by Roni Hernandez RN  Pressure Reduction Techniques:   frequent weight shift encouraged   rest period provided between sit times   heels elevated off bed   pressure points protected  Head of Bed (HOB) Positioning: HOB elevated  Pressure Reduction Devices:   heel offloading device utilized   positioning supports utilized   pressure-redistributing mattress utilized  Skin Protection:   adhesive use limited   skin-to-skin areas padded   skin-to-device areas padded   tubing/devices free from skin contact  Taken 5/29/2023 1200 by Roni Hernandez RN  Pressure Reduction Techniques:   frequent weight shift encouraged   heels elevated off bed   pressure points protected   rest period provided between sit times  Head of Bed (HOB) Positioning: HOB elevated  Pressure Reduction Devices:   pressure-redistributing mattress utilized   positioning supports utilized  Skin Protection:    adhesive use limited   incontinence pads utilized   skin-to-device areas padded   skin-to-skin areas padded   tubing/devices free from skin contact  Taken 5/29/2023 1000 by Roni Hernandez RN  Pressure Reduction Techniques:   frequent weight shift encouraged   heels elevated off bed   pressure points protected   rest period provided between sit times  Head of Bed (HOB) Positioning: HOB at 45 degrees  Pressure Reduction Devices:   pressure-redistributing mattress utilized   positioning supports utilized  Skin Protection:   adhesive use limited   skin-to-device areas padded   skin-to-skin areas padded   tubing/devices free from skin contact  Taken 5/29/2023 0800 by Roni Hernandez RN  Pressure Reduction Techniques:   frequent weight shift encouraged   heels elevated off bed   pressure points protected   rest period provided between sit times  Head of Bed (HOB) Positioning: HOB at 45 degrees  Pressure Reduction Devices:   heel offloading device utilized   positioning supports utilized   pressure-redistributing mattress utilized  Skin Protection:   adhesive use limited   incontinence pads utilized   skin-to-device areas padded   skin-to-skin areas padded   tubing/devices free from skin contact     Problem: Adult Inpatient Plan of Care  Goal: Plan of Care Review  Outcome: Ongoing, Progressing  Goal: Patient-Specific Goal (Individualized)  Outcome: Ongoing, Progressing  Goal: Absence of Hospital-Acquired Illness or Injury  Outcome: Ongoing, Progressing  Intervention: Identify and Manage Fall Risk  Recent Flowsheet Documentation  Taken 5/29/2023 1400 by Roni Hernandez RN  Safety Promotion/Fall Prevention:   activity supervised   assistive device/personal items within reach   clutter free environment maintained   fall prevention program maintained   lighting adjusted   nonskid shoes/slippers when out of bed   room organization consistent   safety round/check completed  Taken 5/29/2023 1200 by Roni Hernandez RN  Safety  Promotion/Fall Prevention:   activity supervised   assistive device/personal items within reach   clutter free environment maintained   fall prevention program maintained   lighting adjusted   nonskid shoes/slippers when out of bed   room organization consistent   safety round/check completed  Taken 5/29/2023 1000 by Roni Hernandez RN  Safety Promotion/Fall Prevention:   activity supervised   assistive device/personal items within reach   clutter free environment maintained   fall prevention program maintained   nonskid shoes/slippers when out of bed   room organization consistent   safety round/check completed  Taken 5/29/2023 0800 by Roni Hernandez RN  Safety Promotion/Fall Prevention:   activity supervised   assistive device/personal items within reach   clutter free environment maintained   fall prevention program maintained   lighting adjusted   nonskid shoes/slippers when out of bed   room organization consistent   safety round/check completed  Intervention: Prevent Skin Injury  Recent Flowsheet Documentation  Taken 5/29/2023 1400 by Roni Hernandez RN  Skin Protection:   adhesive use limited   skin-to-skin areas padded   skin-to-device areas padded   tubing/devices free from skin contact  Taken 5/29/2023 1200 by Roni Hernandez RN  Body Position:   legs elevated   weight shifting  Skin Protection:   adhesive use limited   incontinence pads utilized   skin-to-device areas padded   skin-to-skin areas padded   tubing/devices free from skin contact  Taken 5/29/2023 1000 by Roin Hernandez RN  Body Position:   turned   left   weight shifting  Skin Protection:   adhesive use limited   skin-to-device areas padded   skin-to-skin areas padded   tubing/devices free from skin contact  Taken 5/29/2023 0800 by Roni Hernandez RN  Body Position:   turned   supine  Skin Protection:   adhesive use limited   incontinence pads utilized   skin-to-device areas padded   skin-to-skin areas padded   tubing/devices free from skin  contact  Intervention: Prevent and Manage VTE (Venous Thromboembolism) Risk  Recent Flowsheet Documentation  Taken 5/29/2023 1400 by Roni Hernandez RN  Activity Management: up in chair  Taken 5/29/2023 1200 by Roni Hernandez RN  Activity Management: up in chair  Taken 5/29/2023 0800 by Roni Hernandez RN  Activity Management: activity encouraged  Range of Motion: ROM (range of motion) performed  Intervention: Prevent Infection  Recent Flowsheet Documentation  Taken 5/29/2023 1400 by Roni Hernandez RN  Infection Prevention:   environmental surveillance performed   equipment surfaces disinfected   hand hygiene promoted   personal protective equipment utilized   rest/sleep promoted   single patient room provided  Taken 5/29/2023 1200 by Roni Hernandez RN  Infection Prevention:   environmental surveillance performed   equipment surfaces disinfected   hand hygiene promoted   personal protective equipment utilized   rest/sleep promoted   single patient room provided  Taken 5/29/2023 1000 by Roni Hernandez RN  Infection Prevention: environmental surveillance performed  Taken 5/29/2023 0800 by Roni Hernandez RN  Infection Prevention:   environmental surveillance performed   equipment surfaces disinfected   hand hygiene promoted   personal protective equipment utilized   rest/sleep promoted   single patient room provided  Goal: Optimal Comfort and Wellbeing  Outcome: Ongoing, Progressing  Intervention: Provide Person-Centered Care  Recent Flowsheet Documentation  Taken 5/29/2023 0800 by Roni Hernandez RN  Trust Relationship/Rapport:   care explained   choices provided   emotional support provided   empathic listening provided   questions answered   reassurance provided   questions encouraged   thoughts/feelings acknowledged  Goal: Readiness for Transition of Care  Outcome: Ongoing, Progressing   Goal Outcome Evaluation:  Plan of Care Reviewed With: patient, guardian

## 2023-05-29 NOTE — PLAN OF CARE
Goal Outcome Evaluation:  Plan of Care Reviewed With: patient        Progress: improving  Outcome Evaluation: Patient agreeable to get to chair and able to follow commands to initate rolling and transfer to EOB however continues to require significant assistance. patient able to sit at EOB with supervision. patient with complaints that her neck was very stiff. Patient completed gentle ROM of neck rotation and lateral flexion. patient completed 5 sit to stands with 2 rest breaks and completed SPT to chair. Patient with difficulty taking steps and requires heavy cues and assistance. patient would continue to benefit from skilled pt services

## 2023-05-30 VITALS
SYSTOLIC BLOOD PRESSURE: 132 MMHG | OXYGEN SATURATION: 90 % | WEIGHT: 109.5 LBS | HEIGHT: 63 IN | BODY MASS INDEX: 19.4 KG/M2 | HEART RATE: 87 BPM | RESPIRATION RATE: 18 BRPM | DIASTOLIC BLOOD PRESSURE: 61 MMHG | TEMPERATURE: 98.6 F

## 2023-05-30 PROBLEM — N39.0 ACUTE UTI: Status: RESOLVED | Noted: 2023-05-26 | Resolved: 2023-05-30

## 2023-05-30 PROBLEM — N17.9 AKI (ACUTE KIDNEY INJURY): Status: RESOLVED | Noted: 2023-05-26 | Resolved: 2023-05-30

## 2023-05-30 LAB
FLUAV SUBTYP SPEC NAA+PROBE: NOT DETECTED
FLUBV RNA ISLT QL NAA+PROBE: NOT DETECTED
SARS-COV-2 RNA RESP QL NAA+PROBE: NOT DETECTED

## 2023-05-30 PROCEDURE — 87636 SARSCOV2 & INF A&B AMP PRB: CPT | Performed by: INTERNAL MEDICINE

## 2023-05-30 PROCEDURE — 99239 HOSP IP/OBS DSCHRG MGMT >30: CPT | Performed by: NURSE PRACTITIONER

## 2023-05-30 PROCEDURE — G0378 HOSPITAL OBSERVATION PER HR: HCPCS

## 2023-05-30 RX ORDER — PANTOPRAZOLE SODIUM 40 MG/1
40 TABLET, DELAYED RELEASE ORAL DAILY
Status: ON HOLD
Start: 2023-05-31

## 2023-05-30 RX ORDER — ACETAMINOPHEN 325 MG/1
650 TABLET ORAL EVERY 6 HOURS PRN
Status: ON HOLD
Start: 2023-05-30

## 2023-05-30 RX ORDER — POLYETHYLENE GLYCOL 3350 17 G/17G
17 POWDER, FOR SOLUTION ORAL DAILY PRN
Status: ON HOLD
Start: 2023-05-30

## 2023-05-30 RX ADMIN — LEVOTHYROXINE SODIUM 25 MCG: 25 TABLET ORAL at 05:14

## 2023-05-30 RX ADMIN — Medication 10 ML: at 09:43

## 2023-05-30 RX ADMIN — PREGABALIN 25 MG: 25 CAPSULE ORAL at 09:42

## 2023-05-30 RX ADMIN — CETIRIZINE HYDROCHLORIDE 10 MG: 10 TABLET, FILM COATED ORAL at 09:42

## 2023-05-30 RX ADMIN — DONEPEZIL HYDROCHLORIDE 5 MG: 5 TABLET, FILM COATED ORAL at 09:41

## 2023-05-30 RX ADMIN — PANTOPRAZOLE SODIUM 40 MG: 40 TABLET, DELAYED RELEASE ORAL at 09:42

## 2023-05-30 RX ADMIN — MEGESTROL ACETATE 20 MG: 20 TABLET ORAL at 09:42

## 2023-05-30 RX ADMIN — SPIRONOLACTONE 25 MG: 25 TABLET ORAL at 09:42

## 2023-05-30 RX ADMIN — TORSEMIDE 30 MG: 20 TABLET ORAL at 09:42

## 2023-05-30 RX ADMIN — OXCARBAZEPINE 600 MG: 150 TABLET, FILM COATED ORAL at 09:41

## 2023-05-30 RX ADMIN — METOPROLOL SUCCINATE 25 MG: 25 TABLET, EXTENDED RELEASE ORAL at 09:41

## 2023-05-30 RX ADMIN — SENNOSIDES AND DOCUSATE SODIUM 2 TABLET: 50; 8.6 TABLET ORAL at 09:42

## 2023-05-30 NOTE — DISCHARGE PLACEMENT REQUEST
"Mitzi Zapata (87 y.o. Female)     Date of Birth   1935    Social Security Number       Address   [address] [facility] [address]    Home Phone   [phone]    MRN   [id number]       Tenriism   Muslim    Marital Status   Single                            Admission Date   23    Admission Type   Emergency    Admitting Provider   Ame Cuevas MD    Attending Provider   Ame Cuevas MD    Department, Room/Bed   [facility] 5G, S557/1       Discharge Date       Discharge Disposition   Rehab Facility or Unit (DC - External)    Discharge Destination                               Attending Provider: Ame Cuevas MD    Allergies: Bee Pollen, Lorazepam    Isolation: None   Infection: None   Code Status: No CPR    Ht: 160 cm (62.99\")   Wt: 49.7 kg (109 lb 8 oz)    Admission Cmt: None   Principal Problem: Acute UTI [N39.0]                 Active Insurance as of 2023     Primary Coverage     Payor Plan Insurance Group Employer/Plan Group    HUMANA MEDICARE REPLACEMENT HUMANA MEDICARE REPLACEMENT [id number]     Payor Plan Address Payor Plan Phone Number Payor Plan Fax Number Effective Dates    [address] [address] [phone]  2018 - None Entered    [facility] [address]       Subscriber Name Subscriber Birth Date Member ID       MITZI ZAPATA 1935 [id number]                 Emergency Contacts      (Rel.) Home Phone Work Phone Mobile Phone    CHELE ZAPATA (Power of ) [phone] -- [phone]    DANISH ZAPATAIN (Relative) [phone] -- [phone]               Discharge Summary      Rosa Isela Diane APRN at 23 1324     Attestation signed by Ame Cuevas MD at 23 1439    I have reviewed this documentation and agree.                      [facility] Medicine Services  TRANSFER SUMMARY    Patient Name: Mitzi Zapata  : 1935  MRN: [id number]    Date of Admission: 2023  Date of Discharge:  " 5/30/2023  Length of Stay: 0  Primary Care Physician: Provider, No Known    Consults     No orders found from 4/26/2023 to 5/26/2023.          Hospital Course     Presenting Problem:   Acute UTI [N39.0]    Active Hospital Problems    Diagnosis  POA   • CKD (chronic kidney disease), stage III [N18.30]  Yes   • Centrilobular emphysema [J43.2]  Yes   • Moderate malnutrition (CMS/HCC) [E44.0]  Yes   • Chronic respiratory failure [J96.10]  Yes   • chronic CHF (congestive heart failure) (HCC) [I50.9]  Yes   • atrial fibrillation [I48.91]  Yes   • Dementia without behavioral disturbance [F03.90]  Yes   • Severe malnutrition (CMS/HCC) [E43]  Yes   • Hypothyroidism (acquired) [E03.9]  Yes   • Essential hypertension [I10]  Yes      Resolved Hospital Problems    Diagnosis Date Resolved POA   • **Acute UTI [N39.0] 05/30/2023 Yes   • JUAN F (acute kidney injury) [N17.9] 05/30/2023 Yes          Hospital Course:  Mitzi Eric is a 87 y.o. female with a past medical history significant for CKD III, COPD (2L nightly) with known bilateral upper lobe masses, paroxysmal A-fib, HLD, HTN, heart failure, hypothyroidism, Trigeminal neuralgia, and dementia. She is resident at [facility]. Presented to ED after unwitnessed mechanical fall today wherein she sustained a laceration under left eye.      Notably, she was just here 5/10 to 5/19 for a fall. Was discharged to [facility].       S/p Fall  Debility  - superficial lac under left eye, healing  - CT Head and CSpine negative      Ecoli UTI  - s/p 3d rocephin course.  - blood culture NG x3d      Acute on Chronic Renal Failure Stage 3, resolved  - Cr 1.19 on admission, improved w fluids      Left Apical Lung mass with fluid-necrotic components/cavitary appearance   Right Upper lobe lung mass   Hx COPD  - Patient/POA have been aware of this and not interested in further work-up  - On 2L NC currently, normally on RA during the day and 2L at night per Nephew.  Sats stable.  No  obvious shortness of breath.     Combined systolic and diastolic heart failure  Moderate pulm hypertension  - Compensated, off IVF  - continue home Demadex/Spironolactone.  Monitor BP at facility closely.     Paroxysmal atrial fibrillation  - continue home Metoprolol  - not anticoagulated secondary to fall risk  --Right stable currently     Hypothyroidism  --TSH at last check 2/2023 normal.  Was not rechecked.  Defer to PCP.  - Continue Synthroid     Trigeminal neuralgia  -Continue Trileptal and Lyrica, stable appearing.     Hypertension  -continue home medications as above  --Stable.  Courage p.o. fluids.     Dementia  -Continue Aricept     Anemia  - H/h initially declined to 9.8.  [place] to be delusional as it is now improved back to 10.2.  No obvious bleeding.    Patient was seen this morning resting up in bed in no acute distress.  Sleeping mostly.  Demented at baseline.  Nephew/POA at bedside.  Eager to get back to facility today.  She is currently hemodynamically stable and afebrile.  Transferring back to [place] for continued rehab today.        Discharge Follow Up Recommendations for outpatient labs/diagnostics:  Patient is cleared for transfer back to [place] for continued rehab today.  Going on medications as below with follow-up as noted.    --To be seen by provider at rehab on arrival, PCP 1 week of discharge    Day of Discharge     HPI:   Patient was seen at 10:35 AM resting up in the bed mostly sleeping.  Nephew/POA at bedside.  Patient is frail and chronically ill and debilitated appearing.  She does awaken briefly and said hello to me.  Denied pain.  Quickly back to sleep.  Hopefully back to [place] for continued rehab today.    Review of Systems  Denied pain or shortness of breath.  Otherwise difficult to accurately assess ROS due to dementia baseline    Vital Signs:   Temp:  [97.9 °F (36.6 °C)-98.6 °F (37 °C)] 98.6 °F (37 °C)  Heart Rate:  [86-93] 87  Resp:  [16-18] 18  BP: ()/(45-82)  132/61     Physical Exam:  Constitutional: No acute distress, sleeping mostly resting up in bed.  Awakens fairly easily to voice.  Frail and chronically ill-appearing elderly female nephew/POA at bedside.  HENT: NCAT, mucous membranes moist  Respiratory: Clear to auscultation bilaterally, respiratory effort normal on 2 LNC with sats 93%.  Cardiovascular: RRR, no murmurs, rubs, or gallops  Gastrointestinal: Positive bowel sounds, soft, nontender, nondistended.  Thin habitus.  Musculoskeletal: No bilateral ankle edema.  JO spontaneously.  Psychiatric: Appropriate affect, cooperative  Neurologic: Oriented x 1, nonfocal.  Very sleepy.  Awakens fairly easily to voice briefly.  Speech clear but generally confused at baseline.  Skin: No rashes noted.  Healing bruise and abrasion to left cheek.       Pertinent Results     Results from last 7 days   Lab Units 05/28/23  0339 05/26/23  0442 05/26/23  0052   WBC 10*3/mm3 6.98 8.45 12.87*   HEMOGLOBIN g/dL 10.2* 9.8* 10.3*   HEMATOCRIT % 31.7* 31.5* 32.0*   PLATELETS 10*3/mm3 167 183 213   SODIUM mmol/L  --  140 140   POTASSIUM mmol/L  --  4.6 4.3   CHLORIDE mmol/L  --  107 104   CO2 mmol/L  --  23.0 24.0   BUN mg/dL  --  38* 43*   CREATININE mg/dL  --  1.07* 1.19*   GLUCOSE mg/dL  --  100* 102*   CALCIUM mg/dL  --  8.5* 8.8     Results from last 7 days   Lab Units 05/26/23  0052   BILIRUBIN mg/dL 0.3   ALK PHOS U/L 136*   ALT (SGPT) U/L 20   AST (SGOT) U/L 31           Invalid input(s): TG, LDLCALC, LDLREALC      Brief Urine Lab Results  (Last result in the past 365 days)      Color   Clarity   Blood   Leuk Est   Nitrite   Protein   CREAT   Urine HCG        05/26/23 0239 Yellow   Cloudy   Large (3+)   Moderate (2+)   Positive   30 mg/dL (1+)                 Microbiology Results Abnormal     Procedure Component Value - Date/Time    Blood Culture - Blood, Hand, Left [[id number]]  (Normal) Collected: 05/26/23 0328    Lab Status: Preliminary result Specimen: Blood from Hand, Left  Updated: 05/30/23 0415     Blood Culture No growth at 4 days    Blood Culture - Blood, Arm, Right [[id number]]  (Normal) Collected: 05/26/23 0320    Lab Status: Preliminary result Specimen: Blood from Arm, Right Updated: 05/30/23 0415     Blood Culture No growth at 4 days          Imaging Results (All)     Procedure Component Value Units Date/Time    CT Cervical Spine Without Contrast [[id number]] Collected: 05/26/23 0013     Updated: 05/26/23 0218    Narrative:      CT CERVICAL SPINE WO CONTRAST    Date of Examination: 5/26/2023 12:33 AM    History:  Fall, neck pain    Technique: Noncontrast cervical spine CT with coronal and sagittal reformats.  CT dose lowering techniques were used, to include: automated exposure control, adjustment for patient size, and or use of iterative reconstruction.    Comparison:  Cervical spine CT from 5/8/2023    Findings:    No cervical spine fracture.    10% retrolisthesis of C3 on C4, 10% anterolisthesis of C4 on C5, 20% anterolisthesis of C5 on C6, and 20% anterolisthesis of C7 on T1, unchanged from prior. Diffuse severe bilateral facet arthrosis again noted.    Bilateral carotid atherosclerosis.    Extensive consolidation in the bilateral lung apices unchanged from the recent prior, this is only partially visualized and incompletely evaluated.        Impression:        1. No cervical spine fracture.  2. Extensive degenerative changes unchanged from recent prior.  3. Consolidation in the bilateral lung apices unchanged from the recent prior, only partially visualized and incompletely evaluated.      This examination was interpreted by Jonas Brooks M.D.    Electronically signed by:  Jonas Brooks M.D.    5/26/2023 12:17 AM Mountain Time    CT Head Without Contrast [[id number]] Collected: 05/25/23 2354     Updated: 05/26/23 0158    Narrative:      EXAMINATION: CT HEAD WO CONTRAST    DATE: 5/26/2023 12:33 AM     INDICATION: Fall.     COMPARISON: CT head, 5/10/2023     TECHNIQUE:  Thin section noncontrast axial images were obtained through the head. Coronal reformatted images were created.  CT dose lowering techniques were used, to include: automated exposure control, adjustment for patient size, and or use of iterative   reconstruction.    FINDINGS:  Intracranial contents:  Unchanged left frontal encephalomalacia and right parafalcine meningioma.      Generalized parenchymal volume loss without lobar predominance. No hydrocephalus.   Patchy regions of hypoattenuation within periventricular and peripheral white matter most commonly represent chronic microangiopathy.  There is no midline shift or mass   effect. No hemorrhage, mass lesion, or evidence of evolving large territorial infarct. Atherosclerosis.    Bones and extracranial soft tissues:    Right frontal craniotomy.  Orbits unremarkable. The visualized paranasal sinuses are clear. No mastoid or middle ear effusions.        Impression:        1.  No acute intracranial abnormality.    2.  Unchanged chronic findings as described.    Electronically signed by:  Dieudonne Seaman M.D.    5/25/2023 11:57 PM Mountain Time          Results for orders placed during the hospital encounter of 03/17/22    Adult Transthoracic Echo Complete W/ Cont if Necessary Per Protocol    Interpretation Summary  · Left ventricular ejection fraction appears to be 46 - 50%. Left ventricular systolic function is mildly decreased.  · Left ventricular diastolic function is consistent with (grade I) impaired relaxation.  · Mild to moderate mitral regurgitation.  · Mild to moderate tricuspid regurgitation, RVSP 54 mmHg.      Pending Labs     Order Current Status    COVID PRE-OP / PRE-PROCEDURE SCREENING ORDER (NO ISOLATION) - Swab, Nasopharynx Collected (05/30/23 1320)    COVID-19 and FLU A/B PCR - Swab, Nasopharynx Collected (05/30/23 1320)    Blood Culture - Blood, Arm, Right Preliminary result    Blood Culture - Blood, Hand, Left Preliminary result          Discharge  Details        Discharge Medications      New Medications      Instructions Start Date   polyethylene glycol 17 g packet  Commonly known as: MIRALAX   17 g, Oral, Daily PRN         Changes to Medications      Instructions Start Date   acetaminophen 325 MG tablet  Commonly known as: TYLENOL  What changed:   · when to take this  · reasons to take this  · additional instructions   650 mg, Oral, Every 6 Hours PRN      pantoprazole 40 MG EC tablet  Commonly known as: PROTONIX  What changed:   · medication strength  · how much to take   40 mg, Oral, Daily   Start Date: May 31, 2023     Symbicort 80-4.5 MCG/ACT inhaler  Generic drug: budesonide-formoterol  What changed: See the new instructions.   INHALE 2 PUFF(S) BY MOUTH TWO TIMES A DAY FOR 30 DAYS         Continue These Medications      Instructions Start Date   albuterol sulfate  (90 Base) MCG/ACT inhaler  Commonly known as: PROVENTIL HFA;VENTOLIN HFA;PROAIR HFA   2 puffs, Inhalation, Every 4 Hours PRN      cetirizine 10 MG tablet  Commonly known as: zyrTEC   10 mg, Oral, Daily, OTC      docusate sodium 100 MG capsule   100 mg, Oral, 2 Times Daily      donepezil 5 MG tablet  Commonly known as: Aricept   5 mg, Oral, Every Morning      folic acid 400 MCG tablet  Commonly known as: FOLVITE   400 mcg, Oral, 2 Times Daily, OTC      levothyroxine 25 MCG tablet  Commonly known as: SYNTHROID, LEVOTHROID   25 mcg, Oral, Every Early Morning      megestrol 20 MG tablet  Commonly known as: MEGACE   20 mg, Oral, Daily      metoprolol succinate XL 25 MG 24 hr tablet  Commonly known as: TOPROL-XL   25 mg, Oral, Daily      OXcarbazepine 600 MG tablet  Commonly known as: TRILEPTAL   600 mg, Oral, 2 Times Daily      pregabalin 25 MG capsule  Commonly known as: LYRICA   25 mg, Oral, 2 Times Daily      spironolactone 25 MG tablet  Commonly known as: ALDACTONE   25 mg, Oral, Daily      torsemide 10 MG tablet  Commonly known as: DEMADEX   30 mg, Oral, Daily      traZODone 150 MG  tablet  Commonly known as: DESYREL   150 mg, Oral, Nightly PRN         Stop These Medications    aspirin 81 MG EC tablet     traMADol 50 MG tablet  Commonly known as: ULTRAM            Allergies   Allergen Reactions   • Bee Pollen Unknown - Low Severity   • Lorazepam Delirium         Discharge Disposition:  Rehab Facility or Unit (DC - External)    Discharge Diet:  Diet Order   Procedures   • Diet: Regular/House Diet; Texture: Regular Texture (IDDSI 7); Fluid Consistency: Thin (IDDSI 0)       Discharge Activity:  Activity Instructions     Activity as Tolerated      Measure Blood Pressure              CODE STATUS:    Code Status and Medical Interventions:   Ordered at: 05/26/23 0357     Medical Intervention Limits:    NO intubation (DNI)     Code Status (Patient has no pulse and is not breathing):    No CPR (Do Not Attempt to Resuscitate)     Medical Interventions (Patient has pulse or is breathing):    Limited Support         Future Appointments   Date Time Provider Department Center   5/30/2023  3:00 PM EMS 1 [facility]   7/21/2023  3:00 PM RAD TECH [facility]   7/21/2023  3:30 PM Jennifer Ace MD [facility]   8/2/2023  8:00 AM Ann-Marie Goodwin APRN [facility] LX2 [facility]   10/17/2023  1:00 PM Lauren Pina APRN [facility]       Additional Instructions for the Follow-ups that You Need to Schedule     Discharge Follow-up with PCP   As directed       Currently Documented PCP:    Provider, No Known    PCP Phone Number:    None     Follow Up Details: To be seen by provider at rehab on arrival, PCP 1 week of discharge               Electronically signed by RAFAEL Avendano, 05/30/23, 1:24 PM EDT.      Time Spent on Discharge: I spent 45 minutes on this discharge activity which included: face-to-face encounter with the patient, reviewing the data in the system, coordination of the care with the nursing staff as well as consultants, documentation, and  entering orders.        Electronically signed by Ame Cuevas MD at 05/30/23 7159

## 2023-05-30 NOTE — CASE MANAGEMENT/SOCIAL WORK
Case Management Discharge Note      Final Note: I spoke with the patient's family member at the bedside. [facility] has offered a bed and insurance has approved for her to go back to [facility] today.  EMS transport has been arranged for the patient at 1500 today. PCS is on the chart. Please collect a covid test on the patient with enough time to have a result in Epic before discharge. Please call report to [phone]. Please fax the discharge summary to [phone]. Pharmacy in [facility] reflects the pharmacy used by [facility].         Selected Continued Care - Admitted Since 5/25/2023     Destination Coordination complete.    Service Provider Selected Services Address Phone Fax Patient Preferred    [facility] [address] [facility] [facility] [address] [phone] --          Durable Medical Equipment    No services have been selected for the patient.              Dialysis/Infusion    No services have been selected for the patient.              Home Medical Care    No services have been selected for the patient.              Therapy    No services have been selected for the patient.              Community Resources    No services have been selected for the patient.              Community & DME    No services have been selected for the patient.                Selected Continued Care - Prior Encounters Includes continued care and service providers with selected services from prior encounters from 2/24/2023 to 5/30/2023    Discharged on 5/19/2023 Admission date: 5/10/2023 - Discharge disposition: Skilled Nursing Facility (DC - External)    Destination     Service Provider Selected Services Address Phone Fax Patient Preferred    [facility] [address] [facility] [address] [phone] --                    Transportation Services  Ambulance: [facility]    Final Discharge Disposition Code: 03 - skilled nursing facility  ([facility])

## 2023-05-30 NOTE — DISCHARGE SUMMARY
[facility] Medicine Services  TRANSFER SUMMARY    Patient Name: Mitzi Eric  : 1935  MRN: [id number]    Date of Admission: 2023  Date of Discharge:  2023  Length of Stay: 0  Primary Care Physician: Provider, No Known    Consults     No orders found from 2023 to 2023.          Hospital Course     Presenting Problem:   Acute UTI [N39.0]    Active Hospital Problems    Diagnosis  POA   • CKD (chronic kidney disease), stage III [N18.30]  Yes   • Centrilobular emphysema [J43.2]  Yes   • Moderate malnutrition (CMS/HCC) [E44.0]  Yes   • Chronic respiratory failure [J96.10]  Yes   • chronic CHF (congestive heart failure) (HCC) [I50.9]  Yes   • atrial fibrillation [I48.91]  Yes   • Dementia without behavioral disturbance [F03.90]  Yes   • Severe malnutrition (CMS/HCC) [E43]  Yes   • Hypothyroidism (acquired) [E03.9]  Yes   • Essential hypertension [I10]  Yes      Resolved Hospital Problems    Diagnosis Date Resolved POA   • **Acute UTI [N39.0] 2023 Yes   • JUAN F (acute kidney injury) [N17.9] 2023 Yes          Hospital Course:  Mitzi Eric is a 87 y.o. female with a past medical history significant for CKD III, COPD (2L nightly) with known bilateral upper lobe masses, paroxysmal A-fib, HLD, HTN, heart failure, hypothyroidism, Trigeminal neuralgia, and dementia. She is resident at [facility]. Presented to ED after unwitnessed mechanical fall today wherein she sustained a laceration under left eye.      Notably, she was just here 5/10 to  for a fall. Was discharged to [facility].       S/p Fall  Debility  - superficial lac under left eye, healing  - CT Head and CSpine negative      Ecoli UTI  - s/p 3d rocephin course.  - blood culture NG x3d      Acute on Chronic Renal Failure Stage 3, resolved  - Cr 1.19 on admission, improved w fluids      Left Apical Lung mass with fluid-necrotic components/cavitary appearance   Right Upper lobe lung  mass   Hx COPD  - Patient/POA have been aware of this and not interested in further work-up  - On 2L NC currently, normally on RA during the day and 2L at night per Nephew.  Sats stable.  No obvious shortness of breath.     Combined systolic and diastolic heart failure  Moderate pulm hypertension  - Compensated, off IVF  - continue home Demadex/Spironolactone.  Monitor BP at facility closely.     Paroxysmal atrial fibrillation  - continue home Metoprolol  - not anticoagulated secondary to fall risk  --Right stable currently     Hypothyroidism  --TSH at last check 2/2023 normal.  Was not rechecked.  Defer to PCP.  - Continue Synthroid     Trigeminal neuralgia  -Continue Trileptal and Lyrica, stable appearing.     Hypertension  -continue home medications as above  --Stable.  Courage p.o. fluids.     Dementia  -Continue Aricept     Anemia  - H/h initially declined to 9.8.  Burley to be delusional as it is now improved back to 10.2.  No obvious bleeding.    Patient was seen this morning resting up in bed in no acute distress.  Sleeping mostly.  Demented at baseline.  Nephew/POA at bedside.  Eager to get back to facility today.  She is currently hemodynamically stable and afebrile.  Transferring back to [facility] for continued rehab today.        Discharge Follow Up Recommendations for outpatient labs/diagnostics:  Patient is cleared for transfer back to [facility] for continued rehab today.  Going on medications as below with follow-up as noted.    --To be seen by provider at rehab on arrival, PCP 1 week of discharge    Day of Discharge     HPI:   Patient was seen at 10:35 AM resting up in the bed mostly sleeping.  Nephew/POA at bedside.  Patient is frail and chronically ill and debilitated appearing.  She does awaken briefly and said hello to me.  Denied pain.  Quickly back to sleep.  Hopefully back to [facility] for continued rehab today.    Review of Systems  Denied pain or shortness of breath.  Otherwise difficult to  accurately assess ROS due to dementia baseline    Vital Signs:   Temp:  [97.9 °F (36.6 °C)-98.6 °F (37 °C)] 98.6 °F (37 °C)  Heart Rate:  [86-93] 87  Resp:  [16-18] 18  BP: ()/(45-82) 132/61     Physical Exam:  Constitutional: No acute distress, sleeping mostly resting up in bed.  Awakens fairly easily to voice.  Frail and chronically ill-appearing elderly female nephew/POA at bedside.  HENT: NCAT, mucous membranes moist  Respiratory: Clear to auscultation bilaterally, respiratory effort normal on 2 LNC with sats 93%.  Cardiovascular: RRR, no murmurs, rubs, or gallops  Gastrointestinal: Positive bowel sounds, soft, nontender, nondistended.  Thin habitus.  Musculoskeletal: No bilateral ankle edema.  JO spontaneously.  Psychiatric: Appropriate affect, cooperative  Neurologic: Oriented x 1, nonfocal.  Very sleepy.  Awakens fairly easily to voice briefly.  Speech clear but generally confused at baseline.  Skin: No rashes noted.  Healing bruise and abrasion to left cheek.       Pertinent Results     Results from last 7 days   Lab Units 05/28/23  0339 05/26/23  0442 05/26/23  0052   WBC 10*3/mm3 6.98 8.45 12.87*   HEMOGLOBIN g/dL 10.2* 9.8* 10.3*   HEMATOCRIT % 31.7* 31.5* 32.0*   PLATELETS 10*3/mm3 167 183 213   SODIUM mmol/L  --  140 140   POTASSIUM mmol/L  --  4.6 4.3   CHLORIDE mmol/L  --  107 104   CO2 mmol/L  --  23.0 24.0   BUN mg/dL  --  38* 43*   CREATININE mg/dL  --  1.07* 1.19*   GLUCOSE mg/dL  --  100* 102*   CALCIUM mg/dL  --  8.5* 8.8     Results from last 7 days   Lab Units 05/26/23  0052   BILIRUBIN mg/dL 0.3   ALK PHOS U/L 136*   ALT (SGPT) U/L 20   AST (SGOT) U/L 31           Invalid input(s): TG, LDLCALC, LDLREALC      Brief Urine Lab Results  (Last result in the past 365 days)      Color   Clarity   Blood   Leuk Est   Nitrite   Protein   CREAT   Urine HCG        05/26/23 0239 Yellow   Cloudy   Large (3+)   Moderate (2+)   Positive   30 mg/dL (1+)                 Microbiology Results Abnormal      Procedure Component Value - Date/Time    Blood Culture - Blood, Hand, Left [[id number]]  (Normal) Collected: 05/26/23 0328    Lab Status: Preliminary result Specimen: Blood from Hand, Left Updated: 05/30/23 0415     Blood Culture No growth at 4 days    Blood Culture - Blood, Arm, Right [[id number]]  (Normal) Collected: 05/26/23 0320    Lab Status: Preliminary result Specimen: Blood from Arm, Right Updated: 05/30/23 0415     Blood Culture No growth at 4 days          Imaging Results (All)     Procedure Component Value Units Date/Time    CT Cervical Spine Without Contrast [[id number]] Collected: 05/26/23 0013     Updated: 05/26/23 0218    Narrative:      CT CERVICAL SPINE WO CONTRAST    Date of Examination: 5/26/2023 12:33 AM    History:  Fall, neck pain    Technique: Noncontrast cervical spine CT with coronal and sagittal reformats.  CT dose lowering techniques were used, to include: automated exposure control, adjustment for patient size, and or use of iterative reconstruction.    Comparison:  Cervical spine CT from 5/8/2023    Findings:    No cervical spine fracture.    10% retrolisthesis of C3 on C4, 10% anterolisthesis of C4 on C5, 20% anterolisthesis of C5 on C6, and 20% anterolisthesis of C7 on T1, unchanged from prior. Diffuse severe bilateral facet arthrosis again noted.    Bilateral carotid atherosclerosis.    Extensive consolidation in the bilateral lung apices unchanged from the recent prior, this is only partially visualized and incompletely evaluated.        Impression:        1. No cervical spine fracture.  2. Extensive degenerative changes unchanged from recent prior.  3. Consolidation in the bilateral lung apices unchanged from the recent prior, only partially visualized and incompletely evaluated.      This examination was interpreted by Jonas Brooks M.D.    Electronically signed by:  Jonas Brooks M.D.    5/26/2023 12:17 AM Mountain Time    CT Head Without Contrast [[id number]] Collected:  05/25/23 2354     Updated: 05/26/23 0158    Narrative:      EXAMINATION: CT HEAD WO CONTRAST    DATE: 5/26/2023 12:33 AM     INDICATION: Fall.     COMPARISON: CT head, 5/10/2023     TECHNIQUE: Thin section noncontrast axial images were obtained through the head. Coronal reformatted images were created.  CT dose lowering techniques were used, to include: automated exposure control, adjustment for patient size, and or use of iterative   reconstruction.    FINDINGS:  Intracranial contents:  Unchanged left frontal encephalomalacia and right parafalcine meningioma.      Generalized parenchymal volume loss without lobar predominance. No hydrocephalus.   Patchy regions of hypoattenuation within periventricular and peripheral white matter most commonly represent chronic microangiopathy.  There is no midline shift or mass   effect. No hemorrhage, mass lesion, or evidence of evolving large territorial infarct. Atherosclerosis.    Bones and extracranial soft tissues:    Right frontal craniotomy.  Orbits unremarkable. The visualized paranasal sinuses are clear. No mastoid or middle ear effusions.        Impression:        1.  No acute intracranial abnormality.    2.  Unchanged chronic findings as described.    Electronically signed by:  Dieudonne Seaman M.D.    5/25/2023 11:57 PM Mountain Time          Results for orders placed during the hospital encounter of 03/17/22    Adult Transthoracic Echo Complete W/ Cont if Necessary Per Protocol    Interpretation Summary  · Left ventricular ejection fraction appears to be 46 - 50%. Left ventricular systolic function is mildly decreased.  · Left ventricular diastolic function is consistent with (grade I) impaired relaxation.  · Mild to moderate mitral regurgitation.  · Mild to moderate tricuspid regurgitation, RVSP 54 mmHg.      Pending Labs     Order Current Status    COVID PRE-OP / PRE-PROCEDURE SCREENING ORDER (NO ISOLATION) - Swab, Nasopharynx Collected (05/30/23 1320)    COVID-19  and FLU A/B PCR - Swab, Nasopharynx Collected (05/30/23 1320)    Blood Culture - Blood, Arm, Right Preliminary result    Blood Culture - Blood, Hand, Left Preliminary result          Discharge Details        Discharge Medications      New Medications      Instructions Start Date   polyethylene glycol 17 g packet  Commonly known as: MIRALAX   17 g, Oral, Daily PRN         Changes to Medications      Instructions Start Date   acetaminophen 325 MG tablet  Commonly known as: TYLENOL  What changed:   · when to take this  · reasons to take this  · additional instructions   650 mg, Oral, Every 6 Hours PRN      pantoprazole 40 MG EC tablet  Commonly known as: PROTONIX  What changed:   · medication strength  · how much to take   40 mg, Oral, Daily   Start Date: May 31, 2023     Symbicort 80-4.5 MCG/ACT inhaler  Generic drug: budesonide-formoterol  What changed: See the new instructions.   INHALE 2 PUFF(S) BY MOUTH TWO TIMES A DAY FOR 30 DAYS         Continue These Medications      Instructions Start Date   albuterol sulfate  (90 Base) MCG/ACT inhaler  Commonly known as: PROVENTIL HFA;VENTOLIN HFA;PROAIR HFA   2 puffs, Inhalation, Every 4 Hours PRN      cetirizine 10 MG tablet  Commonly known as: zyrTEC   10 mg, Oral, Daily, OTC      docusate sodium 100 MG capsule   100 mg, Oral, 2 Times Daily      donepezil 5 MG tablet  Commonly known as: Aricept   5 mg, Oral, Every Morning      folic acid 400 MCG tablet  Commonly known as: FOLVITE   400 mcg, Oral, 2 Times Daily, OTC      levothyroxine 25 MCG tablet  Commonly known as: SYNTHROID, LEVOTHROID   25 mcg, Oral, Every Early Morning      megestrol 20 MG tablet  Commonly known as: MEGACE   20 mg, Oral, Daily      metoprolol succinate XL 25 MG 24 hr tablet  Commonly known as: TOPROL-XL   25 mg, Oral, Daily      OXcarbazepine 600 MG tablet  Commonly known as: TRILEPTAL   600 mg, Oral, 2 Times Daily      pregabalin 25 MG capsule  Commonly known as: LYRICA   25 mg, Oral, 2 Times  Daily      spironolactone 25 MG tablet  Commonly known as: ALDACTONE   25 mg, Oral, Daily      torsemide 10 MG tablet  Commonly known as: DEMADEX   30 mg, Oral, Daily      traZODone 150 MG tablet  Commonly known as: DESYREL   150 mg, Oral, Nightly PRN         Stop These Medications    aspirin 81 MG EC tablet     traMADol 50 MG tablet  Commonly known as: ULTRAM            Allergies   Allergen Reactions   • Bee Pollen Unknown - Low Severity   • Lorazepam Delirium         Discharge Disposition:  Rehab Facility or Unit (DC - External)    Discharge Diet:  Diet Order   Procedures   • Diet: Regular/House Diet; Texture: Regular Texture (IDDSI 7); Fluid Consistency: Thin (IDDSI 0)       Discharge Activity:  Activity Instructions     Activity as Tolerated      Measure Blood Pressure              CODE STATUS:    Code Status and Medical Interventions:   Ordered at: 05/26/23 0357     Medical Intervention Limits:    NO intubation (DNI)     Code Status (Patient has no pulse and is not breathing):    No CPR (Do Not Attempt to Resuscitate)     Medical Interventions (Patient has pulse or is breathing):    Limited Support         Future Appointments   Date Time Provider Department Center   5/30/2023  3:00 PM EMS [facility]   7/21/2023  3:00 PM RAD TECH [facility]   7/21/2023  3:30 PM Jennifer Ace MD [facility]   8/2/2023  8:00 AM Ann-Marie Goodwin APRN [facility]   10/17/2023  1:00 PM Lauren Pina APRN [facility]       Additional Instructions for the Follow-ups that You Need to Schedule     Discharge Follow-up with PCP   As directed       Currently Documented PCP:    Provider, No Known    PCP Phone Number:    None     Follow Up Details: To be seen by provider at rehab on arrival, PCP 1 week of discharge               Electronically signed by RAFAEL Avendano, 05/30/23, 1:24 PM EDT.      Time Spent on Discharge: I spent 45 minutes on this discharge  activity which included: face-to-face encounter with the patient, reviewing the data in the system, coordination of the care with the nursing staff as well as consultants, documentation, and entering orders.

## 2023-05-30 NOTE — PLAN OF CARE
Problem: Fall Injury Risk  Goal: Absence of Fall and Fall-Related Injury  Outcome: Met  Intervention: Identify and Manage Contributors  Recent Flowsheet Documentation  Taken 5/30/2023 1200 by Valorie Browning RN  Medication Review/Management: medications reviewed  Taken 5/30/2023 1000 by Valorie Browning RN  Medication Review/Management: medications reviewed  Taken 5/30/2023 0800 by Valorie Browning RN  Medication Review/Management: medications reviewed  Intervention: Promote Injury-Free Environment  Recent Flowsheet Documentation  Taken 5/30/2023 1200 by Valorie Browning RN  Safety Promotion/Fall Prevention:   activity supervised   assistive device/personal items within reach   clutter free environment maintained   fall prevention program maintained   nonskid shoes/slippers when out of bed   safety round/check completed   toileting scheduled  Taken 5/30/2023 1000 by Valorie Browning RN  Safety Promotion/Fall Prevention:   activity supervised   assistive device/personal items within reach   clutter free environment maintained   fall prevention program maintained   nonskid shoes/slippers when out of bed   safety round/check completed   toileting scheduled  Taken 5/30/2023 0800 by Valorie Browning, RN  Safety Promotion/Fall Prevention:   activity supervised   assistive device/personal items within reach   clutter free environment maintained   fall prevention program maintained   nonskid shoes/slippers when out of bed   safety round/check completed   toileting scheduled   Goal Outcome Evaluation:

## 2023-05-31 LAB
BACTERIA SPEC AEROBE CULT: NORMAL
BACTERIA SPEC AEROBE CULT: NORMAL

## 2023-06-06 ENCOUNTER — ANESTHESIA (OUTPATIENT)
Dept: EMERGENCY DEPT | Facility: HOSPITAL | Age: 88
End: 2023-06-06
Payer: MEDICARE

## 2023-06-06 ENCOUNTER — ANESTHESIA EVENT (OUTPATIENT)
Dept: EMERGENCY DEPT | Facility: HOSPITAL | Age: 88
End: 2023-06-06
Payer: MEDICARE

## 2023-06-06 ENCOUNTER — ANESTHESIA (OUTPATIENT)
Dept: PERIOP | Facility: HOSPITAL | Age: 88
End: 2023-06-06
Payer: MEDICARE

## 2023-06-06 ENCOUNTER — ANESTHESIA EVENT (OUTPATIENT)
Dept: PERIOP | Facility: HOSPITAL | Age: 88
End: 2023-06-06
Payer: MEDICARE

## 2023-06-06 PROBLEM — S70.02XA CONTUSION OF LEFT HIP, INITIAL ENCOUNTER: Status: ACTIVE | Noted: 2023-06-06

## 2023-06-06 PROBLEM — S72.002A CLOSED LEFT HIP FRACTURE: Status: ACTIVE | Noted: 2023-06-06

## 2023-06-06 PROCEDURE — 25010000002 ONDANSETRON PER 1 MG: Performed by: NURSE ANESTHETIST, CERTIFIED REGISTERED

## 2023-06-06 PROCEDURE — 25010000002 PROPOFOL 10 MG/ML EMULSION: Performed by: NURSE ANESTHETIST, CERTIFIED REGISTERED

## 2023-06-06 PROCEDURE — 25010000002 ROPIVACAINE PER 1 MG: Performed by: ANESTHESIOLOGY

## 2023-06-06 PROCEDURE — 25010000002 CEFAZOLIN IN DEXTROSE 2-4 GM/100ML-% SOLUTION: Performed by: ORTHOPAEDIC SURGERY

## 2023-06-06 RX ORDER — ROPIVACAINE HYDROCHLORIDE 2 MG/ML
INJECTION, SOLUTION EPIDURAL; INFILTRATION; PERINEURAL
Status: DISCONTINUED | OUTPATIENT
Start: 2023-06-06 | End: 2023-06-06 | Stop reason: SURG

## 2023-06-06 RX ORDER — PHENYLEPHRINE HCL IN 0.9% NACL 1 MG/10 ML
SYRINGE (ML) INTRAVENOUS AS NEEDED
Status: DISCONTINUED | OUTPATIENT
Start: 2023-06-06 | End: 2023-06-06 | Stop reason: SURG

## 2023-06-06 RX ORDER — LIDOCAINE HYDROCHLORIDE 10 MG/ML
INJECTION, SOLUTION EPIDURAL; INFILTRATION; INTRACAUDAL; PERINEURAL AS NEEDED
Status: DISCONTINUED | OUTPATIENT
Start: 2023-06-06 | End: 2023-06-06 | Stop reason: SURG

## 2023-06-06 RX ORDER — ONDANSETRON 2 MG/ML
INJECTION INTRAMUSCULAR; INTRAVENOUS AS NEEDED
Status: DISCONTINUED | OUTPATIENT
Start: 2023-06-06 | End: 2023-06-06 | Stop reason: SURG

## 2023-06-06 RX ORDER — PROPOFOL 10 MG/ML
VIAL (ML) INTRAVENOUS AS NEEDED
Status: DISCONTINUED | OUTPATIENT
Start: 2023-06-06 | End: 2023-06-06 | Stop reason: SURG

## 2023-06-06 RX ADMIN — CEFAZOLIN SODIUM 2 G: 2 INJECTION, SOLUTION INTRAVENOUS at 17:00

## 2023-06-06 RX ADMIN — Medication 200 MCG: at 17:24

## 2023-06-06 RX ADMIN — LIDOCAINE HYDROCHLORIDE 50 MG: 10 INJECTION, SOLUTION EPIDURAL; INFILTRATION; INTRACAUDAL; PERINEURAL at 17:00

## 2023-06-06 RX ADMIN — Medication 200 MCG: at 17:21

## 2023-06-06 RX ADMIN — Medication 300 MCG: at 17:15

## 2023-06-06 RX ADMIN — ONDANSETRON 4 MG: 2 INJECTION INTRAMUSCULAR; INTRAVENOUS at 17:00

## 2023-06-06 RX ADMIN — TRANEXAMIC ACID 1000 MG: 10 INJECTION, SOLUTION INTRAVENOUS at 17:05

## 2023-06-06 RX ADMIN — PROPOFOL 80 MG: 10 INJECTION, EMULSION INTRAVENOUS at 17:00

## 2023-06-06 RX ADMIN — ROPIVACAINE HYDROCHLORIDE 40 ML: 2 INJECTION, SOLUTION EPIDURAL; INFILTRATION at 16:00

## 2023-06-06 NOTE — ANESTHESIA PROCEDURE NOTES
Peripheral Block      Patient reassessed immediately prior to procedure    Reason for block: at surgeon's request and post-op pain management  Performed by  Anesthesiologist: Manolo Pritchett MD  CRNA/CAA: Jaxson Mcpherson CRNA  Preanesthetic Checklist  Completed: patient identified, IV checked, site marked, risks and benefits discussed, surgical consent, monitors and equipment checked, pre-op evaluation and timeout performed  Prep:  Pt Position: supine  Sterile barriers:cap, gloves, mask and washed/disinfected hands  Prep: ChloraPrep  Patient monitoring: blood pressure monitoring, continuous pulse oximetry and EKG  Procedure  Performed under: local infiltration  Guidance:ultrasound guided    ULTRASOUND INTERPRETATION.  Using ultrasound guidance a 20 G gauge needle was placed in close proximity to the nerve, at which point, under ultrasound guidance anesthetic was injected in the area of the nerve and spread of the anesthesia was seen on ultrasound in close proximity thereto.  There were no abnormalities seen on ultrasound; a digital image was taken; and the patient tolerated the procedure with no complications. Images:still images obtained, printed/placed on chart    Laterality:left  Block Type:fascia iliaca compartment  Injection Technique:catheter  Needle Type:echogenic and Tuohy  Needle Gauge:18 G  Resistance on Injection: none  Catheter Size:20 G (20g)  Cath Depth at skin: 11 cm    Medications Used: ropivacaine (NAROPIN) 0.2 % injection - Peripheral Nerve   40 mL - 6/6/2023 4:00:00 PM      Post Assessment  Injection Assessment: negative aspiration for heme, no paresthesia on injection and incremental injection  Patient Tolerance:comfortable throughout block  Complications:no  Additional Notes  CKAFASCIAILIACA: CATHETER  A high-frequency linear transducer, with sterile cover, was placed in parasagittal plane on top of the Anterior Superior Iliac Spine (ASIS) and moved medially to identify the Internal Oblique  "muscle, Sartorius muscle, Iliacus Muscle, Fascia Iliaca (FI) and Fascia Latae. The insertion site was prepped and draped in sterile fashion. Skin and cutaneous tissue was infiltrated with 2-5 ml of 1% Lidocaine. Using ultrasound-guidance, an 18-gauge Contiplex Ultra 360 Touhy needle was advanced in plane from caudad to cephalad. Preservative-free normal saline was utilized for hydro-dissection of tissue, advancement of Touhy, and to confirm final needle placement below FI. Local anesthetic in incremental 3-5 ml injections. Aspiration every 5 ml to prevent intravascular injection. Injection was completed with negative aspiration of blood and negative intravascular injection. Injection pressures were normal with minimal resistance. A 20-gauge Contiplex Echo catheter was placed through the needle and advance out the tip of the Touhy 3-5 cm. The Touhy needle was then removed, and final catheter position verified below the FI. The catheter was secured in the usual fashion with skin glue, benzoin, steri-strips, CHG tegaderm and Label noting \"Nerve Block Catheter\". Jerk tape applied at yellow connector and catheter connection.           "

## 2023-06-06 NOTE — ADDENDUM NOTE
Addendum  created 06/06/23 1815 by Manolo Pritchett MD    Child order released for a procedure order, Clinical Note Signed, Diagnosis association updated, Intraprocedure Blocks edited, SmartForm saved

## 2023-06-06 NOTE — ANESTHESIA POSTPROCEDURE EVALUATION
Patient: Mitzi Eric    Procedure Summary       Date: 06/06/23 Room / Location:  [facility] [address] [place] [address] [facility]    Anesthesia Start: 1654 Anesthesia Stop: 1802    Procedure: LEFT TFN (Left: Hip) Diagnosis:     Surgeons: Roni Hess Jr., MD Provider: Manolo Pritchett MD    Anesthesia Type: general with block ASA Status: 3            Anesthesia Type: general with block    Vitals  No vitals data found for the desired time range.          Post Anesthesia Care and Evaluation    Patient location during evaluation: PACU  Patient participation: complete - patient participated  Level of consciousness: awake and alert  Pain management: adequate    Airway patency: patent  Anesthetic complications: No anesthetic complications  PONV Status: none  Cardiovascular status: hemodynamically stable and acceptable  Respiratory status: nonlabored ventilation, acceptable and nasal cannula  Hydration status: acceptable

## 2023-06-06 NOTE — ANESTHESIA PROCEDURE NOTES
FICB (CATH) left      Patient reassessed immediately prior to procedure    Reason for block: at surgeon's request and post-op pain management  Performed by  CRNA/CAA: Jaxson Mcpherson, AYANA  Assisted by: Cora Dumont RN  Preanesthetic Checklist  Completed: patient identified, IV checked, site marked, risks and benefits discussed, surgical consent, monitors and equipment checked, pre-op evaluation and timeout performed  Prep:  Pt Position: supine  Sterile barriers:cap, gloves, mask and washed/disinfected hands  Prep: ChloraPrep  Patient monitoring: blood pressure monitoring, continuous pulse oximetry and EKG  Procedure  Performed under: local infiltration  Guidance:ultrasound guided    ULTRASOUND INTERPRETATION.  Using ultrasound guidance a 20 G gauge needle was placed in close proximity to the nerve, at which point, under ultrasound guidance anesthetic was injected in the area of the nerve and spread of the anesthesia was seen on ultrasound in close proximity thereto.  There were no abnormalities seen on ultrasound; a digital image was taken; and the patient tolerated the procedure with no complications. Images:still images obtained, printed/placed on chart    Laterality:left  Block Type:fascia iliaca compartment  Injection Technique:catheter  Needle Type:echogenic and Tuohy  Needle Gauge:18 G  Resistance on Injection: none  Catheter Size:20 G (20g)  Cath Depth at skin: 10 cm          Post Assessment  Injection Assessment: negative aspiration for heme, no paresthesia on injection and incremental injection  Patient Tolerance:comfortable throughout block  Complications:no  Additional Notes  CKAFASCIAILIACA: CATHETER  A high-frequency linear transducer, with sterile cover, was placed in parasagittal plane on top of the Anterior Superior Iliac Spine (ASIS) and moved medially to identify the Internal Oblique muscle, Sartorius muscle, Iliacus Muscle, Fascia Iliaca (FI) and Fascia Latae. The insertion site was prepped and  "draped in sterile fashion. Skin and cutaneous tissue was infiltrated with 2-5 ml of 1% Lidocaine. Using ultrasound-guidance, an 18-gauge Contiplex Ultra 360 Touhy needle was advanced in plane from caudad to cephalad. Preservative-free normal saline was utilized for hydro-dissection of tissue, advancement of Touhy, and to confirm final needle placement below FI. Local anesthetic in incremental 3-5 ml injections. Aspiration every 5 ml to prevent intravascular injection. Injection was completed with negative aspiration of blood and negative intravascular injection. Injection pressures were normal with minimal resistance. A 20-gauge Contiplex Echo catheter was placed through the needle and advance out the tip of the Touhy 3-5 cm. The Touhy needle was then removed, and final catheter position verified below the FI. The catheter was secured in the usual fashion with skin glue, benzoin, steri-strips, CHG tegaderm and Label noting \"Nerve Block Catheter\". Jerk tape applied at yellow connector and catheter connection.           "

## 2023-06-06 NOTE — ANESTHESIA PREPROCEDURE EVALUATION
Anesthesia Evaluation     Patient summary reviewed and Nursing notes reviewed   no history of anesthetic complications:   NPO Solid Status: > 8 hours  NPO Liquid Status: > 2 hours           Airway   Mallampati: II  TM distance: >3 FB  Neck ROM: full  No difficulty expected  Dental - normal exam     Pulmonary - normal exam   (+) COPD,home oxygen  Cardiovascular - normal exam  Exercise tolerance: unable to assess    ECG reviewed  Patient on routine beta blocker    (+) hypertensiondysrhythmias Atrial Fib, CHF , hyperlipidemia  (-) cardiac stents    ROS comment: 3/22-· Left ventricular ejection fraction appears to be 46 - 50%. Left ventricular systolic function is mildly decreased.  · Left ventricular diastolic function is consistent with (grade I) impaired relaxation.  · Mild to moderate mitral regurgitation.  · Mild to moderate tricuspid regurgitation, RVSP 54 mmHg.         Neuro/Psych  (+) numbness, psychiatric history, dementia  (-) seizures, CVA  GI/Hepatic/Renal/Endo    (+) GERD well controlled, renal disease CRI, thyroid problem hypothyroidism    Musculoskeletal     Abdominal    Substance History      OB/GYN          Other        ROS/Med Hx Other: Hgb 11.4 k 3.6                  Anesthesia Plan    ASA 3     general with block     (Risks and benefits of general anesthesia discussed with patient (including MI, CVA, death, recall, aspiration, oropharyngeal/dental damage), questions answered, agreeable to proceed.    Benefits and risks of nerve block/catheter discussed with patient ((including failed block, damage to nerve/nearby structures, intravascular injection)); questions answered, agreeable to proceed.        Discussed all with POA/nephew. Questions answered, agreeable to proceed.      )  intravenous induction     Anesthetic plan, risks, benefits, and alternatives have been provided, discussed and informed consent has been obtained with: patient and healthcare power of .    Use of blood products  discussed with patient and healthcare power of  .    Plan discussed with CRNA.      CODE STATUS:

## 2023-06-06 NOTE — ANESTHESIA PROCEDURE NOTES
Airway  Urgency: elective    Date/Time: 6/6/2023 5:01 PM  Airway not difficult    General Information and Staff    Patient location during procedure: OR  CRNA/CAA: Faustino Pugh, AYANA    Indications and Patient Condition  Indications for airway management: airway protection    Preoxygenated: yes  Mask difficulty assessment: 0 - not attempted    Final Airway Details  Final airway type: supraglottic airway      Successful airway: I-gel  Size 3     Number of attempts at approach: 1  Assessment: lips, teeth, and gum same as pre-op    Additional Comments  LMA placed without difficulty, ventilation with assist, equal breath sounds and symmetric chest rise and fall

## 2023-06-12 LAB
QT INTERVAL: 442 MS
QTC INTERVAL: 629 MS

## 2023-06-19 PROBLEM — J96.11 CHRONIC RESPIRATORY FAILURE WITH HYPOXIA: Status: ACTIVE | Noted: 2022-04-23

## 2023-06-19 PROBLEM — I50.42 CHRONIC COMBINED SYSTOLIC (CONGESTIVE) AND DIASTOLIC (CONGESTIVE) HEART FAILURE: Status: ACTIVE | Noted: 2023-06-19

## 2023-06-26 PROBLEM — R47.01 APHASIA: Status: ACTIVE | Noted: 2023-06-26

## 2023-07-06 PROBLEM — R47.01 APHASIA: Status: RESOLVED | Noted: 2023-06-26 | Resolved: 2023-07-06

## 2023-07-08 PROBLEM — N39.0 UTI (URINARY TRACT INFECTION): Status: ACTIVE | Noted: 2023-07-08

## 2023-07-12 NOTE — TELEPHONE ENCOUNTER
Caller: [facility]    New or established patient?  [x] New  [] Established    Date of discharge: 7-12-23    Facility discharged from: [facility]    Diagnosis/Symptoms: UTI    Length of stay (If applicable): 7-8 TO 7-12

## 2023-07-19 NOTE — TELEPHONE ENCOUNTER
Caller: CHELE ZAPATA    Relationship: Emergency Contact; POA    Best call back number: [phone]    What was the call regarding: PT'S NEPHEW/POA CALLED TO CANCEL PT'S F/U APPT W/ RAFAEL BUSTILLO ON 8/2/23. PT IS NOT DOING WELL AT THIS TIME AND PT'S NEPHEW DOES NOT BELIEVE PT WILL MAKE IT TO HER APPT AS HOSPICE HAS BEEN CALLED IN AND IS NOW ADMINISTERING MORPHINE TO KEEP PT COMFORTABLE. PT'S NEPHEW THANKS NEFTALY FOR ALL OF HER HELP IN REGARDS TO PT'S CARE. I ADVISED PT'S NEPHEW TO CALL IF HE/PT WERE TO NEED ANYTHING OR HAVE ANY ADDITIONAL QUESTIONS FOR US. PT'S NEPHEW VERBALIZED UNDERSTANDING.    Do you require a callback: NOT REQUIRED. IF ANY ADDITIONAL QUESTIONS, FEEL FREE TO REACH OUT.    PLEASE BE AWARE.

## 2023-07-25 NOTE — TELEPHONE ENCOUNTER
PATIENTS POA CALLING TO LET YOU KNOW THAT AMY IS NOW IN HOSPICE CARE- THEY WANT TO THANK NEFTALY FOR EVERYTHING SHE WAS ABLE TO DO FOR AMY OVER THE LAST TWO YEARS.

## 2023-07-26 NOTE — TELEPHONE ENCOUNTER
Thank you for the update. I am honored to be able to care for her over these past 2 years. Our prayers will be with the family during this time. Thanks, Ann-Marie

## 2023-09-13 ENCOUNTER — TELEPHONE (OUTPATIENT)
Dept: CARDIOLOGY | Facility: HOSPITAL | Age: 88
End: 2023-09-13

## 2023-09-13 NOTE — TELEPHONE ENCOUNTER
"    “Please be informed that patient has passed. Patient has been marked  in the system. The date of death is: 23\".    Caller: CHELE ZAPATA    Relationship: Emergency Contact    Best call back number: [phone]    "